# Patient Record
Sex: MALE
[De-identification: names, ages, dates, MRNs, and addresses within clinical notes are randomized per-mention and may not be internally consistent; named-entity substitution may affect disease eponyms.]

---

## 2017-07-23 NOTE — RAD
PROCEDURE:  Radiographs of the Left Forearm 



HISTORY:

2 broken needles in ARM s/p drug use







COMPARISON:

None available.



TECHNIQUE:

Frontal and lateral views obtained. 



FINDINGS:



BONES:

No fracture or destructive lesion.



JOINT SPACES:

Unremarkable.



OTHER FINDINGS:

Linear metallic radiopaque foreign bodies in the ventral soft tissues 

of proximal forearm, consistent with needles.



IMPRESSION:

Two needles in ventral soft tissues proximal left forearm.

## 2017-07-23 NOTE — ED PDOC
Arrival/HPI





- General


Chief Complaint: Abnormal Skin Integrity


Time Seen by Provider: 07/23/17 15:20


Historian: Patient





- History of Present Illness


Narrative History of Present Illness (Text): 





07/23/17 17:06


51-year-old male with a history of IV drug abuse presents today with concerns 

for broken needle in his skin. Patient states 2 days ago he was using heroin 

and the needle broke off near the antecubital region. Patient denies fevers or 

chills. Complaining of minimal pain. Denies numbness weakness or tingling in 

the extremity. Denies decreased range of motion of the extremity. Patient 

states one week ago this also happened and he was seen in the Hospital in 

[place] and they told him that he had a needle in the forearm. Patient states 

he was told that there was nothing that needed to be done and he was sent home.


Time/Duration: Other (2 days ago)


Symptom Onset: Sudden


Quality: Aching


Severity Level: 2





Past Medical History





- Provider Review


Nursing Documentation Reviewed: Yes





- Travel History


Have you recently traveled outside [place] w/in the past 3 mons?: No





- Tetanus Immunization


Tetanus Immunization: Unknown





- Cardiac


Hx Hypertension: Yes





- Psychiatric


Hx Substance Use: Yes (heroin, coccain)





- Surgical History


Other/Comment: hernia sx,





Family/Social History





- Physician Review


Nursing Documentation Reviewed: Yes


Family/Social History: Unknown Family HX


Smoking Status: Heavy Smoker > 10 Cigarettes Daily


Hx Alcohol Use: No


Hx Substance Use: Yes (heroin, coccain)





Allergies/Home Meds


Allergies/Adverse Reactions: 


Allergies





benztropine [From Cogentin] Allergy (Verified 07/23/17 15:08)


 SHORTNESS OF BREATH


chlorpromazine [From Thorazine] Allergy (Verified 07/23/17 15:08)


 URTICARIA


haloperidol [From Haldol] Allergy (Verified 07/23/17 15:08)


 SHORTNESS OF BREATH


phenytoin [From Dilantin] Allergy (Verified 07/23/17 15:08)


 SHORTNESS OF BREATH








Home Medications: 


 Home Meds











 Medication  Instructions  Recorded  Confirmed


 


Gabapentin [Neurontin] 600 mg PO TID 07/23/17 07/23/17


 


Lisinopril [Zestril] 5 mg PO DAILY 07/23/17 07/23/17














Review of Systems





- Review of Systems


Constitutional: absent: Fatigue, Fevers


Respiratory: absent: SOB, Cough


Cardiovascular: absent: Chest Pain, Palpitations


Gastrointestinal: absent: Abdominal Pain, Nausea, Vomiting


Musculoskeletal: Arthralgias


Skin: Other (fb in left forearm)


Neurological: absent: Headache, Dizziness





Physical Exam


Vital Signs Reviewed: Yes


Vital Signs











  Temp Pulse Resp BP Pulse Ox


 


 07/23/17 18:01   68  18  123/63  96


 


 07/23/17 16:35   71  18  125/65  96


 


 07/23/17 15:01  98.9 F  74  18  127/66  96











Temperature: Afebrile


Blood Pressure: Normal


Pulse: Regular


Respiratory Rate: Normal


Appearance: Positive for: Well-Appearing, Non-Toxic, Comfortable


Pain Distress: None


Mental Status: Positive for: Alert and Oriented X 3





- Systems Exam


Head: Present: Atraumatic


Mouth: Present: Moist Mucous Membranes


Neck: Present: Normal Range of Motion


Respiratory/Chest: Present: Clear to Auscultation, Good Air Exchange.  No: 

Respiratory Distress, Accessory Muscle Use


Cardiovascular: Present: Regular Rate and Rhythm, Normal S1, S2.  No: Murmurs


Upper Extremity: Present: Normal ROM, NORMAL PULSES, Neurovascularly Intact, 

Capillary Refill < 2s, Other (+ tract marks noted to left forearm and 

anticubital region; no palpable fb noted. no abscess or erythema noted. no 

edema. sensation and distal pulses intact; cap refill <2. full rom of entire 

extremity.  ).  No: Tenderness, Swelling, Erythema, Deformity


Neurological: Present: GCS=15, Speech Normal


Skin: Present: Warm, Dry


Psychiatric: Present: Alert, Oriented x 3





Medical Decision Making


ED Course and Treatment: 





07/23/17 17:09


51-year-old male with a history of IV drug abuse presents today with concerns 

for broken needle in the left arm





X-rays of the forearm shows 2 metallic foreign bodies within the volar aspect 

of the left forearm


X-rays of the humerus within normal limits








Case was discussed with Dr. Dunham: d/c home f/u in the clinic for 

evaluation and possible removal. 


Patient seen and evaluated by surgical resident dr. Cifuentes; no palpable fb. 

pt to f/u in surgical clinic.





tetanus updated


bactrim


keflex 


motrin








all results discussed with patient.


We will discharge the patient home with antibiotics. Advised follow-up in the 

surgical clinic ASAP.. Advised pt to return if symptoms worsen persist or if 

new concerning symptoms develop. stressed importance of close f/u and immediate 

return. advised repeat xrays. 





Patient verbalized full agreement with and understanding of discharge 

instructions. States that he agrees with the plan and disposition. Verbalized 

and repeated discharge instructions and plan. I have given the patient 

opportunity to ask any additional questions. 








all aspects of this case were discussed the attending of record. 








Impression: Foreign body skin


Motrin every 6 hours as needed for pain


Bactrim 1 tablet twice daily 7 days


Keflex 1 capsule 4 times daily 7 days


Follow-up with primary care physician within the next 2 days


Follow-up with the surgical clinic within the next 2 days


Return immediately if symptoms worsen persist or if new concerning symptoms 

develop high fevers, increasing pain, increasing redness, increasing swelling, 

purulent discharge








- RAD Interpretation


Radiology Orders: 








07/23/17 15:20


FOREARM LEFT [RAD] Stat 


HUMERUS LEFT [RAD] Stat 














- Medication Orders


Current Medication Orders: 











Discontinued Medications





Cephalexin Monohydrate (Keflex)  500 mg PO STAT STA


   PRN Reason: Protocol


   Stop: 07/23/17 17:57


Ibuprofen (Motrin Tab)  600 mg PO STAT STA


   Stop: 07/23/17 18:02


Tetanus/Reduced Diphtheria/Acell Pertussis (Boostrix Vaccine Inj)  0.5 ml IM 

.ONCE ONE


   Stop: 07/23/17 17:58


Trimethoprim/Sulfamethoxazole (Bactrim Ds Tab)  1 tab PO STAT STA


   PRN Reason: Protocol


   Stop: 07/23/17 17:57











Disposition/Present on Arrival





- Present on Arrival


Any Indicators Present on Arrival: No


History of DVT/PE: No


History of Uncontrolled Diabetes: No


Urinary Catheter: No


History of Decub. Ulcer: No


History Surgical Site Infection Following: None





- Disposition


Have Diagnosis and Disposition been Completed?: Yes


Diagnosis: 


 Foreign body in soft tissue





Disposition: HOME/ ROUTINE


Disposition Time: 17:10


Patient Plan: Discharge


Patient Problems: 


 Current Active Problems











Problem Status Onset


 


Foreign body in soft tissue Acute  











Condition: GOOD


Discharge Instructions (ExitCare):  Soft Tissue Foreign Body (ED)


Additional Instructions: 


Motrin every 6 hours as needed for pain


Bactrim 1 tablet twice daily 7 days


Keflex 1 capsule 4 times daily 7 days


Follow-up with primary care physician within the next 2 days


Follow-up with the surgical clinic within the next 2 days


Return immediately if symptoms worsen persist or if new concerning symptoms 

develop high fevers, increasing pain, increasing redness, increasing swelling, 

purulent discharge


Prescriptions: 


Cephalexin [Keflex] 500 mg PO QID #28 capsule


Ibuprofen [Motrin] 600 mg PO Q6H PRN #20 tab


 PRN Reason: pain/fever reduction


Sulfamethoxazole/Trimethoprim [Bactrim  mg-160 mg] 1 tab PO BID #14 tab


Referrals: 


Dylon Dunham MD [Staff Provider] - Follow up with primary


[facility] [Outside] - Follow up with primary


Forms:  WORK NOTE

## 2017-07-23 NOTE — RAD
PROCEDURE:  Radiographs of the left humerus.



HISTORY:

broken needle in arm



COMPARISON:

None.



FINDINGS:



BONES:

Normal. No fracture or focal lesion. 



SOFT TISSUES:

Normal.



OTHER FINDINGS:

None.



IMPRESSION:

Normal radiographs of left humerus.

## 2017-07-23 NOTE — CP.PCM.CON
History of Present Illness





- History of Present Illness


History of Present Illness: 





General Surgery Consult- Dr. Dunham





51M hx of IV drug abuse presents today with concerns for broken needle in his 

skin. Patient states 2 days ago he was using heroin and the needle broke off 

near the antecubital region. Patient denies fevers or chills. Complaining of 

minimal pain. Denies numbness weakness or tingling in the extremity. Denies 

decreased range of motion of the extremity. Patient states one week ago this 

also happened and he was seen in the Hospital in [place] and they told him 

that he had a needle in the forearm. Patient states he was told that there was 

nothing that needed to be done and he was sent home.


PMHx: none


PSH: None


ALL: Phenytoin, haloperidol, chlopromazine, benztropine


Socialhx: cocaine and heroin use. smoker 2ppd





Past Patient History





- Tetanus Immunizations


Tetanus Immunization: Unknown





- Past Social History


Smoking Status: Heavy Smoker > 10 Cigarettes Daily





- CARDIAC


Hx Hypertension: Yes





- PSYCHIATRIC


Hx Substance Use: Yes (heroin, coccain)





- SURGICAL HISTORY


Other/Comment: hernia sx,





Meds


Home Medications: 


 Home Medication List











 Medication  Instructions  Recorded  Confirmed  Type


 


Cephalexin [Keflex] 500 mg PO QID #28 capsule 07/23/17  Rx


 


Ibuprofen [Motrin] 600 mg PO Q6H PRN #20 tab 07/23/17  Rx


 


Sulfamethoxazole/Trimethoprim 1 tab PO BID #14 tab 07/23/17  Rx





[Bactrim  mg-160 mg]    











Allergies/Adverse Reactions: 


 Allergies











Allergy/AdvReac Type Severity Reaction Status Date / Time


 


benztropine [From Cogentin] Allergy  SHORTNESS Verified 07/23/17 15:08





   OF BREATH  


 


chlorpromazine Allergy  URTICARIA Verified 07/23/17 15:08





[From Thorazine]     


 


haloperidol [From Haldol] Allergy  SHORTNESS Verified 07/23/17 15:08





   OF BREATH  


 


phenytoin [From Dilantin] Allergy  SHORTNESS Verified 07/23/17 15:08





   OF BREATH  














Physical Exam





- Constitutional


Appears: Unkempt, Agitated, Confused





- Eye Exam


Eye Exam: EOMI.  absent: PERRL





- ENT Exam


ENT Exam: Mucous Membranes Moist





- Respiratory Exam


Respiratory Exam: Clear to Auscultation Bilateral, NORMAL BREATHING PATTERN.  

absent: Accessory Muscle Use, Wheezes





- Cardiovascular Exam


Cardiovascular Exam: REGULAR RHYTHM, +S1, +S2





- Rectal Exam


Rectal Exam: Deferred





- Extremities Exam


Additional comments: 





left anticubetal fossa burising from needle use. 





- Neurological Exam


Neurological exam: Alert, Oriented x3





- Skin


Additional comments: 





non-palpable needle





Results





- Vital Signs


Recent Vital Signs: 


 Last Vital Signs











Temp  98.9 F   07/23/17 15:01


 


Pulse  68   07/23/17 18:01


 


Resp  18   07/23/17 18:01


 


BP  123/63   07/23/17 18:01


 


Pulse Ox  96   07/23/17 18:01














Assessment & Plan





- Assessment and Plan (Free Text)


Assessment: 





51M hx of substance abuse presents with foreign body in Left arm


Plan: 





- pt neurovascular intact


- Xray shows two needles in arm.


- attempted to palpate and use US to extract the needle at bedside. Needle was 

not visible by US


- Pt is stable and neurovascular intact


- sent home on abx


- f/u as outpt 





D/W Dr. Roly Cifuentes PGY1





- Date & Time


Date: 07/23/17


Time: 15:30

## 2018-10-11 ENCOUNTER — HOSPITAL ENCOUNTER (EMERGENCY)
Dept: HOSPITAL 42 - ED | Age: 53
Discharge: HOME | End: 2018-10-11
Payer: MEDICAID

## 2018-10-11 ENCOUNTER — HOSPITAL ENCOUNTER (EMERGENCY)
Dept: HOSPITAL 42 - ED | Age: 53
Discharge: LEFT BEFORE DISCHARGE | End: 2018-10-11
Payer: MEDICAID

## 2018-10-11 VITALS — RESPIRATION RATE: 18 BRPM | TEMPERATURE: 98.2 F

## 2018-10-11 VITALS
TEMPERATURE: 98.5 F | OXYGEN SATURATION: 96 % | HEART RATE: 96 BPM | DIASTOLIC BLOOD PRESSURE: 84 MMHG | SYSTOLIC BLOOD PRESSURE: 160 MMHG

## 2018-10-11 VITALS — DIASTOLIC BLOOD PRESSURE: 100 MMHG | OXYGEN SATURATION: 99 % | HEART RATE: 98 BPM | SYSTOLIC BLOOD PRESSURE: 182 MMHG

## 2018-10-11 VITALS — BODY MASS INDEX: 25.8 KG/M2

## 2018-10-11 VITALS — RESPIRATION RATE: 20 BRPM

## 2018-10-11 DIAGNOSIS — R33.9: Primary | ICD-10-CM
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
## 2018-10-11 DIAGNOSIS — S37.30XA: Primary | ICD-10-CM
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
## 2018-10-11 LAB
BASOPHILS # BLD AUTO: 0.02 K/MM3 (ref 0–2)
BASOPHILS NFR BLD: 0.2 % (ref 0–3)
BUN SERPL-MCNC: 12 MG/DL (ref 7–21)
CALCIUM SERPL-MCNC: 9.8 MG/DL (ref 8.4–10.5)
EOSINOPHIL # BLD: 0 10*3/UL (ref 0–0.7)
EOSINOPHIL NFR BLD: 0.4 % (ref 1.5–5)
ERYTHROCYTE [DISTWIDTH] IN BLOOD BY AUTOMATED COUNT: 13.4 % (ref 11.5–14.5)
GFR NON-AFRICAN AMERICAN: > 60
GRANULOCYTES # BLD: 6.87 10*3/UL (ref 1.4–6.5)
GRANULOCYTES NFR BLD: 73.4 % (ref 50–68)
HGB BLD-MCNC: 15.3 G/DL (ref 14–18)
LYMPHOCYTES # BLD: 2 10*3/UL (ref 1.2–3.4)
LYMPHOCYTES NFR BLD AUTO: 20.9 % (ref 22–35)
MCH RBC QN AUTO: 30.2 PG (ref 25–35)
MCHC RBC AUTO-ENTMCNC: 34.2 G/DL (ref 31–37)
MCV RBC AUTO: 88.2 FL (ref 80–105)
MONOCYTES # BLD AUTO: 0.5 10*3/UL (ref 0.1–0.6)
MONOCYTES NFR BLD: 5.1 % (ref 1–6)
PLATELET # BLD: 271 10^3/UL (ref 120–450)
PMV BLD AUTO: 9.6 FL (ref 7–11)
RBC # BLD AUTO: 5.07 10^6/UL (ref 3.5–6.1)
WBC # BLD AUTO: 9.4 10^3/UL (ref 4.5–11)

## 2018-10-11 PROCEDURE — 99283 EMERGENCY DEPT VISIT LOW MDM: CPT

## 2018-10-11 PROCEDURE — 96372 THER/PROPH/DIAG INJ SC/IM: CPT

## 2018-10-11 PROCEDURE — 80048 BASIC METABOLIC PNL TOTAL CA: CPT

## 2018-10-11 PROCEDURE — 99284 EMERGENCY DEPT VISIT MOD MDM: CPT

## 2018-10-11 PROCEDURE — 96374 THER/PROPH/DIAG INJ IV PUSH: CPT

## 2018-10-11 PROCEDURE — 85025 COMPLETE CBC W/AUTO DIFF WBC: CPT

## 2018-10-11 NOTE — CP.PCM.CON
History of Present Illness





- History of Present Illness


History of Present Illness: 


Surgery: Dr. Love 





Reason for consult: urinary retention and urethral stricture 





51 y/o male pmhx of opioids abuse and dependence currently on methadone with 

recent placement of penile prosthesis in August 2018 presents complaining of 

severe pain at the tip of his penis and urinary retention since 3am. Patient 

states he was unable to void since 3am. He reports manipulating the prosthesis 

this am in attempts to assist with voiding and felt a "pop"/burst sensation. He 

did have the prosthesis completely deflated.  He denies f/c/n/v/. Per patient he

has been told he has a urethral stricture in the past, requiring dilation during

penile prosthesis procedure for glaser. 











Review of Systems





- Review of Systems


All systems: reviewed and no additional remarkable complaints except


Review of Systems: 





unless stated in HPI





Past Patient History





- Infectious Disease


Hx of Infectious Diseases: None





- Tetanus Immunizations


Tetanus Immunization: Unknown





- Past Social History


Smoking Status: Heavy Smoker > 10 Cigarettes Daily





- CARDIAC


Hx Hypertension: Yes





- GENITOURINARY/GYNECOLOGICAL


Other/Comment: penile implant





- PSYCHIATRIC


Hx Substance Use: Yes (heroin, coccain)





- SURGICAL HISTORY


Other/Comment: hernia sx,





- ANESTHESIA


Hx Anesthesia Reactions: No





Meds


Allergies/Adverse Reactions: 


                                    Allergies











Allergy/AdvReac Type Severity Reaction Status Date / Time


 


benztropine [From Cogentin] Allergy  SHORTNESS Verified 10/11/18 07:56





   OF BREATH  


 


chlorpromazine Allergy  URTICARIA Verified 10/11/18 07:56





[From Thorazine]     


 


haloperidol [From Haldol] Allergy  SHORTNESS Verified 10/11/18 07:56





   OF BREATH  


 


phenytoin [From Dilantin] Allergy  SHORTNESS Verified 10/11/18 07:56





   OF BREATH  














Physical Exam





- Constitutional


Appears: In Acute Distress





- Head Exam


Head Exam: ATRAUMATIC, NORMOCEPHALIC





- Eye Exam


Eye Exam: EOMI, Normal appearance





- ENT Exam


ENT Exam: Mucous Membranes Moist





- Respiratory Exam


Respiratory Exam: NORMAL BREATHING PATTERN.  absent: Respiratory Distress





- Cardiovascular Exam


Cardiovascular Exam: REGULAR RHYTHM.  absent: Tachycardia





-  Exam


 Exam: Circumcision, Bladder Distension.  absent: Scrotal Swelling (no 

thethering of prosthesis to the scrotum, penile shaft implant deflated), 

Testicular Tenderness, Uretheral Discharge





Assessment & Plan





- Assessment and Plan (Free Text)


Assessment: 





51 y/o male w/ malfunctioning penile prosthesis, no sign of infection, with 

urethral stricture and urinary retention 2/2 to above


Plan: 





-urethral stricture dilated with mosquito clamp and 10Fr glaser placed, clear 

yellow urine returned, no hematuria 


-patient instructed to f/u with primary urologist as soon as possible, being 

today or tomorrow for further evaluation 


-Urologist Dr. Jorge David in the [place]


-d/c patient with leg bag to continue urinary drainage until seen by primary 

urologist 


-recommend Keflex upon d/c 


-d/w ED attending Dr. Wolfe and urologist Dr. Kate Whitlock PGY4





- Date & Time


Date: 10/11/18


Time: 09:00

## 2018-10-11 NOTE — ED PDOC
Arrival/HPI





- General


Chief Complaint: Male Genitourinary


Time Seen by Provider: 10/11/18 12:30


Historian: Patient





- History of Present Illness


Narrative History of Present Illness (Text): 





10/11/18 12:40


52 M, returns to the Emergency department after self removal of Glaser catheter 

that was inserted about 1 hour prior to arrival. Patient presented to the 

Emergency department this morning for urine retention and was discharged at 

09:48 after FC was inserted.  Patient states he pulled his FC out of place and 

is in pain. Patient denies any other complaints. 











Time/Duration: Prior to Arrival (pt returned to Emergency department after self 

removal of FC )


Symptom Onset: Sudden


Symptom Course: Unchanged


Activities at Onset: Other (self removal of FC )





Past Medical History





- Provider Review


Nursing Documentation Reviewed: Yes





- Infectious Disease


Hx of Infectious Diseases: None





- Tetanus Immunization


Tetanus Immunization: Unknown





- Cardiac


Hx Hypertension: Yes





- Pulmonary


Hx Respiratory Disorders: No





- Neurological


Hx Neurological Disorder: No





- Genitourinary/Gynecological


Other/Comment: penile implant





- Psychiatric


Hx Substance Use: Yes (heroin, coccain)





- Surgical History


Other/Comment: hernia sx,





- Anesthesia


Hx Anesthesia Reactions: No





Family/Social History





- Physician Review


Nursing Documentation Reviewed: Yes


Family/Social History: No Known Family HX


Smoking Status: Heavy Smoker > 10 Cigarettes Daily


Hx Alcohol Use: No


Hx Substance Use: Yes (heroin, coccain)





Allergies/Home Meds


Allergies/Adverse Reactions: 


Allergies





benztropine [From Cogentin] Allergy (Verified 10/11/18 07:56)


   SHORTNESS OF BREATH


chlorpromazine [From Thorazine] Allergy (Verified 10/11/18 07:56)


   URTICARIA


haloperidol [From Haldol] Allergy (Verified 10/11/18 07:56)


   SHORTNESS OF BREATH


phenytoin [From Dilantin] Allergy (Verified 10/11/18 07:56)


   SHORTNESS OF BREATH








Home Medications: 


                                    Home Meds











 Medication  Instructions  Recorded  Confirmed


 


Gabapentin [Neurontin] 600 mg PO TID 07/23/17 07/23/17


 


Lisinopril [Zestril] 5 mg PO DAILY 07/23/17 07/23/17














Review of Systems





- Physician Review


All systems were reviewed & negative as marked: Yes (All other systems negative 

except that noted in the HPI.)





Physical Exam





- Physical Exam


Narrative Physical Exam (Text): 





10/11/18 12:40


Gen: VS reviewed, alert, well developed, well nourished, nontoxic, mild 

distress.





ENT: normal pharynx





Eye: EOMI, PERRL





Neck: no JVD, supple, no adenopathy





CV: regular rate, regular rhythm, no rubs, no murmer, no gallops, S1, S2, pulses

 equal and strong





Pulm: no distress, clear to auscultation, no wheeze, no rhonci, breath sounds 

equal, no rales





Abd: soft, nontender, no guarding, no rebound, no rigidity, normal bowel sounds





Ext: no edema.  





Genitourinary: FC is in place. It is draining clear urine. 





Skin: good color, no rash, no cyanosis





Psych: responds appropriately to questions, normal affect





Neuro: oriented x3, CN2-12 intact grossly, motor intact, sensation intact








Vital Signs Reviewed: Yes





Vital Signs











  Temp Pulse Resp BP Pulse Ox


 


 10/11/18 11:37  97.8 F  102 H  20  152/98 H  98


 


 10/11/18 11:33  97.8 F  102 H  20  152/98 H  98











Temperature: Afebrile


Blood Pressure: Hypertensive (at 152/98)


Pulse: Tachycardic (at 102)


Respiratory Rate: Normal


Appearance: Positive for: Well-Appearing, Non-Toxic


Pain Distress: Mild


Mental Status: Positive for: Alert and Oriented X 3





Medical Decision Making


ED Course and Treatment: 





10/11/18 12:40


Impression:








Differential Diagnosis included but are not limited to:  





Plan:


-- Morphine 


-- Pack ice - LG/SM routine


-- Reassess and disposition





Prior Visits:


Notes and results from previous visits were reviewed. Patient was last seen in 

the emergency department earlier today morning 10/11/18 for urinary retention. 

Patient was discharged home in stable condition and directed to follow up with 

his Urologist as soon as possible. 





Progress Notes:








10/11/18 14:15


events: patient came to the ED because he attempted to rip out his glaser 

catheter. patient states at that time there was transient blood coming from the 

catheter. upon arrival to the ED, there was no blood noted around the catheter 

or inside the urine bag. in fact, the catheter was draining urine appropriately 

but was disconnected from the collection bag (this is was disconnected prior to 

my eval). urology, dr. barker was contacted and at that time it was agreed that 

since the glaser was appropriately drainaing urine, there was no indication to 

alter management. moments later, the patient was complaining that blood is 

coming from the tube but the blood that is seen on the bed stretcher, it is 

unclear if the blood was coming from the catheter or the urethra on repeat 

inspection. dr. barker was contacted again and he recommended to remove the 

glaser catheter. dr. barker states that he is willing to see the patient in the 

ED but is currently help up with office patients. i went back to explain this to

 the patient and he states he is willing to wait for the urologist. i told the 

patient that i need to remove the catheter but he clearly refused removal of the

 catheter. 


10/11/18 15:44


patient left the ED, walked out. i did not witness the patient leaving the ED.





- Medication Orders


Current Medication Orders: 














Discontinued Medications





Morphine Sulfate (Morphine)  2 mg IM STAT STA


   Stop: 10/11/18 12:49


   Last Admin: 10/11/18 13:06  Dose: 2 mg





MAR Pain Assessment


 Document     10/11/18 13:06  [facility]  (Rec: 10/11/18 13:07  [id number]


     Pain Reassessment


      Is this a pain reassessment?               Yes


IM Administration Charges


 Document     10/11/18 13:06  [facility]  (Rec: 10/11/18 13:07  [id number]


     Charges for Administration


      # of IM Administrations                    1














- Scribe Statement


The provider has reviewed the documentation as recorded by the Scribe





Fely Hughes 





All medical record entries made by the Scribe were at my direction and 

personally dictated by me. I have reviewed the chart and agree that the record 

accurately reflects my personal performance of the history, physical exam, 

medical decision making, and the department course for this patient. I have also

 personally directed, reviewed, and agree with the discharge instructions and 

disposition.





Disposition/Present on Arrival





- Present on Arrival


Any Indicators Present on Arrival: No


History of DVT/PE: No


History of Uncontrolled Diabetes: No


Urinary Catheter: No


History of Decub. Ulcer: No


History Surgical Site Infection Following: None





- Disposition


Have Diagnosis and Disposition been Completed?: Yes


Diagnosis: 


 Internal injury, urethra, closed





Disposition: ELOPEMENT - ER ONLY


Disposition Time: 19:18


Condition: STABLE


Forms:  [employer] (English)

## 2018-10-11 NOTE — ED PDOC
Arrival/HPI





- General


Chief Complaint: Male Genitourinary


Time Seen by Provider: 10/11/18 07:47


Historian: Patient





- History of Present Illness


Narrative History of Present Illness (Text): 





10/11/18 08:25


52 M, whose past medical history includes hypertension and IV drug abuse, who 

presents to the Emergency department with cc of urinary retention. Patient 

reports last urination was at 03:00 this morning, and has not urinated since. 

Patient states his bladder feels full and notes he has not been able to urinate.

Patient had a penile implant on 08/13/18. Patient denies any other complaints. 








Urologist: Dr. David ([place], [place]) 








Time/Duration: Other (notes his last urination was 03:00 this morning, states 

bladder feels full. )


Symptom Onset: Sudden


Symptom Course: Unchanged


Activities at Onset: Light





Past Medical History





- Provider Review


Nursing Documentation Reviewed: Yes





- Infectious Disease


Hx of Infectious Diseases: None





- Tetanus Immunization


Tetanus Immunization: Unknown





- Cardiac


Hx Hypertension: Yes





- Genitourinary/Gynecological


Other/Comment: penile implant





- Psychiatric


Hx Substance Use: Yes (heroin, coccain)





- Surgical History


Other/Comment: hernia sx,





- Anesthesia


Hx Anesthesia Reactions: No





Family/Social History





- Physician Review


Nursing Documentation Reviewed: Yes


Family/Social History: No Known Family HX


Smoking Status: Heavy Smoker > 10 Cigarettes Daily


Hx Alcohol Use: No


Hx Substance Use: Yes (heroin, coccain)





Allergies/Home Meds


Allergies/Adverse Reactions: 


Allergies





benztropine [From Cogentin] Allergy (Verified 10/11/18 07:56)


   SHORTNESS OF BREATH


chlorpromazine [From Thorazine] Allergy (Verified 10/11/18 07:56)


   URTICARIA


haloperidol [From Haldol] Allergy (Verified 10/11/18 07:56)


   SHORTNESS OF BREATH


phenytoin [From Dilantin] Allergy (Verified 10/11/18 07:56)


   SHORTNESS OF BREATH








Home Medications: 


                                    Home Meds











 Medication  Instructions  Recorded  Confirmed


 


Gabapentin [Neurontin] 600 mg PO TID 07/23/17 07/23/17


 


Lisinopril [Zestril] 5 mg PO DAILY 07/23/17 07/23/17














Review of Systems





- Physician Review


All systems were reviewed & negative as marked: Yes (All other systems negative 

except that noted in the HPI.)





Physical Exam





- Physical Exam


Narrative Physical Exam (Text): 





10/11/18 08:25


Gen: VS reviewed, alert, well developed, well nourished, nontoxic, mild 

distress.





ENT: normal pharynx





Eye: EOMI, PERRL





Neck: no JVD, supple, no adenopathy





CV: regular rate, regular rhythm, no rubs, no murmer, no gallops, S1, S2, pulses

 equal and strong





Pulm: no distress, clear to auscultation, no wheeze, no rhonci, breath sounds 

equal, no rales





Abd: soft, nontender, no guarding, no rebound, no rigidity, normal bowel sounds





Ext: no edema





Skin: good color, no rash, no cyanosis





Psych: responds appropriately to questions, normal affect





Neuro: oriented x3, CN2-12 intact grossly, motor intact, sensation intact.





Very small urethral opening. No redness around genital area. No tenderness 

around genital area. 








Vital Signs Reviewed: Yes





Vital Signs











  Temp Pulse Resp BP Pulse Ox


 


 10/11/18 07:49  98.2 F  81  18  189/109 H  97











Temperature: Afebrile


Blood Pressure: Hypertensive (at 189/109)


Pulse: Regular


Respiratory Rate: Normal


Appearance: Positive for: Well-Appearing, Non-Toxic


Pain Distress: Mild


Mental Status: Positive for: Alert and Oriented X 3





Medical Decision Making


ED Course and Treatment: 





10/11/18 08:25





Impression:








Differential Diagnosis included but are not limited to:  





Plan:


-- BMP


-- CBC (with differential) 


-- Bladder scan 


-- Morphine


-- Reassess and disposition





Prior Visits:


Notes and results from previous visits were reviewed. 





Progress Notes:


10/11/18 08:40


Surgical resident is at bedside for evaluation. 





10/11/18 09:13


Pt appears to be very uncomfortable during procedure. Dose of Morphine ordered 

for analgesia. Asked nurse to please administer expeditiously. 








10/11/18 09:47


glaser catheter placed by surgical residents under the supervision of dr. barker.

 patient reported resolution of pain. patient is already taking po antibiotics 

for uti. methadone dose confirmed by clinic to be 180mg daily. patient will make

 an immediate follow up with his regular urologist.


10/11/18 10:15


the discussed methadone dose was only discussed in reference to the patient, not

 the patient's female significant other. the patient's methadone dose was 

confirmed at clinic and ordered to be given to the patient.





- Scribe Statement


The provider has reviewed the documentation as recorded by the Karenibe





Fely Hughes 





All medical record entries made by the Karenibe were at my direction and 

personally dictated by me. I have reviewed the chart and agree that the record 

accurately reflects my personal performance of the history, physical exam, 

medical decision making, and the department course for this patient. I have also

 personally directed, reviewed, and agree with the discharge instructions and 

disposition.





Disposition/Present on Arrival





- Present on Arrival


Any Indicators Present on Arrival: No


History of DVT/PE: No


History of Uncontrolled Diabetes: No


Urinary Catheter: No


History of Decub. Ulcer: No


History Surgical Site Infection Following: None





- Disposition


Have Diagnosis and Disposition been Completed?: Yes


Diagnosis: 


 Urine retention





Disposition: HOME/ ROUTINE


Disposition Time: 09:48


Patient Plan: Discharge


Patient Problems: 


                             Current Active Problems











Problem Status Onset


 


Urine retention Acute 











Condition: STABLE


Discharge Instructions (ExitCare):  Urinary Retention


Additional Instructions: 


Follow up with your urologist as soon as possible. Follow up with your primary 

care doctor to recheck your elevated blood pressure.





DOROTHY REYNOSO, thank you for letting us take care of you today. Your provider 

was Dr. Zay Wolfe and you were treated for URINARY RETENTION. The e

[facility] medical care you received today was directed at your acute symptoms. If

 you were prescribed any medication, please fill it and take as directed. It may

 take several days for your symptoms to resolve. Return to the Emergency 

Department if your symptoms worsen, do not improve, or if you have any other 

problems.





Please contact your doctor or call one of the physicians/clinics you have been 

referred to that are listed on the Patient Visit Information form that is 

included in your discharge packet. Bring any paperwork you were given at 

discharge with you along with any medications you are taking to your follow up 

visit. Our treatment cannot replace ongoing medical care by a primary care 

provider outside of the emergency department.





Thank you for allowing the [facility] team to be part of your care today.








If you had an X-Ray or CT scan: A Radiologist will review the ED reading if any 

change in treatment is needed we will contact you.***





If you had a blood, urine, or wound culture: It will take several days for the 

results, if any change in treatment is needed we will contact you.***





If you had an STI test: It will take 48 hours for the results. Please call after

 1 week if you have not heard back.***


Forms:  [employer] (English)

## 2018-11-07 ENCOUNTER — HOSPITAL ENCOUNTER (EMERGENCY)
Dept: HOSPITAL 42 - ED | Age: 53
Discharge: HOME | End: 2018-11-07
Payer: MEDICAID

## 2018-11-07 VITALS — HEART RATE: 75 BPM | DIASTOLIC BLOOD PRESSURE: 85 MMHG | OXYGEN SATURATION: 100 % | SYSTOLIC BLOOD PRESSURE: 154 MMHG

## 2018-11-07 VITALS — RESPIRATION RATE: 18 BRPM | TEMPERATURE: 98.8 F

## 2018-11-07 VITALS — BODY MASS INDEX: 24.2 KG/M2

## 2018-11-07 DIAGNOSIS — I10: ICD-10-CM

## 2018-11-07 DIAGNOSIS — R56.9: Primary | ICD-10-CM

## 2018-11-07 DIAGNOSIS — F17.210: ICD-10-CM

## 2018-11-07 DIAGNOSIS — E11.9: ICD-10-CM

## 2018-11-07 DIAGNOSIS — Z79.4: ICD-10-CM

## 2018-11-07 LAB
ALBUMIN SERPL-MCNC: 4.3 G/DL (ref 3–4.8)
ALBUMIN/GLOB SERPL: 1.3 {RATIO} (ref 1.1–1.8)
ALT SERPL-CCNC: 32 U/L (ref 7–56)
AST SERPL-CCNC: 40 U/L (ref 17–59)
BASOPHILS # BLD AUTO: 0.02 K/MM3 (ref 0–2)
BASOPHILS NFR BLD: 0.2 % (ref 0–3)
BUN SERPL-MCNC: 12 MG/DL (ref 7–21)
CALCIUM SERPL-MCNC: 8.9 MG/DL (ref 8.4–10.5)
EOSINOPHIL # BLD: 0.1 10*3/UL (ref 0–0.7)
EOSINOPHIL NFR BLD: 0.6 % (ref 1.5–5)
ERYTHROCYTE [DISTWIDTH] IN BLOOD BY AUTOMATED COUNT: 14 % (ref 11.5–14.5)
GFR NON-AFRICAN AMERICAN: > 60
GRANULOCYTES # BLD: 5.35 10*3/UL (ref 1.4–6.5)
GRANULOCYTES NFR BLD: 63.7 % (ref 50–68)
HGB BLD-MCNC: 13.8 G/DL (ref 14–18)
LYMPHOCYTES # BLD: 2.6 10*3/UL (ref 1.2–3.4)
LYMPHOCYTES NFR BLD AUTO: 30.4 % (ref 22–35)
MCH RBC QN AUTO: 29.6 PG (ref 25–35)
MCHC RBC AUTO-ENTMCNC: 33.8 G/DL (ref 31–37)
MCV RBC AUTO: 87.6 FL (ref 80–105)
MONOCYTES # BLD AUTO: 0.4 10*3/UL (ref 0.1–0.6)
MONOCYTES NFR BLD: 5.1 % (ref 1–6)
PLATELET # BLD: 236 10^3/UL (ref 120–450)
PMV BLD AUTO: 9.5 FL (ref 7–11)
RBC # BLD AUTO: 4.66 10^6/UL (ref 3.5–6.1)
WBC # BLD AUTO: 8.4 10^3/UL (ref 4.5–11)

## 2018-11-07 NOTE — CT
Date of service: 



11/07/2018



PROCEDURE:  CT HEAD WITHOUT CONTRAST.



HISTORY:

seizure



COMPARISON:

None available.



TECHNIQUE:

Axial computed tomography images were obtained through the head/brain 

without intravenous contrast.  



Radiation dose:



Total exam DLP = 868.0 mGy-cm.



This CT exam was performed using one or more of the following dose 

reduction techniques: Automated exposure control, adjustment of the 

mA and/or kV according to patient size, and/or use of iterative 

reconstruction technique.



FINDINGS:



HEMORRHAGE:

No intracranial hemorrhage. 



BRAIN:

No mass effect or edema.  No atrophy or chronic microvascular 

ischemic changes.



VENTRICLES:

Unremarkable. No hydrocephalus. 



CALVARIUM:

Unremarkable.



PARANASAL SINUSES:

Unremarkable as visualized. No significant inflammatory changes.



MASTOID AIR CELLS:

Unremarkable as visualized. No inflammatory changes.



OTHER FINDINGS:

The report concurs with the preliminary [facility] report



IMPRESSION:

No acute intracranial findings

## 2018-11-07 NOTE — ED PDOC
Arrival/HPI





- General


Chief Complaint: Seizure


Time Seen by Provider: 11/07/18 04:15


Historian: Patient





- History of Present Illness


Narrative History of Present Illness (Text): 





11/07/18 04:27


53 year old male, whose past medical history includes IDDM, IV drug abuse, 

Hypertension, Epilepsy, and Seizure disorder (Klonipan 2mg), presents to the 

emergency department s/p unwitnessed seizure in [facility]. Patient states 

he fell back and hit his head, but denies any loss of consciousness. Patient 

reports he missed his dose of Klonipan today and did not eat at all. He admits 

to drinking Vodka and a couple beers tonight. The patient denies any fever, 

chills, chest pain, shortness of breath, abdominal pain, nausea, vomiting, 

diarrhea, urinary symptoms, back pain, neck pain, headache, dizziness, or any 

other complaints. 


Time/Duration: Prior to Arrival


Symptom Onset: Sudden


Symptom Course: Improving


Activities at Onset: Light


Context: Walking, Street





Past Medical History





- Provider Review


Nursing Documentation Reviewed: Yes





- Infectious Disease


Hx of Infectious Diseases: None





- Tetanus Immunization


Tetanus Immunization: Unknown





- Cardiac


Hx Hypertension: Yes





- Pulmonary


Hx Respiratory Disorders: No





- Neurological


Hx Neurological Disorder: No


Hx Seizures: Yes





- Genitourinary/Gynecological


Other/Comment: penile implant





- Psychiatric


Hx Substance Use: Yes (heroin, coccain)





- Surgical History


Other/Comment: hernia sx,





- Anesthesia


Hx Anesthesia Reactions: No





Family/Social History





- Physician Review


Nursing Documentation Reviewed: Yes


Family/Social History: No Known Family HX


Smoking Status: Heavy Smoker > 10 Cigarettes Daily


Hx Alcohol Use: No


Hx Substance Use: Yes (heroin, coccain)





Allergies/Home Meds


Allergies/Adverse Reactions: 


Allergies





benztropine [From Cogentin] Allergy (Verified 10/11/18 07:56)


   SHORTNESS OF BREATH


chlorpromazine [From Thorazine] Allergy (Verified 10/11/18 07:56)


   URTICARIA


haloperidol [From Haldol] Allergy (Verified 10/11/18 07:56)


   SHORTNESS OF BREATH


phenytoin [From Dilantin] Allergy (Verified 10/11/18 07:56)


   SHORTNESS OF BREATH








Home Medications: 


                                    Home Meds











 Medication  Instructions  Recorded  Confirmed


 


Gabapentin [Neurontin] 600 mg PO TID 07/23/17 11/07/18


 


RX: Lisinopril [Zestril] 5 mg PO DAILY 07/23/17 11/07/18


 


Clonazepam [Klonopin] 2 mg PO DAILY 11/07/18 11/07/18














Review of Systems





- Physician Review


All systems were reviewed & negative as marked: Yes





- Review of Systems


Constitutional: absent: Fevers, Other (Chills)


Respiratory: absent: SOB


Cardiovascular: absent: Chest Pain


Gastrointestinal: absent: Abdominal Pain, Diarrhea, Nausea, Vomiting


Genitourinary Male: absent: Frequency


Musculoskeletal: absent: Back Pain, Neck Pain


Neurological: Seizure.  absent: Headache, Dizziness





Physical Exam


Vital Signs Reviewed: Yes





Vital Signs











  Temp Pulse Resp BP Pulse Ox


 


 11/07/18 04:09  98.8 F  76  18  172/96 H  95











Temperature: Afebrile


Blood Pressure: Hypertensive


Pulse: Regular


Respiratory Rate: Normal


Appearance: Positive for: Well-Appearing, Non-Toxic, Comfortable


Pain Distress: None


Mental Status: Positive for: Alert and Oriented X 3





- Systems Exam


Head: Present: Atraumatic, Normocephalic, Other (hematoma on the optical area of

 head.)


Pupils: Present: PERRL


Extroacular Muscles: Present: EOMI


Conjunctiva: Present: Normal


Mouth: Present: Moist Mucous Membranes


Neck: Present: Normal Range of Motion


Respiratory/Chest: Present: Clear to Auscultation, Good Air Exchange.  No: 

Respiratory Distress, Accessory Muscle Use


Cardiovascular: Present: Regular Rate and Rhythm, Normal S1, S2.  No: Murmurs


Abdomen: No: Tenderness, Distention, Peritoneal Signs


Back: Present: Normal Inspection


Upper Extremity: Present: Normal Inspection.  No: Cyanosis, Edema


Lower Extremity: Present: Normal Inspection.  No: Edema


Neurological: Present: GCS=15, CN II-XII Intact, Speech Normal


Skin: Present: Warm, Dry, Normal Color.  No: Rashes


Psychiatric: Present: Alert, Oriented x 3, Normal Insight, Normal Concentration





Medical Decision Making


ED Course and Treatment: 





11/07/18 04:27


Impression:


53 year old male presents s/p seizure episode. Patient did not take his dose of 

Klonipan today. 





Plan:


-- CT Head w/o contrast 


-- Labs


-- Tylenol  


-- Reassess and disposition





Prior Visits:


Notes and results from previous visits were reviewed.





Progress Notes:





EXAM: CT SCAN OF THE BRAIN WITHOUT IV CONTRAST 


Electronically signed on Nov 7, 2018 5:39:17 AM EST by:


Marisol Herzog M.D


IMPRESSION:


Normal unenhanced CT scan of the brain.





- Lab Interpretations


I have reviewed the lab results: Yes





- Scribe Statement


The provider has reviewed the documentation as recorded by the Karenibe





Frank Engel





Provider Scribe Attestation:


All medical record entries made by the Scribe were at my direction and 

personally dictated by me. I have reviewed the chart and agree that the record 

accurately reflects my personal performance of the history, physical exam, 

medical decision making, and the department course for this patient. I have also

 personally directed, reviewed, and agree with the discharge instructions and 

disposition.








Disposition/Present on Arrival





- Present on Arrival


Any Indicators Present on Arrival: No


History of DVT/PE: No


History of Uncontrolled Diabetes: No


Urinary Catheter: No


History of Decub. Ulcer: No


History Surgical Site Infection Following: None





- Disposition


Have Diagnosis and Disposition been Completed?: Yes


Diagnosis: 


 Seizure





Disposition: HOME/ ROUTINE


Disposition Time: 06:30


Condition: GOOD


Discharge Instructions (ExitCare):  Seizures, Adult (DC)


Forms:  [facility] [facility] [employer] [facility] (English)

## 2020-02-25 VITALS
OXYGEN SATURATION: 95 % | WEIGHT: 175.05 LBS | TEMPERATURE: 98 F | DIASTOLIC BLOOD PRESSURE: 97 MMHG | HEIGHT: 67 IN | HEART RATE: 95 BPM | RESPIRATION RATE: 18 BRPM | SYSTOLIC BLOOD PRESSURE: 157 MMHG

## 2020-02-25 LAB
ANION GAP SERPL CALC-SCNC: 3 MMOL/L — LOW (ref 9–16)
APAP SERPL-MCNC: <2 UG/ML — SIGNIFICANT CHANGE UP (ref 10–30)
BASOPHILS # BLD AUTO: 0.01 K/UL — SIGNIFICANT CHANGE UP (ref 0–0.2)
BASOPHILS NFR BLD AUTO: 0.1 % — SIGNIFICANT CHANGE UP (ref 0–2)
BUN SERPL-MCNC: 13 MG/DL — SIGNIFICANT CHANGE UP (ref 7–23)
CALCIUM SERPL-MCNC: 9.3 MG/DL — SIGNIFICANT CHANGE UP (ref 8.5–10.5)
CHLORIDE SERPL-SCNC: 96 MMOL/L — SIGNIFICANT CHANGE UP (ref 96–108)
CO2 SERPL-SCNC: 34 MMOL/L — HIGH (ref 22–31)
CREAT SERPL-MCNC: 1.09 MG/DL — SIGNIFICANT CHANGE UP (ref 0.5–1.3)
EOSINOPHIL # BLD AUTO: 0.01 K/UL — SIGNIFICANT CHANGE UP (ref 0–0.5)
EOSINOPHIL NFR BLD AUTO: 0.1 % — SIGNIFICANT CHANGE UP (ref 0–6)
ETHANOL SERPL-MCNC: <3 MG/DL — SIGNIFICANT CHANGE UP
GLUCOSE SERPL-MCNC: 134 MG/DL — HIGH (ref 70–99)
HCT VFR BLD CALC: 39.9 % — SIGNIFICANT CHANGE UP (ref 39–50)
HGB BLD-MCNC: 13.4 G/DL — SIGNIFICANT CHANGE UP (ref 13–17)
IMM GRANULOCYTES NFR BLD AUTO: 0.1 % — SIGNIFICANT CHANGE UP (ref 0–1.5)
LYMPHOCYTES # BLD AUTO: 2.43 K/UL — SIGNIFICANT CHANGE UP (ref 1–3.3)
LYMPHOCYTES # BLD AUTO: 35.4 % — SIGNIFICANT CHANGE UP (ref 13–44)
MCHC RBC-ENTMCNC: 29 PG — SIGNIFICANT CHANGE UP (ref 27–34)
MCHC RBC-ENTMCNC: 33.6 GM/DL — SIGNIFICANT CHANGE UP (ref 32–36)
MCV RBC AUTO: 86.4 FL — SIGNIFICANT CHANGE UP (ref 80–100)
MONOCYTES # BLD AUTO: 0.7 K/UL — SIGNIFICANT CHANGE UP (ref 0–0.9)
MONOCYTES NFR BLD AUTO: 10.2 % — SIGNIFICANT CHANGE UP (ref 2–14)
NEUTROPHILS # BLD AUTO: 3.7 K/UL — SIGNIFICANT CHANGE UP (ref 1.8–7.4)
NEUTROPHILS NFR BLD AUTO: 54.1 % — SIGNIFICANT CHANGE UP (ref 43–77)
NRBC # BLD: 0 /100 WBCS — SIGNIFICANT CHANGE UP (ref 0–0)
PLATELET # BLD AUTO: 241 K/UL — SIGNIFICANT CHANGE UP (ref 150–400)
POTASSIUM SERPL-MCNC: 3.8 MMOL/L — SIGNIFICANT CHANGE UP (ref 3.5–5.3)
POTASSIUM SERPL-SCNC: 3.8 MMOL/L — SIGNIFICANT CHANGE UP (ref 3.5–5.3)
RBC # BLD: 4.62 M/UL — SIGNIFICANT CHANGE UP (ref 4.2–5.8)
RBC # FLD: 13.6 % — SIGNIFICANT CHANGE UP (ref 10.3–14.5)
SALICYLATES SERPL-MCNC: 1.4 MG/DL — LOW (ref 2.8–20)
SODIUM SERPL-SCNC: 133 MMOL/L — SIGNIFICANT CHANGE UP (ref 132–145)
TSH SERPL-MCNC: 1.56 UIU/ML — SIGNIFICANT CHANGE UP (ref 0.36–3.74)
WBC # BLD: 6.86 K/UL — SIGNIFICANT CHANGE UP (ref 3.8–10.5)
WBC # FLD AUTO: 6.86 K/UL — SIGNIFICANT CHANGE UP (ref 3.8–10.5)

## 2020-02-25 PROCEDURE — 71045 X-RAY EXAM CHEST 1 VIEW: CPT | Mod: 26

## 2020-02-25 PROCEDURE — 90792 PSYCH DIAG EVAL W/MED SRVCS: CPT | Mod: GT

## 2020-02-25 RX ORDER — ACETAMINOPHEN 500 MG
975 TABLET ORAL ONCE
Refills: 0 | Status: COMPLETED | OUTPATIENT
Start: 2020-02-25 | End: 2020-02-25

## 2020-02-25 RX ADMIN — Medication 975 MILLIGRAM(S): at 21:55
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
## 2020-02-25 NOTE — ED BEHAVIORAL HEALTH ASSESSMENT NOTE - DIFFERENTIAL
schizoaffective d/o bipolar type  cocaine use d/o  cannabis use d/o   opioid use d/o in remission  r/o substance induced psychosis/mood d/o
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
## 2020-02-25 NOTE — ED BEHAVIORAL HEALTH ASSESSMENT NOTE - ACTIVATING EVENTS/STRESSORS
Inadequate social supports/Substance intoxication or withdrawal/Non-compliant or not receiving treatment

## 2020-02-25 NOTE — ED BEHAVIORAL HEALTH ASSESSMENT NOTE - MEDICAL ISSUES AND PLAN (INCLUDE STANDING AND PRN MEDICATION)
Defer to ED provider, please see ED provider note.  Per [facility], patient picked up Percocet 10-325mg tid on 2/1, would prescribe lower dosage for PRN for pain
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
## 2020-02-25 NOTE — ED BEHAVIORAL HEALTH ASSESSMENT NOTE - PSYCHIATRIC ISSUES AND PLAN (INCLUDE STANDING AND PRN MEDICATION)
standing medication defer to primary inpatient team, day team to clarify Methadone treatment program/dosage.  Provide Haldol 5mg q 6 hours PRN for agitation (IM or p.o.), Ativan 2mg q 6 hours PRN for agitation (IM or p.o.), Benadryl 50mg q 6 hours PRN for agitation (IM or p.o.)

## 2020-02-25 NOTE — ED PROVIDER NOTE - ENMT, MLM
s/p total tooth extraction , dry blood, Airway patent, Nasal mucosa clear. Throat has no vesicles, no oropharyngeal exudates and uvula is midline.
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
## 2020-02-25 NOTE — ED PROVIDER NOTE - CONSTITUTIONAL, MLM
normal... anxious , awake, alert, oriented to person, place, time/situation and in no apparent distress.

## 2020-02-25 NOTE — ED BEHAVIORAL HEALTH ASSESSMENT NOTE - PERSONAL COLLATERAL NAME
History and physical documented and up to date, allergies reviewed, lab results reviewed, pre-procedure education provided, patient verbalized understanding of procedure, procedural consent signed and patient is NPO. Tonya Buchanan

## 2020-02-25 NOTE — ED BEHAVIORAL HEALTH ASSESSMENT NOTE - DESCRIPTION (FIRST USE, LAST USE, QUANTITY, FREQUENCY, DURATION)
2-3 cigarettes daily social drinking edible MJ use intermittently intermittent use, last used few days ago h/o use, reports in Methadone program currently prescription use
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
## 2020-02-25 NOTE — ED BEHAVIORAL HEALTH ASSESSMENT NOTE - DESCRIPTION
please see [facility] note separately    istop  Reference #: [id number]    Others' Prescriptions  Patient Name:	Edmund Bueno	YOB: 1965  Address:	[address] [place] [address]	Sex:	Male  Rx Written	Rx Dispensed	Drug	Quantity	Days Supply	Prescriber Name  01/31/2020	02/01/2020	oxycodone-acetaminophen  mg tab	75	25	Chance Lay MD  12/13/2019	12/16/2019	oxycodone-acetaminophen  mg tab	75	25	Cristino Marsh K  09/27/2019	09/30/2019	oxycodone-acetaminophen  mg tab	60	30	Sudeep Graham  Patient Name:	Edmund Bueno	YOB: 1965  Address:	[address] [place] [address]	Sex:	Male  Rx Written	Rx Dispensed	Drug	Quantity	Days Supply	Prescriber Name  01/15/2020	01/17/2020	oxycodone-acetaminophen  mg tab	32	10	Chance Lay MD  12/18/2019	12/18/2019	dextroamp-amphetamin 30 mg tab	30	15	Bienvenido Gleason R (MD)  12/04/2019	12/16/2019	testosterone cyp 200 mg/ml	4ml	28	Bienvenido Gleason R (MD)  12/04/2019	12/04/2019	lorazepam 2 mg tablet	60	30	Bienvenido Gleason R (MD) s/p teeth extraction, self reported diabetes with neuropathy but glucose test was normal at ED, denied taking insulin or meds at home, reports h/o chronic pituitary tumors that do not require acute treatment see hpi

## 2020-02-25 NOTE — ED BEHAVIORAL HEALTH ASSESSMENT NOTE - OTHER
gf substance use, not currently in psych treatment defer to ED deferred to ED dysarthria due to s/p teeth extraction

## 2020-02-25 NOTE — ED BEHAVIORAL HEALTH ASSESSMENT NOTE - SUICIDE RISK FACTORS
Psychotic disorder current/past/Conduct problems current/past/Mood Disorder current/past/Unable to engage in safety planning/Alcohol/Substance abuse disorders/Insomnia

## 2020-02-25 NOTE — ED BEHAVIORAL HEALTH ASSESSMENT NOTE - ADDITIONAL DETAILS ALL
Patient reports active SI with plan and intent to slit wrist and run into traffic, denied prior attempt

## 2020-02-25 NOTE — ED BEHAVIORAL HEALTH ASSESSMENT NOTE - AXIS IV
Problem related to social environment/Problems with interaction with legal system/Occupational problems/Economic problems/Problems with primary support

## 2020-02-26 ENCOUNTER — INPATIENT (INPATIENT)
Facility: HOSPITAL | Age: 55
LOS: 6 days | Discharge: HOME | DRG: 885 | End: 2020-03-04
Attending: PSYCHIATRY & NEUROLOGY | Admitting: PSYCHIATRY & NEUROLOGY
Payer: COMMERCIAL

## 2020-02-26 DIAGNOSIS — F25.0 SCHIZOAFFECTIVE DISORDER, BIPOLAR TYPE: ICD-10-CM

## 2020-02-26 DIAGNOSIS — E13.9 OTHER SPECIFIED DIABETES MELLITUS WITHOUT COMPLICATIONS: ICD-10-CM

## 2020-02-26 DIAGNOSIS — F39 UNSPECIFIED MOOD [AFFECTIVE] DISORDER: ICD-10-CM

## 2020-02-26 DIAGNOSIS — D35.2 BENIGN NEOPLASM OF PITUITARY GLAND: ICD-10-CM

## 2020-02-26 DIAGNOSIS — G40.919 EPILEPSY, UNSPECIFIED, INTRACTABLE, WITHOUT STATUS EPILEPTICUS: ICD-10-CM

## 2020-02-26 DIAGNOSIS — F11.11 OPIOID ABUSE, IN REMISSION: ICD-10-CM

## 2020-02-26 DIAGNOSIS — K08.89 OTHER SPECIFIED DISORDERS OF TEETH AND SUPPORTING STRUCTURES: ICD-10-CM

## 2020-02-26 DIAGNOSIS — E11.69 TYPE 2 DIABETES MELLITUS WITH OTHER SPECIFIED COMPLICATION: ICD-10-CM

## 2020-02-26 DIAGNOSIS — I10 ESSENTIAL (PRIMARY) HYPERTENSION: ICD-10-CM

## 2020-02-26 LAB
ALBUMIN SERPL ELPH-MCNC: 3.9 G/DL — SIGNIFICANT CHANGE UP (ref 3.3–5)
ALP SERPL-CCNC: 76 U/L — SIGNIFICANT CHANGE UP (ref 40–120)
ALT FLD-CCNC: 19 U/L — SIGNIFICANT CHANGE UP (ref 10–45)
APPEARANCE UR: CLEAR — SIGNIFICANT CHANGE UP
AST SERPL-CCNC: 20 U/L — SIGNIFICANT CHANGE UP (ref 10–40)
BILIRUB DIRECT SERPL-MCNC: <0.2 MG/DL — SIGNIFICANT CHANGE UP (ref 0–0.2)
BILIRUB INDIRECT FLD-MCNC: >0.1 MG/DL — LOW (ref 0.2–1)
BILIRUB SERPL-MCNC: 0.3 MG/DL — SIGNIFICANT CHANGE UP (ref 0.2–1.2)
BILIRUB UR-MCNC: ABNORMAL
CHOLEST SERPL-MCNC: 144 MG/DL — SIGNIFICANT CHANGE UP (ref 10–199)
COLOR SPEC: YELLOW — SIGNIFICANT CHANGE UP
DIFF PNL FLD: NEGATIVE — SIGNIFICANT CHANGE UP
GLUCOSE BLDC GLUCOMTR-MCNC: 156 MG/DL — HIGH (ref 70–99)
GLUCOSE UR QL: 500
HAV IGM SER-ACNC: SIGNIFICANT CHANGE UP
HBA1C BLD-MCNC: 8.5 % — HIGH (ref 4–5.6)
HBV CORE IGM SER-ACNC: SIGNIFICANT CHANGE UP
HBV SURFACE AG SER-ACNC: SIGNIFICANT CHANGE UP
HCV AB S/CO SERPL IA: 8.95 S/CO — SIGNIFICANT CHANGE UP
HCV AB SERPL-IMP: REACTIVE
HDLC SERPL-MCNC: 54 MG/DL — SIGNIFICANT CHANGE UP
HIV 1+2 AB+HIV1 P24 AG SERPL QL IA: SIGNIFICANT CHANGE UP
INR BLD: 1.08 — SIGNIFICANT CHANGE UP (ref 0.88–1.16)
KETONES UR-MCNC: ABNORMAL MG/DL
LEUKOCYTE ESTERASE UR-ACNC: NEGATIVE — SIGNIFICANT CHANGE UP
LIPID PNL WITH DIRECT LDL SERPL: 78 MG/DL — SIGNIFICANT CHANGE UP
NITRITE UR-MCNC: NEGATIVE — SIGNIFICANT CHANGE UP
PCP SPEC-MCNC: SIGNIFICANT CHANGE UP
PH UR: 5.5 — SIGNIFICANT CHANGE UP (ref 5–8)
PROT SERPL-MCNC: 7.2 G/DL — SIGNIFICANT CHANGE UP (ref 6–8.3)
PROT UR-MCNC: NEGATIVE MG/DL — SIGNIFICANT CHANGE UP
PROTHROM AB SERPL-ACNC: 12.4 SEC — SIGNIFICANT CHANGE UP (ref 10–12.9)
SP GR SPEC: >=1.03 — SIGNIFICANT CHANGE UP (ref 1–1.03)
TOTAL CHOLESTEROL/HDL RATIO MEASUREMENT: 2.7 RATIO — LOW (ref 3.4–9.6)
TRIGL SERPL-MCNC: 58 MG/DL — SIGNIFICANT CHANGE UP (ref 10–149)
UROBILINOGEN FLD QL: 0.2 E.U./DL — SIGNIFICANT CHANGE UP

## 2020-02-26 PROCEDURE — 93010 ELECTROCARDIOGRAM REPORT: CPT

## 2020-02-26 PROCEDURE — 99223 1ST HOSP IP/OBS HIGH 75: CPT

## 2020-02-26 PROCEDURE — 99285 EMERGENCY DEPT VISIT HI MDM: CPT

## 2020-02-26 PROCEDURE — 70450 CT HEAD/BRAIN W/O DYE: CPT | Mod: 26

## 2020-02-26 RX ORDER — METHADONE HYDROCHLORIDE 40 MG/1
200 TABLET ORAL DAILY
Refills: 0 | Status: DISCONTINUED | OUTPATIENT
Start: 2020-02-26 | End: 2020-03-04

## 2020-02-26 RX ORDER — IBUPROFEN 200 MG
600 TABLET ORAL EVERY 6 HOURS
Refills: 0 | Status: DISCONTINUED | OUTPATIENT
Start: 2020-02-26 | End: 2020-02-27

## 2020-02-26 RX ORDER — INSULIN GLARGINE 100 [IU]/ML
20 INJECTION, SOLUTION SUBCUTANEOUS AT BEDTIME
Refills: 0 | Status: DISCONTINUED | OUTPATIENT
Start: 2020-02-26 | End: 2020-03-04

## 2020-02-26 RX ORDER — DEXTROSE 50 % IN WATER 50 %
25 SYRINGE (ML) INTRAVENOUS ONCE
Refills: 0 | Status: DISCONTINUED | OUTPATIENT
Start: 2020-02-26 | End: 2020-03-04

## 2020-02-26 RX ORDER — OXYCODONE AND ACETAMINOPHEN 5; 325 MG/1; MG/1
1 TABLET ORAL EVERY 8 HOURS
Refills: 0 | Status: DISCONTINUED | OUTPATIENT
Start: 2020-02-26 | End: 2020-02-26

## 2020-02-26 RX ORDER — SODIUM CHLORIDE 9 MG/ML
1000 INJECTION, SOLUTION INTRAVENOUS
Refills: 0 | Status: DISCONTINUED | OUTPATIENT
Start: 2020-02-26 | End: 2020-03-04

## 2020-02-26 RX ORDER — NICOTINE POLACRILEX 2 MG
1 GUM BUCCAL DAILY
Refills: 0 | Status: DISCONTINUED | OUTPATIENT
Start: 2020-02-26 | End: 2020-03-04

## 2020-02-26 RX ORDER — GABAPENTIN 400 MG/1
300 CAPSULE ORAL THREE TIMES A DAY
Refills: 0 | Status: DISCONTINUED | OUTPATIENT
Start: 2020-02-26 | End: 2020-02-27

## 2020-02-26 RX ORDER — DEXTROSE 50 % IN WATER 50 %
12.5 SYRINGE (ML) INTRAVENOUS ONCE
Refills: 0 | Status: DISCONTINUED | OUTPATIENT
Start: 2020-02-26 | End: 2020-03-04

## 2020-02-26 RX ORDER — MAGNESIUM HYDROXIDE 400 MG/1
30 TABLET, CHEWABLE ORAL DAILY
Refills: 0 | Status: DISCONTINUED | OUTPATIENT
Start: 2020-02-26 | End: 2020-03-04

## 2020-02-26 RX ORDER — ACETAMINOPHEN 500 MG
650 TABLET ORAL EVERY 6 HOURS
Refills: 0 | Status: DISCONTINUED | OUTPATIENT
Start: 2020-02-26 | End: 2020-02-27

## 2020-02-26 RX ORDER — QUETIAPINE FUMARATE 200 MG/1
50 TABLET, FILM COATED ORAL EVERY 6 HOURS
Refills: 0 | Status: DISCONTINUED | OUTPATIENT
Start: 2020-02-26 | End: 2020-03-04

## 2020-02-26 RX ORDER — GLUCAGON INJECTION, SOLUTION 0.5 MG/.1ML
1 INJECTION, SOLUTION SUBCUTANEOUS ONCE
Refills: 0 | Status: DISCONTINUED | OUTPATIENT
Start: 2020-02-26 | End: 2020-03-04

## 2020-02-26 RX ORDER — DIVALPROEX SODIUM 500 MG/1
500 TABLET, DELAYED RELEASE ORAL AT BEDTIME
Refills: 0 | Status: DISCONTINUED | OUTPATIENT
Start: 2020-02-26 | End: 2020-03-01

## 2020-02-26 RX ORDER — DEXTROSE 50 % IN WATER 50 %
15 SYRINGE (ML) INTRAVENOUS ONCE
Refills: 0 | Status: DISCONTINUED | OUTPATIENT
Start: 2020-02-26 | End: 2020-03-04

## 2020-02-26 RX ORDER — INSULIN LISPRO 100/ML
VIAL (ML) SUBCUTANEOUS
Refills: 0 | Status: DISCONTINUED | OUTPATIENT
Start: 2020-02-26 | End: 2020-03-04

## 2020-02-26 RX ADMIN — Medication 650 MILLIGRAM(S): at 21:27

## 2020-02-26 RX ADMIN — METHADONE HYDROCHLORIDE 200 MILLIGRAM(S): 40 TABLET ORAL at 12:01

## 2020-02-26 RX ADMIN — Medication 1: at 12:10

## 2020-02-26 RX ADMIN — GABAPENTIN 300 MILLIGRAM(S): 400 CAPSULE ORAL at 08:01

## 2020-02-26 RX ADMIN — Medication 4: at 16:45

## 2020-02-26 RX ADMIN — GABAPENTIN 300 MILLIGRAM(S): 400 CAPSULE ORAL at 21:23

## 2020-02-26 RX ADMIN — DIVALPROEX SODIUM 500 MILLIGRAM(S): 500 TABLET, DELAYED RELEASE ORAL at 21:23

## 2020-02-26 RX ADMIN — Medication 600 MILLIGRAM(S): at 16:48

## 2020-02-26 RX ADMIN — Medication 650 MILLIGRAM(S): at 13:04

## 2020-02-26 RX ADMIN — Medication 2 MILLIGRAM(S): at 14:47

## 2020-02-26 RX ADMIN — INSULIN GLARGINE 20 UNIT(S): 100 INJECTION, SOLUTION SUBCUTANEOUS at 21:26

## 2020-02-26 RX ADMIN — Medication 975 MILLIGRAM(S): at 08:01

## 2020-02-26 RX ADMIN — Medication 600 MILLIGRAM(S): at 17:33

## 2020-02-26 RX ADMIN — Medication 650 MILLIGRAM(S): at 12:00

## 2020-02-26 RX ADMIN — Medication 2 MILLIGRAM(S): at 21:26

## 2020-02-26 RX ADMIN — GABAPENTIN 300 MILLIGRAM(S): 400 CAPSULE ORAL at 14:47

## 2020-02-26 NOTE — BEHAVIORAL HEALTH ASSESSMENT NOTE - PROBLEM SELECTOR PLAN 3
Monitor blood sugar with PRN sliding scale insulin - Continue to monitor  - If elevated will restart patient on low dose of home Lisinopril
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
## 2020-02-26 NOTE — BEHAVIORAL HEALTH ASSESSMENT NOTE - SUICIDE PROTECTIVE FACTORS
Fear of death or the actual act of killing self/Positive therapeutic relationships/Responsibility to family and others/Identifies reasons for living

## 2020-02-26 NOTE — BEHAVIORAL HEALTH ASSESSMENT NOTE - NSBHCHARTREVIEWLAB_PSY_A_CORE FT
13.4   6.86  )-----------( 241      ( 25 Feb 2020 19:55 )             39.9    Liver panel pending.  ID labs pending.

## 2020-02-26 NOTE — BEHAVIORAL HEALTH ASSESSMENT NOTE - DIFFERENTIAL
schizoaffective d/o bipolar type  cluster B personality disorder  cocaine use d/o  cannabis use d/o   opioid use d/o in remission  r/o substance induced psychosis/mood d/o schizoaffective d/o bipolar type vs bipolar d/o  cluster B personality disorder  cocaine use d/o  cannabis use d/o   opioid use d/o in remission  r/o substance induced psychosis/mood d/o

## 2020-02-26 NOTE — BEHAVIORAL HEALTH ASSESSMENT NOTE - ACTIVATING EVENTS/STRESSORS
Acute medical problem/Non-compliant or not receiving treatment/Triggering events leading to humiliation, shame, and/or despair (e.g. Loss of relationship, financial or health status) (real or anticipated)/Substance intoxication or withdrawal

## 2020-02-26 NOTE — BEHAVIORAL HEALTH ASSESSMENT NOTE - OTHER PAST PSYCHIATRIC HISTORY (INCLUDE DETAILS REGARDING ONSET, COURSE OF ILLNESS, INPATIENT/OUTPATIENT TREATMENT)
Previous dx of bipolar disorder with apparent intellectual, social and conduct limitations Previous dx of bipolar disorder with apparent intellectual, social and conduct limitations. Prior hospitalizations. Prior dx of ADHD, depression.

## 2020-02-26 NOTE — BEHAVIORAL HEALTH ASSESSMENT NOTE - NSBHREFERDETAILS_PSY_A_CORE_FT
Patient seen at [facility] and transferred directly from [facility] ED to [facility] Patient seen at [facility] and transferred [facility] [place] Patient seen at [facility] and transferred [address] [place].

## 2020-02-26 NOTE — BEHAVIORAL HEALTH ASSESSMENT NOTE - SUICIDE RISK FACTORS
History of abuse/trauma/Family History of psychiatric diagnoses requiring hospitalization/Current mood episode/Alcohol/Substance abuse disorders/Access to lethal methods (pills, firearm, etc.: Ask specifically about presence or absence of a firearm in the home or ease of accessing/Conduct problems current/past/ADHD current/past/Cluster B Personality disorders or traits current/past

## 2020-02-26 NOTE — BEHAVIORAL HEALTH ASSESSMENT NOTE - PAST PSYCHOTROPIC MEDICATION
prozac prozac, lithium (not well tolerated for unknown reason), past trials of Haldol and Thorazine which resulted in significant EPS. Seroquel poorly tolerated.
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
## 2020-02-26 NOTE — BEHAVIORAL HEALTH ASSESSMENT NOTE - DESCRIPTION (FIRST USE, LAST USE, QUANTITY, FREQUENCY, DURATION)
2-3 cigs/day Has been drinking pint of vodka few times per week since separation Occasional edibles Nasal inhalation, used in past few weeks

## 2020-02-26 NOTE — BEHAVIORAL HEALTH ASSESSMENT NOTE - PROBLEM SELECTOR PLAN 4
Depakote - Head CT in ED wnl but non-sensitive to pituitary, may consider endocrinology involvement and MRI but currently asymptomatic  - Previously on testosterone but will hold due to possible mood effects

## 2020-02-26 NOTE — BEHAVIORAL HEALTH ASSESSMENT NOTE - NSBHADMITIPSTRENGTH_PSY_A_CORE
Has supportive interpersonal relationships with family, friends or peers/Awareness of substance use issues/Motivated and ready for change

## 2020-02-26 NOTE — BEHAVIORAL HEALTH ASSESSMENT NOTE - OTHER
Patient typically domiciled but has been displaced for several weeks, staying with uncle/friends/streets Patient fixed on breakup with wife few weeks ago and recent home displacement it has caused him hostile at times but able to cooperate with full exam. frustration tolerance diminished. regarding wife's indiscretions
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
## 2020-02-26 NOTE — BEHAVIORAL HEALTH ASSESSMENT NOTE - NSBHCHARTREVIEWVS_PSY_A_CORE FT
Vital Signs Last 24 Hrs  T(C): 36.9 (26 Feb 2020 09:00), Max: 37.2 (25 Feb 2020 22:43)  T(F): 98.4 (26 Feb 2020 09:00), Max: 98.9 (25 Feb 2020 22:43)  HR: 84 (26 Feb 2020 09:00) (84 - 105)  BP: 144/83 (26 Feb 2020 09:00) (132/80 - 157/97)  BP(mean): --  RR: 20 (26 Feb 2020 09:00) (16 - 20)  SpO2: 96% (26 Feb 2020 09:00) (95% - 97%)
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
## 2020-02-26 NOTE — BEHAVIORAL HEALTH ASSESSMENT NOTE - PROBLEM SELECTOR PLAN 1
- Start Depakote ER 500mg qHS with VPA level on 3/1/20 with mind to up-titrate  - Group and milieu therapy, to start individual therapy  - Continue discharge planning
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
## 2020-02-26 NOTE — BEHAVIORAL HEALTH ASSESSMENT NOTE - PROBLEM SELECTOR PROBLEM 2
Pituitary adenoma Type 2 diabetes mellitus with other specified complication, unspecified whether long term insulin use

## 2020-02-26 NOTE — BEHAVIORAL HEALTH ASSESSMENT NOTE - NSBHSOCIALHXDETAILSFT_PSY_A_CORE
Patient decided it was not worth repercussions to have firearm in his possession given his incarceration history so patient gave gun to friend.

## 2020-02-26 NOTE — BEHAVIORAL HEALTH ASSESSMENT NOTE - PROBLEM SELECTOR PLAN 2
Head CT in ED shows no acute treatment necessary, consider endocrinology involvement - Continue to monitor fingersticks  - Sliding scale PRN - Continue to monitor fingersticks  - Continue home dose of Lantus 20units at bedtime (as per PCP Dr. Gleason wanted to increase dose to 40units at bedtime on next visit)  - Sliding scale PRN  - Consider endocrine consultation if continued deranged fingersticks especially given pituitary concerns  - Dietary consultation placed in context of recent dental operation and dietary needs - Continue to monitor fingersticks  - Continue home dose of Lantus 20units at bedtime (as per PCP Dr. Gleason wanted to increase dose to 40units at bedtime on next visit)  - Sliding scale PRN  - Consider endocrine consultation if continued deranged fingersticks especially given pituitary concerns  - Dietary consultation placed in context of recent dental operation and dietary needs  - Neuropathic pain to continue Gabapentin 300mg TID with mind to increase to home dose of 600mg TID

## 2020-02-26 NOTE — BEHAVIORAL HEALTH ASSESSMENT NOTE - COMMENTS ON SUICIDE RISK/PROTECTIVE FACTORS:
Patient feels he is important and impact to wife's 5 yo son (from previous marriage) Patient feels he is important and impact to wife's 7 yo son (not biological, from previous marriage)

## 2020-02-26 NOTE — BEHAVIORAL HEALTH ASSESSMENT NOTE - NS ED BHA SUICIDALITY PRESENT CURRENT INTENT DETAILS COLLATERAL FT
As per ED note: mother reports patient is not at baseline for past few months and feels he has been delusional, paranoid and fixated about gf as. She does not  believe his report on his gf is accurate

## 2020-02-26 NOTE — BEHAVIORAL HEALTH ASSESSMENT NOTE - COMMENTS ON VIOLENCE RISK/PROTECTIVE FACTORS:
Patient aware of repercussions if he were to act on his feelings, does not want to go back to shelter
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
## 2020-02-27 LAB — T PALLIDUM AB TITR SER: NEGATIVE — SIGNIFICANT CHANGE UP

## 2020-02-27 PROCEDURE — 99232 SBSQ HOSP IP/OBS MODERATE 35: CPT

## 2020-02-27 RX ORDER — TRAMADOL HYDROCHLORIDE 50 MG/1
50 TABLET ORAL EVERY 6 HOURS
Refills: 0 | Status: DISCONTINUED | OUTPATIENT
Start: 2020-02-27 | End: 2020-03-03

## 2020-02-27 RX ORDER — GABAPENTIN 400 MG/1
600 CAPSULE ORAL THREE TIMES A DAY
Refills: 0 | Status: DISCONTINUED | OUTPATIENT
Start: 2020-02-27 | End: 2020-03-04

## 2020-02-27 RX ORDER — ACETAMINOPHEN 500 MG
1000 TABLET ORAL EVERY 8 HOURS
Refills: 0 | Status: DISCONTINUED | OUTPATIENT
Start: 2020-02-27 | End: 2020-02-27

## 2020-02-27 RX ORDER — TRAMADOL HYDROCHLORIDE 50 MG/1
25 TABLET ORAL EVERY 6 HOURS
Refills: 0 | Status: DISCONTINUED | OUTPATIENT
Start: 2020-02-27 | End: 2020-02-27

## 2020-02-27 RX ORDER — ACETAMINOPHEN 500 MG
1000 TABLET ORAL EVERY 8 HOURS
Refills: 0 | Status: DISCONTINUED | OUTPATIENT
Start: 2020-02-27 | End: 2020-03-04

## 2020-02-27 RX ORDER — IBUPROFEN 200 MG
800 TABLET ORAL EVERY 6 HOURS
Refills: 0 | Status: DISCONTINUED | OUTPATIENT
Start: 2020-02-27 | End: 2020-03-04

## 2020-02-27 RX ORDER — TRAMADOL HYDROCHLORIDE 50 MG/1
25 TABLET ORAL ONCE
Refills: 0 | Status: DISCONTINUED | OUTPATIENT
Start: 2020-02-27 | End: 2020-02-27

## 2020-02-27 RX ADMIN — TRAMADOL HYDROCHLORIDE 25 MILLIGRAM(S): 50 TABLET ORAL at 16:50

## 2020-02-27 RX ADMIN — Medication 650 MILLIGRAM(S): at 07:00

## 2020-02-27 RX ADMIN — GABAPENTIN 300 MILLIGRAM(S): 400 CAPSULE ORAL at 06:58

## 2020-02-27 RX ADMIN — Medication 1: at 16:44

## 2020-02-27 RX ADMIN — TRAMADOL HYDROCHLORIDE 25 MILLIGRAM(S): 50 TABLET ORAL at 17:50

## 2020-02-27 RX ADMIN — TRAMADOL HYDROCHLORIDE 50 MILLIGRAM(S): 50 TABLET ORAL at 22:07

## 2020-02-27 RX ADMIN — TRAMADOL HYDROCHLORIDE 25 MILLIGRAM(S): 50 TABLET ORAL at 17:00

## 2020-02-27 RX ADMIN — DIVALPROEX SODIUM 500 MILLIGRAM(S): 500 TABLET, DELAYED RELEASE ORAL at 21:41

## 2020-02-27 RX ADMIN — TRAMADOL HYDROCHLORIDE 25 MILLIGRAM(S): 50 TABLET ORAL at 17:55

## 2020-02-27 RX ADMIN — Medication 2 MILLIGRAM(S): at 09:51

## 2020-02-27 RX ADMIN — GABAPENTIN 600 MILLIGRAM(S): 400 CAPSULE ORAL at 21:41

## 2020-02-27 RX ADMIN — GABAPENTIN 300 MILLIGRAM(S): 400 CAPSULE ORAL at 13:30

## 2020-02-27 RX ADMIN — TRAMADOL HYDROCHLORIDE 50 MILLIGRAM(S): 50 TABLET ORAL at 21:41

## 2020-02-27 RX ADMIN — METHADONE HYDROCHLORIDE 200 MILLIGRAM(S): 40 TABLET ORAL at 09:52

## 2020-02-27 RX ADMIN — Medication 650 MILLIGRAM(S): at 07:01

## 2020-02-27 RX ADMIN — Medication 600 MILLIGRAM(S): at 11:22

## 2020-02-27 RX ADMIN — Medication 1: at 11:21

## 2020-02-27 RX ADMIN — INSULIN GLARGINE 20 UNIT(S): 100 INJECTION, SOLUTION SUBCUTANEOUS at 22:01

## 2020-02-27 RX ADMIN — Medication 600 MILLIGRAM(S): at 12:20

## 2020-02-27 NOTE — PROGRESS NOTE BEHAVIORAL HEALTH - OTHER
hostile at times but able to cooperate with full exam. frustration tolerance diminished. regarding wife's indiscretions paranoid superficially cooperative regarding partner's indiscretions
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
## 2020-02-27 NOTE — PROGRESS NOTE BEHAVIORAL HEALTH - PROBLEM SELECTOR PLAN 6
- NSAIDs PRN  - Follow-up outpatient post-discharge - increase Tylenol to 1000mg TID prn   - increase Motrin to 800mg q6hr  - increase Gabapentin to 600mg TID  - Follow-up outpatient post-discharge - increase Tylenol to 1000mg TID prn   - increase Motrin to 800mg q6hr  - increase Gabapentin to 600mg TID  - start Tramadol 25mg q6hr prn severe pain  - Follow-up outpatient post-discharge
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
## 2020-02-27 NOTE — PROGRESS NOTE BEHAVIORAL HEALTH - NSBHFUPVIOLFT_PSY_A_CORE
Patient feels aggression toward girlfriend during current struggles but reports he would not act on thoughts.

## 2020-02-27 NOTE — PROGRESS NOTE BEHAVIORAL HEALTH - PROBLEM SELECTOR PLAN 2
- Continue to monitor fingersticks  - Continue home dose of Lantus 20units at bedtime (as per PCP Dr. Gleason wanted to increase dose to 40units at bedtime on next visit)  - Sliding scale PRN  - Consider endocrine consultation if continued deranged fingersticks especially given pituitary concerns  - Dietary consultation placed in context of recent dental operation and dietary needs  - Neuropathic pain to continue Gabapentin 300mg TID with mind to increase to home dose of 600mg TID

## 2020-02-27 NOTE — PROGRESS NOTE BEHAVIORAL HEALTH - NSBHFUPINTERVALHXFT_PSY_A_CORE
Pt. seen, chart reviewed, case discussed with the Interdisciplinary Treatment Team. As per Nursing Report, pt has been irritable, cooperative, and compliant with medications.  Pt. seen individually, he is lying in his bed, dressed in hospital-provided clothing. Pt. reports to be feeling “depressed” and is fixated on his past with his partner. Also reports dental pain s/p teeth removal. He has been eating and sleeping well. Patient is paranoid of partners new boyfriends coming to the unit to harm him. Pt. denies SI/HI/AH/VH. No side effects to medications reported or observed. Pt. seen, chart reviewed, case discussed with the Interdisciplinary Treatment Team. As per Nursing Report, pt has been irritable, cooperative, and compliant with medications.  Pt. seen individually, he is lying in his bed, dressed in hospital-provided clothing. Pt. reports to be feeling “depressed” and is fixated on his past with his partner. Also reports dental pain s/p teeth removal. He has been eating and sleeping well. Patient is paranoid of partners new boyfriends coming to the unit to harm him. Pt. denies SI/HI/AH/VH. Pt. reports some mild sedation since starting Depakote but as he has been on it before appreciates this is temporary.  Pt. also continues to endorse tooth pain and requests increasing current pain medications.  Pt. also made several requests for Adderall during today's evaluation. Pt. seen, chart reviewed, case discussed with the Interdisciplinary Treatment Team. As per Nursing Report, pt has been irritable, cooperative, and compliant with medications.  Pt. seen individually, he is lying in his bed, dressed in hospital-provided clothing. Pt. reports to be feeling “depressed” and is fixated on his past with his partner. Also reports dental pain s/p teeth removal. He has been eating and sleeping well. Patient is paranoid of partners new boyfriends coming to the unit to harm him. Pt. denies SI/HI/AH/VH. Pt. reports some mild sedation since starting Depakote but as he has been on it before appreciates this is temporary.  Pt. also continues to endorse tooth pain and requests increasing current pain medications as well as Tramadol.  Pt. also made several requests for Adderall during today's evaluation.

## 2020-02-28 PROCEDURE — 99232 SBSQ HOSP IP/OBS MODERATE 35: CPT

## 2020-02-28 RX ORDER — HYDROXYZINE HCL 10 MG
50 TABLET ORAL AT BEDTIME
Refills: 0 | Status: DISCONTINUED | OUTPATIENT
Start: 2020-02-28 | End: 2020-03-04

## 2020-02-28 RX ADMIN — TRAMADOL HYDROCHLORIDE 50 MILLIGRAM(S): 50 TABLET ORAL at 11:20

## 2020-02-28 RX ADMIN — Medication 1000 MILLIGRAM(S): at 03:06

## 2020-02-28 RX ADMIN — INSULIN GLARGINE 20 UNIT(S): 100 INJECTION, SOLUTION SUBCUTANEOUS at 21:32

## 2020-02-28 RX ADMIN — Medication 2 MILLIGRAM(S): at 03:06

## 2020-02-28 RX ADMIN — Medication 2 MILLIGRAM(S): at 12:36

## 2020-02-28 RX ADMIN — GABAPENTIN 600 MILLIGRAM(S): 400 CAPSULE ORAL at 10:19

## 2020-02-28 RX ADMIN — GABAPENTIN 600 MILLIGRAM(S): 400 CAPSULE ORAL at 14:12

## 2020-02-28 RX ADMIN — TRAMADOL HYDROCHLORIDE 50 MILLIGRAM(S): 50 TABLET ORAL at 10:20

## 2020-02-28 RX ADMIN — TRAMADOL HYDROCHLORIDE 50 MILLIGRAM(S): 50 TABLET ORAL at 21:32

## 2020-02-28 RX ADMIN — Medication 1: at 21:43

## 2020-02-28 RX ADMIN — Medication 1000 MILLIGRAM(S): at 13:30

## 2020-02-28 RX ADMIN — Medication 1: at 17:28

## 2020-02-28 RX ADMIN — Medication 2 MILLIGRAM(S): at 17:28

## 2020-02-28 RX ADMIN — GABAPENTIN 600 MILLIGRAM(S): 400 CAPSULE ORAL at 21:32

## 2020-02-28 RX ADMIN — METHADONE HYDROCHLORIDE 200 MILLIGRAM(S): 40 TABLET ORAL at 10:20

## 2020-02-28 RX ADMIN — Medication 1000 MILLIGRAM(S): at 07:06

## 2020-02-28 RX ADMIN — Medication 1000 MILLIGRAM(S): at 12:36

## 2020-02-28 RX ADMIN — Medication 2 MILLIGRAM(S): at 21:32

## 2020-02-28 RX ADMIN — DIVALPROEX SODIUM 500 MILLIGRAM(S): 500 TABLET, DELAYED RELEASE ORAL at 21:32

## 2020-02-28 RX ADMIN — TRAMADOL HYDROCHLORIDE 50 MILLIGRAM(S): 50 TABLET ORAL at 23:36

## 2020-02-28 NOTE — PROGRESS NOTE BEHAVIORAL HEALTH - NSBHFUPVIOLFT_PSY_A_CORE
Unchanged since admission, patient occasionally feels resentment with thoughts of harm towards partner d/t recent breakup but no intent to act

## 2020-02-28 NOTE — PROGRESS NOTE BEHAVIORAL HEALTH - DETAILS
Dental pain s/p dental procedure. Patient reports sedative effect from Depakote but understands this is temporary

## 2020-02-28 NOTE — PROGRESS NOTE BEHAVIORAL HEALTH - PROBLEM SELECTOR PLAN 6
- NSAIDs PRN  - Follow-up outpatient post-discharge - NSAIDs PRN  - Added Tramadol PRN  - Follow-up outpatient post-discharge

## 2020-02-28 NOTE — PROGRESS NOTE BEHAVIORAL HEALTH - RISK ASSESSMENT
Static: Male Sex, Hx of Substance Use  Modifiable: Current Substance Use, Tx compliance  Protective: Fear of death/Dying  Current Risk: MODERATE  Current risk of self-harm due to mood episode will be mitigated by use of medication i.e. Depakote.

## 2020-02-28 NOTE — PROGRESS NOTE BEHAVIORAL HEALTH - PROBLEM SELECTOR PLAN 1
- Start Depakote ER 500mg qHS with VPA level on 3/1/20 with mind to up-titrate  - Group and milieu therapy, to start individual therapy  - Continue discharge planning - Continue Depakote ER 500mg qHS with VPA level on 3/1/20 with mind to up-titrate  - Atarax 50mg PRN for insomnia  - Group and milieu therapy, and to start individual therapy  - Continue discharge planning

## 2020-02-28 NOTE — PROGRESS NOTE BEHAVIORAL HEALTH - PROBLEM SELECTOR PLAN 2
- Continue to monitor fingersticks  - Continue home dose of Lantus 20units at bedtime (as per PCP Dr. Gleason wanted to increase dose to 40units at bedtime on next visit)  - Sliding scale PRN  - Consider endocrine consultation if continued deranged fingersticks especially given pituitary concerns  - Dietary consultation placed in context of recent dental operation and dietary needs  - Neuropathic pain to continue Gabapentin 300mg TID with mind to increase to home dose of 600mg TID - Continue to monitor fingersticks  - Continue home dose of Lantus 20units at bedtime (as per PCP Dr. Gleason wanted to increase dose to 40units at bedtime on next visit)  - Sliding scale PRN  - Consider endocrine consultation if continued deranged fingersticks especially given pituitary concerns  - Dietary consultation completed  - Neuropathic pain to continue Gabapentin 600mg TID

## 2020-02-28 NOTE — PROGRESS NOTE BEHAVIORAL HEALTH - NSBHFUPSUICINTERVALFT_PSY_A_CORE
Unchanged since admission, patient occasionally does not feel desire to live d/t recent events with partner but no intent to act
Patient feels depressed with expression on not wanting to live during current struggles but reports he would not act on thoughts.

## 2020-02-28 NOTE — PROGRESS NOTE BEHAVIORAL HEALTH - SUMMARY
As per previous note: "54 year old  male with previously domiciled with domestic partner of 3 years but broke up recently and homeless since early Feb 2020, part-time employment, mother as main social support, PPH of depression and ADHD, multiple prior admissions, remote h/o SA as per collateral, last at [facility] 1 year ago, h/o DAISY treatment last time was in 2016, currently in Methadone program for h/o opioid abuse, polysubstance abuse, used cocaine few days ago, and MJ use intermittently, Utox+ cocaine, methadone, amphetamines, and opioids, h/o incarceration for assault and manslaughter released from nursing home 2014, reports no active legal issues, has h/o violence, BIB self for SI/HI in context of medication nonadherence. On assessment on arrival to the unit he shares complaints of depressed mood, paranoia, and anxiety. He reports that sleep and concentration have been disrupted in the context of homelessness and preoccupation with his partner of 3.5 years. Objectively, patient appears irritable, with low frustration tolerance, but able to conduct full interview with team. 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 Diagnostic impression of mood disorder, unspecified. Plan to restart Methadone 200mg (confirmed) and to confirm appropriate liver functioning with lab draws in order to start Depakote ER 500mg qHS for mood stability.    2/27: Patient remains paranoid with overinclusive thoughts, continue Depakote for bipolar d/o. Will increase Gabapentin, Tylenol and Motrin to address pain."  2/28: Patient unable to sleep well d/t overinclusive thoughts about partner, paranoia and delusions still present but have decreased- patient feeling more safe on unit, continue observation. Dental pain improved with increase Tylenol and Motrin. As per previous note: "54 year old  male with previously domiciled with domestic partner of 3 years but broke up recently and homeless since early Feb 2020, part-time employment, mother as main social support, PPH of depression and ADHD, multiple prior admissions, remote h/o SA as per collateral, last at [facility] 1 year ago, h/o DAISY treatment last time was in 2016, currently in Methadone program for h/o opioid abuse, polysubstance abuse, used cocaine few days ago, and MJ use intermittently, Utox+ cocaine, methadone, amphetamines, and opioids, h/o incarceration for assault and manslaughter released from senior living 2014, reports no active legal issues, has h/o violence, BIB self for SI/HI in context of medication nonadherence. On assessment on arrival to the unit he shares complaints of depressed mood, paranoia, and anxiety. He reports that sleep and concentration have been disrupted in the context of homelessness and preoccupation with his partner of 3.5 years. Objectively, patient appears irritable, with low frustration tolerance, but able to conduct full interview with team. 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 Diagnostic impression of mood disorder, unspecified. Plan to restart Methadone 200mg (confirmed) and to confirm appropriate liver functioning with lab draws in order to start Depakote ER 500mg qHS for mood stability.    2/27: Patient remains paranoid with overinclusive thoughts, continue Depakote for bipolar d/o. Will increase Gabapentin, Tylenol and Motrin to address pain."  2/28: Paranoia and delusions still present but have decreased. He denies SI. Continue observation. Dental pain improved with increase in Tylenol and Motrin and addition of Tramadol. VPA level on Sunday with possible dose increase of Depakote.

## 2020-02-28 NOTE — PROGRESS NOTE BEHAVIORAL HEALTH - NSBHFUPINTERVALHXFT_PSY_A_CORE
Pt. seen, chart reviewed, case discussed with the Interdisciplinary Treatment Team. As per Nursing Report, pt has been irritable, anxious with drug seeking behaviors but has been cooperative, and compliant with medications.  Pt. seen individually, he is lying in his bed, dressed in hospital-provided clothing. Pt. reports to be feeling “the same” and is fixated on his past with his partner. Also reports improvement in dental pain since adjustment to pain management plan. He has been eating and sleeping well. Patient is still paranoid of partners new bfs coming to the unit to harm him. Pt. denies SI/HI/AH/VH. Reports mild sedative effects since beginning depakote, the patient understands this is a side effect of medications that will resolve in the next few days. Pt. seen, chart reviewed, case discussed with the Interdisciplinary Treatment Team. As per Nursing Report, pt has been irritable, anxious with drug seeking behaviors but has been cooperative, and compliant with medications.  Pt. seen individually, he is lying in his bed, dressed in hospital-provided clothing. Pt. reports to be feeling “the same” and is fixated on his past with his partner. Also reports improvement in dental pain since adjustment to pain management plan with Tramadol. He has been eating and sleeping well. Patient is still paranoid of partners new bfs coming to the unit to harm him but recognizes that no one is going to harm him on the unit. He denied any paranoia after having breakfast and waking up more. Pt. denies SI/HI/AH/VH. Reports mild sedative effects since beginning Depakote, the patient understands this is a side effect of medications that will resolve in the next few days.

## 2020-02-28 NOTE — PROGRESS NOTE BEHAVIORAL HEALTH - CASE SUMMARY
53 yo WM with domestic partner of 3 years, left home couple weeks ago, on SSI, unemployed, mother as main support, PPH of bipolar vs schizoaffective d/o, multiple prior admissions, denied h/o SA, last at [facility] 1 year ago, h/o DAISY treatment last time was in 2016, self-reported currently in Methadone program for h/o opioid abuse, poly substance abuse, used cocaine few days ago, and MJ use intermittently, Utox currently negative so as BAL, PMH of DM, HTN, ?pituitary tumor, h/o incarceration for assault and manslaughter released from senior care 2014 after 29 years of imprisonment, reports no active legal issues, has h/o violence, BIBS for SI/HI since 2/14 in context of medication noncompliance and recent breakup, s/p all teeth extraction on day of admission. Possible paranoid ideation prior to admission, SI with plan and intent to slit his wrist and HI to harm his gf.  He denies AH/VH, racing thoughts, poor sleep, poor appetite, energy, and concentration, with increased anxiety. Pt requests Adderall for ADHD sx. Labs/EKG reviewed. CT head WNL. DDX- SAD, SIMD. Will start VPA ER 500mg qhs for mood stabilization if LFTs wnl.  ISS for DM.  Consider restarting Lisinopril if BP is elevated (though pt seems to not be compliant outpt).  Will avoid restarting testosterone as it may exacerbate mood sx.  Appreciate collateral info. Psychoeducation provided re: importance of treatment compliance and risks associated with substance abuse. Encourage 12 step meetings.  IGM tx.  Discharge planning. Per staff, pt is anxious, med-seeking.  This morning, pt states that he had difficulty falling and staying asleep, reports depressed mood with occasional si.  Denies current si/hi/ah/vh.  Denies med side effects.  Cont current meds. Psychoeducation provided re: importance of treatment compliance and risks associated with substance abuse.  IGM tx.  Discharge planning.

## 2020-02-29 RX ORDER — OLANZAPINE 15 MG/1
5 TABLET, FILM COATED ORAL ONCE
Refills: 0 | Status: DISCONTINUED | OUTPATIENT
Start: 2020-02-29 | End: 2020-03-04

## 2020-02-29 RX ADMIN — GABAPENTIN 600 MILLIGRAM(S): 400 CAPSULE ORAL at 11:01

## 2020-02-29 RX ADMIN — METHADONE HYDROCHLORIDE 200 MILLIGRAM(S): 40 TABLET ORAL at 10:57

## 2020-02-29 RX ADMIN — TRAMADOL HYDROCHLORIDE 50 MILLIGRAM(S): 50 TABLET ORAL at 12:15

## 2020-02-29 NOTE — CHART NOTE - NSCHARTNOTEFT_GEN_A_CORE
RN called: patient is demanding ativan 2mg , tramadol and methadone 200mg at the same time and nursing doesn't feel comfortable giving due to high risk of respiratory depression.  Patient became agitated and yelling at nurses.    Pt seen, chart reviewed.    He states that he takes ativan on the street and needs it now for anxiety.  It is explained that ativan is only given for benzo withdrawal and agitation combine with antipsychotic.  He refuses to take antipsychotic.  Lidocaine viscous offered for toothache and he declined.  He went to his room.      In the afternoon, he requested for ativan again and agrees to take with olanzapine 5mg for agitation.

## 2020-03-01 LAB — VALPROATE SERPL-MCNC: 6.8 UG/ML — LOW (ref 50–100)

## 2020-03-01 RX ORDER — LISINOPRIL 2.5 MG/1
5 TABLET ORAL DAILY
Refills: 0 | Status: DISCONTINUED | OUTPATIENT
Start: 2020-03-01 | End: 2020-03-04

## 2020-03-01 RX ORDER — DIVALPROEX SODIUM 500 MG/1
1000 TABLET, DELAYED RELEASE ORAL AT BEDTIME
Refills: 0 | Status: DISCONTINUED | OUTPATIENT
Start: 2020-03-01 | End: 2020-03-04

## 2020-03-01 RX ADMIN — Medication 800 MILLIGRAM(S): at 07:20

## 2020-03-01 RX ADMIN — DIVALPROEX SODIUM 1000 MILLIGRAM(S): 500 TABLET, DELAYED RELEASE ORAL at 21:42

## 2020-03-01 RX ADMIN — METHADONE HYDROCHLORIDE 200 MILLIGRAM(S): 40 TABLET ORAL at 10:29

## 2020-03-01 RX ADMIN — Medication 800 MILLIGRAM(S): at 06:25

## 2020-03-01 RX ADMIN — Medication 2 MILLIGRAM(S): at 15:56

## 2020-03-01 RX ADMIN — TRAMADOL HYDROCHLORIDE 50 MILLIGRAM(S): 50 TABLET ORAL at 13:06

## 2020-03-01 RX ADMIN — GABAPENTIN 600 MILLIGRAM(S): 400 CAPSULE ORAL at 21:43

## 2020-03-01 RX ADMIN — Medication 1: at 13:06

## 2020-03-01 RX ADMIN — TRAMADOL HYDROCHLORIDE 50 MILLIGRAM(S): 50 TABLET ORAL at 22:31

## 2020-03-01 RX ADMIN — Medication 2 MILLIGRAM(S): at 06:25

## 2020-03-01 RX ADMIN — GABAPENTIN 600 MILLIGRAM(S): 400 CAPSULE ORAL at 10:29

## 2020-03-01 RX ADMIN — INSULIN GLARGINE 20 UNIT(S): 100 INJECTION, SOLUTION SUBCUTANEOUS at 21:43

## 2020-03-01 RX ADMIN — TRAMADOL HYDROCHLORIDE 50 MILLIGRAM(S): 50 TABLET ORAL at 21:43

## 2020-03-01 NOTE — PROGRESS NOTE BEHAVIORAL HEALTH - PROBLEM SELECTOR PLAN 2
- Continue to monitor fingersticks  - Continue home dose of Lantus 20units at bedtime (as per PCP Dr. Gleason wanted to increase dose to 40units at bedtime on next visit)  - Sliding scale PRN  - Consider endocrine consultation if continued deranged fingersticks especially given pituitary concerns  - Dietary consultation completed  - Neuropathic pain to continue Gabapentin 600mg TID

## 2020-03-01 NOTE — PROGRESS NOTE BEHAVIORAL HEALTH - NSBHFUPINTERVALHXFT_PSY_A_CORE
yesterday, on call psychiatrist changed ativan order from prn for anxiety to prn for withdrawal. patient is angry about this, says "I don't understand why I have to be sick to get my ativan" He missed his depakote last night because he was angry about the ativan. he agrees to increase his depakote

## 2020-03-01 NOTE — PROGRESS NOTE BEHAVIORAL HEALTH - SUMMARY
As per previous note: "54 year old  male with previously domiciled with domestic partner of 3 years but broke up recently and homeless since early Feb 2020, part-time employment, mother as main social support, PPH of depression and ADHD, multiple prior admissions, remote h/o SA as per collateral, last at [facility] 1 year ago, h/o DAISY treatment last time was in 2016, currently in Methadone program for h/o opioid abuse, polysubstance abuse, used cocaine few days ago, and MJ use intermittently, Utox+ cocaine, methadone, amphetamines, and opioids, h/o incarceration for assault and manslaughter released from residential 2014, reports no active legal issues, has h/o violence, BIB self for SI/HI in context of medication nonadherence. On assessment on arrival to the unit he shares complaints of depressed mood, paranoia, and anxiety. He reports that sleep and concentration have been disrupted in the context of homelessness and preoccupation with his partner of 3.5 years. Objectively, patient appears irritable, with low frustration tolerance, but able to conduct full interview with team. 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 Diagnostic impression of mood disorder, unspecified. Plan to restart Methadone 200mg (confirmed) and to confirm appropriate liver functioning with lab draws in order to start Depakote ER 500mg qHS for mood stability.    2/27: Patient remains paranoid with overinclusive thoughts, continue Depakote for bipolar d/o. Will increase Gabapentin, Tylenol and Motrin to address pain."  2/28: Paranoia and delusions still present but have decreased. He denies SI. Continue observation. Dental pain improved with increase in Tylenol and Motrin and addition of Tramadol. VPA level on Sunday with possible dose increase of Depakote.  3/1, patient angry that ativan was changed from a prn for anxiety to a prn for withdrawal. level today is 6.8, however he missed his dose last night. Although level is likely falsely low due to missed dose, it is so low that he can likely tolerate 1000mg at bedtime

## 2020-03-01 NOTE — PROGRESS NOTE BEHAVIORAL HEALTH - PROBLEM SELECTOR PLAN 1
- Continue Depakote ER 500mg qHS with VPA level on 3/1/20 with mind to up-titrate  - Atarax 50mg PRN for insomnia  - Group and milieu therapy, and to start individual therapy  - Continue discharge planning

## 2020-03-02 PROCEDURE — 99232 SBSQ HOSP IP/OBS MODERATE 35: CPT

## 2020-03-02 RX ORDER — ARIPIPRAZOLE 15 MG/1
2 TABLET ORAL AT BEDTIME
Refills: 0 | Status: DISCONTINUED | OUTPATIENT
Start: 2020-03-02 | End: 2020-03-03

## 2020-03-02 RX ADMIN — ARIPIPRAZOLE 2 MILLIGRAM(S): 15 TABLET ORAL at 22:20

## 2020-03-02 RX ADMIN — METHADONE HYDROCHLORIDE 200 MILLIGRAM(S): 40 TABLET ORAL at 09:41

## 2020-03-02 RX ADMIN — Medication 2 MILLIGRAM(S): at 22:20

## 2020-03-02 RX ADMIN — Medication 1: at 11:46

## 2020-03-02 RX ADMIN — Medication 2 MILLIGRAM(S): at 07:18

## 2020-03-02 RX ADMIN — Medication 1: at 16:52

## 2020-03-02 RX ADMIN — TRAMADOL HYDROCHLORIDE 50 MILLIGRAM(S): 50 TABLET ORAL at 07:18

## 2020-03-02 RX ADMIN — Medication 2: at 22:20

## 2020-03-02 RX ADMIN — Medication 2 MILLIGRAM(S): at 00:35

## 2020-03-02 RX ADMIN — DIVALPROEX SODIUM 1000 MILLIGRAM(S): 500 TABLET, DELAYED RELEASE ORAL at 22:20

## 2020-03-02 RX ADMIN — Medication 2 MILLIGRAM(S): at 14:56

## 2020-03-02 RX ADMIN — GABAPENTIN 600 MILLIGRAM(S): 400 CAPSULE ORAL at 22:20

## 2020-03-02 RX ADMIN — TRAMADOL HYDROCHLORIDE 50 MILLIGRAM(S): 50 TABLET ORAL at 22:20

## 2020-03-02 RX ADMIN — TRAMADOL HYDROCHLORIDE 50 MILLIGRAM(S): 50 TABLET ORAL at 08:10

## 2020-03-02 RX ADMIN — GABAPENTIN 600 MILLIGRAM(S): 400 CAPSULE ORAL at 14:55

## 2020-03-02 RX ADMIN — GABAPENTIN 600 MILLIGRAM(S): 400 CAPSULE ORAL at 09:41

## 2020-03-02 RX ADMIN — INSULIN GLARGINE 20 UNIT(S): 100 INJECTION, SOLUTION SUBCUTANEOUS at 22:22

## 2020-03-02 NOTE — PROGRESS NOTE BEHAVIORAL HEALTH - CASE SUMMARY
53 yo M pph Schizoaffective D/o responding well to current medications with no side effects reported or observed. Risks, benefits, and potential side effects of all medications discussed with patient. Current risk of self-harm due to mood episode will be mitigated by use of medication i.e Depakote and starting new antipsychotic. Inpatient Hospitalization is necessary at this time to ensure pt safety, stabilize symptoms, and plan a safe discharge. Per staff, pt has been irritable, refusing AP, pain 2’ dental work.  This morning, pt states that he is feeling better.  Denies current si/hi/ah/vh. He denies thoughts of harming anybody.  He recognizes that he may see the clarence who was sleeping with his wife, but he doesn’t want to go back to [facility] so he doesn’t plan to harm them.  Interested in discharge. Focused on getting more Ativan.  Denies med side effects.   Agreeable to accepting Abilify to augment VPA.  Refuses Risperdal or Zyprexa 2’ fears of weight gain or sexual dysfunction. Overall, although pt will remain at chronically elevated risk given his extensive forensic history, h/o mood sx, h/o irritability an d substance abuse, at this time his acute risk is being mitigated with meds.  Cont current meds. Start Abilify 2mg daily.  Rba of meds discussed with pt. Psychoeducation provided re: importance of treatment compliance and risks associated with substance abuse.  IGM tx.  Discharge planning.

## 2020-03-02 NOTE — PROGRESS NOTE BEHAVIORAL HEALTH - SUMMARY
As per previous note: "54 year old  male with previously domiciled with domestic partner of 3 years but broke up recently and homeless since early Feb 2020, part-time employment, mother as main social support, PPH of depression and ADHD, multiple prior admissions, remote h/o SA as per collateral, last at [facility] 1 year ago, h/o DAISY treatment last time was in 2016, currently in Methadone program for h/o opioid abuse, polysubstance abuse, used cocaine few days ago, and MJ use intermittently, Utox+ cocaine, methadone, amphetamines, and opioids, h/o incarceration for assault and manslaughter released from retirement 2014, reports no active legal issues, has h/o violence, BIB self for SI/HI in context of medication nonadherence. On assessment on arrival to the unit he shares complaints of depressed mood, paranoia, and anxiety. He reports that sleep and concentration have been disrupted in the context of homelessness and preoccupation with his partner of 3.5 years. Objectively, patient appears irritable, with low frustration tolerance, but able to conduct full interview with team. 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 Diagnostic impression of mood disorder, unspecified. Plan to restart Methadone 200mg (confirmed) and to confirm appropriate liver functioning with lab draws in order to start Depakote ER 500mg qHS for mood stability.    2/27: Patient remains paranoid with overinclusive thoughts, continue Depakote for bipolar d/o. Will increase Gabapentin, Tylenol and Motrin to address pain.  2/28: Paranoia and delusions still present but have decreased. He denies SI. Continue observation. Dental pain improved with increase in Tylenol and Motrin and addition of Tramadol. VPA level on Sunday with possible dose increase of Depakote."  3/2: Patient is less paranoid today but still having delusions of partner. Expresses HI without plan, denies SI/AH/VH. Dental pain improved on current pain management regiment, will continue PRN. Depakote 1000mg PO qd without side effects. Consider adding a antipsychotic for delusions and personalty d/o. As per previous note: "54 year old  male with previously domiciled with domestic partner of 3 years but broke up recently and homeless since early Feb 2020, part-time employment, mother as main social support, PPH of depression and ADHD, multiple prior admissions, remote h/o SA as per collateral, last at [facility] 1 year ago, h/o DAISY treatment last time was in 2016, currently in Methadone program for h/o opioid abuse, polysubstance abuse, used cocaine few days ago, and MJ use intermittently, Utox+ cocaine, methadone, amphetamines, and opioids, h/o incarceration for assault and manslaughter released from custodial 2014, reports no active legal issues, has h/o violence, BIB self for SI/HI in context of medication nonadherence. On assessment on arrival to the unit he shares complaints of depressed mood, paranoia, and anxiety. He reports that sleep and concentration have been disrupted in the context of homelessness and preoccupation with his partner of 3.5 years. Objectively, patient appears irritable, with low frustration tolerance, but able to conduct full interview with team. 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 Diagnostic impression of mood disorder, unspecified. Plan to restart Methadone 200mg (confirmed) and to confirm appropriate liver functioning with lab draws in order to start Depakote ER 500mg qHS for mood stability.    2/27: Patient remains paranoid with overinclusive thoughts, continue Depakote for bipolar d/o. Will increase Gabapentin, Tylenol and Motrin to address pain.  2/28: Paranoia and delusions still present but have decreased. He denies SI. Continue observation. Dental pain improved with increase in Tylenol and Motrin and addition of Tramadol. VPA level on Sunday with possible dose increase of Depakote."  3/2: Patient is less paranoid today but still having delusions of partner. Expresses HI without plan, denies SI/AH/VH. Dental pain improved on current pain management regiment, will continue PRN. Depakote 1000mg PO qd without side effects. will start abilify 2mg daily ic for delusions and personalty d/o.

## 2020-03-02 NOTE — PROGRESS NOTE BEHAVIORAL HEALTH - NSBHFUPINTERVALHXFT_PSY_A_CORE
Pt. seen, chart reviewed, case discussed with the Interdisciplinary Treatment Team. As per Nursing Report, pt has been irritable but cooperative, and compliant with medications aside from refusing depakote last night, asking several times for Ativan or Tramadol.  Pt. seen individually, he is lying in his bed, dressed in hospital-provided clothing. Pt. reports to be feeling “angry” as he has been speaking with his partner over the phone without resolution of prior issues. Decrease in sleep for the past 2 nights and requesting sleep aid. Improvement with mouth pain, patient eating well. Reports some HI towards partner’s new boyfriend and understands the repercussions should he act on these desires. Pt. denies SI/HI/AH/VH. No side effects to medications reported or observed. Pt. is looking forward to being discharged this week and expresses interest in Outpatient psych follow up and returning to his PCP. Pt. seen, chart reviewed, case discussed with the Interdisciplinary Treatment Team. As per Nursing Report, pt has been irritable but cooperative, and compliant with medications aside from refusing depakote last night, asking several times for Ativan or Tramadol.  Pt. seen individually, he is lying in his bed, dressed in hospital-provided clothing. Pt. reports to be feeling “angry” as he has been speaking with his partner over the phone without resolution of prior issues. Decrease in sleep for the past 2 nights and requesting sleep aid. Improvement with mouth pain, patient eating well. Reports some HI towards partner’s new boyfriend but says he will not act on them and understands the repercussions should he act on these desires. Pt. denies SI/HI/AH/VH. No side effects to medications reported or observed. Pt. is looking forward to being discharged this week and expresses interest in Outpatient psych follow up and returning to his PCP.

## 2020-03-02 NOTE — PROGRESS NOTE BEHAVIORAL HEALTH - NSBHADMITIPREASON_PSY_A_CORE
Danger to self; mental illness expected to respond to inpatient care stabilizing/Danger to self; mental illness expected to respond to inpatient care

## 2020-03-02 NOTE — PROGRESS NOTE BEHAVIORAL HEALTH - NSBHFUPVIOLFT_PSY_A_CORE
Patient expresses desire to harm partner's new boyfriend but feels it may be related to his unstable relationship with her. Repercussions of acting on this ideation was discussed with patient. Patient expresses desire to harm partner's new boyfriend but feels it may be related to his unstable relationship with her. Repercussions of acting on this ideation was discussed with patient. Patient reports should he see them he will try to avoid them and walk away

## 2020-03-02 NOTE — PROGRESS NOTE BEHAVIORAL HEALTH - RISK ASSESSMENT
Static: Male Sex, Hx of Substance Use  Modifiable: Current Substance Use, Tx compliance  Protective: Fear of death/Dying, understands importance of f/u  Current Risk: Low  Current risk of self-harm due to mood episode will be mitigated by use of medication i.e. Depakote.

## 2020-03-03 PROCEDURE — 99232 SBSQ HOSP IP/OBS MODERATE 35: CPT

## 2020-03-03 RX ORDER — TRAMADOL HYDROCHLORIDE 50 MG/1
50 TABLET ORAL EVERY 6 HOURS
Refills: 0 | Status: DISCONTINUED | OUTPATIENT
Start: 2020-03-03 | End: 2020-03-04

## 2020-03-03 RX ORDER — ARIPIPRAZOLE 15 MG/1
5 TABLET ORAL AT BEDTIME
Refills: 0 | Status: DISCONTINUED | OUTPATIENT
Start: 2020-03-03 | End: 2020-03-04

## 2020-03-03 RX ADMIN — Medication 1000 MILLIGRAM(S): at 21:58

## 2020-03-03 RX ADMIN — Medication 1000 MILLIGRAM(S): at 22:52

## 2020-03-03 RX ADMIN — Medication 1: at 16:56

## 2020-03-03 RX ADMIN — Medication 2 MILLIGRAM(S): at 18:38

## 2020-03-03 RX ADMIN — TRAMADOL HYDROCHLORIDE 50 MILLIGRAM(S): 50 TABLET ORAL at 19:29

## 2020-03-03 RX ADMIN — Medication 2 MILLIGRAM(S): at 11:33

## 2020-03-03 RX ADMIN — METHADONE HYDROCHLORIDE 200 MILLIGRAM(S): 40 TABLET ORAL at 09:51

## 2020-03-03 RX ADMIN — TRAMADOL HYDROCHLORIDE 50 MILLIGRAM(S): 50 TABLET ORAL at 11:41

## 2020-03-03 RX ADMIN — Medication 3: at 22:06

## 2020-03-03 RX ADMIN — TRAMADOL HYDROCHLORIDE 50 MILLIGRAM(S): 50 TABLET ORAL at 18:38

## 2020-03-03 RX ADMIN — ARIPIPRAZOLE 5 MILLIGRAM(S): 15 TABLET ORAL at 21:58

## 2020-03-03 RX ADMIN — Medication 1000 MILLIGRAM(S): at 14:50

## 2020-03-03 RX ADMIN — GABAPENTIN 600 MILLIGRAM(S): 400 CAPSULE ORAL at 09:51

## 2020-03-03 RX ADMIN — GABAPENTIN 600 MILLIGRAM(S): 400 CAPSULE ORAL at 13:51

## 2020-03-03 RX ADMIN — TRAMADOL HYDROCHLORIDE 50 MILLIGRAM(S): 50 TABLET ORAL at 13:06

## 2020-03-03 RX ADMIN — DIVALPROEX SODIUM 1000 MILLIGRAM(S): 500 TABLET, DELAYED RELEASE ORAL at 21:58

## 2020-03-03 RX ADMIN — INSULIN GLARGINE 20 UNIT(S): 100 INJECTION, SOLUTION SUBCUTANEOUS at 22:06

## 2020-03-03 RX ADMIN — GABAPENTIN 600 MILLIGRAM(S): 400 CAPSULE ORAL at 21:58

## 2020-03-03 RX ADMIN — Medication 2 MILLIGRAM(S): at 04:44

## 2020-03-03 RX ADMIN — Medication 2: at 11:37

## 2020-03-03 RX ADMIN — Medication 1000 MILLIGRAM(S): at 13:51

## 2020-03-03 NOTE — PROGRESS NOTE BEHAVIORAL HEALTH - OTHER
superficially cooperative regarding partner's indiscretions regarding partner's indiscretions improving

## 2020-03-03 NOTE — PROGRESS NOTE BEHAVIORAL HEALTH - CASE SUMMARY
Per staff, pt has been more cooperative, calm and present on the unit. This morning, pt states that he is feeling better. Denies current si/hi/ah/vh. He denies thoughts of harming anybody.  He recognizes that he may see the clarence who was sleeping with his wife, but he doesn’t want to go back to assisted so he doesn’t plan to harm them. Interested in discharge. Focused on getting more Ativan.  Denies med side effects.   Agreeable to accepting Abilify to augment VPA and admits to some improvement of "noise in brain."  Refuses Risperdal or Zyprexa 2’ fears of weight gain or sexual dysfunction. Overall, although pt will remain at chronically elevated risk given his extensive forensic history, h/o mood sx, h/o irritability an d substance abuse, at this time his acute risk is being mitigated with meds.  Cont current meds.  Rba of meds discussed with pt. Psychoeducation provided re: importance of treatment compliance and risks associated with substance abuse.  IGM tx.  Discharge planning. Case discussed with interdisciplinary team.  Chart reviewed.  Per staff, pt is anxious, focused on ativan, easily agitated, visible on the unit, social with peers, cooperative.  This morning, pt states that he feels better and feels ready to leave the hospital. Denies current si/hi/ah/vh. He denies thoughts of harming anybody else, acknowledges understanding of ramifications of hurting others.  He has remained in behavioral control. Interested in discharge. Accepted and tolerated Abilify without side effects.  Visible on the unit, attending groups. Overall, although pt will remain at chronically elevated risk given his extensive forensic history, h/o mood sx, h/o irritability and substance abuse, at this time his acute risk is being mitigated with meds.  Cont current meds. increase to Abilify 5mg daily to better address delusions.  RBA of meds discussed with pt. Pt educated to get VPA level upon discharge.  Psychoeducation provided re: importance of treatment compliance and risks associated with substance abuse.  IGM tx.  Discharge planning.

## 2020-03-03 NOTE — PROGRESS NOTE BEHAVIORAL HEALTH - PROBLEM SELECTOR PLAN 1
- Continue Depakote ER 500mg qHS with VPA level on 3/1/20 with mind to up-titrate  - Atarax 50mg PRN for insomnia  - Group and milieu therapy, and to start individual therapy  - Continue discharge planning - Continue Depakote ER 1000mg qHS (subtherapeutic VPA level on 3/1/20)  - Atarax 50mg PRN for insomnia  - Group and milieu therapy, and individual therapy  - Continue discharge planning

## 2020-03-03 NOTE — PROGRESS NOTE BEHAVIORAL HEALTH - NSBHFUPINTERVALHXFT_PSY_A_CORE
Pt. seen, chart reviewed, case discussed with the Interdisciplinary Treatment Team. As per Nursing Report, pt has been social with poor boundaries and agitated especially in regards to PRN medications, medicine driven requesting Ativan, has been compliant with medications.  Pt. seen individually, he is lying in his bed, dressed in personal clothing. Pt. reports to be feeling “more calm” with “less noise in his brain” since starting Abilify yesterday. He has been eating and sleeping well.  Pt. denies SI/HI/AH/VH. No side effects to medications reported or observed. Pt reports feeling comfortable remaining on these medications when he is discharged.

## 2020-03-03 NOTE — PROGRESS NOTE BEHAVIORAL HEALTH - PROBLEM SELECTOR PLAN 5
- Continue Methadone 200mg daily  - start Abilify 2 mg daily - Continue Methadone 200mg daily  - Increase Abilify to 5 mg qhs - Continue Methadone 200mg daily  - Increase to Abilify to 5 mg qhs

## 2020-03-03 NOTE — PROGRESS NOTE BEHAVIORAL HEALTH - SUMMARY
As per previous assessments: "54 year old  male with previously domiciled with domestic partner of 3 years but broke up recently and homeless since early Feb 2020, part-time employment, mother as main social support, PPH of depression and ADHD, multiple prior admissions, remote h/o SA as per collateral, last at [facility] 1 year ago, h/o DAISY treatment last time was in 2016, currently in Methadone program for h/o opioid abuse, polysubstance abuse, used cocaine few days ago, and MJ use intermittently, Utox+ cocaine, methadone, amphetamines, and opioids, h/o incarceration for assault and manslaughter released from longterm 2014, reports no active legal issues, has h/o violence, BIB self for SI/HI in context of medication nonadherence. On assessment on arrival to the unit he shares complaints of depressed mood, paranoia, and anxiety. He reports that sleep and concentration have been disrupted in the context of homelessness and preoccupation with his partner of 3.5 years. Objectively, patient appears irritable, with low frustration tolerance, but able to conduct full interview with team. 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 Diagnostic impression of mood disorder, unspecified. Plan to restart Methadone 200mg (confirmed) and to confirm appropriate liver functioning with lab draws in order to start Depakote ER 500mg qHS for mood stability.    2/27: Patient remains paranoid with overinclusive thoughts, continue Depakote for bipolar d/o. Will increase Gabapentin, Tylenol and Motrin to address pain.  2/28: Paranoia and delusions still present but have decreased. He denies SI. Continue observation. Dental pain improved with increase in Tylenol and Motrin and addition of Tramadol. VPA level on Sunday with possible dose increase of Depakote.  3/2: Patient is less paranoid today but still having delusions of partner. Expresses HI without plan, denies SI/AH/VH. Dental pain improved on current pain management regiment, will continue PRN. Depakote 1000mg PO qd without side effects. will start Abilify 2mg daily ic for delusions and personalty d/o."  3/3: Patient appears less agitated today and expresses ability to think more clearly today after starting Abilify yesterday and feels comfortable continuing these mediation outpatient. Patient is still delusional about partners indiscretions but no longer expressing anger towards her. Continue current medication regimen and prepare discharge for later in the week. As per previous assessments: "54 year old  male with previously domiciled with domestic partner of 3 years but broke up recently and homeless since early Feb 2020, part-time employment, mother as main social support, PPH of depression and ADHD, multiple prior admissions, remote h/o SA as per collateral, last at [facility] 1 year ago, h/o DAISY treatment last time was in 2016, currently in Methadone program for h/o opioid abuse, polysubstance abuse, used cocaine few days ago, and MJ use intermittently, Utox+ cocaine, methadone, amphetamines, and opioids, h/o incarceration for assault and manslaughter released from long-term 2014, reports no active legal issues, has h/o violence, BIB self for SI/HI in context of medication nonadherence. On assessment on arrival to the unit he shares complaints of depressed mood, paranoia, and anxiety. He reports that sleep and concentration have been disrupted in the context of homelessness and preoccupation with his partner of 3.5 years. Objectively, patient appears irritable, with low frustration tolerance, but able to conduct full interview with team. 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 Diagnostic impression of mood disorder, unspecified. Plan to restart Methadone 200mg (confirmed) and to confirm appropriate liver functioning with lab draws in order to start Depakote ER 500mg qHS for mood stability.    2/27: Patient remains paranoid with overinclusive thoughts, continue Depakote for bipolar d/o. Will increase Gabapentin, Tylenol and Motrin to address pain.  2/28: Paranoia and delusions still present but have decreased. He denies SI. Continue observation. Dental pain improved with increase in Tylenol and Motrin and addition of Tramadol. VPA level on Sunday with possible dose increase of Depakote.  3/2: Patient is less paranoid today but still having delusions of partner. Expresses HI without plan, denies SI/AH/VH. Dental pain improved on current pain management regiment, will continue PRN. Depakote 1000mg PO qd without side effects. will start Abilify 2mg daily ic for delusions and personalty d/o."  3/3: Patient appears less agitated today and expresses ability to think more clearly today after starting Abilify yesterday and feels comfortable continuing these medication outpatient. Patient is still delusional about partners indiscretions but no longer expressing anger towards her or the clarence she was with. Continue current medication regimen and prepare discharge for later in the week.

## 2020-03-03 NOTE — PROGRESS NOTE BEHAVIORAL HEALTH - RISK ASSESSMENT
Static: Male Sex, Hx of Substance Use  Modifiable: Current Substance Use, Tx compliance  Protective: Fear of death/Dying, understands importance of f/u  Current Risk: Low  Current risk of self-harm due to mood episode will be mitigated by use of medication i.e. Depakote and Risperdal. Static risk factors: Male Sex, Hx of Substance Use, h/o previous incarceration for 29yrs for murder  Modifiable risk factors on admission: Current Substance Use, Tx noncompliance, depressed mood and SI  Protective factors: Fear of death/Dying, understands importance of f/u  Current Risk: Low  Current risk of self-harm due to mood episode has been mitigated by use of medication i.e. Depakote and Abilify

## 2020-03-04 VITALS
HEART RATE: 82 BPM | SYSTOLIC BLOOD PRESSURE: 139 MMHG | OXYGEN SATURATION: 95 % | TEMPERATURE: 98 F | DIASTOLIC BLOOD PRESSURE: 91 MMHG | RESPIRATION RATE: 18 BRPM

## 2020-03-04 PROCEDURE — 86780 TREPONEMA PALLIDUM: CPT

## 2020-03-04 PROCEDURE — 85025 COMPLETE CBC W/AUTO DIFF WBC: CPT

## 2020-03-04 PROCEDURE — 80048 BASIC METABOLIC PNL TOTAL CA: CPT

## 2020-03-04 PROCEDURE — 80076 HEPATIC FUNCTION PANEL: CPT

## 2020-03-04 PROCEDURE — 80164 ASSAY DIPROPYLACETIC ACD TOT: CPT

## 2020-03-04 PROCEDURE — 71045 X-RAY EXAM CHEST 1 VIEW: CPT

## 2020-03-04 PROCEDURE — 85610 PROTHROMBIN TIME: CPT

## 2020-03-04 PROCEDURE — 36415 COLL VENOUS BLD VENIPUNCTURE: CPT

## 2020-03-04 PROCEDURE — 99285 EMERGENCY DEPT VISIT HI MDM: CPT | Mod: 25

## 2020-03-04 PROCEDURE — 84443 ASSAY THYROID STIM HORMONE: CPT

## 2020-03-04 PROCEDURE — 82962 GLUCOSE BLOOD TEST: CPT

## 2020-03-04 PROCEDURE — 93005 ELECTROCARDIOGRAM TRACING: CPT

## 2020-03-04 PROCEDURE — 80061 LIPID PANEL: CPT

## 2020-03-04 PROCEDURE — 81003 URINALYSIS AUTO W/O SCOPE: CPT

## 2020-03-04 PROCEDURE — 80074 ACUTE HEPATITIS PANEL: CPT

## 2020-03-04 PROCEDURE — 83036 HEMOGLOBIN GLYCOSYLATED A1C: CPT

## 2020-03-04 PROCEDURE — 87521 HEPATITIS C PROBE&RVRS TRNSC: CPT

## 2020-03-04 PROCEDURE — 70450 CT HEAD/BRAIN W/O DYE: CPT

## 2020-03-04 PROCEDURE — 99238 HOSP IP/OBS DSCHRG MGMT 30/<: CPT

## 2020-03-04 PROCEDURE — 80307 DRUG TEST PRSMV CHEM ANLYZR: CPT

## 2020-03-04 PROCEDURE — 87389 HIV-1 AG W/HIV-1&-2 AB AG IA: CPT

## 2020-03-04 RX ORDER — DIVALPROEX SODIUM 500 MG/1
2 TABLET, DELAYED RELEASE ORAL
Qty: 0 | Refills: 0 | DISCHARGE
Start: 2020-03-04

## 2020-03-04 RX ORDER — LISINOPRIL 2.5 MG/1
1 TABLET ORAL
Qty: 0 | Refills: 0 | DISCHARGE
Start: 2020-03-04

## 2020-03-04 RX ORDER — ARIPIPRAZOLE 15 MG/1
1 TABLET ORAL
Qty: 15 | Refills: 0
Start: 2020-03-04

## 2020-03-04 RX ORDER — GABAPENTIN 400 MG/1
1 CAPSULE ORAL
Qty: 15 | Refills: 0
Start: 2020-03-04

## 2020-03-04 RX ORDER — GABAPENTIN 400 MG/1
1 CAPSULE ORAL
Qty: 0 | Refills: 0 | DISCHARGE
Start: 2020-03-04

## 2020-03-04 RX ORDER — DIVALPROEX SODIUM 500 MG/1
2 TABLET, DELAYED RELEASE ORAL
Qty: 15 | Refills: 0
Start: 2020-03-04

## 2020-03-04 RX ORDER — METHADONE HYDROCHLORIDE 40 MG/1
200 TABLET ORAL DAILY
Refills: 0 | Status: DISCONTINUED | OUTPATIENT
Start: 2020-03-04 | End: 2020-03-04

## 2020-03-04 RX ORDER — ARIPIPRAZOLE 15 MG/1
1 TABLET ORAL
Qty: 0 | Refills: 0 | DISCHARGE
Start: 2020-03-04

## 2020-03-04 RX ORDER — LISINOPRIL 2.5 MG/1
1 TABLET ORAL
Qty: 15 | Refills: 0
Start: 2020-03-04

## 2020-03-04 RX ADMIN — Medication 2 MILLIGRAM(S): at 07:25

## 2020-03-04 RX ADMIN — Medication 1: at 12:46

## 2020-03-04 RX ADMIN — GABAPENTIN 600 MILLIGRAM(S): 400 CAPSULE ORAL at 10:11

## 2020-03-04 RX ADMIN — Medication 2 MILLIGRAM(S): at 00:52

## 2020-03-04 RX ADMIN — METHADONE HYDROCHLORIDE 200 MILLIGRAM(S): 40 TABLET ORAL at 10:11

## 2020-03-04 RX ADMIN — TRAMADOL HYDROCHLORIDE 50 MILLIGRAM(S): 50 TABLET ORAL at 07:25

## 2020-03-04 RX ADMIN — GABAPENTIN 600 MILLIGRAM(S): 400 CAPSULE ORAL at 12:47

## 2020-03-04 NOTE — PROGRESS NOTE BEHAVIORAL HEALTH - NSBHADMITMEDEDUDETAILS_A_CORE FT
Risks, benefits, and potential side effects of all medications discussed with patient.

## 2020-03-04 NOTE — PROGRESS NOTE BEHAVIORAL HEALTH - NSBHCHARTREVIEWVS_PSY_A_CORE FT
Vital Signs Last 24 Hrs  T(C): 36.9 (01 Mar 2020 09:00), Max: 37.2 (29 Feb 2020 17:20)  T(F): 98.5 (01 Mar 2020 09:00), Max: 98.9 (29 Feb 2020 17:20)  HR: 86 (01 Mar 2020 09:00) (77 - 89)  BP: 144/92 (01 Mar 2020 09:00) (131/91 - 172/88)  BP(mean): --  RR: 18 (01 Mar 2020 09:00) (18 - 18)  SpO2: 97% (01 Mar 2020 09:00) (95% - 97%)
Vital Signs Last 24 Hrs  T(C): 36.5 (03 Mar 2020 09:32), Max: 36.6 (03 Mar 2020 06:00)  T(F): 97.7 (03 Mar 2020 09:32), Max: 97.8 (03 Mar 2020 06:00)  HR: 93 (03 Mar 2020 09:32) (81 - 93)  BP: 128/87 (03 Mar 2020 09:32) (128/87 - 146/83)  BP(mean): --  RR: 20 (03 Mar 2020 09:32) (18 - 20)  SpO2: 94% (03 Mar 2020 09:32) (94% - 95%)
Vital Signs Last 24 Hrs  T(C): 36.9 (04 Mar 2020 08:30), Max: 36.9 (04 Mar 2020 08:30)  T(F): 98.5 (04 Mar 2020 08:30), Max: 98.5 (04 Mar 2020 08:30)  HR: 82 (04 Mar 2020 08:30) (73 - 105)  BP: 139/91 (04 Mar 2020 08:30) (135/73 - 155/100)  BP(mean): --  RR: 18 (04 Mar 2020 08:30) (18 - 18)  SpO2: 95% (04 Mar 2020 08:30) (93% - 95%)
Vital Signs Last 24 Hrs  T(C): 36.4 (28 Feb 2020 09:00), Max: 37.1 (27 Feb 2020 17:06)  T(F): 97.6 (28 Feb 2020 09:00), Max: 98.7 (27 Feb 2020 17:06)  HR: 68 (28 Feb 2020 09:00) (68 - 89)  BP: 130/72 (28 Feb 2020 09:00) (109/79 - 146/87)  BP(mean): --  RR: 18 (28 Feb 2020 09:00) (18 - 18)  SpO2: 96% (28 Feb 2020 09:00) (96% - 96%)
Vital Signs Last 24 Hrs  T(C): 37.2 (02 Mar 2020 08:45), Max: 37.3 (02 Mar 2020 06:27)  T(F): 98.9 (02 Mar 2020 08:45), Max: 99.2 (02 Mar 2020 06:27)  HR: 84 (02 Mar 2020 08:45) (79 - 93)  BP: 134/84 (02 Mar 2020 08:45) (134/84 - 163/89)  BP(mean): --  RR: 20 (02 Mar 2020 08:45) (18 - 20)  SpO2: 97% (02 Mar 2020 08:45) (92% - 97%)
Vital Signs Last 24 Hrs  T(C): 37.1 (27 Feb 2020 06:17), Max: 37.1 (27 Feb 2020 06:17)  T(F): 98.7 (27 Feb 2020 06:17), Max: 98.7 (27 Feb 2020 06:17)  HR: 64 (27 Feb 2020 06:17) (64 - 92)  BP: 102/58 (27 Feb 2020 06:17) (102/58 - 132/85)  BP(mean): --  RR: 18 (27 Feb 2020 06:17) (18 - 18)  SpO2: 96% (27 Feb 2020 06:17) (96% - 96%)

## 2020-03-04 NOTE — PROGRESS NOTE BEHAVIORAL HEALTH - NSBHFUPINTERVALHXFT_PSY_A_CORE
Pt. seen, chart reviewed, case discussed with the Interdisciplinary Treatment Team. As per Nursing Report, pt has been calm, cooperative, and compliant with medications, taking all PRNs.  Pt. seen individually, he is lying in his bed, dressed in personal clothing. Pt. reports to be feeling “good and ready to leave.” Over the past 2 days the patient reports speaking with his wife frequently and resolving most of the conflicts patient was concerned about on admission. He has been eating and sleeping well.  Pt. denies SI/HI/AH/VH, no longer has desires to harm others including partner or partners boyfriend. No side effects to medications reported or observed. Pt. is looking forward to returning home this afternoon and following up with Outpatient providers.

## 2020-03-04 NOTE — PROGRESS NOTE BEHAVIORAL HEALTH - RISK ASSESSMENT
Static risk factors: Male Sex, Hx of Substance Use, h/o previous incarceration for 29yrs for murder  Modifiable risk factors on admission: Current Substance Use, Tx noncompliance, depressed mood and SI  Protective factors: Fear of death/Dying, understands importance of f/u  Current Risk: Low  Current risk of self-harm due to mood episode has been mitigated by use of medication i.e. Depakote and Abilify Static risk factors: Male Sex, Hx of Substance Use, h/o previous incarceration for 29yrs for murder  Modifiable risk factors on admission: Current Substance Use, Tx noncompliance, depressed mood and SI  Protective factors: Fear of death/Dying, understands importance of f/u  Current Risk: Low  Current risk of self-harm due to mood episode has been mitigated by use of medication i.e. Depakote and Abilify.  stable for discharge home today.

## 2020-03-04 NOTE — PROGRESS NOTE BEHAVIORAL HEALTH - CASE SUMMARY
Case discussed with interdisciplinary team.  Chart reviewed.  Per staff, pt is calm and cooperative,, visible on the unit and social with peers. Accepted and tolerated increase of Abilify from 2mg to 5mg to better address delusions, tolerating without side effects, no other medication side effects reported. Patient has been visible on the unit and attending groups during inpatient admission. Overall, although pt will remain at chronically elevated risk given his extensive forensic history, h/o mood sx, h/o irritability and substance abuse, at this time his acute risk is being mitigated with meds. Cont current meds. RBA of meds discussed with pt and patient expressed understanding. Requesting discharge this morning and at current time there are no contraindications to discharge of patient. Pt educated to get VPA level upon discharge. Psychoeducation provided re: importance of treatment compliance and risks associated with substance abuse. In addition to psychiatric follow up patient encouraged to continue visiting, PMD, methadone clinic and neurology for pituitary adenoma. Case discussed with interdisciplinary team.  Chart reviewed.  Per staff, pt is feeling calmer with Abilify, denies si/hi/ah/vh, accepts prns.  This morning, pt states that he feels better and feels ready to leave the hospital. Denies current si/hi/ah/vh. He denies thoughts of harming anybody else, acknowledges understanding of ramifications of hurting others.  He has remained in behavioral control. Interested in discharge. Accepted and tolerated meds without side effects.  Visible on the unit, attending groups. Overall, although pt will remain at chronically elevated risk given his extensive forensic history, h/o mood sx, h/o irritability and substance abuse, at this time his acute risk is being mitigated with meds.  Cont current meds. cont Abilify 2mg daily.  RBA of meds discussed with pt. Psychoeducation provided re: importance of treatment compliance and risks associated with substance abuse.  At this time, pt is at low acute risk of harm to self/others and stable for discharge home today.

## 2020-03-04 NOTE — PROGRESS NOTE BEHAVIORAL HEALTH - PROBLEM SELECTOR PROBLEM 4
Pituitary adenoma

## 2020-03-04 NOTE — PROGRESS NOTE BEHAVIORAL HEALTH - PROBLEM SELECTOR PROBLEM 5
Opioid abuse, in remission
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
## 2020-03-04 NOTE — PROGRESS NOTE BEHAVIORAL HEALTH - NSBHADMITIPREASONDETAILS_PSY_A_CORE FT
no longer harm to self or other, discharge form inpatient unit for outpatient follow up no longer harm to self or other, discharge from inpatient unit for outpatient follow up

## 2020-03-04 NOTE — PROGRESS NOTE BEHAVIORAL HEALTH - NSBHCHARTREVIEWLAB_PSY_A_CORE FT
Valproic Acid Level, Serum (03.01.20 @ 08:15)    Valproic Acid Level, Serum: 6.8 ug/mL
13.4   6.86  )-----------( 241      ( 25 Feb 2020 19:55 )             39.9
13.4   6.86  )-----------( 241      ( 25 Feb 2020 19:55 )             39.9
13.4   6.86  )-----------( 241      ( 25 Feb 2020 19:55 )             39.9    Liver panel pending.  ID labs pending.
13.4   6.86  )-----------( 241      ( 25 Feb 2020 19:55 )             39.9
13.4   6.86  )-----------( 241      ( 25 Feb 2020 19:55 )             39.9    Liver panel pending.  ID labs pending.

## 2020-03-04 NOTE — PROGRESS NOTE BEHAVIORAL HEALTH - PROBLEM SELECTOR PLAN 4
- Head CT in ED wnl but non-sensitive to pituitary, may consider endocrinology involvement and MRI but currently asymptomatic  - Previously on testosterone but will hold due to possible mood effects

## 2020-03-04 NOTE — PROGRESS NOTE BEHAVIORAL HEALTH - PROBLEM SELECTOR PLAN 3
- Continue to monitor  - If elevated will restart patient on low dose of home Lisinopril

## 2020-03-04 NOTE — PROGRESS NOTE BEHAVIORAL HEALTH - PROBLEM SELECTOR PLAN 1
- Continue Depakote ER 1000mg qHS (subtherapeutic VPA level on 3/1/20)  - Atarax 50mg PRN for insomnia  - Group and milieu therapy, and individual therapy  - Continue discharge planning- being discharged today

## 2020-03-04 NOTE — PROGRESS NOTE BEHAVIORAL HEALTH - NSBHATTESTSEENBY_PSY_A_CORE
Trainee with telephonic supervision from Attending Psychiatrist
Attending Psychiatrist supervising NP/Trainee, meeting pt...

## 2020-03-04 NOTE — PROGRESS NOTE BEHAVIORAL HEALTH - PROBLEM SELECTOR PROBLEM 2
Type 2 diabetes mellitus with other specified complication, unspecified whether long term insulin use

## 2020-03-10 DIAGNOSIS — G47.00 INSOMNIA, UNSPECIFIED: ICD-10-CM

## 2020-03-10 DIAGNOSIS — K08.89 OTHER SPECIFIED DISORDERS OF TEETH AND SUPPORTING STRUCTURES: ICD-10-CM

## 2020-03-10 DIAGNOSIS — F15.90 OTHER STIMULANT USE, UNSPECIFIED, UNCOMPLICATED: ICD-10-CM

## 2020-03-10 DIAGNOSIS — F39 UNSPECIFIED MOOD [AFFECTIVE] DISORDER: ICD-10-CM

## 2020-03-10 DIAGNOSIS — R45.850 HOMICIDAL IDEATIONS: ICD-10-CM

## 2020-03-10 DIAGNOSIS — Z91.14 PATIENT'S OTHER NONCOMPLIANCE WITH MEDICATION REGIMEN: ICD-10-CM

## 2020-03-10 DIAGNOSIS — D35.2 BENIGN NEOPLASM OF PITUITARY GLAND: ICD-10-CM

## 2020-03-10 DIAGNOSIS — E11.9 TYPE 2 DIABETES MELLITUS WITHOUT COMPLICATIONS: ICD-10-CM

## 2020-03-10 DIAGNOSIS — Z79.4 LONG TERM (CURRENT) USE OF INSULIN: ICD-10-CM

## 2020-03-10 DIAGNOSIS — F41.9 ANXIETY DISORDER, UNSPECIFIED: ICD-10-CM

## 2020-03-10 DIAGNOSIS — Z81.8 FAMILY HISTORY OF OTHER MENTAL AND BEHAVIORAL DISORDERS: ICD-10-CM

## 2020-03-10 DIAGNOSIS — F11.11 OPIOID ABUSE, IN REMISSION: ICD-10-CM

## 2020-03-10 DIAGNOSIS — I10 ESSENTIAL (PRIMARY) HYPERTENSION: ICD-10-CM

## 2020-03-10 DIAGNOSIS — R45.851 SUICIDAL IDEATIONS: ICD-10-CM

## 2020-03-10 DIAGNOSIS — F14.90 COCAINE USE, UNSPECIFIED, UNCOMPLICATED: ICD-10-CM

## 2020-03-10 DIAGNOSIS — F17.210 NICOTINE DEPENDENCE, CIGARETTES, UNCOMPLICATED: ICD-10-CM

## 2020-03-10 DIAGNOSIS — Z88.8 ALLERGY STATUS TO OTHER DRUGS, MEDICAMENTS AND BIOLOGICAL SUBSTANCES STATUS: ICD-10-CM

## 2021-05-19 ENCOUNTER — EMERGENCY (EMERGENCY)
Facility: HOSPITAL | Age: 56
LOS: 1 days | Discharge: LEFT BEFORE DISCHARGE | End: 2021-05-19
Admitting: EMERGENCY MEDICINE
Payer: COMMERCIAL

## 2021-05-19 VITALS
HEIGHT: 67 IN | SYSTOLIC BLOOD PRESSURE: 136 MMHG | TEMPERATURE: 99 F | WEIGHT: 184.97 LBS | RESPIRATION RATE: 18 BRPM | HEART RATE: 99 BPM | DIASTOLIC BLOOD PRESSURE: 93 MMHG | OXYGEN SATURATION: 98 %

## 2021-05-19 DIAGNOSIS — M79.671 PAIN IN RIGHT FOOT: ICD-10-CM

## 2021-05-19 DIAGNOSIS — Z53.21 PROCEDURE AND TREATMENT NOT CARRIED OUT DUE TO PATIENT LEAVING PRIOR TO BEING SEEN BY HEALTH CARE PROVIDER: ICD-10-CM

## 2021-05-19 PROCEDURE — 99281 EMR DPT VST MAYX REQ PHY/QHP: CPT

## 2021-05-19 PROCEDURE — L9991: CPT
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
## 2021-08-31 NOTE — BEHAVIORAL HEALTH ASSESSMENT NOTE - OCCUPATION
How Severe Are Your Spot(S)?: mild What Is The Reason For Today's Visit?: Full Body Skin Examination What Is The Reason For Today's Visit? 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

## 2022-02-16 ENCOUNTER — INPATIENT (INPATIENT)
Facility: HOSPITAL | Age: 57
LOS: 12 days | Discharge: HOME | DRG: 885 | End: 2022-03-01
Attending: PSYCHIATRY & NEUROLOGY | Admitting: PSYCHIATRY & NEUROLOGY
Payer: COMMERCIAL

## 2022-02-16 VITALS
WEIGHT: 192.46 LBS | RESPIRATION RATE: 18 BRPM | SYSTOLIC BLOOD PRESSURE: 150 MMHG | DIASTOLIC BLOOD PRESSURE: 100 MMHG | HEIGHT: 67 IN | HEART RATE: 70 BPM | TEMPERATURE: 98 F | OXYGEN SATURATION: 94 %

## 2022-02-16 DIAGNOSIS — F39 UNSPECIFIED MOOD [AFFECTIVE] DISORDER: ICD-10-CM

## 2022-02-16 DIAGNOSIS — F11.90 OPIOID USE, UNSPECIFIED, UNCOMPLICATED: ICD-10-CM

## 2022-02-16 DIAGNOSIS — F13.20 SEDATIVE, HYPNOTIC OR ANXIOLYTIC DEPENDENCE, UNCOMPLICATED: ICD-10-CM

## 2022-02-16 DIAGNOSIS — F15.20 OTHER STIMULANT DEPENDENCE, UNCOMPLICATED: ICD-10-CM

## 2022-02-16 LAB
A1C WITH ESTIMATED AVERAGE GLUCOSE RESULT: 9.3 % — HIGH (ref 4–5.6)
ALBUMIN SERPL ELPH-MCNC: 3.8 G/DL — SIGNIFICANT CHANGE UP (ref 3.3–5)
ALP SERPL-CCNC: 65 U/L — SIGNIFICANT CHANGE UP (ref 40–120)
ALT FLD-CCNC: 51 U/L — HIGH (ref 10–45)
AMPHET UR-MCNC: NEGATIVE — SIGNIFICANT CHANGE UP
ANION GAP SERPL CALC-SCNC: 10 MMOL/L — SIGNIFICANT CHANGE UP (ref 5–17)
APPEARANCE UR: CLEAR — SIGNIFICANT CHANGE UP
AST SERPL-CCNC: 23 U/L — SIGNIFICANT CHANGE UP (ref 10–40)
BARBITURATES UR SCN-MCNC: NEGATIVE — SIGNIFICANT CHANGE UP
BASOPHILS # BLD AUTO: 0.03 K/UL — SIGNIFICANT CHANGE UP (ref 0–0.2)
BASOPHILS NFR BLD AUTO: 0.3 % — SIGNIFICANT CHANGE UP (ref 0–2)
BENZODIAZ UR-MCNC: NEGATIVE — SIGNIFICANT CHANGE UP
BILIRUB SERPL-MCNC: <0.2 MG/DL — SIGNIFICANT CHANGE UP (ref 0.2–1.2)
BILIRUB UR-MCNC: NEGATIVE — SIGNIFICANT CHANGE UP
BUN SERPL-MCNC: 12 MG/DL — SIGNIFICANT CHANGE UP (ref 7–23)
CALCIUM SERPL-MCNC: 9.8 MG/DL — SIGNIFICANT CHANGE UP (ref 8.4–10.5)
CHLORIDE SERPL-SCNC: 96 MMOL/L — SIGNIFICANT CHANGE UP (ref 96–108)
CO2 SERPL-SCNC: 31 MMOL/L — SIGNIFICANT CHANGE UP (ref 22–31)
COCAINE METAB.OTHER UR-MCNC: NEGATIVE — SIGNIFICANT CHANGE UP
COLOR SPEC: YELLOW — SIGNIFICANT CHANGE UP
CREAT SERPL-MCNC: 0.85 MG/DL — SIGNIFICANT CHANGE UP (ref 0.5–1.3)
DIFF PNL FLD: NEGATIVE — SIGNIFICANT CHANGE UP
EOSINOPHIL # BLD AUTO: 0.13 K/UL — SIGNIFICANT CHANGE UP (ref 0–0.5)
EOSINOPHIL NFR BLD AUTO: 1.5 % — SIGNIFICANT CHANGE UP (ref 0–6)
ESTIMATED AVERAGE GLUCOSE: 220 MG/DL — HIGH (ref 68–114)
ETHANOL SERPL-MCNC: <10 MG/DL — SIGNIFICANT CHANGE UP (ref 0–10)
GLUCOSE BLDC GLUCOMTR-MCNC: 206 MG/DL — HIGH (ref 70–99)
GLUCOSE BLDC GLUCOMTR-MCNC: 290 MG/DL — HIGH (ref 70–99)
GLUCOSE SERPL-MCNC: 150 MG/DL — HIGH (ref 70–99)
GLUCOSE UR QL: NEGATIVE — SIGNIFICANT CHANGE UP
HCT VFR BLD CALC: 41.5 % — SIGNIFICANT CHANGE UP (ref 39–50)
HGB BLD-MCNC: 13.5 G/DL — SIGNIFICANT CHANGE UP (ref 13–17)
IMM GRANULOCYTES NFR BLD AUTO: 0.7 % — SIGNIFICANT CHANGE UP (ref 0–1.5)
KETONES UR-MCNC: NEGATIVE — SIGNIFICANT CHANGE UP
LEUKOCYTE ESTERASE UR-ACNC: NEGATIVE — SIGNIFICANT CHANGE UP
LYMPHOCYTES # BLD AUTO: 1.98 K/UL — SIGNIFICANT CHANGE UP (ref 1–3.3)
LYMPHOCYTES # BLD AUTO: 22.4 % — SIGNIFICANT CHANGE UP (ref 13–44)
MCHC RBC-ENTMCNC: 27.8 PG — SIGNIFICANT CHANGE UP (ref 27–34)
MCHC RBC-ENTMCNC: 32.5 GM/DL — SIGNIFICANT CHANGE UP (ref 32–36)
MCV RBC AUTO: 85.4 FL — SIGNIFICANT CHANGE UP (ref 80–100)
METHADONE UR-MCNC: POSITIVE
MONOCYTES # BLD AUTO: 0.91 K/UL — HIGH (ref 0–0.9)
MONOCYTES NFR BLD AUTO: 10.3 % — SIGNIFICANT CHANGE UP (ref 2–14)
NEUTROPHILS # BLD AUTO: 5.74 K/UL — SIGNIFICANT CHANGE UP (ref 1.8–7.4)
NEUTROPHILS NFR BLD AUTO: 64.8 % — SIGNIFICANT CHANGE UP (ref 43–77)
NITRITE UR-MCNC: NEGATIVE — SIGNIFICANT CHANGE UP
NRBC # BLD: 0 /100 WBCS — SIGNIFICANT CHANGE UP (ref 0–0)
OPIATES UR-MCNC: NEGATIVE — SIGNIFICANT CHANGE UP
PCP SPEC-MCNC: SIGNIFICANT CHANGE UP
PCP SPEC-MCNC: SIGNIFICANT CHANGE UP
PCP UR-MCNC: NEGATIVE — SIGNIFICANT CHANGE UP
PH UR: 5.5 — SIGNIFICANT CHANGE UP (ref 5–8)
PLATELET # BLD AUTO: 190 K/UL — SIGNIFICANT CHANGE UP (ref 150–400)
POTASSIUM SERPL-MCNC: 4.2 MMOL/L — SIGNIFICANT CHANGE UP (ref 3.5–5.3)
POTASSIUM SERPL-SCNC: 4.2 MMOL/L — SIGNIFICANT CHANGE UP (ref 3.5–5.3)
PROT SERPL-MCNC: 6.7 G/DL — SIGNIFICANT CHANGE UP (ref 6–8.3)
PROT UR-MCNC: NEGATIVE MG/DL — SIGNIFICANT CHANGE UP
RBC # BLD: 4.86 M/UL — SIGNIFICANT CHANGE UP (ref 4.2–5.8)
RBC # FLD: 14.6 % — HIGH (ref 10.3–14.5)
SARS-COV-2 RNA SPEC QL NAA+PROBE: SIGNIFICANT CHANGE UP
SODIUM SERPL-SCNC: 137 MMOL/L — SIGNIFICANT CHANGE UP (ref 135–145)
SP GR SPEC: 1.02 — SIGNIFICANT CHANGE UP (ref 1–1.03)
THC UR QL: NEGATIVE — SIGNIFICANT CHANGE UP
TSH SERPL-MCNC: 2.1 UIU/ML — SIGNIFICANT CHANGE UP (ref 0.27–4.2)
UROBILINOGEN FLD QL: 0.2 E.U./DL — SIGNIFICANT CHANGE UP
WBC # BLD: 8.85 K/UL — SIGNIFICANT CHANGE UP (ref 3.8–10.5)
WBC # FLD AUTO: 8.85 K/UL — SIGNIFICANT CHANGE UP (ref 3.8–10.5)

## 2022-02-16 PROCEDURE — 73562 X-RAY EXAM OF KNEE 3: CPT | Mod: 26,RT

## 2022-02-16 PROCEDURE — 90792 PSYCH DIAG EVAL W/MED SRVCS: CPT

## 2022-02-16 PROCEDURE — 73130 X-RAY EXAM OF HAND: CPT | Mod: 26,RT

## 2022-02-16 PROCEDURE — 99285 EMERGENCY DEPT VISIT HI MDM: CPT

## 2022-02-16 RX ORDER — INSULIN LISPRO 100/ML
VIAL (ML) SUBCUTANEOUS
Refills: 0 | Status: DISCONTINUED | OUTPATIENT
Start: 2022-02-16 | End: 2022-02-21

## 2022-02-16 RX ORDER — DIPHENHYDRAMINE HCL 50 MG
50 CAPSULE ORAL AT BEDTIME
Refills: 0 | Status: DISCONTINUED | OUTPATIENT
Start: 2022-02-16 | End: 2022-03-01

## 2022-02-16 RX ORDER — SODIUM CHLORIDE 9 MG/ML
1000 INJECTION, SOLUTION INTRAVENOUS
Refills: 0 | Status: DISCONTINUED | OUTPATIENT
Start: 2022-02-16 | End: 2022-03-01

## 2022-02-16 RX ORDER — DEXTROSE 50 % IN WATER 50 %
25 SYRINGE (ML) INTRAVENOUS ONCE
Refills: 0 | Status: DISCONTINUED | OUTPATIENT
Start: 2022-02-16 | End: 2022-03-01

## 2022-02-16 RX ORDER — LOPERAMIDE HCL 2 MG
2 TABLET ORAL EVERY 4 HOURS
Refills: 0 | Status: DISCONTINUED | OUTPATIENT
Start: 2022-02-16 | End: 2022-03-01

## 2022-02-16 RX ORDER — INFLUENZA VIRUS VACCINE 15; 15; 15; 15 UG/.5ML; UG/.5ML; UG/.5ML; UG/.5ML
0.5 SUSPENSION INTRAMUSCULAR ONCE
Refills: 0 | Status: DISCONTINUED | OUTPATIENT
Start: 2022-02-16 | End: 2022-03-01

## 2022-02-16 RX ORDER — TRAZODONE HCL 50 MG
50 TABLET ORAL AT BEDTIME
Refills: 0 | Status: DISCONTINUED | OUTPATIENT
Start: 2022-02-16 | End: 2022-02-18

## 2022-02-16 RX ORDER — OLANZAPINE 15 MG/1
5 TABLET, FILM COATED ORAL EVERY 6 HOURS
Refills: 0 | Status: DISCONTINUED | OUTPATIENT
Start: 2022-02-16 | End: 2022-02-18

## 2022-02-16 RX ORDER — GLUCAGON INJECTION, SOLUTION 0.5 MG/.1ML
1 INJECTION, SOLUTION SUBCUTANEOUS ONCE
Refills: 0 | Status: DISCONTINUED | OUTPATIENT
Start: 2022-02-16 | End: 2022-03-01

## 2022-02-16 RX ORDER — DEXTROSE 50 % IN WATER 50 %
15 SYRINGE (ML) INTRAVENOUS ONCE
Refills: 0 | Status: DISCONTINUED | OUTPATIENT
Start: 2022-02-16 | End: 2022-03-01

## 2022-02-16 RX ORDER — CLONAZEPAM 1 MG
1 TABLET ORAL
Refills: 0 | Status: DISCONTINUED | OUTPATIENT
Start: 2022-02-16 | End: 2022-02-22

## 2022-02-16 RX ORDER — LITHIUM CARBONATE 300 MG/1
300 TABLET, EXTENDED RELEASE ORAL
Refills: 0 | Status: DISCONTINUED | OUTPATIENT
Start: 2022-02-16 | End: 2022-02-19

## 2022-02-16 RX ORDER — ACETAMINOPHEN 500 MG
650 TABLET ORAL EVERY 6 HOURS
Refills: 0 | Status: DISCONTINUED | OUTPATIENT
Start: 2022-02-16 | End: 2022-03-01

## 2022-02-16 RX ORDER — MAGNESIUM HYDROXIDE 400 MG/1
30 TABLET, CHEWABLE ORAL DAILY
Refills: 0 | Status: DISCONTINUED | OUTPATIENT
Start: 2022-02-16 | End: 2022-03-01

## 2022-02-16 RX ORDER — DEXTROSE 50 % IN WATER 50 %
12.5 SYRINGE (ML) INTRAVENOUS ONCE
Refills: 0 | Status: DISCONTINUED | OUTPATIENT
Start: 2022-02-16 | End: 2022-03-01

## 2022-02-16 RX ORDER — INSULIN LISPRO 100/ML
VIAL (ML) SUBCUTANEOUS AT BEDTIME
Refills: 0 | Status: DISCONTINUED | OUTPATIENT
Start: 2022-02-16 | End: 2022-02-22

## 2022-02-16 RX ORDER — HYDROXYZINE HCL 10 MG
25 TABLET ORAL EVERY 6 HOURS
Refills: 0 | Status: DISCONTINUED | OUTPATIENT
Start: 2022-02-16 | End: 2022-03-01

## 2022-02-16 RX ORDER — NICOTINE POLACRILEX 2 MG
1 GUM BUCCAL DAILY
Refills: 0 | Status: DISCONTINUED | OUTPATIENT
Start: 2022-02-16 | End: 2022-03-01

## 2022-02-16 RX ADMIN — Medication 1 MILLIGRAM(S): at 23:19

## 2022-02-16 RX ADMIN — Medication 2 MILLIGRAM(S): at 23:24

## 2022-02-16 RX ADMIN — LITHIUM CARBONATE 300 MILLIGRAM(S): 300 TABLET, EXTENDED RELEASE ORAL at 23:14

## 2022-02-16 NOTE — ED PROVIDER NOTE - CLINICAL SUMMARY MEDICAL DECISION MAKING FREE TEXT BOX
Pt with SI and HI - multiple other complaints - but medically cleared.  Right hand with 5th metacarpal neck fracture splinted several days prior, ACE removed and web roll applied for patient's safety.  Pt able to ambulate - no fx of right knee, labs otherwise ok.  Psych to admit for further management.

## 2022-02-16 NOTE — BH PATIENT PROFILE - NSVRISKUNREALISTIC_PSY_ALL_CORE
High Dose Vitamin A Pregnancy And Lactation Text: High dose vitamin A therapy is contraindicated during pregnancy and breast feeding. Maybe/Moderate

## 2022-02-16 NOTE — BH PATIENT PROFILE - STATED REASON FOR ADMISSION
"People are following me" Patient endorses HI towards people that are following him, but does not know who they are.

## 2022-02-16 NOTE — BH PATIENT PROFILE - FALL HARM RISK - UNIVERSAL INTERVENTIONS
Non-slip footwear when patient is out of bed/Physically safe environment - no spills, clutter or unnecessary equipment/Purposeful Proactive Rounding

## 2022-02-16 NOTE — ED BEHAVIORAL HEALTH ASSESSMENT NOTE - DESCRIPTION
Per HPI In ED, he had medical work up including basic labs, EKG, and X-rays of his R hand, which showed healing fracture, and of his knee, which was also normal. In ED, he had medical work up including basic labs, EKG, and X-rays of his R hand, which showed healing fracture, and of his knee, which was also normal. Discussed his R hand fracture with Dr. cole who said she would try to get velcro splint for him rather than continue with ace bandage and foam splint as ace bandage would necessitate 1:1.

## 2022-02-16 NOTE — ED BEHAVIORAL HEALTH ASSESSMENT NOTE - NSCURPASTPSYDX_PSY_ALL_CORE
Mood disorder/Psychotic disorder/Alcohol/Substance Use disorders/Cluster B Personality disorder/traits/Conduct problems

## 2022-02-16 NOTE — ED BEHAVIORAL HEALTH ASSESSMENT NOTE - VIOLENCE RISK FACTORS:
Feeling of being under threat and being unable to control threat/Antisocial behavior/cognition (past or present)/Violent ideation/threat/speech/Substance abuse/Impulsivity

## 2022-02-16 NOTE — ED ADULT TRIAGE NOTE - CHIEF COMPLAINT QUOTE
Pt walked into ED c/o left arm numbness for 4 hrs. Pt also c/o "I have a swollen right knee, I don't know what happened. Also I broke my knuckle and need a new cast because I got this one wet. I have a lot of psych programs, Bipolar and all that, but don't give me haldol, cogentin, thorazine, or compazine because I'm allergic." No left arm weakness noted, no dysarthria or facial droop.

## 2022-02-16 NOTE — ED ADULT NURSE NOTE - OBJECTIVE STATEMENT
Pt A&Ox4, ambulatory with steady gait, speaking in clear/full sentences, no acute distress, vital signs stable. Pt presents to the ED for left arm numbness from the elbow to the hand x 4 hours. Pt reports he has r knee pain that started "maybe after I prayed and hit my knee." Pt also reports left arm pain after he punched a door a week ago. Pt was seen at [facility] but per pt, he needs a new splint. Pt also reports recently moving to [place] from [place] and being paranoid. "People are following me." I wanna kill them and I have thought about killing myself to stop the people from following me. Pt denies HI towards a specific person. Just "those people who are following me" Pt reports SI plan to take a bunch of his home medications. Pt also reports having Covid a few weeks ago. Pt denies SOB/CP at this time.

## 2022-02-16 NOTE — ED ADULT NURSE REASSESSMENT NOTE - NS ED NURSE REASSESS COMMENT FT1
Psyc at bedside. Pt denies auditory hallucinations. Pt reports seeing shadows. But pt reports that is due to his pituitary tumor. Psych at bedside. Pt denies auditory hallucinations. Pt reports seeing shadows. But pt reports that is due to his pituitary tumor.

## 2022-02-16 NOTE — BH PATIENT PROFILE - HOME MEDICATIONS
LORazepam 2 mg oral tablet , 1 tab(s) orally 1 to 2 times a day, As Needed -agitation/anxiety MDD:4mg  lisinopril 5 mg oral tablet , 1 tab(s) orally once a day  ARIPiprazole 5 mg oral tablet , 1 tab(s) orally once a day (at bedtime)  gabapentin 600 mg oral tablet , 1 tab(s) orally 3 times a day  divalproex sodium 500 mg oral tablet, extended release , 2 tab(s) orally once a day (at bedtime)

## 2022-02-16 NOTE — ED ADULT NURSE NOTE - NSICDXPASTMEDICALHX_GEN_ALL_CORE_FT
PAST MEDICAL HISTORY:  Diabetes mellitus     GERD (gastroesophageal reflux disease)     HTN (hypertension)
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
## 2022-02-16 NOTE — ED PROVIDER NOTE - OBJECTIVE STATEMENT
57 y/o m with bipolar disorder on lithium and olanzapine presents to ED with multiple complaints including numbness of his left elbow which radiates to hand, right knee pain and right hand pain s/p splint at [facility] for a fracture.  Pt also states that people are following him and he wants to hurt one of them and wants to hurt himself by taking pills.  Pt states he recently moved to [facility] and has not yet established psychiatric care in ED.  Denies headache, neck pain, back pain, other extremity weakness or numbness.
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
## 2022-02-16 NOTE — ED PROVIDER NOTE - MUSCULOSKELETAL, MLM
right fifth metacarpal tenderness, no distal nv deficit, right knee - mild tenderness, FROM no distal nv deficit

## 2022-02-17 LAB
A1C WITH ESTIMATED AVERAGE GLUCOSE RESULT: 9.2 % — HIGH (ref 4–5.6)
CHOLEST SERPL-MCNC: 92 MG/DL — SIGNIFICANT CHANGE UP
ESTIMATED AVERAGE GLUCOSE: 217 MG/DL — HIGH (ref 68–114)
GLUCOSE BLDC GLUCOMTR-MCNC: 167 MG/DL — HIGH (ref 70–99)
GLUCOSE BLDC GLUCOMTR-MCNC: 223 MG/DL — HIGH (ref 70–99)
GLUCOSE BLDC GLUCOMTR-MCNC: 269 MG/DL — HIGH (ref 70–99)
GLUCOSE BLDC GLUCOMTR-MCNC: 318 MG/DL — HIGH (ref 70–99)
HDLC SERPL-MCNC: 34 MG/DL — LOW
LIPID PNL WITH DIRECT LDL SERPL: 40 MG/DL — SIGNIFICANT CHANGE UP
NON HDL CHOLESTEROL: 58 MG/DL — SIGNIFICANT CHANGE UP
TRIGL SERPL-MCNC: 92 MG/DL — SIGNIFICANT CHANGE UP

## 2022-02-17 PROCEDURE — 99223 1ST HOSP IP/OBS HIGH 75: CPT

## 2022-02-17 RX ORDER — GABAPENTIN 400 MG/1
800 CAPSULE ORAL THREE TIMES A DAY
Refills: 0 | Status: DISCONTINUED | OUTPATIENT
Start: 2022-02-17 | End: 2022-03-01

## 2022-02-17 RX ORDER — METHADONE HYDROCHLORIDE 40 MG/1
30 TABLET ORAL DAILY
Refills: 0 | Status: DISCONTINUED | OUTPATIENT
Start: 2022-02-17 | End: 2022-02-18

## 2022-02-17 RX ORDER — ATORVASTATIN CALCIUM 80 MG/1
80 TABLET, FILM COATED ORAL AT BEDTIME
Refills: 0 | Status: DISCONTINUED | OUTPATIENT
Start: 2022-02-17 | End: 2022-03-01

## 2022-02-17 RX ORDER — SPIRONOLACTONE 25 MG/1
25 TABLET, FILM COATED ORAL DAILY
Refills: 0 | Status: DISCONTINUED | OUTPATIENT
Start: 2022-02-17 | End: 2022-03-01

## 2022-02-17 RX ORDER — METHADONE HYDROCHLORIDE 40 MG/1
120 TABLET ORAL ONCE
Refills: 0 | Status: DISCONTINUED | OUTPATIENT
Start: 2022-02-17 | End: 2022-02-17

## 2022-02-17 RX ORDER — LOSARTAN POTASSIUM 100 MG/1
25 TABLET, FILM COATED ORAL DAILY
Refills: 0 | Status: DISCONTINUED | OUTPATIENT
Start: 2022-02-17 | End: 2022-03-01

## 2022-02-17 RX ORDER — CARVEDILOL PHOSPHATE 80 MG/1
6.25 CAPSULE, EXTENDED RELEASE ORAL EVERY 12 HOURS
Refills: 0 | Status: DISCONTINUED | OUTPATIENT
Start: 2022-02-17 | End: 2022-03-01

## 2022-02-17 RX ORDER — METHADONE HYDROCHLORIDE 40 MG/1
150 TABLET ORAL DAILY
Refills: 0 | Status: DISCONTINUED | OUTPATIENT
Start: 2022-02-18 | End: 2022-02-24

## 2022-02-17 RX ADMIN — Medication 650 MILLIGRAM(S): at 02:30

## 2022-02-17 RX ADMIN — Medication 1 MILLIGRAM(S): at 21:57

## 2022-02-17 RX ADMIN — LITHIUM CARBONATE 300 MILLIGRAM(S): 300 TABLET, EXTENDED RELEASE ORAL at 21:58

## 2022-02-17 RX ADMIN — Medication 6: at 16:46

## 2022-02-17 RX ADMIN — ATORVASTATIN CALCIUM 80 MILLIGRAM(S): 80 TABLET, FILM COATED ORAL at 21:58

## 2022-02-17 RX ADMIN — Medication 1 PATCH: at 16:48

## 2022-02-17 RX ADMIN — Medication 1 PATCH: at 10:19

## 2022-02-17 RX ADMIN — Medication 2 MILLIGRAM(S): at 21:59

## 2022-02-17 RX ADMIN — Medication 1 MILLIGRAM(S): at 10:18

## 2022-02-17 RX ADMIN — CARVEDILOL PHOSPHATE 6.25 MILLIGRAM(S): 80 CAPSULE, EXTENDED RELEASE ORAL at 21:57

## 2022-02-17 RX ADMIN — LITHIUM CARBONATE 300 MILLIGRAM(S): 300 TABLET, EXTENDED RELEASE ORAL at 10:19

## 2022-02-17 RX ADMIN — GABAPENTIN 800 MILLIGRAM(S): 400 CAPSULE ORAL at 21:57

## 2022-02-17 RX ADMIN — METHADONE HYDROCHLORIDE 120 MILLIGRAM(S): 40 TABLET ORAL at 17:47

## 2022-02-17 RX ADMIN — Medication 650 MILLIGRAM(S): at 17:47

## 2022-02-17 RX ADMIN — Medication 8: at 11:44

## 2022-02-17 RX ADMIN — Medication 2 MILLIGRAM(S): at 10:22

## 2022-02-17 RX ADMIN — LOSARTAN POTASSIUM 25 MILLIGRAM(S): 100 TABLET, FILM COATED ORAL at 17:46

## 2022-02-17 RX ADMIN — METHADONE HYDROCHLORIDE 30 MILLIGRAM(S): 40 TABLET ORAL at 13:37

## 2022-02-17 RX ADMIN — Medication 2: at 07:09

## 2022-02-17 RX ADMIN — Medication 2 MILLIGRAM(S): at 15:11

## 2022-02-17 RX ADMIN — Medication 650 MILLIGRAM(S): at 01:50

## 2022-02-17 NOTE — BH INPATIENT PSYCHIATRY ASSESSMENT NOTE - NSBHMETABOLIC_PSY_ALL_CORE_FT
BMI: BMI (kg/m2): 27.6 (02-16-22 @ 22:13)  HbA1c: A1C with Estimated Average Glucose Result: 9.2 % (02-17-22 @ 07:34)    Glucose: POCT Blood Glucose.: 318 mg/dL (02-17-22 @ 11:41)    BP: 128/79 (02-17-22 @ 08:05) (128/79 - 162/79)  Lipid Panel: Date/Time: 02-17-22 @ 07:34  Cholesterol, Serum: 92  Direct LDL: --  HDL Cholesterol, Serum: 34  Total Cholesterol/HDL Ration Measurement: --  Triglycerides, Serum: 92

## 2022-02-17 NOTE — BH INPATIENT PSYCHIATRY ASSESSMENT NOTE - PAST PSYCHOTROPIC MEDICATION
fluoxetine, sertraline, wellbutrin, lexapro, gabapentin, Klonopin, Depakote, olanzapine, lithium, aripiprazole

## 2022-02-17 NOTE — BH INPATIENT PSYCHIATRY ASSESSMENT NOTE - NSICDXBHSECONDARYDX_PSY_ALL_CORE
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

## 2022-02-17 NOTE — BH INPATIENT PSYCHIATRY ASSESSMENT NOTE - NSBHCHARTREVIEWVS_PSY_A_CORE FT
Vital Signs Last 24 Hrs  T(C): 36.9 (02-17-22 @ 08:05), Max: 37.5 (02-16-22 @ 22:10)  T(F): 98.5 (02-17-22 @ 08:05), Max: 99.5 (02-16-22 @ 22:10)  HR: 61 (02-17-22 @ 08:05) (61 - 73)  BP: 128/79 (02-17-22 @ 08:05) (128/79 - 162/79)  BP(mean): --  RR: 18 (02-17-22 @ 08:05) (17 - 20)  SpO2: 95% (02-17-22 @ 08:05) (95% - 96%)
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
## 2022-02-17 NOTE — BH SOCIAL WORK INITIAL PSYCHOSOCIAL EVALUATION - NSBHBARRIERS_PSY_ALL_CORE
Co-morbid personality/Financial difficulties/Lack of support/Lack of insight/Medical co-morbidities/Non-compliant with treatment/Poor judgement

## 2022-02-17 NOTE — BH INPATIENT PSYCHIATRY ASSESSMENT NOTE - NSBHASSESSSUMMFT_PSY_ALL_CORE
Patient is a 56 year old male domiciled at mother's home, unemployed on disability, single, never , no children. He has prior psychiatric diagnoses including bipolar 1 disorder vs schizoaffective disorder, bipolar type vs antisocial personality disorder. He also carries medical diagnoses including pituitary tumor, HTN, T2DM, heart failure, and GERD. He was brought in by self for paranoia, suicidality, and homicidality.    Patient presents with complaints of "jose" which includes paranoia that people are following him to the point where he has HI with active intent to harm other people were he to be discharged. Reports passive SI, but denies intent or plan. Not observed to be elevated, speaking quickly, no PMA. Likely what  patient describes as jose is heightened anxiety related to paranoia that people linked to his ex-girlfriend have been following him for over a year. Mr. Bueno was recently admitted to [facility], but didn't feel the discharge plan (appointment with [facility]) was adequate and thus sought continued inpatient treatment at [facility]. Was admitted do to perceived increase risk to self/others. Patient would benefit from medication optimization to attempt to address paranoia, as well as safety planning when in the community. Chronic suicidality/homicidality may be present, psychoeducation surrounding coping tools with patient's goal of staying out of [facility] as well as preventing future hospitalizations.     Plan:   Continue lithium 300mg PO BID.  Continue methadone 150mg PO daily.

## 2022-02-17 NOTE — BH SOCIAL WORK INITIAL PSYCHOSOCIAL EVALUATION - OTHER PAST PSYCHIATRIC HISTORY (INCLUDE DETAILS REGARDING ONSET, COURSE OF ILLNESS, INPATIENT/OUTPATIENT TREATMENT)
F39 Mood Disorder  F11.90 Opioid Use Disorder  Multiple prior hospitalizations (most recent 02/2022 at [place]) F39 Mood Disorder  F11.90 Opioid Use Disorder  Multiple prior hospitalizations (>10 admissions; patient reports most recent 02/2022 at [place], however RACHEL indicates most recent at [facility] on 08/19/2021)
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
## 2022-02-17 NOTE — CHART NOTE - NSCHARTNOTEFT_GEN_A_CORE
[facility]  PHYSICAL EXAM: Agree/Declined    VITALS: T(C): 37.5 (02-16-22 @ 22:10), Max: 37.5 (02-16-22 @ 22:10)  HR: 73 (02-16-22 @ 22:10) (70 - 73)  BP: 162/79 (02-16-22 @ 22:10) (150/100 - 162/79)  RR: 17 (02-16-22 @ 22:10) (17 - 20)  SpO2: 96% (02-16-22 @ 22:10) (94% - 96%)      GENERAL: NAD, comfortable, ambulating  HEAD:  Atraumatic, Normocephalic  EYES: EOMI, PERRLA, conjunctiva and sclera clear  ENT: Moist mucous membranes  NECK: Supple, No JVD  CHEST/LUNG: Clear to auscultation bilaterally; No rales, rhonchi, wheezing, or rubs. Unlabored respirations  HEART: Regular rate and rhythm; No murmurs, rubs, or gallops  ABDOMEN: BSx4; Soft, nontender, nondistended  EXTREMITIES:  No clubbing, cyanosis, or edema  NERVOUS SYSTEM:  A&Ox3, no focal deficits   SKIN: No rashes or lesions PHYSICAL EXAM: Agreed    VITALS: T(C): 37.5 (02-16-22 @ 22:10), Max: 37.5 (02-16-22 @ 22:10)  HR: 73 (02-16-22 @ 22:10) (70 - 73)  BP: 162/79 (02-16-22 @ 22:10) (150/100 - 162/79)  RR: 17 (02-16-22 @ 22:10) (17 - 20)  SpO2: 96% (02-16-22 @ 22:10) (94% - 96%)      GENERAL: NAD, comfortable, ambulating;  HEAD:  Atraumatic, Normocephalic  EYES: EOMI, PERRLA, conjunctiva and sclera clear  ENT: Moist mucous membranes  NECK: Supple, No JVD  CHEST/LUNG: Clear to auscultation bilaterally; No rales, rhonchi, wheezing, or rubs. Unlabored respirations  HEART: Regular rate and rhythm; No murmurs, rubs, or gallops  ABDOMEN: BSx4; Soft, nontender, nondistended  EXTREMITIES:  No clubbing, cyanosis, or edema  NERVOUS SYSTEM:  A&Ox3, no focal deficits   SKIN: No rashes or lesions

## 2022-02-17 NOTE — BH INPATIENT PSYCHIATRY ASSESSMENT NOTE - CURRENT MEDICATION
MEDICATIONS  (STANDING):  clonazePAM  Tablet 1 milliGRAM(s) Oral two times a day  dextrose 40% Gel 15 Gram(s) Oral once  dextrose 5%. 1000 milliLiter(s) (50 mL/Hr) IV Continuous <Continuous>  dextrose 5%. 1000 milliLiter(s) (100 mL/Hr) IV Continuous <Continuous>  dextrose 50% Injectable 25 Gram(s) IV Push once  dextrose 50% Injectable 12.5 Gram(s) IV Push once  dextrose 50% Injectable 25 Gram(s) IV Push once  glucagon  Injectable 1 milliGRAM(s) IntraMuscular once  influenza   Vaccine 0.5 milliLiter(s) IntraMuscular once  insulin lispro (ADMELOG) corrective regimen sliding scale   SubCutaneous three times a day before meals  insulin lispro (ADMELOG) corrective regimen sliding scale   SubCutaneous at bedtime  lithium 300 milliGRAM(s) Oral two times a day  methadone    Tablet 30 milliGRAM(s) Oral daily  methadone    Tablet 120 milliGRAM(s) Oral once  nicotine -  14 mG/24Hr(s) Patch 1 Patch Transdermal daily    MEDICATIONS  (PRN):  acetaminophen     Tablet .. 650 milliGRAM(s) Oral every 6 hours PRN Mild Pain (1 - 3), Moderate Pain (4 - 6)  aluminum hydroxide/magnesium hydroxide/simethicone Suspension 30 milliLiter(s) Oral every 4 hours PRN Dyspepsia  diphenhydrAMINE 50 milliGRAM(s) Oral at bedtime PRN Insomnia  hydrOXYzine hydrochloride 25 milliGRAM(s) Oral every 6 hours PRN Anxiety  loperamide 2 milliGRAM(s) Oral every 4 hours PRN diarrhea  magnesium hydroxide Suspension 30 milliLiter(s) Oral daily PRN Constipation  OLANZapine Disintegrating Tablet 5 milliGRAM(s) Oral every 6 hours PRN Agitation  traZODone 50 milliGRAM(s) Oral at bedtime PRN insomnia

## 2022-02-17 NOTE — DIETITIAN NUTRITION RISK NOTIFICATION - TREATMENT: THE FOLLOWING DIET HAS BEEN RECOMMENDED
1. Continue Diet, Consistent Carbohydrate w/Evening Snack   >>Fish allergy. Honor diet preferences as able  >>Add Ensure Max Protein QD (provide 150cal, 30g pro/serving)  2. Monitor BG,BMP, CBC per MD discretion  3.Trend weights (weekly), PO intake, bowel movement  >>Reported diarrhea, on immodium. Recs banatrol if diarrhea persists  4. Enforce CHO diet education on follow up  >> Patient would benefit from further Medical Nutrition Therapy after discharge. Recommend patient to [facility]. [email or web address] or call [phone] to make an appointment.   5. RD remains available, continue to follow per protocol.  
Dr. Little: Gen: AAOX3, in no acute distress, speaking in complete sentences, cooperative with examination  HEENT: PERRL, EOMI, moist oral mucosa, patent airway, normal pharynx, supple neck  Heart: RRR, no murmurs or gallops  Lungs: CTAX2, symmetric chest expansion  Ext: no edema or cyanosis, no gross deformities, mild tenderness to palpation right upper arms, full ROM of all extremities  Skin: no rashes or lesions, capillary refill <2 secs  Neuro: no gross deficits        -Dr. Little

## 2022-02-18 LAB
A1C WITH ESTIMATED AVERAGE GLUCOSE RESULT: 9.3 % — HIGH (ref 4–5.6)
CHOLEST SERPL-MCNC: 104 MG/DL — SIGNIFICANT CHANGE UP
ESTIMATED AVERAGE GLUCOSE: 220 MG/DL — HIGH (ref 68–114)
GLUCOSE BLDC GLUCOMTR-MCNC: 162 MG/DL — HIGH (ref 70–99)
GLUCOSE BLDC GLUCOMTR-MCNC: 206 MG/DL — HIGH (ref 70–99)
GLUCOSE BLDC GLUCOMTR-MCNC: 254 MG/DL — HIGH (ref 70–99)
GLUCOSE BLDC GLUCOMTR-MCNC: 289 MG/DL — HIGH (ref 70–99)
HDLC SERPL-MCNC: 40 MG/DL — LOW
LIPID PNL WITH DIRECT LDL SERPL: 33 MG/DL — SIGNIFICANT CHANGE UP
NON HDL CHOLESTEROL: 64 MG/DL — SIGNIFICANT CHANGE UP
TRIGL SERPL-MCNC: 157 MG/DL — HIGH

## 2022-02-18 PROCEDURE — 99233 SBSQ HOSP IP/OBS HIGH 50: CPT

## 2022-02-18 RX ORDER — OLANZAPINE 15 MG/1
10 TABLET, FILM COATED ORAL EVERY 8 HOURS
Refills: 0 | Status: DISCONTINUED | OUTPATIENT
Start: 2022-02-18 | End: 2022-03-01

## 2022-02-18 RX ADMIN — ATORVASTATIN CALCIUM 80 MILLIGRAM(S): 80 TABLET, FILM COATED ORAL at 21:35

## 2022-02-18 RX ADMIN — LOSARTAN POTASSIUM 25 MILLIGRAM(S): 100 TABLET, FILM COATED ORAL at 09:33

## 2022-02-18 RX ADMIN — Medication 650 MILLIGRAM(S): at 07:18

## 2022-02-18 RX ADMIN — METHADONE HYDROCHLORIDE 150 MILLIGRAM(S): 40 TABLET ORAL at 09:36

## 2022-02-18 RX ADMIN — CARVEDILOL PHOSPHATE 6.25 MILLIGRAM(S): 80 CAPSULE, EXTENDED RELEASE ORAL at 09:38

## 2022-02-18 RX ADMIN — Medication 650 MILLIGRAM(S): at 00:20

## 2022-02-18 RX ADMIN — Medication 650 MILLIGRAM(S): at 23:41

## 2022-02-18 RX ADMIN — GABAPENTIN 800 MILLIGRAM(S): 400 CAPSULE ORAL at 09:33

## 2022-02-18 RX ADMIN — Medication 2: at 21:40

## 2022-02-18 RX ADMIN — Medication 1 PATCH: at 09:35

## 2022-02-18 RX ADMIN — Medication 1 MILLIGRAM(S): at 09:37

## 2022-02-18 RX ADMIN — LITHIUM CARBONATE 300 MILLIGRAM(S): 300 TABLET, EXTENDED RELEASE ORAL at 09:34

## 2022-02-18 RX ADMIN — Medication 1 PATCH: at 07:19

## 2022-02-18 RX ADMIN — Medication 650 MILLIGRAM(S): at 13:35

## 2022-02-18 RX ADMIN — Medication 1 PATCH: at 16:51

## 2022-02-18 RX ADMIN — GABAPENTIN 800 MILLIGRAM(S): 400 CAPSULE ORAL at 13:34

## 2022-02-18 RX ADMIN — Medication 2 MILLIGRAM(S): at 17:52

## 2022-02-18 RX ADMIN — LITHIUM CARBONATE 300 MILLIGRAM(S): 300 TABLET, EXTENDED RELEASE ORAL at 21:35

## 2022-02-18 RX ADMIN — Medication 6: at 12:08

## 2022-02-18 RX ADMIN — CARVEDILOL PHOSPHATE 6.25 MILLIGRAM(S): 80 CAPSULE, EXTENDED RELEASE ORAL at 21:35

## 2022-02-18 RX ADMIN — Medication 1 MILLIGRAM(S): at 21:35

## 2022-02-18 RX ADMIN — GABAPENTIN 800 MILLIGRAM(S): 400 CAPSULE ORAL at 21:35

## 2022-02-18 RX ADMIN — Medication 650 MILLIGRAM(S): at 06:04

## 2022-02-18 RX ADMIN — Medication 650 MILLIGRAM(S): at 14:20

## 2022-02-18 RX ADMIN — Medication 4: at 07:08

## 2022-02-18 RX ADMIN — Medication 2: at 16:44

## 2022-02-18 RX ADMIN — OLANZAPINE 10 MILLIGRAM(S): 15 TABLET, FILM COATED ORAL at 17:53

## 2022-02-18 RX ADMIN — SPIRONOLACTONE 25 MILLIGRAM(S): 25 TABLET, FILM COATED ORAL at 09:34

## 2022-02-18 RX ADMIN — Medication 2 MILLIGRAM(S): at 04:18

## 2022-02-18 RX ADMIN — Medication 2 MILLIGRAM(S): at 13:37

## 2022-02-18 NOTE — BH INPATIENT PSYCHIATRY PROGRESS NOTE - NSBHASSESSSUMMFT_PSY_ALL_CORE
Patient is a 56 year old male domiciled at mother's home, unemployed on disability, single, never , no children. He has prior psychiatric diagnoses including bipolar 1 disorder vs schizoaffective disorder, bipolar type vs antisocial personality disorder. He also carries medical diagnoses including pituitary tumor, HTN, T2DM, heart failure, and GERD. He was brought in by self for paranoia, suicidality, and homicidality.    Patient presents with complaints of "jose" which includes paranoia that people are following him to the point where he has HI with active intent to harm other people were he to be discharged. Reports passive SI, but denies intent or plan. Not observed to be elevated, speaking quickly, no PMA. Likely what  patient describes as jose is heightened anxiety related to paranoia that people linked to his ex-girlfriend have been following him for over a year. Mr. Bueno was recently admitted to [facility], but didn't feel the discharge plan (appointment with [facility]) was adequate and thus sought continued inpatient treatment at [facility]. Was admitted do to perceived increase risk to self/others. Patient would benefit from medication optimization to attempt to address paranoia, as well as safety planning when in the community. Chronic suicidality/homicidality may be present, psychoeducation surrounding coping tools with patient's goal of staying out of skilled nursing as well as preventing future hospitalizations.     Plan:   Continue lithium 300mg PO BID.  Continue methadone 150mg PO daily.

## 2022-02-18 NOTE — BH INPATIENT PSYCHIATRY PROGRESS NOTE - CURRENT MEDICATION
MEDICATIONS  (STANDING):  atorvastatin 80 milliGRAM(s) Oral at bedtime  carvedilol 6.25 milliGRAM(s) Oral every 12 hours  clonazePAM  Tablet 1 milliGRAM(s) Oral two times a day  dextrose 40% Gel 15 Gram(s) Oral once  dextrose 5%. 1000 milliLiter(s) (50 mL/Hr) IV Continuous <Continuous>  dextrose 5%. 1000 milliLiter(s) (100 mL/Hr) IV Continuous <Continuous>  dextrose 50% Injectable 25 Gram(s) IV Push once  dextrose 50% Injectable 12.5 Gram(s) IV Push once  dextrose 50% Injectable 25 Gram(s) IV Push once  gabapentin 800 milliGRAM(s) Oral three times a day  glucagon  Injectable 1 milliGRAM(s) IntraMuscular once  influenza   Vaccine 0.5 milliLiter(s) IntraMuscular once  insulin lispro (ADMELOG) corrective regimen sliding scale   SubCutaneous three times a day before meals  insulin lispro (ADMELOG) corrective regimen sliding scale   SubCutaneous at bedtime  lithium 300 milliGRAM(s) Oral two times a day  losartan 25 milliGRAM(s) Oral daily  methadone    Tablet 150 milliGRAM(s) Oral daily  nicotine -  14 mG/24Hr(s) Patch 1 Patch Transdermal daily  spironolactone 25 milliGRAM(s) Oral daily    MEDICATIONS  (PRN):  acetaminophen     Tablet .. 650 milliGRAM(s) Oral every 6 hours PRN Mild Pain (1 - 3), Moderate Pain (4 - 6)  aluminum hydroxide/magnesium hydroxide/simethicone Suspension 30 milliLiter(s) Oral every 4 hours PRN Dyspepsia  diphenhydrAMINE 50 milliGRAM(s) Oral at bedtime PRN Insomnia  hydrOXYzine hydrochloride 25 milliGRAM(s) Oral every 6 hours PRN Anxiety  loperamide 2 milliGRAM(s) Oral every 4 hours PRN diarrhea  LORazepam     Tablet 2 milliGRAM(s) Oral every 6 hours PRN agitation  magnesium hydroxide Suspension 30 milliLiter(s) Oral daily PRN Constipation  OLANZapine Disintegrating Tablet 10 milliGRAM(s) Oral every 8 hours PRN agitation   MEDICATIONS  (STANDING):  atorvastatin 80 milliGRAM(s) Oral at bedtime  carvedilol 6.25 milliGRAM(s) Oral every 12 hours  dextrose 40% Gel 15 Gram(s) Oral once  dextrose 5%. 1000 milliLiter(s) (50 mL/Hr) IV Continuous <Continuous>  dextrose 5%. 1000 milliLiter(s) (100 mL/Hr) IV Continuous <Continuous>  dextrose 50% Injectable 25 Gram(s) IV Push once  dextrose 50% Injectable 12.5 Gram(s) IV Push once  dextrose 50% Injectable 25 Gram(s) IV Push once  diazepam    Tablet 10 milliGRAM(s) Oral two times a day  gabapentin 800 milliGRAM(s) Oral three times a day  glucagon  Injectable 1 milliGRAM(s) IntraMuscular once  influenza   Vaccine 0.5 milliLiter(s) IntraMuscular once  insulin glargine Injectable (LANTUS) 25 Unit(s) SubCutaneous every morning  insulin lispro (ADMELOG) corrective regimen sliding scale   SubCutaneous at bedtime  insulin lispro (ADMELOG) corrective regimen sliding scale   SubCutaneous three times a day before meals  insulin lispro Injectable (ADMELOG) 10 Unit(s) SubCutaneous three times a day before meals  lithium CR (ESKALITH-CR) 450 milliGRAM(s) Oral two times a day  losartan 25 milliGRAM(s) Oral daily  methadone    Tablet 150 milliGRAM(s) Oral daily  nicotine -  14 mG/24Hr(s) Patch 1 Patch Transdermal daily  OLANZapine 15 milliGRAM(s) Oral at bedtime  OLANZapine Injectable 5 milliGRAM(s) IntraMuscular once  spironolactone 25 milliGRAM(s) Oral daily    MEDICATIONS  (PRN):  acetaminophen     Tablet .. 650 milliGRAM(s) Oral every 6 hours PRN Mild Pain (1 - 3), Moderate Pain (4 - 6)  aluminum hydroxide/magnesium hydroxide/simethicone Suspension 30 milliLiter(s) Oral every 4 hours PRN Dyspepsia  diphenhydrAMINE 50 milliGRAM(s) Oral at bedtime PRN Insomnia  hydrOXYzine hydrochloride 25 milliGRAM(s) Oral every 6 hours PRN Anxiety  loperamide 2 milliGRAM(s) Oral every 4 hours PRN diarrhea  LORazepam     Tablet 2 milliGRAM(s) Oral every 6 hours PRN agitation  magnesium hydroxide Suspension 30 milliLiter(s) Oral daily PRN Constipation  OLANZapine Disintegrating Tablet 10 milliGRAM(s) Oral every 8 hours PRN agitation

## 2022-02-18 NOTE — BH INPATIENT PSYCHIATRY PROGRESS NOTE - NSBHMETABOLIC_PSY_ALL_CORE_FT
BMI: BMI (kg/m2): 27.6 (02-16-22 @ 22:13)  HbA1c: A1C with Estimated Average Glucose Result: 9.3 % (02-18-22 @ 07:39)    Glucose: POCT Blood Glucose.: 162 mg/dL (02-18-22 @ 16:42)    BP: 146/77 (02-18-22 @ 17:25) (128/79 - 162/87)  Lipid Panel: Date/Time: 02-18-22 @ 07:38  Cholesterol, Serum: 104  Direct LDL: --  HDL Cholesterol, Serum: 40  Total Cholesterol/HDL Ration Measurement: --  Triglycerides, Serum: 157   BMI: BMI (kg/m2): 27.6 (02-16-22 @ 22:13)  HbA1c: A1C with Estimated Average Glucose Result: 9.3 % (02-18-22 @ 07:39)    Glucose: POCT Blood Glucose.: 403 mg/dL (02-22-22 @ 12:10)    BP: 134/76 (02-22-22 @ 16:45) (117/73 - 159/78)  Lipid Panel: Date/Time: 02-18-22 @ 07:38  Cholesterol, Serum: 104  Direct LDL: --  HDL Cholesterol, Serum: 40  Total Cholesterol/HDL Ration Measurement: --  Triglycerides, Serum: 157

## 2022-02-18 NOTE — BH INPATIENT PSYCHIATRY PROGRESS NOTE - NSBHCHARTREVIEWVS_PSY_A_CORE FT
Vital Signs Last 24 Hrs  T(C): 36.7 (02-18-22 @ 17:25), Max: 36.9 (02-17-22 @ 21:47)  T(F): 98 (02-18-22 @ 17:25), Max: 98.5 (02-17-22 @ 21:47)  HR: 56 (02-18-22 @ 17:25) (56 - 94)  BP: 146/77 (02-18-22 @ 17:25) (133/79 - 159/74)  BP(mean): --  RR: 18 (02-18-22 @ 17:25) (18 - 18)  SpO2: 95% (02-18-22 @ 17:25) (93% - 96%)     Vital Signs Last 24 Hrs  T(C): 36.9 (02-22-22 @ 16:45), Max: 37.1 (02-21-22 @ 20:05)  T(F): 98.5 (02-22-22 @ 16:45), Max: 98.7 (02-21-22 @ 20:05)  HR: 72 (02-22-22 @ 16:45) (72 - 79)  BP: 134/76 (02-22-22 @ 16:45) (129/75 - 134/76)  BP(mean): --  RR: 17 (02-22-22 @ 16:45) (16 - 18)  SpO2: 95% (02-22-22 @ 16:45) (95% - 95%)
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
## 2022-02-19 LAB
GLUCOSE BLDC GLUCOMTR-MCNC: 192 MG/DL — HIGH (ref 70–99)
GLUCOSE BLDC GLUCOMTR-MCNC: 317 MG/DL — HIGH (ref 70–99)
GLUCOSE BLDC GLUCOMTR-MCNC: 319 MG/DL — HIGH (ref 70–99)
GLUCOSE BLDC GLUCOMTR-MCNC: 404 MG/DL — HIGH (ref 70–99)

## 2022-02-19 RX ORDER — OLANZAPINE 15 MG/1
10 TABLET, FILM COATED ORAL AT BEDTIME
Refills: 0 | Status: DISCONTINUED | OUTPATIENT
Start: 2022-02-19 | End: 2022-02-22

## 2022-02-19 RX ORDER — LITHIUM CARBONATE 300 MG/1
450 TABLET, EXTENDED RELEASE ORAL
Refills: 0 | Status: DISCONTINUED | OUTPATIENT
Start: 2022-02-19 | End: 2022-02-23

## 2022-02-19 RX ORDER — OLANZAPINE 15 MG/1
5 TABLET, FILM COATED ORAL AT BEDTIME
Refills: 0 | Status: DISCONTINUED | OUTPATIENT
Start: 2022-02-19 | End: 2022-02-19

## 2022-02-19 RX ADMIN — LITHIUM CARBONATE 300 MILLIGRAM(S): 300 TABLET, EXTENDED RELEASE ORAL at 09:54

## 2022-02-19 RX ADMIN — CARVEDILOL PHOSPHATE 6.25 MILLIGRAM(S): 80 CAPSULE, EXTENDED RELEASE ORAL at 21:39

## 2022-02-19 RX ADMIN — Medication 2: at 16:55

## 2022-02-19 RX ADMIN — Medication 1 MILLIGRAM(S): at 09:54

## 2022-02-19 RX ADMIN — Medication 1 PATCH: at 07:23

## 2022-02-19 RX ADMIN — OLANZAPINE 10 MILLIGRAM(S): 15 TABLET, FILM COATED ORAL at 21:39

## 2022-02-19 RX ADMIN — SPIRONOLACTONE 25 MILLIGRAM(S): 25 TABLET, FILM COATED ORAL at 09:54

## 2022-02-19 RX ADMIN — Medication 650 MILLIGRAM(S): at 00:37

## 2022-02-19 RX ADMIN — Medication 8: at 12:11

## 2022-02-19 RX ADMIN — LOSARTAN POTASSIUM 25 MILLIGRAM(S): 100 TABLET, FILM COATED ORAL at 09:54

## 2022-02-19 RX ADMIN — ATORVASTATIN CALCIUM 80 MILLIGRAM(S): 80 TABLET, FILM COATED ORAL at 21:39

## 2022-02-19 RX ADMIN — Medication 650 MILLIGRAM(S): at 07:17

## 2022-02-19 RX ADMIN — Medication 1 PATCH: at 09:53

## 2022-02-19 RX ADMIN — LITHIUM CARBONATE 450 MILLIGRAM(S): 300 TABLET, EXTENDED RELEASE ORAL at 21:39

## 2022-02-19 RX ADMIN — Medication 650 MILLIGRAM(S): at 07:57

## 2022-02-19 RX ADMIN — GABAPENTIN 800 MILLIGRAM(S): 400 CAPSULE ORAL at 15:26

## 2022-02-19 RX ADMIN — GABAPENTIN 800 MILLIGRAM(S): 400 CAPSULE ORAL at 09:54

## 2022-02-19 RX ADMIN — Medication 2 MILLIGRAM(S): at 14:28

## 2022-02-19 RX ADMIN — Medication 2 MILLIGRAM(S): at 01:09

## 2022-02-19 RX ADMIN — Medication 8: at 21:40

## 2022-02-19 RX ADMIN — METHADONE HYDROCHLORIDE 150 MILLIGRAM(S): 40 TABLET ORAL at 09:54

## 2022-02-19 RX ADMIN — Medication 2 MILLIGRAM(S): at 07:19

## 2022-02-19 RX ADMIN — Medication 8: at 07:30

## 2022-02-19 RX ADMIN — Medication 1 PATCH: at 09:28

## 2022-02-19 RX ADMIN — Medication 1 MILLIGRAM(S): at 21:38

## 2022-02-19 RX ADMIN — GABAPENTIN 800 MILLIGRAM(S): 400 CAPSULE ORAL at 21:39

## 2022-02-19 RX ADMIN — Medication 2 MILLIGRAM(S): at 23:38

## 2022-02-19 RX ADMIN — Medication 650 MILLIGRAM(S): at 23:37

## 2022-02-19 RX ADMIN — CARVEDILOL PHOSPHATE 6.25 MILLIGRAM(S): 80 CAPSULE, EXTENDED RELEASE ORAL at 09:54

## 2022-02-19 NOTE — BH INPATIENT PSYCHIATRY PROGRESS NOTE - NSBHMETABOLIC_PSY_ALL_CORE_FT
BMI: BMI (kg/m2): 27.6 (02-16-22 @ 22:13)  HbA1c: A1C with Estimated Average Glucose Result: 9.3 % (02-18-22 @ 07:39)    Glucose: POCT Blood Glucose.: 317 mg/dL (02-19-22 @ 07:12)    BP: 130/72 (02-19-22 @ 09:37) (128/79 - 162/87)  Lipid Panel: Date/Time: 02-18-22 @ 07:38  Cholesterol, Serum: 104  Direct LDL: --  HDL Cholesterol, Serum: 40  Total Cholesterol/HDL Ration Measurement: --  Triglycerides, Serum: 157
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
## 2022-02-19 NOTE — BH INPATIENT PSYCHIATRY PROGRESS NOTE - NSBHCHARTREVIEWVS_PSY_A_CORE FT
Vital Signs Last 24 Hrs  T(C): 36.9 (02-19-22 @ 09:37), Max: 36.9 (02-19-22 @ 09:37)  T(F): 98.4 (02-19-22 @ 09:37), Max: 98.4 (02-19-22 @ 09:37)  HR: 66 (02-19-22 @ 09:37) (56 - 66)  BP: 130/72 (02-19-22 @ 09:37) (130/72 - 146/77)  BP(mean): --  RR: 18 (02-18-22 @ 21:13) (18 - 18)  SpO2: 95% (02-19-22 @ 09:37) (95% - 95%)

## 2022-02-19 NOTE — BH INPATIENT PSYCHIATRY PROGRESS NOTE - NSBHASSESSSUMMFT_PSY_ALL_CORE
Patient is a 56-year-old man, domiciled at mother's home, unemployed, single, PPH bipolar disorder vs schizoaffective disorder, polysubstance use, antisocial personality disorder, prior psychiatric hospitalizations, prior incarceration for violent crime, BIBself after recent discharge from [facility], endorsing SI, HI, paranoia. Patient was admitted to [facility] for safety, stabilization, and treatment of psychosis.    PLAN:  --Increase Lithium to 450mg bid PO for mood instability  --Start olanzapine 5mg qhs for psychosis, patient has been previously on 20mg qhs  --Continue Klonopin 1mg bid PO for anxiety  --Continue methadone 150mg daily for opioid use disorder  --Continue home gabapentin, carvedilol, spironolactone, losartan Patient is a 56-year-old man, domiciled at mother's home, unemployed, single, PPH bipolar disorder vs schizoaffective disorder, polysubstance use, antisocial personality disorder, prior psychiatric hospitalizations, prior incarceration for violent crime, BIBself after recent discharge from [facility], endorsing SI, HI, paranoia. Patient was admitted to [facility] for safety, stabilization, and treatment of psychosis.    PLAN:  --Increase Lithium to 450mg bid PO for mood instability  --Start olanzapine 10mg qhs for psychosis, patient has been previously on 20mg qhs  --Continue Klonopin 1mg bid PO for anxiety  --Continue methadone 150mg daily for opioid use disorder  --Continue home gabapentin, carvedilol, spironolactone, losartan

## 2022-02-19 NOTE — BH INPATIENT PSYCHIATRY PROGRESS NOTE - CURRENT MEDICATION
MEDICATIONS  (STANDING):  atorvastatin 80 milliGRAM(s) Oral at bedtime  carvedilol 6.25 milliGRAM(s) Oral every 12 hours  clonazePAM  Tablet 1 milliGRAM(s) Oral two times a day  dextrose 40% Gel 15 Gram(s) Oral once  dextrose 5%. 1000 milliLiter(s) (50 mL/Hr) IV Continuous <Continuous>  dextrose 5%. 1000 milliLiter(s) (100 mL/Hr) IV Continuous <Continuous>  dextrose 50% Injectable 25 Gram(s) IV Push once  dextrose 50% Injectable 12.5 Gram(s) IV Push once  dextrose 50% Injectable 25 Gram(s) IV Push once  gabapentin 800 milliGRAM(s) Oral three times a day  glucagon  Injectable 1 milliGRAM(s) IntraMuscular once  influenza   Vaccine 0.5 milliLiter(s) IntraMuscular once  insulin lispro (ADMELOG) corrective regimen sliding scale   SubCutaneous three times a day before meals  insulin lispro (ADMELOG) corrective regimen sliding scale   SubCutaneous at bedtime  lithium 300 milliGRAM(s) Oral two times a day  losartan 25 milliGRAM(s) Oral daily  methadone    Tablet 150 milliGRAM(s) Oral daily  nicotine -  14 mG/24Hr(s) Patch 1 Patch Transdermal daily  spironolactone 25 milliGRAM(s) Oral daily    MEDICATIONS  (PRN):  acetaminophen     Tablet .. 650 milliGRAM(s) Oral every 6 hours PRN Mild Pain (1 - 3), Moderate Pain (4 - 6)  aluminum hydroxide/magnesium hydroxide/simethicone Suspension 30 milliLiter(s) Oral every 4 hours PRN Dyspepsia  diphenhydrAMINE 50 milliGRAM(s) Oral at bedtime PRN Insomnia  hydrOXYzine hydrochloride 25 milliGRAM(s) Oral every 6 hours PRN Anxiety  loperamide 2 milliGRAM(s) Oral every 4 hours PRN diarrhea  LORazepam     Tablet 2 milliGRAM(s) Oral every 6 hours PRN agitation  magnesium hydroxide Suspension 30 milliLiter(s) Oral daily PRN Constipation  OLANZapine Disintegrating Tablet 10 milliGRAM(s) Oral every 8 hours PRN agitation   MEDICATIONS  (STANDING):  atorvastatin 80 milliGRAM(s) Oral at bedtime  carvedilol 6.25 milliGRAM(s) Oral every 12 hours  clonazePAM  Tablet 1 milliGRAM(s) Oral two times a day  dextrose 40% Gel 15 Gram(s) Oral once  dextrose 5%. 1000 milliLiter(s) (50 mL/Hr) IV Continuous <Continuous>  dextrose 5%. 1000 milliLiter(s) (100 mL/Hr) IV Continuous <Continuous>  dextrose 50% Injectable 25 Gram(s) IV Push once  dextrose 50% Injectable 12.5 Gram(s) IV Push once  dextrose 50% Injectable 25 Gram(s) IV Push once  gabapentin 800 milliGRAM(s) Oral three times a day  glucagon  Injectable 1 milliGRAM(s) IntraMuscular once  influenza   Vaccine 0.5 milliLiter(s) IntraMuscular once  insulin lispro (ADMELOG) corrective regimen sliding scale   SubCutaneous three times a day before meals  insulin lispro (ADMELOG) corrective regimen sliding scale   SubCutaneous at bedtime  lithium CR (ESKALITH-CR) 450 milliGRAM(s) Oral two times a day  losartan 25 milliGRAM(s) Oral daily  methadone    Tablet 150 milliGRAM(s) Oral daily  nicotine -  14 mG/24Hr(s) Patch 1 Patch Transdermal daily  OLANZapine 10 milliGRAM(s) Oral at bedtime  spironolactone 25 milliGRAM(s) Oral daily    MEDICATIONS  (PRN):  acetaminophen     Tablet .. 650 milliGRAM(s) Oral every 6 hours PRN Mild Pain (1 - 3), Moderate Pain (4 - 6)  aluminum hydroxide/magnesium hydroxide/simethicone Suspension 30 milliLiter(s) Oral every 4 hours PRN Dyspepsia  diphenhydrAMINE 50 milliGRAM(s) Oral at bedtime PRN Insomnia  hydrOXYzine hydrochloride 25 milliGRAM(s) Oral every 6 hours PRN Anxiety  loperamide 2 milliGRAM(s) Oral every 4 hours PRN diarrhea  LORazepam     Tablet 2 milliGRAM(s) Oral every 6 hours PRN agitation  magnesium hydroxide Suspension 30 milliLiter(s) Oral daily PRN Constipation  OLANZapine Disintegrating Tablet 10 milliGRAM(s) Oral every 8 hours PRN agitation

## 2022-02-20 LAB
GLUCOSE BLDC GLUCOMTR-MCNC: 269 MG/DL — HIGH (ref 70–99)
GLUCOSE BLDC GLUCOMTR-MCNC: 329 MG/DL — HIGH (ref 70–99)
GLUCOSE BLDC GLUCOMTR-MCNC: 333 MG/DL — HIGH (ref 70–99)
GLUCOSE BLDC GLUCOMTR-MCNC: 342 MG/DL — HIGH (ref 70–99)

## 2022-02-20 PROCEDURE — 99232 SBSQ HOSP IP/OBS MODERATE 35: CPT

## 2022-02-20 RX ORDER — OLANZAPINE 15 MG/1
5 TABLET, FILM COATED ORAL ONCE
Refills: 0 | Status: DISCONTINUED | OUTPATIENT
Start: 2022-02-20 | End: 2022-03-01

## 2022-02-20 RX ADMIN — Medication 1 MILLIGRAM(S): at 10:30

## 2022-02-20 RX ADMIN — Medication 2 MILLIGRAM(S): at 18:59

## 2022-02-20 RX ADMIN — ATORVASTATIN CALCIUM 80 MILLIGRAM(S): 80 TABLET, FILM COATED ORAL at 21:13

## 2022-02-20 RX ADMIN — Medication 1 PATCH: at 10:29

## 2022-02-20 RX ADMIN — CARVEDILOL PHOSPHATE 6.25 MILLIGRAM(S): 80 CAPSULE, EXTENDED RELEASE ORAL at 21:14

## 2022-02-20 RX ADMIN — Medication 650 MILLIGRAM(S): at 00:30

## 2022-02-20 RX ADMIN — Medication 1 PATCH: at 10:31

## 2022-02-20 RX ADMIN — METHADONE HYDROCHLORIDE 150 MILLIGRAM(S): 40 TABLET ORAL at 10:31

## 2022-02-20 RX ADMIN — Medication 1 MILLIGRAM(S): at 21:13

## 2022-02-20 RX ADMIN — Medication 8: at 12:12

## 2022-02-20 RX ADMIN — OLANZAPINE 10 MILLIGRAM(S): 15 TABLET, FILM COATED ORAL at 21:13

## 2022-02-20 RX ADMIN — Medication 6: at 07:52

## 2022-02-20 RX ADMIN — Medication 1 PATCH: at 19:07

## 2022-02-20 RX ADMIN — Medication 650 MILLIGRAM(S): at 14:14

## 2022-02-20 RX ADMIN — Medication 4: at 21:14

## 2022-02-20 RX ADMIN — Medication 8: at 17:11

## 2022-02-20 RX ADMIN — LOSARTAN POTASSIUM 25 MILLIGRAM(S): 100 TABLET, FILM COATED ORAL at 10:30

## 2022-02-20 RX ADMIN — Medication 2 MILLIGRAM(S): at 12:33

## 2022-02-20 RX ADMIN — CARVEDILOL PHOSPHATE 6.25 MILLIGRAM(S): 80 CAPSULE, EXTENDED RELEASE ORAL at 10:29

## 2022-02-20 RX ADMIN — LITHIUM CARBONATE 450 MILLIGRAM(S): 300 TABLET, EXTENDED RELEASE ORAL at 10:30

## 2022-02-20 RX ADMIN — Medication 1 PATCH: at 06:46

## 2022-02-20 RX ADMIN — GABAPENTIN 800 MILLIGRAM(S): 400 CAPSULE ORAL at 21:13

## 2022-02-20 RX ADMIN — Medication 650 MILLIGRAM(S): at 15:14

## 2022-02-20 RX ADMIN — GABAPENTIN 800 MILLIGRAM(S): 400 CAPSULE ORAL at 15:00

## 2022-02-20 RX ADMIN — SPIRONOLACTONE 25 MILLIGRAM(S): 25 TABLET, FILM COATED ORAL at 10:29

## 2022-02-20 RX ADMIN — GABAPENTIN 800 MILLIGRAM(S): 400 CAPSULE ORAL at 10:28

## 2022-02-20 RX ADMIN — LITHIUM CARBONATE 450 MILLIGRAM(S): 300 TABLET, EXTENDED RELEASE ORAL at 21:14

## 2022-02-20 NOTE — BH INPATIENT PSYCHIATRY PROGRESS NOTE - NSBHMETABOLIC_PSY_ALL_CORE_FT
BMI: BMI (kg/m2): 27.6 (02-16-22 @ 22:13)  HbA1c: A1C with Estimated Average Glucose Result: 9.3 % (02-18-22 @ 07:39)    Glucose: POCT Blood Glucose.: 269 mg/dL (02-20-22 @ 07:47)    BP: 150/85 (02-19-22 @ 17:26) (130/72 - 162/87)  Lipid Panel: Date/Time: 02-18-22 @ 07:38  Cholesterol, Serum: 104  Direct LDL: --  HDL Cholesterol, Serum: 40  Total Cholesterol/HDL Ration Measurement: --  Triglycerides, Serum: 157   BMI: BMI (kg/m2): 27.6 (02-16-22 @ 22:13)  HbA1c: A1C with Estimated Average Glucose Result: 9.3 % (02-18-22 @ 07:39)    Glucose: POCT Blood Glucose.: 329 mg/dL (02-20-22 @ 12:08)    BP: 132/78 (02-20-22 @ 10:17) (130/72 - 162/87)  Lipid Panel: Date/Time: 02-18-22 @ 07:38  Cholesterol, Serum: 104  Direct LDL: --  HDL Cholesterol, Serum: 40  Total Cholesterol/HDL Ration Measurement: --  Triglycerides, Serum: 157

## 2022-02-20 NOTE — BH INPATIENT PSYCHIATRY PROGRESS NOTE - CURRENT MEDICATION
MEDICATIONS  (STANDING):  atorvastatin 80 milliGRAM(s) Oral at bedtime  carvedilol 6.25 milliGRAM(s) Oral every 12 hours  clonazePAM  Tablet 1 milliGRAM(s) Oral two times a day  dextrose 40% Gel 15 Gram(s) Oral once  dextrose 5%. 1000 milliLiter(s) (50 mL/Hr) IV Continuous <Continuous>  dextrose 5%. 1000 milliLiter(s) (100 mL/Hr) IV Continuous <Continuous>  dextrose 50% Injectable 25 Gram(s) IV Push once  dextrose 50% Injectable 12.5 Gram(s) IV Push once  dextrose 50% Injectable 25 Gram(s) IV Push once  gabapentin 800 milliGRAM(s) Oral three times a day  glucagon  Injectable 1 milliGRAM(s) IntraMuscular once  influenza   Vaccine 0.5 milliLiter(s) IntraMuscular once  insulin lispro (ADMELOG) corrective regimen sliding scale   SubCutaneous three times a day before meals  insulin lispro (ADMELOG) corrective regimen sliding scale   SubCutaneous at bedtime  lithium CR (ESKALITH-CR) 450 milliGRAM(s) Oral two times a day  losartan 25 milliGRAM(s) Oral daily  methadone    Tablet 150 milliGRAM(s) Oral daily  nicotine -  14 mG/24Hr(s) Patch 1 Patch Transdermal daily  OLANZapine 10 milliGRAM(s) Oral at bedtime  spironolactone 25 milliGRAM(s) Oral daily    MEDICATIONS  (PRN):  acetaminophen     Tablet .. 650 milliGRAM(s) Oral every 6 hours PRN Mild Pain (1 - 3), Moderate Pain (4 - 6)  aluminum hydroxide/magnesium hydroxide/simethicone Suspension 30 milliLiter(s) Oral every 4 hours PRN Dyspepsia  diphenhydrAMINE 50 milliGRAM(s) Oral at bedtime PRN Insomnia  hydrOXYzine hydrochloride 25 milliGRAM(s) Oral every 6 hours PRN Anxiety  loperamide 2 milliGRAM(s) Oral every 4 hours PRN diarrhea  LORazepam     Tablet 2 milliGRAM(s) Oral every 6 hours PRN agitation  magnesium hydroxide Suspension 30 milliLiter(s) Oral daily PRN Constipation  OLANZapine Disintegrating Tablet 10 milliGRAM(s) Oral every 8 hours PRN agitation

## 2022-02-20 NOTE — BH INPATIENT PSYCHIATRY PROGRESS NOTE - NSBHASSESSSUMMFT_PSY_ALL_CORE
Patient is a 56-year-old man, domiciled at mother's home, unemployed, single, PPH bipolar disorder vs schizoaffective disorder, polysubstance use, antisocial personality disorder, prior psychiatric hospitalizations, prior incarceration for violent crime, BIBself after recent discharge from [facility], endorsing SI, HI, paranoia. Patient was admitted to [facility] for safety, stabilization, and treatment of psychosis.    PLAN:  --Increase Lithium to 450mg bid PO for mood instability  --Start olanzapine 10mg qhs for psychosis, patient has been previously on 20mg qhs  --Continue Klonopin 1mg bid PO for anxiety  --Continue methadone 150mg daily for opioid use disorder  --Continue home gabapentin, carvedilol, spironolactone, losartan    ;;02/20: somewhat pressured; slightly elevated; med focused on pain medication; able to flex fingers w/o distress; sociable ; Not endorsing  suicidal or homicidal ideation intent or plans; no mention of auditory or visual hallucinations .

## 2022-02-20 NOTE — BH INPATIENT PSYCHIATRY PROGRESS NOTE - NSBHCHARTREVIEWVS_PSY_A_CORE FT
Vital Signs Last 24 Hrs  T(C): 36.6 (02-19-22 @ 17:26), Max: 36.9 (02-19-22 @ 09:37)  T(F): 97.9 (02-19-22 @ 17:26), Max: 98.4 (02-19-22 @ 09:37)  HR: 66 (02-19-22 @ 17:26) (66 - 66)  BP: 150/85 (02-19-22 @ 17:26) (130/72 - 150/85)  BP(mean): --  RR: 18 (02-19-22 @ 17:26) (18 - 18)  SpO2: 95% (02-19-22 @ 17:26) (95% - 95%)     Vital Signs Last 24 Hrs  T(C): 36.9 (02-20-22 @ 10:17), Max: 36.9 (02-20-22 @ 10:17)  T(F): 98.5 (02-20-22 @ 10:17), Max: 98.5 (02-20-22 @ 10:17)  HR: 56 (02-20-22 @ 10:17) (56 - 66)  BP: 132/78 (02-20-22 @ 10:17) (132/78 - 150/85)  BP(mean): --  RR: 18 (02-19-22 @ 17:26) (18 - 18)  SpO2: 97% (02-20-22 @ 10:17) (95% - 97%)

## 2022-02-21 LAB
GLUCOSE BLDC GLUCOMTR-MCNC: 330 MG/DL — HIGH (ref 70–99)
GLUCOSE BLDC GLUCOMTR-MCNC: 343 MG/DL — HIGH (ref 70–99)
GLUCOSE BLDC GLUCOMTR-MCNC: 363 MG/DL — HIGH (ref 70–99)

## 2022-02-21 RX ORDER — INSULIN LISPRO 100/ML
6 VIAL (ML) SUBCUTANEOUS
Refills: 0 | Status: DISCONTINUED | OUTPATIENT
Start: 2022-02-21 | End: 2022-02-22

## 2022-02-21 RX ORDER — INSULIN GLARGINE 100 [IU]/ML
17 INJECTION, SOLUTION SUBCUTANEOUS AT BEDTIME
Refills: 0 | Status: DISCONTINUED | OUTPATIENT
Start: 2022-02-21 | End: 2022-02-21

## 2022-02-21 RX ORDER — INSULIN GLARGINE 100 [IU]/ML
17 INJECTION, SOLUTION SUBCUTANEOUS EVERY MORNING
Refills: 0 | Status: DISCONTINUED | OUTPATIENT
Start: 2022-02-21 | End: 2022-02-22

## 2022-02-21 RX ADMIN — Medication 10: at 17:12

## 2022-02-21 RX ADMIN — Medication 2 MILLIGRAM(S): at 01:45

## 2022-02-21 RX ADMIN — Medication 1 PATCH: at 09:57

## 2022-02-21 RX ADMIN — Medication 2 MILLIGRAM(S): at 14:25

## 2022-02-21 RX ADMIN — LOSARTAN POTASSIUM 25 MILLIGRAM(S): 100 TABLET, FILM COATED ORAL at 09:59

## 2022-02-21 RX ADMIN — ATORVASTATIN CALCIUM 80 MILLIGRAM(S): 80 TABLET, FILM COATED ORAL at 21:49

## 2022-02-21 RX ADMIN — OLANZAPINE 10 MILLIGRAM(S): 15 TABLET, FILM COATED ORAL at 21:51

## 2022-02-21 RX ADMIN — LITHIUM CARBONATE 450 MILLIGRAM(S): 300 TABLET, EXTENDED RELEASE ORAL at 21:49

## 2022-02-21 RX ADMIN — Medication 1 MILLIGRAM(S): at 21:49

## 2022-02-21 RX ADMIN — METHADONE HYDROCHLORIDE 150 MILLIGRAM(S): 40 TABLET ORAL at 10:04

## 2022-02-21 RX ADMIN — CARVEDILOL PHOSPHATE 6.25 MILLIGRAM(S): 80 CAPSULE, EXTENDED RELEASE ORAL at 10:09

## 2022-02-21 RX ADMIN — Medication 8: at 07:29

## 2022-02-21 RX ADMIN — LITHIUM CARBONATE 450 MILLIGRAM(S): 300 TABLET, EXTENDED RELEASE ORAL at 09:59

## 2022-02-21 RX ADMIN — Medication 2 MILLIGRAM(S): at 21:53

## 2022-02-21 RX ADMIN — Medication 1 MILLIGRAM(S): at 09:58

## 2022-02-21 RX ADMIN — CARVEDILOL PHOSPHATE 6.25 MILLIGRAM(S): 80 CAPSULE, EXTENDED RELEASE ORAL at 21:49

## 2022-02-21 RX ADMIN — GABAPENTIN 800 MILLIGRAM(S): 400 CAPSULE ORAL at 13:10

## 2022-02-21 RX ADMIN — Medication 2 MILLIGRAM(S): at 07:46

## 2022-02-21 RX ADMIN — GABAPENTIN 800 MILLIGRAM(S): 400 CAPSULE ORAL at 21:49

## 2022-02-21 RX ADMIN — GABAPENTIN 800 MILLIGRAM(S): 400 CAPSULE ORAL at 14:23

## 2022-02-21 RX ADMIN — SPIRONOLACTONE 25 MILLIGRAM(S): 25 TABLET, FILM COATED ORAL at 09:59

## 2022-02-21 NOTE — CHART NOTE - NSCHARTNOTEFT_GEN_A_CORE
Patient's BG has ranged between 192 and 404 within the past 48 hr. A1c on admission 9.3. Patient only on sliding scale currently. He reports that his BG is <200 at home when he takes aspart 12U premeal and lantus 32U evening.     Pharmacy was called and patient last picked up Aspart 15U premeal on 09/10/21 and is prescribed Lantus 32U evening which he has not filled.    Writer called medicine consult which suggested weight based measurement (BW 87.3kg on file). Recommendations as follows:  - Total insulin daily dose 35U based on weight  - Recommend Lispro 6U premeal  - Recommend Lantus 17U in the evening and check BG at 2AM.  - Recommend to do full consult with medicine if BG remains uncontrollable with aforementioned insulin doses.    Orders placed as per medicine consult's recommendation.

## 2022-02-22 LAB
GLUCOSE BLDC GLUCOMTR-MCNC: 294 MG/DL — HIGH (ref 70–99)
GLUCOSE BLDC GLUCOMTR-MCNC: 342 MG/DL — HIGH (ref 70–99)
GLUCOSE BLDC GLUCOMTR-MCNC: 389 MG/DL — HIGH (ref 70–99)
GLUCOSE BLDC GLUCOMTR-MCNC: 403 MG/DL — HIGH (ref 70–99)

## 2022-02-22 PROCEDURE — 99232 SBSQ HOSP IP/OBS MODERATE 35: CPT

## 2022-02-22 RX ORDER — INSULIN GLARGINE 100 [IU]/ML
25 INJECTION, SOLUTION SUBCUTANEOUS EVERY MORNING
Refills: 0 | Status: DISCONTINUED | OUTPATIENT
Start: 2022-02-22 | End: 2022-02-23

## 2022-02-22 RX ORDER — INSULIN LISPRO 100/ML
10 VIAL (ML) SUBCUTANEOUS
Refills: 0 | Status: DISCONTINUED | OUTPATIENT
Start: 2022-02-22 | End: 2022-02-23

## 2022-02-22 RX ORDER — INSULIN LISPRO 100/ML
VIAL (ML) SUBCUTANEOUS AT BEDTIME
Refills: 0 | Status: DISCONTINUED | OUTPATIENT
Start: 2022-02-22 | End: 2022-03-01

## 2022-02-22 RX ORDER — OLANZAPINE 15 MG/1
15 TABLET, FILM COATED ORAL AT BEDTIME
Refills: 0 | Status: DISCONTINUED | OUTPATIENT
Start: 2022-02-22 | End: 2022-02-25

## 2022-02-22 RX ORDER — DIAZEPAM 5 MG
10 TABLET ORAL
Refills: 0 | Status: DISCONTINUED | OUTPATIENT
Start: 2022-02-22 | End: 2022-02-27

## 2022-02-22 RX ORDER — INSULIN LISPRO 100/ML
VIAL (ML) SUBCUTANEOUS
Refills: 0 | Status: DISCONTINUED | OUTPATIENT
Start: 2022-02-22 | End: 2022-03-01

## 2022-02-22 RX ADMIN — Medication 6 UNIT(S): at 12:16

## 2022-02-22 RX ADMIN — Medication 650 MILLIGRAM(S): at 21:30

## 2022-02-22 RX ADMIN — ATORVASTATIN CALCIUM 80 MILLIGRAM(S): 80 TABLET, FILM COATED ORAL at 20:59

## 2022-02-22 RX ADMIN — INSULIN GLARGINE 17 UNIT(S): 100 INJECTION, SOLUTION SUBCUTANEOUS at 07:51

## 2022-02-22 RX ADMIN — Medication 650 MILLIGRAM(S): at 20:22

## 2022-02-22 RX ADMIN — Medication 1 PATCH: at 10:12

## 2022-02-22 RX ADMIN — CARVEDILOL PHOSPHATE 6.25 MILLIGRAM(S): 80 CAPSULE, EXTENDED RELEASE ORAL at 20:59

## 2022-02-22 RX ADMIN — Medication 10: at 17:12

## 2022-02-22 RX ADMIN — OLANZAPINE 15 MILLIGRAM(S): 15 TABLET, FILM COATED ORAL at 20:59

## 2022-02-22 RX ADMIN — Medication 1 MILLIGRAM(S): at 10:14

## 2022-02-22 RX ADMIN — Medication 4: at 20:57

## 2022-02-22 RX ADMIN — GABAPENTIN 800 MILLIGRAM(S): 400 CAPSULE ORAL at 20:59

## 2022-02-22 RX ADMIN — LITHIUM CARBONATE 450 MILLIGRAM(S): 300 TABLET, EXTENDED RELEASE ORAL at 20:59

## 2022-02-22 RX ADMIN — Medication 2 MILLIGRAM(S): at 14:48

## 2022-02-22 RX ADMIN — Medication 10 MILLIGRAM(S): at 20:58

## 2022-02-22 RX ADMIN — Medication 1 PATCH: at 17:25

## 2022-02-22 RX ADMIN — Medication 2 MILLIGRAM(S): at 07:42

## 2022-02-22 RX ADMIN — GABAPENTIN 800 MILLIGRAM(S): 400 CAPSULE ORAL at 10:14

## 2022-02-22 RX ADMIN — METHADONE HYDROCHLORIDE 150 MILLIGRAM(S): 40 TABLET ORAL at 10:12

## 2022-02-22 RX ADMIN — SPIRONOLACTONE 25 MILLIGRAM(S): 25 TABLET, FILM COATED ORAL at 10:15

## 2022-02-22 RX ADMIN — CARVEDILOL PHOSPHATE 6.25 MILLIGRAM(S): 80 CAPSULE, EXTENDED RELEASE ORAL at 10:14

## 2022-02-22 RX ADMIN — GABAPENTIN 800 MILLIGRAM(S): 400 CAPSULE ORAL at 14:48

## 2022-02-22 RX ADMIN — LITHIUM CARBONATE 450 MILLIGRAM(S): 300 TABLET, EXTENDED RELEASE ORAL at 10:14

## 2022-02-22 RX ADMIN — INSULIN GLARGINE 25 UNIT(S): 100 INJECTION, SOLUTION SUBCUTANEOUS at 20:58

## 2022-02-22 RX ADMIN — LOSARTAN POTASSIUM 25 MILLIGRAM(S): 100 TABLET, FILM COATED ORAL at 10:13

## 2022-02-22 RX ADMIN — Medication 6: at 12:17

## 2022-02-22 RX ADMIN — Medication 6 UNIT(S): at 12:12

## 2022-02-22 NOTE — PROVIDER CONTACT NOTE (CHANGE IN STATUS NOTIFICATION) - BACKGROUND
Patient has repeatedly registered with high glucose on ac/hs checks.  Last evening patient refused insulin.  Also, patient requested food at 0300.

## 2022-02-22 NOTE — CONSULT NOTE ADULT - SUBJECTIVE AND OBJECTIVE BOX
Endocrinology Consult Note    HPI:  56M with bipolar disorder (previously on olanzapine and lithium), HTN, IDDM2(A1c 9.3% 2/18/22) admitted to [facility] for paranoia, suicidality and homicidal intent on 2/16/22. Pt. also with history of prior substance use (amphetamines, crack/cocaine, heroin/opiates) and prior 10yr. incarceration for manslaughter, now out of intermediate for 6.5 yrs.   Endocrinology was consulted for management of pt's DM2, with blood glucose levels ranging in the 300s.    Allergies    Cogentin (Unknown)  Haldol (Unknown)  Thorazine (Rash)    Intolerances        MEDICATIONS  (STANDING):  atorvastatin 80 milliGRAM(s) Oral at bedtime  carvedilol 6.25 milliGRAM(s) Oral every 12 hours  clonazePAM  Tablet 1 milliGRAM(s) Oral two times a day  dextrose 40% Gel 15 Gram(s) Oral once  dextrose 5%. 1000 milliLiter(s) (50 mL/Hr) IV Continuous <Continuous>  dextrose 5%. 1000 milliLiter(s) (100 mL/Hr) IV Continuous <Continuous>  dextrose 50% Injectable 25 Gram(s) IV Push once  dextrose 50% Injectable 12.5 Gram(s) IV Push once  dextrose 50% Injectable 25 Gram(s) IV Push once  gabapentin 800 milliGRAM(s) Oral three times a day  glucagon  Injectable 1 milliGRAM(s) IntraMuscular once  influenza   Vaccine 0.5 milliLiter(s) IntraMuscular once  insulin glargine Injectable (LANTUS) 17 Unit(s) SubCutaneous every morning  insulin lispro (ADMELOG) corrective regimen sliding scale   SubCutaneous at bedtime  insulin lispro (ADMELOG) corrective regimen sliding scale   SubCutaneous three times a day before meals  insulin lispro Injectable (ADMELOG) 6 Unit(s) SubCutaneous three times a day before meals  lithium CR (ESKALITH-CR) 450 milliGRAM(s) Oral two times a day  losartan 25 milliGRAM(s) Oral daily  methadone    Tablet 150 milliGRAM(s) Oral daily  nicotine -  14 mG/24Hr(s) Patch 1 Patch Transdermal daily  OLANZapine 10 milliGRAM(s) Oral at bedtime  OLANZapine Injectable 5 milliGRAM(s) IntraMuscular once  spironolactone 25 milliGRAM(s) Oral daily    MEDICATIONS  (PRN):  acetaminophen     Tablet .. 650 milliGRAM(s) Oral every 6 hours PRN Mild Pain (1 - 3), Moderate Pain (4 - 6)  aluminum hydroxide/magnesium hydroxide/simethicone Suspension 30 milliLiter(s) Oral every 4 hours PRN Dyspepsia  diphenhydrAMINE 50 milliGRAM(s) Oral at bedtime PRN Insomnia  hydrOXYzine hydrochloride 25 milliGRAM(s) Oral every 6 hours PRN Anxiety  loperamide 2 milliGRAM(s) Oral every 4 hours PRN diarrhea  LORazepam     Tablet 2 milliGRAM(s) Oral every 6 hours PRN agitation  magnesium hydroxide Suspension 30 milliLiter(s) Oral daily PRN Constipation  OLANZapine Disintegrating Tablet 10 milliGRAM(s) Oral every 8 hours PRN agitation      PAST MEDICAL & SURGICAL HISTORY:  Diabetes mellitus    HTN (hypertension)    GERD (gastroesophageal reflux disease)    Heart failure        FAMILY HISTORY:      SOCIAL HISTORY: No EtOH, no tobacco    REVIEW OF SYSTEMS:    CONSTITUTIONAL: No weakness, fevers or chills  EYES/ENT: No visual changes;  No vertigo or throat pain   NECK: No pain or stiffness  RESPIRATORY: No cough, wheezing, hemoptysis; No shortness of breath  CARDIOVASCULAR: No chest pain or palpitations  GASTROINTESTINAL: No abdominal or epigastric pain. No nausea, vomiting, or hematemesis; No diarrhea or constipation. No melena or hematochezia.  GENITOURINARY: No dysuria, frequency or hematuria  NEUROLOGICAL: No numbness or weakness  SKIN: No itching, burning, rashes, or lesions   All other review of systems is negative unless indicated above.        T(F): 98.6 (02-22-22 @ 09:10), Max: 98.7 (02-21-22 @ 20:05)  HR: 74 (02-22-22 @ 09:10)  BP: 130/77 (02-22-22 @ 09:10)  RR: 16 (02-22-22 @ 09:10)  SpO2: 95% (02-22-22 @ 09:10)  Wt(kg): --    GENERAL: NAD, well-developed  HEAD:  Atraumatic, Normocephalic  EYES: EOMI, PERRLA, conjunctiva and sclera clear  NECK: Supple, No JVD  CHEST/LUNG: Clear to auscultation bilaterally; No wheeze  HEART: Regular rate and rhythm; No murmurs, rubs, or gallops  ABDOMEN: Soft, Nontender, Nondistended; Bowel sounds present  EXTREMITIES:  2+ Peripheral Pulses, No clubbing, cyanosis, or edema  NEUROLOGY: non-focal  SKIN: No rashes or lesions                     Endocrinology Consult Note    HPI:  56M with bipolar disorder (previously on olanzapine and lithium), HTN, IDDM2(A1c 9.3% 2/18/22) admitted to [facility] for paranoia, suicidality and homicidal intent on 2/16/22. Pt. also with history of prior substance use (amphetamines, crack/cocaine, heroin/opiates) and prior 10yr. incarceration for manslaughter, now out of halfway for 6.5 yrs.   Endocrinology was consulted for management of pt's DM2, with blood glucose levels ranging in the 300s.    He was diagnosed with diabetes 30 yrs. ago, and reports having been on insulin since then. His home regimen is 12U humalog TID with meals, and 32U lantus at bedtime. While inpatient, he has been placed on 6U TID with meals plus the corrective moderate ISS  (for which he has been requiring an additional 6-10U, which he occasionally refuses) and 17U of Lantus plus the corrective low ISS at bedtime. He reports that he "hates the diet" here and that at home he eats [place] toast at least twice daily and consumes Ensure TID. Per the nurses and PCAs on the unit, he reportedly rummages through the food carts and walks off with 6 cups of juice at a time or multiple cakes.    Allergies    Cogentin (Unknown)  Haldol (Unknown)  Thorazine (Rash)    Intolerances        MEDICATIONS  (STANDING):  atorvastatin 80 milliGRAM(s) Oral at bedtime  carvedilol 6.25 milliGRAM(s) Oral every 12 hours  clonazePAM  Tablet 1 milliGRAM(s) Oral two times a day  dextrose 40% Gel 15 Gram(s) Oral once  dextrose 5%. 1000 milliLiter(s) (50 mL/Hr) IV Continuous <Continuous>  dextrose 5%. 1000 milliLiter(s) (100 mL/Hr) IV Continuous <Continuous>  dextrose 50% Injectable 25 Gram(s) IV Push once  dextrose 50% Injectable 12.5 Gram(s) IV Push once  dextrose 50% Injectable 25 Gram(s) IV Push once  gabapentin 800 milliGRAM(s) Oral three times a day  glucagon  Injectable 1 milliGRAM(s) IntraMuscular once  influenza   Vaccine 0.5 milliLiter(s) IntraMuscular once  insulin glargine Injectable (LANTUS) 17 Unit(s) SubCutaneous every morning  insulin lispro (ADMELOG) corrective regimen sliding scale   SubCutaneous at bedtime  insulin lispro (ADMELOG) corrective regimen sliding scale   SubCutaneous three times a day before meals  insulin lispro Injectable (ADMELOG) 6 Unit(s) SubCutaneous three times a day before meals  lithium CR (ESKALITH-CR) 450 milliGRAM(s) Oral two times a day  losartan 25 milliGRAM(s) Oral daily  methadone    Tablet 150 milliGRAM(s) Oral daily  nicotine -  14 mG/24Hr(s) Patch 1 Patch Transdermal daily  OLANZapine 10 milliGRAM(s) Oral at bedtime  OLANZapine Injectable 5 milliGRAM(s) IntraMuscular once  spironolactone 25 milliGRAM(s) Oral daily    MEDICATIONS  (PRN):  acetaminophen     Tablet .. 650 milliGRAM(s) Oral every 6 hours PRN Mild Pain (1 - 3), Moderate Pain (4 - 6)  aluminum hydroxide/magnesium hydroxide/simethicone Suspension 30 milliLiter(s) Oral every 4 hours PRN Dyspepsia  diphenhydrAMINE 50 milliGRAM(s) Oral at bedtime PRN Insomnia  hydrOXYzine hydrochloride 25 milliGRAM(s) Oral every 6 hours PRN Anxiety  loperamide 2 milliGRAM(s) Oral every 4 hours PRN diarrhea  LORazepam     Tablet 2 milliGRAM(s) Oral every 6 hours PRN agitation  magnesium hydroxide Suspension 30 milliLiter(s) Oral daily PRN Constipation  OLANZapine Disintegrating Tablet 10 milliGRAM(s) Oral every 8 hours PRN agitation      PAST MEDICAL & SURGICAL HISTORY:  Diabetes mellitus    HTN (hypertension)    GERD (gastroesophageal reflux disease)    Heart failure        FAMILY HISTORY:      SOCIAL HISTORY: No EtOH, no tobacco    REVIEW OF SYSTEMS:    CONSTITUTIONAL: No weakness, fevers or chills  EYES/ENT: No visual changes;  No vertigo or throat pain   NECK: No pain or stiffness  RESPIRATORY: No cough, wheezing, hemoptysis; No shortness of breath  CARDIOVASCULAR: No chest pain or palpitations  GASTROINTESTINAL: No abdominal or epigastric pain. No nausea, vomiting, or hematemesis; No diarrhea or constipation. No melena or hematochezia.  GENITOURINARY: No dysuria, frequency or hematuria  NEUROLOGICAL: No numbness or weakness  SKIN: No itching, burning, rashes, or lesions   All other review of systems is negative unless indicated above.        T(F): 98.6 (02-22-22 @ 09:10), Max: 98.7 (02-21-22 @ 20:05)  HR: 74 (02-22-22 @ 09:10)  BP: 130/77 (02-22-22 @ 09:10)  RR: 16 (02-22-22 @ 09:10)  SpO2: 95% (02-22-22 @ 09:10)  Wt(kg): --    GENERAL: NAD, well-developed  HEAD:  Atraumatic, Normocephalic  EYES: EOMI, PERRLA, conjunctiva and sclera clear  NECK: Supple, No JVD  CHEST/LUNG: Clear to auscultation bilaterally; No wheeze  HEART: Regular rate and rhythm; No murmurs, rubs, or gallops  ABDOMEN: Soft, Nontender, Nondistended; Bowel sounds present  EXTREMITIES:  2+ Peripheral Pulses, No clubbing, cyanosis, or edema, R hand wrapped in gauze bandage with limited range of motion  NEUROLOGY: non-focal, loss of sensation to light touch on feet  SKIN: No rashes or lesions

## 2022-02-22 NOTE — CONSULT NOTE ADULT - ASSESSMENT
56M with bipolar disorder (previously on olanzapine and lithium), HTN, IDDM2(A1c 9.3% 2/18/22) admitted to [facility] for paranoia, suicidality and homicidal intent on 2/16/22. Endocrinology was consulted for management of pt's DM2, with blood glucose levels ranging in the 300s.    #IDDM2  Patient has a 30yr history of IDDM2, on home 12U humalog TID w meals and 32U lantus qhs, and has been getting 6U +6-10U TID and 17U lantus inpatient, with poor glycemic control and BG in the 300s.    Recommendations:  1. Please increase lantus to 25U qhs and monitor fingersticks  2. Please increase humalog to 10U plus corrective mISS with meals    Recommendations final when discussed with attending on service. 56M with bipolar disorder (previously on olanzapine and lithium), HTN, IDDM2(A1c 9.3% 2/18/22) admitted to [facility] for paranoia, suicidality and homicidal intent on 2/16/22. Endocrinology was consulted for management of pt's DM2, with blood glucose levels ranging in the 300s.    #IDDM2  Patient has a 30yr history of IDDM2, on home 12U humalog TID w meals and 32U lantus qhs, and has been getting 6U +6-10U TID and 17U lantus inpatient, with poor glycemic control and BG in the 300s.    Recommendations:  1. Please increase lantus to 25U qhs   2. Please increase humalog to 10U plus corrective mISS with meals    Recommendations discussed with attending endocrinologist Dr. Brenner.

## 2022-02-22 NOTE — BH INPATIENT PSYCHIATRY PROGRESS NOTE - NSBHCHARTREVIEWVS_PSY_A_CORE FT
Vital Signs Last 24 Hrs  T(C): 37 (02-22-22 @ 09:10), Max: 37.1 (02-21-22 @ 20:05)  T(F): 98.6 (02-22-22 @ 09:10), Max: 98.7 (02-21-22 @ 20:05)  HR: 74 (02-22-22 @ 09:10) (66 - 79)  BP: 130/77 (02-22-22 @ 09:10) (129/75 - 159/78)  BP(mean): --  RR: 16 (02-22-22 @ 09:10) (16 - 18)  SpO2: 95% (02-22-22 @ 09:10) (95% - 96%)

## 2022-02-22 NOTE — BH INPATIENT PSYCHIATRY PROGRESS NOTE - CURRENT MEDICATION
MEDICATIONS  (STANDING):  atorvastatin 80 milliGRAM(s) Oral at bedtime  carvedilol 6.25 milliGRAM(s) Oral every 12 hours  dextrose 40% Gel 15 Gram(s) Oral once  dextrose 5%. 1000 milliLiter(s) (50 mL/Hr) IV Continuous <Continuous>  dextrose 5%. 1000 milliLiter(s) (100 mL/Hr) IV Continuous <Continuous>  dextrose 50% Injectable 25 Gram(s) IV Push once  dextrose 50% Injectable 12.5 Gram(s) IV Push once  dextrose 50% Injectable 25 Gram(s) IV Push once  diazepam    Tablet 10 milliGRAM(s) Oral two times a day  gabapentin 800 milliGRAM(s) Oral three times a day  glucagon  Injectable 1 milliGRAM(s) IntraMuscular once  influenza   Vaccine 0.5 milliLiter(s) IntraMuscular once  insulin glargine Injectable (LANTUS) 17 Unit(s) SubCutaneous every morning  insulin lispro (ADMELOG) corrective regimen sliding scale   SubCutaneous at bedtime  insulin lispro (ADMELOG) corrective regimen sliding scale   SubCutaneous three times a day before meals  insulin lispro Injectable (ADMELOG) 6 Unit(s) SubCutaneous three times a day before meals  lithium CR (ESKALITH-CR) 450 milliGRAM(s) Oral two times a day  losartan 25 milliGRAM(s) Oral daily  methadone    Tablet 150 milliGRAM(s) Oral daily  nicotine -  14 mG/24Hr(s) Patch 1 Patch Transdermal daily  OLANZapine 15 milliGRAM(s) Oral at bedtime  OLANZapine Injectable 5 milliGRAM(s) IntraMuscular once  spironolactone 25 milliGRAM(s) Oral daily    MEDICATIONS  (PRN):  acetaminophen     Tablet .. 650 milliGRAM(s) Oral every 6 hours PRN Mild Pain (1 - 3), Moderate Pain (4 - 6)  aluminum hydroxide/magnesium hydroxide/simethicone Suspension 30 milliLiter(s) Oral every 4 hours PRN Dyspepsia  diphenhydrAMINE 50 milliGRAM(s) Oral at bedtime PRN Insomnia  hydrOXYzine hydrochloride 25 milliGRAM(s) Oral every 6 hours PRN Anxiety  loperamide 2 milliGRAM(s) Oral every 4 hours PRN diarrhea  LORazepam     Tablet 2 milliGRAM(s) Oral every 6 hours PRN agitation  magnesium hydroxide Suspension 30 milliLiter(s) Oral daily PRN Constipation  OLANZapine Disintegrating Tablet 10 milliGRAM(s) Oral every 8 hours PRN agitation

## 2022-02-22 NOTE — BH INPATIENT PSYCHIATRY PROGRESS NOTE - NSBHASSESSSUMMFT_PSY_ALL_CORE
Patient is a 56-year-old man, domiciled at mother's home, unemployed, single, PPH bipolar disorder vs schizoaffective disorder, polysubstance use, antisocial personality disorder, prior psychiatric hospitalizations, prior incarceration for violent crime, BIBself after recent discharge from [facility], endorsing SI, HI, paranoia. Patient was admitted to [facility] for safety, stabilization, and treatment of psychosis.    PLAN:  --Increase Lithium to 450mg bid PO for mood instability  --Start olanzapine 10mg qhs for psychosis, patient has been previously on 20mg qhs  --Continue Klonopin 1mg bid PO for anxiety  --Continue methadone 150mg daily for opioid use disorder  --Continue home gabapentin, carvedilol, spironolactone, losartan    ;;02/20: somewhat pressured; slightly elevated; med focused on pain medication; able to flex fingers w/o distress; sociable ; Not endorsing  suicidal or homicidal ideation intent or plans; no mention of auditory or visual hallucinations .     2/22: Patient appears less pressured but significantly paranoid, to the level where writer is convinced there is a delusional/psychotic aspect in addition to his mood disorder. As such, will continue to increase Zyprexa as he does endorse HI due to the severity of his paranoia. Will follow up on lithium level tomorrow and base dosing on this. Will change Klonopin per patient's request to Valium. Will also follow up on endocrine recs given consistently elevated sugars.

## 2022-02-22 NOTE — BH INPATIENT PSYCHIATRY PROGRESS NOTE - NSTXSUBMISGOALOTHER_PSY_ALL_CORE
Pt will attend 12-step groups when a speaker is on the unit
Attend 12 step program
Pt will attend 12-step groups when a speaker is on the unit
Pt will attend 12-step groups when a speaker is on the unit
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
## 2022-02-22 NOTE — CONSULT NOTE ADULT - ATTENDING COMMENTS
Pt seen on unit.   Agree with above  pt with out regard to diet, will need high doses of insulin to maintain glycemic control.   This apporach to DM control is not optimal as it will lead to excessive weight gain which will contribute to increased cardiovascular, metabolic, and cancer risk.  Pt not willing to even try to adhere to diet recommendations and is clear about disinterest in improving his diabetes control.   To be clear, although increasing doses of insulin may improve pt's hyperglycemia, this is not the best approach to treatment of type 2 diabetes.  This has been discussed with patient who does not demonstrate understanding and we would appreciate cooperation from primary team in managing pt's diet.

## 2022-02-22 NOTE — BH INPATIENT PSYCHIATRY PROGRESS NOTE - NSBHMETABOLIC_PSY_ALL_CORE_FT
BMI: BMI (kg/m2): 27.6 (02-16-22 @ 22:13)  HbA1c: A1C with Estimated Average Glucose Result: 9.3 % (02-18-22 @ 07:39)    Glucose: POCT Blood Glucose.: 403 mg/dL (02-22-22 @ 12:10)    BP: 130/77 (02-22-22 @ 09:10) (117/73 - 159/78)  Lipid Panel: Date/Time: 02-18-22 @ 07:38  Cholesterol, Serum: 104  Direct LDL: --  HDL Cholesterol, Serum: 40  Total Cholesterol/HDL Ration Measurement: --  Triglycerides, Serum: 157

## 2022-02-23 LAB
GLUCOSE BLDC GLUCOMTR-MCNC: 159 MG/DL — HIGH (ref 70–99)
GLUCOSE BLDC GLUCOMTR-MCNC: 217 MG/DL — HIGH (ref 70–99)
GLUCOSE BLDC GLUCOMTR-MCNC: 309 MG/DL — HIGH (ref 70–99)
GLUCOSE BLDC GLUCOMTR-MCNC: 344 MG/DL — HIGH (ref 70–99)
LITHIUM SERPL-MCNC: 0.48 MMOL/L — LOW (ref 0.6–1.2)

## 2022-02-23 PROCEDURE — 99232 SBSQ HOSP IP/OBS MODERATE 35: CPT

## 2022-02-23 RX ORDER — INSULIN LISPRO 100/ML
12 VIAL (ML) SUBCUTANEOUS
Refills: 0 | Status: DISCONTINUED | OUTPATIENT
Start: 2022-02-23 | End: 2022-02-28

## 2022-02-23 RX ORDER — LITHIUM CARBONATE 300 MG/1
600 TABLET, EXTENDED RELEASE ORAL
Refills: 0 | Status: DISCONTINUED | OUTPATIENT
Start: 2022-02-23 | End: 2022-02-23

## 2022-02-23 RX ORDER — INSULIN GLARGINE 100 [IU]/ML
33 INJECTION, SOLUTION SUBCUTANEOUS AT BEDTIME
Refills: 0 | Status: DISCONTINUED | OUTPATIENT
Start: 2022-02-23 | End: 2022-02-28

## 2022-02-23 RX ORDER — LITHIUM CARBONATE 300 MG/1
600 TABLET, EXTENDED RELEASE ORAL
Refills: 0 | Status: DISCONTINUED | OUTPATIENT
Start: 2022-02-23 | End: 2022-03-01

## 2022-02-23 RX ADMIN — SPIRONOLACTONE 25 MILLIGRAM(S): 25 TABLET, FILM COATED ORAL at 09:47

## 2022-02-23 RX ADMIN — GABAPENTIN 800 MILLIGRAM(S): 400 CAPSULE ORAL at 09:11

## 2022-02-23 RX ADMIN — METHADONE HYDROCHLORIDE 150 MILLIGRAM(S): 40 TABLET ORAL at 09:48

## 2022-02-23 RX ADMIN — Medication 10 UNIT(S): at 16:57

## 2022-02-23 RX ADMIN — LOSARTAN POTASSIUM 25 MILLIGRAM(S): 100 TABLET, FILM COATED ORAL at 09:47

## 2022-02-23 RX ADMIN — Medication 1 PATCH: at 10:45

## 2022-02-23 RX ADMIN — Medication 10 MILLIGRAM(S): at 09:11

## 2022-02-23 RX ADMIN — INSULIN GLARGINE 25 UNIT(S): 100 INJECTION, SOLUTION SUBCUTANEOUS at 12:09

## 2022-02-23 RX ADMIN — CARVEDILOL PHOSPHATE 6.25 MILLIGRAM(S): 80 CAPSULE, EXTENDED RELEASE ORAL at 09:49

## 2022-02-23 RX ADMIN — CARVEDILOL PHOSPHATE 6.25 MILLIGRAM(S): 80 CAPSULE, EXTENDED RELEASE ORAL at 21:13

## 2022-02-23 RX ADMIN — Medication 2 MILLIGRAM(S): at 19:56

## 2022-02-23 RX ADMIN — LITHIUM CARBONATE 450 MILLIGRAM(S): 300 TABLET, EXTENDED RELEASE ORAL at 09:47

## 2022-02-23 RX ADMIN — OLANZAPINE 15 MILLIGRAM(S): 15 TABLET, FILM COATED ORAL at 21:13

## 2022-02-23 RX ADMIN — Medication 1 PATCH: at 06:59

## 2022-02-23 RX ADMIN — GABAPENTIN 800 MILLIGRAM(S): 400 CAPSULE ORAL at 21:12

## 2022-02-23 RX ADMIN — Medication 10 UNIT(S): at 07:37

## 2022-02-23 RX ADMIN — Medication 10 UNIT(S): at 12:22

## 2022-02-23 RX ADMIN — Medication 2: at 16:57

## 2022-02-23 RX ADMIN — ATORVASTATIN CALCIUM 80 MILLIGRAM(S): 80 TABLET, FILM COATED ORAL at 21:13

## 2022-02-23 RX ADMIN — INSULIN GLARGINE 33 UNIT(S): 100 INJECTION, SOLUTION SUBCUTANEOUS at 21:13

## 2022-02-23 RX ADMIN — Medication 4: at 21:14

## 2022-02-23 RX ADMIN — Medication 4: at 07:36

## 2022-02-23 RX ADMIN — LITHIUM CARBONATE 600 MILLIGRAM(S): 300 TABLET, EXTENDED RELEASE ORAL at 21:12

## 2022-02-23 RX ADMIN — Medication 8: at 12:21

## 2022-02-23 RX ADMIN — Medication 1 PATCH: at 09:48

## 2022-02-23 RX ADMIN — Medication 2 MILLIGRAM(S): at 10:51

## 2022-02-23 RX ADMIN — Medication 10 MILLIGRAM(S): at 21:13

## 2022-02-23 RX ADMIN — GABAPENTIN 800 MILLIGRAM(S): 400 CAPSULE ORAL at 14:15

## 2022-02-23 NOTE — BH INPATIENT PSYCHIATRY PROGRESS NOTE - CURRENT MEDICATION
MEDICATIONS  (STANDING):  atorvastatin 80 milliGRAM(s) Oral at bedtime  carvedilol 6.25 milliGRAM(s) Oral every 12 hours  dextrose 40% Gel 15 Gram(s) Oral once  dextrose 5%. 1000 milliLiter(s) (50 mL/Hr) IV Continuous <Continuous>  dextrose 5%. 1000 milliLiter(s) (100 mL/Hr) IV Continuous <Continuous>  dextrose 50% Injectable 25 Gram(s) IV Push once  dextrose 50% Injectable 12.5 Gram(s) IV Push once  dextrose 50% Injectable 25 Gram(s) IV Push once  diazepam    Tablet 10 milliGRAM(s) Oral two times a day  gabapentin 800 milliGRAM(s) Oral three times a day  glucagon  Injectable 1 milliGRAM(s) IntraMuscular once  influenza   Vaccine 0.5 milliLiter(s) IntraMuscular once  insulin glargine Injectable (LANTUS) 25 Unit(s) SubCutaneous every morning  insulin lispro (ADMELOG) corrective regimen sliding scale   SubCutaneous at bedtime  insulin lispro (ADMELOG) corrective regimen sliding scale   SubCutaneous three times a day before meals  insulin lispro Injectable (ADMELOG) 10 Unit(s) SubCutaneous three times a day before meals  lithium CR (ESKALITH-CR) 600 milliGRAM(s) Oral two times a day  losartan 25 milliGRAM(s) Oral daily  methadone    Tablet 150 milliGRAM(s) Oral daily  nicotine -  14 mG/24Hr(s) Patch 1 Patch Transdermal daily  OLANZapine 15 milliGRAM(s) Oral at bedtime  OLANZapine Injectable 5 milliGRAM(s) IntraMuscular once  spironolactone 25 milliGRAM(s) Oral daily    MEDICATIONS  (PRN):  acetaminophen     Tablet .. 650 milliGRAM(s) Oral every 6 hours PRN Mild Pain (1 - 3), Moderate Pain (4 - 6)  aluminum hydroxide/magnesium hydroxide/simethicone Suspension 30 milliLiter(s) Oral every 4 hours PRN Dyspepsia  diphenhydrAMINE 50 milliGRAM(s) Oral at bedtime PRN Insomnia  hydrOXYzine hydrochloride 25 milliGRAM(s) Oral every 6 hours PRN Anxiety  loperamide 2 milliGRAM(s) Oral every 4 hours PRN diarrhea  LORazepam     Tablet 2 milliGRAM(s) Oral every 6 hours PRN agitation  magnesium hydroxide Suspension 30 milliLiter(s) Oral daily PRN Constipation  OLANZapine Disintegrating Tablet 10 milliGRAM(s) Oral every 8 hours PRN agitation   MEDICATIONS  (STANDING):  atorvastatin 80 milliGRAM(s) Oral at bedtime  carvedilol 6.25 milliGRAM(s) Oral every 12 hours  dextrose 40% Gel 15 Gram(s) Oral once  dextrose 5%. 1000 milliLiter(s) (50 mL/Hr) IV Continuous <Continuous>  dextrose 5%. 1000 milliLiter(s) (100 mL/Hr) IV Continuous <Continuous>  dextrose 50% Injectable 25 Gram(s) IV Push once  dextrose 50% Injectable 12.5 Gram(s) IV Push once  dextrose 50% Injectable 25 Gram(s) IV Push once  diazepam    Tablet 10 milliGRAM(s) Oral two times a day  gabapentin 800 milliGRAM(s) Oral three times a day  glucagon  Injectable 1 milliGRAM(s) IntraMuscular once  influenza   Vaccine 0.5 milliLiter(s) IntraMuscular once  insulin glargine Injectable (LANTUS) 25 Unit(s) SubCutaneous every morning  insulin lispro (ADMELOG) corrective regimen sliding scale   SubCutaneous at bedtime  insulin lispro (ADMELOG) corrective regimen sliding scale   SubCutaneous three times a day before meals  insulin lispro Injectable (ADMELOG) 10 Unit(s) SubCutaneous three times a day before meals  lithium 600 milliGRAM(s) Oral two times a day  losartan 25 milliGRAM(s) Oral daily  methadone    Tablet 150 milliGRAM(s) Oral daily  nicotine -  14 mG/24Hr(s) Patch 1 Patch Transdermal daily  OLANZapine 15 milliGRAM(s) Oral at bedtime  OLANZapine Injectable 5 milliGRAM(s) IntraMuscular once  spironolactone 25 milliGRAM(s) Oral daily    MEDICATIONS  (PRN):  acetaminophen     Tablet .. 650 milliGRAM(s) Oral every 6 hours PRN Mild Pain (1 - 3), Moderate Pain (4 - 6)  aluminum hydroxide/magnesium hydroxide/simethicone Suspension 30 milliLiter(s) Oral every 4 hours PRN Dyspepsia  diphenhydrAMINE 50 milliGRAM(s) Oral at bedtime PRN Insomnia  hydrOXYzine hydrochloride 25 milliGRAM(s) Oral every 6 hours PRN Anxiety  loperamide 2 milliGRAM(s) Oral every 4 hours PRN diarrhea  LORazepam     Tablet 2 milliGRAM(s) Oral every 6 hours PRN agitation  magnesium hydroxide Suspension 30 milliLiter(s) Oral daily PRN Constipation  OLANZapine Disintegrating Tablet 10 milliGRAM(s) Oral every 8 hours PRN agitation   MEDICATIONS  (STANDING):  atorvastatin 80 milliGRAM(s) Oral at bedtime  carvedilol 6.25 milliGRAM(s) Oral every 12 hours  dextrose 40% Gel 15 Gram(s) Oral once  dextrose 5%. 1000 milliLiter(s) (50 mL/Hr) IV Continuous <Continuous>  dextrose 5%. 1000 milliLiter(s) (100 mL/Hr) IV Continuous <Continuous>  dextrose 50% Injectable 25 Gram(s) IV Push once  dextrose 50% Injectable 12.5 Gram(s) IV Push once  dextrose 50% Injectable 25 Gram(s) IV Push once  diazepam    Tablet 10 milliGRAM(s) Oral two times a day  gabapentin 800 milliGRAM(s) Oral three times a day  glucagon  Injectable 1 milliGRAM(s) IntraMuscular once  influenza   Vaccine 0.5 milliLiter(s) IntraMuscular once  insulin glargine Injectable (LANTUS) 33 Unit(s) SubCutaneous at bedtime  insulin lispro (ADMELOG) corrective regimen sliding scale   SubCutaneous at bedtime  insulin lispro (ADMELOG) corrective regimen sliding scale   SubCutaneous three times a day before meals  insulin lispro Injectable (ADMELOG) 12 Unit(s) SubCutaneous three times a day before meals  lithium 600 milliGRAM(s) Oral two times a day  losartan 25 milliGRAM(s) Oral daily  methadone    Tablet 150 milliGRAM(s) Oral daily  nicotine -  14 mG/24Hr(s) Patch 1 Patch Transdermal daily  OLANZapine 15 milliGRAM(s) Oral at bedtime  OLANZapine Injectable 5 milliGRAM(s) IntraMuscular once  spironolactone 25 milliGRAM(s) Oral daily    MEDICATIONS  (PRN):  acetaminophen     Tablet .. 650 milliGRAM(s) Oral every 6 hours PRN Mild Pain (1 - 3), Moderate Pain (4 - 6)  aluminum hydroxide/magnesium hydroxide/simethicone Suspension 30 milliLiter(s) Oral every 4 hours PRN Dyspepsia  diphenhydrAMINE 50 milliGRAM(s) Oral at bedtime PRN Insomnia  hydrOXYzine hydrochloride 25 milliGRAM(s) Oral every 6 hours PRN Anxiety  loperamide 2 milliGRAM(s) Oral every 4 hours PRN diarrhea  LORazepam     Tablet 2 milliGRAM(s) Oral every 6 hours PRN agitation  magnesium hydroxide Suspension 30 milliLiter(s) Oral daily PRN Constipation  OLANZapine Disintegrating Tablet 10 milliGRAM(s) Oral every 8 hours PRN agitation

## 2022-02-23 NOTE — BH TREATMENT PLAN - NSTXMANICINTERPR_PSY_ALL_CORE
Pt will be invited and encouraged to attend all offered groups
Pt will be invited and encouraged to attend all offered groups

## 2022-02-23 NOTE — BH INPATIENT PSYCHIATRY PROGRESS NOTE - NSBHMETABOLIC_PSY_ALL_CORE_FT
BMI: BMI (kg/m2): 27.6 (02-16-22 @ 22:13)  HbA1c: A1C with Estimated Average Glucose Result: 9.3 % (02-18-22 @ 07:39)    Glucose: POCT Blood Glucose.: 344 mg/dL (02-23-22 @ 11:50)    BP: 135/88 (02-23-22 @ 08:37) (117/73 - 159/78)  Lipid Panel: Date/Time: 02-18-22 @ 07:38  Cholesterol, Serum: 104  Direct LDL: --  HDL Cholesterol, Serum: 40  Total Cholesterol/HDL Ration Measurement: --  Triglycerides, Serum: 157   BMI: BMI (kg/m2): 27.6 (02-16-22 @ 22:13)  HbA1c: A1C with Estimated Average Glucose Result: 9.3 % (02-18-22 @ 07:39)    Glucose: POCT Blood Glucose.: 309 mg/dL (02-23-22 @ 21:11)    BP: 155/78 (02-23-22 @ 20:13) (125/65 - 159/78)  Lipid Panel: Date/Time: 02-18-22 @ 07:38  Cholesterol, Serum: 104  Direct LDL: --  HDL Cholesterol, Serum: 40  Total Cholesterol/HDL Ration Measurement: --  Triglycerides, Serum: 157

## 2022-02-23 NOTE — BH TREATMENT PLAN - NSTXSUBMISINTERPR_PSY_ALL_CORE
Pt will be encouraged to attend 12-step groups when a speaker is on the unit
Pt will be encouraged to attend 12-step groups when a speaker is on the unit
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
## 2022-02-23 NOTE — BH TREATMENT PLAN - NSTXMANICINTERRN_PSY_ALL_CORE
Listen actively and utilize clear, concise communication; remain neutral and avoid arguing about unrealistic or grandiose ideas.    Advocate for continuity of care for consistent approach and expectations. Decrease environmental stimuli; rearrange room to minimize environmental risk; remove objects that may lead to self or other harm. Minimize attention to bizarre behavior; set firm limits on destructive or unsafe behavior; provide positive reinforcement for appropriate behavior and logical thought. Provide and engage in focused, calming activities for distraction and redirection of energy. Monitor behavior frequently for signs of increased agitation or hyperactivity, psychosis, suicidality and risk for violence; implement safety measures based on level of risk.
Listen actively and utilize clear, concise communication; remain neutral and avoid arguing about unrealistic or grandiose ideas.    Advocate for continuity of care for consistent approach and expectations. Decrease environmental stimuli; rearrange room to minimize environmental risk; remove objects that may lead to self or other harm. Minimize attention to bizarre behavior; set firm limits on destructive or unsafe behavior; provide positive reinforcement for appropriate behavior and logical thought. Provide and engage in focused, calming activities for distraction and redirection of energy. Monitor behavior frequently for signs of increased agitation or hyperactivity, psychosis, suicidality and risk for violence; implement safety measures based on level of risk.

## 2022-02-23 NOTE — BH TREATMENT PLAN - NSTXMANICDATETRGT_PSY_ALL_CORE
Use Enhanced Medication Counseling?: No Tazorac Counseling:  Patient advised that medication is irritating and drying.  Patient may need to apply sparingly and wash off after an hour before eventually leaving it on overnight.  The patient verbalized understanding of the proper use and possible adverse effects of tazorac.  All of the patient's questions and concerns were addressed. Sarecycline Counseling: Patient advised regarding possible photosensitivity and discoloration of the teeth, skin, lips, tongue and gums.  Patient instructed to avoid sunlight, if possible.  When exposed to sunlight, patients should wear protective clothing, sunglasses, and sunscreen.  The patient was instructed to call the office immediately if the following severe adverse effects occur:  hearing changes, easy bruising/bleeding, severe headache, or vision changes.  The patient verbalized understanding of the proper use and possible adverse effects of sarecycline.  All of the patient's questions and concerns were addressed. Bactrim Pregnancy And Lactation Text: This medication is Pregnancy Category D and is known to cause fetal risk.  It is also excreted in breast milk. 03-Mar-2022 Birth Control Pills Counseling: Birth Control Pill Counseling: I discussed with the patient the potential side effects of OCPs including but not limited to increased risk of stroke, heart attack, thrombophlebitis, deep venous thrombosis, hepatic adenomas, breast changes, GI upset, headaches, and depression.  The patient verbalized understanding of the proper use and possible adverse effects of OCPs. All of the patient's questions and concerns were addressed. Erythromycin Pregnancy And Lactation Text: This medication is Pregnancy Category B and is considered safe during pregnancy. It is also excreted in breast milk. Doxycycline Pregnancy And Lactation Text: This medication is Pregnancy Category D and not consider safe during pregnancy. It is also excreted in breast milk but is considered safe for shorter treatment courses. Erythromycin Counseling:  I discussed with the patient the risks of erythromycin including but not limited to GI upset, allergic reaction, drug rash, diarrhea, increase in liver enzymes, and yeast infections. Detail Level: Zone Tetracycline Pregnancy And Lactation Text: This medication is Pregnancy Category D and not consider safe during pregnancy. It is also excreted in breast milk. Spironolactone Counseling: Patient advised regarding risks of diarrhea, abdominal pain, hyperkalemia, birth defects (for female patients), liver toxicity and renal toxicity. The patient may need blood work to monitor liver and kidney function and potassium levels while on therapy. The patient verbalized understanding of the proper use and possible adverse effects of spironolactone.  All of the patient's questions and concerns were addressed. Benzoyl Peroxide Pregnancy And Lactation Text: This medication is Pregnancy Category C. It is unknown if benzoyl peroxide is excreted in breast milk. Topical Sulfur Applications Counseling: Topical Sulfur Counseling: Patient counseled that this medication may cause skin irritation or allergic reactions.  In the event of skin irritation, the patient was advised to reduce the amount of the drug applied or use it less frequently.   The patient verbalized understanding of the proper use and possible adverse effects of topical sulfur application.  All of the patient's questions and concerns were addressed. Isotretinoin Pregnancy And Lactation Text: This medication is Pregnancy Category X and is considered extremely dangerous during pregnancy. It is unknown if it is excreted in breast milk. Doxycycline Counseling:  Patient counseled regarding possible photosensitivity and increased risk for sunburn.  Patient instructed to avoid sunlight, if possible.  When exposed to sunlight, patients should wear protective clothing, sunglasses, and sunscreen.  The patient was instructed to call the office immediately if the following severe adverse effects occur:  hearing changes, easy bruising/bleeding, severe headache, or vision changes.  The patient verbalized understanding of the proper use and possible adverse effects of doxycycline.  All of the patient's questions and concerns were addressed. Benzoyl Peroxide Counseling: Patient counseled that medicine may cause skin irritation and bleach clothing.  In the event of skin irritation, the patient was advised to reduce the amount of the drug applied or use it less frequently.   The patient verbalized understanding of the proper use and possible adverse effects of benzoyl peroxide.  All of the patient's questions and concerns were addressed. Azithromycin Pregnancy And Lactation Text: This medication is considered safe during pregnancy and is also secreted in breast milk. Tetracycline Counseling: Patient counseled regarding possible photosensitivity and increased risk for sunburn.  Patient instructed to avoid sunlight, if possible.  When exposed to sunlight, patients should wear protective clothing, sunglasses, and sunscreen.  The patient was instructed to call the office immediately if the following severe adverse effects occur:  hearing changes, easy bruising/bleeding, severe headache, or vision changes.  The patient verbalized understanding of the proper use and possible adverse effects of tetracycline.  All of the patient's questions and concerns were addressed. Patient understands to avoid pregnancy while on therapy due to potential birth defects. High Dose Vitamin A Counseling: Side effects reviewed, pt to contact office should one occur. Tazorac Pregnancy And Lactation Text: This medication is not safe during pregnancy. It is unknown if this medication is excreted in breast milk. Topical Clindamycin Counseling: Patient counseled that this medication may cause skin irritation or allergic reactions.  In the event of skin irritation, the patient was advised to reduce the amount of the drug applied or use it less frequently.   The patient verbalized understanding of the proper use and possible adverse effects of clindamycin.  All of the patient's questions and concerns were addressed. Azithromycin Counseling:  I discussed with the patient the risks of azithromycin including but not limited to GI upset, allergic reaction, drug rash, diarrhea, and yeast infections. Topical Sulfur Applications Pregnancy And Lactation Text: This medication is Pregnancy Category C and has an unknown safety profile during pregnancy. It is unknown if this topical medication is excreted in breast milk. Isotretinoin Counseling: Patient should get monthly blood tests, not donate blood, not drive at night if vision affected, not share medication, and not undergo elective surgery for 6 months after tx completed. Side effects reviewed, pt to contact office should one occur. Topical Clindamycin Pregnancy And Lactation Text: This medication is Pregnancy Category B and is considered safe during pregnancy. It is unknown if it is excreted in breast milk. Birth Control Pills Pregnancy And Lactation Text: This medication should be avoided if pregnant and for the first 30 days post-partum. Dapsone Pregnancy And Lactation Text: This medication is Pregnancy Category C and is not considered safe during pregnancy or breast feeding. Minocycline Counseling: Patient advised regarding possible photosensitivity and discoloration of the teeth, skin, lips, tongue and gums.  Patient instructed to avoid sunlight, if possible.  When exposed to sunlight, patients should wear protective clothing, sunglasses, and sunscreen.  The patient was instructed to call the office immediately if the following severe adverse effects occur:  hearing changes, easy bruising/bleeding, severe headache, or vision changes.  The patient verbalized understanding of the proper use and possible adverse effects of minocycline.  All of the patient's questions and concerns were addressed. High Dose Vitamin A Pregnancy And Lactation Text: High dose vitamin A therapy is contraindicated during pregnancy and breast feeding. Topical Retinoid counseling:  Patient advised to apply a pea-sized amount only at bedtime and wait 30 minutes after washing their face before applying.  If too drying, patient may add a non-comedogenic moisturizer. The patient verbalized understanding of the proper use and possible adverse effects of retinoids.  All of the patient's questions and concerns were addressed. Topical Retinoid Pregnancy And Lactation Text: This medication is Pregnancy Category C. It is unknown if this medication is excreted in breast milk. Bactrim Counseling:  I discussed with the patient the risks of sulfa antibiotics including but not limited to GI upset, allergic reaction, drug rash, diarrhea, dizziness, photosensitivity, and yeast infections.  Rarely, more serious reactions can occur including but not limited to aplastic anemia, agranulocytosis, methemoglobinemia, blood dyscrasias, liver or kidney failure, lung infiltrates or desquamative/blistering drug rashes. Dapsone Counseling: I discussed with the patient the risks of dapsone including but not limited to hemolytic anemia, agranulocytosis, rashes, methemoglobinemia, kidney failure, peripheral neuropathy, headaches, GI upset, and liver toxicity.  Patients who start dapsone require monitoring including baseline LFTs and weekly CBCs for the first month, then every month thereafter.  The patient verbalized understanding of the proper use and possible adverse effects of dapsone.  All of the patient's questions and concerns were addressed. Spironolactone Pregnancy And Lactation Text: This medication can cause feminization of the male fetus and should be avoided during pregnancy. The active metabolite is also found in breast milk.
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
## 2022-02-23 NOTE — BH TREATMENT PLAN - NSTXANXDATEEST_PSY_ALL_CORE
Refill Fluticasone nasal spray  Last OV 3-26-21 [place]  Next appt.  not scheduled
23-Feb-2022
17-Feb-2022

## 2022-02-24 LAB
FSH SERPL-MCNC: 5.6 IU/L — SIGNIFICANT CHANGE UP (ref 1.5–12.4)
GLUCOSE BLDC GLUCOMTR-MCNC: 273 MG/DL — HIGH (ref 70–99)
GLUCOSE BLDC GLUCOMTR-MCNC: 293 MG/DL — HIGH (ref 70–99)
GLUCOSE BLDC GLUCOMTR-MCNC: 312 MG/DL — HIGH (ref 70–99)
GLUCOSE BLDC GLUCOMTR-MCNC: 315 MG/DL — HIGH (ref 70–99)
LH SERPL-ACNC: 7.9 IU/L — SIGNIFICANT CHANGE UP

## 2022-02-24 PROCEDURE — 99232 SBSQ HOSP IP/OBS MODERATE 35: CPT

## 2022-02-24 RX ORDER — METHADONE HYDROCHLORIDE 40 MG/1
150 TABLET ORAL DAILY
Refills: 0 | Status: DISCONTINUED | OUTPATIENT
Start: 2022-02-25 | End: 2022-03-01

## 2022-02-24 RX ORDER — METHADONE HYDROCHLORIDE 40 MG/1
150 TABLET ORAL DAILY
Refills: 0 | Status: DISCONTINUED | OUTPATIENT
Start: 2022-02-24 | End: 2022-02-24

## 2022-02-24 RX ADMIN — Medication 1 PATCH: at 09:31

## 2022-02-24 RX ADMIN — INSULIN GLARGINE 33 UNIT(S): 100 INJECTION, SOLUTION SUBCUTANEOUS at 21:37

## 2022-02-24 RX ADMIN — LOSARTAN POTASSIUM 25 MILLIGRAM(S): 100 TABLET, FILM COATED ORAL at 09:31

## 2022-02-24 RX ADMIN — Medication 12 UNIT(S): at 12:20

## 2022-02-24 RX ADMIN — CARVEDILOL PHOSPHATE 6.25 MILLIGRAM(S): 80 CAPSULE, EXTENDED RELEASE ORAL at 21:36

## 2022-02-24 RX ADMIN — METHADONE HYDROCHLORIDE 150 MILLIGRAM(S): 40 TABLET ORAL at 09:32

## 2022-02-24 RX ADMIN — ATORVASTATIN CALCIUM 80 MILLIGRAM(S): 80 TABLET, FILM COATED ORAL at 21:36

## 2022-02-24 RX ADMIN — Medication 10 MILLIGRAM(S): at 09:30

## 2022-02-24 RX ADMIN — OLANZAPINE 15 MILLIGRAM(S): 15 TABLET, FILM COATED ORAL at 21:37

## 2022-02-24 RX ADMIN — Medication 12 UNIT(S): at 16:39

## 2022-02-24 RX ADMIN — LITHIUM CARBONATE 600 MILLIGRAM(S): 300 TABLET, EXTENDED RELEASE ORAL at 09:31

## 2022-02-24 RX ADMIN — Medication 6: at 07:48

## 2022-02-24 RX ADMIN — Medication 6: at 16:39

## 2022-02-24 RX ADMIN — Medication 12 UNIT(S): at 07:49

## 2022-02-24 RX ADMIN — Medication 1 PATCH: at 18:24

## 2022-02-24 RX ADMIN — LITHIUM CARBONATE 600 MILLIGRAM(S): 300 TABLET, EXTENDED RELEASE ORAL at 21:36

## 2022-02-24 RX ADMIN — Medication 1 PATCH: at 07:05

## 2022-02-24 RX ADMIN — Medication 8: at 12:20

## 2022-02-24 RX ADMIN — SPIRONOLACTONE 25 MILLIGRAM(S): 25 TABLET, FILM COATED ORAL at 09:31

## 2022-02-24 RX ADMIN — CARVEDILOL PHOSPHATE 6.25 MILLIGRAM(S): 80 CAPSULE, EXTENDED RELEASE ORAL at 09:31

## 2022-02-24 RX ADMIN — GABAPENTIN 800 MILLIGRAM(S): 400 CAPSULE ORAL at 09:31

## 2022-02-24 RX ADMIN — GABAPENTIN 800 MILLIGRAM(S): 400 CAPSULE ORAL at 14:07

## 2022-02-24 RX ADMIN — Medication 4: at 21:37

## 2022-02-24 RX ADMIN — GABAPENTIN 800 MILLIGRAM(S): 400 CAPSULE ORAL at 21:36

## 2022-02-24 RX ADMIN — Medication 10 MILLIGRAM(S): at 21:36

## 2022-02-24 RX ADMIN — Medication 2 MILLIGRAM(S): at 10:44

## 2022-02-24 RX ADMIN — Medication 2 MILLIGRAM(S): at 17:43

## 2022-02-24 NOTE — BH INPATIENT PSYCHIATRY PROGRESS NOTE - NSBHMETABOLIC_PSY_ALL_CORE_FT
BMI: BMI (kg/m2): 27.6 (02-16-22 @ 22:13)  HbA1c: A1C with Estimated Average Glucose Result: 9.3 % (02-18-22 @ 07:39)    Glucose: POCT Blood Glucose.: 293 mg/dL (02-24-22 @ 07:38)    BP: 155/78 (02-23-22 @ 20:13) (125/65 - 159/78)  Lipid Panel: Date/Time: 02-18-22 @ 07:38  Cholesterol, Serum: 104  Direct LDL: --  HDL Cholesterol, Serum: 40  Total Cholesterol/HDL Ration Measurement: --  Triglycerides, Serum: 157   BMI: BMI (kg/m2): 27.6 (02-16-22 @ 22:13)  HbA1c: A1C with Estimated Average Glucose Result: 9.3 % (02-18-22 @ 07:39)    Glucose: POCT Blood Glucose.: 312 mg/dL (02-24-22 @ 12:09)    BP: 155/78 (02-23-22 @ 20:13) (125/65 - 159/78)  Lipid Panel: Date/Time: 02-18-22 @ 07:38  Cholesterol, Serum: 104  Direct LDL: --  HDL Cholesterol, Serum: 40  Total Cholesterol/HDL Ration Measurement: --  Triglycerides, Serum: 157
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
## 2022-02-24 NOTE — BH INPATIENT PSYCHIATRY PROGRESS NOTE - NSBHCHARTREVIEWVS_PSY_A_CORE FT
Vital Signs Last 24 Hrs  T(C): 36.9 (02-23-22 @ 20:13), Max: 36.9 (02-23-22 @ 20:13)  T(F): 98.4 (02-23-22 @ 20:13), Max: 98.4 (02-23-22 @ 20:13)  HR: 76 (02-23-22 @ 20:13) (74 - 76)  BP: 155/78 (02-23-22 @ 20:13) (131/77 - 155/78)  BP(mean): --  RR: 19 (02-23-22 @ 20:13) (18 - 19)  SpO2: 97% (02-23-22 @ 20:13) (96% - 97%)

## 2022-02-24 NOTE — BH INPATIENT PSYCHIATRY PROGRESS NOTE - CURRENT MEDICATION
MEDICATIONS  (STANDING):  atorvastatin 80 milliGRAM(s) Oral at bedtime  carvedilol 6.25 milliGRAM(s) Oral every 12 hours  dextrose 40% Gel 15 Gram(s) Oral once  dextrose 5%. 1000 milliLiter(s) (50 mL/Hr) IV Continuous <Continuous>  dextrose 5%. 1000 milliLiter(s) (100 mL/Hr) IV Continuous <Continuous>  dextrose 50% Injectable 25 Gram(s) IV Push once  dextrose 50% Injectable 12.5 Gram(s) IV Push once  dextrose 50% Injectable 25 Gram(s) IV Push once  diazepam    Tablet 10 milliGRAM(s) Oral two times a day  gabapentin 800 milliGRAM(s) Oral three times a day  glucagon  Injectable 1 milliGRAM(s) IntraMuscular once  influenza   Vaccine 0.5 milliLiter(s) IntraMuscular once  insulin glargine Injectable (LANTUS) 33 Unit(s) SubCutaneous at bedtime  insulin lispro (ADMELOG) corrective regimen sliding scale   SubCutaneous at bedtime  insulin lispro (ADMELOG) corrective regimen sliding scale   SubCutaneous three times a day before meals  insulin lispro Injectable (ADMELOG) 12 Unit(s) SubCutaneous three times a day before meals  lithium 600 milliGRAM(s) Oral two times a day  losartan 25 milliGRAM(s) Oral daily  methadone    Tablet 150 milliGRAM(s) Oral daily  nicotine -  14 mG/24Hr(s) Patch 1 Patch Transdermal daily  OLANZapine 15 milliGRAM(s) Oral at bedtime  OLANZapine Injectable 5 milliGRAM(s) IntraMuscular once  spironolactone 25 milliGRAM(s) Oral daily    MEDICATIONS  (PRN):  acetaminophen     Tablet .. 650 milliGRAM(s) Oral every 6 hours PRN Mild Pain (1 - 3), Moderate Pain (4 - 6)  aluminum hydroxide/magnesium hydroxide/simethicone Suspension 30 milliLiter(s) Oral every 4 hours PRN Dyspepsia  diphenhydrAMINE 50 milliGRAM(s) Oral at bedtime PRN Insomnia  hydrOXYzine hydrochloride 25 milliGRAM(s) Oral every 6 hours PRN Anxiety  loperamide 2 milliGRAM(s) Oral every 4 hours PRN diarrhea  LORazepam     Tablet 2 milliGRAM(s) Oral every 6 hours PRN agitation  magnesium hydroxide Suspension 30 milliLiter(s) Oral daily PRN Constipation  OLANZapine Disintegrating Tablet 10 milliGRAM(s) Oral every 8 hours PRN agitation
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
## 2022-02-25 LAB
GLUCOSE BLDC GLUCOMTR-MCNC: 183 MG/DL — HIGH (ref 70–99)
GLUCOSE BLDC GLUCOMTR-MCNC: 240 MG/DL — HIGH (ref 70–99)
GLUCOSE BLDC GLUCOMTR-MCNC: 348 MG/DL — HIGH (ref 70–99)
GLUCOSE BLDC GLUCOMTR-MCNC: 375 MG/DL — HIGH (ref 70–99)

## 2022-02-25 PROCEDURE — 99232 SBSQ HOSP IP/OBS MODERATE 35: CPT

## 2022-02-25 RX ORDER — OLANZAPINE 15 MG/1
20 TABLET, FILM COATED ORAL AT BEDTIME
Refills: 0 | Status: DISCONTINUED | OUTPATIENT
Start: 2022-02-25 | End: 2022-02-25

## 2022-02-25 RX ORDER — OLANZAPINE 15 MG/1
15 TABLET, FILM COATED ORAL AT BEDTIME
Refills: 0 | Status: DISCONTINUED | OUTPATIENT
Start: 2022-02-25 | End: 2022-03-01

## 2022-02-25 RX ADMIN — LOSARTAN POTASSIUM 25 MILLIGRAM(S): 100 TABLET, FILM COATED ORAL at 09:03

## 2022-02-25 RX ADMIN — GABAPENTIN 800 MILLIGRAM(S): 400 CAPSULE ORAL at 14:50

## 2022-02-25 RX ADMIN — INSULIN GLARGINE 33 UNIT(S): 100 INJECTION, SOLUTION SUBCUTANEOUS at 21:45

## 2022-02-25 RX ADMIN — GABAPENTIN 800 MILLIGRAM(S): 400 CAPSULE ORAL at 22:01

## 2022-02-25 RX ADMIN — Medication 10 MILLIGRAM(S): at 21:41

## 2022-02-25 RX ADMIN — CARVEDILOL PHOSPHATE 6.25 MILLIGRAM(S): 80 CAPSULE, EXTENDED RELEASE ORAL at 09:03

## 2022-02-25 RX ADMIN — SPIRONOLACTONE 25 MILLIGRAM(S): 25 TABLET, FILM COATED ORAL at 09:08

## 2022-02-25 RX ADMIN — Medication 1 PATCH: at 18:47

## 2022-02-25 RX ADMIN — Medication 2 MILLIGRAM(S): at 16:34

## 2022-02-25 RX ADMIN — Medication 12 UNIT(S): at 07:53

## 2022-02-25 RX ADMIN — OLANZAPINE 15 MILLIGRAM(S): 15 TABLET, FILM COATED ORAL at 21:42

## 2022-02-25 RX ADMIN — Medication 12 UNIT(S): at 12:12

## 2022-02-25 RX ADMIN — Medication 1 PATCH: at 09:11

## 2022-02-25 RX ADMIN — Medication 10 MILLIGRAM(S): at 09:03

## 2022-02-25 RX ADMIN — Medication 1 PATCH: at 07:21

## 2022-02-25 RX ADMIN — LITHIUM CARBONATE 600 MILLIGRAM(S): 300 TABLET, EXTENDED RELEASE ORAL at 09:02

## 2022-02-25 RX ADMIN — GABAPENTIN 800 MILLIGRAM(S): 400 CAPSULE ORAL at 09:03

## 2022-02-25 RX ADMIN — Medication 12 UNIT(S): at 16:51

## 2022-02-25 RX ADMIN — Medication 4: at 21:43

## 2022-02-25 RX ADMIN — METHADONE HYDROCHLORIDE 150 MILLIGRAM(S): 40 TABLET ORAL at 09:03

## 2022-02-25 RX ADMIN — Medication 1 PATCH: at 09:04

## 2022-02-25 RX ADMIN — Medication 4: at 07:53

## 2022-02-25 RX ADMIN — Medication 650 MILLIGRAM(S): at 19:04

## 2022-02-25 RX ADMIN — ATORVASTATIN CALCIUM 80 MILLIGRAM(S): 80 TABLET, FILM COATED ORAL at 21:41

## 2022-02-25 RX ADMIN — Medication 2 MILLIGRAM(S): at 10:32

## 2022-02-25 RX ADMIN — Medication 2: at 16:51

## 2022-02-25 RX ADMIN — CARVEDILOL PHOSPHATE 6.25 MILLIGRAM(S): 80 CAPSULE, EXTENDED RELEASE ORAL at 21:42

## 2022-02-25 RX ADMIN — LITHIUM CARBONATE 600 MILLIGRAM(S): 300 TABLET, EXTENDED RELEASE ORAL at 21:41

## 2022-02-25 RX ADMIN — Medication 10: at 12:12
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
## 2022-02-25 NOTE — BH INPATIENT PSYCHIATRY PROGRESS NOTE - CURRENT MEDICATION
MEDICATIONS  (STANDING):  atorvastatin 80 milliGRAM(s) Oral at bedtime  carvedilol 6.25 milliGRAM(s) Oral every 12 hours  dextrose 40% Gel 15 Gram(s) Oral once  dextrose 5%. 1000 milliLiter(s) (50 mL/Hr) IV Continuous <Continuous>  dextrose 5%. 1000 milliLiter(s) (100 mL/Hr) IV Continuous <Continuous>  dextrose 50% Injectable 25 Gram(s) IV Push once  dextrose 50% Injectable 12.5 Gram(s) IV Push once  dextrose 50% Injectable 25 Gram(s) IV Push once  diazepam    Tablet 10 milliGRAM(s) Oral two times a day  gabapentin 800 milliGRAM(s) Oral three times a day  glucagon  Injectable 1 milliGRAM(s) IntraMuscular once  influenza   Vaccine 0.5 milliLiter(s) IntraMuscular once  insulin glargine Injectable (LANTUS) 33 Unit(s) SubCutaneous at bedtime  insulin lispro (ADMELOG) corrective regimen sliding scale   SubCutaneous at bedtime  insulin lispro (ADMELOG) corrective regimen sliding scale   SubCutaneous three times a day before meals  insulin lispro Injectable (ADMELOG) 12 Unit(s) SubCutaneous three times a day before meals  lithium 600 milliGRAM(s) Oral two times a day  losartan 25 milliGRAM(s) Oral daily  methadone    Tablet 150 milliGRAM(s) Oral daily  nicotine -  14 mG/24Hr(s) Patch 1 Patch Transdermal daily  OLANZapine 20 milliGRAM(s) Oral at bedtime  OLANZapine Injectable 5 milliGRAM(s) IntraMuscular once  spironolactone 25 milliGRAM(s) Oral daily    MEDICATIONS  (PRN):  acetaminophen     Tablet .. 650 milliGRAM(s) Oral every 6 hours PRN Mild Pain (1 - 3), Moderate Pain (4 - 6)  aluminum hydroxide/magnesium hydroxide/simethicone Suspension 30 milliLiter(s) Oral every 4 hours PRN Dyspepsia  diphenhydrAMINE 50 milliGRAM(s) Oral at bedtime PRN Insomnia  hydrOXYzine hydrochloride 25 milliGRAM(s) Oral every 6 hours PRN Anxiety  loperamide 2 milliGRAM(s) Oral every 4 hours PRN diarrhea  LORazepam     Tablet 2 milliGRAM(s) Oral every 6 hours PRN agitation  magnesium hydroxide Suspension 30 milliLiter(s) Oral daily PRN Constipation  OLANZapine Disintegrating Tablet 10 milliGRAM(s) Oral every 8 hours PRN agitation   MEDICATIONS  (STANDING):  atorvastatin 80 milliGRAM(s) Oral at bedtime  carvedilol 6.25 milliGRAM(s) Oral every 12 hours  dextrose 40% Gel 15 Gram(s) Oral once  dextrose 5%. 1000 milliLiter(s) (50 mL/Hr) IV Continuous <Continuous>  dextrose 5%. 1000 milliLiter(s) (100 mL/Hr) IV Continuous <Continuous>  dextrose 50% Injectable 25 Gram(s) IV Push once  dextrose 50% Injectable 12.5 Gram(s) IV Push once  dextrose 50% Injectable 25 Gram(s) IV Push once  diazepam    Tablet 10 milliGRAM(s) Oral two times a day  gabapentin 800 milliGRAM(s) Oral three times a day  glucagon  Injectable 1 milliGRAM(s) IntraMuscular once  influenza   Vaccine 0.5 milliLiter(s) IntraMuscular once  insulin glargine Injectable (LANTUS) 40 Unit(s) SubCutaneous at bedtime  insulin lispro (ADMELOG) corrective regimen sliding scale   SubCutaneous at bedtime  insulin lispro (ADMELOG) corrective regimen sliding scale   SubCutaneous three times a day before meals  insulin lispro Injectable (ADMELOG) 20 Unit(s) SubCutaneous three times a day before meals  lithium 600 milliGRAM(s) Oral two times a day  losartan 25 milliGRAM(s) Oral daily  methadone    Tablet 150 milliGRAM(s) Oral daily  nicotine -  14 mG/24Hr(s) Patch 1 Patch Transdermal daily  OLANZapine 15 milliGRAM(s) Oral at bedtime  OLANZapine Injectable 5 milliGRAM(s) IntraMuscular once  spironolactone 25 milliGRAM(s) Oral daily    MEDICATIONS  (PRN):  acetaminophen     Tablet .. 650 milliGRAM(s) Oral every 6 hours PRN Mild Pain (1 - 3), Moderate Pain (4 - 6)  aluminum hydroxide/magnesium hydroxide/simethicone Suspension 30 milliLiter(s) Oral every 4 hours PRN Dyspepsia  diphenhydrAMINE 50 milliGRAM(s) Oral at bedtime PRN Insomnia  hydrOXYzine hydrochloride 25 milliGRAM(s) Oral every 6 hours PRN Anxiety  loperamide 2 milliGRAM(s) Oral every 4 hours PRN diarrhea  LORazepam     Tablet 2 milliGRAM(s) Oral every 6 hours PRN agitation  magnesium hydroxide Suspension 30 milliLiter(s) Oral daily PRN Constipation  OLANZapine Disintegrating Tablet 10 milliGRAM(s) Oral every 8 hours PRN agitation

## 2022-02-25 NOTE — BH INPATIENT PSYCHIATRY PROGRESS NOTE - NSBHMETABOLIC_PSY_ALL_CORE_FT
BMI: BMI (kg/m2): 27.6 (02-16-22 @ 22:13)  HbA1c: A1C with Estimated Average Glucose Result: 9.3 % (02-18-22 @ 07:39)    Glucose: POCT Blood Glucose.: 375 mg/dL (02-25-22 @ 12:11)    BP: 149/86 (02-25-22 @ 11:19) (125/65 - 161/79)  Lipid Panel: Date/Time: 02-18-22 @ 07:38  Cholesterol, Serum: 104  Direct LDL: --  HDL Cholesterol, Serum: 40  Total Cholesterol/HDL Ration Measurement: --  Triglycerides, Serum: 157   BMI: BMI (kg/m2): 27.6 (02-16-22 @ 22:13)  HbA1c: A1C with Estimated Average Glucose Result: 9.3 % (02-18-22 @ 07:39)    Glucose: POCT Blood Glucose.: 163 mg/dL (03-01-22 @ 07:34)    BP: 153/77 (03-01-22 @ 09:08) (120/78 - 153/77)  Lipid Panel: Date/Time: 02-18-22 @ 07:38  Cholesterol, Serum: 104  Direct LDL: --  HDL Cholesterol, Serum: 40  Total Cholesterol/HDL Ration Measurement: --  Triglycerides, Serum: 157

## 2022-02-25 NOTE — BH INPATIENT PSYCHIATRY PROGRESS NOTE - NSBHCHARTREVIEWVS_PSY_A_CORE FT
Vital Signs Last 24 Hrs  T(C): 36.6 (02-25-22 @ 11:19), Max: 37 (02-24-22 @ 17:04)  T(F): 97.8 (02-25-22 @ 11:19), Max: 98.6 (02-24-22 @ 17:04)  HR: 75 (02-25-22 @ 11:19) (73 - 89)  BP: 149/86 (02-25-22 @ 11:19) (149/74 - 161/79)  BP(mean): --  RR: 18 (02-25-22 @ 11:19) (18 - 18)  SpO2: 97% (02-25-22 @ 11:19) (95% - 97%)     Vital Signs Last 24 Hrs  T(C): --  T(F): --  HR: 84 (03-01-22 @ 09:08) (80 - 84)  BP: 153/77 (03-01-22 @ 09:08) (138/80 - 153/77)  BP(mean): --  RR: 17 (02-28-22 @ 20:02) (17 - 17)  SpO2: 97% (02-28-22 @ 20:02) (97% - 97%)

## 2022-02-25 NOTE — BH INPATIENT PSYCHIATRY PROGRESS NOTE - NSBHMSESPABN_PSY_A_CORE
Loud volume/Increased productivity
Pressured rate/Increased productivity
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
## 2022-02-25 NOTE — BH INPATIENT PSYCHIATRY PROGRESS NOTE - NSTXMANICGOALOTHER_PSY_ALL_CORE
Patient will stabilize mood and alleviate symptoms of jose to engage in discharge planning.
Patient will stabilize mood and alleviate symptoms of jose
Pt. will participate in groups and milieu treatment structure of the unit
Patient will stabilize mood and alleviate symptoms of jose
Patient will stabilize mood and alleviate symptoms of jose

## 2022-02-26 LAB
GLUCOSE BLDC GLUCOMTR-MCNC: 228 MG/DL — HIGH (ref 70–99)
GLUCOSE BLDC GLUCOMTR-MCNC: 274 MG/DL — HIGH (ref 70–99)
GLUCOSE BLDC GLUCOMTR-MCNC: 279 MG/DL — HIGH (ref 70–99)
GLUCOSE BLDC GLUCOMTR-MCNC: 352 MG/DL — HIGH (ref 70–99)

## 2022-02-26 RX ADMIN — OLANZAPINE 15 MILLIGRAM(S): 15 TABLET, FILM COATED ORAL at 21:34

## 2022-02-26 RX ADMIN — Medication 2 MILLIGRAM(S): at 18:18

## 2022-02-26 RX ADMIN — Medication 12 UNIT(S): at 12:18

## 2022-02-26 RX ADMIN — INSULIN GLARGINE 33 UNIT(S): 100 INJECTION, SOLUTION SUBCUTANEOUS at 21:40

## 2022-02-26 RX ADMIN — Medication 1 PATCH: at 21:00

## 2022-02-26 RX ADMIN — Medication 2 MILLIGRAM(S): at 12:17

## 2022-02-26 RX ADMIN — Medication 650 MILLIGRAM(S): at 13:00

## 2022-02-26 RX ADMIN — Medication 10 MILLIGRAM(S): at 21:31

## 2022-02-26 RX ADMIN — METHADONE HYDROCHLORIDE 150 MILLIGRAM(S): 40 TABLET ORAL at 10:02

## 2022-02-26 RX ADMIN — GABAPENTIN 800 MILLIGRAM(S): 400 CAPSULE ORAL at 14:17

## 2022-02-26 RX ADMIN — Medication 1 PATCH: at 10:02

## 2022-02-26 RX ADMIN — LOSARTAN POTASSIUM 25 MILLIGRAM(S): 100 TABLET, FILM COATED ORAL at 10:01

## 2022-02-26 RX ADMIN — GABAPENTIN 800 MILLIGRAM(S): 400 CAPSULE ORAL at 21:30

## 2022-02-26 RX ADMIN — Medication 4: at 07:46

## 2022-02-26 RX ADMIN — Medication 12 UNIT(S): at 07:48

## 2022-02-26 RX ADMIN — Medication 10 MILLIGRAM(S): at 10:01

## 2022-02-26 RX ADMIN — ATORVASTATIN CALCIUM 80 MILLIGRAM(S): 80 TABLET, FILM COATED ORAL at 21:37

## 2022-02-26 RX ADMIN — CARVEDILOL PHOSPHATE 6.25 MILLIGRAM(S): 80 CAPSULE, EXTENDED RELEASE ORAL at 21:36

## 2022-02-26 RX ADMIN — Medication 6: at 17:01

## 2022-02-26 RX ADMIN — Medication 6: at 12:18

## 2022-02-26 RX ADMIN — LITHIUM CARBONATE 600 MILLIGRAM(S): 300 TABLET, EXTENDED RELEASE ORAL at 21:31

## 2022-02-26 RX ADMIN — Medication 12 UNIT(S): at 17:00

## 2022-02-26 RX ADMIN — GABAPENTIN 800 MILLIGRAM(S): 400 CAPSULE ORAL at 10:01

## 2022-02-26 RX ADMIN — Medication 6: at 21:39

## 2022-02-26 RX ADMIN — SPIRONOLACTONE 25 MILLIGRAM(S): 25 TABLET, FILM COATED ORAL at 10:01

## 2022-02-26 RX ADMIN — Medication 650 MILLIGRAM(S): at 12:17

## 2022-02-26 RX ADMIN — CARVEDILOL PHOSPHATE 6.25 MILLIGRAM(S): 80 CAPSULE, EXTENDED RELEASE ORAL at 10:01

## 2022-02-26 RX ADMIN — LITHIUM CARBONATE 600 MILLIGRAM(S): 300 TABLET, EXTENDED RELEASE ORAL at 10:00

## 2022-02-27 LAB
GLUCOSE BLDC GLUCOMTR-MCNC: 208 MG/DL — HIGH (ref 70–99)
GLUCOSE BLDC GLUCOMTR-MCNC: 263 MG/DL — HIGH (ref 70–99)
GLUCOSE BLDC GLUCOMTR-MCNC: 350 MG/DL — HIGH (ref 70–99)
GLUCOSE BLDC GLUCOMTR-MCNC: 405 MG/DL — HIGH (ref 70–99)

## 2022-02-27 PROCEDURE — 99232 SBSQ HOSP IP/OBS MODERATE 35: CPT

## 2022-02-27 RX ORDER — DIAZEPAM 5 MG
10 TABLET ORAL ONCE
Refills: 0 | Status: DISCONTINUED | OUTPATIENT
Start: 2022-02-27 | End: 2022-02-27

## 2022-02-27 RX ORDER — DIAZEPAM 5 MG
10 TABLET ORAL
Refills: 0 | Status: DISCONTINUED | OUTPATIENT
Start: 2022-02-27 | End: 2022-03-01

## 2022-02-27 RX ADMIN — Medication 2 MILLIGRAM(S): at 14:09

## 2022-02-27 RX ADMIN — CARVEDILOL PHOSPHATE 6.25 MILLIGRAM(S): 80 CAPSULE, EXTENDED RELEASE ORAL at 21:27

## 2022-02-27 RX ADMIN — Medication 10 MILLIGRAM(S): at 10:27

## 2022-02-27 RX ADMIN — Medication 8: at 11:28

## 2022-02-27 RX ADMIN — Medication 1 PATCH: at 18:26

## 2022-02-27 RX ADMIN — LOSARTAN POTASSIUM 25 MILLIGRAM(S): 100 TABLET, FILM COATED ORAL at 10:27

## 2022-02-27 RX ADMIN — Medication 1 PATCH: at 07:40

## 2022-02-27 RX ADMIN — Medication 10 MILLIGRAM(S): at 21:26

## 2022-02-27 RX ADMIN — Medication 1 PATCH: at 10:26

## 2022-02-27 RX ADMIN — OLANZAPINE 15 MILLIGRAM(S): 15 TABLET, FILM COATED ORAL at 21:27

## 2022-02-27 RX ADMIN — Medication 4: at 07:54

## 2022-02-27 RX ADMIN — Medication 2 MILLIGRAM(S): at 07:58

## 2022-02-27 RX ADMIN — GABAPENTIN 800 MILLIGRAM(S): 400 CAPSULE ORAL at 10:27

## 2022-02-27 RX ADMIN — GABAPENTIN 800 MILLIGRAM(S): 400 CAPSULE ORAL at 21:26

## 2022-02-27 RX ADMIN — Medication 2 MILLIGRAM(S): at 21:27

## 2022-02-27 RX ADMIN — Medication 6: at 17:13

## 2022-02-27 RX ADMIN — Medication 12 UNIT(S): at 07:49

## 2022-02-27 RX ADMIN — ATORVASTATIN CALCIUM 80 MILLIGRAM(S): 80 TABLET, FILM COATED ORAL at 21:27

## 2022-02-27 RX ADMIN — LITHIUM CARBONATE 600 MILLIGRAM(S): 300 TABLET, EXTENDED RELEASE ORAL at 21:27

## 2022-02-27 RX ADMIN — Medication 12 UNIT(S): at 11:28

## 2022-02-27 RX ADMIN — Medication 12 UNIT(S): at 17:14

## 2022-02-27 RX ADMIN — LITHIUM CARBONATE 600 MILLIGRAM(S): 300 TABLET, EXTENDED RELEASE ORAL at 10:27

## 2022-02-27 RX ADMIN — Medication 1 PATCH: at 10:29

## 2022-02-27 RX ADMIN — CARVEDILOL PHOSPHATE 6.25 MILLIGRAM(S): 80 CAPSULE, EXTENDED RELEASE ORAL at 10:29

## 2022-02-27 RX ADMIN — METHADONE HYDROCHLORIDE 150 MILLIGRAM(S): 40 TABLET ORAL at 10:28

## 2022-02-27 RX ADMIN — GABAPENTIN 800 MILLIGRAM(S): 400 CAPSULE ORAL at 15:00

## 2022-02-27 RX ADMIN — Medication 8: at 21:28

## 2022-02-27 RX ADMIN — INSULIN GLARGINE 33 UNIT(S): 100 INJECTION, SOLUTION SUBCUTANEOUS at 21:49

## 2022-02-27 RX ADMIN — SPIRONOLACTONE 25 MILLIGRAM(S): 25 TABLET, FILM COATED ORAL at 10:28
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
## 2022-02-27 NOTE — BH INPATIENT PSYCHIATRY PROGRESS NOTE - NSBHFUPINTERVALHXFT_PSY_A_CORE
talks about waiting for his bank card to be sent to the unit; less labile than in past; Alert; oriented; cognition intact; speech clear; no tremor or evidence of movement impairment.  Not endorsing  suicidal or homicidal ideation intent or plans; no mention of auditory or visual hallucinations .  anxious but seems less irritable although demanding at times.

## 2022-02-27 NOTE — BH INPATIENT PSYCHIATRY PROGRESS NOTE - CURRENT MEDICATION
MEDICATIONS  (STANDING):  atorvastatin 80 milliGRAM(s) Oral at bedtime  carvedilol 6.25 milliGRAM(s) Oral every 12 hours  dextrose 40% Gel 15 Gram(s) Oral once  dextrose 5%. 1000 milliLiter(s) (50 mL/Hr) IV Continuous <Continuous>  dextrose 5%. 1000 milliLiter(s) (100 mL/Hr) IV Continuous <Continuous>  dextrose 50% Injectable 25 Gram(s) IV Push once  dextrose 50% Injectable 12.5 Gram(s) IV Push once  dextrose 50% Injectable 25 Gram(s) IV Push once  diazepam    Tablet 10 milliGRAM(s) Oral two times a day  gabapentin 800 milliGRAM(s) Oral three times a day  glucagon  Injectable 1 milliGRAM(s) IntraMuscular once  influenza   Vaccine 0.5 milliLiter(s) IntraMuscular once  insulin glargine Injectable (LANTUS) 33 Unit(s) SubCutaneous at bedtime  insulin lispro (ADMELOG) corrective regimen sliding scale   SubCutaneous three times a day before meals  insulin lispro (ADMELOG) corrective regimen sliding scale   SubCutaneous at bedtime  insulin lispro Injectable (ADMELOG) 12 Unit(s) SubCutaneous three times a day before meals  lithium 600 milliGRAM(s) Oral two times a day  losartan 25 milliGRAM(s) Oral daily  methadone    Tablet 150 milliGRAM(s) Oral daily  nicotine -  14 mG/24Hr(s) Patch 1 Patch Transdermal daily  OLANZapine 15 milliGRAM(s) Oral at bedtime  OLANZapine Injectable 5 milliGRAM(s) IntraMuscular once  spironolactone 25 milliGRAM(s) Oral daily    MEDICATIONS  (PRN):  acetaminophen     Tablet .. 650 milliGRAM(s) Oral every 6 hours PRN Mild Pain (1 - 3), Moderate Pain (4 - 6)  aluminum hydroxide/magnesium hydroxide/simethicone Suspension 30 milliLiter(s) Oral every 4 hours PRN Dyspepsia  diphenhydrAMINE 50 milliGRAM(s) Oral at bedtime PRN Insomnia  hydrOXYzine hydrochloride 25 milliGRAM(s) Oral every 6 hours PRN Anxiety  loperamide 2 milliGRAM(s) Oral every 4 hours PRN diarrhea  LORazepam     Tablet 2 milliGRAM(s) Oral every 6 hours PRN agitation  magnesium hydroxide Suspension 30 milliLiter(s) Oral daily PRN Constipation  OLANZapine Disintegrating Tablet 10 milliGRAM(s) Oral every 8 hours PRN agitation

## 2022-02-27 NOTE — BH INPATIENT PSYCHIATRY PROGRESS NOTE - NSBHMETABOLIC_PSY_ALL_CORE_FT
BMI: BMI (kg/m2): 27.6 (02-16-22 @ 22:13)  HbA1c: A1C with Estimated Average Glucose Result: 9.3 % (02-18-22 @ 07:39)    Glucose: POCT Blood Glucose.: 208 mg/dL (02-27-22 @ 07:44)    BP: 147/74 (02-27-22 @ 08:40) (133/88 - 161/79)  Lipid Panel: Date/Time: 02-18-22 @ 07:38  Cholesterol, Serum: 104  Direct LDL: --  HDL Cholesterol, Serum: 40  Total Cholesterol/HDL Ration Measurement: --  Triglycerides, Serum: 157

## 2022-02-27 NOTE — BH INPATIENT PSYCHIATRY PROGRESS NOTE - NSBHCHARTREVIEWVS_PSY_A_CORE FT
Vital Signs Last 24 Hrs  T(C): 36.7 (02-27-22 @ 08:40), Max: 36.7 (02-27-22 @ 08:40)  T(F): 98.1 (02-27-22 @ 08:40), Max: 98.1 (02-27-22 @ 08:40)  HR: 76 (02-27-22 @ 08:40) (71 - 76)  BP: 147/74 (02-27-22 @ 08:40) (143/76 - 157/81)  BP(mean): --  RR: 18 (02-27-22 @ 08:40) (16 - 18)  SpO2: 97% (02-27-22 @ 08:40) (95% - 97%)

## 2022-02-28 LAB
GLUCOSE BLDC GLUCOMTR-MCNC: 259 MG/DL — HIGH (ref 70–99)
GLUCOSE BLDC GLUCOMTR-MCNC: 301 MG/DL — HIGH (ref 70–99)
GLUCOSE BLDC GLUCOMTR-MCNC: 318 MG/DL — HIGH (ref 70–99)
GLUCOSE BLDC GLUCOMTR-MCNC: 320 MG/DL — HIGH (ref 70–99)
LITHIUM SERPL-MCNC: 0.58 MMOL/L — LOW (ref 0.6–1.2)

## 2022-02-28 PROCEDURE — 99231 SBSQ HOSP IP/OBS SF/LOW 25: CPT

## 2022-02-28 RX ORDER — INSULIN LISPRO 100/ML
20 VIAL (ML) SUBCUTANEOUS
Refills: 0 | Status: DISCONTINUED | OUTPATIENT
Start: 2022-02-28 | End: 2022-03-01

## 2022-02-28 RX ORDER — METHADONE HYDROCHLORIDE 40 MG/1
11 TABLET ORAL
Qty: 0 | Refills: 0 | DISCHARGE
Start: 2022-02-28

## 2022-02-28 RX ORDER — DIAZEPAM 5 MG
1 TABLET ORAL
Qty: 30 | Refills: 0
Start: 2022-02-28 | End: 2022-03-14

## 2022-02-28 RX ORDER — INSULIN LISPRO 100/ML
20 VIAL (ML) SUBCUTANEOUS
Qty: 18 | Refills: 0
Start: 2022-02-28 | End: 2022-03-29

## 2022-02-28 RX ORDER — LOSARTAN POTASSIUM 100 MG/1
1 TABLET, FILM COATED ORAL
Qty: 30 | Refills: 0
Start: 2022-02-28 | End: 2022-03-29

## 2022-02-28 RX ORDER — INSULIN GLARGINE 100 [IU]/ML
40 INJECTION, SOLUTION SUBCUTANEOUS AT BEDTIME
Refills: 0 | Status: DISCONTINUED | OUTPATIENT
Start: 2022-02-28 | End: 2022-03-01

## 2022-02-28 RX ORDER — CARVEDILOL PHOSPHATE 80 MG/1
1 CAPSULE, EXTENDED RELEASE ORAL
Qty: 60 | Refills: 0
Start: 2022-02-28 | End: 2022-03-29

## 2022-02-28 RX ORDER — NICOTINE POLACRILEX 2 MG
14 GUM BUCCAL
Qty: 30 | Refills: 0
Start: 2022-02-28 | End: 2022-03-29

## 2022-02-28 RX ORDER — OLANZAPINE 15 MG/1
15 TABLET, FILM COATED ORAL
Qty: 30 | Refills: 0
Start: 2022-02-28 | End: 2022-03-29

## 2022-02-28 RX ORDER — ATORVASTATIN CALCIUM 80 MG/1
1 TABLET, FILM COATED ORAL
Qty: 30 | Refills: 0
Start: 2022-02-28 | End: 2022-03-29

## 2022-02-28 RX ORDER — INSULIN GLARGINE 100 [IU]/ML
40 INJECTION, SOLUTION SUBCUTANEOUS
Qty: 4 | Refills: 0
Start: 2022-02-28 | End: 2022-03-29

## 2022-02-28 RX ORDER — ISOPROPYL ALCOHOL, BENZOCAINE .7; .06 ML/ML; ML/ML
1 SWAB TOPICAL
Qty: 100 | Refills: 1
Start: 2022-02-28 | End: 2022-04-18

## 2022-02-28 RX ORDER — INSULIN LISPRO 100/ML
15 VIAL (ML) SUBCUTANEOUS
Refills: 0 | Status: DISCONTINUED | OUTPATIENT
Start: 2022-02-28 | End: 2022-02-28

## 2022-02-28 RX ORDER — INSULIN LISPRO 100/ML
20 VIAL (ML) SUBCUTANEOUS
Qty: 6 | Refills: 0
Start: 2022-02-28 | End: 2022-03-29

## 2022-02-28 RX ORDER — LITHIUM CARBONATE 300 MG/1
1 TABLET, EXTENDED RELEASE ORAL
Qty: 60 | Refills: 0
Start: 2022-02-28 | End: 2022-03-29

## 2022-02-28 RX ORDER — GABAPENTIN 400 MG/1
2 CAPSULE ORAL
Qty: 180 | Refills: 0
Start: 2022-02-28 | End: 2022-03-29

## 2022-02-28 RX ORDER — OLANZAPINE 15 MG/1
10 TABLET, FILM COATED ORAL
Qty: 0 | Refills: 0 | DISCHARGE
Start: 2022-02-28 | End: 2022-03-29

## 2022-02-28 RX ORDER — SPIRONOLACTONE 25 MG/1
1 TABLET, FILM COATED ORAL
Qty: 30 | Refills: 0
Start: 2022-02-28 | End: 2022-03-29

## 2022-02-28 RX ORDER — LISINOPRIL 2.5 MG/1
1 TABLET ORAL
Qty: 30 | Refills: 0
Start: 2022-02-28 | End: 2022-03-29

## 2022-02-28 RX ORDER — INSULIN GLARGINE 100 [IU]/ML
36 INJECTION, SOLUTION SUBCUTANEOUS AT BEDTIME
Refills: 0 | Status: DISCONTINUED | OUTPATIENT
Start: 2022-02-28 | End: 2022-02-28

## 2022-02-28 RX ORDER — METHADONE HYDROCHLORIDE 40 MG/1
15 TABLET ORAL
Qty: 0 | Refills: 0 | DISCHARGE
Start: 2022-02-28

## 2022-02-28 RX ORDER — INSULIN GLARGINE 100 [IU]/ML
40 INJECTION, SOLUTION SUBCUTANEOUS
Qty: 12 | Refills: 0
Start: 2022-02-28 | End: 2022-03-29

## 2022-02-28 RX ADMIN — OLANZAPINE 15 MILLIGRAM(S): 15 TABLET, FILM COATED ORAL at 21:23

## 2022-02-28 RX ADMIN — Medication 6: at 16:40

## 2022-02-28 RX ADMIN — CARVEDILOL PHOSPHATE 6.25 MILLIGRAM(S): 80 CAPSULE, EXTENDED RELEASE ORAL at 10:05

## 2022-02-28 RX ADMIN — Medication 1 PATCH: at 09:00

## 2022-02-28 RX ADMIN — GABAPENTIN 800 MILLIGRAM(S): 400 CAPSULE ORAL at 16:40

## 2022-02-28 RX ADMIN — ATORVASTATIN CALCIUM 80 MILLIGRAM(S): 80 TABLET, FILM COATED ORAL at 21:23

## 2022-02-28 RX ADMIN — Medication 8: at 12:17

## 2022-02-28 RX ADMIN — METHADONE HYDROCHLORIDE 150 MILLIGRAM(S): 40 TABLET ORAL at 10:04

## 2022-02-28 RX ADMIN — LITHIUM CARBONATE 600 MILLIGRAM(S): 300 TABLET, EXTENDED RELEASE ORAL at 10:09

## 2022-02-28 RX ADMIN — Medication 2 MILLIGRAM(S): at 14:32

## 2022-02-28 RX ADMIN — Medication 650 MILLIGRAM(S): at 22:13

## 2022-02-28 RX ADMIN — GABAPENTIN 800 MILLIGRAM(S): 400 CAPSULE ORAL at 10:05

## 2022-02-28 RX ADMIN — Medication 1 PATCH: at 10:05

## 2022-02-28 RX ADMIN — Medication 10 MILLIGRAM(S): at 10:05

## 2022-02-28 RX ADMIN — LOSARTAN POTASSIUM 25 MILLIGRAM(S): 100 TABLET, FILM COATED ORAL at 10:04

## 2022-02-28 RX ADMIN — INSULIN GLARGINE 40 UNIT(S): 100 INJECTION, SOLUTION SUBCUTANEOUS at 21:28

## 2022-02-28 RX ADMIN — Medication 8: at 07:35

## 2022-02-28 RX ADMIN — Medication 1 PATCH: at 07:41

## 2022-02-28 RX ADMIN — SPIRONOLACTONE 25 MILLIGRAM(S): 25 TABLET, FILM COATED ORAL at 10:05

## 2022-02-28 RX ADMIN — Medication 12 UNIT(S): at 07:35

## 2022-02-28 RX ADMIN — Medication 2 MILLIGRAM(S): at 22:14

## 2022-02-28 RX ADMIN — Medication 10 MILLIGRAM(S): at 21:26

## 2022-02-28 RX ADMIN — Medication 4: at 21:28

## 2022-02-28 RX ADMIN — GABAPENTIN 800 MILLIGRAM(S): 400 CAPSULE ORAL at 21:23

## 2022-02-28 RX ADMIN — CARVEDILOL PHOSPHATE 6.25 MILLIGRAM(S): 80 CAPSULE, EXTENDED RELEASE ORAL at 21:23

## 2022-02-28 RX ADMIN — Medication 650 MILLIGRAM(S): at 23:40

## 2022-02-28 RX ADMIN — Medication 2 MILLIGRAM(S): at 07:32

## 2022-02-28 RX ADMIN — Medication 15 UNIT(S): at 16:41

## 2022-02-28 RX ADMIN — LITHIUM CARBONATE 600 MILLIGRAM(S): 300 TABLET, EXTENDED RELEASE ORAL at 21:23

## 2022-02-28 NOTE — BH INPATIENT PSYCHIATRY PROGRESS NOTE - NSTXSUBMISDATEEST_PSY_ALL_CORE
17-Feb-2022
23-Feb-2022
17-Feb-2022
23-Feb-2022
17-Feb-2022
23-Feb-2022
23-Feb-2022
17-Feb-2022
23-Feb-2022
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
## 2022-02-28 NOTE — BH INPATIENT PSYCHIATRY PROGRESS NOTE - NSTXANXDATEEST_PSY_ALL_CORE
17-Feb-2022
23-Feb-2022
17-Feb-2022
23-Feb-2022
23-Feb-2022
17-Feb-2022
17-Feb-2022
23-Feb-2022
23-Feb-2022

## 2022-02-28 NOTE — BH INPATIENT PSYCHIATRY DISCHARGE NOTE - LEGAL HISTORY
Has been incarcerated for 10 years for manslaughter -- out of skilled nursing 6.5 years per patient.
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
## 2022-02-28 NOTE — PROGRESS NOTE ADULT - SUBJECTIVE AND OBJECTIVE BOX
INTERVAL HPI/OVERNIGHT EVENTS: Blood sugars continue to be elevated. Patient shows no indications of being open to modifying his eating habits.     SUBJECTIVE: Patient seen and examined at bedside.    VITAL SIGNS:  ICU Vital Signs Last 24 Hrs  T(C): 36.6 (25 Feb 2022 11:19), Max: 37 (24 Feb 2022 17:04)  T(F): 97.8 (25 Feb 2022 11:19), Max: 98.6 (24 Feb 2022 17:04)  HR: 75 (25 Feb 2022 11:19) (73 - 89)  BP: 149/86 (25 Feb 2022 11:19) (149/74 - 161/79)  BP(mean): --  ABP: --  ABP(mean): --  RR: 18 (25 Feb 2022 11:19) (18 - 18)  SpO2: 97% (25 Feb 2022 11:19) (95% - 97%)        CAPILLARY BLOOD GLUCOSE      POCT Blood Glucose.: 375 mg/dL (25 Feb 2022 12:11)      PHYSICAL EXAM:    GENERAL: NAD, well-developed  HEAD:  Atraumatic, Normocephalic  EYES: EOMI, PERRLA, conjunctiva and sclera clear  NECK: Supple, No JVD  CHEST/LUNG: Clear to auscultation bilaterally; No wheeze  HEART: Regular rate and rhythm; No murmurs, rubs, or gallops  ABDOMEN: Soft, Nontender, Nondistended; Bowel sounds present  EXTREMITIES:  2+ Peripheral Pulses, No clubbing, cyanosis, or edema, R hand wrapped in gauze bandage with limited range of motion  NEUROLOGY: non-focal, loss of sensation to light touch on feet, poor insight  SKIN: No rashes or lesions    MEDICATIONS:  MEDICATIONS  (STANDING):  atorvastatin 80 milliGRAM(s) Oral at bedtime  carvedilol 6.25 milliGRAM(s) Oral every 12 hours  dextrose 40% Gel 15 Gram(s) Oral once  dextrose 5%. 1000 milliLiter(s) (50 mL/Hr) IV Continuous <Continuous>  dextrose 5%. 1000 milliLiter(s) (100 mL/Hr) IV Continuous <Continuous>  dextrose 50% Injectable 25 Gram(s) IV Push once  dextrose 50% Injectable 12.5 Gram(s) IV Push once  dextrose 50% Injectable 25 Gram(s) IV Push once  diazepam    Tablet 10 milliGRAM(s) Oral two times a day  gabapentin 800 milliGRAM(s) Oral three times a day  glucagon  Injectable 1 milliGRAM(s) IntraMuscular once  influenza   Vaccine 0.5 milliLiter(s) IntraMuscular once  insulin glargine Injectable (LANTUS) 33 Unit(s) SubCutaneous at bedtime  insulin lispro (ADMELOG) corrective regimen sliding scale   SubCutaneous at bedtime  insulin lispro (ADMELOG) corrective regimen sliding scale   SubCutaneous three times a day before meals  insulin lispro Injectable (ADMELOG) 12 Unit(s) SubCutaneous three times a day before meals  lithium 600 milliGRAM(s) Oral two times a day  losartan 25 milliGRAM(s) Oral daily  methadone    Tablet 150 milliGRAM(s) Oral daily  nicotine -  14 mG/24Hr(s) Patch 1 Patch Transdermal daily  OLANZapine 15 milliGRAM(s) Oral at bedtime  OLANZapine Injectable 5 milliGRAM(s) IntraMuscular once  spironolactone 25 milliGRAM(s) Oral daily    MEDICATIONS  (PRN):  acetaminophen     Tablet .. 650 milliGRAM(s) Oral every 6 hours PRN Mild Pain (1 - 3), Moderate Pain (4 - 6)  aluminum hydroxide/magnesium hydroxide/simethicone Suspension 30 milliLiter(s) Oral every 4 hours PRN Dyspepsia  diphenhydrAMINE 50 milliGRAM(s) Oral at bedtime PRN Insomnia  hydrOXYzine hydrochloride 25 milliGRAM(s) Oral every 6 hours PRN Anxiety  loperamide 2 milliGRAM(s) Oral every 4 hours PRN diarrhea  LORazepam     Tablet 2 milliGRAM(s) Oral every 6 hours PRN agitation  magnesium hydroxide Suspension 30 milliLiter(s) Oral daily PRN Constipation  OLANZapine Disintegrating Tablet 10 milliGRAM(s) Oral every 8 hours PRN agitation      ALLERGIES:  Allergies    Cogentin (Unknown)  Haldol (Unknown)  Thorazine (Rash)    Intolerances        LABS:    CAPILLARY BLOOD GLUCOSE    POCT Blood Glucose.: 375 mg/dL (25 Feb 2022 12:11)  POCT Blood Glucose.: 240 mg/dL (25 Feb 2022 07:49)  POCT Blood Glucose.: 315 mg/dL (24 Feb 2022 21:34)  POCT Blood Glucose.: 273 mg/dL (24 Feb 2022 16:37)                  RADIOLOGY & ADDITIONAL TESTS: Reviewed.  
INTERVAL HPI/OVERNIGHT EVENTS:    SUBJECTIVE: Patient seen and examined at bedside.    VITAL SIGNS:  ICU Vital Signs Last 24 Hrs  T(C): 36.6 (23 Feb 2022 16:48), Max: 37.1 (23 Feb 2022 05:58)  T(F): 97.9 (23 Feb 2022 16:48), Max: 98.7 (23 Feb 2022 05:58)  HR: 74 (23 Feb 2022 16:48) (72 - 95)  BP: 131/77 (23 Feb 2022 16:48) (125/65 - 135/88)  BP(mean): --  ABP: --  ABP(mean): --  RR: 18 (23 Feb 2022 16:48) (17 - 18)  SpO2: 96% (23 Feb 2022 16:48) (93% - 97%)        CAPILLARY BLOOD GLUCOSE      POCT Blood Glucose.: 159 mg/dL (23 Feb 2022 16:43)      PHYSICAL EXAM:    GENERAL: NAD, well-developed  HEAD:  Atraumatic, Normocephalic  EYES: EOMI, PERRLA, conjunctiva and sclera clear  NECK: Supple, No JVD  CHEST/LUNG: Clear to auscultation bilaterally; No wheeze  HEART: Regular rate and rhythm; No murmurs, rubs, or gallops  ABDOMEN: Soft, Nontender, Nondistended; Bowel sounds present  EXTREMITIES:  2+ Peripheral Pulses, No clubbing, cyanosis, or edema, R hand wrapped in gauze bandage with limited range of motion  NEUROLOGY: non-focal, loss of sensation to light touch on feet  SKIN: No rashes or lesions    MEDICATIONS:  MEDICATIONS  (STANDING):  atorvastatin 80 milliGRAM(s) Oral at bedtime  carvedilol 6.25 milliGRAM(s) Oral every 12 hours  dextrose 40% Gel 15 Gram(s) Oral once  dextrose 5%. 1000 milliLiter(s) (50 mL/Hr) IV Continuous <Continuous>  dextrose 5%. 1000 milliLiter(s) (100 mL/Hr) IV Continuous <Continuous>  dextrose 50% Injectable 25 Gram(s) IV Push once  dextrose 50% Injectable 12.5 Gram(s) IV Push once  dextrose 50% Injectable 25 Gram(s) IV Push once  diazepam    Tablet 10 milliGRAM(s) Oral two times a day  gabapentin 800 milliGRAM(s) Oral three times a day  glucagon  Injectable 1 milliGRAM(s) IntraMuscular once  influenza   Vaccine 0.5 milliLiter(s) IntraMuscular once  insulin glargine Injectable (LANTUS) 25 Unit(s) SubCutaneous every morning  insulin lispro (ADMELOG) corrective regimen sliding scale   SubCutaneous at bedtime  insulin lispro (ADMELOG) corrective regimen sliding scale   SubCutaneous three times a day before meals  insulin lispro Injectable (ADMELOG) 10 Unit(s) SubCutaneous three times a day before meals  lithium 600 milliGRAM(s) Oral two times a day  losartan 25 milliGRAM(s) Oral daily  methadone    Tablet 150 milliGRAM(s) Oral daily  nicotine -  14 mG/24Hr(s) Patch 1 Patch Transdermal daily  OLANZapine 15 milliGRAM(s) Oral at bedtime  OLANZapine Injectable 5 milliGRAM(s) IntraMuscular once  spironolactone 25 milliGRAM(s) Oral daily    MEDICATIONS  (PRN):  acetaminophen     Tablet .. 650 milliGRAM(s) Oral every 6 hours PRN Mild Pain (1 - 3), Moderate Pain (4 - 6)  aluminum hydroxide/magnesium hydroxide/simethicone Suspension 30 milliLiter(s) Oral every 4 hours PRN Dyspepsia  diphenhydrAMINE 50 milliGRAM(s) Oral at bedtime PRN Insomnia  hydrOXYzine hydrochloride 25 milliGRAM(s) Oral every 6 hours PRN Anxiety  loperamide 2 milliGRAM(s) Oral every 4 hours PRN diarrhea  LORazepam     Tablet 2 milliGRAM(s) Oral every 6 hours PRN agitation  magnesium hydroxide Suspension 30 milliLiter(s) Oral daily PRN Constipation  OLANZapine Disintegrating Tablet 10 milliGRAM(s) Oral every 8 hours PRN agitation      ALLERGIES:  Allergies    Cogentin (Unknown)  Haldol (Unknown)  Thorazine (Rash)    Intolerances        LABS:      POCT Blood Glucose (02.23.22 @ 16:43)   POCT Blood Glucose.: 159 mg/dL       Historical Values  POCT Blood Glucose (02.23.22 @ 16:43)   POCT Blood Glucose.: 159 mg/dL   POCT Blood Glucose (02.23.22 @ 11:50)   POCT Blood Glucose.: 344 mg/dL   POCT Blood Glucose (02.23.22 @ 07:32)   POCT Blood Glucose.: 217 mg/dL   POCT Blood Glucose (02.22.22 @ 20:54)   POCT Blood Glucose.: 342 mg/dL   POCT Blood Glucose (02.22.22 @ 17:00)   POCT Blood Glucose.: 389 mg/dL   POCT Blood Glucose (02.22.22 @ 12:10)   POCT Blood Glucose.: 403 mg/dL   POCT Blood Glucose (02.22.22 @ 07:32)   POCT Blood Glucose.: 294 mg/dL          RADIOLOGY & ADDITIONAL TESTS: Reviewed.  
INTERVAL HPI/OVERNIGHT EVENTS: Patient continues to have poor glycemic control as he is noncompliant with the diet, and appears to have very poor insight with regards to his diabetes. Despite multiple conversations, he remains insistent on eating what he wants, and per staff, he continues to rummage through pantry and other patient's trays, taking juices or sweets. BG still ranging from 200s to >300s, given patients noncompliance. Per chart review, pt received correctional 8U for a  at around noon, however did not receive his 12U which was supposed to be given with meals (documented reason on MAR stated BG 77),    SUBJECTIVE: Patient seen and examined at bedside.    VITAL SIGNS:  ICU Vital Signs Last 24 Hrs  T(C): 37 (24 Feb 2022 17:04), Max: 37 (24 Feb 2022 17:04)  T(F): 98.6 (24 Feb 2022 17:04), Max: 98.6 (24 Feb 2022 17:04)  HR: 89 (24 Feb 2022 17:04) (76 - 89)  BP: 161/79 (24 Feb 2022 17:04) (155/78 - 161/79)  BP(mean): --  ABP: --  ABP(mean): --  RR: 18 (24 Feb 2022 17:04) (18 - 19)  SpO2: 95% (24 Feb 2022 17:04) (95% - 97%)        CAPILLARY BLOOD GLUCOSE      POCT Blood Glucose.: 273 mg/dL (24 Feb 2022 16:37)      PHYSICAL EXAM:    GENERAL: NAD, well-developed  HEAD:  Atraumatic, Normocephalic  EYES: EOMI, PERRLA, conjunctiva and sclera clear  NECK: Supple, No JVD  CHEST/LUNG: Clear to auscultation bilaterally; No wheeze  HEART: Regular rate and rhythm; No murmurs, rubs, or gallops  ABDOMEN: Soft, Nontender, Nondistended; Bowel sounds present  EXTREMITIES:  2+ Peripheral Pulses, No clubbing, cyanosis, or edema, R hand wrapped in gauze bandage with limited range of motion  NEUROLOGY: non-focal, loss of sensation to light touch on feet, poor insight, distractible  SKIN: No rashes or lesions    MEDICATIONS:  MEDICATIONS  (STANDING):  atorvastatin 80 milliGRAM(s) Oral at bedtime  carvedilol 6.25 milliGRAM(s) Oral every 12 hours  dextrose 40% Gel 15 Gram(s) Oral once  dextrose 5%. 1000 milliLiter(s) (50 mL/Hr) IV Continuous <Continuous>  dextrose 5%. 1000 milliLiter(s) (100 mL/Hr) IV Continuous <Continuous>  dextrose 50% Injectable 25 Gram(s) IV Push once  dextrose 50% Injectable 12.5 Gram(s) IV Push once  dextrose 50% Injectable 25 Gram(s) IV Push once  diazepam    Tablet 10 milliGRAM(s) Oral two times a day  gabapentin 800 milliGRAM(s) Oral three times a day  glucagon  Injectable 1 milliGRAM(s) IntraMuscular once  influenza   Vaccine 0.5 milliLiter(s) IntraMuscular once  insulin glargine Injectable (LANTUS) 33 Unit(s) SubCutaneous at bedtime  insulin lispro (ADMELOG) corrective regimen sliding scale   SubCutaneous at bedtime  insulin lispro (ADMELOG) corrective regimen sliding scale   SubCutaneous three times a day before meals  insulin lispro Injectable (ADMELOG) 12 Unit(s) SubCutaneous three times a day before meals  lithium 600 milliGRAM(s) Oral two times a day  losartan 25 milliGRAM(s) Oral daily  nicotine -  14 mG/24Hr(s) Patch 1 Patch Transdermal daily  OLANZapine 15 milliGRAM(s) Oral at bedtime  OLANZapine Injectable 5 milliGRAM(s) IntraMuscular once  spironolactone 25 milliGRAM(s) Oral daily    MEDICATIONS  (PRN):  acetaminophen     Tablet .. 650 milliGRAM(s) Oral every 6 hours PRN Mild Pain (1 - 3), Moderate Pain (4 - 6)  aluminum hydroxide/magnesium hydroxide/simethicone Suspension 30 milliLiter(s) Oral every 4 hours PRN Dyspepsia  diphenhydrAMINE 50 milliGRAM(s) Oral at bedtime PRN Insomnia  hydrOXYzine hydrochloride 25 milliGRAM(s) Oral every 6 hours PRN Anxiety  loperamide 2 milliGRAM(s) Oral every 4 hours PRN diarrhea  LORazepam     Tablet 2 milliGRAM(s) Oral every 6 hours PRN agitation  magnesium hydroxide Suspension 30 milliLiter(s) Oral daily PRN Constipation  OLANZapine Disintegrating Tablet 10 milliGRAM(s) Oral every 8 hours PRN agitation      ALLERGIES:  Allergies    Cogentin (Unknown)  Haldol (Unknown)  Thorazine (Rash)    Intolerances        LABS:  POCT Blood Glucose (02.24.22 @ 16:37)   POCT Blood Glucose.: 273 mg/dL       Historical Values  POCT Blood Glucose (02.24.22 @ 16:37)   POCT Blood Glucose.: 273 mg/dL   POCT Blood Glucose (02.24.22 @ 12:09)   POCT Blood Glucose.: 312 mg/dL   POCT Blood Glucose (02.24.22 @ 07:38)   POCT Blood Glucose.: 293 mg/dL   POCT Blood Glucose (02.23.22 @ 21:11)   POCT Blood Glucose.: 309 mg/dL   POCT Blood Glucose (02.23.22 @ 16:43)   POCT Blood Glucose.: 159 mg/dL   POCT Blood Glucose (02.23.22 @ 11:50)   POCT Blood Glucose.: 344 mg/dL   POCT Blood Glucose (02.23.22 @ 07:32)   POCT Blood Glucose.: 217 mg/dL              RADIOLOGY & ADDITIONAL TESTS: Reviewed.  
INTERVAL HPI/OVERNIGHT EVENTS: Pt continues to be hyperglycemic    SUBJECTIVE: Patient seen and examined at bedside.    VITAL SIGNS:  ICU Vital Signs Last 24 Hrs  T(C): 36.4 (28 Feb 2022 07:55), Max: 36.9 (27 Feb 2022 20:14)  T(F): 97.6 (28 Feb 2022 07:55), Max: 98.4 (27 Feb 2022 20:14)  HR: 71 (28 Feb 2022 07:55) (71 - 74)  BP: 120/78 (28 Feb 2022 07:55) (120/78 - 135/88)  BP(mean): --  ABP: --  ABP(mean): --  RR: 16 (28 Feb 2022 07:55) (16 - 18)  SpO2: 94% (28 Feb 2022 07:55) (94% - 95%)        CAPILLARY BLOOD GLUCOSE      POCT Blood Glucose.: 259 mg/dL (28 Feb 2022 16:36)      PHYSICAL EXAM:    GENERAL: NAD, well-developed  HEAD:  Atraumatic, Normocephalic  EYES: EOMI, PERRLA, conjunctiva and sclera clear  NECK: Supple, No JVD  CHEST/LUNG: Clear to auscultation bilaterally; No wheeze  HEART: Regular rate and rhythm; No murmurs, rubs, or gallops  ABDOMEN: Soft, Nontender, Nondistended; Bowel sounds present  EXTREMITIES:  2+ Peripheral Pulses, No clubbing, cyanosis, or edema, R hand wrapped in gauze bandage with limited range of motion  NEUROLOGY: non-focal, loss of sensation to light touch on feet, poor insight  SKIN: No rashes or lesions    MEDICATIONS:  MEDICATIONS  (STANDING):  atorvastatin 80 milliGRAM(s) Oral at bedtime  carvedilol 6.25 milliGRAM(s) Oral every 12 hours  dextrose 40% Gel 15 Gram(s) Oral once  dextrose 5%. 1000 milliLiter(s) (50 mL/Hr) IV Continuous <Continuous>  dextrose 5%. 1000 milliLiter(s) (100 mL/Hr) IV Continuous <Continuous>  dextrose 50% Injectable 25 Gram(s) IV Push once  dextrose 50% Injectable 12.5 Gram(s) IV Push once  dextrose 50% Injectable 25 Gram(s) IV Push once  diazepam    Tablet 10 milliGRAM(s) Oral two times a day  gabapentin 800 milliGRAM(s) Oral three times a day  glucagon  Injectable 1 milliGRAM(s) IntraMuscular once  influenza   Vaccine 0.5 milliLiter(s) IntraMuscular once  insulin glargine Injectable (LANTUS) 40 Unit(s) SubCutaneous at bedtime  insulin lispro (ADMELOG) corrective regimen sliding scale   SubCutaneous at bedtime  insulin lispro (ADMELOG) corrective regimen sliding scale   SubCutaneous three times a day before meals  insulin lispro Injectable (ADMELOG) 20 Unit(s) SubCutaneous three times a day before meals  lithium 600 milliGRAM(s) Oral two times a day  losartan 25 milliGRAM(s) Oral daily  methadone    Tablet 150 milliGRAM(s) Oral daily  nicotine -  14 mG/24Hr(s) Patch 1 Patch Transdermal daily  OLANZapine 15 milliGRAM(s) Oral at bedtime  OLANZapine Injectable 5 milliGRAM(s) IntraMuscular once  spironolactone 25 milliGRAM(s) Oral daily    MEDICATIONS  (PRN):  acetaminophen     Tablet .. 650 milliGRAM(s) Oral every 6 hours PRN Mild Pain (1 - 3), Moderate Pain (4 - 6)  aluminum hydroxide/magnesium hydroxide/simethicone Suspension 30 milliLiter(s) Oral every 4 hours PRN Dyspepsia  diphenhydrAMINE 50 milliGRAM(s) Oral at bedtime PRN Insomnia  hydrOXYzine hydrochloride 25 milliGRAM(s) Oral every 6 hours PRN Anxiety  loperamide 2 milliGRAM(s) Oral every 4 hours PRN diarrhea  LORazepam     Tablet 2 milliGRAM(s) Oral every 6 hours PRN agitation  magnesium hydroxide Suspension 30 milliLiter(s) Oral daily PRN Constipation  OLANZapine Disintegrating Tablet 10 milliGRAM(s) Oral every 8 hours PRN agitation      ALLERGIES:  Allergies    Cogentin (Unknown)  Haldol (Unknown)  Thorazine (Rash)    Intolerances        LABS:  CAPILLARY BLOOD GLUCOSE      POCT Blood Glucose.: 259 mg/dL (28 Feb 2022 16:36)  POCT Blood Glucose.: 301 mg/dL (28 Feb 2022 12:11)  POCT Blood Glucose.: 318 mg/dL (28 Feb 2022 07:29)  POCT Blood Glucose.: 405 mg/dL (27 Feb 2022 21:20)                RADIOLOGY & ADDITIONAL TESTS: Reviewed.

## 2022-02-28 NOTE — BH INPATIENT PSYCHIATRY PROGRESS NOTE - NSTXANXINTERMD_PSY_ALL_CORE
reinforce coping tools; medication management

## 2022-02-28 NOTE — BH INPATIENT PSYCHIATRY PROGRESS NOTE - NSBHATTESTSEENBY_PSY_A_CORE
Trainee with telephonic supervision from Attending Psychiatrist
attending Psychiatrist without NP/Trainee
Attending Psychiatrist supervising NP/Trainee, meeting pt...
attending Psychiatrist without NP/Trainee
Attending Psychiatrist supervising NP/Trainee, meeting pt...
attending Psychiatrist without NP/Trainee
Attending Psychiatrist supervising NP/Trainee, meeting pt...
Attending Psychiatrist supervising NP/Trainee, meeting pt...
attending Psychiatrist without NP/Trainee

## 2022-02-28 NOTE — SAFE ACT REPORTING PROGRESS NOTE - COMMENTS
Submitted On: 2/28/2022 3:46:47 PM  Reference Number: [id number]  By: Maria C May  For: JOSEP BAH

## 2022-02-28 NOTE — BH INPATIENT PSYCHIATRY PROGRESS NOTE - NSBHMSEKNOWHOW_PSY_ALL_CORE
Vocabulary
Current Events/Vocabulary
Vocabulary

## 2022-02-28 NOTE — BH INPATIENT PSYCHIATRY DISCHARGE NOTE - NSDCCPCAREPLAN_GEN_ALL_CORE_FT
PRINCIPAL DISCHARGE DIAGNOSIS  Diagnosis: Bipolar disorder with psychotic features  Assessment and Plan of Treatment:       SECONDARY DISCHARGE DIAGNOSES  Diagnosis: Substance use disorder  Assessment and Plan of Treatment:
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
## 2022-02-28 NOTE — BH INPATIENT PSYCHIATRY PROGRESS NOTE - NSTXSUBMISDATETRGT_PSY_ALL_CORE
02-Mar-2024
02-Mar-2022
02-Mar-2024
24-Feb-2022
24-Feb-2022
02-Mar-2022
24-Feb-2022
24-Feb-2022
02-Mar-2024

## 2022-02-28 NOTE — BH INPATIENT PSYCHIATRY PROGRESS NOTE - PRN MEDS
MEDICATIONS  (PRN):  acetaminophen     Tablet .. 650 milliGRAM(s) Oral every 6 hours PRN Mild Pain (1 - 3), Moderate Pain (4 - 6)  aluminum hydroxide/magnesium hydroxide/simethicone Suspension 30 milliLiter(s) Oral every 4 hours PRN Dyspepsia  diphenhydrAMINE 50 milliGRAM(s) Oral at bedtime PRN Insomnia  hydrOXYzine hydrochloride 25 milliGRAM(s) Oral every 6 hours PRN Anxiety  loperamide 2 milliGRAM(s) Oral every 4 hours PRN diarrhea  LORazepam     Tablet 2 milliGRAM(s) Oral every 6 hours PRN agitation  magnesium hydroxide Suspension 30 milliLiter(s) Oral daily PRN Constipation  OLANZapine Disintegrating Tablet 10 milliGRAM(s) Oral every 8 hours PRN agitation  

## 2022-02-28 NOTE — BH INPATIENT PSYCHIATRY PROGRESS NOTE - NSBHADMITMEDEDUDETAILS_PSY_A_CORE FT
patient aware that he is on Lithium	

## 2022-02-28 NOTE — BH INPATIENT PSYCHIATRY DISCHARGE NOTE - NSBHMETABOLIC_PSY_ALL_CORE_FT
BMI: BMI (kg/m2): 27.6 (02-16-22 @ 22:13)  HbA1c: A1C with Estimated Average Glucose Result: 9.3 % (02-18-22 @ 07:39)    Glucose: POCT Blood Glucose.: 259 mg/dL (02-28-22 @ 16:36)    BP: 120/78 (02-28-22 @ 07:55) (120/78 - 157/81)  Lipid Panel: Date/Time: 02-18-22 @ 07:38  Cholesterol, Serum: 104  Direct LDL: --  HDL Cholesterol, Serum: 40  Total Cholesterol/HDL Ration Measurement: --  Triglycerides, Serum: 157
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
## 2022-02-28 NOTE — BH DISCHARGE NOTE NURSING/SOCIAL WORK/PSYCH REHAB - NSDCPRGOAL_PSY_ALL_CORE
Pt. has attended groups regularly during admission.  Pt's mood and behavior has fluctuated.  Pt. has been pleasant at times but can also be irritable and intrusive.  Pt. has been social with peers but has poor boundaries with females at times.  Pt. has demonstrated low frustration tolerance and has been devaluing of staff at times, but has been able to maintain behavioral control.

## 2022-02-28 NOTE — BH INPATIENT PSYCHIATRY PROGRESS NOTE - NSICDXBHPRIMARYDX_PSY_ALL_CORE
Delusions   F22  

## 2022-02-28 NOTE — BH INPATIENT PSYCHIATRY PROGRESS NOTE - NSBHMSEMOOD_PSY_A_CORE
Anxious/Irritable
Anxious
Anxious/Irritable
Anxious/Irritable
Anxious
Anxious
Anxious/Irritable

## 2022-02-28 NOTE — BH INPATIENT PSYCHIATRY PROGRESS NOTE - NSTXMANICDATEEST_PSY_ALL_CORE
24-Feb-2022
22-Feb-2022
24-Feb-2022
17-Feb-2022
22-Feb-2022
24-Feb-2022
24-Feb-2022
17-Feb-2022
17-Feb-2022
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
## 2022-02-28 NOTE — BH INPATIENT PSYCHIATRY PROGRESS NOTE - NSBHMETABOLIC_PSY_ALL_CORE_FT
BMI: BMI (kg/m2): 27.6 (02-16-22 @ 22:13)  HbA1c: A1C with Estimated Average Glucose Result: 9.3 % (02-18-22 @ 07:39)    Glucose: POCT Blood Glucose.: 259 mg/dL (02-28-22 @ 16:36)    BP: 120/78 (02-28-22 @ 07:55) (120/78 - 157/81)  Lipid Panel: Date/Time: 02-18-22 @ 07:38  Cholesterol, Serum: 104  Direct LDL: --  HDL Cholesterol, Serum: 40  Total Cholesterol/HDL Ration Measurement: --  Triglycerides, Serum: 157   BMI: BMI (kg/m2): 27.6 (02-16-22 @ 22:13)  HbA1c: A1C with Estimated Average Glucose Result: 9.3 % (02-18-22 @ 07:39)    Glucose: POCT Blood Glucose.: 163 mg/dL (03-01-22 @ 07:34)    BP: 153/77 (03-01-22 @ 09:08) (120/78 - 153/77)  Lipid Panel: Date/Time: 02-18-22 @ 07:38  Cholesterol, Serum: 104  Direct LDL: --  HDL Cholesterol, Serum: 40  Total Cholesterol/HDL Ration Measurement: --  Triglycerides, Serum: 157
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
## 2022-02-28 NOTE — BH INPATIENT PSYCHIATRY PROGRESS NOTE - NSBHCHARTREVIEWVS_PSY_A_CORE FT
Vital Signs Last 24 Hrs  T(C): 36.4 (02-28-22 @ 07:55), Max: 36.9 (02-27-22 @ 20:14)  T(F): 97.6 (02-28-22 @ 07:55), Max: 98.4 (02-27-22 @ 20:14)  HR: 71 (02-28-22 @ 07:55) (71 - 74)  BP: 120/78 (02-28-22 @ 07:55) (120/78 - 135/88)  BP(mean): --  RR: 16 (02-28-22 @ 07:55) (16 - 18)  SpO2: 94% (02-28-22 @ 07:55) (94% - 95%)     Vital Signs Last 24 Hrs  T(C): --  T(F): --  HR: 84 (03-01-22 @ 09:08) (80 - 84)  BP: 153/77 (03-01-22 @ 09:08) (138/80 - 153/77)  BP(mean): --  RR: 17 (02-28-22 @ 20:02) (17 - 17)  SpO2: 97% (02-28-22 @ 20:02) (97% - 97%)

## 2022-02-28 NOTE — BH INPATIENT PSYCHIATRY PROGRESS NOTE - NSTXSUBMISGOAL_PSY_ALL_CORE
Be able to acknowledge that substance abuse is a problem
Other...
Other...
Be able to acknowledge that substance abuse is a problem
Other...
Other...

## 2022-02-28 NOTE — BH INPATIENT PSYCHIATRY DISCHARGE NOTE - NSBHDCMEDICALFT_PSY_A_CORE
Type 2 Diabetes - Increase in insulin dosing per endocrinology  Heart Failure and HTN - Continued home regimen  Hyperlipidemia - Continued home regimen
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
## 2022-02-28 NOTE — BH INPATIENT PSYCHIATRY DISCHARGE NOTE - REASON FOR ADMISSION
Patient is a 56 year old male with history of bipolar 1 disorder vs schizoaffective disorder, bipolar type with medical diagnoses of pituitary adenoma, HTN, type 2 diabetes, and GERD. He was brought in by self for paranoia, suicidality, and homicidality.

## 2022-02-28 NOTE — BH INPATIENT PSYCHIATRY DISCHARGE NOTE - NSDCMRMEDTOKEN_GEN_ALL_CORE_FT
atorvastatin 80 mg oral tablet: 1 tab(s) orally once a day (at bedtime)  carvedilol 6.25 mg oral tablet: 1 tab(s) orally every 12 hours  insulin glargine 100 units/mL subcutaneous solution: 40 unit(s) subcutaneous once a day (at bedtime)   insulin lispro 100 units/mL injectable solution: 20 unit(s) injectable 3 times a day (before meals)   Lancets: 1 lancet(s) subcutaneous 4 times a day   lisinopril 5 mg oral tablet: 1 tab(s) orally once a day  lithium 600 mg oral capsule: 1 cap(s) orally 2 times a day  losartan 25 mg oral tablet: 1 tab(s) orally once a day  methadone 10 mg oral tablet: 15 tab(s) orally once a day  Neurontin 400 mg oral capsule: 2 cap(s) orally 3 times a day  nicotine 14 mg/24 hr transdermal film, extended release: 14 milligram(s) transdermal once a day   OLANZapine 15 mg oral tablet: 15 milligram(s) orally once a day (at bedtime)   spironolactone 25 mg oral tablet: 1 tab(s) orally once a day  Valium 10 mg oral tablet: 1 tab(s) orally 2 times a day MDD:20 mg   atorvastatin 80 mg oral tablet: 1 tab(s) orally once a day (at bedtime)  carvedilol 6.25 mg oral tablet: 1 tab(s) orally every 12 hours  diazePAM 10 mg oral tablet: 1 tab(s) orally 2 times a day MDD:20 mg  HumaLOG KwikPen 100 units/mL injectable solution: 20 unit(s) subcutaneous 3 times a day (with meals)   Insulin Pen Needles, 4mm: 1 application subcutaneously 4 times a day. ** Use with insulin pen **   Lancets: 1 lancet(s) subcutaneous 4 times a day   Lantus Solostar Pen 100 units/mL subcutaneous solution: 40 unit(s) subcutaneous once a day   lisinopril 5 mg oral tablet: 1 tab(s) orally once a day  lithium 600 mg oral capsule: 1 cap(s) orally 2 times a day  losartan 25 mg oral tablet: 1 tab(s) orally once a day  methadone 10 mg oral tablet: 15 tab(s) orally once a day  Neurontin 400 mg oral capsule: 2 cap(s) orally 3 times a day  nicotine 14 mg/24 hr transdermal film, extended release: 14 milligram(s) transdermal once a day   OLANZapine 15 mg oral tablet: 15 milligram(s) orally once a day (at bedtime)   spironolactone 25 mg oral tablet: 1 tab(s) orally once a day

## 2022-02-28 NOTE — BH INPATIENT PSYCHIATRY PROGRESS NOTE - NSDCCRITERIA_PSY_ALL_CORE
decrease in SI/HI/paranoia

## 2022-02-28 NOTE — BH INPATIENT PSYCHIATRY PROGRESS NOTE - NSTXMANICGOAL_PSY_ALL_CORE
Other...
Exhibit a substantial reduction in elated/angry acting out, and pressured speech that prevents mutual conversation
Demonstrate a substantial reduction in both hyperactive pacing on the unit and social intrusiveness
Other...
Other...
Be able to identify the early signs of jose (e.g. sleep and mood changes) and to employ coping strategies to minimize acting out
Exhibit a substantial reduction in elated/angry acting out, and pressured speech that prevents mutual conversation
Other...
Other...

## 2022-02-28 NOTE — BH INPATIENT PSYCHIATRY PROGRESS NOTE - NSBHFUPINTERVALHXFT_PSY_A_CORE
No events overnight, adherent to medications. Upon evaluation today, patient reported feeling anxious about getting discharged tomorrow as he does not want to go back to his shelter and will likely attempt to find alternative shelter/housing. He also asked writer if it was possible for him to get referral to supportive housing as well. Patient remains paranoid about his ex girlfriend and some of the men she has been with since they broke up, continues to mention feeling threatened. He will report wanting to get a machine gun of some sort to defend himself but when asked if he would get it, he denied. Denies SI/AH/VH. Denying any side effects from medications.

## 2022-02-28 NOTE — BH DISCHARGE NOTE NURSING/SOCIAL WORK/PSYCH REHAB - NSTOBACCONORTHWELLF/U_GEN_A_CORE_CS
[facility] ([address] [place] [address], [place] [id number], [phone])

## 2022-02-28 NOTE — BH INPATIENT PSYCHIATRY PROGRESS NOTE - CURRENT MEDICATION
MEDICATIONS  (STANDING):  atorvastatin 80 milliGRAM(s) Oral at bedtime  carvedilol 6.25 milliGRAM(s) Oral every 12 hours  dextrose 40% Gel 15 Gram(s) Oral once  dextrose 5%. 1000 milliLiter(s) (50 mL/Hr) IV Continuous <Continuous>  dextrose 5%. 1000 milliLiter(s) (100 mL/Hr) IV Continuous <Continuous>  dextrose 50% Injectable 25 Gram(s) IV Push once  dextrose 50% Injectable 12.5 Gram(s) IV Push once  dextrose 50% Injectable 25 Gram(s) IV Push once  diazepam    Tablet 10 milliGRAM(s) Oral two times a day  gabapentin 800 milliGRAM(s) Oral three times a day  glucagon  Injectable 1 milliGRAM(s) IntraMuscular once  influenza   Vaccine 0.5 milliLiter(s) IntraMuscular once  insulin glargine Injectable (LANTUS) 40 Unit(s) SubCutaneous at bedtime  insulin lispro (ADMELOG) corrective regimen sliding scale   SubCutaneous at bedtime  insulin lispro (ADMELOG) corrective regimen sliding scale   SubCutaneous three times a day before meals  insulin lispro Injectable (ADMELOG) 20 Unit(s) SubCutaneous three times a day before meals  lithium 600 milliGRAM(s) Oral two times a day  losartan 25 milliGRAM(s) Oral daily  methadone    Tablet 150 milliGRAM(s) Oral daily  nicotine -  14 mG/24Hr(s) Patch 1 Patch Transdermal daily  OLANZapine 15 milliGRAM(s) Oral at bedtime  OLANZapine Injectable 5 milliGRAM(s) IntraMuscular once  spironolactone 25 milliGRAM(s) Oral daily    MEDICATIONS  (PRN):  acetaminophen     Tablet .. 650 milliGRAM(s) Oral every 6 hours PRN Mild Pain (1 - 3), Moderate Pain (4 - 6)  aluminum hydroxide/magnesium hydroxide/simethicone Suspension 30 milliLiter(s) Oral every 4 hours PRN Dyspepsia  diphenhydrAMINE 50 milliGRAM(s) Oral at bedtime PRN Insomnia  hydrOXYzine hydrochloride 25 milliGRAM(s) Oral every 6 hours PRN Anxiety  loperamide 2 milliGRAM(s) Oral every 4 hours PRN diarrhea  LORazepam     Tablet 2 milliGRAM(s) Oral every 6 hours PRN agitation  magnesium hydroxide Suspension 30 milliLiter(s) Oral daily PRN Constipation  OLANZapine Disintegrating Tablet 10 milliGRAM(s) Oral every 8 hours PRN agitation

## 2022-02-28 NOTE — BH DISCHARGE NOTE NURSING/SOCIAL WORK/PSYCH REHAB - NSTOBACCOSMKCESSPRO_PSY_ALL_CORE
[facility]  [address] [place] [id number]  [phone]

## 2022-02-28 NOTE — BH DISCHARGE NOTE NURSING/SOCIAL WORK/PSYCH REHAB - PATIENT PORTAL LINK FT
You can access the FollowMyHealth Patient Portal offered by [facility] by registering at the following website: [email or web address]. By joining [employer]’s FollowMyHealth portal, you will also be able to view your health information using other applications (apps) compatible with our system.
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
## 2022-02-28 NOTE — BH DISCHARGE NOTE NURSING/SOCIAL WORK/PSYCH REHAB - NSCDUDCCRISIS_PSY_A_CORE
[facility] Well  1 (626) [facility]-WELL ([phone])  Text "WELL" to 77143  Website: www.doxo/.Safe Horizons 1 (558) 051-SDSS (8315) Website: www.safehorizon.org/.National Suicide Prevention Lifeline 6 (769) 354-7176/.  Lifenet  1 (599) LIFENET (095-5912)/.  [facility]  [address] [place] [address] [phone]   Hours:  Monday through Friday from 9 AM to 3 PM/.  [facility] of  Affairs - Veterans Crisis Line  5 [phone] 018-0457, Option 1 [facility] Well  1 (290) [facility]-WELL ([phone])  Text "WELL" to 05358  Website: www.Values of n/.Safe Horizons 1 (438) 551-UAKV (4365) Website: www.safehorizon.org/.National Suicide Prevention Lifeline 1 (265) 026-6519/.  Lif[employer]et  1 (308) LIFENET (251-8442)/.  [facility]’s Behavioral Health Crisis [facility]  [address] [place] [address] [phone]   Hours:  Monday through Friday from 9 AM to 3 PM [facility] Well  1 (862) [facility]-WELL ([phone])  Text "WELL" to 18604  Website: www.Linktone/.Safe Horizons 1 (332) 081-EKBI (7631) Website: www.safehorizon.org/.National Suicide Prevention Lifeline 7 (253) 184-4861/.  [employer]et  1 (862) LIFENET (353-4780)/.  [facility]’s Behavioral Health Crisis Center  [address] [place] [address] [phone]   Hours:  Monday through Friday from 9 AM to 3 PM/.  [facility] Dept of  Affairs - Veterans Crisis Line  5 [phone], Option 1

## 2022-02-28 NOTE — BH INPATIENT PSYCHIATRY PROGRESS NOTE - NSBHINPTBILLING_PSY_ALL_CORE
Non-billable
16623 - Inpatient Moderate Complexity
47393 - Inpatient High Complexity
35145 - Inpatient Low Complexity
41197 - Inpatient Moderate Complexity
71669 - Inpatient Moderate Complexity
65646 - Inpatient Moderate Complexity
76862 - Inpatient Moderate Complexity
92381 - Inpatient Moderate Complexity

## 2022-02-28 NOTE — PROGRESS NOTE ADULT - ATTENDING COMMENTS
Agree with above  can increaes insulin doses as described above
Agree with above  insulin administration reviewed  pt continues to eat large portions and is not engaged in his diabetic care.   Increasing insulin doses may reduce hyperglycemia but will not improve underlying disease and will contribute to significant weight gain and likelihood of developing metabolic syndrome. As stated previously, a better approach to management would be to restrict diet.   will follow
Agree with above  pt continues to eat ad hilary and unchecked  40 units lantus and 20 units lispro tid with meals.
Agree with above  insulin administration reviewed  pt continues to eat large portions and is not engaged in his diabetic care.   Increasing insulin doses may reduce hyperglycemia but will not improve underlying disease and will contribute to significant weight gain and likelihood of developing metabolic syndrome. As stated previously, a better approach to management would be to restrict diet.   will follow

## 2022-02-28 NOTE — BH INPATIENT PSYCHIATRY PROGRESS NOTE - NSBHCONSBHPROVDETAILS_PSY_A_CORE  FT
[facility] 18S
[facility] 18S
[facility] 18S
[facility] 18S
[facility] 18S
[facility] 18S
[facility] 18S
[facility] 18S
[facility] 18S

## 2022-02-28 NOTE — BH INPATIENT PSYCHIATRY PROGRESS NOTE - NSTXSUBMISINTERMD_PSY_ALL_CORE
continue methadone, monitor for withdrawal; 

## 2022-02-28 NOTE — BH INPATIENT PSYCHIATRY PROGRESS NOTE - NSBHFUPMEDSE_PSY_A_CORE
None known

## 2022-02-28 NOTE — BH INPATIENT PSYCHIATRY PROGRESS NOTE - NSTXMANICINTERMD_PSY_ALL_CORE
medication management
medication management
medication management, zyprexa, lithium
medication management
medication management, zyprexa, lithium
medication management
medication management, zyprexa, lithium

## 2022-02-28 NOTE — BH INPATIENT PSYCHIATRY PROGRESS NOTE - NSTXMANICDATETRGT_PSY_ALL_CORE
03-Mar-2022
03-Mar-2022
24-Feb-2022
24-Feb-2022
03-Mar-2022
24-Feb-2022
24-Feb-2022
03-Mar-2022
03-Mar-2022

## 2022-02-28 NOTE — BH DISCHARGE NOTE NURSING/SOCIAL WORK/PSYCH REHAB - NSDCADDINFO2FT_PSY_ALL_CORE
No appointment needed. Since patient is not established at this [facility], patient can attend an intake appointment via walk-in any time from Monday - Friday 7AM-11AM. Please bring a copy of your insurance, proof of address, picture ID, and discharge paperwork.

## 2022-03-01 VITALS — DIASTOLIC BLOOD PRESSURE: 77 MMHG | HEART RATE: 84 BPM | SYSTOLIC BLOOD PRESSURE: 153 MMHG

## 2022-03-01 LAB — GLUCOSE BLDC GLUCOMTR-MCNC: 163 MG/DL — HIGH (ref 70–99)

## 2022-03-01 PROCEDURE — 99239 HOSP IP/OBS DSCHRG MGMT >30: CPT

## 2022-03-01 RX ORDER — DIAZEPAM 5 MG
1 TABLET ORAL
Qty: 30 | Refills: 0
Start: 2022-03-01 | End: 2022-03-15

## 2022-03-01 RX ADMIN — SPIRONOLACTONE 25 MILLIGRAM(S): 25 TABLET, FILM COATED ORAL at 10:09

## 2022-03-01 RX ADMIN — Medication 10 MILLIGRAM(S): at 10:09

## 2022-03-01 RX ADMIN — METHADONE HYDROCHLORIDE 150 MILLIGRAM(S): 40 TABLET ORAL at 10:11

## 2022-03-01 RX ADMIN — Medication 2 MILLIGRAM(S): at 09:13

## 2022-03-01 RX ADMIN — LOSARTAN POTASSIUM 25 MILLIGRAM(S): 100 TABLET, FILM COATED ORAL at 10:10

## 2022-03-01 RX ADMIN — LITHIUM CARBONATE 600 MILLIGRAM(S): 300 TABLET, EXTENDED RELEASE ORAL at 10:10

## 2022-03-01 RX ADMIN — Medication 1 PATCH: at 10:11

## 2022-03-01 RX ADMIN — CARVEDILOL PHOSPHATE 6.25 MILLIGRAM(S): 80 CAPSULE, EXTENDED RELEASE ORAL at 10:10

## 2022-03-01 RX ADMIN — GABAPENTIN 800 MILLIGRAM(S): 400 CAPSULE ORAL at 10:10
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
## 2022-03-03 DIAGNOSIS — I11.0 HYPERTENSIVE HEART DISEASE WITH HEART FAILURE: ICD-10-CM

## 2022-03-03 DIAGNOSIS — E44.0 MODERATE PROTEIN-CALORIE MALNUTRITION: ICD-10-CM

## 2022-03-03 DIAGNOSIS — D35.2 BENIGN NEOPLASM OF PITUITARY GLAND: ICD-10-CM

## 2022-03-03 DIAGNOSIS — R45.851 SUICIDAL IDEATIONS: ICD-10-CM

## 2022-03-03 DIAGNOSIS — S62.336A DISPLACED FRACTURE OF NECK OF FIFTH METACARPAL BONE, RIGHT HAND, INITIAL ENCOUNTER FOR CLOSED FRACTURE: ICD-10-CM

## 2022-03-03 DIAGNOSIS — F13.20 SEDATIVE, HYPNOTIC OR ANXIOLYTIC DEPENDENCE, UNCOMPLICATED: ICD-10-CM

## 2022-03-03 DIAGNOSIS — F60.2 ANTISOCIAL PERSONALITY DISORDER: ICD-10-CM

## 2022-03-03 DIAGNOSIS — R45.850 HOMICIDAL IDEATIONS: ICD-10-CM

## 2022-03-03 DIAGNOSIS — F11.21 OPIOID DEPENDENCE, IN REMISSION: ICD-10-CM

## 2022-03-03 DIAGNOSIS — E78.5 HYPERLIPIDEMIA, UNSPECIFIED: ICD-10-CM

## 2022-03-03 DIAGNOSIS — F31.9 BIPOLAR DISORDER, UNSPECIFIED: ICD-10-CM

## 2022-03-03 DIAGNOSIS — Z79.4 LONG TERM (CURRENT) USE OF INSULIN: ICD-10-CM

## 2022-03-03 DIAGNOSIS — Z91.11 PATIENT'S NONCOMPLIANCE WITH DIETARY REGIMEN: ICD-10-CM

## 2022-03-03 DIAGNOSIS — F15.20 OTHER STIMULANT DEPENDENCE, UNCOMPLICATED: ICD-10-CM

## 2022-03-03 DIAGNOSIS — I50.9 HEART FAILURE, UNSPECIFIED: ICD-10-CM

## 2022-03-03 DIAGNOSIS — E11.65 TYPE 2 DIABETES MELLITUS WITH HYPERGLYCEMIA: ICD-10-CM
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
## 2022-03-03 DIAGNOSIS — F17.290 NICOTINE DEPENDENCE, OTHER TOBACCO PRODUCT, UNCOMPLICATED: ICD-10-CM
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
## 2022-03-03 DIAGNOSIS — F22 DELUSIONAL DISORDERS: ICD-10-CM

## 2022-03-03 DIAGNOSIS — K21.9 GASTRO-ESOPHAGEAL REFLUX DISEASE WITHOUT ESOPHAGITIS: ICD-10-CM

## 2022-03-03 DIAGNOSIS — Z91.51 PERSONAL HISTORY OF SUICIDAL BEHAVIOR: ICD-10-CM

## 2022-03-05 ENCOUNTER — EMERGENCY (EMERGENCY)
Facility: HOSPITAL | Age: 57
LOS: 1 days | Discharge: HOME | End: 2022-03-05
Attending: EMERGENCY MEDICINE | Admitting: EMERGENCY MEDICINE
Payer: COMMERCIAL

## 2022-03-05 VITALS
OXYGEN SATURATION: 100 % | SYSTOLIC BLOOD PRESSURE: 109 MMHG | HEART RATE: 81 BPM | TEMPERATURE: 98 F | RESPIRATION RATE: 17 BRPM | DIASTOLIC BLOOD PRESSURE: 77 MMHG | WEIGHT: 195.11 LBS | HEIGHT: 67 IN

## 2022-03-05 PROBLEM — K21.9 GASTRO-ESOPHAGEAL REFLUX DISEASE WITHOUT ESOPHAGITIS: Chronic | Status: ACTIVE | Noted: 2022-02-16

## 2022-03-05 PROBLEM — E11.9 TYPE 2 DIABETES MELLITUS WITHOUT COMPLICATIONS: Chronic | Status: ACTIVE | Noted: 2022-02-16

## 2022-03-05 PROBLEM — I10 ESSENTIAL (PRIMARY) HYPERTENSION: Chronic | Status: ACTIVE | Noted: 2022-02-16

## 2022-03-05 PROCEDURE — 99283 EMERGENCY DEPT VISIT LOW MDM: CPT

## 2022-03-05 RX ORDER — METHADONE HYDROCHLORIDE 40 MG/1
80 TABLET ORAL ONCE
Refills: 0 | Status: DISCONTINUED | OUTPATIENT
Start: 2022-03-05 | End: 2022-03-05

## 2022-03-05 RX ORDER — CARVEDILOL PHOSPHATE 80 MG/1
6.25 CAPSULE, EXTENDED RELEASE ORAL EVERY 12 HOURS
Refills: 0 | Status: DISCONTINUED | OUTPATIENT
Start: 2022-03-05 | End: 2022-03-08

## 2022-03-05 RX ADMIN — CARVEDILOL PHOSPHATE 6.25 MILLIGRAM(S): 80 CAPSULE, EXTENDED RELEASE ORAL at 10:17

## 2022-03-05 RX ADMIN — METHADONE HYDROCHLORIDE 80 MILLIGRAM(S): 40 TABLET ORAL at 10:17

## 2022-03-05 NOTE — ED PROVIDER NOTE - PATIENT PORTAL LINK FT
You can access the FollowMyHealth Patient Portal offered by [facility] by registering at the following website: [email or web address]. By joining [facility]’s FollowMyHealth portal, you will also be able to view your health information using other applications (apps) compatible with our system.

## 2022-03-05 NOTE — ED ADULT TRIAGE NOTE - PATIENT ON (OXYGEN DELIVERY METHOD)
room air No. KRISTA screening performed.  STOP BANG Legend: 0-2 = LOW Risk; 3-4 = INTERMEDIATE Risk; 5-8 = HIGH Risk

## 2022-03-05 NOTE — ED ADULT NURSE NOTE - OBJECTIVE STATEMENT
57 y/o male Patient states he ran out of his psych meds and methadone 150mg.  Denies suicidal ideation, hallucinations.

## 2022-03-05 NOTE — ED PROVIDER NOTE - OBJECTIVE STATEMENT
56 year old male with history of bipolar 1 disorder vs schizoaffective disorder, bipolar type with medical diagnoses of pituitary adenoma, HTN, type 2 diabetes, and GERD - requesting methadone 150 mg- [place]  feeling anxious and in pain  mod pain

## 2022-03-05 NOTE — ED ADULT NURSE NOTE - NSIMPLEMENTINTERV_GEN_ALL_ED
Implemented All Universal Safety Interventions:  [place] to call system. Call bell, personal items and telephone within reach. Instruct patient to call for assistance. Room bathroom lighting operational. Non-slip footwear when patient is off stretcher. Physically safe environment: no spills, clutter or unnecessary equipment. Stretcher in lowest position, wheels locked, appropriate side rails in place.

## 2022-03-05 NOTE — ED ADULT TRIAGE NOTE - CHIEF COMPLAINT QUOTE
Patient states he ran out of his psych meds and methadone 150mg.  Denies suicidal ideation, hallucinations.

## 2022-03-05 NOTE — ED ADULT NURSE NOTE - NS ED NURSE RECORD ANOTHER HT AND WT
Number Of Tubes Drawn: 1 Venipuncture Paragraph: An alcohol pad was applied to the venipuncture site. Venipuncture was performed using a butterfly needle. Pressure and a bandaid was applied to the site. No complications were noted. Bill For Individual Tests Below?: no Detail Level: None Yes

## 2022-03-05 NOTE — ED PROVIDER NOTE - RESPIRATORY, MLM
Refill request from [facility] for Amlodipine and Atorvastatin. It looks like Atorvastatin rx was sent yesterday. Last CPX done 12/11/2019. No upcoming appts scheduled. Please advise if ok to send rx? -MP   Breath sounds clear and equal bilaterally.

## 2022-03-07 DIAGNOSIS — Z88.8 ALLERGY STATUS TO OTHER DRUGS, MEDICAMENTS AND BIOLOGICAL SUBSTANCES STATUS: ICD-10-CM

## 2022-03-07 DIAGNOSIS — Z76.0 ENCOUNTER FOR ISSUE OF REPEAT PRESCRIPTION: ICD-10-CM

## 2022-03-07 DIAGNOSIS — Z79.4 LONG TERM (CURRENT) USE OF INSULIN: ICD-10-CM

## 2022-03-07 DIAGNOSIS — I10 ESSENTIAL (PRIMARY) HYPERTENSION: ICD-10-CM

## 2022-03-07 DIAGNOSIS — E11.9 TYPE 2 DIABETES MELLITUS WITHOUT COMPLICATIONS: ICD-10-CM

## 2022-03-07 DIAGNOSIS — K21.9 GASTRO-ESOPHAGEAL REFLUX DISEASE WITHOUT ESOPHAGITIS: ICD-10-CM

## 2022-03-08 DIAGNOSIS — F41.9 ANXIETY DISORDER, UNSPECIFIED: ICD-10-CM

## 2022-05-30 PROCEDURE — 81003 URINALYSIS AUTO W/O SCOPE: CPT

## 2022-05-30 PROCEDURE — 83036 HEMOGLOBIN GLYCOSYLATED A1C: CPT

## 2022-05-30 PROCEDURE — 80307 DRUG TEST PRSMV CHEM ANLYZR: CPT

## 2022-05-30 PROCEDURE — U0005: CPT

## 2022-05-30 PROCEDURE — 80178 ASSAY OF LITHIUM: CPT

## 2022-05-30 PROCEDURE — 73130 X-RAY EXAM OF HAND: CPT

## 2022-05-30 PROCEDURE — 99285 EMERGENCY DEPT VISIT HI MDM: CPT

## 2022-05-30 PROCEDURE — 73562 X-RAY EXAM OF KNEE 3: CPT

## 2022-05-30 PROCEDURE — 84443 ASSAY THYROID STIM HORMONE: CPT

## 2022-05-30 PROCEDURE — 83001 ASSAY OF GONADOTROPIN (FSH): CPT

## 2022-05-30 PROCEDURE — U0003: CPT

## 2022-05-30 PROCEDURE — 82962 GLUCOSE BLOOD TEST: CPT

## 2022-05-30 PROCEDURE — 85025 COMPLETE CBC W/AUTO DIFF WBC: CPT

## 2022-05-30 PROCEDURE — 36415 COLL VENOUS BLD VENIPUNCTURE: CPT

## 2022-05-30 PROCEDURE — 80053 COMPREHEN METABOLIC PANEL: CPT

## 2022-05-30 PROCEDURE — 83002 ASSAY OF GONADOTROPIN (LH): CPT

## 2022-05-30 PROCEDURE — 80061 LIPID PANEL: CPT

## 2022-09-19 NOTE — ED ADULT NURSE NOTE - NSFALLRSKASSESSTYPE_ED_ALL_ED
Initial (On Arrival) Partial Purse String (Simple) Text: Given the location of the defect and the characteristics of the surrounding skin a simple purse string closure was deemed most appropriate.  Undermining was performed circumferentially around the surgical defect.  A purse string suture was then placed and tightened. Wound tension of the circular defect prevented complete closure of the wound.

## 2022-09-29 ENCOUNTER — EMERGENCY (EMERGENCY)
Facility: HOSPITAL | Age: 57
LOS: 1 days | Discharge: LEFT BEFORE DISCHARGE | End: 2022-09-29
Admitting: EMERGENCY MEDICINE

## 2022-09-29 VITALS
OXYGEN SATURATION: 95 % | DIASTOLIC BLOOD PRESSURE: 85 MMHG | WEIGHT: 189.6 LBS | RESPIRATION RATE: 16 BRPM | SYSTOLIC BLOOD PRESSURE: 175 MMHG | HEIGHT: 67 IN | TEMPERATURE: 98 F | HEART RATE: 64 BPM

## 2022-09-29 DIAGNOSIS — Z76.0 ENCOUNTER FOR ISSUE OF REPEAT PRESCRIPTION: ICD-10-CM

## 2022-09-29 DIAGNOSIS — Z53.21 PROCEDURE AND TREATMENT NOT CARRIED OUT DUE TO PATIENT LEAVING PRIOR TO BEING SEEN BY HEALTH CARE PROVIDER: ICD-10-CM

## 2022-09-29 PROCEDURE — L9991: CPT

## 2022-09-29 NOTE — ED ADULT TRIAGE NOTE - CHIEF COMPLAINT QUOTE
Pt presents to ER requesting medication Ivermectin because pts insurance wont pay for it and pt states his "health is deteriorating with out it". Pt diagnosed with scabies ~2 weeks ago.

## 2022-11-03 NOTE — BH DISCHARGE NOTE NURSING/SOCIAL WORK/PSYCH REHAB - NSDCPEPT PROEDHF_GEN_ALL_CORE
Call primary care provider for follow up after discharge/Activities as tolerated/Low salt diet/Monitor weight daily/Report signs and symptoms to primary care provider
Never smoker

## 2022-11-29 NOTE — BH INPATIENT PSYCHIATRY PROGRESS NOTE - NSBHCHARTREVIEWVS_PSY_A_CORE FT
Vital Signs Last 24 Hrs  T(C): 36.9 (02-23-22 @ 08:37), Max: 37.1 (02-23-22 @ 05:58)  T(F): 98.5 (02-23-22 @ 08:37), Max: 98.7 (02-23-22 @ 05:58)  HR: 72 (02-23-22 @ 08:37) (72 - 95)  BP: 135/88 (02-23-22 @ 08:37) (125/65 - 135/88)  BP(mean): --  RR: 18 (02-23-22 @ 08:37) (17 - 18)  SpO2: 95% (02-23-22 @ 08:37) (93% - 97%)     Vital Signs Last 24 Hrs  T(C): 36.9 (02-23-22 @ 20:13), Max: 37.1 (02-23-22 @ 05:58)  T(F): 98.4 (02-23-22 @ 20:13), Max: 98.7 (02-23-22 @ 05:58)  HR: 76 (02-23-22 @ 20:13) (72 - 95)  BP: 155/78 (02-23-22 @ 20:13) (125/65 - 155/78)  BP(mean): --  RR: 19 (02-23-22 @ 20:13) (18 - 19)  SpO2: 97% (02-23-22 @ 20:13) (93% - 97%)     Composite Graft Text: The defect edges were debeveled with a #15 scalpel blade.  Given the location of the defect, shape of the defect, the proximity to free margins and the fact the defect was full thickness a composite graft was deemed most appropriate.  The defect was outline and then transferred to the donor site.  A full thickness graft was then excised from the donor site. The graft was then placed in the primary defect, oriented appropriately and then sutured into place.  The secondary defect was then repaired using a primary closure.

## 2022-12-19 ENCOUNTER — HOSPITAL ENCOUNTER (INPATIENT)
Dept: HOSPITAL 74 - YASAS | Age: 57
LOS: 4 days | Discharge: HOME | DRG: 773 | End: 2022-12-23
Attending: SURGERY | Admitting: ALLERGY & IMMUNOLOGY
Payer: COMMERCIAL

## 2022-12-19 VITALS — BODY MASS INDEX: 25 KG/M2

## 2022-12-19 DIAGNOSIS — I50.9: ICD-10-CM

## 2022-12-19 DIAGNOSIS — F11.20: Primary | ICD-10-CM

## 2022-12-19 DIAGNOSIS — K21.9: ICD-10-CM

## 2022-12-19 DIAGNOSIS — F12.20: ICD-10-CM

## 2022-12-19 DIAGNOSIS — F90.9: ICD-10-CM

## 2022-12-19 DIAGNOSIS — I11.0: ICD-10-CM

## 2022-12-19 DIAGNOSIS — Z86.69: ICD-10-CM

## 2022-12-19 DIAGNOSIS — Z79.4: ICD-10-CM

## 2022-12-19 DIAGNOSIS — E78.5: ICD-10-CM

## 2022-12-19 DIAGNOSIS — F41.9: ICD-10-CM

## 2022-12-19 DIAGNOSIS — F17.210: ICD-10-CM

## 2022-12-19 DIAGNOSIS — Z86.19: ICD-10-CM

## 2022-12-19 DIAGNOSIS — E11.65: ICD-10-CM

## 2022-12-19 DIAGNOSIS — I25.10: ICD-10-CM

## 2022-12-19 DIAGNOSIS — Z88.8: ICD-10-CM

## 2022-12-19 DIAGNOSIS — F31.9: ICD-10-CM

## 2022-12-19 DIAGNOSIS — Z91.013: ICD-10-CM

## 2022-12-19 DIAGNOSIS — F14.20: ICD-10-CM

## 2022-12-19 LAB
ALBUMIN SERPL-MCNC: 3.1 G/DL (ref 3.4–5)
ALP SERPL-CCNC: 83 U/L (ref 45–117)
ALT SERPL-CCNC: 19 U/L (ref 13–61)
ANION GAP SERPL CALC-SCNC: 11 MMOL/L (ref 8–16)
AST SERPL-CCNC: 14 U/L (ref 15–37)
BILIRUB SERPL-MCNC: 0.5 MG/DL (ref 0.2–1)
BUN SERPL-MCNC: 10.6 MG/DL (ref 7–18)
CALCIUM SERPL-MCNC: 8.7 MG/DL (ref 8.5–10.1)
CHLORIDE SERPL-SCNC: 95 MMOL/L (ref 98–107)
CO2 SERPL-SCNC: 33 MMOL/L (ref 21–32)
CREAT SERPL-MCNC: 1.1 MG/DL (ref 0.55–1.3)
DEPRECATED RDW RBC AUTO: 13.4 % (ref 11.9–15.9)
GLUCOSE SERPL-MCNC: 284 MG/DL (ref 74–106)
HCT VFR BLD CALC: 41.4 % (ref 35.4–49)
HGB BLD-MCNC: 13.6 GM/DL (ref 11.7–16.9)
MCH RBC QN AUTO: 29.6 PG (ref 25.7–33.7)
MCHC RBC AUTO-ENTMCNC: 32.9 G/DL (ref 32–35.9)
MCV RBC: 89.9 FL (ref 80–96)
PLATELET # BLD AUTO: 240 10^3/UL (ref 134–434)
PMV BLD: 9.7 FL (ref 7.5–11.1)
PROT SERPL-MCNC: 6.2 G/DL (ref 6.4–8.2)
RBC # BLD AUTO: 4.6 M/MM3 (ref 4–5.6)
SODIUM SERPL-SCNC: 139 MMOL/L (ref 136–145)
WBC # BLD AUTO: 6.1 K/MM3 (ref 4–10)

## 2022-12-19 PROCEDURE — U0005 INFEC AGEN DETEC AMPLI PROBE: HCPCS

## 2022-12-19 PROCEDURE — HZ2ZZZZ DETOXIFICATION SERVICES FOR SUBSTANCE ABUSE TREATMENT: ICD-10-PCS | Performed by: SURGERY

## 2022-12-19 PROCEDURE — U0003 INFECTIOUS AGENT DETECTION BY NUCLEIC ACID (DNA OR RNA); SEVERE ACUTE RESPIRATORY SYNDROME CORONAVIRUS 2 (SARS-COV-2) (CORONAVIRUS DISEASE [COVID-19]), AMPLIFIED PROBE TECHNIQUE, MAKING USE OF HIGH THROUGHPUT TECHNOLOGIES AS DESCRIBED BY CMS-2020-01-R: HCPCS

## 2022-12-19 RX ADMIN — INSULIN ASPART SCH UNIT: 100 INJECTION, SOLUTION INTRAVENOUS; SUBCUTANEOUS at 21:38

## 2022-12-19 RX ADMIN — METHOCARBAMOL PRN MG: 500 TABLET ORAL at 14:29

## 2022-12-19 RX ADMIN — LITHIUM CARBONATE SCH MG: 450 TABLET, EXTENDED RELEASE ORAL at 22:20

## 2022-12-19 RX ADMIN — Medication SCH TAB: at 14:31

## 2022-12-19 RX ADMIN — CARVEDILOL SCH MG: 12.5 TABLET, FILM COATED ORAL at 22:20

## 2022-12-19 RX ADMIN — NICOTINE SCH MG: 21 PATCH TRANSDERMAL at 14:31

## 2022-12-19 RX ADMIN — ACETAMINOPHEN PRN MG: 325 TABLET ORAL at 17:35

## 2022-12-19 RX ADMIN — INSULIN DETEMIR SCH UNIT: 100 INJECTION, SOLUTION SUBCUTANEOUS at 21:28

## 2022-12-19 RX ADMIN — Medication SCH MG: at 22:18

## 2022-12-20 RX ADMIN — IBUPROFEN PRN MG: 600 TABLET, FILM COATED ORAL at 20:49

## 2022-12-20 RX ADMIN — Medication SCH: at 22:48

## 2022-12-20 RX ADMIN — INSULIN ASPART SCH UNIT: 100 INJECTION, SOLUTION INTRAVENOUS; SUBCUTANEOUS at 07:43

## 2022-12-20 RX ADMIN — Medication SCH TAB: at 10:19

## 2022-12-20 RX ADMIN — METHOCARBAMOL PRN MG: 500 TABLET ORAL at 11:20

## 2022-12-20 RX ADMIN — ACETAMINOPHEN PRN MG: 325 TABLET ORAL at 05:44

## 2022-12-20 RX ADMIN — Medication SCH MG: at 22:11

## 2022-12-20 RX ADMIN — SPIRONOLACTONE SCH MG: 25 TABLET, FILM COATED ORAL at 11:06

## 2022-12-20 RX ADMIN — CARVEDILOL SCH MG: 12.5 TABLET, FILM COATED ORAL at 11:05

## 2022-12-20 RX ADMIN — INSULIN ASPART SCH UNIT: 100 INJECTION, SOLUTION INTRAVENOUS; SUBCUTANEOUS at 22:15

## 2022-12-20 RX ADMIN — INSULIN ASPART SCH UNIT: 100 INJECTION, SOLUTION INTRAVENOUS; SUBCUTANEOUS at 18:03

## 2022-12-20 RX ADMIN — INSULIN ASPART SCH UNIT: 100 INJECTION, SOLUTION INTRAVENOUS; SUBCUTANEOUS at 11:09

## 2022-12-20 RX ADMIN — INSULIN DETEMIR SCH UNIT: 100 INJECTION, SOLUTION SUBCUTANEOUS at 22:17

## 2022-12-20 RX ADMIN — LITHIUM CARBONATE SCH MG: 450 TABLET, EXTENDED RELEASE ORAL at 11:09

## 2022-12-20 RX ADMIN — NICOTINE SCH MG: 21 PATCH TRANSDERMAL at 10:19

## 2022-12-20 RX ADMIN — NICOTINE PRN MG: 4 INHALANT RESPIRATORY (INHALATION) at 10:24

## 2022-12-20 RX ADMIN — CARVEDILOL SCH MG: 12.5 TABLET, FILM COATED ORAL at 22:12

## 2022-12-20 RX ADMIN — LITHIUM CARBONATE SCH MG: 450 TABLET, EXTENDED RELEASE ORAL at 22:13

## 2022-12-21 RX ADMIN — INSULIN ASPART SCH UNIT: 100 INJECTION, SOLUTION INTRAVENOUS; SUBCUTANEOUS at 17:15

## 2022-12-21 RX ADMIN — Medication SCH MG: at 22:16

## 2022-12-21 RX ADMIN — INSULIN ASPART SCH UNIT: 100 INJECTION, SOLUTION INTRAVENOUS; SUBCUTANEOUS at 22:21

## 2022-12-21 RX ADMIN — IBUPROFEN PRN MG: 400 TABLET, FILM COATED ORAL at 12:27

## 2022-12-21 RX ADMIN — Medication SCH TAB: at 10:17

## 2022-12-21 RX ADMIN — ACETAMINOPHEN PRN MG: 325 TABLET ORAL at 10:19

## 2022-12-21 RX ADMIN — CARVEDILOL SCH MG: 12.5 TABLET, FILM COATED ORAL at 10:17

## 2022-12-21 RX ADMIN — METHOCARBAMOL PRN MG: 500 TABLET ORAL at 11:07

## 2022-12-21 RX ADMIN — SPIRONOLACTONE SCH MG: 25 TABLET, FILM COATED ORAL at 10:17

## 2022-12-21 RX ADMIN — NICOTINE SCH MG: 21 PATCH TRANSDERMAL at 10:20

## 2022-12-21 RX ADMIN — INSULIN ASPART SCH UNIT: 100 INJECTION, SOLUTION INTRAVENOUS; SUBCUTANEOUS at 11:40

## 2022-12-21 RX ADMIN — Medication SCH MG: at 22:17

## 2022-12-21 RX ADMIN — IBUPROFEN PRN MG: 600 TABLET, FILM COATED ORAL at 06:33

## 2022-12-21 RX ADMIN — LITHIUM CARBONATE SCH MG: 450 TABLET, EXTENDED RELEASE ORAL at 10:17

## 2022-12-21 RX ADMIN — INSULIN ASPART SCH UNIT: 100 INJECTION, SOLUTION INTRAVENOUS; SUBCUTANEOUS at 07:49

## 2022-12-21 RX ADMIN — NICOTINE PRN MG: 4 INHALANT RESPIRATORY (INHALATION) at 21:14

## 2022-12-21 RX ADMIN — CARVEDILOL SCH MG: 12.5 TABLET, FILM COATED ORAL at 22:17

## 2022-12-21 RX ADMIN — INSULIN DETEMIR SCH UNIT: 100 INJECTION, SOLUTION SUBCUTANEOUS at 22:22

## 2022-12-22 LAB
ANION GAP SERPL CALC-SCNC: 9 MMOL/L (ref 8–16)
BUN SERPL-MCNC: 14.7 MG/DL (ref 7–18)
CALCIUM SERPL-MCNC: 9.9 MG/DL (ref 8.5–10.1)
CHLORIDE SERPL-SCNC: 95 MMOL/L (ref 98–107)
CO2 SERPL-SCNC: 30 MMOL/L (ref 21–32)
CREAT SERPL-MCNC: 1.2 MG/DL (ref 0.55–1.3)
GLUCOSE SERPL-MCNC: 384 MG/DL (ref 74–106)
SODIUM SERPL-SCNC: 135 MMOL/L (ref 136–145)

## 2022-12-22 RX ADMIN — METHOCARBAMOL PRN MG: 500 TABLET ORAL at 03:09

## 2022-12-22 RX ADMIN — INSULIN ASPART SCH UNIT: 100 INJECTION, SOLUTION INTRAVENOUS; SUBCUTANEOUS at 08:09

## 2022-12-22 RX ADMIN — CARVEDILOL SCH: 12.5 TABLET, FILM COATED ORAL at 23:20

## 2022-12-22 RX ADMIN — INSULIN ASPART SCH UNIT: 100 INJECTION, SOLUTION INTRAVENOUS; SUBCUTANEOUS at 17:48

## 2022-12-22 RX ADMIN — NICOTINE PRN MG: 4 INHALANT RESPIRATORY (INHALATION) at 19:47

## 2022-12-22 RX ADMIN — Medication SCH MG: at 22:11

## 2022-12-22 RX ADMIN — INSULIN DETEMIR SCH UNIT: 100 INJECTION, SOLUTION SUBCUTANEOUS at 22:18

## 2022-12-22 RX ADMIN — METHOCARBAMOL PRN MG: 500 TABLET ORAL at 23:17

## 2022-12-22 RX ADMIN — IBUPROFEN PRN MG: 600 TABLET, FILM COATED ORAL at 03:09

## 2022-12-22 RX ADMIN — INSULIN ASPART SCH UNIT: 100 INJECTION, SOLUTION INTRAVENOUS; SUBCUTANEOUS at 22:15

## 2022-12-22 RX ADMIN — NICOTINE SCH MG: 21 PATCH TRANSDERMAL at 10:22

## 2022-12-22 RX ADMIN — ACETAMINOPHEN PRN MG: 325 TABLET ORAL at 23:17

## 2022-12-22 RX ADMIN — NICOTINE PRN MG: 4 INHALANT RESPIRATORY (INHALATION) at 10:54

## 2022-12-22 RX ADMIN — SPIRONOLACTONE SCH MG: 25 TABLET, FILM COATED ORAL at 10:19

## 2022-12-22 RX ADMIN — Medication SCH TAB: at 10:19

## 2022-12-22 RX ADMIN — INSULIN ASPART SCH UNIT: 100 INJECTION, SOLUTION INTRAVENOUS; SUBCUTANEOUS at 11:57

## 2022-12-22 RX ADMIN — METHOCARBAMOL PRN MG: 500 TABLET ORAL at 10:19

## 2022-12-22 RX ADMIN — CARVEDILOL SCH MG: 12.5 TABLET, FILM COATED ORAL at 10:19

## 2022-12-22 RX ADMIN — IBUPROFEN PRN MG: 400 TABLET, FILM COATED ORAL at 17:52

## 2022-12-23 VITALS
SYSTOLIC BLOOD PRESSURE: 129 MMHG | HEART RATE: 62 BPM | TEMPERATURE: 97.1 F | RESPIRATION RATE: 18 BRPM | DIASTOLIC BLOOD PRESSURE: 68 MMHG

## 2022-12-23 RX ADMIN — METHOCARBAMOL PRN MG: 500 TABLET ORAL at 06:54

## 2022-12-23 RX ADMIN — INSULIN ASPART SCH UNIT: 100 INJECTION, SOLUTION INTRAVENOUS; SUBCUTANEOUS at 06:49

## 2022-12-23 RX ADMIN — NICOTINE PRN MG: 4 INHALANT RESPIRATORY (INHALATION) at 11:44

## 2022-12-23 RX ADMIN — Medication SCH TAB: at 10:29

## 2022-12-23 RX ADMIN — IBUPROFEN PRN MG: 600 TABLET, FILM COATED ORAL at 03:16

## 2022-12-23 RX ADMIN — NICOTINE SCH MG: 21 PATCH TRANSDERMAL at 10:29

## 2022-12-23 RX ADMIN — CARVEDILOL SCH MG: 12.5 TABLET, FILM COATED ORAL at 10:29

## 2022-12-23 RX ADMIN — NICOTINE PRN MG: 4 INHALANT RESPIRATORY (INHALATION) at 07:30

## 2022-12-23 RX ADMIN — ACETAMINOPHEN PRN MG: 325 TABLET ORAL at 06:54

## 2022-12-23 RX ADMIN — INSULIN ASPART SCH UNIT: 100 INJECTION, SOLUTION INTRAVENOUS; SUBCUTANEOUS at 11:51

## 2022-12-23 RX ADMIN — SPIRONOLACTONE SCH MG: 25 TABLET, FILM COATED ORAL at 10:29

## 2023-01-04 ENCOUNTER — EMERGENCY (EMERGENCY)
Facility: HOSPITAL | Age: 58
LOS: 1 days | Discharge: PSYCHIATRIC OR SUBSTANCE USE FACILITY | End: 2023-01-04
Attending: STUDENT IN AN ORGANIZED HEALTH CARE EDUCATION/TRAINING PROGRAM | Admitting: STUDENT IN AN ORGANIZED HEALTH CARE EDUCATION/TRAINING PROGRAM
Payer: COMMERCIAL

## 2023-01-04 VITALS
SYSTOLIC BLOOD PRESSURE: 174 MMHG | RESPIRATION RATE: 18 BRPM | DIASTOLIC BLOOD PRESSURE: 90 MMHG | OXYGEN SATURATION: 97 % | TEMPERATURE: 98 F | HEART RATE: 74 BPM | WEIGHT: 184.97 LBS

## 2023-01-04 DIAGNOSIS — F14.90 COCAINE USE, UNSPECIFIED, UNCOMPLICATED: ICD-10-CM

## 2023-01-04 DIAGNOSIS — Z81.3 FAMILY HISTORY OF OTHER PSYCHOACTIVE SUBSTANCE ABUSE AND DEPENDENCE: ICD-10-CM

## 2023-01-04 DIAGNOSIS — Z79.4 LONG TERM (CURRENT) USE OF INSULIN: ICD-10-CM

## 2023-01-04 DIAGNOSIS — R45.84 ANHEDONIA: ICD-10-CM
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
## 2023-01-04 DIAGNOSIS — U07.1 COVID-19: ICD-10-CM

## 2023-01-04 DIAGNOSIS — E11.9 TYPE 2 DIABETES MELLITUS WITHOUT COMPLICATIONS: ICD-10-CM

## 2023-01-04 DIAGNOSIS — F17.290 NICOTINE DEPENDENCE, OTHER TOBACCO PRODUCT, UNCOMPLICATED: ICD-10-CM
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
## 2023-01-04 DIAGNOSIS — Z91.14 PATIENT'S OTHER NONCOMPLIANCE WITH MEDICATION REGIMEN: ICD-10-CM

## 2023-01-04 DIAGNOSIS — Z88.8 ALLERGY STATUS TO OTHER DRUGS, MEDICAMENTS AND BIOLOGICAL SUBSTANCES: ICD-10-CM

## 2023-01-04 DIAGNOSIS — R45.850 HOMICIDAL IDEATIONS: ICD-10-CM

## 2023-01-04 DIAGNOSIS — Z91.51 PERSONAL HISTORY OF SUICIDAL BEHAVIOR: ICD-10-CM

## 2023-01-04 DIAGNOSIS — F22 DELUSIONAL DISORDERS: ICD-10-CM

## 2023-01-04 DIAGNOSIS — Z87.19 PERSONAL HISTORY OF OTHER DISEASES OF THE DIGESTIVE SYSTEM: ICD-10-CM

## 2023-01-04 DIAGNOSIS — Z86.011 PERSONAL HISTORY OF BENIGN NEOPLASM OF THE BRAIN: ICD-10-CM

## 2023-01-04 DIAGNOSIS — T43.506A UNDERDOSING OF UNSPECIFIED ANTIPSYCHOTICS AND NEUROLEPTICS, INITIAL ENCOUNTER: ICD-10-CM

## 2023-01-04 DIAGNOSIS — F31.60 BIPOLAR DISORDER, CURRENT EPISODE MIXED, UNSPECIFIED: ICD-10-CM

## 2023-01-04 DIAGNOSIS — Z81.8 FAMILY HISTORY OF OTHER MENTAL AND BEHAVIORAL DISORDERS: ICD-10-CM

## 2023-01-04 DIAGNOSIS — F29 UNSPECIFIED PSYCHOSIS NOT DUE TO A SUBSTANCE OR KNOWN PHYSIOLOGICAL CONDITION: ICD-10-CM

## 2023-01-04 DIAGNOSIS — Z81.1 FAMILY HISTORY OF ALCOHOL ABUSE AND DEPENDENCE: ICD-10-CM

## 2023-01-04 DIAGNOSIS — I11.0 HYPERTENSIVE HEART DISEASE WITH HEART FAILURE: ICD-10-CM

## 2023-01-04 DIAGNOSIS — F13.90 SEDATIVE, HYPNOTIC, OR ANXIOLYTIC USE, UNSPECIFIED, UNCOMPLICATED: ICD-10-CM

## 2023-01-04 DIAGNOSIS — F10.90 ALCOHOL USE, UNSPECIFIED, UNCOMPLICATED: ICD-10-CM

## 2023-01-04 DIAGNOSIS — F91.9 CONDUCT DISORDER, UNSPECIFIED: ICD-10-CM

## 2023-01-04 DIAGNOSIS — F19.90 OTHER PSYCHOACTIVE SUBSTANCE USE, UNSPECIFIED, UNCOMPLICATED: ICD-10-CM

## 2023-01-04 DIAGNOSIS — I50.9 HEART FAILURE, UNSPECIFIED: ICD-10-CM

## 2023-01-04 DIAGNOSIS — F11.90 OPIOID USE, UNSPECIFIED, UNCOMPLICATED: ICD-10-CM

## 2023-01-04 DIAGNOSIS — F60.89 OTHER SPECIFIC PERSONALITY DISORDERS: ICD-10-CM

## 2023-01-04 LAB
AMPHET UR-MCNC: NEGATIVE — SIGNIFICANT CHANGE UP
ANION GAP SERPL CALC-SCNC: 10 MMOL/L — SIGNIFICANT CHANGE UP (ref 5–17)
APAP SERPL-MCNC: <5 UG/ML — LOW (ref 10–30)
APPEARANCE UR: CLEAR — SIGNIFICANT CHANGE UP
BARBITURATES UR SCN-MCNC: NEGATIVE — SIGNIFICANT CHANGE UP
BASOPHILS # BLD AUTO: 0.02 K/UL — SIGNIFICANT CHANGE UP (ref 0–0.2)
BASOPHILS NFR BLD AUTO: 0.3 % — SIGNIFICANT CHANGE UP (ref 0–2)
BENZODIAZ UR-MCNC: POSITIVE
BILIRUB UR-MCNC: NEGATIVE — SIGNIFICANT CHANGE UP
BUN SERPL-MCNC: 8 MG/DL — SIGNIFICANT CHANGE UP (ref 7–23)
CALCIUM SERPL-MCNC: 9.6 MG/DL — SIGNIFICANT CHANGE UP (ref 8.4–10.5)
CHLORIDE SERPL-SCNC: 95 MMOL/L — LOW (ref 96–108)
CO2 SERPL-SCNC: 30 MMOL/L — SIGNIFICANT CHANGE UP (ref 22–31)
COCAINE METAB.OTHER UR-MCNC: POSITIVE
COLOR SPEC: YELLOW — SIGNIFICANT CHANGE UP
CREAT SERPL-MCNC: 0.77 MG/DL — SIGNIFICANT CHANGE UP (ref 0.5–1.3)
DIFF PNL FLD: NEGATIVE — SIGNIFICANT CHANGE UP
EGFR: 105 ML/MIN/1.73M2 — SIGNIFICANT CHANGE UP
EOSINOPHIL # BLD AUTO: 0.26 K/UL — SIGNIFICANT CHANGE UP (ref 0–0.5)
EOSINOPHIL NFR BLD AUTO: 3.5 % — SIGNIFICANT CHANGE UP (ref 0–6)
ETHANOL SERPL-MCNC: 122 MG/DL — HIGH (ref 0–10)
FLUAV AG NPH QL: SIGNIFICANT CHANGE UP
FLUBV AG NPH QL: SIGNIFICANT CHANGE UP
GLUCOSE SERPL-MCNC: 213 MG/DL — HIGH (ref 70–99)
GLUCOSE UR QL: NEGATIVE — SIGNIFICANT CHANGE UP
HCT VFR BLD CALC: 42 % — SIGNIFICANT CHANGE UP (ref 39–50)
HGB BLD-MCNC: 14.2 G/DL — SIGNIFICANT CHANGE UP (ref 13–17)
IMM GRANULOCYTES NFR BLD AUTO: 0.3 % — SIGNIFICANT CHANGE UP (ref 0–0.9)
KETONES UR-MCNC: NEGATIVE — SIGNIFICANT CHANGE UP
LEUKOCYTE ESTERASE UR-ACNC: NEGATIVE — SIGNIFICANT CHANGE UP
LYMPHOCYTES # BLD AUTO: 2.28 K/UL — SIGNIFICANT CHANGE UP (ref 1–3.3)
LYMPHOCYTES # BLD AUTO: 30.3 % — SIGNIFICANT CHANGE UP (ref 13–44)
MCHC RBC-ENTMCNC: 29.9 PG — SIGNIFICANT CHANGE UP (ref 27–34)
MCHC RBC-ENTMCNC: 33.8 GM/DL — SIGNIFICANT CHANGE UP (ref 32–36)
MCV RBC AUTO: 88.4 FL — SIGNIFICANT CHANGE UP (ref 80–100)
METHADONE UR-MCNC: POSITIVE
MONOCYTES # BLD AUTO: 0.49 K/UL — SIGNIFICANT CHANGE UP (ref 0–0.9)
MONOCYTES NFR BLD AUTO: 6.5 % — SIGNIFICANT CHANGE UP (ref 2–14)
NEUTROPHILS # BLD AUTO: 4.45 K/UL — SIGNIFICANT CHANGE UP (ref 1.8–7.4)
NEUTROPHILS NFR BLD AUTO: 59.1 % — SIGNIFICANT CHANGE UP (ref 43–77)
NITRITE UR-MCNC: NEGATIVE — SIGNIFICANT CHANGE UP
NRBC # BLD: 0 /100 WBCS — SIGNIFICANT CHANGE UP (ref 0–0)
OPIATES UR-MCNC: NEGATIVE — SIGNIFICANT CHANGE UP
PCP SPEC-MCNC: SIGNIFICANT CHANGE UP
PCP UR-MCNC: NEGATIVE — SIGNIFICANT CHANGE UP
PH UR: 6 — SIGNIFICANT CHANGE UP (ref 5–8)
PLATELET # BLD AUTO: 239 K/UL — SIGNIFICANT CHANGE UP (ref 150–400)
POTASSIUM SERPL-MCNC: 4.1 MMOL/L — SIGNIFICANT CHANGE UP (ref 3.5–5.3)
POTASSIUM SERPL-SCNC: 4.1 MMOL/L — SIGNIFICANT CHANGE UP (ref 3.5–5.3)
PROT UR-MCNC: NEGATIVE MG/DL — SIGNIFICANT CHANGE UP
RBC # BLD: 4.75 M/UL — SIGNIFICANT CHANGE UP (ref 4.2–5.8)
RBC # FLD: 12.7 % — SIGNIFICANT CHANGE UP (ref 10.3–14.5)
RSV RNA NPH QL NAA+NON-PROBE: SIGNIFICANT CHANGE UP
SALICYLATES SERPL-MCNC: <0.3 MG/DL — LOW (ref 2.8–20)
SARS-COV-2 RNA SPEC QL NAA+PROBE: DETECTED
SODIUM SERPL-SCNC: 135 MMOL/L — SIGNIFICANT CHANGE UP (ref 135–145)
SP GR SPEC: <=1.005 — SIGNIFICANT CHANGE UP (ref 1–1.03)
THC UR QL: POSITIVE
TSH SERPL-MCNC: 0.52 UIU/ML — SIGNIFICANT CHANGE UP (ref 0.27–4.2)
UROBILINOGEN FLD QL: 0.2 E.U./DL — SIGNIFICANT CHANGE UP
WBC # BLD: 7.52 K/UL — SIGNIFICANT CHANGE UP (ref 3.8–10.5)
WBC # FLD AUTO: 7.52 K/UL — SIGNIFICANT CHANGE UP (ref 3.8–10.5)

## 2023-01-04 PROCEDURE — 99285 EMERGENCY DEPT VISIT HI MDM: CPT

## 2023-01-04 NOTE — ED ADULT NURSE NOTE - OBJECTIVE STATEMENT
Patient complaints of homicidal thoughts. As per patient, he broke up with girl friend. And wants to kill her and her daughter.   Denies SOB, CP, abdominal pain, HA, Dizziness, cough or other medical complaints at this time.  Patient is alert and oriented, A&O4, steady gait noted.  Patient is under 1:1 close observation.
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
## 2023-01-04 NOTE — ED ADULT NURSE NOTE - NSICDXPASTMEDICALHX_GEN_ALL_CORE_FT
PAST MEDICAL HISTORY:  Diabetes mellitus     GERD (gastroesophageal reflux disease)     Heart failure     HTN (hypertension)

## 2023-01-05 ENCOUNTER — INPATIENT (INPATIENT)
Facility: HOSPITAL | Age: 58
LOS: 1 days | Discharge: ANOTHER HOSPITAL | End: 2023-01-07
Attending: PSYCHIATRY & NEUROLOGY | Admitting: PSYCHIATRY & NEUROLOGY
Payer: MEDICAID

## 2023-01-05 VITALS — WEIGHT: 185.19 LBS | HEIGHT: 70 IN | TEMPERATURE: 98 F | OXYGEN SATURATION: 100 % | RESPIRATION RATE: 18 BRPM

## 2023-01-05 VITALS
HEART RATE: 55 BPM | DIASTOLIC BLOOD PRESSURE: 75 MMHG | OXYGEN SATURATION: 99 % | SYSTOLIC BLOOD PRESSURE: 148 MMHG | RESPIRATION RATE: 18 BRPM | TEMPERATURE: 98 F

## 2023-01-05 DIAGNOSIS — F11.90 OPIOID USE, UNSPECIFIED, UNCOMPLICATED: ICD-10-CM

## 2023-01-05 DIAGNOSIS — F31.60 BIPOLAR DISORDER, CURRENT EPISODE MIXED, UNSPECIFIED: ICD-10-CM

## 2023-01-05 DIAGNOSIS — F19.90 OTHER PSYCHOACTIVE SUBSTANCE USE, UNSPECIFIED, UNCOMPLICATED: ICD-10-CM

## 2023-01-05 PROCEDURE — 99284 EMERGENCY DEPT VISIT MOD MDM: CPT

## 2023-01-05 PROCEDURE — 80048 BASIC METABOLIC PNL TOTAL CA: CPT

## 2023-01-05 PROCEDURE — 90792 PSYCH DIAG EVAL W/MED SRVCS: CPT | Mod: 95

## 2023-01-05 PROCEDURE — 80307 DRUG TEST PRSMV CHEM ANLYZR: CPT

## 2023-01-05 PROCEDURE — 85025 COMPLETE CBC W/AUTO DIFF WBC: CPT

## 2023-01-05 PROCEDURE — 36415 COLL VENOUS BLD VENIPUNCTURE: CPT

## 2023-01-05 PROCEDURE — 84443 ASSAY THYROID STIM HORMONE: CPT

## 2023-01-05 PROCEDURE — 87637 SARSCOV2&INF A&B&RSV AMP PRB: CPT

## 2023-01-05 PROCEDURE — 81003 URINALYSIS AUTO W/O SCOPE: CPT

## 2023-01-05 PROCEDURE — 99223 1ST HOSP IP/OBS HIGH 75: CPT

## 2023-01-05 PROCEDURE — 93005 ELECTROCARDIOGRAM TRACING: CPT

## 2023-01-05 RX ORDER — OLANZAPINE 15 MG/1
10 TABLET, FILM COATED ORAL ONCE
Refills: 0 | Status: DISCONTINUED | OUTPATIENT
Start: 2023-01-05 | End: 2023-01-07

## 2023-01-05 RX ORDER — DIAZEPAM 5 MG
20 TABLET ORAL EVERY 6 HOURS
Refills: 0 | Status: DISCONTINUED | OUTPATIENT
Start: 2023-01-05 | End: 2023-01-06

## 2023-01-05 RX ORDER — METHADONE HYDROCHLORIDE 40 MG/1
110 TABLET ORAL ONCE
Refills: 0 | Status: DISCONTINUED | OUTPATIENT
Start: 2023-01-05 | End: 2023-01-05

## 2023-01-05 RX ORDER — MULTIVIT-MIN/FERROUS GLUCONATE 9 MG/15 ML
1 LIQUID (ML) ORAL AT BEDTIME
Refills: 0 | Status: DISCONTINUED | OUTPATIENT
Start: 2023-01-05 | End: 2023-01-07

## 2023-01-05 RX ORDER — CARVEDILOL PHOSPHATE 80 MG/1
6.25 CAPSULE, EXTENDED RELEASE ORAL ONCE
Refills: 0 | Status: COMPLETED | OUTPATIENT
Start: 2023-01-05 | End: 2023-01-05

## 2023-01-05 RX ORDER — TRAZODONE HCL 50 MG
100 TABLET ORAL AT BEDTIME
Refills: 0 | Status: DISCONTINUED | OUTPATIENT
Start: 2023-01-05 | End: 2023-01-07

## 2023-01-05 RX ORDER — METOPROLOL TARTRATE 50 MG
25 TABLET ORAL AT BEDTIME
Refills: 0 | Status: DISCONTINUED | OUTPATIENT
Start: 2023-01-05 | End: 2023-01-07

## 2023-01-05 RX ORDER — LITHIUM CARBONATE 300 MG/1
900 TABLET, EXTENDED RELEASE ORAL AT BEDTIME
Refills: 0 | Status: DISCONTINUED | OUTPATIENT
Start: 2023-01-05 | End: 2023-01-07

## 2023-01-05 RX ORDER — OLANZAPINE 15 MG/1
5 TABLET, FILM COATED ORAL EVERY 4 HOURS
Refills: 0 | Status: DISCONTINUED | OUTPATIENT
Start: 2023-01-05 | End: 2023-01-07

## 2023-01-05 RX ORDER — IBUPROFEN 200 MG
600 TABLET ORAL ONCE
Refills: 0 | Status: COMPLETED | OUTPATIENT
Start: 2023-01-05 | End: 2023-01-05

## 2023-01-05 RX ORDER — ARIPIPRAZOLE 15 MG/1
10 TABLET ORAL AT BEDTIME
Refills: 0 | Status: DISCONTINUED | OUTPATIENT
Start: 2023-01-05 | End: 2023-01-05

## 2023-01-05 RX ORDER — SPIRONOLACTONE 25 MG/1
25 TABLET, FILM COATED ORAL ONCE
Refills: 0 | Status: COMPLETED | OUTPATIENT
Start: 2023-01-05 | End: 2023-01-05

## 2023-01-05 RX ORDER — OLANZAPINE 15 MG/1
10 TABLET, FILM COATED ORAL AT BEDTIME
Refills: 0 | Status: DISCONTINUED | OUTPATIENT
Start: 2023-01-05 | End: 2023-01-07

## 2023-01-05 RX ORDER — DEXTROSE 50 % IN WATER 50 %
15 SYRINGE (ML) INTRAVENOUS ONCE
Refills: 0 | Status: DISCONTINUED | OUTPATIENT
Start: 2023-01-05 | End: 2023-01-07

## 2023-01-05 RX ORDER — INFLUENZA VIRUS VACCINE 15; 15; 15; 15 UG/.5ML; UG/.5ML; UG/.5ML; UG/.5ML
0.5 SUSPENSION INTRAMUSCULAR ONCE
Refills: 0 | Status: DISCONTINUED | OUTPATIENT
Start: 2023-01-05 | End: 2023-01-07

## 2023-01-05 RX ORDER — OXYMETAZOLINE HYDROCHLORIDE 0.5 MG/ML
1 SPRAY NASAL ONCE
Refills: 0 | Status: COMPLETED | OUTPATIENT
Start: 2023-01-05 | End: 2023-01-05

## 2023-01-05 RX ORDER — INSULIN LISPRO 100/ML
VIAL (ML) SUBCUTANEOUS
Refills: 0 | Status: DISCONTINUED | OUTPATIENT
Start: 2023-01-05 | End: 2023-01-07

## 2023-01-05 RX ORDER — INSULIN LISPRO 100/ML
VIAL (ML) SUBCUTANEOUS AT BEDTIME
Refills: 0 | Status: DISCONTINUED | OUTPATIENT
Start: 2023-01-05 | End: 2023-01-07

## 2023-01-05 RX ADMIN — Medication 1 TABLET(S): at 21:29

## 2023-01-05 RX ADMIN — Medication 0.2 MILLIGRAM(S): at 05:32

## 2023-01-05 RX ADMIN — OXYMETAZOLINE HYDROCHLORIDE 1 SPRAY(S): 0.5 SPRAY NASAL at 10:25

## 2023-01-05 RX ADMIN — Medication 4: at 21:30

## 2023-01-05 RX ADMIN — SPIRONOLACTONE 25 MILLIGRAM(S): 25 TABLET, FILM COATED ORAL at 05:32

## 2023-01-05 RX ADMIN — Medication 25 MILLIGRAM(S): at 15:50

## 2023-01-05 RX ADMIN — CARVEDILOL PHOSPHATE 6.25 MILLIGRAM(S): 80 CAPSULE, EXTENDED RELEASE ORAL at 05:32

## 2023-01-05 RX ADMIN — Medication 25 MILLIGRAM(S): at 06:50

## 2023-01-05 RX ADMIN — Medication 2 MILLIGRAM(S): at 20:24

## 2023-01-05 RX ADMIN — OLANZAPINE 10 MILLIGRAM(S): 15 TABLET, FILM COATED ORAL at 20:24

## 2023-01-05 RX ADMIN — Medication 50 MILLIGRAM(S): at 10:26

## 2023-01-05 RX ADMIN — LITHIUM CARBONATE 900 MILLIGRAM(S): 300 TABLET, EXTENDED RELEASE ORAL at 20:24

## 2023-01-05 RX ADMIN — Medication 20 MILLIGRAM(S): at 18:31

## 2023-01-05 RX ADMIN — Medication 100 MILLIGRAM(S): at 20:24

## 2023-01-05 RX ADMIN — METHADONE HYDROCHLORIDE 110 MILLIGRAM(S): 40 TABLET ORAL at 10:24

## 2023-01-05 RX ADMIN — Medication 600 MILLIGRAM(S): at 10:25

## 2023-01-05 NOTE — ED BEHAVIORAL HEALTH PROGRESS NOTE - OTHER
moving extremities - motor exam deferred due to covid precautions no impulsivity observed during interview stable posture in bed in ED room dysphoric content with SI and HI. No voiced delusions or paranoia during re-evaluation
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
## 2023-01-05 NOTE — ED ADULT NURSE REASSESSMENT NOTE - NS ED NURSE REASSESS COMMENT FT1
Patient is sleeping on stretcher. Denies any discomfort at this time.
Report received from nightshift RN. Pt sleeping in stretcher. Respirations even and unlabored. Constant observation remains in place. Safety precautions maintained.
pt. requesting home BP meds, has spoken with provider, DEEP Diaz speaking with Psych at present
pt. was medicated as noted, tonie. well, telepsych states ready for consult, cart to bedside with 1:1, pt. aware, with understanding verbalized
telepsych called ED, updated on ed course, per RN, awaiting further recs. per telepsych
Patient is sleeping on stretcher comfortably.  Denies any discomfort at this time.  Continuous 1:1 observation is ongoing.

## 2023-01-05 NOTE — ED BEHAVIORAL HEALTH ASSESSMENT NOTE - PRIMARY DX
Mood disorder Bipolar affective disorder, current episode mixed, current episode severity unspecified

## 2023-01-05 NOTE — ED BEHAVIORAL HEALTH PROGRESS NOTE - PSYCHIATRIC ISSUES AND PLAN (INCLUDE STANDING AND PRN MEDICATION)
restart Lithium 300mg BID for mood stabilization and suicidality  consider restarting olanzapine pending clinical course  olanzapine PRN for acute agitation

## 2023-01-05 NOTE — ED BEHAVIORAL HEALTH PROGRESS NOTE - DETAILS:
Pt reassessed this morning - reports to be feeling "terrible", with body aches he attributes to covid and need for methadone. Reports ongoing low mood, suicidal thoughts to overdose on fentanyl and homicidal thoughts to kill his ex-girlfriend who lives in [place]. Reports that he does not want to act on these thoughts (cites his family - mother with cancer and sister as deterrents - but worries about ability to control his actions in the community. Denies current AVH. Denies violent thoughts or suicidal intent/plan in ED.

## 2023-01-05 NOTE — BH INPATIENT PSYCHIATRY ASSESSMENT NOTE - PAST PSYCHOTROPIC MEDICATION
Per HPI (has also been on fluoxetine, sertraline, wellbutrin, lexapro, and gabapentin) also states history of adderall

## 2023-01-05 NOTE — BH INPATIENT PSYCHIATRY ASSESSMENT NOTE - OTHER
MMTP that pt feels is helping him in [place]- has card- dose of 110 mg to confirm does not appear frankly psychotic or complain of AVTH pt narrative fair at times, lying in bed

## 2023-01-05 NOTE — BH PATIENT PROFILE - HOME MEDICATIONS
diazePAM 10 mg oral tablet , 1 tab(s) orally 2 times a day MDD:20 mg  Insulin Pen Needles, 4mm , 1 application subcutaneously 4 times a day. ** Use with insulin pen **   HumaLOG KwikPen 100 units/mL injectable solution , 20 unit(s) subcutaneous 3 times a day (with meals)   Lantus Solostar Pen 100 units/mL subcutaneous solution , 40 unit(s) subcutaneous once a day   Lancets , 1 lancet(s) subcutaneous 4 times a day   Neurontin 400 mg oral capsule , 2 cap(s) orally 3 times a day  nicotine 14 mg/24 hr transdermal film, extended release , 14 milligram(s) transdermal once a day   spironolactone 25 mg oral tablet , 1 tab(s) orally once a day  carvedilol 6.25 mg oral tablet , 1 tab(s) orally every 12 hours  OLANZapine 15 mg oral tablet , 15 milligram(s) orally once a day (at bedtime)   lithium 600 mg oral capsule , 1 cap(s) orally 2 times a day  atorvastatin 80 mg oral tablet , 1 tab(s) orally once a day (at bedtime)  losartan 25 mg oral tablet , 1 tab(s) orally once a day  lisinopril 5 mg oral tablet , 1 tab(s) orally once a day  methadone 10 mg oral tablet , 15 tab(s) orally once a day
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
## 2023-01-05 NOTE — ED BEHAVIORAL HEALTH ASSESSMENT NOTE - MEDICAL ISSUES AND PLAN (INCLUDE STANDING AND PRN MEDICATION)
Diabetes, will have sliding scale HTN: Continue home medications (clonidine, carvedilol, and spirolactone), DM2: Patient has not been taking DM2 medications and is supposd to be on insulin, would start with ILSS and finger sticks but consider consulting endocrinology for management given patient's non compliance with treatment, GERD: pantoprazole
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
## 2023-01-05 NOTE — ED BEHAVIORAL HEALTH ASSESSMENT NOTE - DIFFERENTIAL
Bipolar 1 disorder vs antisocial personality disorder vs substance induced mood disorder Bipolar 1 disorder   antisocial personality disorder    substance induced mood disorder  substance induced psychotic disorder

## 2023-01-05 NOTE — ED BEHAVIORAL HEALTH ASSESSMENT NOTE - DETAILS
Pain in knee and R hand Per HPI Did not answer call Bleach ingestion years ago see HPI Pending bed, requires COVID+ bed Ed notified of plan Father - alcohol and heroin used, patient states his father passed away in front of him of drug overdose when he wasa kid, half sister has schizophrenia Pt does not have a handgun, but states that he can easily access one if he wants. He was DESTINEY'd on his admission in february 2022 due to shanelle comments

## 2023-01-05 NOTE — ED BEHAVIORAL HEALTH ASSESSMENT NOTE - CURRENT MEDICATION
Per HPI Methadone 110mg daily ([facility]), carvedilol, clonidine, spirolactone 25mg once a day

## 2023-01-05 NOTE — ED BEHAVIORAL HEALTH PROGRESS NOTE - MEDICAL ISSUES AND PLAN (INCLUDE STANDING AND PRN MEDICATION)
as per ED team as per ED team, pt is medically cleared for psychiatric admission  resume home medications and sliding scale insulin. No current need for any medicine/endocrine consult

## 2023-01-05 NOTE — BH INPATIENT PSYCHIATRY ASSESSMENT NOTE - CURRENT MEDICATION
MEDICATIONS  (STANDING):  ARIPiprazole 10 milliGRAM(s) Oral at bedtime  traZODone 100 milliGRAM(s) Oral at bedtime    MEDICATIONS  (PRN):  diazepam    Tablet 20 milliGRAM(s) Oral every 6 hours PRN Symptom-triggered when CIWA-Ar score 8 or Greater  LORazepam     Tablet 2 milliGRAM(s) Oral every 2 hours PRN Symptom-triggered 2 point increase in CIWA-Ar  LORazepam   Injectable 2 milliGRAM(s) IntraMuscular once PRN agitation 2/2 anxiety  OLANZapine Disintegrating Tablet 5 milliGRAM(s) Oral every 4 hours PRN agiaton or aggression  OLANZapine Injectable 10 milliGRAM(s) IntraMuscular once PRN severe agitation or aggression   MEDICATIONS  (STANDING):  influenza   Vaccine 0.5 milliLiter(s) IntraMuscular once  insulin lispro (ADMELOG) corrective regimen sliding scale   SubCutaneous three times a day before meals  insulin lispro (ADMELOG) corrective regimen sliding scale   SubCutaneous at bedtime  lithium CR (ESKALITH-CR) 900 milliGRAM(s) Oral at bedtime  multivitamin/minerals 1 Tablet(s) Oral at bedtime  OLANZapine Disintegrating Tablet 10 milliGRAM(s) Oral at bedtime  traZODone 100 milliGRAM(s) Oral at bedtime    MEDICATIONS  (PRN):  dextrose Oral Gel 15 Gram(s) Oral once PRN Blood Glucose LESS THAN 70 milliGRAM(s)/deciliter  diazepam    Tablet 20 milliGRAM(s) Oral every 6 hours PRN Symptom-triggered when CIWA-Ar score 8 or Greater  LORazepam     Tablet 2 milliGRAM(s) Oral every 2 hours PRN Symptom-triggered 2 point increase in CIWA-Ar  LORazepam   Injectable 2 milliGRAM(s) IntraMuscular once PRN agitation 2/2 anxiety  OLANZapine Disintegrating Tablet 5 milliGRAM(s) Oral every 4 hours PRN agiaton or aggression  OLANZapine Injectable 10 milliGRAM(s) IntraMuscular once PRN severe agitation or aggression

## 2023-01-05 NOTE — BH INPATIENT PSYCHIATRY ASSESSMENT NOTE - NSBHMETABOLIC_PSY_ALL_CORE_FT
BMI: BMI (kg/m2): 29 (01-04-23 @ 21:43)  HbA1c: A1C with Estimated Average Glucose Result: 9.3 % (02-18-22 @ 07:39)    Glucose: POCT Blood Glucose.: 163 mg/dL (03-01-22 @ 07:34)    BP: --  Lipid Panel: Date/Time: 02-18-22 @ 07:38  Cholesterol, Serum: 104  Direct LDL: --  HDL Cholesterol, Serum: 40  Total Cholesterol/HDL Ration Measurement: --  Triglycerides, Serum: 157   BMI: BMI (kg/m2): 26.6 (01-05-23 @ 17:05)  HbA1c: A1C with Estimated Average Glucose Result: 9.3 % (02-18-22 @ 07:39)    Glucose: POCT Blood Glucose.: 417 mg/dL (01-05-23 @ 20:41)    BP: --  Lipid Panel: Date/Time: 02-18-22 @ 07:38  Cholesterol, Serum: 104  Direct LDL: --  HDL Cholesterol, Serum: 40  Total Cholesterol/HDL Ration Measurement: --  Triglycerides, Serum: 157

## 2023-01-05 NOTE — ED BEHAVIORAL HEALTH ASSESSMENT NOTE - SUBSTANCE ISSUES AND PLAN (INCLUDE STANDING AND PRN MEDICATION)
Nicotine patch, confirm methadone dose Nicotine patch, Methadone 110mg daily (ED team to check with Methadone clinic at [facility] to confirm dosing), ETOH withdrawal: Multivitamin, Folate, B12, Symptom triggered CIWA for alcohol withdrawal

## 2023-01-05 NOTE — ED BEHAVIORAL HEALTH ASSESSMENT NOTE - VIOLENCE RISK FACTORS:
Feeling of being under threat and being unable to control threat/Antisocial behavior/cognition (past or present)/Violent ideation/threat/speech/Substance abuse/Impulsivity Feeling of being under threat and being unable to control threat/Antisocial behavior/cognition (past or present)/Violent ideation/threat/speech/Substance abuse/Affective dysregulation/Impulsivity/History of being victimized/traumatized/Lack of insight into violence risk/need for treatment/Noncompliance with treatment/Community stressors that increase the risk of destabilization/Irritability

## 2023-01-05 NOTE — BH INPATIENT PSYCHIATRY ASSESSMENT NOTE - NSBHCHARTREVIEWVS_PSY_A_CORE FT
Vital Signs Last 24 Hrs  T(C): 36.9 (01-05-23 @ 15:24), Max: 37 (01-05-23 @ 08:26)  T(F): 98.5 (01-05-23 @ 15:24), Max: 98.6 (01-05-23 @ 08:26)  HR: 55 (01-05-23 @ 15:24) (55 - 87)  BP: 148/75 (01-05-23 @ 15:24) (148/75 - 178/87)  BP(mean): --  RR: 18 (01-05-23 @ 15:24) (18 - 18)  SpO2: 99% (01-05-23 @ 15:24) (95% - 99%)     Vital Signs Last 24 Hrs  T(C): 36.6 (01-05-23 @ 17:05), Max: 37 (01-05-23 @ 08:26)  T(F): 97.8 (01-05-23 @ 17:05), Max: 98.6 (01-05-23 @ 08:26)  HR: 55 (01-05-23 @ 15:24) (55 - 87)  BP: 148/75 (01-05-23 @ 15:24) (148/75 - 178/87)  BP(mean): --  RR: 18 (01-05-23 @ 17:05) (18 - 18)  SpO2: 100% (01-05-23 @ 17:05) (95% - 100%)    Orthostatic VS  01-05-23 @ 17:05  Lying BP: --/-- HR: --  Sitting BP: 149/70 HR: 69  Standing BP: 146/71 HR: 71  Site: --  Mode: --

## 2023-01-05 NOTE — BH INPATIENT PSYCHIATRY ASSESSMENT NOTE - VIOLENCE RISK FACTORS:
Feeling of being under threat and being unable to control threat/Antisocial behavior/cognition (past or present)/Violent ideation/threat/speech/Substance abuse/Affective dysregulation/Impulsivity/History of being victimized/traumatized/Lack of insight into violence risk/need for treatment/Noncompliance with treatment/Community stressors that increase the risk of destabilization/Irritability
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
## 2023-01-05 NOTE — BH INPATIENT PSYCHIATRY ASSESSMENT NOTE - DETAILS
see HPI as above Per HPI Pt does not have a handgun, but states that he can easily access one if he wants. He was DESTINEY'd on his admission in february 2022 due to shanelle comments Father - alcohol and heroin used, patient states his father passed away in front of him of drug overdose when he was a kid, half sister has schizophrenia

## 2023-01-05 NOTE — BH INPATIENT PSYCHIATRY ASSESSMENT NOTE - NSCOMMENTSUICRISKFACT_PSY_ALL_CORE
Would benefit from GLASS for safety and compliance: low dose aristada vs invega vs relprevv with vivitrol

## 2023-01-05 NOTE — ED BEHAVIORAL HEALTH NOTE - BEHAVIORAL HEALTH NOTE
==================  PRE-HOSPITAL COURSE  ===================  SOURCE:  RN and secondhand ED documentation  DETAILS: 58 yo male with h/o bipolar d/o, ? schizoaffective d/o, currently on Methadone program,  in the Er c/o HI.  =========  ED COURSE  =========  SOURCE:  RN and secondhand ED documentation.  ARRIVAL:  Per chart and RN, patient arrived via walk in. Per RN, patient was calm upon arrival, and cooperative with triage process.  BELONGINGS:  Per RN, patient arrived with belongings. All belongings were provided to hospital security, and patient currently in a gown with a 1:1 staff member.  BEHAVIOR: RN described patient to be mostly sleeping through the night and irritable, presenting with linear thought process, AAOx3, presenting with irritable mood and congruent affect, remains in behavioral and impulse control, is not violent/aggressive. RN stated that the patient is endorsing HI, denies SI/A/VH. RN stated that there are no visible marks, bruises, or lacerations on the body. RN stated that the patient appears to be unkempt, maintains fair hygiene, and reports fair ADLs, ambulates without assistance.  TREATMENT:  Per chart and RN, patient did not receive PRN medications.   VISITORS:  Per RN, no visitors at bedside.        COVID Exposure Screen- collateral (i.e. third-party, chart review, belongings, etc; include EMS and ED staff)   ---------------------------------------------------  1. Has the patient had a COVID-19 test in the last 90 days? Unknown.   2. Has the patient tested positive for COVID-19 antibodies? Unknown.   3. Has the patient received 2 doses of the COVID-19 vaccine? Unknown  4. In the past 10 days, has the patient been around anyone with a positive COVID-19 test?* Unknown.   5. Has the patient been out of [place] within the past 10 days? Unknown

## 2023-01-05 NOTE — ED PROVIDER NOTE - ATTENDING APP SHARED VISIT CONTRIBUTION OF CARE
56 yo male with h/o bipolar, HTN, DM II, GERD, on Methadone, in the ER with paranoia and HI. Pt also admits drinking and using cocaine yesterday. Appears in no distress, cooperative. plan : labs, psych. eval.   dispo as per psychiatrist.
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
## 2023-01-05 NOTE — ED PROVIDER NOTE - OBJECTIVE STATEMENT
58 yo male with h/o bipolar d/o, ? schizoaffective d/o, currently on Methadone program,  in the Er c/o HI. Pt states he feels very depressed, and he also overwhelmed with some people that are following him. Pt states that his ex girlfriend is together with those who has been following him. pt states " I want to kill her and her daughter" Pt admits drinking almost every day, admits using cocaine. Pt states he has not been taking amny psychiatric mediucations for about a year.
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
## 2023-01-05 NOTE — ED BEHAVIORAL HEALTH ASSESSMENT NOTE - PAST PSYCHOTROPIC MEDICATION
Per HPI (has also been on fluoxetine, sertraline, wellbutrin, lexapro, and gabapentin) Per HPI (has also been on fluoxetine, sertraline, wellbutrin, lexapro, and gabapentin) also states history of adderall

## 2023-01-05 NOTE — CHART NOTE - NSCHARTNOTEFT_GEN_A_CORE
Screening Medical Evaluation  Patient Admitted from: [facility] admitting diagnosis: Bipolar affective disorder, current episode mixed    PAST MEDICAL & SURGICAL HISTORY:  Diabetes mellitus      HTN (hypertension)      GERD (gastroesophageal reflux disease)      Heart failure            Allergies    Cogentin (Unknown)  Haldol (Unknown)  Thorazine (Rash)    Intolerances        Social History:     FAMILY HISTORY:      MEDICATIONS  (STANDING):  influenza   Vaccine 0.5 milliLiter(s) IntraMuscular once  insulin lispro (ADMELOG) corrective regimen sliding scale   SubCutaneous three times a day before meals  insulin lispro (ADMELOG) corrective regimen sliding scale   SubCutaneous at bedtime  lithium CR (ESKALITH-CR) 900 milliGRAM(s) Oral at bedtime  metoprolol succinate ER 25 milliGRAM(s) Oral at bedtime  multivitamin/minerals 1 Tablet(s) Oral at bedtime  OLANZapine Disintegrating Tablet 10 milliGRAM(s) Oral at bedtime  traZODone 100 milliGRAM(s) Oral at bedtime    MEDICATIONS  (PRN):  dextrose Oral Gel 15 Gram(s) Oral once PRN Blood Glucose LESS THAN 70 milliGRAM(s)/deciliter  diazepam    Tablet 20 milliGRAM(s) Oral every 6 hours PRN Symptom-triggered when CIWA-Ar score 8 or Greater  LORazepam     Tablet 2 milliGRAM(s) Oral every 2 hours PRN Symptom-triggered 2 point increase in CIWA-Ar  LORazepam   Injectable 2 milliGRAM(s) IntraMuscular once PRN agitation 2/2 anxiety  OLANZapine Disintegrating Tablet 5 milliGRAM(s) Oral every 4 hours PRN agiaton or aggression  OLANZapine Injectable 10 milliGRAM(s) IntraMuscular once PRN severe agitation or aggression      Vital Signs Last 24 Hrs  T(C): 36.6 (05 Jan 2023 17:05), Max: 37 (05 Jan 2023 08:26)  T(F): 97.8 (05 Jan 2023 17:05), Max: 98.6 (05 Jan 2023 08:26)  HR: 55 (05 Jan 2023 15:24) (55 - 87)  BP: 148/75 (05 Jan 2023 15:24) (148/75 - 178/87)  BP(mean): --  RR: 18 (05 Jan 2023 17:05) (18 - 18)  SpO2: 100% (05 Jan 2023 17:05) (95% - 100%)      CAPILLARY BLOOD GLUCOSE      POCT Blood Glucose.: 417 mg/dL (05 Jan 2023 20:41)        PHYSICAL EXAM:  GENERAL: NAD, well-developed  HEAD:  Atraumatic, Normocephalic  EYES: EOMI, PERRLA, conjunctiva and sclera clear  NECK: Supple, No JVD  CHEST/LUNG: Clear to auscultation bilaterally; No wheeze  HEART: Regular rate and rhythm; No murmurs, rubs, or gallops  ABDOMEN: Soft, Nontender, Nondistended; Bowel sounds present  EXTREMITIES:  2+ Peripheral Pulses, No clubbing, cyanosis, or edema  PSYCH: AAOx3  NEUROLOGY: non-focal  SKIN: No rashes or lesions    LABS:                        14.2   7.52  )-----------( 239      ( 04 Jan 2023 22:16 )             42.0     01-04    135  |  95<L>  |  8   ----------------------------<  213<H>  4.1   |  30  |  0.77    Ca    9.6      04 Jan 2023 22:16            Urinalysis Basic - ( 04 Jan 2023 22:24 )    Color: Yellow / Appearance: Clear / SG: <=1.005 / pH: x  Gluc: x / Ketone: NEGATIVE  / Bili: Negative / Urobili: 0.2 E.U./dL   Blood: x / Protein: NEGATIVE mg/dL / Nitrite: NEGATIVE   Leuk Esterase: NEGATIVE / RBC: x / WBC x   Sq Epi: x / Non Sq Epi: x / Bacteria: x        RADIOLOGY & ADDITIONAL TESTS:    Assessment and Plan:  57 year old male presenting today from [facility] ED to [facility] with admitting diagnosis of Bipolar affective disorder, current episode mixed with PMH of PMHx pituitary tumor, HTN, T2DM, and GERD (only compliant with HTN medications), polysubstance abuse (daily ETOH use c/b reportedly ETOH withdrawal seizures per patient report, MJ use, cocaine use, past heroin use, now on methadone maintenance through [facility] in [place]). Pt COVID +. Denies any fever, chills, headche, chest pain, SOB, abdominal pain, N/V/D/C, dysuria.   1) Continue home medications for HTN, GERD, and DM2, consider endocrinology consult as patient has not been compliant with home DM2 medications and was previously on insulin  CIWA for ETOH w/drawal, continue home Methadone (ED to call [facility] Methad[facility] clinic
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
## 2023-01-05 NOTE — ED BEHAVIORAL HEALTH ASSESSMENT NOTE - RISK ASSESSMENT
Given diagnoses per PSYCKES, history of substance use, history of violence with incarceration, and prior hospitalizations including at [facility], he is a chronically moderate risk. Pt is at high acute risk of harm to others and low moderate acute suicide risk. Patient has numerous chronic risk factors that place him at high risk for violence, he has low frustration tolerance, poor impulse control, poor coping skills, he has a significant history of violence including manslaughter and has been aggressive in the community and on inpatient units, he states that it is easy for him to access guns (although he does not have one). In regards to suicide risks, patient reports depressed mood and poor sleep, he is not taking his medication, and he reported suicidal ideation with loosely formulated/vague plan to OD. He has one past remote suicide attempt. He also has acute risk factors most prominently violent/homicidal ideation to harm ex and her daughter, he also has polysubstance abuse which is a risk factor for both violence and suicide. The patient has a few protective factors - he is help seeking, he identifies reasons to live, and has the support of his sister. He states that he wants to get better and go to rehab after inpatient treatment.

## 2023-01-05 NOTE — ED BEHAVIORAL HEALTH ASSESSMENT NOTE - DESCRIPTION
Per HPI Pt largely calm and cooperative    T(C): 36.7 (01-05-23 @ 04:08)  T(F): 98 (01-05-23 @ 04:08), Max: 98 (01-05-23 @ 04:08)  HR: 72 (01-05-23 @ 04:08) (65 - 74)  BP: 178/87 (01-05-23 @ 04:08) (152/72 - 178/87)  RR:  (18 - 18)  SpO2:  (95% - 97%)  Wt(kg): -- HTN, T2DM, GERD, pituitary tumor

## 2023-01-05 NOTE — BH INPATIENT PSYCHIATRY ASSESSMENT NOTE - NSACTIVEVENT_PSY_ALL_CORE
Triggering events leading to humiliation, shame, and/or despair (e.g., Loss of relationship, financial or health status) (real or anticipated)/Current or pending social isolation/Substance intoxication or withdrawal/Pending incarceration or homelessness/Inadequate social supports

## 2023-01-05 NOTE — ED BEHAVIORAL HEALTH PROGRESS NOTE - VIOLENCE RISK FACTORS:
Antisocial behavior/cognition (past or present)/Violent ideation/threat/speech/Substance abuse/Impulsivity/Noncompliance with treatment/Irritability

## 2023-01-05 NOTE — ED BEHAVIORAL HEALTH ASSESSMENT NOTE - SUMMARY
Patient is a 56 year old male domiciled at mother's home, unemployed on disability, single, never , no children. He has prior psychiatric diagnoses including bipolar 1 disorder vs schizoaffective disorder, bipolar type vs antisocial personality disorder. He also carries medical diagnoses including pituitary tumor, HTN, T2DM, and GERD. He was brought in by self for paranoia, suicidality, and homicidality.    Patient presents with complaints of "jose" which includes paranoia that people are following him to the point where he has HI with active intent to harm other people were he to be discharged. Also endorsing intent to kill himself were he to be discharged. He also reports feeling depressed, sleeping only 5 hours a day and fatigue. Unclear if substance is playing a role in current presentation though patient would like to go to inpatient rehab.     He has genetic loading with sister with schizophrenia, and long standing polysubstance use, on methadone, as well as legal history. His issues with violence and substance use will be chronic risk factors for future hospitalizations. Unclear what his adherence to treatment and medications is. He also has multiple chronic health issues that are also risks to his overall mental health. He is seeking help and does want to get better.     As patient is an acute risk to self and others, he will be admitted on a voluntary basis where he will be started on lithium 300 mg twice daily as per his wish vs depakote. Will hold on restarting zyprexa as it is unclear if he has an active psychotic process though he does have quite significant paranoia (but not particularly disorganized or flat of affect). There is an element of possible secondary gain based on his history and chart review as well. Will need to continuously reevaluate need for inpatient hospitalization.     Plan:     1) Medical - Per primary team  2) Standing psychotropics - Will restart on lithium 300 mg twice daily and reeval need for zyprexa  3) PRN Psychotropics - QTc on EKG was in the low 400s, but given possible history of heart failure, may need to avoid Haldol. Zyprexa/Zydeis would be safer 5-10 mg q6h po/IV/IM. Please avoid concomitant IV/IM benzo use if he is given zyprexa IV/IM for at least 4 hours.   4) Observation - 1:1 until transfer  5) Dispo - Transfer to [facility] psych Mr. Bueno is a 57 year old man, domiciled with sister in [place], on disability, single, documented diagnoses of bipolar 1 disorder, antisocial personality disorder, PMHx pituitary tumor, HTN, T2DM, and GERD (only compliant with HTN medications), 1 remote suicide attempt via injecting bleech per patient, with numerous psychiatric hospitalizations (last at [facility] in [place] from 10/9/2022 - 10/12/2022), multiple ED visits in the past few months for medical, MH and substance use disorder, history of multiple incarcerations (last nine years ago, patient served 10 year sentence for manslaughter and was released from detention in ), history of violence and aggression both on the psychiatric unit and in the community,  history of polysubstance abuse (daily ETOH use c/b reportedly ETOH withdrawal seizures per patient report, MJ use, cocaine use, past heroin use, now on methadone maintenance through [facility] in [place]), +family history (father  of drug/alcohol overdose, and half sister has schizophrenia), he self presented with homicidal ideation and paranoia in the context of substance abuse coupled with medication non compliance. Pt is COVID positive.     Patient presents with homicidal ideation and suicidal ideation in the context of paranoia that people are following him around due to his ex-girlfriend/wife. He expressed active homicidal ideation, with plan to chop his ex and her daughter w/axradha and states that he may act on his violent thoughts if he goes home. The patient also endorsed vague suicidal ideation to overdose in the context of his paranoia. Pt has similar presentations to ED in the past, and upon admission psychiatrists have noted that his paranoia is prominent and that he improves on Lithium and antipsychotic medication. However, patient also uses a lot of substances which is likely interfering with his functioning and his mood, and he has axis 2 pathology, largely antisocial personality traits, which also contribute to his frequent ED presentations. Pt also is somewhat provocative with practitioners, and it is unclear what elements of his presentation are related to secondary gain, and what elements are due to substance use disorder, and what is due to a primary mood or psychotic disorder.  Nonetheless, the patient is an acute risk to himself and others due to his active homicidal ideation coupled with his suicidal ideation. He adamantly wants inpatient admission for stabilization and is also interested in attending rehabilitation for substance use disorder after inpatient.     Plan:   - Pt will be admitted on  legal status, maintain 1:1 until transfer, pending bed at [facility] for COVID+, no CO required on inpatient as patient states he can remain safe while in the hospital and presence of CO will likely exacerbate patient's baseline irritability   - monitor vital signs  - Consider restarting Lithium for mood stability and Zyprexa for paranoia/delusions as these medications were effective during his last available admission.   - Medical: Continue home medications for HTN, GERD, and DM2, consider endocrinology consult as patient has not been compliant with home DM2 medications and was previously on insulin  - PRNs: Zyprexa 5-10mg q6 hrs po PRN or IM for agitation/severe agitation. Patient reporting possible heart failure so would avoid haldol for now. Can also use Ativan 2mg po PRN for severe agitation. Avoid concomitant IV/IM benzo use if he is given zyprexa IV/IM for at least 4 hours.  - Pt was safe ACT'd in February   - I do not have the contact information for patient's ex or ex's daughter; however, given patient's homicidal ideation towards these individuals, primary team should attempt to get this information and discuss with patient duty to warn/protect  - CIWA for ETOH w/drawal, continue home Methadone (ED to call [facility] Methadone clinic to confirm dose)

## 2023-01-06 PROCEDURE — 99232 SBSQ HOSP IP/OBS MODERATE 35: CPT | Mod: GC

## 2023-01-06 PROCEDURE — 99223 1ST HOSP IP/OBS HIGH 75: CPT

## 2023-01-06 RX ORDER — GABAPENTIN 400 MG/1
800 CAPSULE ORAL THREE TIMES A DAY
Refills: 0 | Status: DISCONTINUED | OUTPATIENT
Start: 2023-01-06 | End: 2023-01-07

## 2023-01-06 RX ORDER — NICOTINE POLACRILEX 2 MG
1 GUM BUCCAL DAILY
Refills: 0 | Status: DISCONTINUED | OUTPATIENT
Start: 2023-01-06 | End: 2023-01-07

## 2023-01-06 RX ORDER — INSULIN GLARGINE 100 [IU]/ML
30 INJECTION, SOLUTION SUBCUTANEOUS AT BEDTIME
Refills: 0 | Status: DISCONTINUED | OUTPATIENT
Start: 2023-01-06 | End: 2023-01-07

## 2023-01-06 RX ORDER — METHADONE HYDROCHLORIDE 40 MG/1
30 TABLET ORAL ONCE
Refills: 0 | Status: DISCONTINUED | OUTPATIENT
Start: 2023-01-06 | End: 2023-01-06

## 2023-01-06 RX ORDER — SULINDAC 200 MG/1
150 TABLET ORAL EVERY 12 HOURS
Refills: 0 | Status: DISCONTINUED | OUTPATIENT
Start: 2023-01-06 | End: 2023-01-06

## 2023-01-06 RX ORDER — INSULIN LISPRO 100/ML
10 VIAL (ML) SUBCUTANEOUS
Refills: 0 | Status: DISCONTINUED | OUTPATIENT
Start: 2023-01-06 | End: 2023-01-07

## 2023-01-06 RX ORDER — SPIRONOLACTONE 25 MG/1
25 TABLET, FILM COATED ORAL DAILY
Refills: 0 | Status: DISCONTINUED | OUTPATIENT
Start: 2023-01-06 | End: 2023-01-07

## 2023-01-06 RX ORDER — METHADONE HYDROCHLORIDE 40 MG/1
80 TABLET ORAL ONCE
Refills: 0 | Status: DISCONTINUED | OUTPATIENT
Start: 2023-01-06 | End: 2023-01-06

## 2023-01-06 RX ORDER — SULINDAC 200 MG/1
150 TABLET ORAL EVERY 12 HOURS
Refills: 0 | Status: DISCONTINUED | OUTPATIENT
Start: 2023-01-06 | End: 2023-01-07

## 2023-01-06 RX ORDER — BENZOCAINE AND MENTHOL 5; 1 G/100ML; G/100ML
1 LIQUID ORAL
Refills: 0 | Status: DISCONTINUED | OUTPATIENT
Start: 2023-01-06 | End: 2023-01-07

## 2023-01-06 RX ORDER — ACETAMINOPHEN 500 MG
650 TABLET ORAL EVERY 6 HOURS
Refills: 0 | Status: DISCONTINUED | OUTPATIENT
Start: 2023-01-06 | End: 2023-01-07

## 2023-01-06 RX ORDER — CARVEDILOL PHOSPHATE 80 MG/1
12.5 CAPSULE, EXTENDED RELEASE ORAL EVERY 12 HOURS
Refills: 0 | Status: DISCONTINUED | OUTPATIENT
Start: 2023-01-06 | End: 2023-01-07

## 2023-01-06 RX ORDER — INSULIN LISPRO 100/ML
VIAL (ML) SUBCUTANEOUS
Refills: 0 | Status: DISCONTINUED | OUTPATIENT
Start: 2023-01-06 | End: 2023-01-07

## 2023-01-06 RX ORDER — METHADONE HYDROCHLORIDE 40 MG/1
80 TABLET ORAL DAILY
Refills: 0 | Status: DISCONTINUED | OUTPATIENT
Start: 2023-01-07 | End: 2023-01-07

## 2023-01-06 RX ORDER — SODIUM CHLORIDE 0.65 %
1 AEROSOL, SPRAY (ML) NASAL EVERY 4 HOURS
Refills: 0 | Status: DISCONTINUED | OUTPATIENT
Start: 2023-01-06 | End: 2023-01-07

## 2023-01-06 RX ORDER — BENZOCAINE AND MENTHOL 5; 1 G/100ML; G/100ML
1 LIQUID ORAL
Refills: 0 | Status: DISCONTINUED | OUTPATIENT
Start: 2023-01-06 | End: 2023-01-06

## 2023-01-06 RX ORDER — METHADONE HYDROCHLORIDE 40 MG/1
30 TABLET ORAL DAILY
Refills: 0 | Status: DISCONTINUED | OUTPATIENT
Start: 2023-01-07 | End: 2023-01-07

## 2023-01-06 RX ADMIN — METHADONE HYDROCHLORIDE 30 MILLIGRAM(S): 40 TABLET ORAL at 10:12

## 2023-01-06 RX ADMIN — Medication 3: at 12:15

## 2023-01-06 RX ADMIN — Medication 0.2 MILLIGRAM(S): at 22:50

## 2023-01-06 RX ADMIN — Medication 0: at 21:29

## 2023-01-06 RX ADMIN — Medication 50 MILLIGRAM(S): at 10:42

## 2023-01-06 RX ADMIN — Medication 50 MILLIGRAM(S): at 14:43

## 2023-01-06 RX ADMIN — Medication 1 PATCH: at 15:07

## 2023-01-06 RX ADMIN — INSULIN GLARGINE 30 UNIT(S): 100 INJECTION, SOLUTION SUBCUTANEOUS at 22:52

## 2023-01-06 RX ADMIN — SPIRONOLACTONE 25 MILLIGRAM(S): 25 TABLET, FILM COATED ORAL at 12:57

## 2023-01-06 RX ADMIN — Medication 50 MILLIGRAM(S): at 22:51

## 2023-01-06 RX ADMIN — CARVEDILOL PHOSPHATE 12.5 MILLIGRAM(S): 80 CAPSULE, EXTENDED RELEASE ORAL at 21:32

## 2023-01-06 RX ADMIN — OLANZAPINE 10 MILLIGRAM(S): 15 TABLET, FILM COATED ORAL at 23:14

## 2023-01-06 RX ADMIN — Medication 1 TABLET(S): at 22:50

## 2023-01-06 RX ADMIN — Medication 4: at 16:35

## 2023-01-06 RX ADMIN — LITHIUM CARBONATE 900 MILLIGRAM(S): 300 TABLET, EXTENDED RELEASE ORAL at 21:31

## 2023-01-06 RX ADMIN — Medication 2 MILLIGRAM(S): at 20:16

## 2023-01-06 RX ADMIN — Medication 10 UNIT(S): at 16:36

## 2023-01-06 RX ADMIN — Medication 50 MILLIGRAM(S): at 18:35

## 2023-01-06 RX ADMIN — Medication 1 PATCH: at 20:15

## 2023-01-06 RX ADMIN — Medication 650 MILLIGRAM(S): at 16:06

## 2023-01-06 RX ADMIN — Medication 2 MILLIGRAM(S): at 05:30

## 2023-01-06 RX ADMIN — Medication 2 MILLIGRAM(S): at 12:02

## 2023-01-06 RX ADMIN — METHADONE HYDROCHLORIDE 80 MILLIGRAM(S): 40 TABLET ORAL at 10:12

## 2023-01-06 RX ADMIN — Medication 2 MILLIGRAM(S): at 16:06

## 2023-01-06 RX ADMIN — SULINDAC 150 MILLIGRAM(S): 200 TABLET ORAL at 15:07

## 2023-01-06 RX ADMIN — Medication 100 MILLIGRAM(S): at 21:32

## 2023-01-06 RX ADMIN — GABAPENTIN 800 MILLIGRAM(S): 400 CAPSULE ORAL at 12:57

## 2023-01-06 RX ADMIN — GABAPENTIN 800 MILLIGRAM(S): 400 CAPSULE ORAL at 21:31

## 2023-01-06 RX ADMIN — Medication 25 MILLIGRAM(S): at 21:31

## 2023-01-06 NOTE — BH CHART NOTE - NSEVENTNOTEFT_PSY_ALL_CORE
Called [facility] ([phone]), spoke with VELMA Craft.  Confirmed daily methadone dose of 110 mg.  Last pickup was yesterday.

## 2023-01-06 NOTE — BH INPATIENT PSYCHIATRY PROGRESS NOTE - NSBHASSESSSUMMFT_PSY_ALL_CORE
Mr. Bueno is a 57 year old man, domiciled with sister in [place], on disability, single, documented diagnoses of bipolar 1 disorder, antisocial personality disorder, PMHx pituitary tumor, HTN, T2DM, and GERD (only compliant with HTN medications), 1 remote suicide attempt via injecting bleach per patient, with numerous psychiatric hospitalizations (last at [facility] in [place] from 10/9/2022 - 10/12/2022), multiple ED visits in the past few months for medical, MH and substance use disorder, history of multiple incarcerations (last nine years ago, patient served 10 year sentence for manslaughter and was released from [facility] in ), history of violence and aggression both on the psychiatric unit and in the community,  history of polysubstance abuse (daily ETOH use c/b reportedly ETOH withdrawal seizures per patient report, MJ use, cocaine use, past heroin use, now on methadone maintenance through [facility] in [place]), +family history (father  of drug/alcohol overdose, and half sister has schizophrenia), he self presented with homicidal ideation and paranoia in the context of substance abuse coupled with medication non compliance, found to be COVID+, admitted to [facility] for continued psychiatric care. Differentials include substance withdrawal, substance induced mood disorder, personality traits, secondary gain.     Today, patient was demanding medications for this alcohol withdrawal and was started on a librium taper. Does not appear manic or psychotic.     PLAN  Pt would benefit form VOL admit 9.13  q15 adequate, no plan to harm self on unit  Psych meds:  lithium  qhs  Zyprexa 10 qhs for mood stabilizing and anxiety and sleep  Consider SGA mood stabilizers possibly low dose with GLASS options, eg aristada vs invega  Else consider latuda vs asenapine vs low dose FGA  Trazodone 100 qhs for insomnia  CIWA scale with ativan+librium as per score  PRNs:  Zyprexa IM 10 mg given aggression hx and zydis PO 5 mg q4  Ativan 2 mg IM  Comorbids:  DM2 diet and FSGs with insulin SS and monitoring/metabolic monitoring  HTN med regimen needs review: pt obviously noncompliant with any TID regimen- will restart low dose carvedilol if needed  MED consult for appropriate HTN meds/regimen but an SGA may correct HTN and no meds needed/TBD  MI for all substance cessation and Addiction consult for possible MAT/vivitrol GLASS  MMTP continuance  MM dose of 110 mg as per pt- has card- dose needs confirmation  EKG monitoring/YFi=230  Psych collateral  Family/other support collateral  G&M therapy  Supportive therapy  Dispo TBD- consider dual dx aftercare  consider GLASS insulin for safety and compliance    Plan:  1.	Legal: continue   2.	Safety: routine obs  3.	Psychiatric: c/w Li 900mg qhs, zyprexa ODT 10mg qhs, trazodone 100mg qhs. Started gabapentin 800mg TID (home med per OP pharmacy). OUD- Restarted methadone 110mg daily (confirmed with MMTP)  4.	Group/Milieu therapy   5.	Medical: COVID+ (prns for symptomatic treatment), Alcohol withdrawal - CIWA with librium taper and symptom triggered PRN ativan 2mg PO, hx HTN/HF (start carvedilol 12.5mg BID, clonidine 0.2mg BID, spironolactone 25mg per OP pharmacy), T2DM (start lantus 30u qhs, Ademelog 10mg TID)  6.	Collateral/Dispo: Unclear, will submit MH housing application if not already submitted   Mr. Bueno is a 57 year old man, domiciled with sister in [place], on disability, single, documented diagnoses of bipolar 1 disorder, antisocial personality disorder, PMHx pituitary tumor, HTN, T2DM, and GERD (only compliant with HTN medications), 1 remote suicide attempt via injecting bleach per patient, with numerous psychiatric hospitalizations (last at [facility] in [place] from 10/9/2022 - 10/12/2022), multiple ED visits in the past few months for medical, MH and substance use disorder, history of multiple incarcerations (last nine years ago, patient served 10 year sentence for manslaughter and was released from senior living in ), history of violence and aggression both on the psychiatric unit and in the community,  history of polysubstance abuse (daily ETOH use c/b reportedly ETOH withdrawal seizures per patient report, MJ use, cocaine use, past heroin use, now on methadone maintenance through [facility] in [place]), +family history (father  of drug/alcohol overdose, and half sister has schizophrenia), he self presented with homicidal ideation and paranoia in the context of substance abuse coupled with medication non compliance, found to be COVID+, admitted to [facility] for continued psychiatric care. Differentials include substance withdrawal, substance induced mood disorder, personality traits, secondary gain.     Today, patient was demanding medications for alcohol withdrawal and was started on a librium taper. Does not appear manic or psychotic.     Plan:  1.	Legal: continue   2.	Safety: routine obs  3.	Psychiatric: c/w Li 900mg qhs, zyprexa ODT 10mg qhs, trazodone 100mg qhs. Started gabapentin 800mg TID (home med per OP pharmacy). OUD- Restarted methadone 110mg daily (confirmed with MMTP)  4.	Group/Milieu therapy   5.	Medical: COVID+ (prns for symptomatic treatment), Alcohol withdrawal - CIWA with librium taper and symptom triggered PRN ativan 2mg PO, hx HTN/HF (start carvedilol 12.5mg BID, clonidine 0.2mg BID, spironolactone 25mg per OP pharmacy), T2DM (start lantus 30u qhs, Ademelog 10mg TID)  6.	Collateral/Dispo: Unclear, will submit  housing application if not already submitted   Mr. Bueno is a 57 year old man, domiciled with sister in [place], on disability, single, documented diagnoses of bipolar 1 disorder, antisocial personality disorder, PMHx pituitary tumor, HTN, T2DM, and GERD (only compliant with HTN medications), 1 remote suicide attempt via injecting bleach per patient, with numerous psychiatric hospitalizations (last at [facility] in [place] from 10/9/2022 - 10/12/2022), multiple ED visits in the past few months for medical, MH and substance use disorder, history of multiple incarcerations (last nine years ago, patient served 10 year sentence for manslaughter and was released from correction in ), history of violence and aggression both on the psychiatric unit and in the community,  history of polysubstance abuse (daily ETOH use c/b reportedly ETOH withdrawal seizures per patient report, MJ use, cocaine use, past heroin use, now on methadone maintenance through [facility] in [place]), +family history (father  of drug/alcohol overdose, and half sister has schizophrenia), he self presented with homicidal ideation and paranoia in the context of substance abuse coupled with medication non compliance, found to be COVID+, admitted to [facility] for continued psychiatric care. Differentials include substance withdrawal, substance induced mood disorder, personality traits, secondary gain.     Today, pt scoring on CIWA for alcohol withdrawal and was started on a librium taper. Does not appear manic or psychotic.     Plan:  1.	Legal: continue   2.	Safety: routine obs  3.	Psychiatric: c/w Li 900mg qhs, zyprexa ODT 10mg qhs, trazodone 100mg qhs. Started gabapentin 800mg TID (home med per OP pharmacy). OUD- Restarted methadone 110mg daily (confirmed with MMTP)  4.	Group/Milieu therapy   5.	Medical: COVID+ (prns for symptomatic treatment), Alcohol withdrawal - CIWA with librium taper and symptom triggered PRN ativan 2mg PO, hx HTN/HF (start carvedilol 12.5mg BID, clonidine 0.2mg BID, spironolactone 25mg per OP pharmacy), T2DM (start lantus 30u qhs, Ademelog 10mg TID)  6.	Collateral/Dispo: Unclear, will submit  housing application if not already submitted

## 2023-01-06 NOTE — BH INPATIENT PSYCHIATRY PROGRESS NOTE - NSDCCRITERIA_PSY_ALL_CORE
cgi<=2  No HI  No SI  Ideally with GLASS  MMTP continued possibly with additional MAT When pt is no longer an acute or imminent risk of harm to self or others, and is able to care for self safely, pt may then be discharged.

## 2023-01-06 NOTE — BH INPATIENT PSYCHIATRY PROGRESS NOTE - NSBHATTESTCOMMENTATTENDFT_PSY_A_CORE
Pt at this time is isolated to room, reporting COVID and ETOH withdrawal symptoms.  Tx plan as above.  Dispo: Mental health housing application, inpt rehab

## 2023-01-06 NOTE — BH SOCIAL WORK INITIAL PSYCHOSOCIAL EVALUATION - NSHIGHRISKBEHFT_PSY_ALL_CORE
Pt was charged with manslaughter and spent ten years in [facility].  Pt was charged with manslaughter and spent ten years in retirement. Multiple suicide attempts in past, as per sister.

## 2023-01-06 NOTE — BH SOCIAL WORK INITIAL PSYCHOSOCIAL EVALUATION - NSBHABUSECOMMENTFT_PSY_ALL_CORE
Pt reported no abuse or neglect during childhood, but stated that he was traumatized while he was in jail. Pt did not go into detail as to how.

## 2023-01-06 NOTE — BH INPATIENT PSYCHIATRY PROGRESS NOTE - PRN MEDS
MEDICATIONS  (PRN):  acetaminophen     Tablet .. 650 milliGRAM(s) Oral every 6 hours PRN Moderate Pain (4 - 6)  benzocaine 15 mG/menthol 3.6 mG Lozenge 1 Lozenge Oral every 2 hours PRN sore throat  dextrose Oral Gel 15 Gram(s) Oral once PRN Blood Glucose LESS THAN 70 milliGRAM(s)/deciliter  guaiFENesin Oral Liquid (Sugar-Free) 100 milliGRAM(s) Oral every 6 hours PRN cough  LORazepam     Tablet 2 milliGRAM(s) Oral every 2 hours PRN Symptom-triggered 2 point increase in CIWA-Ar  LORazepam   Injectable 2 milliGRAM(s) IntraMuscular once PRN agitation 2/2 anxiety  OLANZapine Disintegrating Tablet 5 milliGRAM(s) Oral every 4 hours PRN agiaton or aggression  OLANZapine Injectable 10 milliGRAM(s) IntraMuscular once PRN severe agitation or aggression  sodium chloride 0.65% Nasal 1 Spray(s) Both Nostrils every 4 hours PRN Nasal Congestion  sulindac 150 milliGRAM(s) Oral every 12 hours PRN pain

## 2023-01-06 NOTE — BH INPATIENT PSYCHIATRY PROGRESS NOTE - NSBHATTESTBILLING_PSY_A_CORE
99223-Initial OBS or IP - high complexity OR  mins 66092-Txdrxeyzaf OBS or IP - moderate complexity OR 35-49 mins

## 2023-01-06 NOTE — BH INPATIENT PSYCHIATRY PROGRESS NOTE - CURRENT MEDICATION
MEDICATIONS  (STANDING):  carvedilol 12.5 milliGRAM(s) Oral every 12 hours  chlordiazePOXIDE   Oral   chlordiazePOXIDE 50 milliGRAM(s) Oral every 4 hours  cloNIDine 0.2 milliGRAM(s) Oral every 12 hours  gabapentin 800 milliGRAM(s) Oral three times a day  influenza   Vaccine 0.5 milliLiter(s) IntraMuscular once  insulin glargine Injectable (LANTUS) 30 Unit(s) SubCutaneous at bedtime  insulin lispro (ADMELOG) corrective regimen sliding scale   SubCutaneous three times a day before meals  insulin lispro (ADMELOG) corrective regimen sliding scale   SubCutaneous three times a day before meals  insulin lispro (ADMELOG) corrective regimen sliding scale   SubCutaneous at bedtime  insulin lispro Injectable (ADMELOG) 10 Unit(s) SubCutaneous three times a day before meals  lithium CR (ESKALITH-CR) 900 milliGRAM(s) Oral at bedtime  metoprolol succinate ER 25 milliGRAM(s) Oral at bedtime  multivitamin/minerals 1 Tablet(s) Oral at bedtime  nicotine - 21 mG/24Hr(s) Patch 1 Patch Transdermal daily  OLANZapine Disintegrating Tablet 10 milliGRAM(s) Oral at bedtime  spironolactone 25 milliGRAM(s) Oral daily  traZODone 100 milliGRAM(s) Oral at bedtime    MEDICATIONS  (PRN):  acetaminophen     Tablet .. 650 milliGRAM(s) Oral every 6 hours PRN Moderate Pain (4 - 6)  benzocaine 15 mG/menthol 3.6 mG Lozenge 1 Lozenge Oral every 2 hours PRN sore throat  dextrose Oral Gel 15 Gram(s) Oral once PRN Blood Glucose LESS THAN 70 milliGRAM(s)/deciliter  guaiFENesin Oral Liquid (Sugar-Free) 100 milliGRAM(s) Oral every 6 hours PRN cough  LORazepam     Tablet 2 milliGRAM(s) Oral every 2 hours PRN Symptom-triggered 2 point increase in CIWA-Ar  LORazepam   Injectable 2 milliGRAM(s) IntraMuscular once PRN agitation 2/2 anxiety  OLANZapine Disintegrating Tablet 5 milliGRAM(s) Oral every 4 hours PRN agiaton or aggression  OLANZapine Injectable 10 milliGRAM(s) IntraMuscular once PRN severe agitation or aggression  sodium chloride 0.65% Nasal 1 Spray(s) Both Nostrils every 4 hours PRN Nasal Congestion  sulindac 150 milliGRAM(s) Oral every 12 hours PRN pain

## 2023-01-06 NOTE — BH SOCIAL WORK INITIAL PSYCHOSOCIAL EVALUATION - OTHER PAST PSYCHIATRIC HISTORY (INCLUDE DETAILS REGARDING ONSET, COURSE OF ILLNESS, INPATIENT/OUTPATIENT TREATMENT)
According to  Inpatient Psychiatry Assessment Note on 2023, "As per comprehensive [facility] ED note:Mr. Bueno is a 57 year old man, domiciled with sister in [place], on disability, single, documented diagnoses of bipolar 1 disorder, antisocial personality disorder, PMHx pituitary tumor, HTN, T2DM, and GERD (only compliant with HTN medications), 1 remote suicide attempt via injecting bleech per patient, with numerous psychiatric hospitalizations (last at [facility] in [place] from 10/9/2022 - 10/12/2022), multiple ED visits in the past few months for medical, MH and substance use disorder, history of multiple incarcerations (last nine years ago, patient served 10 year sentence for manslaughter and was released from senior living in ), history of violence and aggression both on the psychiatric unit and in the community,  history of polysubstance abuse (daily ETOH use c/b reportedly ETOH withdrawal seizures per patient report, MJ use, cocaine use, past heroin use, now on methadone maintenance through [facility] in [place]), +family history (father  of drug/alcohol overdose, and half sister has schizophrenia), he self presented with homicidal ideation and paranoia in the context of substance abuse coupled with medication non compliance. Pt is COVID positive. "

## 2023-01-06 NOTE — BH SOCIAL WORK INITIAL PSYCHOSOCIAL EVALUATION - NSBHBARRIERS_PSY_ALL_CORE
Non-compliant with treatment Financial difficulties/Lack of support/Lack of insight/Non-compliant with treatment/Poor judgement

## 2023-01-06 NOTE — PSYCHIATRIC REHAB INITIAL EVALUATION - NSBHPRRECOMMEND_PSY_ALL_CORE
Patient is a 57 year old male, with a long hx of treatment, with a dx of Bipolar Disorder and Antisocial Personality Disorder, with a legal hx that includes 10 years incarceration for manslaughter, with a hx of aggression both in and out of the hospital, one suicide attempt that includes injecting self with bleach. Patient was hospitalized due to worsening paranoia and HI, secondary to noncompliance with medication and polysubstance abuse.

## 2023-01-06 NOTE — BH INPATIENT PSYCHIATRY PROGRESS NOTE - NSBHCHARTREVIEWVS_PSY_A_CORE FT
Vital Signs Last 24 Hrs  T(C): 36.4 (01-06-23 @ 08:54), Max: 36.9 (01-05-23 @ 15:24)  T(F): 97.6 (01-06-23 @ 08:54), Max: 98.5 (01-05-23 @ 15:24)  HR: 85 (01-06-23 @ 12:06) (55 - 85)  BP: 123/75 (01-06-23 @ 12:06) (123/75 - 148/75)  BP(mean): --  RR: 18 (01-05-23 @ 17:05) (18 - 18)  SpO2: 100% (01-05-23 @ 17:05) (99% - 100%)    Orthostatic VS  01-06-23 @ 08:54  Lying BP: --/-- HR: --  Sitting BP: 116/59 HR: 60  Standing BP: --/-- HR: --  Site: --  Mode: --  Orthostatic VS  01-05-23 @ 17:05  Lying BP: --/-- HR: --  Sitting BP: 149/70 HR: 69  Standing BP: 146/71 HR: 71  Site: --  Mode: --   Vital Signs Last 24 Hrs  T(C): 36.4 (01-06-23 @ 08:54), Max: 36.6 (01-05-23 @ 17:05)  T(F): 97.6 (01-06-23 @ 08:54), Max: 97.8 (01-05-23 @ 17:05)  HR: 85 (01-06-23 @ 12:06) (85 - 85)  BP: 123/75 (01-06-23 @ 12:06) (123/75 - 123/75)  BP(mean): --  RR: 18 (01-05-23 @ 17:05) (18 - 18)  SpO2: 100% (01-05-23 @ 17:05) (100% - 100%)    Orthostatic VS  01-06-23 @ 08:54  Lying BP: --/-- HR: --  Sitting BP: 116/59 HR: 60  Standing BP: --/-- HR: --  Site: --  Mode: --  Orthostatic VS  01-05-23 @ 17:05  Lying BP: --/-- HR: --  Sitting BP: 149/70 HR: 69  Standing BP: 146/71 HR: 71  Site: --  Mode: --

## 2023-01-06 NOTE — BH INPATIENT PSYCHIATRY PROGRESS NOTE - MSE UNSTRUCTURED FT
Appearance: fair hygiene, dressed appropriately.   Behavior: mildly cooperative, fair eye contact.    Motor: Some psychomotor agitation. Mild tremor with arms outstretched  Speech: Normal rate.   Mood: "sick"    Affect: irritable/ anxious. constricted  Thought process: linear, goal directed.   Thought content: Focused on demanding medications. Not responding to internal stimuli.   Insight: poor  Judgment: poor  Impulse control: poor  Gait: Intact Appearance: fair hygiene, dressed appropriately.   Behavior: mildly cooperative, fair eye contact.    Motor: Some psychomotor agitation. Mild tremor with arms outstretched  Speech: Normal rate.   Mood: "sick"    Affect: irritable/ anxious. constricted  Thought process: linear, goal directed.   Thought content: Focused on medical and withdrawal symptoms. Not responding to internal stimuli.   Insight: poor  Judgment: poor  Impulse control: poor  Gait: Intact

## 2023-01-06 NOTE — BH INPATIENT PSYCHIATRY PROGRESS NOTE - NSBHMETABOLIC_PSY_ALL_CORE_FT
BMI: BMI (kg/m2): 26.6 (01-05-23 @ 17:05)  HbA1c: A1C with Estimated Average Glucose Result: 9.3 % (02-18-22 @ 07:39)    Glucose: POCT Blood Glucose.: 290 mg/dL (01-06-23 @ 11:37)    BP: 123/75 (01-06-23 @ 12:06) (123/75 - 123/75)  Lipid Panel: Date/Time: 02-18-22 @ 07:38  Cholesterol, Serum: 104  Direct LDL: --  HDL Cholesterol, Serum: 40  Total Cholesterol/HDL Ration Measurement: --  Triglycerides, Serum: 157

## 2023-01-06 NOTE — BH SOCIAL WORK INITIAL PSYCHOSOCIAL EVALUATION - NSPTSTATEDGOAL_PSY_ALL_CORE
"I would like to find housing, get a job, and straighten my life out". "I would like to find housing, get a job, and straighten my life out."
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
## 2023-01-06 NOTE — BH INPATIENT PSYCHIATRY PROGRESS NOTE - NSBHFUPINTERVALHXFT_PSY_A_CORE
Chart reviewed. Case discussed with interdisciplinary team. Patient compliant with standing medications. Patient demanding PRNs for alcohol withdrawal symptoms (last CIWAS 8,8,10 - mostly for anxiety and agitation). Since admission to  yesterday at 7pm requested PRN ativan 2mg x3, PRN valium 20mg x1, PRN librium 50mg x2). Patient was restarted on his methadone 110mg daily this morning. Patient was restarted on his other home medications for HTN/ HF (spironolactone 25mg, clonidine 0.2mg BID, carvedilol 12.5mg BID) and gabapentin 800mg TID (as confirmed by his [facility] pharmacy in [place] ([phone]). Patient additionally requested PRNs for COVID symptoms (nasal spray, robitussin, benzocaine lozenges, sulindac, tylenol). Patient was found in his room, reports feeling sick from COVID (nasal congestion, muscle aches). Reports he wants help with housing. He was previously in the shelter system and does not want to return. Reports no one has submitted an application for  housing. Reports he is interested in inpatient rehab for substance use. Asked the interviewer for the time and then rushed out of the room to request another dose of librium.  Chart reviewed. Case discussed with interdisciplinary team. Patient compliant with standing medications. Patient demanding PRNs for alcohol withdrawal symptoms (last CIWAS 8,8,10 - mostly for anxiety and agitation). Since admission to  yesterday at 7pm requested PRN ativan 2mg x3, PRN valium 20mg x1, PRN librium 50mg x2). Patient was restarted on his methadone 110mg daily this morning. Patient was restarted on his other home medications for HTN/ HF (spironolactone 25mg, clonidine 0.2mg BID, carvedilol 12.5mg BID) and gabapentin 800mg TID (as confirmed by his [facility] pharmacy in [place] ([phone]). Patient additionally requested PRNs for COVID symptoms (nasal spray, robitussin, benzocaine lozenges, sulindac, tylenol). Patient was found in his room, reports feeling sick from COVID (nasal congestion, muscle aches). Reports he wants help with housing. He was previously in the shelter system and does not want to return. Reports no one has submitted an application for  housing. Reports he is interested in inpatient rehab for substance use.

## 2023-01-07 ENCOUNTER — INPATIENT (INPATIENT)
Facility: HOSPITAL | Age: 58
LOS: 2 days | Discharge: PSYCHIATRIC OR SUBSTANCE USE FACILITY | End: 2023-01-10
Attending: STUDENT IN AN ORGANIZED HEALTH CARE EDUCATION/TRAINING PROGRAM | Admitting: STUDENT IN AN ORGANIZED HEALTH CARE EDUCATION/TRAINING PROGRAM
Payer: MEDICAID

## 2023-01-07 VITALS
SYSTOLIC BLOOD PRESSURE: 191 MMHG | HEIGHT: 70 IN | TEMPERATURE: 98 F | HEART RATE: 55 BPM | DIASTOLIC BLOOD PRESSURE: 94 MMHG | OXYGEN SATURATION: 96 % | RESPIRATION RATE: 15 BRPM

## 2023-01-07 VITALS — DIASTOLIC BLOOD PRESSURE: 72 MMHG | HEART RATE: 63 BPM | SYSTOLIC BLOOD PRESSURE: 132 MMHG

## 2023-01-07 DIAGNOSIS — E11.9 TYPE 2 DIABETES MELLITUS WITHOUT COMPLICATIONS: ICD-10-CM

## 2023-01-07 DIAGNOSIS — R00.1 BRADYCARDIA, UNSPECIFIED: ICD-10-CM

## 2023-01-07 DIAGNOSIS — Z29.9 ENCOUNTER FOR PROPHYLACTIC MEASURES, UNSPECIFIED: ICD-10-CM

## 2023-01-07 DIAGNOSIS — T50.901A POISONING BY UNSPECIFIED DRUGS, MEDICAMENTS AND BIOLOGICAL SUBSTANCES, ACCIDENTAL (UNINTENTIONAL), INITIAL ENCOUNTER: ICD-10-CM

## 2023-01-07 DIAGNOSIS — I10 ESSENTIAL (PRIMARY) HYPERTENSION: ICD-10-CM

## 2023-01-07 DIAGNOSIS — F19.10 OTHER PSYCHOACTIVE SUBSTANCE ABUSE, UNCOMPLICATED: ICD-10-CM

## 2023-01-07 DIAGNOSIS — F31.9 BIPOLAR DISORDER, UNSPECIFIED: ICD-10-CM

## 2023-01-07 DIAGNOSIS — E16.2 HYPOGLYCEMIA, UNSPECIFIED: ICD-10-CM

## 2023-01-07 DIAGNOSIS — I50.9 HEART FAILURE, UNSPECIFIED: ICD-10-CM

## 2023-01-07 DIAGNOSIS — U07.1 COVID-19: ICD-10-CM

## 2023-01-07 LAB
ALBUMIN SERPL ELPH-MCNC: 4.2 G/DL — SIGNIFICANT CHANGE UP (ref 3.3–5)
ALP SERPL-CCNC: 78 U/L — SIGNIFICANT CHANGE UP (ref 40–120)
ALT FLD-CCNC: 18 U/L — SIGNIFICANT CHANGE UP (ref 4–41)
ANION GAP SERPL CALC-SCNC: 10 MMOL/L — SIGNIFICANT CHANGE UP (ref 7–14)
ANION GAP SERPL CALC-SCNC: 8 MMOL/L — SIGNIFICANT CHANGE UP (ref 7–14)
AST SERPL-CCNC: 16 U/L — SIGNIFICANT CHANGE UP (ref 4–40)
BASOPHILS # BLD AUTO: 0.04 K/UL — SIGNIFICANT CHANGE UP (ref 0–0.2)
BASOPHILS NFR BLD AUTO: 0.4 % — SIGNIFICANT CHANGE UP (ref 0–2)
BILIRUB SERPL-MCNC: <0.2 MG/DL — SIGNIFICANT CHANGE UP (ref 0.2–1.2)
BLOOD GAS VENOUS COMPREHENSIVE RESULT: SIGNIFICANT CHANGE UP
BLOOD GAS VENOUS COMPREHENSIVE RESULT: SIGNIFICANT CHANGE UP
BUN SERPL-MCNC: 22 MG/DL — SIGNIFICANT CHANGE UP (ref 7–23)
BUN SERPL-MCNC: 23 MG/DL — SIGNIFICANT CHANGE UP (ref 7–23)
CALCIUM SERPL-MCNC: 10.1 MG/DL — SIGNIFICANT CHANGE UP (ref 8.4–10.5)
CALCIUM SERPL-MCNC: 9.4 MG/DL — SIGNIFICANT CHANGE UP (ref 8.4–10.5)
CHLORIDE SERPL-SCNC: 102 MMOL/L — SIGNIFICANT CHANGE UP (ref 98–107)
CHLORIDE SERPL-SCNC: 106 MMOL/L — SIGNIFICANT CHANGE UP (ref 98–107)
CO2 SERPL-SCNC: 27 MMOL/L — SIGNIFICANT CHANGE UP (ref 22–31)
CO2 SERPL-SCNC: 28 MMOL/L — SIGNIFICANT CHANGE UP (ref 22–31)
CREAT SERPL-MCNC: 0.96 MG/DL — SIGNIFICANT CHANGE UP (ref 0.5–1.3)
CREAT SERPL-MCNC: 1.09 MG/DL — SIGNIFICANT CHANGE UP (ref 0.5–1.3)
EGFR: 80 ML/MIN/1.73M2 — SIGNIFICANT CHANGE UP
EGFR: 93 ML/MIN/1.73M2 — SIGNIFICANT CHANGE UP
EOSINOPHIL # BLD AUTO: 0.65 K/UL — HIGH (ref 0–0.5)
EOSINOPHIL NFR BLD AUTO: 6.8 % — HIGH (ref 0–6)
FLUAV AG NPH QL: SIGNIFICANT CHANGE UP
FLUBV AG NPH QL: SIGNIFICANT CHANGE UP
GLUCOSE BLDC GLUCOMTR-MCNC: 290 MG/DL — HIGH (ref 70–99)
GLUCOSE SERPL-MCNC: 49 MG/DL — CRITICAL LOW (ref 70–99)
GLUCOSE SERPL-MCNC: 77 MG/DL — SIGNIFICANT CHANGE UP (ref 70–99)
HCT VFR BLD CALC: 45.2 % — SIGNIFICANT CHANGE UP (ref 39–50)
HGB BLD-MCNC: 14.8 G/DL — SIGNIFICANT CHANGE UP (ref 13–17)
IANC: 4.5 K/UL — SIGNIFICANT CHANGE UP (ref 1.8–7.4)
IMM GRANULOCYTES NFR BLD AUTO: 0.6 % — SIGNIFICANT CHANGE UP (ref 0–0.9)
LITHIUM SERPL-MCNC: 0.4 MMOL/L — LOW (ref 0.6–1.2)
LYMPHOCYTES # BLD AUTO: 3.43 K/UL — HIGH (ref 1–3.3)
LYMPHOCYTES # BLD AUTO: 35.7 % — SIGNIFICANT CHANGE UP (ref 13–44)
MAGNESIUM SERPL-MCNC: 1.7 MG/DL — SIGNIFICANT CHANGE UP (ref 1.6–2.6)
MAGNESIUM SERPL-MCNC: 1.7 MG/DL — SIGNIFICANT CHANGE UP (ref 1.6–2.6)
MCHC RBC-ENTMCNC: 29 PG — SIGNIFICANT CHANGE UP (ref 27–34)
MCHC RBC-ENTMCNC: 32.7 GM/DL — SIGNIFICANT CHANGE UP (ref 32–36)
MCV RBC AUTO: 88.6 FL — SIGNIFICANT CHANGE UP (ref 80–100)
MONOCYTES # BLD AUTO: 0.94 K/UL — HIGH (ref 0–0.9)
MONOCYTES NFR BLD AUTO: 9.8 % — SIGNIFICANT CHANGE UP (ref 2–14)
NEUTROPHILS # BLD AUTO: 4.5 K/UL — SIGNIFICANT CHANGE UP (ref 1.8–7.4)
NEUTROPHILS NFR BLD AUTO: 46.7 % — SIGNIFICANT CHANGE UP (ref 43–77)
NRBC # BLD: 0 /100 WBCS — SIGNIFICANT CHANGE UP (ref 0–0)
NRBC # FLD: 0 K/UL — SIGNIFICANT CHANGE UP (ref 0–0)
PHOSPHATE SERPL-MCNC: 1.7 MG/DL — LOW (ref 2.5–4.5)
PHOSPHATE SERPL-MCNC: 1.9 MG/DL — LOW (ref 2.5–4.5)
PLATELET # BLD AUTO: 320 K/UL — SIGNIFICANT CHANGE UP (ref 150–400)
POTASSIUM SERPL-MCNC: 4.2 MMOL/L — SIGNIFICANT CHANGE UP (ref 3.5–5.3)
POTASSIUM SERPL-MCNC: 4.5 MMOL/L — SIGNIFICANT CHANGE UP (ref 3.5–5.3)
POTASSIUM SERPL-SCNC: 4.2 MMOL/L — SIGNIFICANT CHANGE UP (ref 3.5–5.3)
POTASSIUM SERPL-SCNC: 4.5 MMOL/L — SIGNIFICANT CHANGE UP (ref 3.5–5.3)
PROT SERPL-MCNC: 7 G/DL — SIGNIFICANT CHANGE UP (ref 6–8.3)
RBC # BLD: 5.1 M/UL — SIGNIFICANT CHANGE UP (ref 4.2–5.8)
RBC # FLD: 13 % — SIGNIFICANT CHANGE UP (ref 10.3–14.5)
RSV RNA NPH QL NAA+NON-PROBE: SIGNIFICANT CHANGE UP
SARS-COV-2 RNA SPEC QL NAA+PROBE: DETECTED
SODIUM SERPL-SCNC: 140 MMOL/L — SIGNIFICANT CHANGE UP (ref 135–145)
SODIUM SERPL-SCNC: 141 MMOL/L — SIGNIFICANT CHANGE UP (ref 135–145)
TOXICOLOGY SCREEN, DRUGS OF ABUSE, SERUM RESULT: SIGNIFICANT CHANGE UP
TROPONIN T, HIGH SENSITIVITY RESULT: 7 NG/L — SIGNIFICANT CHANGE UP
WBC # BLD: 9.62 K/UL — SIGNIFICANT CHANGE UP (ref 3.8–10.5)
WBC # FLD AUTO: 9.62 K/UL — SIGNIFICANT CHANGE UP (ref 3.8–10.5)

## 2023-01-07 PROCEDURE — 99222 1ST HOSP IP/OBS MODERATE 55: CPT | Mod: GC

## 2023-01-07 PROCEDURE — 99291 CRITICAL CARE FIRST HOUR: CPT

## 2023-01-07 PROCEDURE — 70450 CT HEAD/BRAIN W/O DYE: CPT | Mod: 26,MA

## 2023-01-07 PROCEDURE — 99231 SBSQ HOSP IP/OBS SF/LOW 25: CPT

## 2023-01-07 PROCEDURE — 99223 1ST HOSP IP/OBS HIGH 75: CPT | Mod: GC

## 2023-01-07 RX ORDER — ACETAMINOPHEN 500 MG
650 TABLET ORAL EVERY 6 HOURS
Refills: 0 | Status: DISCONTINUED | OUTPATIENT
Start: 2023-01-07 | End: 2023-01-10

## 2023-01-07 RX ORDER — DEXTROSE 50 % IN WATER 50 %
50 SYRINGE (ML) INTRAVENOUS ONCE
Refills: 0 | Status: COMPLETED | OUTPATIENT
Start: 2023-01-07 | End: 2023-01-07

## 2023-01-07 RX ORDER — SODIUM CHLORIDE 9 MG/ML
1000 INJECTION, SOLUTION INTRAVENOUS
Refills: 0 | Status: DISCONTINUED | OUTPATIENT
Start: 2023-01-07 | End: 2023-01-07

## 2023-01-07 RX ORDER — OLANZAPINE 15 MG/1
10 TABLET, FILM COATED ORAL AT BEDTIME
Refills: 0 | Status: DISCONTINUED | OUTPATIENT
Start: 2023-01-07 | End: 2023-01-10

## 2023-01-07 RX ORDER — BENZOCAINE AND MENTHOL 5; 1 G/100ML; G/100ML
1 LIQUID ORAL
Refills: 0 | Status: DISCONTINUED | OUTPATIENT
Start: 2023-01-07 | End: 2023-01-10

## 2023-01-07 RX ORDER — SODIUM CHLORIDE 9 MG/ML
500 INJECTION INTRAMUSCULAR; INTRAVENOUS; SUBCUTANEOUS ONCE
Refills: 0 | Status: COMPLETED | OUTPATIENT
Start: 2023-01-07 | End: 2023-01-07

## 2023-01-07 RX ORDER — SPIRONOLACTONE 25 MG/1
25 TABLET, FILM COATED ORAL DAILY
Refills: 0 | Status: DISCONTINUED | OUTPATIENT
Start: 2023-01-07 | End: 2023-01-10

## 2023-01-07 RX ORDER — SODIUM CHLORIDE 0.65 %
1 AEROSOL, SPRAY (ML) NASAL EVERY 4 HOURS
Refills: 0 | Status: DISCONTINUED | OUTPATIENT
Start: 2023-01-07 | End: 2023-01-10

## 2023-01-07 RX ORDER — NICOTINE POLACRILEX 2 MG
1 GUM BUCCAL DAILY
Refills: 0 | Status: DISCONTINUED | OUTPATIENT
Start: 2023-01-07 | End: 2023-01-10

## 2023-01-07 RX ORDER — OLANZAPINE 15 MG/1
5 TABLET, FILM COATED ORAL EVERY 4 HOURS
Refills: 0 | Status: DISCONTINUED | OUTPATIENT
Start: 2023-01-07 | End: 2023-01-09

## 2023-01-07 RX ORDER — CARVEDILOL PHOSPHATE 80 MG/1
12.5 CAPSULE, EXTENDED RELEASE ORAL EVERY 12 HOURS
Refills: 0 | Status: DISCONTINUED | OUTPATIENT
Start: 2023-01-07 | End: 2023-01-08

## 2023-01-07 RX ORDER — LITHIUM CARBONATE 300 MG/1
900 TABLET, EXTENDED RELEASE ORAL AT BEDTIME
Refills: 0 | Status: DISCONTINUED | OUTPATIENT
Start: 2023-01-07 | End: 2023-01-10

## 2023-01-07 RX ADMIN — METHADONE HYDROCHLORIDE 80 MILLIGRAM(S): 40 TABLET ORAL at 09:08

## 2023-01-07 RX ADMIN — Medication 50 MILLIGRAM(S): at 07:00

## 2023-01-07 RX ADMIN — Medication 1 SPRAY(S): at 10:12

## 2023-01-07 RX ADMIN — SODIUM CHLORIDE 500 MILLILITER(S): 9 INJECTION INTRAMUSCULAR; INTRAVENOUS; SUBCUTANEOUS at 13:58

## 2023-01-07 RX ADMIN — GABAPENTIN 800 MILLIGRAM(S): 400 CAPSULE ORAL at 09:09

## 2023-01-07 RX ADMIN — SPIRONOLACTONE 25 MILLIGRAM(S): 25 TABLET, FILM COATED ORAL at 10:11

## 2023-01-07 RX ADMIN — SODIUM CHLORIDE 100 MILLILITER(S): 9 INJECTION, SOLUTION INTRAVENOUS at 15:47

## 2023-01-07 RX ADMIN — Medication 10 UNIT(S): at 09:11

## 2023-01-07 RX ADMIN — Medication 50 MILLIGRAM(S): at 10:12

## 2023-01-07 RX ADMIN — CARVEDILOL PHOSPHATE 12.5 MILLIGRAM(S): 80 CAPSULE, EXTENDED RELEASE ORAL at 09:09

## 2023-01-07 RX ADMIN — Medication 50 MILLILITER(S): at 14:31

## 2023-01-07 RX ADMIN — METHADONE HYDROCHLORIDE 30 MILLIGRAM(S): 40 TABLET ORAL at 09:08

## 2023-01-07 RX ADMIN — Medication 5: at 08:33

## 2023-01-07 RX ADMIN — Medication 0.2 MILLIGRAM(S): at 09:09

## 2023-01-07 NOTE — H&P ADULT - PROBLEM SELECTOR PLAN 3
Patient w/ history of uncontrolled DM  At [facility] patient with fluctuating hyper- & hypoglycemic episodes  Was on lantus 30u qhs & ademlog 10u tid  - Check A1C  - Start lantus 24u qhs & admelog 8u tid  - ISS Patient w/ history of uncontrolled DM  At [facility] patient with fluctuating hyper- & hypoglycemic episodes  Was on lantus 30u qhs & ademlog 10u tid  - Check A1C  - Start lantus 15u qhs & admelog 5u tid  - ISS

## 2023-01-07 NOTE — H&P ADULT - PROBLEM SELECTOR PLAN 7
Questionable history of HF reported by patient  No TTE found on chart review  Appears euvolemic on exam  - Continue coreg w/ hold parameters  - Continue spironolactone

## 2023-01-07 NOTE — H&P ADULT - ATTENDING COMMENTS
20.5 Pt with DM, HTN, bipolar d/o, opiate and ETOH dependence on methadone transferred from [facility] with somnolence and noted to be bradycardic.  Was admitted at [facility] for HI, and was being treated for ETOH withdrawal.  Unclear if pt took extra benzo or opiate or other medications, however he reports he did not.    Currently on exam in NAD, heart RRR, lungs CTA B,  minimal hand tremor  Labs reviewed  Will monitor with CIWA protocal with symptom triggered ativan.  If CIWA rising again, will restart benzo taper.    Continue methadone  C/L psych f/u

## 2023-01-07 NOTE — H&P ADULT - PROBLEM SELECTOR PLAN 5
Patient reported COVID positive on 1/4  Endorses mild symptoms of congestion, subjective fevers, chills  Not hypoxic  - No indication for treatment at this time  - Continue to monitor  - Supportive care+ Patient reported COVID positive on 1/4  Endorses mild symptoms of congestion, subjective fevers, chills  Not hypoxic  - No indication for treatment at this time  - Continue to monitor  - Supportive care

## 2023-01-07 NOTE — BH INPATIENT PSYCHIATRY PROGRESS NOTE - NSBHCHARTREVIEWVS_PSY_A_CORE FT
Vital Signs Last 24 Hrs  T(C): 36.6 (01-07-23 @ 13:02), Max: 37.1 (01-06-23 @ 16:54)  T(F): 97.8 (01-07-23 @ 13:02), Max: 98.7 (01-06-23 @ 16:54)  HR: 55 (01-07-23 @ 13:02) (55 - 63)  BP: 191/94 (01-07-23 @ 13:02) (104/45 - 191/94)  BP(mean): --  RR: 15 (01-07-23 @ 13:02) (15 - 16)  SpO2: 96% (01-07-23 @ 13:02) (96% - 96%)    Orthostatic VS  01-06-23 @ 20:59  Lying BP: --/-- HR: --  Sitting BP: 128/63 HR: 70  Standing BP: 130/71 HR: 74  Site: --  Mode: --  Orthostatic VS  01-06-23 @ 08:54  Lying BP: --/-- HR: --  Sitting BP: 116/59 HR: 60  Standing BP: --/-- HR: --  Site: --  Mode: --  Orthostatic VS  01-05-23 @ 17:05  Lying BP: --/-- HR: --  Sitting BP: 149/70 HR: 69  Standing BP: 146/71 HR: 71  Site: --  Mode: --   Vital Signs Last 24 Hrs  T(C): 36.9 (01-08-23 @ 09:00), Max: 37 (01-08-23 @ 03:20)  T(F): 98.5 (01-08-23 @ 09:00), Max: 98.6 (01-08-23 @ 03:20)  HR: 51 (01-08-23 @ 09:00) (42 - 60)  BP: 125/64 (01-08-23 @ 09:00) (113/86 - 191/94)  BP(mean): --  RR: 17 (01-08-23 @ 09:00) (10 - 19)  SpO2: 100% (01-08-23 @ 09:00) (95% - 100%)    Orthostatic VS  01-06-23 @ 20:59  Lying BP: --/-- HR: --  Sitting BP: 128/63 HR: 70  Standing BP: 130/71 HR: 74  Site: --  Mode: --

## 2023-01-07 NOTE — H&P ADULT - PROBLEM SELECTOR PLAN 1
Patient presented with alteration in mental status w/ drooling, hypotension, bradycardia, hypoglycemia. Received naloxone 2mg IV with improvement in mental status. Patient also noted w/ miosis & hyporeflexia on arrival to ED. Signs & symptoms conenring for medication overdose i/s/o polypharmacy. s/p MICU & toxicology consults.  - Toxicology on board, appreciate recs  - Telemetry for monitoring of bradycardia  - EtCO2 monitoring for hypercapnia  - Careful re-initiation of medications  - No indication for further naloxone  - Close monitoring of sedative-hypnotic/alcohol withdrawal

## 2023-01-07 NOTE — BH INPATIENT PSYCHIATRY PROGRESS NOTE - CURRENT MEDICATION
MEDICATIONS  (STANDING):  carvedilol 12.5 milliGRAM(s) Oral every 12 hours  chlordiazePOXIDE   Oral   chlordiazePOXIDE 50 milliGRAM(s) Oral every 6 hours  cloNIDine 0.2 milliGRAM(s) Oral every 12 hours  gabapentin 800 milliGRAM(s) Oral three times a day  influenza   Vaccine 0.5 milliLiter(s) IntraMuscular once  insulin lispro (ADMELOG) corrective regimen sliding scale   SubCutaneous three times a day before meals  insulin lispro (ADMELOG) corrective regimen sliding scale   SubCutaneous at bedtime  lithium CR (ESKALITH-CR) 900 milliGRAM(s) Oral at bedtime  methadone    Tablet 30 milliGRAM(s) Oral daily  methadone   Dispersible Tablet 80 milliGRAM(s) Oral daily  metoprolol succinate ER 25 milliGRAM(s) Oral at bedtime  multivitamin/minerals 1 Tablet(s) Oral at bedtime  nicotine - 21 mG/24Hr(s) Patch 1 Patch Transdermal daily  OLANZapine Disintegrating Tablet 10 milliGRAM(s) Oral at bedtime  spironolactone 25 milliGRAM(s) Oral daily  traZODone 100 milliGRAM(s) Oral at bedtime    MEDICATIONS  (PRN):  acetaminophen     Tablet .. 650 milliGRAM(s) Oral every 6 hours PRN Moderate Pain (4 - 6)  benzocaine 15 mG/menthol 3.6 mG Lozenge 1 Lozenge Oral every 2 hours PRN sore throat  dextrose Oral Gel 15 Gram(s) Oral once PRN Blood Glucose LESS THAN 70 milliGRAM(s)/deciliter  guaiFENesin Oral Liquid (Sugar-Free) 100 milliGRAM(s) Oral every 6 hours PRN cough  LORazepam     Tablet 2 milliGRAM(s) Oral every 2 hours PRN Symptom-triggered 2 point increase in CIWA-Ar  LORazepam   Injectable 2 milliGRAM(s) IntraMuscular once PRN agitation 2/2 anxiety  OLANZapine Disintegrating Tablet 5 milliGRAM(s) Oral every 4 hours PRN agiaton or aggression  OLANZapine Injectable 10 milliGRAM(s) IntraMuscular once PRN severe agitation or aggression  sodium chloride 0.65% Nasal 1 Spray(s) Both Nostrils every 4 hours PRN Nasal Congestion  sulindac 150 milliGRAM(s) Oral every 12 hours PRN pain   MEDICATIONS  (STANDING):    MEDICATIONS  (PRN):

## 2023-01-07 NOTE — BH INPATIENT PSYCHIATRY PROGRESS NOTE - PRN MEDS
MEDICATIONS  (PRN):  acetaminophen     Tablet .. 650 milliGRAM(s) Oral every 6 hours PRN Moderate Pain (4 - 6)  benzocaine 15 mG/menthol 3.6 mG Lozenge 1 Lozenge Oral every 2 hours PRN sore throat  dextrose Oral Gel 15 Gram(s) Oral once PRN Blood Glucose LESS THAN 70 milliGRAM(s)/deciliter  guaiFENesin Oral Liquid (Sugar-Free) 100 milliGRAM(s) Oral every 6 hours PRN cough  LORazepam     Tablet 2 milliGRAM(s) Oral every 2 hours PRN Symptom-triggered 2 point increase in CIWA-Ar  LORazepam   Injectable 2 milliGRAM(s) IntraMuscular once PRN agitation 2/2 anxiety  OLANZapine Disintegrating Tablet 5 milliGRAM(s) Oral every 4 hours PRN agiaton or aggression  OLANZapine Injectable 10 milliGRAM(s) IntraMuscular once PRN severe agitation or aggression  sodium chloride 0.65% Nasal 1 Spray(s) Both Nostrils every 4 hours PRN Nasal Congestion  sulindac 150 milliGRAM(s) Oral every 12 hours PRN pain   MEDICATIONS  (PRN):

## 2023-01-07 NOTE — H&P ADULT - NSHPPHYSICALEXAM_GEN_ALL_CORE
VITALS:   T(C): 36.8 (01-07-23 @ 22:12), Max: 36.8 (01-07-23 @ 22:12)  HR: 56 (01-07-23 @ 22:12) (42 - 63)  BP: 139/102 (01-07-23 @ 22:12) (104/45 - 191/94)  RR: 17 (01-07-23 @ 22:12) (10 - 17)  SpO2: 100% (01-07-23 @ 22:12) (96% - 100%)    GENERAL: NAD, lying in bed comfortably  HEAD:  Atraumatic, Normocephalic  EYES: EOMI, pupils pinpoint but reactive, conjunctiva and sclera clear  ENT: Moist mucous membranes  NECK: Supple, No JVD  CHEST/LUNG: Clear to auscultation bilaterally; No rales, rhonchi, wheezing, or rubs. Unlabored respirations  HEART: Regular rate and rhythm; No murmurs, rubs, or gallops  ABDOMEN: BSx4; Soft, nontender, nondistended  EXTREMITIES:  2+ Peripheral Pulses, brisk capillary refill. No clubbing, cyanosis, or edema  NERVOUS SYSTEM:  A&Ox3, no focal deficits   SKIN: No rashes or lesions  Psych: Normal speech, normal behavior, normal affect

## 2023-01-07 NOTE — H&P ADULT - PROBLEM SELECTOR PLAN 4
Patient presented with bradycardia to the 40s, asymptomatic  Possibly i/s/o medication use (carvedilol, metoprolol?)  - Continue to monitor on telemetry  - Add medications w/ hold parameters Patient presented with bradycardia to the 40s, asymptomatic  Possibly i/s/o medication use (carvedilol, metoprolol?)  - Continue to monitor on telemetry  - Restart medications w/ hold parameters as per below

## 2023-01-07 NOTE — H&P ADULT - PROBLEM SELECTOR PLAN 9
DVT ppx: Lovenox  Diet: CC/DASH  Dispo: Likely back to [facility] pending medical management

## 2023-01-07 NOTE — CONSULT NOTE ADULT - ATTENDING COMMENTS
Patient seen and examined at bedside. Patient continues to be communicative and is currently asking me for his methadone and diazepam. He continues to be hemodynamically stable. His HR is in the 40's so would suggest telemetry. Continue D5NS for hypoglycemia.  Would do basic infectious workup but this appears to be more overdose. No need for ICU level of care at this time.     Thank you for your consult. Please call back if you have any further questions.

## 2023-01-07 NOTE — ED ADULT NURSE NOTE - OBJECTIVE STATEMENT
TERRI RN: Received pt to Rm 16 from [facility] with c/o possible overdose. Pt was at [facility] and was found to be unresponsive, staff thinks pt may have overdosed on prescribed methadone. Pt arrives grossly diaphoretic, minimally responsive to painful stimuli. Pt arrives with #[id number] IV to [facility] by EMS. Pt changed into gown and placed on cardiac monitor showing NSR, pt placed on 3LPM NC. Lab work collected as ordered. MD at bedside for eval, report given to primary RN.

## 2023-01-07 NOTE — BH CHART NOTE - NSEVENTNOTEFT_PSY_ALL_CORE
MMR called at 12pm for hypotension. Per nursing, earlier that morning pt found to be hypoglycemic to 60 on routine FS, given juice and encouraged PO intake. On reevaluation pt found to be somnolent but arousable to verbal stimuli in bed, oriented to self only. Repeat FS 96, however, BP found to be 65/38, on repeat 95/40. Drooling, responding to questions with intermittent appropriateness. Pt reports feeling well throughout rapid. Noted to have difficulty sitting upright without support. Pt transferred to ED for further evaluation of hypotension and AMS.       VS: BP95/40, HR63  Gen: Patient stooped over in bed, sitting upright only with support from staff, drooling  CV: Radial pulses 2+ b/l  Neuro: somnolent, oriented to self only     A/P: JILL called to evaluate pt for hypoglycemia and hypotension. PT hypoglycemic to 60 with increase to 95 after administration of juice. Pt with persistent hypotensive to 65/38 and 95/38 on repeat. Associated AMS, oriented to self only. Pt transferred to ED for further evaluation of hypotension and AMS.   1. Pt transferred to [facility] ED via EMS for evaluation   2. will continue to monitor ED course  3. d/w RN staff

## 2023-01-07 NOTE — BH INPATIENT PSYCHIATRY PROGRESS NOTE - NSBHCONSDANGEROTHERS_PSY_A_CORE
assaultive threats with plan and means/high risk for assault
assaultive threats with plan and means/high risk for assault

## 2023-01-07 NOTE — H&P ADULT - HISTORY OF PRESENT ILLNESS
57yoM with PMH of DM2 (on Lantus 40u QHS and admelog 20 TID), psychiatry disorders (diazepam 10 BID, neurontin 800mg TID, lithium 600mg BID, Zyprexa 15mg QHS) daily smoker 50 pack year (on nicotine patch), CHF/HTN (spironolactone 25mg QD, carvedilol 6.25mg BID, losartan 25 QD), presented from [facility] for altered mentation. Per [facility] report, he was found to be somnolent but arousable to verbal stimuli, oriented only to self with BP 65/38--> 95/40, drooling. He was givve 2mg IV Narcan with improvement in mental status. He states that he remembers that he self-admitted himself to [facility] for homicidal ideation. He reports that this morning he did not eat much breakfast and then felt tired, so he returned to bed. He remembers not being able to wake up not much other than that. He states he did not take any medications other than what was prescribed at [facility]. He feels tired and states that he is experiencing severe withdrawal symptoms (hot/cold, skin crawling, pain) as well as minor COVID symptoms including congestion, diarrhea, fevers, chils, myalgias. Staff at [facility] suspected that he was storing methadone but nothing was confirmed to be found in his room. He has history of cocaine, heroin, alcohol, and opioid use.     In the ED, T 97.8, HR 55, /94, RR 15, O2 sat 96%. Subsequently developed bradycardia to 40s. CBC WNL, BMP with hypoglycemia to 49 & phos 1.7. Troponin T 7. VBG 7.45/46/84/32. Lithium level 0.4, acetaminophen, salicylate, alcohol negative. COVID-19 positive. CTH w/ no acute intracranial process, moderate mucosal thickening.  57yoM with PMH of DM2 (on Lantus 40u QHS and admelog 20 TID), bipolar disorder (diazepam 10 BID, neurontin 800mg TID, lithium 600mg BID, Zyprexa 15mg QHS) daily smoker 50 pack year (on nicotine patch), CHF/HTN (spironolactone 25mg QD, carvedilol 6.25mg BID, losartan 25 QD), presented from [facility] for altered mentation. Per [facility] report, he was found to be somnolent but arousable to verbal stimuli, oriented only to self with BP 65/38--> 95/40, drooling. He was givve 2mg IV Narcan with improvement in mental status. He states that he remembers that he self-admitted himself to [facility] for homicidal ideation. He reports that this morning he did not eat much breakfast and then felt tired, so he returned to bed. He remembers not being able to wake up not much other than that. He states he did not take any medications other than what was prescribed at [facility]. He feels tired and states that he is experiencing severe withdrawal symptoms (hot/cold, skin crawling, pain) as well as minor COVID symptoms including congestion, diarrhea, fevers, chils, myalgias. Staff at [facility] suspected that he was storing methadone but nothing was confirmed to be found in his room. He has history of cocaine, heroin, alcohol, and opioid use.     In the ED, T 97.8, HR 55, /94, RR 15, O2 sat 96%. Subsequently developed bradycardia to 40s. CBC WNL, BMP with hypoglycemia to 49 & phos 1.7. Troponin T 7. VBG 7.45/46/84/32. Lithium level 0.4, acetaminophen, salicylate, alcohol negative. COVID-19 positive. CTH w/ no acute intracranial process, moderate mucosal thickening.

## 2023-01-07 NOTE — ED PROVIDER NOTE - OBJECTIVE STATEMENT
57 Y M H/O DM on lantus 40 u QHSm, admelog 20 units TID, bipolar, CHF, HTN, presenting from [facility] for lethargy, bradycardia, hypotension. Per discussion with [facility] [place] nursing staff patient became somnelant at noon, unresponsive, hypotensive to 70/40, responding to 2 mg naloxone, presenting now mildly diaphoretic, somnelent, protecting airway, non-responsive to verbal stimuli and minimally responsive to physical stimuli.

## 2023-01-07 NOTE — BH INPATIENT PSYCHIATRY PROGRESS NOTE - NSBHMETABOLIC_PSY_ALL_CORE_FT
BMI: BMI (kg/m2): 26.6 (01-07-23 @ 13:02)  HbA1c: A1C with Estimated Average Glucose Result: 9.3 % (02-18-22 @ 07:39)    Glucose: POCT Blood Glucose.: 73 mg/dL (01-07-23 @ 13:55)    BP: 132/72 (01-07-23 @ 06:19) (104/45 - 132/72)  Lipid Panel: Date/Time: 02-18-22 @ 07:38  Cholesterol, Serum: 104  Direct LDL: --  HDL Cholesterol, Serum: 40  Total Cholesterol/HDL Ration Measurement: --  Triglycerides, Serum: 157   BMI: BMI (kg/m2): 26.6 (01-07-23 @ 13:02)  HbA1c: A1C with Estimated Average Glucose Result: 10.2 % (01-08-23 @ 05:32)    Glucose: POCT Blood Glucose.: 290 mg/dL (01-07-23 @ 21:26)    BP: 132/72 (01-07-23 @ 06:19) (104/45 - 132/72)  Lipid Panel: Date/Time: 02-18-22 @ 07:38  Cholesterol, Serum: 104  Direct LDL: --  HDL Cholesterol, Serum: 40  Total Cholesterol/HDL Ration Measurement: --  Triglycerides, Serum: 157

## 2023-01-07 NOTE — H&P ADULT - PROBLEM SELECTOR PLAN 2
Patient has complex history of polysubstance abuse including alcohol, marijuana, cocaine, heroin  He has been on methadone maintenance through [facility] in [place]  Presented to [facility] w/ homicidal ideation and paranoia i/s/o alcohol & cocaine use, on CIWA at facility  Last drink 1/4  - Holding methadone currently i/s/o possible overdose  - Restart CIWA protocol carefully  - appreciate recommendations of psychiatry Patient has complex history of polysubstance abuse including alcohol, marijuana, cocaine, heroin  He has been on methadone maintenance through [facility] in [place]  Presented to [facility] w/ homicidal ideation and paranoia i/s/o alcohol & cocaine use, on CIWA at facility  Last drink 1/4  - Holding methadone currently i/s/o possible overdose  - Restart symptom triggered CIWA for now  --> Low threshold to restart Librium taper if symptoms uncontrolled w/ Ativan  - appreciate recommendations of psychiatry

## 2023-01-07 NOTE — H&P ADULT - ASSESSMENT
57yoM with PMH of DM2 (on Lantus 40u QHS and admelog 20 TID), psychiatry disorders (diazepam 10 BID, neurontin 800mg TID, lithium 600mg BID, Zyprexa 15mg QHS) daily smoker 50 pack year (on nicotine patch), CHF/HTN (spironolactone 25mg QD, carvedilol 6.25mg BID, losartan 25 QD), presented from [facility] for altered mentation in presumed medication overdose.

## 2023-01-07 NOTE — CONSULT NOTE ADULT - SUBJECTIVE AND OBJECTIVE BOX
CHIEF COMPLAINT: tired    HPI:   57yoM with PMH of DM2 (on Lantus 40u QHS and admelog 20 TID), psychiatry disorders (diazepam 10 BID, neurontin 800mg TID, lithium 600mg BID, Zyprexa 15mg QHS) daily smoker 50PPY (on nicotine patch), CHF/HTN (spironolactone 25mg QD, carvedilol 6.25mg BID, losartan 25 QD), presented from [facility] for lethargy, bradycardia to 40s and hypoglycemia to 49, and HTN to SBP of 190s. Pt states that he did not take any medications other than what he was prescribed at [facility]. He states he feels tired but denies fevers/chills/n/v/abdominal pain/myalgias/SOB/cough Staff suspecting pt was storing methadone, but nothing else was confirmed to be found in his room. Pt states that he has a history of cocaine, heroin, alcohol, and opioid use and was a former smoker. No significant overnight events. Pt denies chest pain, palpitations, SOB, dizziness/lightheadedhess, syncope, abdominal pain, diarrhea/constipation, dysuria, focal deficits.    PAST MEDICAL & SURGICAL HISTORY:  Diabetes mellitus  HTN (hypertension)  GERD (gastroesophageal reflux disease)  Heart failure    FAMILY HISTORY:    SOCIAL HISTORY:  former smoker, cocaine use, alcohol use, opioid use on methadone for treatment    Allergies:  Cogentin (Unknown)  Haldol (Unknown)  Thorazine (Rash)    Intolerances    HOME MEDICATIONS: as of 01-Mar-2022  · 	diazePAM 10 mg oral tablet: 1 tab(s) orally 2 times a day MDD:20 mg  · 	Insulin Pen Needles, 4mm: 1 application subcutaneously 4 times a day. ** Use with insulin pen **   · 	HumaLOG KwikPen 100 units/mL injectable solution: 20 unit(s) subcutaneous 3 times a day (with meals)   · 	Lantus Solostar Pen 100 units/mL subcutaneous solution: 40 unit(s) subcutaneous once a day   · 	Lancets: 1 lancet(s) subcutaneous 4 times a day   · 	Neurontin 400 mg oral capsule: 2 cap(s) orally 3 times a day  · 	nicotine 14 mg/24 hr transdermal film, extended release: 14 milligram(s) transdermal once a day   · 	spironolactone 25 mg oral tablet: 1 tab(s) orally once a day  · 	carvedilol 6.25 mg oral tablet: 1 tab(s) orally every 12 hours  · 	OLANZapine 15 mg oral tablet: 15 milligram(s) orally once a day (at bedtime)   · 	lithium 600 mg oral capsule: 1 cap(s) orally 2 times a day  · 	atorvastatin 80 mg oral tablet: 1 tab(s) orally once a day (at bedtime)  · 	losartan 25 mg oral tablet: 1 tab(s) orally once a day  · 	lisinopril 5 mg oral tablet: 1 tab(s) orally once a day          · 	methadone 10 mg oral tablet: 15 tab(s) orally once a day    REVIEW OF SYSTEMS:  CONSTITUTIONAL: +tired. Denies fevers or chills  EYES/ENT: No visual changes;  No vertigo or throat pain   NECK: No pain or stiffness  RESPIRATORY: No cough, wheezing, hemoptysis; No shortness of breath  CARDIOVASCULAR: No chest pain or palpitations  GASTROINTESTINAL: No abdominal or epigastric pain. No nausea, vomiting, or hematemesis; No diarrhea or constipation.  GENITOURINARY: No dysuria, frequency or hematuria  NEUROLOGICAL: No numbness or weakness  SKIN: No itching, rashes    OBJECTIVE:  ICU Vital Signs Last 24 Hrs  T(C): 36.6 (07 Jan 2023 13:02), Max: 36.8 (06 Jan 2023 20:59)  T(F): 97.8 (07 Jan 2023 13:02), Max: 98.2 (06 Jan 2023 20:59)  HR: 46 (07 Jan 2023 16:17) (46 - 63)  BP: 116/61 (07 Jan 2023 16:17) (104/45 - 191/94)  RR: 16 (07 Jan 2023 16:17) (15 - 16)  SpO2: 96% (07 Jan 2023 16:17) (96% - 96%)    O2 Parameters below as of 07 Jan 2023 16:17  Patient On (Oxygen Delivery Method): room air    CAPILLARY BLOOD GLUCOSE  POCT Blood Glucose.: 114 mg/dL (07 Jan 2023 17:06)    GENERAL: NAD, sleeping in bed with mild snoring, easily arousable to voice  HEAD: Atraumatic, Normocephalic  EYES: conjunctiva and sclera clear, pupils reactive to light, slightly constricted  ENT: Moist mucous membranes  NECK: Supple, No JVD  CHEST/LUNG: No stridor or upper airway obstruction or collapse seen. Clear to auscultation bilaterally; No rales, rhonchi, wheezing, or rubs. Unlabored respirations Mild snoring.   HEART: Regular rate and rhythm; No murmurs, rubs, or gallops  ABDOMEN: Bowel sounds present; Soft, Nontender, Nondistended.   EXTREMITIES:  2+ Peripheral Pulses, brisk capillary refill. No clubbing, cyanosis, or edema  NERVOUS SYSTEM: Alert & Oriented X3, speech clear. No deficits   MSK: FROM all 4 extremities, full and equal strength  SKIN: No rashes or lesions    HOSPITAL MEDICATIONS:  MEDICATIONS  (STANDING):  dextrose 5% + lactated ringers. 1000 milliLiter(s) (100 mL/Hr) IV Continuous <Continuous>    MEDICATIONS  (PRN):    LABS:                        14.8   9.62  )-----------( 320      ( 07 Jan 2023 13:10 )             45.2     01-07    141  |  106  |  22  ----------------------------<  77  4.5   |  27  |  0.96    Ca    9.4      07 Jan 2023 16:05  Phos  1.9     01-07  Mg     1.70     01-07    TPro  7.0  /  Alb  4.2  /  TBili  <0.2  /  DBili  x   /  AST  16  /  ALT  18  /  AlkPhos  78  01-07    Venous Blood Gas:  01-07 @ 14:44  7.37/52/76/30/94.8  VBG Lactate: 1.1  Venous Blood Gas:  01-07 @ 14:07  7.45/46/84/32/97.4  VBG Lactate: 1.9    MICROBIOLOGY:     RADIOLOGY:  [ ] Reviewed and interpreted by me    EKG:  CHIEF COMPLAINT: tired    HPI:   57yoM with PMH of DM2 (on Lantus 40u QHS and admelog 20 TID), psychiatry disorders (diazepam 10 BID, neurontin 800mg TID, lithium 600mg BID, Zyprexa 15mg QHS) daily smoker 50PPY (on nicotine patch), CHF/HTN (spironolactone 25mg QD, carvedilol 6.25mg BID, losartan 25 QD), presented from [facility] for lethargy, bradycardia to 40s and hypoglycemia to 49, and HTN to SBP of 190s. Pt states that he did not take any medications other than what he was prescribed at [facility]. He states he feels tired but denies fevers/chills/n/v/abdominal pain/myalgias/SOB/cough Staff suspecting pt was storing methadone, but nothing else was confirmed to be found in his room. Pt states that he has a history of cocaine, heroin, alcohol, and opioid use and was a former smoker. No significant overnight events. Pt denies chest pain, palpitations, SOB, dizziness/lightheadedhess, syncope, abdominal pain, diarrhea/constipation, dysuria, focal deficits.    PAST MEDICAL & SURGICAL HISTORY:  Diabetes mellitus  HTN (hypertension)  GERD (gastroesophageal reflux disease)  Heart failure    FAMILY HISTORY:  denies FH of cancers, heart disease, DM2, strokes    SOCIAL HISTORY:  former smoker, cocaine use, alcohol use, opioid use on methadone for treatment    Allergies:  Cogentin (Unknown)  Haldol (Unknown)  Thorazine (Rash)    Intolerances    HOME MEDICATIONS: as of 01-Mar-2022  · 	diazePAM 10 mg oral tablet: 1 tab(s) orally 2 times a day MDD:20 mg  · 	Insulin Pen Needles, 4mm: 1 application subcutaneously 4 times a day. ** Use with insulin pen **   · 	HumaLOG KwikPen 100 units/mL injectable solution: 20 unit(s) subcutaneous 3 times a day (with meals)   · 	Lantus Solostar Pen 100 units/mL subcutaneous solution: 40 unit(s) subcutaneous once a day   · 	Lancets: 1 lancet(s) subcutaneous 4 times a day   · 	Neurontin 400 mg oral capsule: 2 cap(s) orally 3 times a day  · 	nicotine 14 mg/24 hr transdermal film, extended release: 14 milligram(s) transdermal once a day   · 	spironolactone 25 mg oral tablet: 1 tab(s) orally once a day  · 	carvedilol 6.25 mg oral tablet: 1 tab(s) orally every 12 hours  · 	OLANZapine 15 mg oral tablet: 15 milligram(s) orally once a day (at bedtime)   · 	lithium 600 mg oral capsule: 1 cap(s) orally 2 times a day  · 	atorvastatin 80 mg oral tablet: 1 tab(s) orally once a day (at bedtime)  · 	losartan 25 mg oral tablet: 1 tab(s) orally once a day  · 	lisinopril 5 mg oral tablet: 1 tab(s) orally once a day          · 	methadone 10 mg oral tablet: 15 tab(s) orally once a day    REVIEW OF SYSTEMS:  CONSTITUTIONAL: +tired. Denies fevers or chills  EYES/ENT: No visual changes;  No vertigo or throat pain   NECK: No pain or stiffness  RESPIRATORY: No cough, wheezing, hemoptysis; No shortness of breath  CARDIOVASCULAR: No chest pain or palpitations  GASTROINTESTINAL: No abdominal or epigastric pain. No nausea, vomiting, or hematemesis; No diarrhea or constipation.  GENITOURINARY: No dysuria, frequency or hematuria  NEUROLOGICAL: No numbness or weakness  SKIN: No itching, rashes    OBJECTIVE:  ICU Vital Signs Last 24 Hrs  T(C): 36.6 (07 Jan 2023 13:02), Max: 36.8 (06 Jan 2023 20:59)  T(F): 97.8 (07 Jan 2023 13:02), Max: 98.2 (06 Jan 2023 20:59)  HR: 46 (07 Jan 2023 16:17) (46 - 63)  BP: 116/61 (07 Jan 2023 16:17) (104/45 - 191/94)  RR: 16 (07 Jan 2023 16:17) (15 - 16)  SpO2: 96% (07 Jan 2023 16:17) (96% - 96%)    O2 Parameters below as of 07 Jan 2023 16:17  Patient On (Oxygen Delivery Method): room air    CAPILLARY BLOOD GLUCOSE  POCT Blood Glucose.: 114 mg/dL (07 Jan 2023 17:06)    GENERAL: NAD, sleeping in bed with mild snoring, easily arousable to voice  HEAD: Atraumatic, Normocephalic  EYES: conjunctiva and sclera clear, pupils reactive to light, slightly constricted pupils  ENT: Moist mucous membranes  NECK: Supple, No JVD  CHEST/LUNG: No stridor or upper airway obstruction or collapse seen. Clear to auscultation bilaterally; No rales, rhonchi, wheezing, or rubs. Unlabored respirations Mild snoring.   HEART: Regular rate and rhythm; No murmurs, rubs, or gallops  ABDOMEN: Bowel sounds present; Soft, Nontender, Nondistended.   EXTREMITIES:  2+ Peripheral Pulses, brisk capillary refill. No clubbing, cyanosis, or edema  NERVOUS SYSTEM: Alert & Oriented X3, speech clear. No deficits   MSK: FROM all 4 extremities, full and equal strength  SKIN: No rashes or lesions    HOSPITAL MEDICATIONS:  MEDICATIONS  (STANDING):  dextrose 5% + lactated ringers. 1000 milliLiter(s) (100 mL/Hr) IV Continuous <Continuous>    MEDICATIONS  (PRN):    LABS:                        14.8   9.62  )-----------( 320      ( 07 Jan 2023 13:10 )             45.2     01-07    141  |  106  |  22  ----------------------------<  77  4.5   |  27  |  0.96    Ca    9.4      07 Jan 2023 16:05  Phos  1.9     01-07  Mg     1.70     01-07    TPro  7.0  /  Alb  4.2  /  TBili  <0.2  /  DBili  x   /  AST  16  /  ALT  18  /  AlkPhos  78  01-07    Venous Blood Gas:  01-07 @ 14:44  7.37/52/76/30/94.8  VBG Lactate: 1.1  Venous Blood Gas:  01-07 @ 14:07  7.45/46/84/32/97.4  VBG Lactate: 1.9    MICROBIOLOGY:     RADIOLOGY:  [ ] Reviewed and interpreted by me    EKG:

## 2023-01-07 NOTE — H&P ADULT - PROBLEM SELECTOR PLAN 6
Patient w/ reported history of BPD, antisocial personality disorder  Presented to [facility] w/ homicidal ideation & paranoia  Lithium level low  - Continue Lithium 900mg qhs  - Continue Zyprexa ODT 10mg qhs  --> Continue Zyprexa ODT 5mg q4hr agitation  - Continue trazodone 100mg qhs  - Continue gabapentin 800 tid  - Contact psych in AM for further recommendations Patient w/ reported history of BPD, antisocial personality disorder  Presented to [facility] w/ homicidal ideation & paranoia  Lithium level low  - Continue Lithium 900mg qhs  - Continue Zyprexa ODT 10mg qhs  --> Continue Zyprexa ODT 5mg q4hr agitation  - Hold trazodone 100mg qhs for now  - Hold gabapentin 800 tid for now, but would reinitiate this medication first if remains lucid   - Contact psych in AM for further recommendations

## 2023-01-07 NOTE — BH INPATIENT PSYCHIATRY PROGRESS NOTE - NSTXALCDRGGOAL_PSY_ALL_CORE
Will utilize coping/calming strategies to manage with withdrawal symptoms daily
Will utilize coping/calming strategies to manage with withdrawal symptoms daily

## 2023-01-07 NOTE — CONSULT NOTE ADULT - ASSESSMENT
Pt is a 58 yo M, PMHx of HTN, T2DM, polysubstance abuse, depression, bipolar disorder, who was BIBEMS from [facility] for altered mental status, hypoglycemia, and hypotension. This morning, patient received morning medications, and at noon was found to be hypoglycemic with BG 60, somnolent, drooling, and oriented x1. BP was reportedly 60s/30s with HR 60s at the time. EMS was called, and reportedly en route BP 70s/40s. EMS gave naloxone 2 mg and zofran, and patient was diaphoretic afterwards. He is found bradycardic, initially hypertensive and now normotensive in ED. He appears sedated with miosis and hyporeflexia per ED team. His EKG shows sinus bradycardia with slightly  prolonged NJ and QRS. Labs show hypoglycemia after morning medications, mild mildly elevated creatinine.     The patient's clinical presentation is certainly concerning for a xenobiotic induced toxicity, likely polypharmacy given his multiple medication. His mildly increase creatinine also may increase his serum concentration of xenobiotics. Specifically, insulin likely contributes to his hypoglycemia Carvedilol and metoprolol to the bradycardia and hypotension. Methadone contributing to sedation and miosis. Clonidine and multiple sedative hypnotic medications to bradycardia and sedation. His mildly increase creatinine likely also contributes to increased serum xenobiotic concentrations.     #Recommendations  - Supportive care including monitoring on telemetry for worsening hypotension, bradycardia, and/or arrhythmia; EtCO2 monitoring for hypercapnia  - Can consider glucagon 5-10 mg IV given over 3 minutes to see if bradycardia would improve. If improvement, likely a component of carvedilol and/or metoprolol induced toxicity. Slow push decreases risk of emesis.   - Would NOT recommend further naloxone use give risk of severe withdrawal  - Patient requires careful medication reconciliation and titration per medicine team and/or subspecialty team  - Watch for sedative-hypnotic/alcohol withdrawal once mental status improves  - Add on lithium level  - Further medical care per primary team    Thank you for involving us in the care of this patient. Assessment and plan discussed with toxicology attending Dr. Gerald Kelly. Please do not hesitate to reach out to the toxicology team for any further questions or concerns.    The On-Call Toxicology Fellow can be reached 24/7 via Pager #[phone]  Please send a 10 digit call back # as [place] cover multiple hospitals    Ian Pichardo MD  Toxicology Fellow  PGY-4  
57yoM with PMH of DM2 (on Lantus 40u QHS and admelog 20 TID), psychiatry disorders, CHF/HTN (spironolactone 25mg QD, carvedilol 6.25mg BID, losartan 25 QD), presented from [facility] for lethargy, bradycardia to 40s and hypoglycemia to 49, and HTN to SBP of 190s, concerning for toxic ingestion/overdose. MICU consulted for airway evaluation.     MICU recommendations  -Pt on RA saturating 100%, able to follow complex commands, A/Ox3 answering all questions appropriately  -Pt adequately protecting his airway at this time  -Methadone, if taken by patient in larger than intended dose, would have reached peak onset of action at time of evaluation. Pt has no further indication for naloxone at time of evaluation.  -Would f/u toxicology recommendations   -Would continue tele monitoring for bradycardia, and/or arrhythmia; EtCO2 monitoring for hypercapnia  -Pt previously hypertensive to  and bradycardic to 40s, would f/u CT head to evaluate for intracranial pathology   -Would continue D5 while pt has episodes of hypoglycemic as Lantus acts over 24 hours. Would check C-peptide level. If persistent >24 hours would evaluate for possible sulfonylurea poisoning v pancreatic pathology v adrenal disease contributing to hypoglycemia, which at that time, would consult endocrinology for further workup    Note authored by Loly Youssef MD  Case discussed and patient seen with Dr. Cheatham, Attending Physician

## 2023-01-07 NOTE — ED ADULT NURSE NOTE - CHIEF COMPLAINT QUOTE
pt form [facility], arrives via EMS for possible Overdose on his methadone. pt given 2mg of Narcan IV and Zofran pta. pt diaphoretic, restless and lethargic in triage, #18 R forearm with NS @KVO by EMS.

## 2023-01-07 NOTE — ED ADULT TRIAGE NOTE - CHIEF COMPLAINT QUOTE
pt form [facility], arrives via EMS for possible Overdose on his methadone. pt given 2mg of Narcan IV and Zofran pta. pt diaphoretic and lethargic in triage, #18 R forearm with NS @KVO by EMS. pt form [facility], arrives via EMS for possible Overdose on his methadone. pt given 2mg of Narcan IV and Zofran pta. pt diaphoretic, restless and lethargic in triage, #18 R forearm with NS @KVO by EMS.

## 2023-01-07 NOTE — H&P ADULT - NSHPSOCIALHISTORY_GEN_ALL_CORE
Currently undomiciled. Transferred from [facility] where he was receiving inpatient psychiatric treatment for homicidal ideation.

## 2023-01-07 NOTE — CONSULT NOTE ADULT - SUBJECTIVE AND OBJECTIVE BOX
MEDICAL TOXICOLOGY CONSULT    HPI: "Pt is a 58 yo M, PMHx of HTN, T2DM, polysubstance abuse, depression, bipolar disorder, who was BIBEMS from [facility] for altered mental status, hypoglycemia, and hypotension. Patient initially presented to ED on 23 for homicidal ideation and paranoia. At the time, he endorsed daily ethanol use and cocaine use, and appeared inebriated His workup at the time show UDS +benzos, THC, cocaine, methadone. He was COVID+. He was subsequently cleared for psychiatric management at [facility]. On 23, the hospitalist was consulted for medical management. He had URI symptoms, myalgia, weight loss, polyuria, and polydipsia in the setting of poor compliance with diabetes medication. This morning, patient received morning medications, and at noon was found to be hypoglycemic with BG 60, somnolent, drooling, and oriented x1. BP was reportedly 60s/30s with HR 60s at the time. EMS was called, and reportedly en route BP 70s/40s. EMS gave naloxone 2 mg and zofran, and patient was diaphoretic afterwards.     Per [facility] resident: Medications this morning (most at 9 am) include chlordiazepoxide 50 mg at 7am and 50 mg at 10am, carvedilol 12.5 mg, clonidine 0.2 mg, methadone 110 mg, insulin 15 units, spironolactone 25 mg, gabapentin 800 mg,. Last night he received lantus 30 U at 10:52 pm, trazodone 100 mg, olanzapine 10 mg, metoprolol succinate 25 mg, carvedilol 12.5 mg, clonidine 0.2 mg, lithium 100 mg, gabapentin 800 mg, chlordiazepoxide 50 mg, and lorazepam 2 mg at night.     ONSET / TIME of exposure(s): See HPI    QUANTITY of exposure(s):See HPI    ROUTE of exposure:  INGESTION    CONTEXT of exposure: At [facility]    ASSOCIATED symptoms: somnolence, altered mental status, hypotension    PAST MEDICAL & SURGICAL HISTORY:  Diabetes mellitus      HTN (hypertension)      GERD (gastroesophageal reflux disease)      Heart failure          MEDICATION HISTORY:  dextrose 5% + lactated ringers. 1000 milliLiter(s) IV Continuous <Continuous>      FAMILY HISTORY: n/a      REVIEW OF SYSTEMS: unable to perform due to intoxication, dementia, or illness      Vital Signs Last 24 Hrs  T(C): 36.6 (2023 13:02), Max: 36.8 (2023 20:59)  T(F): 97.8 (2023 13:02), Max: 98.2 (2023 20:59)  HR: 46 (2023 16:17) (46 - 63)  BP: 116/61 (2023 16:17) (104/45 - 191/94)  BP(mean): --  RR: 16 (2023 16:17) (15 - 16)  SpO2: 96% (2023 16:17) (96% - 96%)    Parameters below as of 2023 16:17  Patient On (Oxygen Delivery Method): room air        SIGNIFICANT LABORATORY STUDIES:                        14.8   9.62  )-----------( 320      ( 2023 13:10 )             45.2           141  |  106  |  22  ----------------------------<  77  4.5   |  27  |  0.96    Ca    9.4      2023 16:05  Phos  1.9       Mg     1.70         TPro  7.0  /  Alb  4.2  /  TBili  <0.2  /  DBili  x   /  AST  16  /  ALT  18  /  AlkPhos  78  07              Anion Gap: 8  @ 16:05  CK: --  @ 16:05  Troponin:  --   @ 16:05  Pro-BNP:  --   @ 16:05  VBG:  --   @ 16:05  Carboxyhemoglobin %:  --   @ 16:05  Methemoglobin %:  --   @ 16:05  Osmolality Serum:  --   @ 16:05  Aspirin Level: --   @ 16:05  Acetaminophen Level:  --   @ 16:05  Ethanol Level:  --   @ 16:05  Digoxin Level:  --   @ 16:05  Phenytoin Level:  --   @ 16:05  Carbamazepine level:  --   @ 16:05  Lamotrigine level:  --   @ 16:05  Anion Gap: --  @ 14:44  CK: --  14:44  Troponin:  --   14:44  Pro-BNP:  --   14:44  VB   14:44  Carboxyhemoglobin %:  --   14:44  Methemoglobin %:  --   @ 14:44  Osmolality Serum:  --   14:44  Aspirin Level: <0.3<L>   14:44  Acetaminophen Level:  <10<L>   14:44  Ethanol Level:  --   14:44  Digoxin Level:  --   14:44  Phenytoin Level:  --  :44  Carbamazepine level:  --   14:44  Lamotrigine level:  --   14:44  Anion Gap: --  @ 14:07  CK: --  @ 14:07  Troponin:  --   @ 14:07  Pro-BNP:  --   14:07  VB   @ 14:07  Carboxyhemoglobin %:  --   14:07  Methemoglobin %:  --   @ 14:07  Osmolality Serum:  --   @ 14:07  Aspirin Level: --   @ 14:07  Acetaminophen Level:  --   @ 14:07  Ethanol Level:  --   @ 14:07  Digoxin Level:  --   @ 14:07  Phenytoin Level:  --   @ 14:07  Carbamazepine level:  --   @ 14:07  Lamotrigine level:  --   @ 14:07  Anion Gap: 10  @ 13:10  CK: --  @ 13:10  Troponin:  --   @ 13:10  Pro-BNP:  --   @ 13:10  VBG:  --   @ 13:10  Carboxyhemoglobin %:  --   @ 13:10  Methemoglobin %:  --   @ 13:10  Osmolality Serum:  --   @ 13:10  Aspirin Level: --   @ 13:10  Acetaminophen Level:  --   @ 13:10  Ethanol Level:  --   @ 13:10  Digoxin Level:  --   @ 13:10  Phenytoin Level:  --   @ 13:10  Carbamazepine level:  --   @ 13:10  Lamotrigine level:  --   @ 13:10

## 2023-01-07 NOTE — ED PROVIDER NOTE - ATTENDING CONTRIBUTION TO CARE
57M p/w sent from [facility] for altered mental status, hypoglycemia, and hypotension.  Per note/report from staff at [facility], this morning found unresponsive, FS was low, glucose was given, pt also noted to be bradycardic and hypotensive.  Pt improved with narcan and zofran but is now minimally responsive here again.  Rpt FS OK but glucose low?  Rpt labs again.  Possibly narcotic overdose - concern for staff to be taking extra methadone.  Pt maintaining airway at this time, Bp improving and stable.  Pupils pinpoint.  Rx fluids, close monitoring of FS, will observe off narcan for now due to long acting agent and no benefit to waking him up and possibly having opioid withdrawal.  Pt is at [facility] for polysubstance abuse, bipolar disorder, (+)HI, and was covid positive on admission 3 days ago.  No sign of hypoxia, lungs clear.  Moving all extrems to painful stimuli, can be woken with vigorous stimulation, mumbles a bit, and goes back to sleep.  Concern for toxidrome - likely overdose on sedative hypnotics + also hypoglycemia likely r/t decreased PO intake possibly due to malaise from COVID, pt on multiple sedating meds including methadone, clonidine, coreg, gabapentin, trazodone, zyprexa, librium, ativan vs also on street drugs.  pt also on lantus and insulin.  Possibly related to significant bradycardia to 40's but this is felt to be more of a symptom of toxidrome vs a cause of obtundation.  Tox consult.  MICU consult.  Admit to medicine.  Likely needs time to observe FS and mental status, reconfigure medications while in hospital.   VS:  bradycardia, hypopnea.  BP adequate    GEN - obtunded  HEAD - NC/AT     ENT - PEERL, EOMI, mucous membranes   dry , no discharge      NECK: Neck supple, non-tender without lymphadenopathy, no masses, no JVD  PULM - CTA b/l,  symmetric breath sounds  COR -  normal heart sounds    ABD - , ND, NT, soft,  BACK - no CVA tenderness, nontender spine     EXTREMS - no edema, no deformity, warm and well perfused    SKIN - no rash    or bruising      NEUROLOGIC -obtunded but arousable to physical stim, face symmetric, speech mumbling incoherently, sensation moves all 4 when stimulated, motor moves all extrems equally when stimulated.    Upon my evaluation, this patient had a high probability of imminent or life-threatening deterioration due to obtundation, hypoglycemia, bradycardia, opioid overdose which required my direct attention, intervention, and personal management.  The patient has a  medical condition that impairs one or more vital organ systems.  Frequent personal assessment and adjustment of medical interventions was performed.      I have personally provided 45 minutes of critical care time exclusive of time spent on separately billable procedures. Time includes review of laboratory data, radiology results, discussion with consultants, patient and family; monitoring for potential decompensation, as well as time spent retrieving data and reviewing the chart and documenting the visit. Interventions were performed as documented above.

## 2023-01-07 NOTE — BH INPATIENT PSYCHIATRY PROGRESS NOTE - NSBHASSESSSUMMFT_PSY_ALL_CORE
Mr. Bueno is a 57 year old man, domiciled with sister in [place], on disability, single, documented diagnoses of bipolar 1 disorder, antisocial personality disorder, PMHx pituitary tumor, HTN, T2DM, and GERD (only compliant with HTN medications), 1 remote suicide attempt via injecting bleach per patient, with numerous psychiatric hospitalizations (last at [facility] in [place] from 10/9/2022 - 10/12/2022), multiple ED visits in the past few months for medical, MH and substance use disorder, history of multiple incarcerations (last nine years ago, patient served 10 year sentence for manslaughter and was released from residential in ), history of violence and aggression both on the psychiatric unit and in the community,  history of polysubstance abuse (daily ETOH use c/b reportedly ETOH withdrawal seizures per patient report, MJ use, cocaine use, past heroin use, now on methadone maintenance through [facility] in [place]), +family history (father  of drug/alcohol overdose, and half sister has schizophrenia), he self presented with homicidal ideation and paranoia in the context of substance abuse coupled with medication non compliance, found to be COVID+, admitted to [facility] for continued psychiatric care. Differentials include substance withdrawal, substance induced mood disorder, personality traits, secondary gain.     On assessment, patient was sedated and unable to participate in interview. He was later sent to the ED by [facility] for further eval, BP was low and patient unable to sit up.     Plan:  1.	Legal: continue 13  2.	Safety: routine obs  3.	Psychiatric: c/w Li 900mg qhs, zyprexa ODT 10mg qhs, trazodone 100mg qhs. Started gabapentin 800mg TID (home med per OP pharmacy). OUD- Restarted methadone 110mg daily (confirmed with [facility])  4.	Group/Milieu therapy   5.	Medical: COVID+ (prns for symptomatic treatment), Alcohol withdrawal - CIWA with librium taper and symptom triggered PRN ativan 2mg PO, hx HTN/HF (start carvedilol 12.5mg BID, clonidine 0.2mg BID, spironolactone 25mg per OP pharmacy), T2DM (start lantus 30u qhs, Ademelog 10mg TID)  6.	Collateral/Dispo: Unclear, will submit MH housing application if not already submitted   Mr. Bueno is a 57 year old man, domiciled with sister in [place], on disability, single, documented diagnoses of bipolar 1 disorder, antisocial personality disorder, PMHx pituitary tumor, HTN, T2DM, and GERD (only compliant with HTN medications), 1 remote suicide attempt via injecting bleach per patient, with numerous psychiatric hospitalizations (last at [facility] in [place] from 10/9/2022 - 10/12/2022), multiple ED visits in the past few months for medical, MH and substance use disorder, history of multiple incarcerations (last nine years ago, patient served 10 year sentence for manslaughter and was released from nursing home in ), history of violence and aggression both on the psychiatric unit and in the community,  history of polysubstance abuse (daily ETOH use c/b reportedly ETOH withdrawal seizures per patient report, MJ use, cocaine use, past heroin use, now on methadone maintenance through [facility] in [place]), +family history (father  of drug/alcohol overdose, and half sister has schizophrenia), he self presented with homicidal ideation and paranoia in the context of substance abuse coupled with medication non compliance, found to be COVID+, admitted to [facility] for continued psychiatric care. Differentials include substance withdrawal, substance induced mood disorder, personality traits, secondary gain.     On assessment, patient was sedated and unable to participate in interview. He was later sent to the ED by [facility] for further eval, BP and FS was low and patient unable to sit up.     Plan:  1.	Legal: continue   2.	Safety: routine obs  3.	Psychiatric: c/w Li 900mg qhs, zyprexa ODT 10mg qhs, trazodone 100mg qhs. Started gabapentin 800mg TID (home med per OP pharmacy). OUD- Restarted methadone 110mg daily (confirmed with [facility])  4.	Group/Milieu therapy   5.	Medical: COVID+ (prns for symptomatic treatment), Alcohol withdrawal - CIWA with librium taper and symptom triggered PRN ativan 2mg PO, hx HTN/HF (start carvedilol 12.5mg BID, clonidine 0.2mg BID, spironolactone 25mg per OP pharmacy), T2DM (start lantus 30u qhs, Ademelog 10mg TID)  6.	Collateral/Dispo: Unclear, will submit  housing application if not already submitted

## 2023-01-07 NOTE — ED PROVIDER NOTE - CLINICAL SUMMARY MEDICAL DECISION MAKING FREE TEXT BOX
57 Y m presenting from [facility] for hypoglycemia, unresponsiveness s/p methadone. primary concern for opioid toxicity with constricted pupils, lethargy, vs. persistent hypoglycemia, common labs, CT head, capnography, re-assess, disposition to MICU vs. medical floors based on correction of hypoglycemia. Time spent in discussion with [facility], review of >3 documents.

## 2023-01-07 NOTE — ED PROVIDER NOTE - PHYSICAL EXAMINATION
Physical Exam:  General: NAD, Conversive  Eyes: EOMI, Conjunctia and sclera clear, pupils 1 mm B/L responsive to light  Neck: No JVD  Lungs: Clear to auscultation bilaterally, no wheeze, no rhonchi  Heart: Normal S1, S2, no murmurs  Abdomen: Soft, nontender, nondistended, no CVA tenderness  Extremities: 2+ peripheral pulses, no edema  Psych: AAO X0 non-responsive to questioning  Neurologic: Non-focal, GURROLA x4, hyporeflexic, no rigidity.

## 2023-01-07 NOTE — CHART NOTE - NSCHARTNOTEFT_GEN_A_CORE
Got signout to f/u FS. FS from today noted for hypoglycemia and hypotension. Per primary team, patient is getting transferred to ED now. DM regimen dc'ed for now. If patient is not admitted and FS elevated, would restart patient at a significantly reduced dose insulin regimen and low dose ISS. Got signout to f/u FS. FS from today noted for hypoglycemia. Per primary team, patient is getting transferred to ED now for hypotension. DM regimen dc'ed for now. If patient is not admitted and FS elevated, would restart patient at a significantly reduced dose insulin regimen and low dose ISS.

## 2023-01-07 NOTE — CHART NOTE - NSCHARTNOTEFT_GEN_A_CORE
[facility] Inpatient to ED Transfer Summary    Reason for Transfer/Medical Summary: Hypotension     56 yo M w/ HTN, DM2, polysubstance abuse.  Pt noted to be hypoglycemic to 60 this morning, given juice and encouraged PO intake. On reevaluation, pt found in bed somnolent, FSBG rechecked and found to be 95. Hypotensive to 65/38, on recheck found to be 95/40. HR 63. Patient with AMS, oriented to self only. Drooling, not answering questions appropriately.     PAST MEDICAL & SURGICAL HISTORY:  Diabetes mellitus      HTN (hypertension)      GERD (gastroesophageal reflux disease)      Heart failure          Allergies    Cogentin (Unknown)  Haldol (Unknown)  Thorazine (Rash)    Intolerances        MEDICATIONS  (STANDING):  carvedilol 12.5 milliGRAM(s) Oral every 12 hours  chlordiazePOXIDE   Oral   chlordiazePOXIDE 50 milliGRAM(s) Oral every 6 hours  cloNIDine 0.2 milliGRAM(s) Oral every 12 hours  gabapentin 800 milliGRAM(s) Oral three times a day  influenza   Vaccine 0.5 milliLiter(s) IntraMuscular once  insulin glargine Injectable (LANTUS) 30 Unit(s) SubCutaneous at bedtime  insulin lispro (ADMELOG) corrective regimen sliding scale   SubCutaneous three times a day before meals  insulin lispro (ADMELOG) corrective regimen sliding scale   SubCutaneous three times a day before meals  insulin lispro (ADMELOG) corrective regimen sliding scale   SubCutaneous at bedtime  insulin lispro Injectable (ADMELOG) 10 Unit(s) SubCutaneous three times a day before meals  lithium CR (ESKALITH-CR) 900 milliGRAM(s) Oral at bedtime  methadone    Tablet 30 milliGRAM(s) Oral daily  methadone   Dispersible Tablet 80 milliGRAM(s) Oral daily  metoprolol succinate ER 25 milliGRAM(s) Oral at bedtime  multivitamin/minerals 1 Tablet(s) Oral at bedtime  nicotine - 21 mG/24Hr(s) Patch 1 Patch Transdermal daily  OLANZapine Disintegrating Tablet 10 milliGRAM(s) Oral at bedtime  spironolactone 25 milliGRAM(s) Oral daily  traZODone 100 milliGRAM(s) Oral at bedtime    MEDICATIONS  (PRN):  acetaminophen     Tablet .. 650 milliGRAM(s) Oral every 6 hours PRN Moderate Pain (4 - 6)  benzocaine 15 mG/menthol 3.6 mG Lozenge 1 Lozenge Oral every 2 hours PRN sore throat  dextrose Oral Gel 15 Gram(s) Oral once PRN Blood Glucose LESS THAN 70 milliGRAM(s)/deciliter  guaiFENesin Oral Liquid (Sugar-Free) 100 milliGRAM(s) Oral every 6 hours PRN cough  LORazepam     Tablet 2 milliGRAM(s) Oral every 2 hours PRN Symptom-triggered 2 point increase in CIWA-Ar  LORazepam   Injectable 2 milliGRAM(s) IntraMuscular once PRN agitation 2/2 anxiety  OLANZapine Disintegrating Tablet 5 milliGRAM(s) Oral every 4 hours PRN agiaton or aggression  OLANZapine Injectable 10 milliGRAM(s) IntraMuscular once PRN severe agitation or aggression  sodium chloride 0.65% Nasal 1 Spray(s) Both Nostrils every 4 hours PRN Nasal Congestion  sulindac 150 milliGRAM(s) Oral every 12 hours PRN pain      Vital Signs Last 24 Hrs  T(C): 36.8 (06 Jan 2023 20:59), Max: 37.1 (06 Jan 2023 16:54)  T(F): 98.2 (06 Jan 2023 20:59), Max: 98.7 (06 Jan 2023 16:54)  HR: 63 (07 Jan 2023 06:19) (57 - 63)  BP: 132/72 (07 Jan 2023 06:19) (104/45 - 132/72)  BP(mean): --  RR: 16 (07 Jan 2023 03:15) (16 - 16)  SpO2: --    Parameters below as of 07 Jan 2023 03:15  Patient On (Oxygen Delivery Method): room air      CAPILLARY BLOOD GLUCOSE      POCT Blood Glucose.: 95 mg/dL (07 Jan 2023 12:00)  POCT Blood Glucose.: 60 mg/dL (07 Jan 2023 11:48)  POCT Blood Glucose.: 354 mg/dL (07 Jan 2023 07:48)  POCT Blood Glucose.: 158 mg/dL (06 Jan 2023 20:33)  POCT Blood Glucose.: 325 mg/dL (06 Jan 2023 16:21)        PHYSICAL EXAM:  GENERAL: Somnolent,   HEART: Regular rate and rhythm  EXTREMITIES:  2+ Peripheral Pulses  PSYCH: AAOx1 to self only   NEUROLOGY: non-focal  SKIN: No rashes or lesions    LABS:                    Psychiatry Section:  Psychiatric Summary/[facility] admitting diagnosis:  56 yo M w/ HTN, DM2, polysubstance abuse, history of incarceration and prior psychiatric hospitalizations (dx of bipolar, antisocial personality disorder), presented with homicidal ideation and paranoia in setting of substance abuse, transferred to [facility] for further psychiatric management.     Psychiatric Recommendations:  Hold medications pending further evaluation and med rec     Observation status (check one):   (X) Constant Observation  ( ) Enhanced care  ( ) Routine checks    Risk Status (check all that apply if present):  ( ) at risk for suicide/self-injury  ( ) at risk for aggressive behavior  (X) at risk for elopement  ( ) other risk: [facility] Inpatient to ED Transfer Summary    Reason for Transfer/Medical Summary: Hypotension     56 yo M w/ HTN, DM2, polysubstance abuse.  Pt noted to be hypoglycemic on routine FS this AM to 60 this morning, given juice and encouraged PO intake. On reevaluation, pt found in bed somnolent, FSBG rechecked and found to be 95. Hypotensive to 65/38, on recheck found to be 95/40. HR 63. Patient with AMS, oriented to self only. Drooling, responding to questions with intermittent appropriateness.     PAST MEDICAL & SURGICAL HISTORY:  Diabetes mellitus      HTN (hypertension)      GERD (gastroesophageal reflux disease)      Heart failure          Allergies    Cogentin (Unknown)  Haldol (Unknown)  Thorazine (Rash)    Intolerances        MEDICATIONS  (STANDING):  carvedilol 12.5 milliGRAM(s) Oral every 12 hours  chlordiazePOXIDE   Oral   chlordiazePOXIDE 50 milliGRAM(s) Oral every 6 hours  cloNIDine 0.2 milliGRAM(s) Oral every 12 hours  gabapentin 800 milliGRAM(s) Oral three times a day  influenza   Vaccine 0.5 milliLiter(s) IntraMuscular once  insulin glargine Injectable (LANTUS) 30 Unit(s) SubCutaneous at bedtime  insulin lispro (ADMELOG) corrective regimen sliding scale   SubCutaneous three times a day before meals  insulin lispro (ADMELOG) corrective regimen sliding scale   SubCutaneous three times a day before meals  insulin lispro (ADMELOG) corrective regimen sliding scale   SubCutaneous at bedtime  insulin lispro Injectable (ADMELOG) 10 Unit(s) SubCutaneous three times a day before meals  lithium CR (ESKALITH-CR) 900 milliGRAM(s) Oral at bedtime  methadone    Tablet 30 milliGRAM(s) Oral daily  methadone   Dispersible Tablet 80 milliGRAM(s) Oral daily  metoprolol succinate ER 25 milliGRAM(s) Oral at bedtime  multivitamin/minerals 1 Tablet(s) Oral at bedtime  nicotine - 21 mG/24Hr(s) Patch 1 Patch Transdermal daily  OLANZapine Disintegrating Tablet 10 milliGRAM(s) Oral at bedtime  spironolactone 25 milliGRAM(s) Oral daily  traZODone 100 milliGRAM(s) Oral at bedtime    MEDICATIONS  (PRN):  acetaminophen     Tablet .. 650 milliGRAM(s) Oral every 6 hours PRN Moderate Pain (4 - 6)  benzocaine 15 mG/menthol 3.6 mG Lozenge 1 Lozenge Oral every 2 hours PRN sore throat  dextrose Oral Gel 15 Gram(s) Oral once PRN Blood Glucose LESS THAN 70 milliGRAM(s)/deciliter  guaiFENesin Oral Liquid (Sugar-Free) 100 milliGRAM(s) Oral every 6 hours PRN cough  LORazepam     Tablet 2 milliGRAM(s) Oral every 2 hours PRN Symptom-triggered 2 point increase in CIWA-Ar  LORazepam   Injectable 2 milliGRAM(s) IntraMuscular once PRN agitation 2/2 anxiety  OLANZapine Disintegrating Tablet 5 milliGRAM(s) Oral every 4 hours PRN agiaton or aggression  OLANZapine Injectable 10 milliGRAM(s) IntraMuscular once PRN severe agitation or aggression  sodium chloride 0.65% Nasal 1 Spray(s) Both Nostrils every 4 hours PRN Nasal Congestion  sulindac 150 milliGRAM(s) Oral every 12 hours PRN pain      Vital Signs Last 24 Hrs  T(C): 36.8 (06 Jan 2023 20:59), Max: 37.1 (06 Jan 2023 16:54)  T(F): 98.2 (06 Jan 2023 20:59), Max: 98.7 (06 Jan 2023 16:54)  HR: 63 (07 Jan 2023 06:19) (57 - 63)  BP: 132/72 (07 Jan 2023 06:19) (104/45 - 132/72)  BP(mean): --  RR: 16 (07 Jan 2023 03:15) (16 - 16)  SpO2: --    Parameters below as of 07 Jan 2023 03:15  Patient On (Oxygen Delivery Method): room air      CAPILLARY BLOOD GLUCOSE      POCT Blood Glucose.: 95 mg/dL (07 Jan 2023 12:00)  POCT Blood Glucose.: 60 mg/dL (07 Jan 2023 11:48)  POCT Blood Glucose.: 354 mg/dL (07 Jan 2023 07:48)  POCT Blood Glucose.: 158 mg/dL (06 Jan 2023 20:33)  POCT Blood Glucose.: 325 mg/dL (06 Jan 2023 16:21)        PHYSICAL EXAM:  GENERAL: Somnolent,   HEART: Regular rate and rhythm  EXTREMITIES:  2+ Peripheral Pulses  PSYCH: AAOx1 to self only   NEUROLOGY: non-focal  SKIN: No rashes or lesions    LABS:                    Psychiatry Section:  Psychiatric Summary/[facility] admitting diagnosis:  56 yo M w/ HTN, DM2, polysubstance abuse, history of incarceration and prior psychiatric hospitalizations (dx of bipolar, antisocial personality disorder), presented with homicidal ideation and paranoia in setting of substance abuse, transferred to [facility] for further psychiatric management.     Psychiatric Recommendations:  Hold medications pending further evaluation and med rec     Observation status (check one):   (X) Constant Observation  ( ) Enhanced care  ( ) Routine checks    Risk Status (check all that apply if present):  ( ) at risk for suicide/self-injury  ( ) at risk for aggressive behavior  (X) at risk for elopement  ( ) other risk: [facility] Inpatient to ED Transfer Summary    Reason for Transfer/Medical Summary: Hypotension     56 yo M w/ HTN, DM2, COVID (+).  Pt noted to be hypoglycemic on routine FS this AM to 60 this morning, given juice and encouraged PO intake. On reevaluation, pt found in bed somnolent, FSBG rechecked and found to be 95. Hypotensive to 65/38, on recheck found to be 95/40. HR 63. Patient with AMS, oriented to self only. Drooling, responding to questions with intermittent appropriateness.     PAST MEDICAL & SURGICAL HISTORY:  Diabetes mellitus      HTN (hypertension)      GERD (gastroesophageal reflux disease)      Heart failure          Allergies    Cogentin (Unknown)  Haldol (Unknown)  Thorazine (Rash)    Intolerances        MEDICATIONS  (STANDING):  carvedilol 12.5 milliGRAM(s) Oral every 12 hours  chlordiazePOXIDE   Oral   chlordiazePOXIDE 50 milliGRAM(s) Oral every 6 hours  cloNIDine 0.2 milliGRAM(s) Oral every 12 hours  gabapentin 800 milliGRAM(s) Oral three times a day  influenza   Vaccine 0.5 milliLiter(s) IntraMuscular once  insulin glargine Injectable (LANTUS) 30 Unit(s) SubCutaneous at bedtime  insulin lispro (ADMELOG) corrective regimen sliding scale   SubCutaneous three times a day before meals  insulin lispro (ADMELOG) corrective regimen sliding scale   SubCutaneous three times a day before meals  insulin lispro (ADMELOG) corrective regimen sliding scale   SubCutaneous at bedtime  insulin lispro Injectable (ADMELOG) 10 Unit(s) SubCutaneous three times a day before meals  lithium CR (ESKALITH-CR) 900 milliGRAM(s) Oral at bedtime  methadone    Tablet 30 milliGRAM(s) Oral daily  methadone   Dispersible Tablet 80 milliGRAM(s) Oral daily  metoprolol succinate ER 25 milliGRAM(s) Oral at bedtime  multivitamin/minerals 1 Tablet(s) Oral at bedtime  nicotine - 21 mG/24Hr(s) Patch 1 Patch Transdermal daily  OLANZapine Disintegrating Tablet 10 milliGRAM(s) Oral at bedtime  spironolactone 25 milliGRAM(s) Oral daily  traZODone 100 milliGRAM(s) Oral at bedtime    MEDICATIONS  (PRN):  acetaminophen     Tablet .. 650 milliGRAM(s) Oral every 6 hours PRN Moderate Pain (4 - 6)  benzocaine 15 mG/menthol 3.6 mG Lozenge 1 Lozenge Oral every 2 hours PRN sore throat  dextrose Oral Gel 15 Gram(s) Oral once PRN Blood Glucose LESS THAN 70 milliGRAM(s)/deciliter  guaiFENesin Oral Liquid (Sugar-Free) 100 milliGRAM(s) Oral every 6 hours PRN cough  LORazepam     Tablet 2 milliGRAM(s) Oral every 2 hours PRN Symptom-triggered 2 point increase in CIWA-Ar  LORazepam   Injectable 2 milliGRAM(s) IntraMuscular once PRN agitation 2/2 anxiety  OLANZapine Disintegrating Tablet 5 milliGRAM(s) Oral every 4 hours PRN agiaton or aggression  OLANZapine Injectable 10 milliGRAM(s) IntraMuscular once PRN severe agitation or aggression  sodium chloride 0.65% Nasal 1 Spray(s) Both Nostrils every 4 hours PRN Nasal Congestion  sulindac 150 milliGRAM(s) Oral every 12 hours PRN pain      Vital Signs Last 24 Hrs  T(C): 36.8 (06 Jan 2023 20:59), Max: 37.1 (06 Jan 2023 16:54)  T(F): 98.2 (06 Jan 2023 20:59), Max: 98.7 (06 Jan 2023 16:54)  HR: 63 (07 Jan 2023 06:19) (57 - 63)  BP: 132/72 (07 Jan 2023 06:19) (104/45 - 132/72)  BP(mean): --  RR: 16 (07 Jan 2023 03:15) (16 - 16)  SpO2: --    Parameters below as of 07 Jan 2023 03:15  Patient On (Oxygen Delivery Method): room air      CAPILLARY BLOOD GLUCOSE      POCT Blood Glucose.: 95 mg/dL (07 Jan 2023 12:00)  POCT Blood Glucose.: 60 mg/dL (07 Jan 2023 11:48)  POCT Blood Glucose.: 354 mg/dL (07 Jan 2023 07:48)  POCT Blood Glucose.: 158 mg/dL (06 Jan 2023 20:33)  POCT Blood Glucose.: 325 mg/dL (06 Jan 2023 16:21)        PHYSICAL EXAM:  GENERAL: Somnolent,   HEART: Regular rate and rhythm  EXTREMITIES:  2+ Peripheral Pulses  PSYCH: AAOx1 to self only   NEUROLOGY: non-focal  SKIN: No rashes or lesions    LABS:                    Psychiatry Section:  Psychiatric Summary/[facility] admitting diagnosis:  56 yo M w/ HTN, DM2, polysubstance abuse, history of incarceration and prior psychiatric hospitalizations (dx of bipolar, antisocial personality disorder), presented with homicidal ideation and paranoia in setting of substance abuse, transferred to [facility] for further psychiatric management.     Psychiatric Recommendations:  Hold medications pending further evaluation and med rec     Observation status (check one):   (X) Constant Observation  ( ) Enhanced care  ( ) Routine checks    Risk Status (check all that apply if present):  ( ) at risk for suicide/self-injury  ( ) at risk for aggressive behavior  (X) at risk for elopement  ( ) other risk:

## 2023-01-07 NOTE — ED PROVIDER NOTE - CARE PLAN
Principal Discharge DX:	Hypoglycemia   1 Principal Discharge DX:	Medication overdose  Secondary Diagnosis:	Obtundation  Secondary Diagnosis:	Sinus bradycardia

## 2023-01-07 NOTE — ED ADULT NURSE NOTE - NSIMPLEMENTINTERV_GEN_ALL_ED
Implemented All Fall Risk Interventions:  [place] to call system. Call bell, personal items and telephone within reach. Instruct patient to call for assistance. Room bathroom lighting operational. Non-slip footwear when patient is off stretcher. Physically safe environment: no spills, clutter or unnecessary equipment. Stretcher in lowest position, wheels locked, appropriate side rails in place. Provide visual cue, wrist band, yellow gown, etc. Monitor gait and stability. Monitor for mental status changes and reorient to person, place, and time. Review medications for side effects contributing to fall risk. Reinforce activity limits and safety measures with patient and family.

## 2023-01-07 NOTE — BH INPATIENT PSYCHIATRY PROGRESS NOTE - NSBHFUPINTERVALHXFT_PSY_A_CORE
Chart reviewed and case discussed with treatment team. No events reported overnight. Sleep and appetite is reportedly fair. Patient scored on CIWA and was given Ativan PRN with standing meds. Patient unable to participate in interview due to oversedation. Patient was mumbling and speech was incoherent. RN called rapid response on patient later in the morning for low BP. Patient was speaking clearer but unable to stay awake at that time. Patient also evaluated by JILL and sent to ED for further eval. Patient is compliant with medications, no adverse effects reported.   Chart reviewed and case discussed with treatment team. No events reported overnight. Sleep and appetite is reportedly fair. Patient scored on CIWA and was given Ativan PRN with standing meds. Patient unable to participate in interview due to oversedation. Patient was mumbling and speech was incoherent. RN called rapid response on patient later in the morning for hypoglycemia and hypotension. Patient was speaking clearer but unable to stay awake at that time. Patient also evaluated by JILL and sent to ED for further eval. Patient is compliant with medications, no adverse effects reported.

## 2023-01-08 LAB
A1C WITH ESTIMATED AVERAGE GLUCOSE RESULT: 10.2 % — HIGH (ref 4–5.6)
ANION GAP SERPL CALC-SCNC: 10 MMOL/L — SIGNIFICANT CHANGE UP (ref 7–14)
BASE EXCESS BLDV CALC-SCNC: 5.6 MMOL/L — HIGH (ref -2–3)
BUN SERPL-MCNC: 16 MG/DL — SIGNIFICANT CHANGE UP (ref 7–23)
CALCIUM SERPL-MCNC: 9.6 MG/DL — SIGNIFICANT CHANGE UP (ref 8.4–10.5)
CHLORIDE SERPL-SCNC: 97 MMOL/L — LOW (ref 98–107)
CO2 BLDV-SCNC: 34.6 MMOL/L — HIGH (ref 22–26)
CO2 SERPL-SCNC: 28 MMOL/L — SIGNIFICANT CHANGE UP (ref 22–31)
CREAT SERPL-MCNC: 0.82 MG/DL — SIGNIFICANT CHANGE UP (ref 0.5–1.3)
EGFR: 103 ML/MIN/1.73M2 — SIGNIFICANT CHANGE UP
ESTIMATED AVERAGE GLUCOSE: 246 — SIGNIFICANT CHANGE UP
GLUCOSE BLDC GLUCOMTR-MCNC: 143 MG/DL — HIGH (ref 70–99)
GLUCOSE BLDC GLUCOMTR-MCNC: 196 MG/DL — HIGH (ref 70–99)
GLUCOSE BLDC GLUCOMTR-MCNC: 232 MG/DL — HIGH (ref 70–99)
GLUCOSE SERPL-MCNC: 275 MG/DL — HIGH (ref 70–99)
HCO3 BLDV-SCNC: 33 MMOL/L — HIGH (ref 22–29)
HCT VFR BLD CALC: 40 % — SIGNIFICANT CHANGE UP (ref 39–50)
HGB BLD-MCNC: 13.2 G/DL — SIGNIFICANT CHANGE UP (ref 13–17)
MAGNESIUM SERPL-MCNC: 1.7 MG/DL — SIGNIFICANT CHANGE UP (ref 1.6–2.6)
MCHC RBC-ENTMCNC: 29.4 PG — SIGNIFICANT CHANGE UP (ref 27–34)
MCHC RBC-ENTMCNC: 33 GM/DL — SIGNIFICANT CHANGE UP (ref 32–36)
MCV RBC AUTO: 89.1 FL — SIGNIFICANT CHANGE UP (ref 80–100)
NRBC # BLD: 0 /100 WBCS — SIGNIFICANT CHANGE UP (ref 0–0)
NRBC # FLD: 0 K/UL — SIGNIFICANT CHANGE UP (ref 0–0)
PCO2 BLDV: 58 MMHG — HIGH (ref 42–55)
PH BLDV: 7.36 — SIGNIFICANT CHANGE UP (ref 7.32–7.43)
PHOSPHATE SERPL-MCNC: 3.4 MG/DL — SIGNIFICANT CHANGE UP (ref 2.5–4.5)
PLATELET # BLD AUTO: 234 K/UL — SIGNIFICANT CHANGE UP (ref 150–400)
PO2 BLDV: 59 MMHG — SIGNIFICANT CHANGE UP
POTASSIUM SERPL-MCNC: 4.3 MMOL/L — SIGNIFICANT CHANGE UP (ref 3.5–5.3)
POTASSIUM SERPL-SCNC: 4.3 MMOL/L — SIGNIFICANT CHANGE UP (ref 3.5–5.3)
RBC # BLD: 4.49 M/UL — SIGNIFICANT CHANGE UP (ref 4.2–5.8)
RBC # FLD: 13.1 % — SIGNIFICANT CHANGE UP (ref 10.3–14.5)
SAO2 % BLDV: 89.1 % — SIGNIFICANT CHANGE UP
SODIUM SERPL-SCNC: 135 MMOL/L — SIGNIFICANT CHANGE UP (ref 135–145)
WBC # BLD: 6.24 K/UL — SIGNIFICANT CHANGE UP (ref 3.8–10.5)
WBC # FLD AUTO: 6.24 K/UL — SIGNIFICANT CHANGE UP (ref 3.8–10.5)

## 2023-01-08 PROCEDURE — 99232 SBSQ HOSP IP/OBS MODERATE 35: CPT | Mod: GC

## 2023-01-08 RX ORDER — DEXTROSE 50 % IN WATER 50 %
25 SYRINGE (ML) INTRAVENOUS ONCE
Refills: 0 | Status: DISCONTINUED | OUTPATIENT
Start: 2023-01-08 | End: 2023-01-10

## 2023-01-08 RX ORDER — METHADONE HYDROCHLORIDE 40 MG/1
110 TABLET ORAL DAILY
Refills: 0 | Status: DISCONTINUED | OUTPATIENT
Start: 2023-01-08 | End: 2023-01-10

## 2023-01-08 RX ORDER — INSULIN LISPRO 100/ML
VIAL (ML) SUBCUTANEOUS AT BEDTIME
Refills: 0 | Status: DISCONTINUED | OUTPATIENT
Start: 2023-01-08 | End: 2023-01-10

## 2023-01-08 RX ORDER — INSULIN LISPRO 100/ML
VIAL (ML) SUBCUTANEOUS
Refills: 0 | Status: DISCONTINUED | OUTPATIENT
Start: 2023-01-08 | End: 2023-01-10

## 2023-01-08 RX ORDER — SODIUM CHLORIDE 9 MG/ML
1000 INJECTION, SOLUTION INTRAVENOUS
Refills: 0 | Status: DISCONTINUED | OUTPATIENT
Start: 2023-01-08 | End: 2023-01-10

## 2023-01-08 RX ORDER — DEXTROSE 50 % IN WATER 50 %
12.5 SYRINGE (ML) INTRAVENOUS ONCE
Refills: 0 | Status: DISCONTINUED | OUTPATIENT
Start: 2023-01-08 | End: 2023-01-10

## 2023-01-08 RX ORDER — INSULIN LISPRO 100/ML
5 VIAL (ML) SUBCUTANEOUS
Refills: 0 | Status: DISCONTINUED | OUTPATIENT
Start: 2023-01-08 | End: 2023-01-09

## 2023-01-08 RX ORDER — GLUCAGON INJECTION, SOLUTION 0.5 MG/.1ML
1 INJECTION, SOLUTION SUBCUTANEOUS ONCE
Refills: 0 | Status: DISCONTINUED | OUTPATIENT
Start: 2023-01-08 | End: 2023-01-10

## 2023-01-08 RX ORDER — ENOXAPARIN SODIUM 100 MG/ML
40 INJECTION SUBCUTANEOUS EVERY 24 HOURS
Refills: 0 | Status: DISCONTINUED | OUTPATIENT
Start: 2023-01-08 | End: 2023-01-10

## 2023-01-08 RX ORDER — DEXTROSE 50 % IN WATER 50 %
15 SYRINGE (ML) INTRAVENOUS ONCE
Refills: 0 | Status: DISCONTINUED | OUTPATIENT
Start: 2023-01-08 | End: 2023-01-10

## 2023-01-08 RX ORDER — INSULIN GLARGINE 100 [IU]/ML
15 INJECTION, SOLUTION SUBCUTANEOUS AT BEDTIME
Refills: 0 | Status: DISCONTINUED | OUTPATIENT
Start: 2023-01-08 | End: 2023-01-09

## 2023-01-08 RX ADMIN — Medication 50 MILLIGRAM(S): at 14:19

## 2023-01-08 RX ADMIN — Medication 50 MILLIGRAM(S): at 21:20

## 2023-01-08 RX ADMIN — INSULIN GLARGINE 15 UNIT(S): 100 INJECTION, SOLUTION SUBCUTANEOUS at 23:34

## 2023-01-08 RX ADMIN — OLANZAPINE 10 MILLIGRAM(S): 15 TABLET, FILM COATED ORAL at 01:11

## 2023-01-08 RX ADMIN — Medication 1 TABLET(S): at 10:04

## 2023-01-08 RX ADMIN — Medication 5 UNIT(S): at 13:10

## 2023-01-08 RX ADMIN — OLANZAPINE 10 MILLIGRAM(S): 15 TABLET, FILM COATED ORAL at 21:20

## 2023-01-08 RX ADMIN — Medication 1 PATCH: at 10:05

## 2023-01-08 RX ADMIN — Medication 2 MILLIGRAM(S): at 10:05

## 2023-01-08 RX ADMIN — Medication 5 UNIT(S): at 09:33

## 2023-01-08 RX ADMIN — SPIRONOLACTONE 25 MILLIGRAM(S): 25 TABLET, FILM COATED ORAL at 05:15

## 2023-01-08 RX ADMIN — Medication 1: at 09:34

## 2023-01-08 RX ADMIN — ENOXAPARIN SODIUM 40 MILLIGRAM(S): 100 INJECTION SUBCUTANEOUS at 21:20

## 2023-01-08 RX ADMIN — Medication 2 MILLIGRAM(S): at 01:10

## 2023-01-08 RX ADMIN — LITHIUM CARBONATE 900 MILLIGRAM(S): 300 TABLET, EXTENDED RELEASE ORAL at 01:10

## 2023-01-08 RX ADMIN — LITHIUM CARBONATE 900 MILLIGRAM(S): 300 TABLET, EXTENDED RELEASE ORAL at 22:33

## 2023-01-08 RX ADMIN — Medication 2 MILLIGRAM(S): at 23:34

## 2023-01-08 RX ADMIN — METHADONE HYDROCHLORIDE 110 MILLIGRAM(S): 40 TABLET ORAL at 13:19

## 2023-01-08 RX ADMIN — Medication 2 MILLIGRAM(S): at 19:33

## 2023-01-08 RX ADMIN — Medication 2 MILLIGRAM(S): at 14:47

## 2023-01-08 RX ADMIN — Medication 1: at 13:10

## 2023-01-08 RX ADMIN — Medication 5 UNIT(S): at 17:36

## 2023-01-08 RX ADMIN — Medication 1 PATCH: at 19:53

## 2023-01-08 NOTE — BH INPATIENT PSYCHIATRY DISCHARGE NOTE - NSBHDCHANDOFFFT_PSY_ALL_CORE
[facility] was notified of medical admission.  Primary team to follow up.
[facility] psychiat[facility] was notified of medical admission.  Primary team to follow up.

## 2023-01-08 NOTE — BH INPATIENT PSYCHIATRY DISCHARGE NOTE - HOSPITAL COURSE
Pt was admitted to [facility] 2N COVID+ unit with depression, suicidality, homicidality, in context of medication noncompliance and psychosocial stressors.  Etiology of presentation was likely multifactorial, differential included the following: bipolar depression, persistent depressive disorder, substance induced (intoxication/withdrawal) mood symptoms, adjustment, personality traits vs full disorder, secondary gain.  Pt described daily ETOH use and hx of alcohol withdrawal seizures, pt was started on CIWA with symptom triggered Librium and Ativan PRNs.  Team contacted [facility] ([phone]) who confirmed dose of methadone as 110 mg daily, with last pickup on day prior to admission.  Team also contacted his [facility] pharmacy in [place] ([phone]), confirmed home medications as spironolactone 25mg, clonidine 0.2mg BID, and carvedilol 12.5mg BID, and gabapentin 800mg TID.  Pt was continued on lithium and olanzapine on admission for mood stabilization.  On first day of admission 1/6/23, pt had increasing CIWA scores of 8 to 13.  Pt was placed on Librium taper, starting at 50 mg q4h x 24 hrs, with breakthrough symptom triggered Ativan PRN.  [facility] also met with pt for management of chronic medical problems.  On second day of admission 1/7/23, pt in afternoon was found to be hypoglycemic with fingerstick of 60, BP of 65/38, HR 65, SpO2 100% RA, presented as somnolent, drooling, and oriented x1.  Pt was transferred to [facility] ER and subsequently medically admitted.
Pt was admitted to [facility] 2N COVID+ unit with depression, suicidality, homicidality, in context of medication noncompliance and psychosocial stressors.  Etiology of presentation was likely multifactorial, differential included the following: bipolar depression, persistent depressive disorder, substance induced (intoxication/withdrawal) mood symptoms, adjustment, personality traits vs full disorder, secondary gain.  Pt described daily ETOH use and hx of alcohol withdrawal seizures, pt was started on CIWA with symptom triggered Librium and Ativan PRNs.  Team contacted [facility] ([phone]) who confirmed dose of methadone as 110 mg daily, with last pickup on day prior to admission.  Team also contacted his [facility] pharmacy in [place] ([phone]), confirmed home medications as spironolactone 25mg, clonidine 0.2mg BID, and carvedilol 12.5mg BID, and gabapentin 800mg TID.  Pt was continued on lithium  mg qhs and olanzapine 10 mg qhs on admission for mood stabilization.  On first day of admission 1/6/23, pt had increasing CIWA scores of 8 to 13.  Pt was placed on Librium taper, starting at 50 mg q4h x 24 hrs, with breakthrough symptom triggered Ativan PRN.  [facility] Medicine also met with pt for management of chronic medical problems.  On second day of admission 1/7/23, pt in afternoon was found to be hypoglycemic with fingerstick of 60, BP of 65/38, HR 65, SpO2 100% RA, presented as somnolent, drooling, and oriented x1.  Pt was transferred to [facility] ER and subsequently medically admitted.

## 2023-01-08 NOTE — BH INPATIENT PSYCHIATRY DISCHARGE NOTE - NSDCPROCEDURES_PSY_ALL_CORE
Significant procedures or tests performed during this admission,
Significant procedures or tests performed during this admission,

## 2023-01-08 NOTE — PROGRESS NOTE ADULT - PROBLEM SELECTOR PLAN 4
Patient presented with bradycardia to the 40s, asymptomatic  Possibly i/s/o medication use (carvedilol, metoprolol?)  - Continue to monitor on telemetry  - Restart medications w/ hold parameters as per below Patient presented with bradycardia to the 40s, asymptomatic  Possibly i/s/o medication use (carvedilol, metoprolol?)  - Continue to monitor on telemetry  - hold clonidine and carvedilol

## 2023-01-08 NOTE — BH INPATIENT PSYCHIATRY DISCHARGE NOTE - NSCURPASTPSYDX_PSY_ALL_CORE
Mood disorder/Alcohol/Substance Use disorders/Cluster B Personality disorder/traits
Mood disorder/Alcohol/Substance Use disorders/Cluster B Personality disorder/traits

## 2023-01-08 NOTE — BH INPATIENT PSYCHIATRY DISCHARGE NOTE - NSTXRELFACTOR_PSY_ALL_CORE
Non-compliant or not receiving treatment/Recent inpatient discharge
Non-compliant or not receiving treatment/Recent inpatient discharge

## 2023-01-08 NOTE — BH INPATIENT PSYCHIATRY DISCHARGE NOTE - NSACTIVEVENT_PSY_ALL_CORE
Triggering events leading to humiliation, shame, and/or despair (e.g., Loss of relationship, financial or health status) (real or anticipated)/Current or pending social isolation/Chronic pain or other acute medical condition/Substance intoxication or withdrawal/Inadequate social supports
Triggering events leading to humiliation, shame, and/or despair (e.g., Loss of relationship, financial or health status) (real or anticipated)/Current or pending social isolation/Chronic pain or other acute medical condition/Substance intoxication or withdrawal/Inadequate social supports

## 2023-01-08 NOTE — BH INPATIENT PSYCHIATRY DISCHARGE NOTE - NSBHSUBSTUSED_PSY_A_CORE
Alcohol/Benzodiazepines/Cocaine/Crack/Heroin/Opiates
Alcohol/Benzodiazepines/Cocaine/Crack/Heroin/Opiates

## 2023-01-08 NOTE — BH INPATIENT PSYCHIATRY DISCHARGE NOTE - NSBHSUICIDESTATUS_PSY_ALL_CORE
Remains acute risk of harm to self and others due to recent suicidality/homicidality.  Requires medical admission.
Remains acute risk of harm to self and others due to recent suicidality/homicidality.  Requires medical admission.

## 2023-01-08 NOTE — PATIENT PROFILE ADULT - FALL HARM RISK - HARM RISK INTERVENTIONS

## 2023-01-08 NOTE — PROGRESS NOTE ADULT - PROBLEM SELECTOR PLAN 6
Patient w/ reported history of BPD, antisocial personality disorder  Presented to [facility] w/ homicidal ideation & paranoia  Lithium level low  - Continue Lithium 900mg qhs  - Continue Zyprexa ODT 10mg qhs  --> Continue Zyprexa ODT 5mg q4hr agitation  - Hold trazodone 100mg qhs for now  - Hold gabapentin 800 tid for now, but would reinitiate this medication first if remains lucid   - Contact psych in AM for further recommendations

## 2023-01-08 NOTE — BH INPATIENT PSYCHIATRY DISCHARGE NOTE - NSBHSAALC_PSY_A_CORE FT
Daily use - Pt reports drinking 1-2 liters of vodka a day. 
Daily use - Pt reports drinking 1-2 liters of vodka a day.
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
## 2023-01-08 NOTE — BH INPATIENT PSYCHIATRY DISCHARGE NOTE - NSBHDCMEDICALFT_PSY_A_CORE
Pt received treatment for his chronic medical problems.  Pt was also given PRNs for URI symptoms secondary to COVID.  Pt required transfer to [facility] ER and was subsequently medically admitted as described above.
Pt received treatment for his chronic medical problems.  Pt was also given PRNs for URI symptoms secondary to COVID.  Pt required transfer to [facility] ER and was subsequently medically admitted as described above.

## 2023-01-08 NOTE — PROGRESS NOTE ADULT - PROBLEM SELECTOR PLAN 8
- Continue home coreg w/ hold parameters  - Continue spironolactone  - Continue clonidine - hold coreg and clonidine  - Continue spironolactone

## 2023-01-08 NOTE — PATIENT PROFILE ADULT - FLU SEASON?
Additional Note: Reassured benign in nature. Detail Level: Zone Hide Include Location In Plan Question?: No Yes...

## 2023-01-08 NOTE — PROGRESS NOTE ADULT - PROBLEM SELECTOR PLAN 1
Patient presented with alteration in mental status w/ drooling, hypotension, bradycardia, hypoglycemia. Received naloxone 2mg IV with improvement in mental status. Patient also noted w/ miosis & hyporeflexia on arrival to ED. Signs & symptoms conenring for medication overdose i/s/o polypharmacy. s/p MICU & toxicology consults.  - Toxicology on board, appreciate recs  - Telemetry for monitoring of bradycardia  - EtCO2 monitoring for hypercapnia  - Careful re-initiation of medications  - No indication for further naloxone  - Close monitoring of sedative-hypnotic/alcohol withdrawal  - consider glucagon 5-10 mg IV over 3 minutes to see if bradycardia improves. If improvement, likely a component of carvedilol and/or metoprolol induced toxicity. Slow push decreases risk of emesis. Patient presented with alteration in mental status w/ drooling, hypotension, bradycardia, hypoglycemia. Received naloxone 2mg IV with improvement in mental status. Patient also noted w/ miosis & hyporeflexia on arrival to ED. Signs & symptoms conenring for medication overdose i/s/o polypharmacy. s/p MICU & toxicology consults.  - Telemetry for monitoring of bradycardia  - EtCO2 monitoring for hypercapnia  - No indication for further naloxone  - Close monitoring of sedative-hypnotic/alcohol withdrawal  - dc carvedilol, clonidine

## 2023-01-08 NOTE — BH INPATIENT PSYCHIATRY DISCHARGE NOTE - NSDCMRMEDTOKEN_GEN_ALL_CORE_FT
acetaminophen 650 mg oral tablet: 1 tab(s) orally every 6 hours, As Needed  Ativan 2 mg oral tablet: 1 tab(s) orally every 2 hours, As Needed  carvedilol 12.5 mg oral tablet: 1 tab(s) orally 2 times a day  cloNIDine 0.2 mg oral tablet: 1 tab(s) orally 2 times a day  guaiFENesin 100 mg/5 mL oral liquid: 5 milliliter(s) orally every 6 hours, As Needed  Librium 25 mg oral capsule: 2 cap(s) orally every 4 hours  lithium carbonate extended release: 900 milligram(s) orally once a day (at bedtime)  menthol-benzocaine 3.6 mg-15 mg mucous membrane lozenge: 1 lozenge mucous membrane every 2 hours, As Needed  methadone 10 mg oral tablet: 11 tab(s) orally once a day  Multiple Vitamins oral capsule: 1 cap(s) orally once a day  Neurontin 400 mg oral capsule: 2 cap(s) orally 3 times a day  nicotine 14 mg/24 hr transdermal film, extended release: 14 milligram(s) transdermal once a day   Ocean 0.65% nasal spray: 1 spray(s) nasal 4 times a day, As Needed  OLANZapine 15 mg oral tablet: 10 milligram(s) orally once a day (at bedtime)  OLANZapine 5 mg oral tablet, disintegratin tab(s) orally every 4 hours, As Needed  spironolactone 25 mg oral tablet: 1 tab(s) orally once a day  sulindac 150 mg oral tablet: 1 tab(s) orally 2 times a day, As Needed  Toprol-XL 25 mg oral tablet, extended release: 1 tab(s) orally once a day (at bedtime)  traZODone 100 mg oral tablet: 1 tab(s) orally once a day (at bedtime)  
acetaminophen 650 mg oral tablet: 1 tab(s) orally every 6 hours, As Needed  Ativan 2 mg oral tablet: 1 tab(s) orally every 2 hours, As Needed  carvedilol 12.5 mg oral tablet: 1 tab(s) orally 2 times a day  cloNIDine 0.2 mg oral tablet: 1 tab(s) orally 2 times a day  guaiFENesin 100 mg/5 mL oral liquid: 5 milliliter(s) orally every 6 hours, As Needed  Librium 25 mg oral capsule: 2 cap(s) orally every 4 hours  lithium carbonate extended release: 900 milligram(s) orally once a day (at bedtime)  menthol-benzocaine 3.6 mg-15 mg mucous membrane lozenge: 1 lozenge mucous membrane every 2 hours, As Needed  methadone 10 mg oral tablet: 11 tab(s) orally once a day  Multiple Vitamins oral capsule: 1 cap(s) orally once a day  Neurontin 400 mg oral capsule: 2 cap(s) orally 3 times a day  nicotine 14 mg/24 hr transdermal film, extended release: 14 milligram(s) transdermal once a day   Ocean 0.65% nasal spray: 1 spray(s) nasal 4 times a day, As Needed  OLANZapine 15 mg oral tablet: 10 milligram(s) orally once a day (at bedtime)  OLANZapine 5 mg oral tablet, disintegratin tab(s) orally every 4 hours, As Needed  spironolactone 25 mg oral tablet: 1 tab(s) orally once a day  sulindac 150 mg oral tablet: 1 tab(s) orally 2 times a day, As Needed  Toprol-XL 25 mg oral tablet, extended release: 1 tab(s) orally once a day (at bedtime)  traZODone 100 mg oral tablet: 1 tab(s) orally once a day (at bedtime)

## 2023-01-08 NOTE — BH INPATIENT PSYCHIATRY DISCHARGE NOTE - NSBHMETABOLIC_PSY_ALL_CORE_FT
BMI: BMI (kg/m2): 26.6 (01-07-23 @ 13:02)  HbA1c: A1C with Estimated Average Glucose Result: 10.2 % (01-08-23 @ 05:32)    Glucose: POCT Blood Glucose.: 290 mg/dL (01-07-23 @ 21:26)    BP: 132/72 (01-07-23 @ 06:19) (104/45 - 132/72)  Lipid Panel: Date/Time: 02-18-22 @ 07:38  Cholesterol, Serum: 104  Direct LDL: --  HDL Cholesterol, Serum: 40  Total Cholesterol/HDL Ration Measurement: --  Triglycerides, Serum: 157  
BMI: BMI (kg/m2): 26.6 (01-07-23 @ 13:02)  HbA1c: A1C with Estimated Average Glucose Result: 10.2 % (01-08-23 @ 05:32)    Glucose: POCT Blood Glucose.: 290 mg/dL (01-07-23 @ 21:26)    BP: 132/72 (01-07-23 @ 06:19) (104/45 - 132/72)  Lipid Panel: Date/Time: 02-18-22 @ 07:38  Cholesterol, Serum: 104  Direct LDL: --  HDL Cholesterol, Serum: 40  Total Cholesterol/HDL Ration Measurement: --  Triglycerides, Serum: 157

## 2023-01-08 NOTE — PROGRESS NOTE ADULT - PROBLEM SELECTOR PLAN 7
Questionable history of HF reported by patient  No TTE found on chart review  Appears euvolemic on exam  - Continue coreg w/ hold parameters  - Continue spironolactone Questionable history of HF reported by patient  No TTE found on chart review  Appears euvolemic on exam  - hold coreg  - Continue spironolactone

## 2023-01-08 NOTE — BH INPATIENT PSYCHIATRY DISCHARGE NOTE - NSTOBACCOMEDADM_PSY_A_CORE
patient to continue on over the counter cessation medication
patient to continue on over the counter cessation medication

## 2023-01-08 NOTE — PROGRESS NOTE ADULT - SUBJECTIVE AND OBJECTIVE BOX
DATE OF SERVICE: 01-08-23 @ 07:50    Patient is a 57y old  Male who presents with a chief complaint of Suspected overdose (07 Jan 2023 22:28)      SUBJECTIVE / OVERNIGHT EVENTS:  Overnight, admitted for medication overdose. Toxicology consulted. Started on CIWA protocol.   Pt seen and examined at bedside.    ROS negative except as above.    MEDICATIONS  (STANDING):  carvedilol 12.5 milliGRAM(s) Oral every 12 hours  cloNIDine 0.2 milliGRAM(s) Oral two times a day  dextrose 5%. 1000 milliLiter(s) (100 mL/Hr) IV Continuous <Continuous>  dextrose 5%. 1000 milliLiter(s) (50 mL/Hr) IV Continuous <Continuous>  dextrose 50% Injectable 25 Gram(s) IV Push once  dextrose 50% Injectable 12.5 Gram(s) IV Push once  dextrose 50% Injectable 25 Gram(s) IV Push once  enoxaparin Injectable 40 milliGRAM(s) SubCutaneous every 24 hours  glucagon  Injectable 1 milliGRAM(s) IntraMuscular once  insulin glargine Injectable (LANTUS) 15 Unit(s) SubCutaneous at bedtime  insulin lispro (ADMELOG) corrective regimen sliding scale   SubCutaneous three times a day before meals  insulin lispro (ADMELOG) corrective regimen sliding scale   SubCutaneous at bedtime  insulin lispro Injectable (ADMELOG) 5 Unit(s) SubCutaneous three times a day before meals  lithium CR (ESKALITH-CR) 900 milliGRAM(s) Oral at bedtime  multivitamin 1 Tablet(s) Oral daily  nicotine -  14 mG/24Hr(s) Patch 1 Patch Transdermal daily  OLANZapine 10 milliGRAM(s) Oral at bedtime  spironolactone 25 milliGRAM(s) Oral daily    MEDICATIONS  (PRN):  acetaminophen     Tablet .. 650 milliGRAM(s) Oral every 6 hours PRN Temp greater or equal to 38C (100.4F), Mild Pain (1 - 3)  benzocaine 15 mG/menthol 3.6 mG Lozenge 1 Lozenge Oral every 2 hours PRN Sore Throat  dextrose Oral Gel 15 Gram(s) Oral once PRN Blood Glucose LESS THAN 70 milliGRAM(s)/deciliter  guaiFENesin Oral Liquid (Sugar-Free) 100 milliGRAM(s) Oral every 6 hours PRN Cough  LORazepam     Tablet 2 milliGRAM(s) Oral every 2 hours PRN Symptom-triggered 2 point increase in CIWA-Ar  LORazepam     Tablet 2 milliGRAM(s) Oral every 1 hour PRN Symptom-triggered: each CIWA -Ar score 8 or GREATER  OLANZapine Disintegrating Tablet 5 milliGRAM(s) Oral every 4 hours PRN Agitation/Aggression  sodium chloride 0.65% Nasal 1 Spray(s) Both Nostrils every 4 hours PRN Nasal Congestion      Vital Signs Last 24 Hrs  T(C): 36.6 (08 Jan 2023 07:30), Max: 37 (08 Jan 2023 03:20)  T(F): 97.8 (08 Jan 2023 07:30), Max: 98.6 (08 Jan 2023 03:20)  HR: 52 (08 Jan 2023 07:30) (42 - 60)  BP: 113/86 (08 Jan 2023 07:30) (113/86 - 191/94)  BP(mean): --  RR: 18 (08 Jan 2023 07:30) (10 - 19)  SpO2: 100% (08 Jan 2023 07:30) (95% - 100%)    Parameters below as of 08 Jan 2023 07:30  Patient On (Oxygen Delivery Method): room air      CAPILLARY BLOOD GLUCOSE      POCT Blood Glucose.: 290 mg/dL (07 Jan 2023 21:26)  POCT Blood Glucose.: 138 mg/dL (07 Jan 2023 18:42)  POCT Blood Glucose.: 114 mg/dL (07 Jan 2023 17:06)  POCT Blood Glucose.: 75 mg/dL (07 Jan 2023 15:55)  POCT Blood Glucose.: 70 mg/dL (07 Jan 2023 15:36)  POCT Blood Glucose.: 177 mg/dL (07 Jan 2023 14:37)  POCT Blood Glucose.: 286 mg/dL (07 Jan 2023 14:31)  POCT Blood Glucose.: >600 mg/dL (07 Jan 2023 14:30)  POCT Blood Glucose.: 73 mg/dL (07 Jan 2023 13:55)  POCT Blood Glucose.: 95 mg/dL (07 Jan 2023 12:00)  POCT Blood Glucose.: 60 mg/dL (07 Jan 2023 11:48)    I&O's Summary    07 Jan 2023 07:01  -  08 Jan 2023 07:00  --------------------------------------------------------  IN: 0 mL / OUT: 1800 mL / NET: -1800 mL    08 Jan 2023 07:01  -  08 Jan 2023 07:50  --------------------------------------------------------  IN: 0 mL / OUT: 600 mL / NET: -600 mL        PHYSICAL EXAM:  GENERAL: NAD, well-developed  HEAD:  Atraumatic, Normocephalic  EYES: EOMI, PERRLA, conjunctiva and sclera clear  NECK: Supple, No JVD  CHEST/LUNG: Clear to auscultation bilaterally; No wheeze  HEART: Regular rate and rhythm; No murmurs, rubs, or gallops  ABDOMEN: Soft, Nontender, Nondistended; Bowel sounds present  EXTREMITIES:  2+ Peripheral Pulses, No clubbing, cyanosis, or edema  PSYCH: AAOx3  NEUROLOGY: non-focal  SKIN: No rashes or lesions    LABS:                        13.2   6.24  )-----------( 234      ( 08 Jan 2023 05:32 )             40.0     01-08    135  |  97<L>  |  16  ----------------------------<  275<H>  4.3   |  28  |  0.82    Ca    9.6      08 Jan 2023 05:32  Phos  3.4     01-08  Mg     1.70     01-08    TPro  7.0  /  Alb  4.2  /  TBili  <0.2  /  DBili  x   /  AST  16  /  ALT  18  /  AlkPhos  78  01-07              MICRO:      RADIOLOGY & ADDITIONAL TESTS:      Consultant(s) Notes Reviewed:         DATE OF SERVICE: 01-08-23 @ 07:50    Patient is a 57y old  Male who presents with a chief complaint of Suspected overdose (07 Jan 2023 22:28)      SUBJECTIVE / OVERNIGHT EVENTS:  Overnight, admitted for medication overdose. Toxicology consulted. Started on CIWA symptom trigger ativan.  Pt seen and examined at bedside. Agitated and anxious this morning because he has not been receiving his librium and methadone. Clarifying med rec with ariel. no respiratory complaints. Feeling restless despite being able to lay still.    ROS negative except as above.    MEDICATIONS  (STANDING):  carvedilol 12.5 milliGRAM(s) Oral every 12 hours  cloNIDine 0.2 milliGRAM(s) Oral two times a day  dextrose 5%. 1000 milliLiter(s) (100 mL/Hr) IV Continuous <Continuous>  dextrose 5%. 1000 milliLiter(s) (50 mL/Hr) IV Continuous <Continuous>  dextrose 50% Injectable 25 Gram(s) IV Push once  dextrose 50% Injectable 12.5 Gram(s) IV Push once  dextrose 50% Injectable 25 Gram(s) IV Push once  enoxaparin Injectable 40 milliGRAM(s) SubCutaneous every 24 hours  glucagon  Injectable 1 milliGRAM(s) IntraMuscular once  insulin glargine Injectable (LANTUS) 15 Unit(s) SubCutaneous at bedtime  insulin lispro (ADMELOG) corrective regimen sliding scale   SubCutaneous three times a day before meals  insulin lispro (ADMELOG) corrective regimen sliding scale   SubCutaneous at bedtime  insulin lispro Injectable (ADMELOG) 5 Unit(s) SubCutaneous three times a day before meals  lithium CR (ESKALITH-CR) 900 milliGRAM(s) Oral at bedtime  multivitamin 1 Tablet(s) Oral daily  nicotine -  14 mG/24Hr(s) Patch 1 Patch Transdermal daily  OLANZapine 10 milliGRAM(s) Oral at bedtime  spironolactone 25 milliGRAM(s) Oral daily    MEDICATIONS  (PRN):  acetaminophen     Tablet .. 650 milliGRAM(s) Oral every 6 hours PRN Temp greater or equal to 38C (100.4F), Mild Pain (1 - 3)  benzocaine 15 mG/menthol 3.6 mG Lozenge 1 Lozenge Oral every 2 hours PRN Sore Throat  dextrose Oral Gel 15 Gram(s) Oral once PRN Blood Glucose LESS THAN 70 milliGRAM(s)/deciliter  guaiFENesin Oral Liquid (Sugar-Free) 100 milliGRAM(s) Oral every 6 hours PRN Cough  LORazepam     Tablet 2 milliGRAM(s) Oral every 2 hours PRN Symptom-triggered 2 point increase in CIWA-Ar  LORazepam     Tablet 2 milliGRAM(s) Oral every 1 hour PRN Symptom-triggered: each CIWA -Ar score 8 or GREATER  OLANZapine Disintegrating Tablet 5 milliGRAM(s) Oral every 4 hours PRN Agitation/Aggression  sodium chloride 0.65% Nasal 1 Spray(s) Both Nostrils every 4 hours PRN Nasal Congestion      Vital Signs Last 24 Hrs  T(C): 36.6 (08 Jan 2023 07:30), Max: 37 (08 Jan 2023 03:20)  T(F): 97.8 (08 Jan 2023 07:30), Max: 98.6 (08 Jan 2023 03:20)  HR: 52 (08 Jan 2023 07:30) (42 - 60)  BP: 113/86 (08 Jan 2023 07:30) (113/86 - 191/94)  BP(mean): --  RR: 18 (08 Jan 2023 07:30) (10 - 19)  SpO2: 100% (08 Jan 2023 07:30) (95% - 100%)    Parameters below as of 08 Jan 2023 07:30  Patient On (Oxygen Delivery Method): room air      CAPILLARY BLOOD GLUCOSE      POCT Blood Glucose.: 290 mg/dL (07 Jan 2023 21:26)  POCT Blood Glucose.: 138 mg/dL (07 Jan 2023 18:42)  POCT Blood Glucose.: 114 mg/dL (07 Jan 2023 17:06)  POCT Blood Glucose.: 75 mg/dL (07 Jan 2023 15:55)  POCT Blood Glucose.: 70 mg/dL (07 Jan 2023 15:36)  POCT Blood Glucose.: 177 mg/dL (07 Jan 2023 14:37)  POCT Blood Glucose.: 286 mg/dL (07 Jan 2023 14:31)  POCT Blood Glucose.: >600 mg/dL (07 Jan 2023 14:30)  POCT Blood Glucose.: 73 mg/dL (07 Jan 2023 13:55)  POCT Blood Glucose.: 95 mg/dL (07 Jan 2023 12:00)  POCT Blood Glucose.: 60 mg/dL (07 Jan 2023 11:48)    I&O's Summary    07 Jan 2023 07:01  -  08 Jan 2023 07:00  --------------------------------------------------------  IN: 0 mL / OUT: 1800 mL / NET: -1800 mL    08 Jan 2023 07:01  -  08 Jan 2023 07:50  --------------------------------------------------------  IN: 0 mL / OUT: 600 mL / NET: -600 mL        PHYSICAL EXAM:  GENERAL: agitated and anxious because he has not received his librium and methadone yet  HEAD:  Atraumatic, Normocephalic  NOSE: nasal congestion  EYES: EOMI, PERRLA, conjunctiva and sclera clear  NECK: Supple, No JVD  CHEST/LUNG: Clear to auscultation bilaterally; No wheeze  HEART: Regular rate and rhythm; No murmurs, rubs, or gallops  ABDOMEN: Soft, Nontender, Nondistended; Bowel sounds present  EXTREMITIES:  2+ Peripheral Pulses, No clubbing, cyanosis, or edema  PSYCH: AAOx3  NEUROLOGY: COWS score 8, CIWA score 4, slight tremor observed with outstretched hands  SKIN: No rashes or lesions    LABS:                        13.2   6.24  )-----------( 234      ( 08 Jan 2023 05:32 )             40.0     01-08    135  |  97<L>  |  16  ----------------------------<  275<H>  4.3   |  28  |  0.82    Ca    9.6      08 Jan 2023 05:32  Phos  3.4     01-08  Mg     1.70     01-08    TPro  7.0  /  Alb  4.2  /  TBili  <0.2  /  DBili  x   /  AST  16  /  ALT  18  /  AlkPhos  78  01-07              MICRO:      RADIOLOGY & ADDITIONAL TESTS:      Consultant(s) Notes Reviewed:

## 2023-01-08 NOTE — BH INPATIENT PSYCHIATRY DISCHARGE NOTE - NSDCCPCAREPLAN_GEN_ALL_CORE_FT
PRINCIPAL DISCHARGE DIAGNOSIS  Diagnosis: Bipolar disorder, unspecified  Assessment and Plan of Treatment: Requires medical admission      SECONDARY DISCHARGE DIAGNOSES  Diagnosis: Substance use disorder  Assessment and Plan of Treatment: Requires medical admission    
PRINCIPAL DISCHARGE DIAGNOSIS  Diagnosis: Bipolar disorder, unspecified  Assessment and Plan of Treatment: Requires medical admission      SECONDARY DISCHARGE DIAGNOSES  Diagnosis: Substance use disorder  Assessment and Plan of Treatment: Requires medical admission

## 2023-01-08 NOTE — PROGRESS NOTE ADULT - ASSESSMENT
57yoM with PMH of DM2 (on Lantus 40u QHS and admelog 20 TID), psychiatry disorders (diazepam 10 BID, neurontin 800mg TID, lithium 600mg BID, Zyprexa 15mg QHS) daily smoker 50 pack year (on nicotine patch), CHF/HTN (spironolactone 25mg QD, carvedilol 6.25mg BID, losartan 25 QD), presented from [facility] for altered mentation in presumed medication overdose.  57yoM with PMH of DM2 (on Lantus 40u QHS and admelog 20 TID), psychiatry disorders (diazepam 10 BID, neurontin 800mg TID, lithium 600mg BID, Zyprexa 15mg QHS) daily smoker 50 pack year (on nicotine patch), CHF/HTN (spironolactone 25mg QD, carvedilol 6.25mg BID, losartan 25 QD), presented from [facility] for altered mentation in presumed medication overdose.

## 2023-01-08 NOTE — PROGRESS NOTE ADULT - PROBLEM SELECTOR PLAN 2
Patient has complex history of polysubstance abuse including alcohol, marijuana, cocaine, heroin  He has been on methadone maintenance through [facility] in [place]  Presented to [facility] w/ homicidal ideation and paranoia i/s/o alcohol & cocaine use, on CIWA at facility  Last drink 1/4  - Holding methadone currently i/s/o possible overdose  - Restart symptom triggered CIWA for now  --> Low threshold to restart Librium taper if symptoms uncontrolled w/ Ativan  - appreciate recommendations of psychiatry Patient has complex history of polysubstance abuse including alcohol, marijuana, cocaine, heroin  He has been on methadone maintenance through [facility] in [place]  Presented to [facility] w/ homicidal ideation and paranoia i/s/o alcohol & cocaine use, on CIWA at facility  Last drink 1/4  - resume methadone 110 mg daily per psych with hold parameters for respiratory depression  - c/w symptom triggered CIWA po ativan  - will restart librium taper after verifying received doses with [facility] pharmacy  - appreciate recommendations of psychiatry

## 2023-01-08 NOTE — PROGRESS NOTE ADULT - PROBLEM SELECTOR PLAN 3
Patient w/ history of uncontrolled DM  At [facility] patient with fluctuating hyper- & hypoglycemic episodes  Was on lantus 30u qhs & ademlog 10u tid  - Check A1C  - Start lantus 15u qhs & admelog 5u tid  - ISS Patient w/ history of uncontrolled DM. A1c 10.2  At [facility] patient with fluctuating hyper- & hypoglycemic episodes  Was on lantus 30u qhs & ademlog 10u tid  - c/w lantus 15u qhs & admelog 5u tid  - ISS

## 2023-01-08 NOTE — BH INPATIENT PSYCHIATRY DISCHARGE NOTE - NSDCHC_MEDRECSTATUS_GEN_ALL_CORE
Admission Reconciliation is Not Complete  Discharge Reconciliation is Not Complete
Admission Reconciliation is Not Complete  Discharge Reconciliation is Not Complete

## 2023-01-08 NOTE — BH INPATIENT PSYCHIATRY DISCHARGE NOTE - NSICDXPASTMEDICALHX_GEN_ALL_CORE_FT
PAST MEDICAL HISTORY:  Diabetes mellitus     GERD (gastroesophageal reflux disease)     Heart failure     History of pituitary tumor     HTN (hypertension)     
PAST MEDICAL HISTORY:  Diabetes mellitus     GERD (gastroesophageal reflux disease)     Heart failure     History of pituitary tumor     HTN (hypertension)

## 2023-01-08 NOTE — BH INPATIENT PSYCHIATRY DISCHARGE NOTE - NSBHDCBILLING_PSY_ALL_CORE
77443 (Hospital discharge day management; 30 min or less)
58649 (Hospital discharge day management; 30 min or less)

## 2023-01-09 DIAGNOSIS — F31.9 BIPOLAR DISORDER, UNSPECIFIED: ICD-10-CM

## 2023-01-09 DIAGNOSIS — F60.2 ANTISOCIAL PERSONALITY DISORDER: ICD-10-CM

## 2023-01-09 DIAGNOSIS — Z86.59 PERSONAL HISTORY OF OTHER MENTAL AND BEHAVIORAL DISORDERS: ICD-10-CM

## 2023-01-09 LAB
ALBUMIN SERPL ELPH-MCNC: 4.1 G/DL — SIGNIFICANT CHANGE UP (ref 3.3–5)
ALP SERPL-CCNC: 75 U/L — SIGNIFICANT CHANGE UP (ref 40–120)
ALT FLD-CCNC: 19 U/L — SIGNIFICANT CHANGE UP (ref 4–41)
ANION GAP SERPL CALC-SCNC: 12 MMOL/L — SIGNIFICANT CHANGE UP (ref 7–14)
AST SERPL-CCNC: 18 U/L — SIGNIFICANT CHANGE UP (ref 4–40)
BILIRUB DIRECT SERPL-MCNC: <0.2 MG/DL — SIGNIFICANT CHANGE UP (ref 0–0.3)
BILIRUB INDIRECT FLD-MCNC: SIGNIFICANT CHANGE UP MG/DL (ref 0–1)
BILIRUB SERPL-MCNC: <0.2 MG/DL — SIGNIFICANT CHANGE UP (ref 0.2–1.2)
BUN SERPL-MCNC: 20 MG/DL — SIGNIFICANT CHANGE UP (ref 7–23)
CALCIUM SERPL-MCNC: 9.8 MG/DL — SIGNIFICANT CHANGE UP (ref 8.4–10.5)
CHLORIDE SERPL-SCNC: 97 MMOL/L — LOW (ref 98–107)
CO2 SERPL-SCNC: 28 MMOL/L — SIGNIFICANT CHANGE UP (ref 22–31)
CREAT SERPL-MCNC: 1.1 MG/DL — SIGNIFICANT CHANGE UP (ref 0.5–1.3)
EGFR: 79 ML/MIN/1.73M2 — SIGNIFICANT CHANGE UP
GLUCOSE BLDC GLUCOMTR-MCNC: 107 MG/DL — HIGH (ref 70–99)
GLUCOSE BLDC GLUCOMTR-MCNC: 172 MG/DL — HIGH (ref 70–99)
GLUCOSE BLDC GLUCOMTR-MCNC: 181 MG/DL — HIGH (ref 70–99)
GLUCOSE BLDC GLUCOMTR-MCNC: 204 MG/DL — HIGH (ref 70–99)
GLUCOSE BLDC GLUCOMTR-MCNC: 251 MG/DL — HIGH (ref 70–99)
GLUCOSE BLDC GLUCOMTR-MCNC: 261 MG/DL — HIGH (ref 70–99)
GLUCOSE SERPL-MCNC: 199 MG/DL — HIGH (ref 70–99)
HCT VFR BLD CALC: 43.1 % — SIGNIFICANT CHANGE UP (ref 39–50)
HGB BLD-MCNC: 14.2 G/DL — SIGNIFICANT CHANGE UP (ref 13–17)
MAGNESIUM SERPL-MCNC: 1.6 MG/DL — SIGNIFICANT CHANGE UP (ref 1.6–2.6)
MCHC RBC-ENTMCNC: 29.6 PG — SIGNIFICANT CHANGE UP (ref 27–34)
MCHC RBC-ENTMCNC: 32.9 GM/DL — SIGNIFICANT CHANGE UP (ref 32–36)
MCV RBC AUTO: 90 FL — SIGNIFICANT CHANGE UP (ref 80–100)
NRBC # BLD: 0 /100 WBCS — SIGNIFICANT CHANGE UP (ref 0–0)
NRBC # FLD: 0 K/UL — SIGNIFICANT CHANGE UP (ref 0–0)
PHOSPHATE SERPL-MCNC: 4 MG/DL — SIGNIFICANT CHANGE UP (ref 2.5–4.5)
PLATELET # BLD AUTO: 264 K/UL — SIGNIFICANT CHANGE UP (ref 150–400)
POTASSIUM SERPL-MCNC: 4.2 MMOL/L — SIGNIFICANT CHANGE UP (ref 3.5–5.3)
POTASSIUM SERPL-SCNC: 4.2 MMOL/L — SIGNIFICANT CHANGE UP (ref 3.5–5.3)
PROT SERPL-MCNC: 7 G/DL — SIGNIFICANT CHANGE UP (ref 6–8.3)
RBC # BLD: 4.79 M/UL — SIGNIFICANT CHANGE UP (ref 4.2–5.8)
RBC # FLD: 13.4 % — SIGNIFICANT CHANGE UP (ref 10.3–14.5)
SODIUM SERPL-SCNC: 137 MMOL/L — SIGNIFICANT CHANGE UP (ref 135–145)
WBC # BLD: 5.85 K/UL — SIGNIFICANT CHANGE UP (ref 3.8–10.5)
WBC # FLD AUTO: 5.85 K/UL — SIGNIFICANT CHANGE UP (ref 3.8–10.5)

## 2023-01-09 PROCEDURE — 99223 1ST HOSP IP/OBS HIGH 75: CPT

## 2023-01-09 PROCEDURE — 99233 SBSQ HOSP IP/OBS HIGH 50: CPT | Mod: GC

## 2023-01-09 RX ORDER — DIPHENHYDRAMINE HCL 50 MG
50 CAPSULE ORAL EVERY 6 HOURS
Refills: 0 | Status: DISCONTINUED | OUTPATIENT
Start: 2023-01-09 | End: 2023-01-10

## 2023-01-09 RX ORDER — OLANZAPINE 15 MG/1
5 TABLET, FILM COATED ORAL EVERY 6 HOURS
Refills: 0 | Status: DISCONTINUED | OUTPATIENT
Start: 2023-01-09 | End: 2023-01-10

## 2023-01-09 RX ORDER — INSULIN LISPRO 100/ML
7 VIAL (ML) SUBCUTANEOUS
Refills: 0 | Status: DISCONTINUED | OUTPATIENT
Start: 2023-01-09 | End: 2023-01-10

## 2023-01-09 RX ORDER — INSULIN GLARGINE 100 [IU]/ML
20 INJECTION, SOLUTION SUBCUTANEOUS AT BEDTIME
Refills: 0 | Status: DISCONTINUED | OUTPATIENT
Start: 2023-01-09 | End: 2023-01-10

## 2023-01-09 RX ORDER — INSULIN LISPRO 100/ML
7 VIAL (ML) SUBCUTANEOUS ONCE
Refills: 0 | Status: COMPLETED | OUTPATIENT
Start: 2023-01-09 | End: 2023-01-09

## 2023-01-09 RX ADMIN — Medication 650 MILLIGRAM(S): at 01:17

## 2023-01-09 RX ADMIN — Medication 7 UNIT(S): at 13:03

## 2023-01-09 RX ADMIN — Medication 1 PATCH: at 08:53

## 2023-01-09 RX ADMIN — Medication 1 TABLET(S): at 11:59

## 2023-01-09 RX ADMIN — Medication 75 MILLIGRAM(S): at 19:05

## 2023-01-09 RX ADMIN — OLANZAPINE 10 MILLIGRAM(S): 15 TABLET, FILM COATED ORAL at 21:53

## 2023-01-09 RX ADMIN — ENOXAPARIN SODIUM 40 MILLIGRAM(S): 100 INJECTION SUBCUTANEOUS at 21:47

## 2023-01-09 RX ADMIN — Medication 50 MILLIGRAM(S): at 01:47

## 2023-01-09 RX ADMIN — METHADONE HYDROCHLORIDE 110 MILLIGRAM(S): 40 TABLET ORAL at 12:00

## 2023-01-09 RX ADMIN — Medication 650 MILLIGRAM(S): at 00:17

## 2023-01-09 RX ADMIN — INSULIN GLARGINE 20 UNIT(S): 100 INJECTION, SOLUTION SUBCUTANEOUS at 22:32

## 2023-01-09 RX ADMIN — Medication 50 MILLIGRAM(S): at 11:07

## 2023-01-09 RX ADMIN — Medication 1 PATCH: at 19:58

## 2023-01-09 RX ADMIN — LITHIUM CARBONATE 900 MILLIGRAM(S): 300 TABLET, EXTENDED RELEASE ORAL at 21:42

## 2023-01-09 RX ADMIN — Medication 25 MILLIGRAM(S): at 15:17

## 2023-01-09 RX ADMIN — Medication 1 PATCH: at 09:33

## 2023-01-09 RX ADMIN — Medication 7 UNIT(S): at 18:25

## 2023-01-09 RX ADMIN — Medication 5 UNIT(S): at 09:16

## 2023-01-09 RX ADMIN — Medication 3: at 18:25

## 2023-01-09 RX ADMIN — SPIRONOLACTONE 25 MILLIGRAM(S): 25 TABLET, FILM COATED ORAL at 06:23

## 2023-01-09 RX ADMIN — Medication 3: at 09:16

## 2023-01-09 RX ADMIN — Medication 1 PATCH: at 12:02

## 2023-01-09 NOTE — PROGRESS NOTE ADULT - PROBLEM SELECTOR PLAN 6
Patient w/ reported history of BPD, antisocial personality disorder  Presented to [facility] w/ homicidal ideation & paranoia  Lithium level low  - Continue Lithium 900mg qhs  - Continue Zyprexa ODT 10mg qhs  --> Continue Zyprexa ODT 5mg q4hr agitation  - Hold trazodone 100mg qhs for now  - Hold gabapentin 800 tid for now, but would reinitiate this medication first if remains lucid   - Contact psych in AM for further recommendations Patient w/ reported history of BPD, antisocial personality disorder  Presented to [facility] w/ homicidal ideation & paranoia  Lithium level low  - Continue Lithium 900mg qhs  - Continue Zyprexa ODT 10mg qhs  --> Continue Zyprexa ODT 5mg q4hr agitation  - Hold trazodone 100mg qhs for now

## 2023-01-09 NOTE — PROGRESS NOTE ADULT - SUBJECTIVE AND OBJECTIVE BOX
DATE OF SERVICE: 01-09-23 @ 07:10    Patient is a 57y old  Male who presents with a chief complaint of Suspected overdose (08 Jan 2023 07:50)      SUBJECTIVE / OVERNIGHT EVENTS:  Overnight, no events. Methadone and librium taper restarted.  Pt seen and examined at bedside.    ROS negative except as above.    MEDICATIONS  (STANDING):  chlordiazePOXIDE   Oral   chlordiazePOXIDE 50 milliGRAM(s) Oral every 6 hours  chlordiazePOXIDE 50 milliGRAM(s) Oral every 8 hours  dextrose 5%. 1000 milliLiter(s) (50 mL/Hr) IV Continuous <Continuous>  dextrose 5%. 1000 milliLiter(s) (100 mL/Hr) IV Continuous <Continuous>  dextrose 50% Injectable 25 Gram(s) IV Push once  dextrose 50% Injectable 12.5 Gram(s) IV Push once  dextrose 50% Injectable 25 Gram(s) IV Push once  enoxaparin Injectable 40 milliGRAM(s) SubCutaneous every 24 hours  glucagon  Injectable 1 milliGRAM(s) IntraMuscular once  insulin glargine Injectable (LANTUS) 15 Unit(s) SubCutaneous at bedtime  insulin lispro (ADMELOG) corrective regimen sliding scale   SubCutaneous three times a day before meals  insulin lispro (ADMELOG) corrective regimen sliding scale   SubCutaneous at bedtime  insulin lispro Injectable (ADMELOG) 5 Unit(s) SubCutaneous three times a day before meals  lithium CR (ESKALITH-CR) 900 milliGRAM(s) Oral at bedtime  methadone    Tablet 110 milliGRAM(s) Oral daily  multivitamin 1 Tablet(s) Oral daily  nicotine -  14 mG/24Hr(s) Patch 1 Patch Transdermal daily  OLANZapine 10 milliGRAM(s) Oral at bedtime  spironolactone 25 milliGRAM(s) Oral daily    MEDICATIONS  (PRN):  acetaminophen     Tablet .. 650 milliGRAM(s) Oral every 6 hours PRN Temp greater or equal to 38C (100.4F), Mild Pain (1 - 3)  benzocaine 15 mG/menthol 3.6 mG Lozenge 1 Lozenge Oral every 2 hours PRN Sore Throat  dextrose Oral Gel 15 Gram(s) Oral once PRN Blood Glucose LESS THAN 70 milliGRAM(s)/deciliter  guaiFENesin Oral Liquid (Sugar-Free) 100 milliGRAM(s) Oral every 6 hours PRN Cough  LORazepam     Tablet 2 milliGRAM(s) Oral every 2 hours PRN Symptom-triggered 2 point increase in CIWA-Ar  LORazepam     Tablet 2 milliGRAM(s) Oral every 1 hour PRN Symptom-triggered: each CIWA -Ar score 8 or GREATER  OLANZapine Disintegrating Tablet 5 milliGRAM(s) Oral every 4 hours PRN Agitation/Aggression  sodium chloride 0.65% Nasal 1 Spray(s) Both Nostrils every 4 hours PRN Nasal Congestion      Vital Signs Last 24 Hrs  T(C): 36.6 (09 Jan 2023 05:30), Max: 37 (08 Jan 2023 11:00)  T(F): 97.8 (09 Jan 2023 05:30), Max: 98.6 (08 Jan 2023 11:00)  HR: 74 (09 Jan 2023 05:30) (51 - 80)  BP: 124/76 (09 Jan 2023 05:30) (111/80 - 130/66)  BP(mean): --  RR: 18 (09 Jan 2023 05:30) (16 - 18)  SpO2: 97% (09 Jan 2023 05:30) (96% - 100%)    Parameters below as of 09 Jan 2023 05:30  Patient On (Oxygen Delivery Method): room air      CAPILLARY BLOOD GLUCOSE  181 (08 Jan 2023 09:20)      POCT Blood Glucose.: 232 mg/dL (08 Jan 2023 22:59)  POCT Blood Glucose.: 143 mg/dL (08 Jan 2023 17:18)  POCT Blood Glucose.: 196 mg/dL (08 Jan 2023 12:23)    I&O's Summary    08 Jan 2023 07:01  -  09 Jan 2023 07:00  --------------------------------------------------------  IN: 0 mL / OUT: 600 mL / NET: -600 mL        PHYSICAL EXAM:  GENERAL: NAD, well-developed  HEAD:  Atraumatic, Normocephalic  EYES: EOMI, PERRLA, conjunctiva and sclera clear  NECK: Supple, No JVD  CHEST/LUNG: Clear to auscultation bilaterally; No wheeze  HEART: Regular rate and rhythm; No murmurs, rubs, or gallops  ABDOMEN: Soft, Nontender, Nondistended; Bowel sounds present  EXTREMITIES:  2+ Peripheral Pulses, No clubbing, cyanosis, or edema  PSYCH: AAOx3  NEUROLOGY: non-focal  SKIN: No rashes or lesions    LABS:                        13.2   6.24  )-----------( 234      ( 08 Jan 2023 05:32 )             40.0     01-08    135  |  97<L>  |  16  ----------------------------<  275<H>  4.3   |  28  |  0.82    Ca    9.6      08 Jan 2023 05:32  Phos  3.4     01-08  Mg     1.70     01-08    TPro  7.0  /  Alb  4.2  /  TBili  <0.2  /  DBili  x   /  AST  16  /  ALT  18  /  AlkPhos  78  01-07              MICRO:      RADIOLOGY & ADDITIONAL TESTS:      Consultant(s) Notes Reviewed:         DATE OF SERVICE: 01-09-23 @ 07:10    Patient is a 57y old  Male who presents with a chief complaint of Suspected overdose (08 Jan 2023 07:50)      SUBJECTIVE / OVERNIGHT EVENTS:  Overnight, no events. Methadone and librium taper restarted.  Pt seen and examined at bedside. Pt reports he is agitated, feeling worsening symptoms of his withdrawals. Has sensation of bugs crawling on his skin. Could not sleep well last night. Endorsing homicidal ideations, states he wants to kill his ex-girlfriend, Tracey Blackmon because he reports "she told everyone in town I touched her kid, which isn't true". Reports he plans on meeting his ex girlfriend at [place]     Psych team aware of HI, will try to coordinate warning the ex-girlfriend.    ROS negative except as above.    MEDICATIONS  (STANDING):  chlordiazePOXIDE   Oral   chlordiazePOXIDE 50 milliGRAM(s) Oral every 6 hours  chlordiazePOXIDE 50 milliGRAM(s) Oral every 8 hours  dextrose 5%. 1000 milliLiter(s) (50 mL/Hr) IV Continuous <Continuous>  dextrose 5%. 1000 milliLiter(s) (100 mL/Hr) IV Continuous <Continuous>  dextrose 50% Injectable 25 Gram(s) IV Push once  dextrose 50% Injectable 12.5 Gram(s) IV Push once  dextrose 50% Injectable 25 Gram(s) IV Push once  enoxaparin Injectable 40 milliGRAM(s) SubCutaneous every 24 hours  glucagon  Injectable 1 milliGRAM(s) IntraMuscular once  insulin glargine Injectable (LANTUS) 15 Unit(s) SubCutaneous at bedtime  insulin lispro (ADMELOG) corrective regimen sliding scale   SubCutaneous three times a day before meals  insulin lispro (ADMELOG) corrective regimen sliding scale   SubCutaneous at bedtime  insulin lispro Injectable (ADMELOG) 5 Unit(s) SubCutaneous three times a day before meals  lithium CR (ESKALITH-CR) 900 milliGRAM(s) Oral at bedtime  methadone    Tablet 110 milliGRAM(s) Oral daily  multivitamin 1 Tablet(s) Oral daily  nicotine -  14 mG/24Hr(s) Patch 1 Patch Transdermal daily  OLANZapine 10 milliGRAM(s) Oral at bedtime  spironolactone 25 milliGRAM(s) Oral daily    MEDICATIONS  (PRN):  acetaminophen     Tablet .. 650 milliGRAM(s) Oral every 6 hours PRN Temp greater or equal to 38C (100.4F), Mild Pain (1 - 3)  benzocaine 15 mG/menthol 3.6 mG Lozenge 1 Lozenge Oral every 2 hours PRN Sore Throat  dextrose Oral Gel 15 Gram(s) Oral once PRN Blood Glucose LESS THAN 70 milliGRAM(s)/deciliter  guaiFENesin Oral Liquid (Sugar-Free) 100 milliGRAM(s) Oral every 6 hours PRN Cough  LORazepam     Tablet 2 milliGRAM(s) Oral every 2 hours PRN Symptom-triggered 2 point increase in CIWA-Ar  LORazepam     Tablet 2 milliGRAM(s) Oral every 1 hour PRN Symptom-triggered: each CIWA -Ar score 8 or GREATER  OLANZapine Disintegrating Tablet 5 milliGRAM(s) Oral every 4 hours PRN Agitation/Aggression  sodium chloride 0.65% Nasal 1 Spray(s) Both Nostrils every 4 hours PRN Nasal Congestion      Vital Signs Last 24 Hrs  T(C): 36.6 (09 Jan 2023 05:30), Max: 37 (08 Jan 2023 11:00)  T(F): 97.8 (09 Jan 2023 05:30), Max: 98.6 (08 Jan 2023 11:00)  HR: 74 (09 Jan 2023 05:30) (51 - 80)  BP: 124/76 (09 Jan 2023 05:30) (111/80 - 130/66)  BP(mean): --  RR: 18 (09 Jan 2023 05:30) (16 - 18)  SpO2: 97% (09 Jan 2023 05:30) (96% - 100%)    Parameters below as of 09 Jan 2023 05:30  Patient On (Oxygen Delivery Method): room air      CAPILLARY BLOOD GLUCOSE  181 (08 Jan 2023 09:20)      POCT Blood Glucose.: 232 mg/dL (08 Jan 2023 22:59)  POCT Blood Glucose.: 143 mg/dL (08 Jan 2023 17:18)  POCT Blood Glucose.: 196 mg/dL (08 Jan 2023 12:23)    I&O's Summary    08 Jan 2023 07:01  -  09 Jan 2023 07:00  --------------------------------------------------------  IN: 0 mL / OUT: 600 mL / NET: -600 mL        PHYSICAL EXAM:  GENERAL: anxious, agitated, however able to sit still and participate in interview  HEAD:  Atraumatic, Normocephalic  EYES: EOMI, PERRLA, conjunctiva and sclera clear  NECK: Supple, No JVD  CHEST/LUNG: Clear to auscultation bilaterally; No wheeze  HEART: Regular rate and rhythm; No murmurs, rubs, or gallops  ABDOMEN: Soft, Nontender, Nondistended; Bowel sounds present  EXTREMITIES:  2+ Peripheral Pulses, No clubbing, cyanosis, or edema  PSYCH: AAOx3, slurring words, +HI as above, no SI  SKIN: scratching vigorously at legs, creating open wounds by scratching    LABS:                        13.2   6.24  )-----------( 234      ( 08 Jan 2023 05:32 )             40.0     01-08    135  |  97<L>  |  16  ----------------------------<  275<H>  4.3   |  28  |  0.82    Ca    9.6      08 Jan 2023 05:32  Phos  3.4     01-08  Mg     1.70     01-08    TPro  7.0  /  Alb  4.2  /  TBili  <0.2  /  DBili  x   /  AST  16  /  ALT  18  /  AlkPhos  78  01-07              MICRO:      RADIOLOGY & ADDITIONAL TESTS:      Consultant(s) Notes Reviewed:
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
## 2023-01-09 NOTE — BH CONSULTATION LIAISON ASSESSMENT NOTE - OTHER
fair at times, lying in bed pt narrative MMTP that pt feels is helping him in [place]- has card- dose of 110 mg confirmed No tongue fasciculations

## 2023-01-09 NOTE — BH CONSULTATION LIAISON ASSESSMENT NOTE - NSCOMMENTSUICRISKFACT_PSY_ALL_CORE
Would benefit from GLASS for safety and compliance: low dose aristada vs invega vs relprevv with vivitrol Would benefit from GLASS for safety and compliance

## 2023-01-09 NOTE — PROGRESS NOTE ADULT - PROBLEM SELECTOR PLAN 1
Patient presented with alteration in mental status w/ drooling, hypotension, bradycardia, hypoglycemia. Received naloxone 2mg IV with improvement in mental status. Patient also noted w/ miosis & hyporeflexia on arrival to ED. Signs & symptoms conenring for medication overdose i/s/o polypharmacy. s/p MICU & toxicology consults.  - Telemetry for monitoring of bradycardia  - EtCO2 monitoring for hypercapnia  - No indication for further naloxone  - Close monitoring of sedative-hypnotic/alcohol withdrawal  - dc carvedilol, clonidine Patient presented with alteration in mental status w/ drooling, hypotension, bradycardia, hypoglycemia. Received naloxone 2mg IV with improvement in mental status. Patient also noted w/ miosis & hyporeflexia on arrival to ED. Signs & symptoms concerning for medication overdose i/s/o polypharmacy. s/p MICU & toxicology consults.  - Telemetry for monitoring of bradycardia  - dc carvedilol, clonidine

## 2023-01-09 NOTE — PROGRESS NOTE ADULT - ASSESSMENT
57yoM with PMH of DM2 (on Lantus 40u QHS and admelog 20 TID), psychiatry disorders (diazepam 10 BID, neurontin 800mg TID, lithium 600mg BID, Zyprexa 15mg QHS) daily smoker 50 pack year (on nicotine patch), CHF/HTN (spironolactone 25mg QD, carvedilol 6.25mg BID, losartan 25 QD), presented from [facility] for altered mentation in presumed medication overdose.
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
## 2023-01-09 NOTE — PROGRESS NOTE ADULT - PROBLEM SELECTOR PLAN 4
Patient presented with bradycardia to the 40s, asymptomatic  Possibly i/s/o medication use (carvedilol, metoprolol?)  - Continue to monitor on telemetry  - hold clonidine and carvedilol

## 2023-01-09 NOTE — BH CONSULTATION LIAISON ASSESSMENT NOTE - NSBHATTESTCOMMENTATTENDFT_PSY_A_CORE
Chart reviewed, pt. seen/evaluated with resident, I agree with above assessment/plan. Patient AAOX3, overall cooperative, expressed frustration in the setting of alcohol withdrawal, he thinks current dose of librium is not helping with withdrawals, mild tremors noted on exam. Patient denies current SIIP, he reports HI toward his ex-girlfriend though refuses to provide collateral information. Denies symptoms of psychosis. Care coordinated with Dr. Curry, team is attempting to obtain contact information of ex girl friend. Plan as above, will follow Chart reviewed, pt. seen/evaluated with resident, I agree with above assessment/plan. Patient AAOX3, overall cooperative, expressed frustration in the setting of alcohol withdrawal, he thinks current dose of librium is not helping with withdrawals, mild tremors noted on exam. Patient denies current SIIP, he reports HI toward his ex-girlfriend though refuses to provide collateral information. Denies symptoms of psychosis. Care coordinated with Dr. Curry, team is attempting to obtain contact information of ex girl friend to notify her. Plan as above, will follow

## 2023-01-09 NOTE — PROGRESS NOTE ADULT - PROBLEM SELECTOR PLAN 7
Questionable history of HF reported by patient  No TTE found on chart review  Appears euvolemic on exam  - hold coreg  - Continue spironolactone
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
## 2023-01-09 NOTE — BH CONSULTATION LIAISON ASSESSMENT NOTE - DETAILS
Father - alcohol and heroin used, patient states his father passed away in front of him of drug overdose when he was a kid, half sister has schizophrenia Per HPI see HPI as above Pt does not have a handgun, but states that he can easily access one if he wants. He was DESTINEY'd on his admission in february 2022 due to shanelle comments Pt does not have a handgun, as per chart: pt. states that he can easily access one if he wants. He was SAFEACT'd on his admission in february 2022 due to similar comments

## 2023-01-09 NOTE — BH CONSULTATION LIAISON ASSESSMENT NOTE - NSBHREFERDETAILS_PSY_A_CORE_FT
Transfer from [facility] for HI statements  Transfer from [facility] for sedation. Consult for HI. Transfer from [facility] for sedation. Consult for further management

## 2023-01-09 NOTE — BH CONSULTATION LIAISON ASSESSMENT NOTE - CURRENT MEDICATION
MEDICATIONS  (STANDING):  chlordiazePOXIDE   Oral   chlordiazePOXIDE 50 milliGRAM(s) Oral every 8 hours  dextrose 5%. 1000 milliLiter(s) (50 mL/Hr) IV Continuous <Continuous>  dextrose 5%. 1000 milliLiter(s) (100 mL/Hr) IV Continuous <Continuous>  dextrose 50% Injectable 25 Gram(s) IV Push once  dextrose 50% Injectable 12.5 Gram(s) IV Push once  dextrose 50% Injectable 25 Gram(s) IV Push once  enoxaparin Injectable 40 milliGRAM(s) SubCutaneous every 24 hours  glucagon  Injectable 1 milliGRAM(s) IntraMuscular once  insulin glargine Injectable (LANTUS) 20 Unit(s) SubCutaneous at bedtime  insulin lispro (ADMELOG) corrective regimen sliding scale   SubCutaneous three times a day before meals  insulin lispro (ADMELOG) corrective regimen sliding scale   SubCutaneous at bedtime  insulin lispro Injectable (ADMELOG) 7 Unit(s) SubCutaneous three times a day before meals  lithium CR (ESKALITH-CR) 900 milliGRAM(s) Oral at bedtime  methadone    Tablet 110 milliGRAM(s) Oral daily  multivitamin 1 Tablet(s) Oral daily  nicotine -  14 mG/24Hr(s) Patch 1 Patch Transdermal daily  OLANZapine 10 milliGRAM(s) Oral at bedtime  spironolactone 25 milliGRAM(s) Oral daily    MEDICATIONS  (PRN):  acetaminophen     Tablet .. 650 milliGRAM(s) Oral every 6 hours PRN Temp greater or equal to 38C (100.4F), Mild Pain (1 - 3)  benzocaine 15 mG/menthol 3.6 mG Lozenge 1 Lozenge Oral every 2 hours PRN Sore Throat  dextrose Oral Gel 15 Gram(s) Oral once PRN Blood Glucose LESS THAN 70 milliGRAM(s)/deciliter  guaiFENesin Oral Liquid (Sugar-Free) 100 milliGRAM(s) Oral every 6 hours PRN Cough  LORazepam     Tablet 2 milliGRAM(s) Oral every 2 hours PRN Symptom-triggered 2 point increase in CIWA-Ar  LORazepam     Tablet 2 milliGRAM(s) Oral every 1 hour PRN Symptom-triggered: each CIWA -Ar score 8 or GREATER  OLANZapine Disintegrating Tablet 5 milliGRAM(s) Oral every 4 hours PRN Agitation/Aggression  sodium chloride 0.65% Nasal 1 Spray(s) Both Nostrils every 4 hours PRN Nasal Congestion
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
## 2023-01-09 NOTE — BH CONSULTATION LIAISON ASSESSMENT NOTE - NSICDXPASTMEDICALHX_GEN_ALL_CORE_FT
PAST MEDICAL HISTORY:  Diabetes mellitus     GERD (gastroesophageal reflux disease)     Heart failure     History of pituitary tumor     HTN (hypertension)

## 2023-01-09 NOTE — SOCIAL WORK POST DISCHARGE FOLLOW UP NOTE - NSBHSWFOLLOWUP_PSY_ALL_CORE_FT
On 1/7/23, pt was transferred to [facility] ER for medical problems and subsequently medically admitted.

## 2023-01-09 NOTE — BH CONSULTATION LIAISON ASSESSMENT NOTE - NSICDXBHSECONDARYDX_PSY_ALL_CORE
Bipolar disorder   F31.9  Antisocial personality disorder   F60.2   Bipolar disorder   F31.9  History of antisocial personality disorder   Z86.59

## 2023-01-09 NOTE — PROGRESS NOTE ADULT - PROBLEM SELECTOR PLAN 3
Patient w/ history of uncontrolled DM. A1c 10.2  At [facility] patient with fluctuating hyper- & hypoglycemic episodes  Was on lantus 30u qhs & ademlog 10u tid  - c/w lantus 15u qhs & admelog 5u tid  - ISS Patient w/ history of uncontrolled DM. A1c 10.2  At [facility] patient with fluctuating hyper- & hypoglycemic episodes  Was on lantus 30u qhs & ademlog 10u tid  - increase lantus to 20u qhs & admelog 7u tid  - ISS

## 2023-01-09 NOTE — BH CONSULTATION LIAISON ASSESSMENT NOTE - NSBHCHARTREVIEWVS_PSY_A_CORE FT
Vital Signs Last 24 Hrs  T(C): 36.3 (09 Jan 2023 11:00), Max: 36.8 (08 Jan 2023 14:00)  T(F): 97.4 (09 Jan 2023 11:00), Max: 98.3 (08 Jan 2023 14:00)  HR: 135 (09 Jan 2023 11:00) (58 - 135)  BP: 110/75 (09 Jan 2023 11:00) (110/75 - 135/70)  BP(mean): --  RR: 19 (09 Jan 2023 11:00) (16 - 19)  SpO2: 99% (09 Jan 2023 11:00) (96% - 99%)    Parameters below as of 09 Jan 2023 11:00  Patient On (Oxygen Delivery Method): room air

## 2023-01-10 ENCOUNTER — INPATIENT (INPATIENT)
Facility: HOSPITAL | Age: 58
LOS: 6 days | Discharge: ANOTHER HOSPITAL | End: 2023-01-17
Attending: PSYCHIATRY & NEUROLOGY | Admitting: PSYCHIATRY & NEUROLOGY
Payer: MEDICAID

## 2023-01-10 VITALS
TEMPERATURE: 98 F | SYSTOLIC BLOOD PRESSURE: 153 MMHG | OXYGEN SATURATION: 98 % | DIASTOLIC BLOOD PRESSURE: 78 MMHG | RESPIRATION RATE: 17 BRPM | HEART RATE: 72 BPM

## 2023-01-10 VITALS — HEIGHT: 70 IN | TEMPERATURE: 97 F | WEIGHT: 184.97 LBS

## 2023-01-10 DIAGNOSIS — F31.9 BIPOLAR DISORDER, UNSPECIFIED: ICD-10-CM

## 2023-01-10 PROBLEM — Z87.898 PERSONAL HISTORY OF OTHER SPECIFIED CONDITIONS: Chronic | Status: ACTIVE | Noted: 2023-01-08

## 2023-01-10 PROBLEM — I50.9 HEART FAILURE, UNSPECIFIED: Chronic | Status: ACTIVE | Noted: 2023-01-08

## 2023-01-10 LAB
ALBUMIN SERPL ELPH-MCNC: 4.1 G/DL — SIGNIFICANT CHANGE UP (ref 3.3–5)
ALP SERPL-CCNC: 69 U/L — SIGNIFICANT CHANGE UP (ref 40–120)
ALT FLD-CCNC: 17 U/L — SIGNIFICANT CHANGE UP (ref 4–41)
ANION GAP SERPL CALC-SCNC: 10 MMOL/L — SIGNIFICANT CHANGE UP (ref 7–14)
AST SERPL-CCNC: 17 U/L — SIGNIFICANT CHANGE UP (ref 4–40)
BILIRUB SERPL-MCNC: <0.2 MG/DL — SIGNIFICANT CHANGE UP (ref 0.2–1.2)
BUN SERPL-MCNC: 19 MG/DL — SIGNIFICANT CHANGE UP (ref 7–23)
CALCIUM SERPL-MCNC: 10 MG/DL — SIGNIFICANT CHANGE UP (ref 8.4–10.5)
CHLORIDE SERPL-SCNC: 98 MMOL/L — SIGNIFICANT CHANGE UP (ref 98–107)
CO2 SERPL-SCNC: 29 MMOL/L — SIGNIFICANT CHANGE UP (ref 22–31)
CREAT SERPL-MCNC: 0.96 MG/DL — SIGNIFICANT CHANGE UP (ref 0.5–1.3)
EGFR: 93 ML/MIN/1.73M2 — SIGNIFICANT CHANGE UP
GLUCOSE BLDC GLUCOMTR-MCNC: 178 MG/DL — HIGH (ref 70–99)
GLUCOSE BLDC GLUCOMTR-MCNC: 194 MG/DL — HIGH (ref 70–99)
GLUCOSE SERPL-MCNC: 187 MG/DL — HIGH (ref 70–99)
HCT VFR BLD CALC: 43.4 % — SIGNIFICANT CHANGE UP (ref 39–50)
HGB BLD-MCNC: 14.3 G/DL — SIGNIFICANT CHANGE UP (ref 13–17)
MAGNESIUM SERPL-MCNC: 1.7 MG/DL — SIGNIFICANT CHANGE UP (ref 1.6–2.6)
MCHC RBC-ENTMCNC: 29.4 PG — SIGNIFICANT CHANGE UP (ref 27–34)
MCHC RBC-ENTMCNC: 32.9 GM/DL — SIGNIFICANT CHANGE UP (ref 32–36)
MCV RBC AUTO: 89.1 FL — SIGNIFICANT CHANGE UP (ref 80–100)
NRBC # BLD: 0 /100 WBCS — SIGNIFICANT CHANGE UP (ref 0–0)
NRBC # FLD: 0 K/UL — SIGNIFICANT CHANGE UP (ref 0–0)
PHOSPHATE SERPL-MCNC: 3.8 MG/DL — SIGNIFICANT CHANGE UP (ref 2.5–4.5)
PLATELET # BLD AUTO: 284 K/UL — SIGNIFICANT CHANGE UP (ref 150–400)
POTASSIUM SERPL-MCNC: 4.3 MMOL/L — SIGNIFICANT CHANGE UP (ref 3.5–5.3)
POTASSIUM SERPL-SCNC: 4.3 MMOL/L — SIGNIFICANT CHANGE UP (ref 3.5–5.3)
PROT SERPL-MCNC: 7.4 G/DL — SIGNIFICANT CHANGE UP (ref 6–8.3)
RBC # BLD: 4.87 M/UL — SIGNIFICANT CHANGE UP (ref 4.2–5.8)
RBC # FLD: 13.2 % — SIGNIFICANT CHANGE UP (ref 10.3–14.5)
SODIUM SERPL-SCNC: 137 MMOL/L — SIGNIFICANT CHANGE UP (ref 135–145)
WBC # BLD: 6.46 K/UL — SIGNIFICANT CHANGE UP (ref 3.8–10.5)
WBC # FLD AUTO: 6.46 K/UL — SIGNIFICANT CHANGE UP (ref 3.8–10.5)

## 2023-01-10 PROCEDURE — 99221 1ST HOSP IP/OBS SF/LOW 40: CPT

## 2023-01-10 PROCEDURE — 99233 SBSQ HOSP IP/OBS HIGH 50: CPT

## 2023-01-10 PROCEDURE — 99239 HOSP IP/OBS DSCHRG MGMT >30: CPT | Mod: GC

## 2023-01-10 RX ORDER — TRAZODONE HCL 50 MG
1 TABLET ORAL
Qty: 0 | Refills: 0 | DISCHARGE

## 2023-01-10 RX ORDER — SENNA PLUS 8.6 MG/1
1 TABLET ORAL
Qty: 0 | Refills: 0 | DISCHARGE
Start: 2023-01-10

## 2023-01-10 RX ORDER — INSULIN GLARGINE 100 [IU]/ML
23 INJECTION, SOLUTION SUBCUTANEOUS
Qty: 0 | Refills: 0 | DISCHARGE
Start: 2023-01-10

## 2023-01-10 RX ORDER — INSULIN LISPRO 100/ML
10 VIAL (ML) SUBCUTANEOUS
Qty: 0 | Refills: 0 | DISCHARGE
Start: 2023-01-10

## 2023-01-10 RX ORDER — HYDROXYZINE HCL 10 MG
1 TABLET ORAL
Qty: 0 | Refills: 0 | DISCHARGE
Start: 2023-01-10

## 2023-01-10 RX ORDER — INSULIN GLARGINE 100 [IU]/ML
23 INJECTION, SOLUTION SUBCUTANEOUS AT BEDTIME
Refills: 0 | Status: DISCONTINUED | OUTPATIENT
Start: 2023-01-10 | End: 2023-01-10

## 2023-01-10 RX ORDER — INSULIN LISPRO 100/ML
VIAL (ML) SUBCUTANEOUS
Refills: 0 | Status: DISCONTINUED | OUTPATIENT
Start: 2023-01-10 | End: 2023-01-17

## 2023-01-10 RX ORDER — METHADONE HYDROCHLORIDE 40 MG/1
80 TABLET ORAL DAILY
Refills: 0 | Status: DISCONTINUED | OUTPATIENT
Start: 2023-01-10 | End: 2023-01-17

## 2023-01-10 RX ORDER — SODIUM CHLORIDE 9 MG/ML
1000 INJECTION, SOLUTION INTRAVENOUS
Refills: 0 | Status: DISCONTINUED | OUTPATIENT
Start: 2023-01-10 | End: 2023-01-11

## 2023-01-10 RX ORDER — GLUCAGON INJECTION, SOLUTION 0.5 MG/.1ML
1 INJECTION, SOLUTION SUBCUTANEOUS ONCE
Refills: 0 | Status: DISCONTINUED | OUTPATIENT
Start: 2023-01-10 | End: 2023-01-11

## 2023-01-10 RX ORDER — BENZOCAINE AND MENTHOL 5; 1 G/100ML; G/100ML
1 LIQUID ORAL
Refills: 0 | Status: DISCONTINUED | OUTPATIENT
Start: 2023-01-10 | End: 2023-01-17

## 2023-01-10 RX ORDER — HYDROXYZINE HCL 10 MG
25 TABLET ORAL EVERY 6 HOURS
Refills: 0 | Status: DISCONTINUED | OUTPATIENT
Start: 2023-01-10 | End: 2023-01-10

## 2023-01-10 RX ORDER — LITHIUM CARBONATE 300 MG/1
900 TABLET, EXTENDED RELEASE ORAL AT BEDTIME
Refills: 0 | Status: DISCONTINUED | OUTPATIENT
Start: 2023-01-10 | End: 2023-01-17

## 2023-01-10 RX ORDER — POLYETHYLENE GLYCOL 3350 17 G/17G
17 POWDER, FOR SOLUTION ORAL
Refills: 0 | Status: DISCONTINUED | OUTPATIENT
Start: 2023-01-10 | End: 2023-01-17

## 2023-01-10 RX ORDER — SODIUM CHLORIDE 0.65 %
1 AEROSOL, SPRAY (ML) NASAL
Refills: 0 | Status: DISCONTINUED | OUTPATIENT
Start: 2023-01-10 | End: 2023-01-17

## 2023-01-10 RX ORDER — METHADONE HYDROCHLORIDE 40 MG/1
80 TABLET ORAL DAILY
Refills: 0 | Status: DISCONTINUED | OUTPATIENT
Start: 2023-01-10 | End: 2023-01-10

## 2023-01-10 RX ORDER — METOPROLOL TARTRATE 50 MG
1 TABLET ORAL
Qty: 0 | Refills: 0 | DISCHARGE

## 2023-01-10 RX ORDER — POLYETHYLENE GLYCOL 3350 17 G/17G
17 POWDER, FOR SOLUTION ORAL
Qty: 0 | Refills: 0 | DISCHARGE
Start: 2023-01-10

## 2023-01-10 RX ORDER — DEXTROSE 50 % IN WATER 50 %
25 SYRINGE (ML) INTRAVENOUS ONCE
Refills: 0 | Status: DISCONTINUED | OUTPATIENT
Start: 2023-01-10 | End: 2023-01-11

## 2023-01-10 RX ORDER — OLANZAPINE 15 MG/1
10 TABLET, FILM COATED ORAL AT BEDTIME
Refills: 0 | Status: DISCONTINUED | OUTPATIENT
Start: 2023-01-10 | End: 2023-01-12

## 2023-01-10 RX ORDER — SENNA PLUS 8.6 MG/1
1 TABLET ORAL AT BEDTIME
Refills: 0 | Status: DISCONTINUED | OUTPATIENT
Start: 2023-01-10 | End: 2023-01-10

## 2023-01-10 RX ORDER — POLYETHYLENE GLYCOL 3350 17 G/17G
17 POWDER, FOR SOLUTION ORAL
Refills: 0 | Status: DISCONTINUED | OUTPATIENT
Start: 2023-01-10 | End: 2023-01-10

## 2023-01-10 RX ORDER — OLANZAPINE 15 MG/1
5 TABLET, FILM COATED ORAL EVERY 4 HOURS
Refills: 0 | Status: DISCONTINUED | OUTPATIENT
Start: 2023-01-10 | End: 2023-01-12

## 2023-01-10 RX ORDER — CARVEDILOL PHOSPHATE 80 MG/1
1 CAPSULE, EXTENDED RELEASE ORAL
Qty: 0 | Refills: 0 | DISCHARGE

## 2023-01-10 RX ORDER — HYDROXYZINE HCL 10 MG
25 TABLET ORAL EVERY 6 HOURS
Refills: 0 | Status: DISCONTINUED | OUTPATIENT
Start: 2023-01-10 | End: 2023-01-12

## 2023-01-10 RX ORDER — METHADONE HYDROCHLORIDE 40 MG/1
110 TABLET ORAL DAILY
Refills: 0 | Status: DISCONTINUED | OUTPATIENT
Start: 2023-01-10 | End: 2023-01-10

## 2023-01-10 RX ORDER — INSULIN LISPRO 100/ML
10 VIAL (ML) SUBCUTANEOUS
Refills: 0 | Status: DISCONTINUED | OUTPATIENT
Start: 2023-01-10 | End: 2023-01-10

## 2023-01-10 RX ORDER — DEXTROSE 50 % IN WATER 50 %
15 SYRINGE (ML) INTRAVENOUS ONCE
Refills: 0 | Status: DISCONTINUED | OUTPATIENT
Start: 2023-01-10 | End: 2023-01-11

## 2023-01-10 RX ORDER — SULINDAC 200 MG/1
1 TABLET ORAL
Qty: 0 | Refills: 0 | DISCHARGE

## 2023-01-10 RX ORDER — ENOXAPARIN SODIUM 100 MG/ML
40 INJECTION SUBCUTANEOUS
Qty: 0 | Refills: 0 | DISCHARGE
Start: 2023-01-10

## 2023-01-10 RX ORDER — OLANZAPINE 15 MG/1
5 TABLET, FILM COATED ORAL ONCE
Refills: 0 | Status: DISCONTINUED | OUTPATIENT
Start: 2023-01-10 | End: 2023-01-17

## 2023-01-10 RX ORDER — METHADONE HYDROCHLORIDE 40 MG/1
30 TABLET ORAL DAILY
Refills: 0 | Status: DISCONTINUED | OUTPATIENT
Start: 2023-01-10 | End: 2023-01-17

## 2023-01-10 RX ORDER — NICOTINE POLACRILEX 2 MG
1 GUM BUCCAL DAILY
Refills: 0 | Status: DISCONTINUED | OUTPATIENT
Start: 2023-01-10 | End: 2023-01-17

## 2023-01-10 RX ORDER — SPIRONOLACTONE 25 MG/1
25 TABLET, FILM COATED ORAL DAILY
Refills: 0 | Status: DISCONTINUED | OUTPATIENT
Start: 2023-01-10 | End: 2023-01-17

## 2023-01-10 RX ORDER — DEXTROSE 50 % IN WATER 50 %
12.5 SYRINGE (ML) INTRAVENOUS ONCE
Refills: 0 | Status: DISCONTINUED | OUTPATIENT
Start: 2023-01-10 | End: 2023-01-11

## 2023-01-10 RX ORDER — SENNA PLUS 8.6 MG/1
1 TABLET ORAL AT BEDTIME
Refills: 0 | Status: DISCONTINUED | OUTPATIENT
Start: 2023-01-10 | End: 2023-01-17

## 2023-01-10 RX ORDER — ACETAMINOPHEN 500 MG
1 TABLET ORAL
Qty: 0 | Refills: 0 | DISCHARGE

## 2023-01-10 RX ADMIN — Medication 1 TABLET(S): at 12:07

## 2023-01-10 RX ADMIN — Medication 2 MILLIGRAM(S): at 20:43

## 2023-01-10 RX ADMIN — Medication 75 MILLIGRAM(S): at 05:47

## 2023-01-10 RX ADMIN — METHADONE HYDROCHLORIDE 110 MILLIGRAM(S): 40 TABLET ORAL at 12:08

## 2023-01-10 RX ADMIN — POLYETHYLENE GLYCOL 3350 17 GRAM(S): 17 POWDER, FOR SOLUTION ORAL at 20:44

## 2023-01-10 RX ADMIN — Medication 30 MILLILITER(S): at 23:04

## 2023-01-10 RX ADMIN — SENNA PLUS 1 TABLET(S): 8.6 TABLET ORAL at 20:44

## 2023-01-10 RX ADMIN — SPIRONOLACTONE 25 MILLIGRAM(S): 25 TABLET, FILM COATED ORAL at 05:47

## 2023-01-10 RX ADMIN — Medication 75 MILLIGRAM(S): at 03:04

## 2023-01-10 RX ADMIN — Medication 1: at 12:56

## 2023-01-10 RX ADMIN — Medication 7 UNIT(S): at 08:54

## 2023-01-10 RX ADMIN — Medication 75 MILLIGRAM(S): at 20:43

## 2023-01-10 RX ADMIN — Medication 10 UNIT(S): at 12:56

## 2023-01-10 RX ADMIN — Medication 650 MILLIGRAM(S): at 03:03

## 2023-01-10 RX ADMIN — Medication 1 PATCH: at 12:15

## 2023-01-10 RX ADMIN — Medication 1 SPRAY(S): at 22:55

## 2023-01-10 RX ADMIN — Medication 1 PATCH: at 12:07

## 2023-01-10 RX ADMIN — Medication 1 PATCH: at 07:15

## 2023-01-10 RX ADMIN — LITHIUM CARBONATE 900 MILLIGRAM(S): 300 TABLET, EXTENDED RELEASE ORAL at 20:44

## 2023-01-10 RX ADMIN — Medication 650 MILLIGRAM(S): at 04:03

## 2023-01-10 RX ADMIN — Medication 1: at 08:53

## 2023-01-10 NOTE — BH CONSULTATION LIAISON PROGRESS NOTE - NSBHATTESTCOMMENTATTENDFT_PSY_A_CORE
Chart reviewed, pt. seen/evaluated with resident. I agree with above assessment/plan. Patient oriented, seems more calmer than yesterday, reports feeling depressed/hopeless in the setting of multiple psychosocial stressors, denies SIIP, pt. initially denies HI, however later in the interview , he stated he would harm his ex girl friend if he were to be outside of the hospital right now. Denies AVH. No tremors or sweating noted on exam. Plan as above, care coordinated with NORM Reyes,  will follow Chart reviewed, pt. seen/evaluated with resident. I agree with above assessment/plan. Patient oriented, seems more calmer than yesterday, reports feeling depressed/hopeless with intermittent SI in the setting of multiple psychosocial stressors, denies current SIIP, pt. initially denies HI, however later in the interview , he stated he would harm his ex girl friend if he were to be outside of the hospital right now. Denies AVH. No tremors or sweating noted on exam. Plan as above, pt. will need an inpatient psychiatric admission for further stabilization/treatment, care coordinated with NORM Reyes and Basia, handoff provided to Dr. Munson [facility], case d/w Dr. Curry.

## 2023-01-10 NOTE — PROGRESS NOTE ADULT - SUBJECTIVE AND OBJECTIVE BOX
DATE OF SERVICE: 01-10-23 @ 07:04    Patient is a 57y old  Male who presents with a chief complaint of Suspected overdose (09 Jan 2023 07:10)      SUBJECTIVE / OVERNIGHT EVENTS:  Overnight, no events.   Pt seen and examined at bedside.    ROS negative except as above.    MEDICATIONS  (STANDING):  chlordiazePOXIDE 75 milliGRAM(s) Oral every 12 hours  dextrose 5%. 1000 milliLiter(s) (50 mL/Hr) IV Continuous <Continuous>  dextrose 5%. 1000 milliLiter(s) (100 mL/Hr) IV Continuous <Continuous>  dextrose 50% Injectable 25 Gram(s) IV Push once  dextrose 50% Injectable 12.5 Gram(s) IV Push once  dextrose 50% Injectable 25 Gram(s) IV Push once  enoxaparin Injectable 40 milliGRAM(s) SubCutaneous every 24 hours  glucagon  Injectable 1 milliGRAM(s) IntraMuscular once  insulin glargine Injectable (LANTUS) 20 Unit(s) SubCutaneous at bedtime  insulin lispro (ADMELOG) corrective regimen sliding scale   SubCutaneous three times a day before meals  insulin lispro (ADMELOG) corrective regimen sliding scale   SubCutaneous at bedtime  insulin lispro Injectable (ADMELOG) 7 Unit(s) SubCutaneous three times a day before meals  lithium CR (ESKALITH-CR) 900 milliGRAM(s) Oral at bedtime  methadone    Tablet 110 milliGRAM(s) Oral daily  multivitamin 1 Tablet(s) Oral daily  nicotine -  14 mG/24Hr(s) Patch 1 Patch Transdermal daily  OLANZapine 10 milliGRAM(s) Oral at bedtime  spironolactone 25 milliGRAM(s) Oral daily    MEDICATIONS  (PRN):  acetaminophen     Tablet .. 650 milliGRAM(s) Oral every 6 hours PRN Temp greater or equal to 38C (100.4F), Mild Pain (1 - 3)  benzocaine 15 mG/menthol 3.6 mG Lozenge 1 Lozenge Oral every 2 hours PRN Sore Throat  dextrose Oral Gel 15 Gram(s) Oral once PRN Blood Glucose LESS THAN 70 milliGRAM(s)/deciliter  diphenhydrAMINE Injectable 50 milliGRAM(s) IV Push every 6 hours PRN agitation  guaiFENesin Oral Liquid (Sugar-Free) 100 milliGRAM(s) Oral every 6 hours PRN Cough  LORazepam     Tablet 2 milliGRAM(s) Oral every 2 hours PRN Symptom-triggered 2 point increase in CIWA-Ar  LORazepam     Tablet 2 milliGRAM(s) Oral every 1 hour PRN Symptom-triggered: each CIWA -Ar score 8 or GREATER  OLANZapine Injectable 5 milliGRAM(s) IntraMuscular every 6 hours PRN agitation  sodium chloride 0.65% Nasal 1 Spray(s) Both Nostrils every 4 hours PRN Nasal Congestion      Vital Signs Last 24 Hrs  T(C): 36.8 (10 Wayne 2023 06:00), Max: 36.8 (09 Jan 2023 22:00)  T(F): 98.2 (10 Wayne 2023 06:00), Max: 98.2 (09 Jan 2023 22:00)  HR: 68 (10 Wayne 2023 06:00) (58 - 135)  BP: 134/70 (10 Wayne 2023 06:00) (110/75 - 143/100)  BP(mean): --  RR: 18 (10 Wayne 2023 06:00) (18 - 19)  SpO2: 99% (10 Wayne 2023 06:00) (96% - 99%)    Parameters below as of 10 Wayne 2023 06:00  Patient On (Oxygen Delivery Method): room air      CAPILLARY BLOOD GLUCOSE      POCT Blood Glucose.: 172 mg/dL (09 Jan 2023 22:30)  POCT Blood Glucose.: 261 mg/dL (09 Jan 2023 18:23)  POCT Blood Glucose.: 107 mg/dL (09 Jan 2023 12:20)  POCT Blood Glucose.: 251 mg/dL (09 Jan 2023 08:37)    I&O's Summary    09 Jan 2023 07:01  -  10 Wayne 2023 07:00  --------------------------------------------------------  IN: 0 mL / OUT: 600 mL / NET: -600 mL        PHYSICAL EXAM:  GENERAL: NAD, well-developed  HEAD:  Atraumatic, Normocephalic  EYES: EOMI, PERRLA, conjunctiva and sclera clear  NECK: Supple, No JVD  CHEST/LUNG: Clear to auscultation bilaterally; No wheeze  HEART: Regular rate and rhythm; No murmurs, rubs, or gallops  ABDOMEN: Soft, Nontender, Nondistended; Bowel sounds present  EXTREMITIES:  2+ Peripheral Pulses, No clubbing, cyanosis, or edema  PSYCH: AAOx3  NEUROLOGY: non-focal  SKIN: No rashes or lesions    LABS:                        14.2   5.85  )-----------( 264      ( 09 Jan 2023 06:35 )             43.1     01-09    137  |  97<L>  |  20  ----------------------------<  199<H>  4.2   |  28  |  1.10    Ca    9.8      09 Jan 2023 06:35  Phos  4.0     01-09  Mg     1.60     01-09    TPro  7.0  /  Alb  4.1  /  TBili  <0.2  /  DBili  <0.2  /  AST  18  /  ALT  19  /  AlkPhos  75  01-09              MICRO:      RADIOLOGY & ADDITIONAL TESTS:      Consultant(s) Notes Reviewed:         DATE OF SERVICE: 01-10-23 @ 07:04    Patient is a 57y old  Male who presents with a chief complaint of Suspected overdose (09 Jan 2023 07:10)      SUBJECTIVE / OVERNIGHT EVENTS:  Overnight, no events. Did not require any prn zyprexa or ativan.  Pt seen and examined at bedside. Reports feeling slightly improved, however upset he is not getting additional ativan, however he is cooperative. Slurring words. Medically optimized, awaiting bed at [facility].     ROS negative except as above.    MEDICATIONS  (STANDING):  chlordiazePOXIDE 75 milliGRAM(s) Oral every 12 hours  dextrose 5%. 1000 milliLiter(s) (50 mL/Hr) IV Continuous <Continuous>  dextrose 5%. 1000 milliLiter(s) (100 mL/Hr) IV Continuous <Continuous>  dextrose 50% Injectable 25 Gram(s) IV Push once  dextrose 50% Injectable 12.5 Gram(s) IV Push once  dextrose 50% Injectable 25 Gram(s) IV Push once  enoxaparin Injectable 40 milliGRAM(s) SubCutaneous every 24 hours  glucagon  Injectable 1 milliGRAM(s) IntraMuscular once  insulin glargine Injectable (LANTUS) 20 Unit(s) SubCutaneous at bedtime  insulin lispro (ADMELOG) corrective regimen sliding scale   SubCutaneous three times a day before meals  insulin lispro (ADMELOG) corrective regimen sliding scale   SubCutaneous at bedtime  insulin lispro Injectable (ADMELOG) 7 Unit(s) SubCutaneous three times a day before meals  lithium CR (ESKALITH-CR) 900 milliGRAM(s) Oral at bedtime  methadone    Tablet 110 milliGRAM(s) Oral daily  multivitamin 1 Tablet(s) Oral daily  nicotine -  14 mG/24Hr(s) Patch 1 Patch Transdermal daily  OLANZapine 10 milliGRAM(s) Oral at bedtime  spironolactone 25 milliGRAM(s) Oral daily    MEDICATIONS  (PRN):  acetaminophen     Tablet .. 650 milliGRAM(s) Oral every 6 hours PRN Temp greater or equal to 38C (100.4F), Mild Pain (1 - 3)  benzocaine 15 mG/menthol 3.6 mG Lozenge 1 Lozenge Oral every 2 hours PRN Sore Throat  dextrose Oral Gel 15 Gram(s) Oral once PRN Blood Glucose LESS THAN 70 milliGRAM(s)/deciliter  diphenhydrAMINE Injectable 50 milliGRAM(s) IV Push every 6 hours PRN agitation  guaiFENesin Oral Liquid (Sugar-Free) 100 milliGRAM(s) Oral every 6 hours PRN Cough  LORazepam     Tablet 2 milliGRAM(s) Oral every 2 hours PRN Symptom-triggered 2 point increase in CIWA-Ar  LORazepam     Tablet 2 milliGRAM(s) Oral every 1 hour PRN Symptom-triggered: each CIWA -Ar score 8 or GREATER  OLANZapine Injectable 5 milliGRAM(s) IntraMuscular every 6 hours PRN agitation  sodium chloride 0.65% Nasal 1 Spray(s) Both Nostrils every 4 hours PRN Nasal Congestion      Vital Signs Last 24 Hrs  T(C): 36.8 (10 Wayne 2023 06:00), Max: 36.8 (09 Jan 2023 22:00)  T(F): 98.2 (10 Wayne 2023 06:00), Max: 98.2 (09 Jan 2023 22:00)  HR: 68 (10 Wayne 2023 06:00) (58 - 135)  BP: 134/70 (10 Wayne 2023 06:00) (110/75 - 143/100)  BP(mean): --  RR: 18 (10 Wayne 2023 06:00) (18 - 19)  SpO2: 99% (10 Wayne 2023 06:00) (96% - 99%)    Parameters below as of 10 Wayne 2023 06:00  Patient On (Oxygen Delivery Method): room air      CAPILLARY BLOOD GLUCOSE      POCT Blood Glucose.: 172 mg/dL (09 Jan 2023 22:30)  POCT Blood Glucose.: 261 mg/dL (09 Jan 2023 18:23)  POCT Blood Glucose.: 107 mg/dL (09 Jan 2023 12:20)  POCT Blood Glucose.: 251 mg/dL (09 Jan 2023 08:37)    I&O's Summary    09 Jan 2023 07:01  -  10 Wayne 2023 07:00  --------------------------------------------------------  IN: 0 mL / OUT: 600 mL / NET: -600 mL        PHYSICAL EXAM:  GENERAL: unkempt, NAD  HEAD:  Atraumatic, Normocephalic  EYES: EOMI, PERRLA, conjunctiva and sclera clear  NECK: Supple, No JVD  CHEST/LUNG: Clear to auscultation bilaterally; No wheeze  HEART: Regular rate and rhythm; No murmurs, rubs, or gallops  ABDOMEN: Soft, Nontender, Nondistended; Bowel sounds present  EXTREMITIES:  2+ Peripheral Pulses, No clubbing, cyanosis, or edema  PSYCH: AAOx3, slurring words   SKIN: No rashes or lesions, left arm tattoo nuoed    LABS:                        14.2   5.85  )-----------( 264      ( 09 Jan 2023 06:35 )             43.1     01-09    137  |  97<L>  |  20  ----------------------------<  199<H>  4.2   |  28  |  1.10    Ca    9.8      09 Jan 2023 06:35  Phos  4.0     01-09  Mg     1.60     01-09    TPro  7.0  /  Alb  4.1  /  TBili  <0.2  /  DBili  <0.2  /  AST  18  /  ALT  19  /  AlkPhos  75  01-09              MICRO:      RADIOLOGY & ADDITIONAL TESTS:      Consultant(s) Notes Reviewed:

## 2023-01-10 NOTE — PROGRESS NOTE ADULT - PROBLEM SELECTOR PLAN 2
Patient has complex history of polysubstance abuse including alcohol, marijuana, cocaine, heroin  He has been on methadone maintenance through [facility] in [place]  Presented to [facility] w/ homicidal ideation and paranoia i/s/o alcohol & cocaine use, on CIWA at facility  Last drink 1/4  - c/w methadone 110 mg daily per psych with hold parameters for respiratory depression  - c/w symptom triggered CIWA po ativan  - increase librium taper to 75 mg per dose, hold parameters HR < 55, SBP < 90  - appreciate recommendations of psychiatry  - endorsing homicidal ideations, states he wants to kill his ex-girlfriend, Tracey Blackmon because he reports "she told everyone in town I touched her kid, which isn't true". Reports he plans on meeting his ex girlfriend at the methadone clinic she usually goes to in the [facility] side.  - zyprexa 5 mg q6h prn IM for agitation, do not give within 1 hr of ativan for risk of respiratory depression  - benadryl 50 mg q6h prn IM for agitation Patient has complex history of polysubstance abuse including alcohol, marijuana, cocaine, heroin  He has been on methadone maintenance through [facility] in [place]  Presented to [facility] w/ homicidal ideation and paranoia i/s/o alcohol & cocaine use, on CIWA at facility  Last drink 1/4  - c/w methadone 110 mg daily per psych with hold parameters for respiratory depression  - c/w symptom triggered CIWA po ativan  - c/w librium taper to 75 mg per dose, hold parameters HR < 55, SBP < 90  - appreciate recommendations of psychiatry  - endorsing homicidal ideations, states he wants to kill his ex-girlfriend, Tracey Blackmon because he reports "she told everyone in town I touched her kid, which isn't true". Reports he plans on meeting his ex girlfriend at the methadone clinic she usually goes to in the [facility].  - zyprexa 5 mg q6h prn IM for agitation, do not give within 1 hr of ativan for risk of respiratory depression  - benadryl 50 mg q6h prn IM for agitation

## 2023-01-10 NOTE — DISCHARGE NOTE PROVIDER - NSFOLLOWUPCLINICS_GEN_ALL_ED_FT
[facility]  Cardiology  [address] [place] [id number]  Phone: [phone]  Fax:   Follow Up Time: 1 month

## 2023-01-10 NOTE — DISCHARGE NOTE NURSING/CASE MANAGEMENT/SOCIAL WORK - NSDCPEFALRISK_GEN_ALL_CORE
For information on Fall & Injury Prevention, visit: [email or web address] OR  [email or web address] OR  https://www.cdc.gov/steadi/patient.html

## 2023-01-10 NOTE — DISCHARGE NOTE PROVIDER - NSDCMRMEDTOKEN_GEN_ALL_CORE_FT
chlordiazePOXIDE 25 mg oral capsule: 3 cap(s) orally every 12 hours  chlordiazePOXIDE 25 mg oral capsule: 3 cap(s) orally every 24 hours  enoxaparin: 40 milligram(s) subcutaneous once a day  guaiFENesin 100 mg/5 mL oral liquid: 5 milliliter(s) orally every 6 hours, As Needed  insulin glargine 100 units/mL subcutaneous solution: 23 unit(s) subcutaneous once a day (at bedtime)  insulin lispro 100 units/mL injectable solution: 10 unit(s) injectable 3 times a day (before meals)  lithium carbonate extended release: 900 milligram(s) orally once a day (at bedtime)  menthol-benzocaine 3.6 mg-15 mg mucous membrane lozenge: 1 lozenge mucous membrane every 2 hours, As Needed  methadone 10 mg oral tablet: 11 tab(s) orally once a day  Multiple Vitamins oral capsule: 1 cap(s) orally once a day  nicotine 14 mg/24 hr transdermal film, extended release: 14 milligram(s) transdermal once a day   Ocean 0.65% nasal spray: 1 spray(s) nasal 4 times a day, As Needed  OLANZapine 15 mg oral tablet: 10 milligram(s) orally once a day (at bedtime)  OLANZapine 5 mg oral tablet, disintegratin tab(s) orally every 4 hours, As Needed  polyethylene glycol 3350 oral powder for reconstitution: 17 gram(s) orally 2 times a day  senna leaf extract oral tablet: 1 tab(s) orally once a day (at bedtime)  spironolactone 25 mg oral tablet: 1 tab(s) orally once a day

## 2023-01-10 NOTE — BH PATIENT PROFILE - NSSBIRTALCPASSREFTXDET_GEN_A_CORE
Discussed pt's readiness to quit drinking, pt did express a desire to quit and reported 35 years of abstinence from alcohol in past during which he was taking Klonopin daily. Pt denied desire to participate in 12-step meetings.
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
## 2023-01-10 NOTE — DISCHARGE NOTE PROVIDER - NSDCCPCAREPLAN_GEN_ALL_CORE_FT
PRINCIPAL DISCHARGE DIAGNOSIS  Diagnosis: Medication overdose  Assessment and Plan of Treatment: You came to the hospital because your blood pressure was very low. We held most of you anti-hypertensive medications which resulted in an improvement in your blood pressure. You sugar levels initially were also very low, so we decreased your insulin dose and gradually increased it accoridng to your glucose readings. You will continue your care at [facility] to optimize the remainder of your psychiatric medications.
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
## 2023-01-10 NOTE — BH PATIENT PROFILE - HOME MEDICATIONS
hydrOXYzine hydrochloride 25 mg oral tablet , 1 tab(s) orally every 6 hours, As needed, Anxiety  senna leaf extract oral tablet , 1 tab(s) orally once a day (at bedtime)  polyethylene glycol 3350 oral powder for reconstitution , 17 gram(s) orally 2 times a day  chlordiazePOXIDE 25 mg oral capsule , 3 cap(s) orally every 24 hours  chlordiazePOXIDE 25 mg oral capsule , 3 cap(s) orally every 12 hours  insulin lispro 100 units/mL injectable solution , 10 unit(s) injectable 3 times a day (before meals)  insulin glargine 100 units/mL subcutaneous solution , 23 unit(s) subcutaneous once a day (at bedtime)  guaiFENesin 100 mg/5 mL oral liquid , 5 milliliter(s) orally every 6 hours, As Needed  Ocean 0.65% nasal spray , 1 spray(s) nasal 4 times a day, As Needed  menthol-benzocaine 3.6 mg-15 mg mucous membrane lozenge , 1 lozenge mucous membrane every 2 hours, As Needed  OLANZapine 5 mg oral tablet, disintegrating , 1 tab(s) orally every 4 hours, As Needed  Multiple Vitamins oral capsule , 1 cap(s) orally once a day  OLANZapine 15 mg oral tablet , 10 milligram(s) orally once a day (at bedtime)  methadone 10 mg oral tablet , 11 tab(s) orally once a day  lithium carbonate extended release , 900 milligram(s) orally once a day (at bedtime)  nicotine 14 mg/24 hr transdermal film, extended release , 14 milligram(s) transdermal once a day   spironolactone 25 mg oral tablet , 1 tab(s) orally once a day   hydrOXYzine hydrochloride 25 mg oral tablet , 1 tab(s) orally every 6 hours, As needed, Anxiety  senna leaf extract oral tablet , 1 tab(s) orally once a day (at bedtime)  polyethylene glycol 3350 oral powder for reconstitution , 17 gram(s) orally 2 times a day  guaiFENesin 100 mg/5 mL oral liquid , 5 milliliter(s) orally every 6 hours, As Needed  Ocean 0.65% nasal spray , 1 spray(s) nasal 4 times a day, As Needed  menthol-benzocaine 3.6 mg-15 mg mucous membrane lozenge , 1 lozenge mucous membrane every 2 hours, As Needed  OLANZapine 5 mg oral tablet, disintegrating , 1 tab(s) orally every 4 hours, As Needed  Multiple Vitamins oral capsule , 1 cap(s) orally once a day  OLANZapine 15 mg oral tablet , 10 milligram(s) orally once a day (at bedtime)  methadone 10 mg oral tablet , 11 tab(s) orally once a day  lithium carbonate extended release , 900 milligram(s) orally once a day (at bedtime)  nicotine 14 mg/24 hr transdermal film, extended release , 14 milligram(s) transdermal once a day   spironolactone 25 mg oral tablet , 1 tab(s) orally once a day

## 2023-01-10 NOTE — BH CONSULTATION LIAISON PROGRESS NOTE - NSBHCHARTREVIEWVS_PSY_A_CORE FT
Vital Signs Last 24 Hrs  T(C): 36.8 (10 Wayne 2023 06:00), Max: 36.8 (09 Jan 2023 22:00)  T(F): 98.2 (10 Wayne 2023 06:00), Max: 98.2 (09 Jan 2023 22:00)  HR: 68 (10 Wayne 2023 06:00) (63 - 135)  BP: 134/70 (10 Wayne 2023 06:00) (110/75 - 143/100)  BP(mean): --  RR: 18 (10 Wayne 2023 06:00) (18 - 19)  SpO2: 99% (10 Wayne 2023 06:00) (98% - 99%)    Parameters below as of 10 Wayne 2023 06:00  Patient On (Oxygen Delivery Method): room air

## 2023-01-10 NOTE — DISCHARGE NOTE PROVIDER - YES NO FOR MLM POSITIVE OR NEGATIVE COVID RESULT
What Type Of Note Output Would You Prefer (Optional)?: Bullet Format How Severe Is Your Rash?: moderate . Is This A New Presentation, Or A Follow-Up?: Rash

## 2023-01-10 NOTE — PROGRESS NOTE ADULT - PROBLEM SELECTOR PLAN 6
Patient w/ reported history of BPD, antisocial personality disorder  Presented to [facility] w/ homicidal ideation & paranoia  Lithium level low  - Continue Lithium 900mg qhs  - Continue Zyprexa ODT 10mg qhs  --> Continue Zyprexa ODT 5mg q4hr agitation  - Hold trazodone 100mg qhs for now
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
## 2023-01-10 NOTE — BH CONSULTATION LIAISON PROGRESS NOTE - NSBHCONSULTPRIMARYDISCUSSYES_PSY_A_CORE FT
Handoff provided to Dr. Munson unit [facility], discussed in detail regarding pt.'s expressing HI towards his ex-girl friend, discussed that team was trying to get contact information for the ex girl friend but could not find it yet, plan was to involve possibly law enforcement if pt. were to stay at [facility], however now pt. has been transferred to [facility].

## 2023-01-10 NOTE — BH INPATIENT PSYCHIATRY ASSESSMENT NOTE - MSE UNSTRUCTURED FT
Patient is awake and alert. Calm, cooperative. Affect is neutral. "I feel alright, I just want to get my methadone a little earlier if I can." Speech is fluent. TP is logical, coherent. No delusions. No tremors. No suicidal ideations. Limited insight. No homicidal ideations.
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
## 2023-01-10 NOTE — DISCHARGE NOTE NURSING/CASE MANAGEMENT/SOCIAL WORK - NSDCPEWEB_GEN_ALL_CORE
[facility] for Tobacco Control website --- [email or web address] website --- [email or web address]

## 2023-01-10 NOTE — CHART NOTE - NSCHARTNOTEFT_GEN_A_CORE
Tolerating adequate PO: [x ] yes  [ ] no    PEG tube on bolus feeds: [ ] yes  [x ] no  --Must be on bolus feeds, no pumps; TPN not accepted    Ambulates independently (can transfer to chair with assist): [x] yes  [ ] no    Hospital bed required: [ ] yes  [x ] no    Home O2: [ ] yes  [x ] no  --Only if patient was on home O2 prior to hospital admission    CPAP use at home confirmed with patient (accepted at [facility]): [ ] yes  [ x] no    BIPAP use at home (not accepted at [facility]): [ ] yes  [ x] no    Presence/Need of any IVs or PICCs (not accepted at [facility]): [ ] yes  [x ] no    Back catheter: [ ] yes  [ x] no  --Include date of placement and TOV in discharge note: [ ] done    Wound care instructions included in discharge note: [ ] yes  [ ] no not applicable  --Send enough supplies for special wound care services until the next business day: [ ] done    Sutures/staples: [ ] yes  [x ] no    --Include expected removal date in discharge note: [ ] done    Presence of any implanted devices: [ ] yes  [x ] no  --Insulin pumps must be removed before transfer by endocrine team    Receiving chemotherapy: [ ] yes  [x ] no  --If yes, can it be delayed by [facility] admission?: [ ] yes  [ ] no    Date ranges for any subspecialist follow-ups needed (assume lengthy admission to [facility]): cardiology followup within 1 month  --Include in discharge note: [ ] done
Psych contacted regarding confirmation of methadone dosage and question about reinitiating in context of recent bradycardia leading to transfer from [facility] to [facility] for further w/u and assessment. MMTP dosage confirmed by inpatient team to be methadone 110 mg qDaily. Patient can resume this dosage today with appropriate hold parameters (do not give if RR < 12, if HR <60, SYS BP <100, Diastolic BP <60 or if overly sedated). Consider holding or discontinuing clonidine if concerned for continued bradycardia or hypotension.    Plan:  - formal consult to follow in AM, patient added to CL consult list  - can RESUME methadone 110 mg qDaily with hold parameters for bradycardia, hypotension, excessive sedation, or decreased RR  -- can adjust during week as needed, CL team to follow  - would continue BZD taper for ETOH withdrawal with hold parameters for bradycardia, hypotension, excessive sedation or decreased RR  - consider discontinue/holding clonidine as can contribute to decreased HR/BP.

## 2023-01-10 NOTE — BH CONSULTATION LIAISON PROGRESS NOTE - CURRENT MEDICATION
MEDICATIONS  (STANDING):  chlordiazePOXIDE 75 milliGRAM(s) Oral every 12 hours  dextrose 5%. 1000 milliLiter(s) (50 mL/Hr) IV Continuous <Continuous>  dextrose 5%. 1000 milliLiter(s) (100 mL/Hr) IV Continuous <Continuous>  dextrose 50% Injectable 25 Gram(s) IV Push once  dextrose 50% Injectable 12.5 Gram(s) IV Push once  dextrose 50% Injectable 25 Gram(s) IV Push once  enoxaparin Injectable 40 milliGRAM(s) SubCutaneous every 24 hours  glucagon  Injectable 1 milliGRAM(s) IntraMuscular once  insulin glargine Injectable (LANTUS) 23 Unit(s) SubCutaneous at bedtime  insulin lispro (ADMELOG) corrective regimen sliding scale   SubCutaneous three times a day before meals  insulin lispro (ADMELOG) corrective regimen sliding scale   SubCutaneous at bedtime  insulin lispro Injectable (ADMELOG) 10 Unit(s) SubCutaneous three times a day before meals  lithium CR (ESKALITH-CR) 900 milliGRAM(s) Oral at bedtime  methadone    Tablet 110 milliGRAM(s) Oral daily  multivitamin 1 Tablet(s) Oral daily  nicotine -  14 mG/24Hr(s) Patch 1 Patch Transdermal daily  OLANZapine 10 milliGRAM(s) Oral at bedtime  polyethylene glycol 3350 17 Gram(s) Oral two times a day  senna 1 Tablet(s) Oral at bedtime  spironolactone 25 milliGRAM(s) Oral daily    MEDICATIONS  (PRN):  acetaminophen     Tablet .. 650 milliGRAM(s) Oral every 6 hours PRN Temp greater or equal to 38C (100.4F), Mild Pain (1 - 3)  benzocaine 15 mG/menthol 3.6 mG Lozenge 1 Lozenge Oral every 2 hours PRN Sore Throat  dextrose Oral Gel 15 Gram(s) Oral once PRN Blood Glucose LESS THAN 70 milliGRAM(s)/deciliter  diphenhydrAMINE Injectable 50 milliGRAM(s) IV Push every 6 hours PRN agitation  guaiFENesin Oral Liquid (Sugar-Free) 100 milliGRAM(s) Oral every 6 hours PRN Cough  LORazepam     Tablet 2 milliGRAM(s) Oral every 2 hours PRN Symptom-triggered 2 point increase in CIWA-Ar  LORazepam     Tablet 2 milliGRAM(s) Oral every 1 hour PRN Symptom-triggered: each CIWA -Ar score 8 or GREATER  OLANZapine Injectable 5 milliGRAM(s) IntraMuscular every 6 hours PRN agitation  sodium chloride 0.65% Nasal 1 Spray(s) Both Nostrils every 4 hours PRN Nasal Congestion

## 2023-01-10 NOTE — DISCHARGE NOTE PROVIDER - NSFOLLOWUPCLINICSTOKEN_GEN_ALL_ED_FT
826894:1 month|| ||00\01||False;
I have personally seen and examined this patient. I have fully participated in the care of this patient. I have reviewed all pertinent clinical information, including history, physical exam, plan and the Medical/PA/NP Student's note and agree except as noted.

## 2023-01-10 NOTE — PROGRESS NOTE ADULT - ASSESSMENT
57yoM with PMH of DM2 (on Lantus 40u QHS and admelog 20 TID), psychiatry disorders (diazepam 10 BID, neurontin 800mg TID, lithium 600mg BID, Zyprexa 15mg QHS) daily smoker 50 pack year (on nicotine patch), CHF/HTN (spironolactone 25mg QD, carvedilol 6.25mg BID, losartan 25 QD), presented from [facility] for altered mentation in presumed medication overdose.

## 2023-01-10 NOTE — PROGRESS NOTE ADULT - PROBLEM SELECTOR PLAN 5
Patient reported COVID positive on 1/4  Endorses mild symptoms of congestion, subjective fevers, chills  Not hypoxic  - No indication for treatment at this time  - Continue to monitor  - Supportive care

## 2023-01-10 NOTE — DISCHARGE NOTE NURSING/CASE MANAGEMENT/SOCIAL WORK - NSDCPEEMAIL_GEN_ALL_CORE
[facility] for Tobacco Control email [email or web address]
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
## 2023-01-10 NOTE — BH INPATIENT PSYCHIATRY ASSESSMENT NOTE - NSBHMETABOLIC_PSY_ALL_CORE_FT
BMI: BMI (kg/m2): 26.5 (01-10-23 @ 17:48)  HbA1c: A1C with Estimated Average Glucose Result: 10.2 % (01-08-23 @ 05:32)    Glucose: POCT Blood Glucose.: 178 mg/dL (01-10-23 @ 12:39)    BP: --  Lipid Panel: Date/Time: 02-18-22 @ 07:38  Cholesterol, Serum: 104  Direct LDL: --  HDL Cholesterol, Serum: 40  Total Cholesterol/HDL Ration Measurement: --  Triglycerides, Serum: 157

## 2023-01-10 NOTE — DISCHARGE NOTE NURSING/CASE MANAGEMENT/SOCIAL WORK - PATIENT PORTAL LINK FT
You can access the FollowMyHealth Patient Portal offered by [facility] by registering at the following website: [email or web address]. By joining [facility]’s FollowMyHealth portal, you will also be able to view your health information using other applications (apps) compatible with our system.

## 2023-01-10 NOTE — BH INPATIENT PSYCHIATRY ASSESSMENT NOTE - CURRENT MEDICATION
MEDICATIONS  (STANDING):  lithium 900 milliGRAM(s) Oral at bedtime  methadone    Tablet 110 milliGRAM(s) Oral daily  multivitamin 1 Tablet(s) Oral daily  nicotine -  14 mG/24Hr(s) Patch 1 Patch Transdermal daily  OLANZapine 10 milliGRAM(s) Oral at bedtime  polyethylene glycol 3350 17 Gram(s) Oral two times a day  senna 1 Tablet(s) Oral at bedtime  spironolactone 25 milliGRAM(s) Oral daily    MEDICATIONS  (PRN):  benzocaine 15 mG/menthol 3.6 mG Lozenge 1 Lozenge Oral every 2 hours PRN cough  guaiFENesin Oral Liquid (Sugar-Free) 100 milliGRAM(s) Oral every 6 hours PRN cough  hydrOXYzine hydrochloride 25 milliGRAM(s) Oral every 6 hours PRN anxiety  OLANZapine Disintegrating Tablet 5 milliGRAM(s) Oral every 4 hours PRN agitation  sodium chloride 0.65% Nasal 1 Spray(s) Both Nostrils four times a day PRN Nasal Congestion

## 2023-01-10 NOTE — BH INPATIENT PSYCHIATRY ASSESSMENT NOTE - NSBHCHARTREVIEWVS_PSY_A_CORE FT
Vital Signs Last 24 Hrs  T(C): 36.9 (01-10-23 @ 17:54), Max: 36.9 (01-10-23 @ 17:54)  T(F): 98.4 (01-10-23 @ 17:54), Max: 98.4 (01-10-23 @ 17:54)  HR: 72 (01-10-23 @ 14:00) (68 - 80)  BP: 153/78 (01-10-23 @ 14:00) (126/70 - 153/78)  BP(mean): --  RR: 17 (01-10-23 @ 14:00) (17 - 19)  SpO2: 98% (01-10-23 @ 14:00) (98% - 99%)    Orthostatic VS  01-10-23 @ 17:48  Lying BP: --/-- HR: --  Sitting BP: 145/86 HR: 70  Standing BP: 136/86 HR: 79  Site: --  Mode: --

## 2023-01-10 NOTE — BH CONSULTATION LIAISON PROGRESS NOTE - NSBHFUPINTERVALHXFT_PSY_A_CORE
Patient seen and examined at bedside. No additional Ativan PRN's required. Patient is seen initially sleeping in bed. He reports good toleration of current Librium taper. He reports anxiety at times outside of the withdrawals, which he attributes to feeling stuck in life and feeling worried about the future. He also reports feeling more down today, due to being in the hospital. He denies any associated SI, intent, or plan.     He initially states that he no longer wants to harm his ex, which he attributes to a conversation with his mother last night. Later in the interview though, the patient then states he would harm his ex if he were to be outside of the hospital right now.  Patient seen and examined at bedside. No additional Ativan PRN's required. Patient is seen initially sleeping in bed. He reports good toleration of current Librium taper. He reports anxiety at times outside of the withdrawals, which he attributes to feeling stuck in life and feeling worried about the future. He also reports feeling more down/depressed today, due to being in the hospital. He denies any associated SI, intent, or plan.     He initially states that he no longer wants to harm his ex girlfriend, which he attributes to a conversation with his mother last night. Later in the interview though, the patient then states he would harm his ex if he were to be outside of the hospital right now.

## 2023-01-10 NOTE — PROGRESS NOTE ADULT - PROBLEM SELECTOR PLAN 1
Patient presented with alteration in mental status w/ drooling, hypotension, bradycardia, hypoglycemia. Received naloxone 2mg IV with improvement in mental status. Patient also noted w/ miosis & hyporeflexia on arrival to ED. Signs & symptoms concerning for medication overdose i/s/o polypharmacy. s/p MICU & toxicology consults.  - Telemetry for monitoring of bradycardia  - dc carvedilol, clonidine Patient presented with alteration in mental status w/ drooling, hypotension, bradycardia, hypoglycemia. Received naloxone 2mg IV with improvement in mental status. Patient also noted w/ miosis & hyporeflexia on arrival to ED. Signs & symptoms concerning for medication overdose i/s/o polypharmacy. s/p MICU & toxicology consults.  - Telemetry for monitoring of bradycardia  - continue to hold carvedilol, clonidine

## 2023-01-10 NOTE — PROGRESS NOTE ADULT - PROBLEM SELECTOR PLAN 9
DVT ppx: Lovenox  Diet: CC/DASH  Dispo: Likely back to [facility] pending medical management
DVT ppx: Lovenox  Diet: CC/DASH  Dispo: Likely back to [facility] pending medical management
DVT ppx: Lovenox  Diet: CC/DASH  Dispo: Likely back to [facility] pending medical management
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
## 2023-01-10 NOTE — PROGRESS NOTE ADULT - PROBLEM SELECTOR PLAN 3
Patient w/ history of uncontrolled DM. A1c 10.2  At [facility] patient with fluctuating hyper- & hypoglycemic episodes  Was on lantus 30u qhs & ademlog 10u tid  - increase lantus to 20u qhs & admelog 7u tid  - ISS Patient w/ history of uncontrolled DM. A1c 10.2  At [facility] patient with fluctuating hyper- & hypoglycemic episodes  Was on lantus 30u qhs & ademlog 10u tid  - increase lantus to 23u qhs & admelog 1u tid  - ISS

## 2023-01-11 LAB — SARS-COV-2 RNA SPEC QL NAA+PROBE: SIGNIFICANT CHANGE UP

## 2023-01-11 PROCEDURE — 99223 1ST HOSP IP/OBS HIGH 75: CPT

## 2023-01-11 PROCEDURE — 99221 1ST HOSP IP/OBS SF/LOW 40: CPT

## 2023-01-11 RX ORDER — ACETAMINOPHEN 500 MG
650 TABLET ORAL EVERY 6 HOURS
Refills: 0 | Status: DISCONTINUED | OUTPATIENT
Start: 2023-01-11 | End: 2023-01-17

## 2023-01-11 RX ORDER — INSULIN GLARGINE 100 [IU]/ML
18 INJECTION, SOLUTION SUBCUTANEOUS AT BEDTIME
Refills: 0 | Status: DISCONTINUED | OUTPATIENT
Start: 2023-01-11 | End: 2023-01-12

## 2023-01-11 RX ORDER — LACTOBACILLUS ACIDOPHILUS 100MM CELL
1 CAPSULE ORAL
Refills: 0 | Status: DISCONTINUED | OUTPATIENT
Start: 2023-01-11 | End: 2023-01-12

## 2023-01-11 RX ORDER — INSULIN LISPRO 100/ML
6 VIAL (ML) SUBCUTANEOUS
Refills: 0 | Status: DISCONTINUED | OUTPATIENT
Start: 2023-01-11 | End: 2023-01-12

## 2023-01-11 RX ORDER — LOPERAMIDE HCL 2 MG
2 TABLET ORAL ONCE
Refills: 0 | Status: COMPLETED | OUTPATIENT
Start: 2023-01-11 | End: 2023-01-11

## 2023-01-11 RX ORDER — LOPERAMIDE HCL 2 MG
2 TABLET ORAL ONCE
Refills: 0 | Status: DISCONTINUED | OUTPATIENT
Start: 2023-01-11 | End: 2023-01-17

## 2023-01-11 RX ORDER — INSULIN LISPRO 100/ML
VIAL (ML) SUBCUTANEOUS AT BEDTIME
Refills: 0 | Status: DISCONTINUED | OUTPATIENT
Start: 2023-01-11 | End: 2023-01-17

## 2023-01-11 RX ORDER — DEXTROSE 50 % IN WATER 50 %
15 SYRINGE (ML) INTRAVENOUS ONCE
Refills: 0 | Status: DISCONTINUED | OUTPATIENT
Start: 2023-01-11 | End: 2023-01-17

## 2023-01-11 RX ORDER — DIAZEPAM 5 MG
10 TABLET ORAL
Refills: 0 | Status: DISCONTINUED | OUTPATIENT
Start: 2023-01-11 | End: 2023-01-17

## 2023-01-11 RX ORDER — LACTOBACILLUS ACIDOPHILUS 100MM CELL
1 CAPSULE ORAL
Refills: 0 | Status: COMPLETED | OUTPATIENT
Start: 2023-01-11 | End: 2023-01-14

## 2023-01-11 RX ORDER — GABAPENTIN 400 MG/1
300 CAPSULE ORAL EVERY 6 HOURS
Refills: 0 | Status: DISCONTINUED | OUTPATIENT
Start: 2023-01-11 | End: 2023-01-13

## 2023-01-11 RX ADMIN — OLANZAPINE 5 MILLIGRAM(S): 15 TABLET, FILM COATED ORAL at 13:43

## 2023-01-11 RX ADMIN — Medication 2: at 08:22

## 2023-01-11 RX ADMIN — Medication 6 UNIT(S): at 17:06

## 2023-01-11 RX ADMIN — Medication 1 PATCH: at 08:49

## 2023-01-11 RX ADMIN — Medication 4: at 17:06

## 2023-01-11 RX ADMIN — Medication 1 TABLET(S): at 08:47

## 2023-01-11 RX ADMIN — METHADONE HYDROCHLORIDE 80 MILLIGRAM(S): 40 TABLET ORAL at 08:48

## 2023-01-11 RX ADMIN — Medication 10 MILLIGRAM(S): at 20:36

## 2023-01-11 RX ADMIN — Medication 75 MILLIGRAM(S): at 08:48

## 2023-01-11 RX ADMIN — LITHIUM CARBONATE 900 MILLIGRAM(S): 300 TABLET, EXTENDED RELEASE ORAL at 20:36

## 2023-01-11 RX ADMIN — POLYETHYLENE GLYCOL 3350 17 GRAM(S): 17 POWDER, FOR SOLUTION ORAL at 08:48

## 2023-01-11 RX ADMIN — Medication 3: at 12:06

## 2023-01-11 RX ADMIN — SPIRONOLACTONE 25 MILLIGRAM(S): 25 TABLET, FILM COATED ORAL at 08:47

## 2023-01-11 RX ADMIN — Medication 1 TABLET(S): at 17:27

## 2023-01-11 RX ADMIN — INSULIN GLARGINE 18 UNIT(S): 100 INJECTION, SOLUTION SUBCUTANEOUS at 21:49

## 2023-01-11 RX ADMIN — METHADONE HYDROCHLORIDE 30 MILLIGRAM(S): 40 TABLET ORAL at 08:48

## 2023-01-11 NOTE — BH INPATIENT PSYCHIATRY PROGRESS NOTE - PRN MEDS
MEDICATIONS  (PRN):  acetaminophen     Tablet .. 650 milliGRAM(s) Oral every 6 hours PRN Temp greater or equal to 38C (100.4F), Mild Pain (1 - 3), Moderate Pain (4 - 6), Severe Pain (7 - 10)  benzocaine 15 mG/menthol 3.6 mG Lozenge 1 Lozenge Oral every 2 hours PRN cough  guaiFENesin Oral Liquid (Sugar-Free) 100 milliGRAM(s) Oral every 6 hours PRN cough  hydrOXYzine hydrochloride 25 milliGRAM(s) Oral every 6 hours PRN anxiety  OLANZapine Disintegrating Tablet 5 milliGRAM(s) Oral every 4 hours PRN agitation  OLANZapine Injectable 5 milliGRAM(s) IntraMuscular once PRN severe agitation  sodium chloride 0.65% Nasal 1 Spray(s) Both Nostrils four times a day PRN Nasal Congestion   MEDICATIONS  (PRN):  acetaminophen     Tablet .. 650 milliGRAM(s) Oral every 6 hours PRN Temp greater or equal to 38C (100.4F), Mild Pain (1 - 3), Moderate Pain (4 - 6), Severe Pain (7 - 10)  benzocaine 15 mG/menthol 3.6 mG Lozenge 1 Lozenge Oral every 2 hours PRN cough  dextrose Oral Gel 15 Gram(s) Oral once PRN Blood Glucose LESS THAN 70 milliGRAM(s)/deciliter  gabapentin 300 milliGRAM(s) Oral every 6 hours PRN anxiety/agitation  guaiFENesin Oral Liquid (Sugar-Free) 100 milliGRAM(s) Oral every 6 hours PRN cough  hydrOXYzine hydrochloride 25 milliGRAM(s) Oral every 6 hours PRN anxiety  OLANZapine Disintegrating Tablet 5 milliGRAM(s) Oral every 4 hours PRN agitation  OLANZapine Injectable 5 milliGRAM(s) IntraMuscular once PRN severe agitation  sodium chloride 0.65% Nasal 1 Spray(s) Both Nostrils four times a day PRN Nasal Congestion   MEDICATIONS  (PRN):  acetaminophen     Tablet .. 650 milliGRAM(s) Oral every 6 hours PRN Temp greater or equal to 38C (100.4F), Mild Pain (1 - 3), Moderate Pain (4 - 6), Severe Pain (7 - 10)  benzocaine 15 mG/menthol 3.6 mG Lozenge 1 Lozenge Oral every 2 hours PRN cough  dextrose Oral Gel 15 Gram(s) Oral once PRN Blood Glucose LESS THAN 70 milliGRAM(s)/deciliter  gabapentin 300 milliGRAM(s) Oral every 6 hours PRN anxiety/agitation  guaiFENesin Oral Liquid (Sugar-Free) 100 milliGRAM(s) Oral every 6 hours PRN cough  melatonin. 5 milliGRAM(s) Oral at bedtime PRN Insomnia  OLANZapine Disintegrating Tablet 2.5 milliGRAM(s) Oral every 4 hours PRN agitation  OLANZapine Injectable 5 milliGRAM(s) IntraMuscular once PRN severe agitation  QUEtiapine 50 milliGRAM(s) Oral every 4 hours PRN anxiety/agitation  sodium chloride 0.65% Nasal 1 Spray(s) Both Nostrils four times a day PRN Nasal Congestion

## 2023-01-11 NOTE — BH SOCIAL WORK INITIAL PSYCHOSOCIAL EVALUATION - NSBHBARRIERS_PSY_ALL_CORE
Financial difficulties/Lack of support/Non-compliant with treatment/Poor judgement/Social withdrawal

## 2023-01-11 NOTE — DIETITIAN INITIAL EVALUATION ADULT - OTHER INFO
Pt admitted to [facility] with primary diagnosis of Bipolar illness. H/O Polysubstance abuse. Medical history includes: Heart Failure, Pituitary humor, GERD, HTN, Type 2 diabetes. Saw Pt in day room. Pt reports good appetite/po intake at present. No GI distress noted. Noted Pt edentulous but states he can eat regular texture food. Of note, Pt not compliant with diabetes medication at home. Provided verbal education re: Consistent carbohydrate diet. Pt verbalized understanding. Food preferences explored and implemented. Food allergy to fish.

## 2023-01-11 NOTE — BH INPATIENT PSYCHIATRY PROGRESS NOTE - NSBHCHARTREVIEWVS_PSY_A_CORE FT
Vital Signs Last 24 Hrs  T(C): 36.2 (01-11-23 @ 08:28), Max: 36.9 (01-10-23 @ 17:54)  T(F): 97.2 (01-11-23 @ 08:28), Max: 98.4 (01-10-23 @ 17:54)  HR: --  BP: --  BP(mean): --  RR: --  SpO2: --    Orthostatic VS  01-11-23 @ 08:28  Lying BP: --/-- HR: --  Sitting BP: 146/82 HR: 72  Standing BP: --/-- HR: --  Site: --  Mode: --  Orthostatic VS  01-10-23 @ 17:48  Lying BP: --/-- HR: --  Sitting BP: 145/86 HR: 70  Standing BP: 136/86 HR: 79  Site: --  Mode: --   Vital Signs Last 24 Hrs  T(C): 36.1 (01-12-23 @ 17:18), Max: 36.3 (01-12-23 @ 08:29)  T(F): 97 (01-12-23 @ 17:18), Max: 97.3 (01-12-23 @ 08:29)  HR: 71 (01-12-23 @ 08:29) (71 - 71)  BP: 137/73 (01-12-23 @ 08:29) (137/73 - 137/73)  BP(mean): --  RR: --  SpO2: --    Orthostatic VS  01-11-23 @ 08:28  Lying BP: --/-- HR: --  Sitting BP: 146/82 HR: 72  Standing BP: --/-- HR: --  Site: --  Mode: --

## 2023-01-11 NOTE — BH INPATIENT PSYCHIATRY PROGRESS NOTE - NSBHMETABOLIC_PSY_ALL_CORE_FT
BMI: BMI (kg/m2): 26.5 (01-10-23 @ 17:48)  HbA1c: A1C with Estimated Average Glucose Result: 10.2 % (01-08-23 @ 05:32)    Glucose: POCT Blood Glucose.: 241 mg/dL (01-11-23 @ 07:55)    BP: --  Lipid Panel: Date/Time: 02-18-22 @ 07:38  Cholesterol, Serum: 104  Direct LDL: --  HDL Cholesterol, Serum: 40  Total Cholesterol/HDL Ration Measurement: --  Triglycerides, Serum: 157   BMI: BMI (kg/m2): 26.5 (01-10-23 @ 17:48)  HbA1c: A1C with Estimated Average Glucose Result: 10.2 % (01-08-23 @ 05:32)    Glucose: POCT Blood Glucose.: 217 mg/dL (01-12-23 @ 16:25)    BP: 137/73 (01-12-23 @ 08:29) (137/73 - 137/73)  Lipid Panel: Date/Time: 02-18-22 @ 07:38  Cholesterol, Serum: 104  Direct LDL: --  HDL Cholesterol, Serum: 40  Total Cholesterol/HDL Ration Measurement: --  Triglycerides, Serum: 157

## 2023-01-11 NOTE — DIETITIAN INITIAL EVALUATION ADULT - PERTINENT MEDS FT
MEDICATIONS  (STANDING):  glucagon  Injectable 1 milliGRAM(s) IntraMuscular once  insulin lispro (ADMELOG) corrective regimen sliding scale   SubCutaneous three times a day before meals  lactobacillus acidophilus 1 Tablet(s) Oral two times a day with meals  lithium 900 milliGRAM(s) Oral at bedtime  loperamide 2 milliGRAM(s) Oral once  methadone    Tablet 30 milliGRAM(s) Oral daily  methadone   Dispersible Tablet 80 milliGRAM(s) Oral daily  multivitamin 1 Tablet(s) Oral daily  nicotine -  14 mG/24Hr(s) Patch 1 Patch Transdermal daily  OLANZapine 10 milliGRAM(s) Oral at bedtime  polyethylene glycol 3350 17 Gram(s) Oral two times a day  senna 1 Tablet(s) Oral at bedtime  spironolactone 25 milliGRAM(s) Oral daily

## 2023-01-11 NOTE — BH SOCIAL WORK INITIAL PSYCHOSOCIAL EVALUATION - OTHER PAST PSYCHIATRIC HISTORY (INCLUDE DETAILS REGARDING ONSET, COURSE OF ILLNESS, INPATIENT/OUTPATIENT TREATMENT)
As per  Inpatient Psychiatry Assessment Note on 1/10/2023: "57 year old man, domiciled with sister in [place], on disability, single, documented diagnoses of bipolar 1 disorder, antisocial personality disorder, PMHx pituitary tumor, HTN, T2DM, and GERD (only compliant with HTN medications), 1 remote suicide attempt via injecting bleach per patient, with numerous psychiatric hospitalizations (last at [facility] in [place] from 10/9/2022 - 10/12/2022), multiple ED visits in the past few months for medical, MH and substance use disorder, history of multiple incarcerations (last nine years ago, patient served 10 year sentence for manslaughter and was released from assisted in ), history of violence and aggression both on the psychiatric unit and in the community,  history of polysubstance abuse (daily ETOH use c/b reportedly ETOH withdrawal seizures per patient report, MJ use, cocaine use, past heroin use, now on methadone maintenance through [facility] in [place]), +family history (father  of drug/alcohol overdose, and half sister has schizophrenia), he self presented with homicidal ideation and paranoia to [facility] in the context of substance abuse coupled with medication non compliance, was admitted to [facility], had episode of hypotension and impaired sensorium reversed with antagonist, treated in [facility] and followed by [facility] psych, antihypertensive meds adjusted, insulin adjusted, COVID+, now returned for continued tx."

## 2023-01-11 NOTE — PSYCHIATRIC REHAB INITIAL EVALUATION - NSBHPRRECOMMEND_PSY_ALL_CORE
Writer met with patient in order to orient patient to unit, provide patient with a unit schedule, and introduce patient to psychiatric rehabilitation staff and department functions. Writer discussed current protocol in response to COVID-19; writer reviewed the importance of daily hygiene, hand-washing, and the function and importance of the mask mandate and appropriate social distancing. Patient was irritable and unreceptive to writers engagement and ended session after a couple of minutes. Patient was recently medically discharged from  due to hypotension and hypoglycemia. Patient has since been medically cleared and has returned to [facility] COVID unit for continued inpatient psychiatric care. Per chart, patient self-presented to ED for homicidal ideation and paranoia in the context substance use and medication noncompliance. Patient has PPH of Bipolar Disorder and Antisocial personality disorder, with history of numerous psychiatric hospitalizations. Patient has history of one SA via injecting bleach into body. Patient has history of polysubstance abuse and daily alcohol use. Patient has history of aggression, both in the hospital and in the community. Patient was incarcerated for 10 years for manslaughter. Patient has PMH of pituitary tumor, HTN, T2DM, and GERD. Writer and patient were unable to collaborate on a psychiatric rehabilitation goal, therefore one will be chosen for him. Psych rehab staff will continue to engage patient daily in order to build therapeutic rapport.

## 2023-01-11 NOTE — BH INPATIENT PSYCHIATRY PROGRESS NOTE - NSBHASSESSSUMMFT_PSY_ALL_CORE
57 year old man, undomiciled, on disability, single, documented diagnoses of bipolar 1 disorder, antisocial personality disorder, PMHx pituitary tumor, HFrEF, HTN, T2DM, and GERD, 1 remote SA via injecting bleach, numerous psych hospitalizations (last at [facility] in [place] from 10/9/2022 - 10/12/2022), multiple ED visits in the past few months for medical, MH and substance use disorder, hx of multiple incarcerations (last 9 years ago, patient served 10 year sentence for manslaughter and was released from detention in ), hx of violence and aggression on the psych unit and in the community, hx of polysubstance abuse (daily ETOH use c/b reportedly ETOH withdrawal seizures per patient report, MJ use, cocaine use, past heroin use, now on methadone maintenance 110mg/day through [facility] in [place]), +family history (father  of drug/alcohol overdose, and half sister has schizophrenia), self presented with conditional HI to kill his ex-gf and paranoia in the context of substance abuse coupled with medication non compliance, found to be COVID+, admitted to [facility] for continued psychiatric care. Course complicated by episode of hypotension, bradycardia, and hypoglycemia and decreased responsiveness on 23, sent to [facility], where he stabilized after given narcan and his medications were adjusted, returned to  on 1/10/23. Differentials include substance withdrawal, substance induced mood disorder, personality traits, secondary gain.     Today, patient was linear, requesting clothes, housing, and medication changes. In good behavioral control.     Plan:  1.	Legal: continue 9.13  2.	Safety: routine obs  3.	Psychiatric: c/w Li 900mg qhs, zyprexa 10mg qhs,  methadone 110mg daily (confirmed with MMTP), valium 10mg BID  4.	Group/Milieu therapy   5.	Medical: COVID+ (prns for symptomatic treatment), hx HTN/HF (spironolactone 25mg), T2DM (SSI), nicotine patch 14mg/day. D/c CIWAS given his last drink was 23  6.	Collateral/Dispo: Unclear.  57 year old man, undomiciled, on disability, single, documented diagnoses of bipolar 1 disorder, antisocial personality disorder, PMHx pituitary tumor, HFrEF, HTN, T2DM, and GERD, 1 remote SA via injecting bleach, numerous psych hospitalizations (last at [facility] in [place] from 10/9/2022 - 10/12/2022), multiple ED visits in the past few months for medical, MH and substance use disorder, hx of multiple incarcerations (last 9 years ago, patient served 10 year sentence for manslaughter and was released from [facility] in ), hx of violence and aggression on the psych unit and in the community, hx of polysubstance abuse (daily ETOH use c/b reportedly ETOH withdrawal seizures per patient report, MJ use, cocaine use, past heroin use, now on methadone maintenance 110mg/day through [facility] in [place]), +family history (father  of drug/alcohol overdose, and half sister has schizophrenia), self presented with conditional HI to kill his ex-gf and paranoia in the context of substance abuse coupled with medication non compliance, found to be COVID+, admitted to [facility] for continued psychiatric care. Course complicated by episode of hypotension, bradycardia, and hypoglycemia and decreased responsiveness on 23, sent to [facility], where he stabilized after given narcan and his medications were adjusted, returned to  on 1/10/23. Differentials include substance withdrawal, substance induced mood disorder, personality traits, secondary gain.     Today, patient was linear, requesting clothes, housing, and medication changes. In good behavioral control.     Plan:  1.	Legal: continue 9.13  2.	Safety: routine obs  3.	Psychiatric: c/w Li 900mg qhs, zyprexa 10mg qhs,  methadone 110mg daily (confirmed with MMTP), valium 10mg BID. PRN gabapentin 300mg q6h for anxiety  4.	Group/Milieu therapy   5.	Medical: COVID+ (prns for symptomatic treatment), hx HTN/HF (spironolactone 25mg), T2DM (lantus 18u qhs, admelog 6u TID - per hospitalist), nicotine patch 14mg/day. D/c CIWAS given his last drink was 23  6.	Collateral/Dispo: Unclear.

## 2023-01-11 NOTE — PSYCHIATRIC REHAB INITIAL EVALUATION - NSBHALCSUB12_PSY_ALL_CORE
jomar Verdugo for Dr. Blair Hunter.     No

## 2023-01-11 NOTE — CONSULT NOTE ADULT - SUBJECTIVE AND OBJECTIVE BOX
HPI: 58 yo M w/ HTN, DM2, HFreF?, polysubstance abuse, history of incarceration and prior psychiatric hospitalizations (dx of bipolar, antisocial personality disorder), presented with homicidal ideation and paranoia in setting of substance abuse, transferred to [facility] for further psychiatric management, noted to be COVID+, but not hypoxic. Course was complicated by altered mental status on , where patient noted to be hypotensive, bradycardic, hypoglycemia and [facility]management. His symptoms were attributed to medications/polypharmacy, s/p naloxone with improvement in mental status, clonidine and coreg were discontinued (no further hypotension), and insulin reintroduced at a lower dose (not hypoglycemic). Deemed medically stable to return to psychiatric facility. Patient still endorsing homidical ideation (states wants to kill ex-girlfriend), so transferred back to [facility] for further psychiatric management.       Currently...     PAST MEDICAL & SURGICAL HISTORY:  Polysubstance abuse  Diabetes mellitus  HTN (hypertension)  GERD (gastroesophageal reflux disease)  History of pituitary tumor  Heart failure (HFrEF per [facility] team collateral from sister)    Review of Systems:   CONSTITUTIONAL:   EYES:   ENMT:    NECK:   RESPIRATORY:   CARDIOVASCULAR:   GASTROINTESTINAL:   GENITOURINARY:  NEUROLOGICAL:   SKIN:  LYMPH NODES:   ENDOCRINE:  MUSCULOSKELETAL:   HEME/LYMPH:   ALLERGY AND IMMUNOLOGIC:     Allergies  Cogentin (Unknown)  Haldol (Unknown)  Thorazine (Rash)    Intolerances    Social History: (supplemented via chart review)  single, domiciled w/ sister in [place], on disability, hx of multiple incarcerations including for manslaughter, +polysubstance abuse (etoh, benzos, MJ, cocaine, heroin) now on methadone    FAMILY HISTORY: (supplemented via chart review)  father  of drug/alcohol overdose  half-sister schizophrenia    MEDICATIONS  (STANDING):  dextrose 5%. 1000 milliLiter(s) (100 mL/Hr) IV Continuous <Continuous>  dextrose 5%. 1000 milliLiter(s) (50 mL/Hr) IV Continuous <Continuous>  dextrose 50% Injectable 25 Gram(s) IV Push once  dextrose 50% Injectable 12.5 Gram(s) IV Push once  dextrose 50% Injectable 25 Gram(s) IV Push once  glucagon  Injectable 1 milliGRAM(s) IntraMuscular once  insulin lispro (ADMELOG) corrective regimen sliding scale   SubCutaneous three times a day before meals  lactobacillus acidophilus 1 Tablet(s) Oral two times a day with meals  lithium 900 milliGRAM(s) Oral at bedtime  loperamide 2 milliGRAM(s) Oral once  methadone    Tablet 30 milliGRAM(s) Oral daily  methadone   Dispersible Tablet 80 milliGRAM(s) Oral daily  multivitamin 1 Tablet(s) Oral daily  nicotine -  14 mG/24Hr(s) Patch 1 Patch Transdermal daily  OLANZapine 10 milliGRAM(s) Oral at bedtime  polyethylene glycol 3350 17 Gram(s) Oral two times a day  senna 1 Tablet(s) Oral at bedtime  spironolactone 25 milliGRAM(s) Oral daily    MEDICATIONS  (PRN):  acetaminophen     Tablet .. 650 milliGRAM(s) Oral every 6 hours PRN Temp greater or equal to 38C (100.4F), Mild Pain (1 - 3), Moderate Pain (4 - 6), Severe Pain (7 - 10)  benzocaine 15 mG/menthol 3.6 mG Lozenge 1 Lozenge Oral every 2 hours PRN cough  dextrose Oral Gel 15 Gram(s) Oral once PRN Blood Glucose LESS THAN 70 milliGRAM(s)/deciliter  guaiFENesin Oral Liquid (Sugar-Free) 100 milliGRAM(s) Oral every 6 hours PRN cough  hydrOXYzine hydrochloride 25 milliGRAM(s) Oral every 6 hours PRN anxiety  LORazepam     Tablet 2 milliGRAM(s) Oral every 2 hours PRN CIWA score increase by 2 points and current CIWA score GREATER THAN 9  OLANZapine Disintegrating Tablet 5 milliGRAM(s) Oral every 4 hours PRN agitation  OLANZapine Injectable 5 milliGRAM(s) IntraMuscular once PRN severe agitation  sodium chloride 0.65% Nasal 1 Spray(s) Both Nostrils four times a day PRN Nasal Congestion      Vital Signs Last 24 Hrs  T(C): 36.2 (2023 08:28), Max: 36.9 (10 [facility] 2023 17:54)  T(F): 97.2 (2023 08:28), Max: 98.4 (10 [facility] 2023 17:54)  HR: 72 ([facility] 2023 14:00) (72 - 79)  BP: 153/78 (10 [facility] 2023 14:00) (138/79 - 153/78)  RR: 17 (10 [facility] 2023 14:00) (17 - 19)  SpO2: 98% (10 [facility] 2023 14:00) (98% - 98%)    CAPILLARY BLOOD GLUCOSE  POCT Blood Glucose.: 241 mg/dL (2023 07:55)  POCT Blood Glucose.: 178 mg/dL (10 [facility] 2023 12:39)      PHYSICAL EXAM:  GENERAL:   HEAD:    EYES:   NECK:   CHEST/LUNG:   HEART:   ABDOMEN:   EXTREMITIES:    PSYCH:   NEUROLOGY:  SKIN:     LABS:                        14.3   6.46  )-----------( 284      ( 10 [facility] 2023 06:43 )             43.4     01-10    137  |  98  |  19  ----------------------------<  187<H>  4.3   |  29  |  0.96    Ca    10.0      10 [facility] 2023 06:43  Phos  3.8     01-10  Mg     1.70     -10    TPro  7.4  /  Alb  4.1  /  TBili  <0.2  /  DBili  x   /  AST  17  /  ALT  17  /  AlkPhos  69  -10    A1C with Estimated Average Glucose Result: 10.2:     EKG(personally reviewed):    RADIOLOGY & ADDITIONAL TESTS:    Imaging Personally Reviewed:  < from: CT Head No Cont (23 @ 18:08) >  CLINICAL INFORMATION: Altered mental status    TECHNIQUE: Contiguous axial 5 mm sections were obtained through the head.   Sagittal and coronal 2-D reformatted images were also obtained.   This   scan was performed using automatic exposure control (radiation dose   reduction software) to obtain a diagnostic image quality scan with   patient dose as low as reasonably achievable.    FINDINGS:   No previous examinations are available for review.    The brain demonstrates no abnormal attenuation.   No acute cerebral   cortical infarct is seen.  No intracranial hemorrhage is found.  No mass   effect is foundin the brain.    The ventricles, sulci and basal cisterns appear unremarkable.    The orbits are unremarkable.  The paranasal sinuses are significant for   moderate mucosal thickening in the RIGHT ethmoid sinuses.  The nasal   cavity appears intact.The nasopharynx is symmetric.  The central skull   base, petrous temporal bones and calvarium remain intact.      IMPRESSION:   No acute abnormality. Moderate mucosal thickening in the   maxillary sinuses.    < end of copied text >      Consultant(s) Notes Reviewed:  Hospital course, MICU/TOx/med notes,     Care Discussed with Consultants/Other Providers: KIRILL   HPI: 56 yo M w/ HTN, DM2, HFreF?, polysubstance abuse, history of incarceration and prior psychiatric hospitalizations (dx of bipolar, antisocial personality disorder), presented with homicidal ideation and paranoia in setting of substance abuse, transferred to [facility] for further psychiatric management, noted to be COVID+, but not hypoxic. Course was complicated by altered mental status on , where patient noted to be hypotensive, bradycardic, hypoglycemia and he was transferred to [facility] for further workup/management. His symptoms were attributed to medications/polypharmacy, s/p naloxone with improvement in mental status, clonidine and coreg were discontinued (no further hypotension), and insulin reintroduced at a lower dose (not hypoglycemic). Deemed medically stable to return to psychiatric facility. Patient still endorsing homidical ideation (states wants to kill ex-girlfriend), so transferred back to [facility] for further psychiatric management.       Currently patient sitting in dining area, eating lunch (burger), speaking to other patients. Attributes episode to blood pressure medications, may not have been taking them consistently prior to admission (feels getting them all together was too much). Only complaint now is headache, otherwise doing OK. Discussed alterations to medications and plan to slowly adjust them as needed, he verbalized understanding.     PAST MEDICAL & SURGICAL HISTORY:  Polysubstance abuse  Diabetes mellitus  HTN (hypertension)  GERD (gastroesophageal reflux disease)  History of pituitary tumor  Heart failure (HFrEF per [facility] team collateral from sister)    Review of Systems:   CONSTITUTIONAL: no fever/chills  EYES: no eye pain/blurry vision  ENMT:  no throat pain/trouble swallowing  NECK: no neck pain/stiffness  RESPIRATORY: no cough/SOB  CARDIOVASCULAR: no CP/palpitations/LE edema  GASTROINTESTINAL: no N/V/abdominal pain  GENITOURINARY: no dysuria/change in frequency  NEUROLOGICAL: +headaches, no focal weakness/numbness  SKIN: no issues   ENDOCRINE: diabetes on insulin, previously with polyuria/polydipsia  MUSCULOSKELETAL: no joint pain/swelling  HEME/LYMPH: no easy bleeding/bruising    Allergies  Cogentin (Unknown)  Haldol (Unknown)  Thorazine (Rash)    Intolerances    Social History: (supplemented via chart review)  single, domiciled w/ sister in [place], on disability, hx of multiple incarcerations including for manslaughter, +polysubstance abuse (etoh, benzos, MJ, cocaine, heroin) now on methadone    FAMILY HISTORY: (supplemented via chart review)  father  of drug/alcohol overdose  half-sister schizophrenia    MEDICATIONS  (STANDING):  dextrose 5%. 1000 milliLiter(s) (100 mL/Hr) IV Continuous <Continuous>  dextrose 5%. 1000 milliLiter(s) (50 mL/Hr) IV Continuous <Continuous>  dextrose 50% Injectable 25 Gram(s) IV Push once  dextrose 50% Injectable 12.5 Gram(s) IV Push once  dextrose 50% Injectable 25 Gram(s) IV Push once  glucagon  Injectable 1 milliGRAM(s) IntraMuscular once  insulin lispro (ADMELOG) corrective regimen sliding scale   SubCutaneous three times a day before meals  lactobacillus acidophilus 1 Tablet(s) Oral two times a day with meals  lithium 900 milliGRAM(s) Oral at bedtime  loperamide 2 milliGRAM(s) Oral once  methadone    Tablet 30 milliGRAM(s) Oral daily  methadone   Dispersible Tablet 80 milliGRAM(s) Oral daily  multivitamin 1 Tablet(s) Oral daily  nicotine -  14 mG/24Hr(s) Patch 1 Patch Transdermal daily  OLANZapine 10 milliGRAM(s) Oral at bedtime  polyethylene glycol 3350 17 Gram(s) Oral two times a day  senna 1 Tablet(s) Oral at bedtime  spironolactone 25 milliGRAM(s) Oral daily    MEDICATIONS  (PRN):  acetaminophen     Tablet .. 650 milliGRAM(s) Oral every 6 hours PRN Temp greater or equal to 38C (100.4F), Mild Pain (1 - 3), Moderate Pain (4 - 6), Severe Pain (7 - 10)  benzocaine 15 mG/menthol 3.6 mG Lozenge 1 Lozenge Oral every 2 hours PRN cough  dextrose Oral Gel 15 Gram(s) Oral once PRN Blood Glucose LESS THAN 70 milliGRAM(s)/deciliter  guaiFENesin Oral Liquid (Sugar-Free) 100 milliGRAM(s) Oral every 6 hours PRN cough  hydrOXYzine hydrochloride 25 milliGRAM(s) Oral every 6 hours PRN anxiety  LORazepam     Tablet 2 milliGRAM(s) Oral every 2 hours PRN CIWA score increase by 2 points and current CIWA score GREATER THAN 9  OLANZapine Disintegrating Tablet 5 milliGRAM(s) Oral every 4 hours PRN agitation  OLANZapine Injectable 5 milliGRAM(s) IntraMuscular once PRN severe agitation  sodium chloride 0.65% Nasal 1 Spray(s) Both Nostrils four times a day PRN Nasal Congestion      Vital Signs Last 24 Hrs  T(C): 36.2 (2023 08:28), Max: 36.9 (10 [facility] 2023 17:54)  T(F): 97.2 (2023 08:28), Max: 98.4 (10 [facility] 2023 17:54)  HR: 72 (10 [facility] 2023 14:00) (72 - 79)  BP: 153/78 (10 [facility] 2023 14:00) (138/79 - 153/78)  RR: 17 (10 [facility] 2023 14:00) (17 - 19)  SpO2: 98% (10 [facility] 2023 14:00) (98% - 98%)    CAPILLARY BLOOD GLUCOSE  POCT Blood Glucose.: 241 mg/dL (2023 07:55)  POCT Blood Glucose.: 178 mg/dL (10 [facility] 2023 12:39)      PHYSICAL EXAM:  GENERAL: NAD, sitting in common area, eating lunch, speaking collegially to other patients  HEAD:  NC/AT  EYES: EOMI, clear sclera/conjunctiva  NECK: supple, no JVD  CHEST/LUNG: CTAB, no wheeze appreciated, comfortable on RA, speaking in full sentences  HEART: S1S2 RRR  ABDOMEN: soft, non-tender +BS  EXTREMITIES:  no c/c/e  PSYCH: calm cooperative  NEUROLOGY: moving all extremities, non-focal  SKIN: +multiple tattoos    LABS:                        14.3   6.46  )-----------( 284      ( 10 [facility] 2023 06:43 )             43.4     01-10    137  |  98  |  19  ----------------------------<  187<H>  4.3   |  29  |  0.96    Ca    10.0      10 [facility] 2023 06:43  Phos  3.8     -10  Mg     1.70     01-10    TPro  7.4  /  Alb  4.1  /  TBili  <0.2  /  DBili  x   /  AST  17  /  ALT  17  /  AlkPhos  69  10    A1C with Estimated Average Glucose Result: 10.2:     EKG(personally reviewed):    RADIOLOGY & ADDITIONAL TESTS:    Imaging Personally Reviewed:  < from: CT Head No Cont (23 @ 18:08) >  CLINICAL INFORMATION: Altered mental status    TECHNIQUE: Contiguous axial 5 mm sections were obtained through the head.   Sagittal and coronal 2-D reformatted images were also obtained.   This   scan was performed using automatic exposure control (radiation dose   reduction software) to obtain a diagnostic image quality scan with   patient dose as low as reasonably achievable.    FINDINGS:   No previous examinations are available for review.    The brain demonstrates no abnormal attenuation.   No acute cerebral   cortical infarct is seen.  No intracranial hemorrhage is found.  No mass   effect is foundin the brain.    The ventricles, sulci and basal cisterns appear unremarkable.    The orbits are unremarkable.  The paranasal sinuses are significant for   moderate mucosal thickening in the RIGHT ethmoid sinuses.  The nasal   cavity appears intact.The nasopharynx is symmetric.  The central skull   base, petrous temporal bones and calvarium remain intact.      IMPRESSION:   No acute abnormality. Moderate mucosal thickening in the   maxillary sinuses.    < end of copied text >      Consultant(s) Notes Reviewed:  Hospital course, MICU/TOx/med notes,     Care Discussed with Consultants/Other Providers: KIRILL Mark re: prior episode of AMS/bradycardia/hypotension/hypoglycemia, medication changes at [facility], plan for slow titration

## 2023-01-11 NOTE — BH INPATIENT PSYCHIATRY PROGRESS NOTE - CURRENT MEDICATION
MEDICATIONS  (STANDING):  diazepam    Tablet 10 milliGRAM(s) Oral two times a day  glucagon  Injectable 1 milliGRAM(s) IntraMuscular once  insulin lispro (ADMELOG) corrective regimen sliding scale   SubCutaneous three times a day before meals  lactobacillus acidophilus 1 Tablet(s) Oral two times a day with meals  lithium 900 milliGRAM(s) Oral at bedtime  loperamide 2 milliGRAM(s) Oral once  methadone    Tablet 30 milliGRAM(s) Oral daily  methadone   Dispersible Tablet 80 milliGRAM(s) Oral daily  multivitamin 1 Tablet(s) Oral daily  nicotine -  14 mG/24Hr(s) Patch 1 Patch Transdermal daily  OLANZapine 10 milliGRAM(s) Oral at bedtime  polyethylene glycol 3350 17 Gram(s) Oral two times a day  senna 1 Tablet(s) Oral at bedtime  spironolactone 25 milliGRAM(s) Oral daily    MEDICATIONS  (PRN):  acetaminophen     Tablet .. 650 milliGRAM(s) Oral every 6 hours PRN Temp greater or equal to 38C (100.4F), Mild Pain (1 - 3), Moderate Pain (4 - 6), Severe Pain (7 - 10)  benzocaine 15 mG/menthol 3.6 mG Lozenge 1 Lozenge Oral every 2 hours PRN cough  guaiFENesin Oral Liquid (Sugar-Free) 100 milliGRAM(s) Oral every 6 hours PRN cough  hydrOXYzine hydrochloride 25 milliGRAM(s) Oral every 6 hours PRN anxiety  OLANZapine Disintegrating Tablet 5 milliGRAM(s) Oral every 4 hours PRN agitation  OLANZapine Injectable 5 milliGRAM(s) IntraMuscular once PRN severe agitation  sodium chloride 0.65% Nasal 1 Spray(s) Both Nostrils four times a day PRN Nasal Congestion   MEDICATIONS  (STANDING):  diazepam    Tablet 10 milliGRAM(s) Oral two times a day  insulin glargine Injectable (LANTUS) 18 Unit(s) SubCutaneous at bedtime  insulin lispro (ADMELOG) corrective regimen sliding scale   SubCutaneous three times a day before meals  insulin lispro (ADMELOG) corrective regimen sliding scale   SubCutaneous at bedtime  insulin lispro Injectable (ADMELOG) 6 Unit(s) SubCutaneous three times a day before meals  lactobacillus acidophilus 1 Tablet(s) Oral two times a day with meals  lithium 900 milliGRAM(s) Oral at bedtime  loperamide 2 milliGRAM(s) Oral once  methadone    Tablet 30 milliGRAM(s) Oral daily  methadone   Dispersible Tablet 80 milliGRAM(s) Oral daily  multivitamin 1 Tablet(s) Oral daily  nicotine -  14 mG/24Hr(s) Patch 1 Patch Transdermal daily  OLANZapine 10 milliGRAM(s) Oral at bedtime  polyethylene glycol 3350 17 Gram(s) Oral two times a day  senna 1 Tablet(s) Oral at bedtime  spironolactone 25 milliGRAM(s) Oral daily    MEDICATIONS  (PRN):  acetaminophen     Tablet .. 650 milliGRAM(s) Oral every 6 hours PRN Temp greater or equal to 38C (100.4F), Mild Pain (1 - 3), Moderate Pain (4 - 6), Severe Pain (7 - 10)  benzocaine 15 mG/menthol 3.6 mG Lozenge 1 Lozenge Oral every 2 hours PRN cough  dextrose Oral Gel 15 Gram(s) Oral once PRN Blood Glucose LESS THAN 70 milliGRAM(s)/deciliter  gabapentin 300 milliGRAM(s) Oral every 6 hours PRN anxiety/agitation  guaiFENesin Oral Liquid (Sugar-Free) 100 milliGRAM(s) Oral every 6 hours PRN cough  hydrOXYzine hydrochloride 25 milliGRAM(s) Oral every 6 hours PRN anxiety  OLANZapine Disintegrating Tablet 5 milliGRAM(s) Oral every 4 hours PRN agitation  OLANZapine Injectable 5 milliGRAM(s) IntraMuscular once PRN severe agitation  sodium chloride 0.65% Nasal 1 Spray(s) Both Nostrils four times a day PRN Nasal Congestion   MEDICATIONS  (STANDING):  diazepam    Tablet 10 milliGRAM(s) Oral two times a day  insulin glargine Injectable (LANTUS) 23 Unit(s) SubCutaneous at bedtime  insulin lispro (ADMELOG) corrective regimen sliding scale   SubCutaneous at bedtime  insulin lispro (ADMELOG) corrective regimen sliding scale   SubCutaneous three times a day before meals  insulin lispro Injectable (ADMELOG) 10 Unit(s) SubCutaneous three times a day before meals  lactobacillus acidophilus 1 Tablet(s) Oral two times a day with meals  lithium 900 milliGRAM(s) Oral at bedtime  loperamide 2 milliGRAM(s) Oral once  melatonin. 5 milliGRAM(s) Oral at bedtime  methadone    Tablet 30 milliGRAM(s) Oral daily  methadone   Dispersible Tablet 80 milliGRAM(s) Oral daily  multivitamin 1 Tablet(s) Oral daily  nicotine -  14 mG/24Hr(s) Patch 1 Patch Transdermal daily  OLANZapine 5 milliGRAM(s) Oral at bedtime  polyethylene glycol 3350 17 Gram(s) Oral two times a day  senna 1 Tablet(s) Oral at bedtime  spironolactone 25 milliGRAM(s) Oral daily    MEDICATIONS  (PRN):  acetaminophen     Tablet .. 650 milliGRAM(s) Oral every 6 hours PRN Temp greater or equal to 38C (100.4F), Mild Pain (1 - 3), Moderate Pain (4 - 6), Severe Pain (7 - 10)  benzocaine 15 mG/menthol 3.6 mG Lozenge 1 Lozenge Oral every 2 hours PRN cough  dextrose Oral Gel 15 Gram(s) Oral once PRN Blood Glucose LESS THAN 70 milliGRAM(s)/deciliter  gabapentin 300 milliGRAM(s) Oral every 6 hours PRN anxiety/agitation  guaiFENesin Oral Liquid (Sugar-Free) 100 milliGRAM(s) Oral every 6 hours PRN cough  melatonin. 5 milliGRAM(s) Oral at bedtime PRN Insomnia  OLANZapine Disintegrating Tablet 2.5 milliGRAM(s) Oral every 4 hours PRN agitation  OLANZapine Injectable 5 milliGRAM(s) IntraMuscular once PRN severe agitation  QUEtiapine 50 milliGRAM(s) Oral every 4 hours PRN anxiety/agitation  sodium chloride 0.65% Nasal 1 Spray(s) Both Nostrils four times a day PRN Nasal Congestion

## 2023-01-11 NOTE — CONSULT NOTE ADULT - ASSESSMENT
56 yo M w/ HTN, DM2, polysubstance abuse, bipolar disorder, initially presenting w/ homicidal ideation/paranoia,, found to be COVID+, [facility] course c/b altered mental status/hypotension/bradycardia/hypoglycemia on 1/7, transferred to [facility] for further evaluation/management, symptoms attributed to medications/polypharmacy, regimen adjusted, now transferred back to [facility] for further psychiatric management.     # Medication overdose/polypharmacy  - 1/7 w/ AMS, hypotension, bradycardia, hypoglycemia  - Tox and MICU evals appreciated  - patient denied taking extra medications...  - s/p naloxone, coreg and clonidine d/zulema, restarted on lower dose of insulin  - plan for cautious adjustments to medications     # DM2 c/b hyperglycemia (and episode of hypoglycemia), on long term insulin therapy  - ?issues with compliance, patient admits his diabetes isn't under good control, had reported polyuria and polydipsia  - A1C 10.2  - was evaluated by endocrine on prior hospitalization  - prior to episode of hypoglycemia, was on Lantus 30U, premeal admelog 10U at [facility] (lower dose compared to what endocrine previously recommended in Feb eval), AM FS on 1/7 354... later FS 60 (was hypoglycemia due to premeal dose + correctional? + ?methadone???)  - insulin regimen was adjusted at [facility], slowly reintroduced and uptitrated  - currently at Lantus 23U, premeal admelog 10U tid  - goal FS <180, continue to monitor FS and titrate regimen (slowly) as needed   - appreciate diabetic educator assistance with counseling, nutrition f/u  - would likely benefit from close endocrine followup after discharge    # Essential HTN  - on spironolactone currently  - coreg and clonidine d/zulema from recent hospitalization  - goal SBP <130 given diabetes  - monitor BP and titrate regimen (slowly) as needed  - avoid hypotension    # COVID19  - not hypoxic, no indication for steroids at this time  - c/w supportive care, symptomatic management as needed    # Heart failure w/ reduced ejection fraction  - per [facility] team, collateral from sister reports hx of HFrEF  - no prior echo noted in EMR on chart review (none in [facility] either)  - regardless patient appears euvolemic on exam, is not in acute exacerbation at this time  - c/w spironolactone  - coreg currently on hold due to prior bradycardia    # Polysubstance abuse  - monitoring for signs of withdrawal as per psych  - opioid dependence, c/w methadone as per psych  - smoker - c/w nicotine replacement therapy    # Bipolar disorder, HI, paranoia, history of violence  - safety precautions and medication management as per psych   58 yo M w/ HTN, DM2, polysubstance abuse, bipolar disorder, initially presenting w/ homicidal ideation/paranoia,, found to be COVID+, [facility] course c/b altered mental status/hypotension/bradycardia/hypoglycemia on 1/7, transferred to [facility] for further evaluation/management, symptoms attributed to medications/polypharmacy, regimen adjusted, now transferred back to [facility] for further psychiatric management.     # Medication overdose/polypharmacy  - 1/7 w/ AMS, hypotension, bradycardia, hypoglycemia  - Tox and MICU evals appreciated  - patient denied taking extra medications...  - s/p naloxone, coreg and clonidine d/zulema, restarted on lower dose of insulin  - plan for cautious adjustments to medications     # DM2 c/b hyperglycemia (and episode of hypoglycemia), on long term insulin therapy  - ?issues with compliance, patient admits his diabetes isn't under good control, had reported polyuria and polydipsia  - A1C 10.2  - was evaluated by endocrine on prior hospitalization  - prior to episode of hypoglycemia, was on Lantus 30U, premeal admelog 10U at [facility] (lower dose compared to what endocrine previously recommended in Feb eval), AM FS on 1/7 354... later FS 60 (was hypoglycemia due to premeal dose + correctional? + ?methadone???)  - insulin regimen was adjusted at [facility], slowly reintroduced and uptitrated  - currently at Lantus 23U, premeal admelog 10U tid  - goal FS <180, continue to monitor FS and titrate regimen (slowly) as needed   - appreciate diabetic educator assistance with counseling, nutrition f/u  - would likely benefit from close endocrine followup after discharge    # Essential HTN  - on spironolactone currently  - coreg and clonidine d/zulema during recent hospitalization  - goal SBP <130 given diabetes  - monitor BP and titrate regimen (slowly) as needed  - avoid hypotension    # COVID19  - not hypoxic, no indication for steroids at this time  - c/w supportive care, symptomatic management as needed    # Heart failure w/ reduced ejection fraction  - per [facility] team, collateral from sister reports hx of HFrEF  - no prior echo noted in EMR on chart review (none in [facility] either)  - regardless patient appears euvolemic on exam, is not in acute exacerbation at this time  - c/w spironolactone  - coreg currently on hold due to prior bradycardia    # Polysubstance abuse  - monitoring for signs of withdrawal as per psych  - opioid dependence, c/w methadone as per psych  - smoker - c/w nicotine replacement therapy    # Bipolar disorder, HI, paranoia, history of violence  - safety precautions and medication management as per psych   56 yo M w/ HTN, DM2, polysubstance abuse, bipolar disorder, initially presenting w/ homicidal ideation/paranoia,, found to be COVID+, [facility] course c/b altered mental status/hypotension/bradycardia/hypoglycemia on 1/7, transferred to [facility] for further evaluation/management, symptoms attributed to medications/polypharmacy, regimen adjusted, now transferred back to [facility] for further psychiatric management.     # Medication overdose/polypharmacy  - 1/7 w/ AMS, hypotension, bradycardia, hypoglycemia  - Tox and MICU evals appreciated  - patient denied taking extra medications...  - s/p naloxone, coreg and clonidine d/zulema, restarted on lower dose of insulin  - plan for cautious adjustments to medications     # DM2 c/b hyperglycemia (and episode of hypoglycemia), on long term insulin therapy  - ?issues with compliance, patient admits his diabetes isn't under good control, had reported polyuria and polydipsia  - A1C 10.2  - was evaluated by endocrine on prior hospitalization  - prior to episode of hypoglycemia, was on Lantus 30U, premeal admelog 10U at [facility] (lower dose compared to what endocrine previously recommended in Feb eval), AM FS on 1/7 354... later FS 60 (was hypoglycemia due to premeal dose + correctional? + ?methadone???)  - insulin regimen was adjusted at [facility], slowly reintroduced and uptitrated  - currently at Lantus 23U, premeal admelog 10U tid at time of discharge from [facility]  - if concerned about PO intake/hypoglycemia, could decrease regimen slightly and reassess (ordered for Lantus 18U, premeal admelog 6U tid)  - goal FS <180, continue to monitor FS and titrate regimen (slowly) as needed   - appreciate diabetic educator assistance with counseling, nutrition f/u  - would likely benefit from close endocrine followup after discharge    # Essential HTN  - on spironolactone currently  - coreg and clonidine d/zulema during recent hospitalization  - goal SBP <130 given diabetes  - monitor BP and titrate regimen (slowly) as needed  - avoid hypotension    # COVID19  - not hypoxic, no indication for steroids at this time  - c/w supportive care, symptomatic management as needed    # Heart failure w/ reduced ejection fraction  - per [facility] team, collateral from sister reports hx of HFrEF  - no prior echo noted in EMR on chart review (none in [facility] either)  - regardless patient appears euvolemic on exam, is not in acute exacerbation at this time  - c/w spironolactone  - coreg currently on hold due to prior bradycardia    # Polysubstance abuse  - monitoring for signs of withdrawal as per psych  - opioid dependence, c/w methadone as per psych  - smoker - c/w nicotine replacement therapy    # Bipolar disorder, HI, paranoia, history of violence  - safety precautions and medication management as per psych

## 2023-01-11 NOTE — BH INPATIENT PSYCHIATRY PROGRESS NOTE - MSE UNSTRUCTURED FT
Patient is awake and alert. Calm, cooperative. Affect is neutral. "I feel alright, I just want to get my methadone a little earlier if I can." Speech is fluent. TP is logical, coherent. No delusions. No tremors. No suicidal ideations. Limited insight. No homicidal ideations.     Appearance: fair hygiene, dressed appropriately in shirt and hospital pants.   Behavior: calm, cooperative, maintained appropriate eye contact.    Motor: No psychomotor agitation or retardation. No tremors.  Speech: Coherent (not slurred), normal rate, rhythm, and volume.   Mood: "not good"    Affect: Neutral, constricted, stable.  Thought process: linear, logical, goal directed.   Thought content: Denies SI/HI/AVH. Not responding to internal stimuli.   Insight: fair  Judgment: fair on interview  Impulse control: fair on interview  Gait: Intact

## 2023-01-11 NOTE — DIETITIAN INITIAL EVALUATION ADULT - PERTINENT LABORATORY DATA
CAPILLARY BLOOD GLUCOSE    POCT Blood Glucose.: 241 mg/dL (11 Jan 2023 07:55)  POCT Blood Glucose.: 178 mg/dL (10 Wayne 2023 12:39)    A1C with Estimated Average Glucose Result: 10.2 % (01-08-23 @ 05:32)

## 2023-01-11 NOTE — BH INPATIENT PSYCHIATRY PROGRESS NOTE - NSBHATTESTBILLING_PSY_A_CORE
99221-Initial OBS or IP - low complexity OR 40-54 mins 26342-[id number] OBS or IP - moderate complexity OR 35-49 mins

## 2023-01-12 LAB — SARS-COV-2 RNA SPEC QL NAA+PROBE: DETECTED

## 2023-01-12 PROCEDURE — 99213 OFFICE O/P EST LOW 20 MIN: CPT

## 2023-01-12 PROCEDURE — 99233 SBSQ HOSP IP/OBS HIGH 50: CPT

## 2023-01-12 RX ORDER — OLANZAPINE 15 MG/1
2.5 TABLET, FILM COATED ORAL EVERY 4 HOURS
Refills: 0 | Status: DISCONTINUED | OUTPATIENT
Start: 2023-01-12 | End: 2023-01-12

## 2023-01-12 RX ORDER — OLANZAPINE 15 MG/1
2.5 TABLET, FILM COATED ORAL EVERY 4 HOURS
Refills: 0 | Status: DISCONTINUED | OUTPATIENT
Start: 2023-01-12 | End: 2023-01-17

## 2023-01-12 RX ORDER — QUETIAPINE FUMARATE 200 MG/1
50 TABLET, FILM COATED ORAL EVERY 4 HOURS
Refills: 0 | Status: DISCONTINUED | OUTPATIENT
Start: 2023-01-12 | End: 2023-01-17

## 2023-01-12 RX ORDER — OLANZAPINE 15 MG/1
5 TABLET, FILM COATED ORAL AT BEDTIME
Refills: 0 | Status: DISCONTINUED | OUTPATIENT
Start: 2023-01-12 | End: 2023-01-17

## 2023-01-12 RX ORDER — INSULIN LISPRO 100/ML
10 VIAL (ML) SUBCUTANEOUS
Refills: 0 | Status: DISCONTINUED | OUTPATIENT
Start: 2023-01-12 | End: 2023-01-17

## 2023-01-12 RX ORDER — INSULIN GLARGINE 100 [IU]/ML
23 INJECTION, SOLUTION SUBCUTANEOUS AT BEDTIME
Refills: 0 | Status: DISCONTINUED | OUTPATIENT
Start: 2023-01-12 | End: 2023-01-13

## 2023-01-12 RX ORDER — LANOLIN ALCOHOL/MO/W.PET/CERES
5 CREAM (GRAM) TOPICAL AT BEDTIME
Refills: 0 | Status: DISCONTINUED | OUTPATIENT
Start: 2023-01-12 | End: 2023-01-17

## 2023-01-12 RX ADMIN — Medication 5 MILLIGRAM(S): at 20:28

## 2023-01-12 RX ADMIN — Medication 25 MILLIGRAM(S): at 12:41

## 2023-01-12 RX ADMIN — GABAPENTIN 300 MILLIGRAM(S): 400 CAPSULE ORAL at 12:41

## 2023-01-12 RX ADMIN — Medication 5 MILLIGRAM(S): at 02:12

## 2023-01-12 RX ADMIN — Medication 1 PATCH: at 20:34

## 2023-01-12 RX ADMIN — LITHIUM CARBONATE 900 MILLIGRAM(S): 300 TABLET, EXTENDED RELEASE ORAL at 20:27

## 2023-01-12 RX ADMIN — METHADONE HYDROCHLORIDE 80 MILLIGRAM(S): 40 TABLET ORAL at 08:53

## 2023-01-12 RX ADMIN — Medication 1 PATCH: at 08:55

## 2023-01-12 RX ADMIN — Medication 10 MILLIGRAM(S): at 20:27

## 2023-01-12 RX ADMIN — Medication 10 MILLIGRAM(S): at 08:57

## 2023-01-12 RX ADMIN — OLANZAPINE 5 MILLIGRAM(S): 15 TABLET, FILM COATED ORAL at 12:41

## 2023-01-12 RX ADMIN — Medication 650 MILLIGRAM(S): at 19:45

## 2023-01-12 RX ADMIN — Medication 1 TABLET(S): at 08:57

## 2023-01-12 RX ADMIN — Medication 10 UNIT(S): at 16:56

## 2023-01-12 RX ADMIN — Medication 6 UNIT(S): at 09:00

## 2023-01-12 RX ADMIN — Medication 6 UNIT(S): at 12:30

## 2023-01-12 RX ADMIN — Medication 1 TABLET(S): at 17:14

## 2023-01-12 RX ADMIN — Medication 650 MILLIGRAM(S): at 20:34

## 2023-01-12 RX ADMIN — METHADONE HYDROCHLORIDE 30 MILLIGRAM(S): 40 TABLET ORAL at 08:53

## 2023-01-12 RX ADMIN — Medication 6: at 12:30

## 2023-01-12 RX ADMIN — Medication 1 TABLET(S): at 08:58

## 2023-01-12 RX ADMIN — INSULIN GLARGINE 23 UNIT(S): 100 INJECTION, SOLUTION SUBCUTANEOUS at 20:23

## 2023-01-12 RX ADMIN — SPIRONOLACTONE 25 MILLIGRAM(S): 25 TABLET, FILM COATED ORAL at 08:58

## 2023-01-12 RX ADMIN — Medication 2: at 16:56

## 2023-01-12 RX ADMIN — Medication 2 MILLIGRAM(S): at 00:07

## 2023-01-12 RX ADMIN — Medication 1 TABLET(S): at 00:10

## 2023-01-12 RX ADMIN — Medication 2: at 09:00

## 2023-01-12 NOTE — BH INPATIENT PSYCHIATRY PROGRESS NOTE - CURRENT MEDICATION
MEDICATIONS  (STANDING):  diazepam    Tablet 10 milliGRAM(s) Oral two times a day  insulin glargine Injectable (LANTUS) 23 Unit(s) SubCutaneous at bedtime  insulin lispro (ADMELOG) corrective regimen sliding scale   SubCutaneous at bedtime  insulin lispro (ADMELOG) corrective regimen sliding scale   SubCutaneous three times a day before meals  insulin lispro Injectable (ADMELOG) 10 Unit(s) SubCutaneous three times a day before meals  lactobacillus acidophilus 1 Tablet(s) Oral two times a day with meals  lithium 900 milliGRAM(s) Oral at bedtime  loperamide 2 milliGRAM(s) Oral once  melatonin. 5 milliGRAM(s) Oral at bedtime  methadone    Tablet 30 milliGRAM(s) Oral daily  methadone   Dispersible Tablet 80 milliGRAM(s) Oral daily  multivitamin 1 Tablet(s) Oral daily  nicotine -  14 mG/24Hr(s) Patch 1 Patch Transdermal daily  OLANZapine 5 milliGRAM(s) Oral at bedtime  polyethylene glycol 3350 17 Gram(s) Oral two times a day  senna 1 Tablet(s) Oral at bedtime  spironolactone 25 milliGRAM(s) Oral daily    MEDICATIONS  (PRN):  acetaminophen     Tablet .. 650 milliGRAM(s) Oral every 6 hours PRN Temp greater or equal to 38C (100.4F), Mild Pain (1 - 3), Moderate Pain (4 - 6), Severe Pain (7 - 10)  benzocaine 15 mG/menthol 3.6 mG Lozenge 1 Lozenge Oral every 2 hours PRN cough  dextrose Oral Gel 15 Gram(s) Oral once PRN Blood Glucose LESS THAN 70 milliGRAM(s)/deciliter  gabapentin 300 milliGRAM(s) Oral every 6 hours PRN anxiety/agitation  guaiFENesin Oral Liquid (Sugar-Free) 100 milliGRAM(s) Oral every 6 hours PRN cough  melatonin. 5 milliGRAM(s) Oral at bedtime PRN Insomnia  OLANZapine Disintegrating Tablet 2.5 milliGRAM(s) Oral every 4 hours PRN agitation  OLANZapine Injectable 5 milliGRAM(s) IntraMuscular once PRN severe agitation  QUEtiapine 50 milliGRAM(s) Oral every 4 hours PRN anxiety/agitation  sodium chloride 0.65% Nasal 1 Spray(s) Both Nostrils four times a day PRN Nasal Congestion

## 2023-01-12 NOTE — BH INPATIENT PSYCHIATRY PROGRESS NOTE - NSBHCHARTREVIEWVS_PSY_A_CORE FT
Vital Signs Last 24 Hrs  T(C): 36.3 (01-12-23 @ 08:29), Max: 36.3 (01-11-23 @ 17:15)  T(F): 97.3 (01-12-23 @ 08:29), Max: 97.3 (01-11-23 @ 17:15)  HR: 71 (01-12-23 @ 08:29) (71 - 71)  BP: 137/73 (01-12-23 @ 08:29) (137/73 - 137/73)  BP(mean): --  RR: --  SpO2: --    Orthostatic VS  01-11-23 @ 08:28  Lying BP: --/-- HR: --  Sitting BP: 146/82 HR: 72  Standing BP: --/-- HR: --  Site: --  Mode: --  Orthostatic VS  01-10-23 @ 17:48  Lying BP: --/-- HR: --  Sitting BP: 145/86 HR: 70  Standing BP: 136/86 HR: 79  Site: --  Mode: --

## 2023-01-12 NOTE — BH INPATIENT PSYCHIATRY PROGRESS NOTE - NSBHASSESSSUMMFT_PSY_ALL_CORE
57 year old man, undomiciled, on disability, single, documented diagnoses of bipolar 1 disorder, antisocial personality disorder, PMHx pituitary tumor, HFrEF, HTN, T2DM, and GERD, 1 remote SA via injecting bleach, numerous psych hospitalizations (last at [facility] in [place] from 10/9/2022 - 10/12/2022), multiple ED visits in the past few months for medical, MH and substance use disorder, hx of multiple incarcerations (last 9 years ago, patient served 10 year sentence for manslaughter and was released from senior care in ), hx of violence and aggression on the psych unit and in the community, hx of polysubstance abuse (daily ETOH use c/b reportedly ETOH withdrawal seizures per patient report, MJ use, cocaine use, past heroin use, now on methadone maintenance 110mg/day through [facility] in [place]), +family history (father  of drug/alcohol overdose, and half sister has schizophrenia), self presented with conditional HI to kill his ex-gf and paranoia in the context of substance abuse coupled with medication non compliance, found to be COVID+, admitted to [facility] for continued psychiatric care. Course complicated by episode of hypotension, bradycardia, and hypoglycemia and decreased responsiveness on 23, sent to [facility], where he stabilized after given narcan and his medications were adjusted, returned to  on 1/10/23.       Clinical suspicion that etiology of presentation likely more involving substance intoxication/withdrawal-induced, personality traits, secondary gain, rather than schizoaffective disorder.    Some interpersonal disputes with peers, but otherwise still no active psychosis or jose.    Plan:  1.	Legal: continue   2.	Safety: routine obs  3.	Psychiatric: c/w Li 900mg qhs.  Pt states olanzapine is too oversedating, reduce standing dose to 5 mg qhs and PRNs to 2.5 mg.  Continue methadone 110mg daily (confirmed with [facility]), valium 10mg BID. PRN gabapentin 300mg q6h for anxiety.  Discontinued hydroxyzine at pt's request (intolerance/allergy?)  4.	Group/Milieu therapy   5.	Medical: COVID+ (prns for symptomatic treatment), hx HTN/HF (spironolactone 25mg), T2DM (lantus 18u qhs, admelog 6u TID - per hospitalist), nicotine patch 14mg/day. D/c CIWAS given his last drink was 23  6.	Collateral/Dispo: Unclear.

## 2023-01-12 NOTE — BH INPATIENT PSYCHIATRY PROGRESS NOTE - NSBHMETABOLIC_PSY_ALL_CORE_FT
BMI: BMI (kg/m2): 26.5 (01-10-23 @ 17:48)  HbA1c: A1C with Estimated Average Glucose Result: 10.2 % (01-08-23 @ 05:32)    Glucose: POCT Blood Glucose.: 217 mg/dL (01-12-23 @ 16:25)    BP: 137/73 (01-12-23 @ 08:29) (137/73 - 137/73)  Lipid Panel: Date/Time: 02-18-22 @ 07:38  Cholesterol, Serum: 104  Direct LDL: --  HDL Cholesterol, Serum: 40  Total Cholesterol/HDL Ration Measurement: --  Triglycerides, Serum: 157

## 2023-01-12 NOTE — BH INPATIENT PSYCHIATRY PROGRESS NOTE - MSE UNSTRUCTURED FT
Appearance: Dressed appropriately.  Good hygiene and grooming.  Found eating pudding snack in room.  Behavior: Cooperative.  Calm at time of interview.  Good eye contact.  Motor: No abnormal movements, no psychomotor slowing or activation.  Speech: Regular rate.  Mood: "Fine."  Affect: Neutral.  Thought Process: Linear.  Associations: Fair.  Thought Content: Ruminations over arguments today.  No SIIP or HIIP.  Insight: Limited.  Judgment: Fair on interview.  Attention: Fair.  Language: Fluent.  Gait: Intact.

## 2023-01-12 NOTE — PROGRESS NOTE ADULT - SUBJECTIVE AND OBJECTIVE BOX
[facility] Medicine  Pager [phone]    Patient is a 57y old  Male who presents with a chief complaint of Bipolar disorder     (11 Jan 2023 12:24)      INTERVAL HPI/OVERNIGHT EVENTS: Had two altercations earlier today, explained that was provoked, was talking about his Nondenominational ([employer]eh[employer]ah[employer], studied theology in senior living, formerly Yazidism but did not like what was going on in senior living Buddhist) when was disrespected. Wanted to apologize, does not want to jeopardize his treatment, is hoping to be able to get into supportive housing.     Otherwise FS noted to be high (>400s), discussed plan to uptitrate insulin regimen slowly (given prior episode of hypoglycemia) and continue to monitor FS.     MEDICATIONS  (STANDING):  diazepam    Tablet 10 milliGRAM(s) Oral two times a day  insulin glargine Injectable (LANTUS) 23 Unit(s) SubCutaneous at bedtime  insulin lispro (ADMELOG) corrective regimen sliding scale   SubCutaneous at bedtime  insulin lispro (ADMELOG) corrective regimen sliding scale   SubCutaneous three times a day before meals  insulin lispro Injectable (ADMELOG) 10 Unit(s) SubCutaneous three times a day before meals  lactobacillus acidophilus 1 Tablet(s) Oral two times a day with meals  lithium 900 milliGRAM(s) Oral at bedtime  loperamide 2 milliGRAM(s) Oral once  melatonin. 5 milliGRAM(s) Oral at bedtime  methadone    Tablet 30 milliGRAM(s) Oral daily  methadone   Dispersible Tablet 80 milliGRAM(s) Oral daily  multivitamin 1 Tablet(s) Oral daily  nicotine -  14 mG/24Hr(s) Patch 1 Patch Transdermal daily  OLANZapine 10 milliGRAM(s) Oral at bedtime  polyethylene glycol 3350 17 Gram(s) Oral two times a day  senna 1 Tablet(s) Oral at bedtime  spironolactone 25 milliGRAM(s) Oral daily    MEDICATIONS  (PRN):  acetaminophen     Tablet .. 650 milliGRAM(s) Oral every 6 hours PRN Temp greater or equal to 38C (100.4F), Mild Pain (1 - 3), Moderate Pain (4 - 6), Severe Pain (7 - 10)  benzocaine 15 mG/menthol 3.6 mG Lozenge 1 Lozenge Oral every 2 hours PRN cough  dextrose Oral Gel 15 Gram(s) Oral once PRN Blood Glucose LESS THAN 70 milliGRAM(s)/deciliter  gabapentin 300 milliGRAM(s) Oral every 6 hours PRN anxiety/agitation  guaiFENesin Oral Liquid (Sugar-Free) 100 milliGRAM(s) Oral every 6 hours PRN cough  melatonin. 5 milliGRAM(s) Oral at bedtime PRN Insomnia  OLANZapine Disintegrating Tablet 5 milliGRAM(s) Oral every 4 hours PRN agitation  OLANZapine Injectable 5 milliGRAM(s) IntraMuscular once PRN severe agitation  sodium chloride 0.65% Nasal 1 Spray(s) Both Nostrils four times a day PRN Nasal Congestion      Allergies    Cogentin (Unknown)  Haldol (Unknown)  Thorazine (Rash)    Intolerances        REVIEW OF SYSTEMS:  Please see interval HPI:    Vital Signs Last 24 Hrs  T(C): 36.3 (12 Jan 2023 08:29), Max: 36.3 (11 Jan 2023 17:15)  T(F): 97.3 (12 Jan 2023 08:29), Max: 97.3 (11 Jan 2023 17:15)  HR: 71 (12 Jan 2023 08:29) (71 - 71)  BP: 137/73 (12 Jan 2023 08:29) (137/73 - 137/73)  BP(mean): --  RR: --  SpO2: --      I&O's Detail        PHYSICAL EXAM:  GENERAL:   HEAD:    EYES:   ENMT:   NECK:   NERVOUS SYSTEM:    CHEST/LUNG:   HEART:   ABDOMEN:   EXTREMITIES:    LYMPH:   SKIN:     LABS:            CAPILLARY BLOOD GLUCOSE  POCT Blood Glucose.: 408 mg/dL (12 Jan 2023 11:35)  POCT Blood Glucose.: 217 mg/dL (12 Jan 2023 08:04)  POCT Blood Glucose.: 166 mg/dL (11 Jan 2023 20:52)  POCT Blood Glucose.: 334 mg/dL (11 Jan 2023 16:19)    BLOOD CULTURE    RADIOLOGY & ADDITIONAL TESTS:    Imaging Personally Reviewed:  [ ] YES     Consultant(s) Notes Reviewed:      Care Discussed with Consultants/Other Providers: [facility] Medicine  Pager [phone]    Patient is a 57y old  Male who presents with a chief complaint of Bipolar disorder     (11 Jan 2023 12:24)      INTERVAL HPI/OVERNIGHT EVENTS: Had two altercations earlier today, explained that was provoked, was talking about his Christianity (Hinduism, studied theology in long-term, formerly Yazidism but did not like what was going on in long-term [employer]ism) when was disrespected. Initially didn't want to take prn med, but eventually acquiesced as preferred oral over IM administration. Wanted to apologize, does not want to jeopardize his treatment, is hoping to be able to get into supportive housing. Would like to speak to psych to explain his side of the story.     Otherwise FS noted to be high (>400s), discussed plan to uptitrate insulin regimen slowly (given prior episode of hypoglycemia/wish to avoid future episodes) and continue to monitor FS. Attributes prior episode of hypoglycemia to not eating much (in addition to medications).     MEDICATIONS  (STANDING):  diazepam    Tablet 10 milliGRAM(s) Oral two times a day  insulin glargine Injectable (LANTUS) 23 Unit(s) SubCutaneous at bedtime  insulin lispro (ADMELOG) corrective regimen sliding scale   SubCutaneous at bedtime  insulin lispro (ADMELOG) corrective regimen sliding scale   SubCutaneous three times a day before meals  insulin lispro Injectable (ADMELOG) 10 Unit(s) SubCutaneous three times a day before meals  lactobacillus acidophilus 1 Tablet(s) Oral two times a day with meals  lithium 900 milliGRAM(s) Oral at bedtime  loperamide 2 milliGRAM(s) Oral once  melatonin. 5 milliGRAM(s) Oral at bedtime  methadone    Tablet 30 milliGRAM(s) Oral daily  methadone   Dispersible Tablet 80 milliGRAM(s) Oral daily  multivitamin 1 Tablet(s) Oral daily  nicotine -  14 mG/24Hr(s) Patch 1 Patch Transdermal daily  OLANZapine 10 milliGRAM(s) Oral at bedtime  polyethylene glycol 3350 17 Gram(s) Oral two times a day  senna 1 Tablet(s) Oral at bedtime  spironolactone 25 milliGRAM(s) Oral daily    MEDICATIONS  (PRN):  acetaminophen     Tablet .. 650 milliGRAM(s) Oral every 6 hours PRN Temp greater or equal to 38C (100.4F), Mild Pain (1 - 3), Moderate Pain (4 - 6), Severe Pain (7 - 10)  benzocaine 15 mG/menthol 3.6 mG Lozenge 1 Lozenge Oral every 2 hours PRN cough  dextrose Oral Gel 15 Gram(s) Oral once PRN Blood Glucose LESS THAN 70 milliGRAM(s)/deciliter  gabapentin 300 milliGRAM(s) Oral every 6 hours PRN anxiety/agitation  guaiFENesin Oral Liquid (Sugar-Free) 100 milliGRAM(s) Oral every 6 hours PRN cough  melatonin. 5 milliGRAM(s) Oral at bedtime PRN Insomnia  OLANZapine Disintegrating Tablet 5 milliGRAM(s) Oral every 4 hours PRN agitation  OLANZapine Injectable 5 milliGRAM(s) IntraMuscular once PRN severe agitation  sodium chloride 0.65% Nasal 1 Spray(s) Both Nostrils four times a day PRN Nasal Congestion      Allergies    Cogentin (Unknown)  Haldol (Unknown)  Thorazine (Rash)    Intolerances    REVIEW OF SYSTEMS:  Please see interval HPI:    Vital Signs Last 24 Hrs  T(C): 36.3 (12 Jan 2023 08:29), Max: 36.3 (11 Jan 2023 17:15)  T(F): 97.3 (12 Jan 2023 08:29), Max: 97.3 (11 Jan 2023 17:15)  HR: 71 (12 Jan 2023 08:29) (71 - 71)  BP: 137/73 (12 Jan 2023 08:29) (137/73 - 137/73)  BP(mean): --  RR: --  SpO2: --      I&O's Detail        PHYSICAL EXAM:  GENERAL: NAD, able to independently ambulate, gets into boxing stance to explain altercation  HEAD:  NC/AT  EYES: EOMI, clear sclera/conjunctiva  ENMT: MMM, hearing intact to voice  NECK: supple, no JVD  NERVOUS SYSTEM:  moving all extremities, non-focal, although gets in boxing stance to explain altercation, not overtly aggressive to provider  CHEST/LUNG: Comfortable on RA, speaking in full sentences, no use of accessory  muscles   EXTREMITIES:  no c/c/e  SKIN: +multiple tattoos    LABS:    CAPILLARY BLOOD GLUCOSE  POCT Blood Glucose.: 408 mg/dL (12 Jan 2023 11:35)  POCT Blood Glucose.: 217 mg/dL (12 Jan 2023 08:04)  POCT Blood Glucose.: 166 mg/dL (11 Jan 2023 20:52)  POCT Blood Glucose.: 334 mg/dL (11 Jan 2023 16:19)    BLOOD CULTURE    RADIOLOGY & ADDITIONAL TESTS:    Imaging Personally Reviewed:  [ ] YES     Consultant(s) Notes Reviewed:      Care Discussed with Consultants/Other Providers:  Dr. Saez re: adjusting insulin regimen

## 2023-01-13 PROCEDURE — 99214 OFFICE O/P EST MOD 30 MIN: CPT | Mod: GC

## 2023-01-13 PROCEDURE — 99232 SBSQ HOSP IP/OBS MODERATE 35: CPT

## 2023-01-13 RX ORDER — GABAPENTIN 400 MG/1
800 CAPSULE ORAL ONCE
Refills: 0 | Status: COMPLETED | OUTPATIENT
Start: 2023-01-13 | End: 2023-01-13

## 2023-01-13 RX ORDER — OXYMETAZOLINE HYDROCHLORIDE 0.5 MG/ML
2 SPRAY NASAL EVERY 12 HOURS
Refills: 0 | Status: DISCONTINUED | OUTPATIENT
Start: 2023-01-13 | End: 2023-01-17

## 2023-01-13 RX ORDER — GABAPENTIN 400 MG/1
800 CAPSULE ORAL THREE TIMES A DAY
Refills: 0 | Status: DISCONTINUED | OUTPATIENT
Start: 2023-01-13 | End: 2023-01-17

## 2023-01-13 RX ORDER — INSULIN GLARGINE 100 [IU]/ML
30 INJECTION, SOLUTION SUBCUTANEOUS AT BEDTIME
Refills: 0 | Status: DISCONTINUED | OUTPATIENT
Start: 2023-01-13 | End: 2023-01-17

## 2023-01-13 RX ADMIN — Medication 3: at 12:10

## 2023-01-13 RX ADMIN — Medication 1 TABLET(S): at 16:06

## 2023-01-13 RX ADMIN — Medication 3: at 16:36

## 2023-01-13 RX ADMIN — METHADONE HYDROCHLORIDE 80 MILLIGRAM(S): 40 TABLET ORAL at 09:29

## 2023-01-13 RX ADMIN — SPIRONOLACTONE 25 MILLIGRAM(S): 25 TABLET, FILM COATED ORAL at 09:28

## 2023-01-13 RX ADMIN — Medication 2: at 08:11

## 2023-01-13 RX ADMIN — Medication 10 UNIT(S): at 08:10

## 2023-01-13 RX ADMIN — GABAPENTIN 800 MILLIGRAM(S): 400 CAPSULE ORAL at 15:49

## 2023-01-13 RX ADMIN — METHADONE HYDROCHLORIDE 30 MILLIGRAM(S): 40 TABLET ORAL at 09:29

## 2023-01-13 RX ADMIN — INSULIN GLARGINE 30 UNIT(S): 100 INJECTION, SOLUTION SUBCUTANEOUS at 20:56

## 2023-01-13 RX ADMIN — Medication 1 TABLET(S): at 09:28

## 2023-01-13 RX ADMIN — Medication 1 PATCH: at 09:29

## 2023-01-13 RX ADMIN — Medication 10 MILLIGRAM(S): at 09:28

## 2023-01-13 RX ADMIN — Medication 650 MILLIGRAM(S): at 10:46

## 2023-01-13 RX ADMIN — Medication 10 UNIT(S): at 16:36

## 2023-01-13 RX ADMIN — Medication 1 SPRAY(S): at 10:46

## 2023-01-13 RX ADMIN — Medication 1 PATCH: at 06:29

## 2023-01-13 RX ADMIN — Medication 10 UNIT(S): at 12:09

## 2023-01-13 NOTE — BH INPATIENT PSYCHIATRY PROGRESS NOTE - NSBHMETABOLIC_PSY_ALL_CORE_FT
BMI: BMI (kg/m2): 26.5 (01-10-23 @ 17:48)  HbA1c: A1C with Estimated Average Glucose Result: 10.2 % (01-08-23 @ 05:32)    Glucose: POCT Blood Glucose.: 237 mg/dL (01-13-23 @ 07:50)    BP: 137/73 (01-12-23 @ 08:29) (137/73 - 137/73)  Lipid Panel: Date/Time: 02-18-22 @ 07:38  Cholesterol, Serum: 104  Direct LDL: --  HDL Cholesterol, Serum: 40  Total Cholesterol/HDL Ration Measurement: --  Triglycerides, Serum: 157   BMI: BMI (kg/m2): 26.5 (01-10-23 @ 17:48)  HbA1c: A1C with Estimated Average Glucose Result: 10.2 % (01-08-23 @ 05:32)    Glucose: POCT Blood Glucose.: 272 mg/dL (01-13-23 @ 16:21)    BP: 137/73 (01-12-23 @ 08:29) (137/73 - 137/73)  Lipid Panel: Date/Time: 02-18-22 @ 07:38  Cholesterol, Serum: 104  Direct LDL: --  HDL Cholesterol, Serum: 40  Total Cholesterol/HDL Ration Measurement: --  Triglycerides, Serum: 157

## 2023-01-13 NOTE — BH INPATIENT PSYCHIATRY PROGRESS NOTE - NSBHATTESTCOMMENTATTENDFT_PSY_A_CORE
No
Pt more emotionally regulated today.  Socializing well with peers.  Asks for Ativan PRN for anxiety, discussed concerns with adding benzos to current regimen given already on Valium and is taking methadone.  Pt in agreement to reinstating home med of Neurontin, pt asks to resume his full dose, states he also takes this for seizure prophylaxis.
Pt reporting longstanding anxiety and ADHD, requesting benzos and Adderall.  [facility]OP confirms that pt had received these in recent past.    Following medication discussion, pt states he will discuss ADHD treatments with his medical and M[facility] providers post discharge.  Will start Valium 10 mg bid, which was last benzo prescription given to pt.  Will d/c Librium and Symptom triggered Ativan PRNs for now.  Continue other meds as ordered.
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
## 2023-01-13 NOTE — BH INPATIENT PSYCHIATRY PROGRESS NOTE - NSBHCHARTREVIEWVS_PSY_A_CORE FT
Vital Signs Last 24 Hrs  T(C): 36.2 (01-13-23 @ 08:39), Max: 36.2 (01-13-23 @ 08:39)  T(F): 97.2 (01-13-23 @ 08:39), Max: 97.2 (01-13-23 @ 08:39)  HR: --  BP: --  BP(mean): --  RR: --  SpO2: --    Orthostatic VS  01-13-23 @ 08:39  Lying BP: --/-- HR: --  Sitting BP: 134/66 HR: 67  Standing BP: --/-- HR: --  Site: --  Mode: --

## 2023-01-13 NOTE — BH INPATIENT PSYCHIATRY PROGRESS NOTE - MSE UNSTRUCTURED FT
Appearance: Dressed appropriately.  Good hygiene and grooming.  Found watching TV in common area.  Behavior: Cooperative.  Calm at time of interview.  Good eye contact.  Motor: No abnormal movements, no psychomotor slowing or activation.  Speech: Regular rate.  Mood: "Run down"  Affect: Neutral.  Thought Process: Linear.  Associations: Fair.  Thought Content: Focused on his COVID symptoms.  No SIIP or HIIP.  Insight: Limited.  Judgment: Fair on interview.  Attention: Fair.  Language: Fluent.  Gait: Intact.
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
## 2023-01-13 NOTE — BH INPATIENT PSYCHIATRY PROGRESS NOTE - NSBHATTESTBILLING_PSY_A_CORE
[id number] - low complexity OR 20-29 mins [id number] - moderate complexity OR 30-39 mins

## 2023-01-13 NOTE — BH INPATIENT PSYCHIATRY PROGRESS NOTE - CURRENT MEDICATION
MEDICATIONS  (STANDING):  diazepam    Tablet 10 milliGRAM(s) Oral two times a day  insulin glargine Injectable (LANTUS) 23 Unit(s) SubCutaneous at bedtime  insulin lispro (ADMELOG) corrective regimen sliding scale   SubCutaneous at bedtime  insulin lispro (ADMELOG) corrective regimen sliding scale   SubCutaneous three times a day before meals  insulin lispro Injectable (ADMELOG) 10 Unit(s) SubCutaneous three times a day before meals  lactobacillus acidophilus 1 Tablet(s) Oral two times a day with meals  lithium 900 milliGRAM(s) Oral at bedtime  loperamide 2 milliGRAM(s) Oral once  melatonin. 5 milliGRAM(s) Oral at bedtime  methadone    Tablet 30 milliGRAM(s) Oral daily  methadone   Dispersible Tablet 80 milliGRAM(s) Oral daily  multivitamin 1 Tablet(s) Oral daily  nicotine -  14 mG/24Hr(s) Patch 1 Patch Transdermal daily  OLANZapine 5 milliGRAM(s) Oral at bedtime  polyethylene glycol 3350 17 Gram(s) Oral two times a day  senna 1 Tablet(s) Oral at bedtime  spironolactone 25 milliGRAM(s) Oral daily    MEDICATIONS  (PRN):  acetaminophen     Tablet .. 650 milliGRAM(s) Oral every 6 hours PRN Temp greater or equal to 38C (100.4F), Mild Pain (1 - 3), Moderate Pain (4 - 6), Severe Pain (7 - 10)  benzocaine 15 mG/menthol 3.6 mG Lozenge 1 Lozenge Oral every 2 hours PRN cough  dextrose Oral Gel 15 Gram(s) Oral once PRN Blood Glucose LESS THAN 70 milliGRAM(s)/deciliter  gabapentin 300 milliGRAM(s) Oral every 6 hours PRN anxiety/agitation  guaiFENesin Oral Liquid (Sugar-Free) 100 milliGRAM(s) Oral every 6 hours PRN cough  melatonin. 5 milliGRAM(s) Oral at bedtime PRN Insomnia  OLANZapine 2.5 milliGRAM(s) Oral every 4 hours PRN agitation  OLANZapine Injectable 5 milliGRAM(s) IntraMuscular once PRN severe agitation  oxymetazoline 0.05% Nasal Spray 2 Spray(s) Both Nostrils every 12 hours PRN nasal congestion  QUEtiapine 50 milliGRAM(s) Oral every 4 hours PRN anxiety/agitation  sodium chloride 0.65% Nasal 1 Spray(s) Both Nostrils four times a day PRN Nasal Congestion   MEDICATIONS  (STANDING):  diazepam    Tablet 10 milliGRAM(s) Oral two times a day  gabapentin 800 milliGRAM(s) Oral three times a day  insulin glargine Injectable (LANTUS) 30 Unit(s) SubCutaneous at bedtime  insulin lispro (ADMELOG) corrective regimen sliding scale   SubCutaneous at bedtime  insulin lispro (ADMELOG) corrective regimen sliding scale   SubCutaneous three times a day before meals  insulin lispro Injectable (ADMELOG) 10 Unit(s) SubCutaneous three times a day before meals  lactobacillus acidophilus 1 Tablet(s) Oral two times a day with meals  lithium 900 milliGRAM(s) Oral at bedtime  loperamide 2 milliGRAM(s) Oral once  melatonin. 5 milliGRAM(s) Oral at bedtime  methadone    Tablet 30 milliGRAM(s) Oral daily  methadone   Dispersible Tablet 80 milliGRAM(s) Oral daily  multivitamin 1 Tablet(s) Oral daily  nicotine -  14 mG/24Hr(s) Patch 1 Patch Transdermal daily  OLANZapine 5 milliGRAM(s) Oral at bedtime  polyethylene glycol 3350 17 Gram(s) Oral two times a day  senna 1 Tablet(s) Oral at bedtime  spironolactone 25 milliGRAM(s) Oral daily    MEDICATIONS  (PRN):  acetaminophen     Tablet .. 650 milliGRAM(s) Oral every 6 hours PRN Temp greater or equal to 38C (100.4F), Mild Pain (1 - 3), Moderate Pain (4 - 6), Severe Pain (7 - 10)  benzocaine 15 mG/menthol 3.6 mG Lozenge 1 Lozenge Oral every 2 hours PRN cough  dextrose Oral Gel 15 Gram(s) Oral once PRN Blood Glucose LESS THAN 70 milliGRAM(s)/deciliter  guaiFENesin Oral Liquid (Sugar-Free) 100 milliGRAM(s) Oral every 6 hours PRN cough  melatonin. 5 milliGRAM(s) Oral at bedtime PRN Insomnia  OLANZapine 2.5 milliGRAM(s) Oral every 4 hours PRN agitation  OLANZapine Injectable 5 milliGRAM(s) IntraMuscular once PRN severe agitation  oxymetazoline 0.05% Nasal Spray 2 Spray(s) Both Nostrils every 12 hours PRN nasal congestion  QUEtiapine 50 milliGRAM(s) Oral every 4 hours PRN anxiety/agitation  sodium chloride 0.65% Nasal 1 Spray(s) Both Nostrils four times a day PRN Nasal Congestion

## 2023-01-13 NOTE — PROGRESS NOTE ADULT - SUBJECTIVE AND OBJECTIVE BOX
[facility] Medicine  Pager [phone]    Patient is a 57y old  Male who presents with a chief complaint of Bipolar disorder     (11 Jan 2023 12:24)      INTERVAL HPI/OVERNIGHT EVENTS:  Sitting in common area, doing artwork. Reviewed FS readings and plan to further titrate insulin. In agreement with plan. .Patient reports having Setswana toast (wanted four slices not two) and burgers for meals.     Goes off on long story about how he believes he is infested with parasites, has not travelled internationally but says that he got infected when an "" [place] a bottle [place] and that the insect bored its way into his nose... Feels that they crawl out of his anus and also into his fingertips. Expresses plan to see a travel doctor for further evaluation.     Also believes that he has hepC and requires treatment. Discussed role for confirmatory testing and outpatient followup prior to any therapy, he verbalized understanding. (review of labs from 2020 reveal hepC RNA negative).     MEDICATIONS  (STANDING):  diazepam    Tablet 10 milliGRAM(s) Oral two times a day  insulin glargine Injectable (LANTUS) 23 Unit(s) SubCutaneous at bedtime  insulin lispro (ADMELOG) corrective regimen sliding scale   SubCutaneous at bedtime  insulin lispro (ADMELOG) corrective regimen sliding scale   SubCutaneous three times a day before meals  insulin lispro Injectable (ADMELOG) 10 Unit(s) SubCutaneous three times a day before meals  lactobacillus acidophilus 1 Tablet(s) Oral two times a day with meals  lithium 900 milliGRAM(s) Oral at bedtime  loperamide 2 milliGRAM(s) Oral once  melatonin. 5 milliGRAM(s) Oral at bedtime  methadone    Tablet 30 milliGRAM(s) Oral daily  methadone   Dispersible Tablet 80 milliGRAM(s) Oral daily  multivitamin 1 Tablet(s) Oral daily  nicotine -  14 mG/24Hr(s) Patch 1 Patch Transdermal daily  OLANZapine 5 milliGRAM(s) Oral at bedtime  polyethylene glycol 3350 17 Gram(s) Oral two times a day  senna 1 Tablet(s) Oral at bedtime  spironolactone 25 milliGRAM(s) Oral daily    MEDICATIONS  (PRN):  acetaminophen     Tablet .. 650 milliGRAM(s) Oral every 6 hours PRN Temp greater or equal to 38C (100.4F), Mild Pain (1 - 3), Moderate Pain (4 - 6), Severe Pain (7 - 10)  benzocaine 15 mG/menthol 3.6 mG Lozenge 1 Lozenge Oral every 2 hours PRN cough  dextrose Oral Gel 15 Gram(s) Oral once PRN Blood Glucose LESS THAN 70 milliGRAM(s)/deciliter  gabapentin 300 milliGRAM(s) Oral every 6 hours PRN anxiety/agitation  guaiFENesin Oral Liquid (Sugar-Free) 100 milliGRAM(s) Oral every 6 hours PRN cough  melatonin. 5 milliGRAM(s) Oral at bedtime PRN Insomnia  OLANZapine 2.5 milliGRAM(s) Oral every 4 hours PRN agitation  OLANZapine Injectable 5 milliGRAM(s) IntraMuscular once PRN severe agitation  oxymetazoline 0.05% Nasal Spray 2 Spray(s) Both Nostrils every 12 hours PRN nasal congestion  QUEtiapine 50 milliGRAM(s) Oral every 4 hours PRN anxiety/agitation  sodium chloride 0.65% Nasal 1 Spray(s) Both Nostrils four times a day PRN Nasal Congestion      Allergies    Cogentin (Unknown)  Haldol (Unknown)  Thorazine (Rash)    Intolerances        REVIEW OF SYSTEMS:  Please see interval HPI:    Vital Signs Last 24 Hrs  T(C): 36.2 (13 Jan 2023 08:39), Max: 36.2 (13 Jan 2023 08:39)  T(F): 97.2 (13 Jan 2023 08:39), Max: 97.2 (13 Jan 2023 08:39)  HR: 67  BP: 134/66  BP(mean): --  RR: --  SpO2: --    I&O's Detail    PHYSICAL EXAM:  GENERAL: NAD, sitting at table coloring, gets up at times to demonstrate aspects of his story (bottle being thrown, insect crawling up nose, him trying to pry it out etc)  HEAD: NC/AT    EYES: EOMI, clear sclera/conjunctiva  ENMT: MMM,  hearing intact to voice  NECK: supple  NERVOUS SYSTEM: moving all extremities, +psychosis    CHEST/LUNG: Comfortable on RA, speaking in full sentences  EXTREMITIES:  no c/c/e  SKIN: +tattoos    LABS:  Hepatitis C Virus Interpretation: Reactive: (02.26.20 @ 15:01)  Hepatitis C RNA, Qualitative: NotDetec (02.27.20 @ 02:55)     CAPILLARY BLOOD GLUCOSE  POCT Blood Glucose.: 288 mg/dL (13 Jan 2023 11:30)  POCT Blood Glucose.: 237 mg/dL (13 Jan 2023 07:50)  POCT Blood Glucose.: 162 mg/dL (12 Jan 2023 20:14)  POCT Blood Glucose.: 217 mg/dL (12 Jan 2023 16:25)    BLOOD CULTURE    RADIOLOGY & ADDITIONAL TESTS:    Imaging Personally Reviewed:  [ ] YES     Consultant(s) Notes Reviewed:      Care Discussed with Consultants/Other Providers: diabetes educator Ginna re: counseling on diet, may benefit from endocrine assistance on discharge regimen as unclear if patient willing to do all the injections, also question of living situation

## 2023-01-13 NOTE — BH TREATMENT PLAN - NSTXVIOLNTINTERRN_PSY_ALL_CORE
Assess mood and behavior. Develop a trusting relationship with the patient and provide a supportive approach. At early signs of escalation, call a huddle to discuss possible interventions and/or crisis prevention. Utilize the Patient Engagement Specialists to proactively de-escalate patients.

## 2023-01-13 NOTE — BH INPATIENT PSYCHIATRY PROGRESS NOTE - PRN MEDS
MEDICATIONS  (PRN):  acetaminophen     Tablet .. 650 milliGRAM(s) Oral every 6 hours PRN Temp greater or equal to 38C (100.4F), Mild Pain (1 - 3), Moderate Pain (4 - 6), Severe Pain (7 - 10)  benzocaine 15 mG/menthol 3.6 mG Lozenge 1 Lozenge Oral every 2 hours PRN cough  dextrose Oral Gel 15 Gram(s) Oral once PRN Blood Glucose LESS THAN 70 milliGRAM(s)/deciliter  gabapentin 300 milliGRAM(s) Oral every 6 hours PRN anxiety/agitation  guaiFENesin Oral Liquid (Sugar-Free) 100 milliGRAM(s) Oral every 6 hours PRN cough  melatonin. 5 milliGRAM(s) Oral at bedtime PRN Insomnia  OLANZapine 2.5 milliGRAM(s) Oral every 4 hours PRN agitation  OLANZapine Injectable 5 milliGRAM(s) IntraMuscular once PRN severe agitation  oxymetazoline 0.05% Nasal Spray 2 Spray(s) Both Nostrils every 12 hours PRN nasal congestion  QUEtiapine 50 milliGRAM(s) Oral every 4 hours PRN anxiety/agitation  sodium chloride 0.65% Nasal 1 Spray(s) Both Nostrils four times a day PRN Nasal Congestion   MEDICATIONS  (PRN):  acetaminophen     Tablet .. 650 milliGRAM(s) Oral every 6 hours PRN Temp greater or equal to 38C (100.4F), Mild Pain (1 - 3), Moderate Pain (4 - 6), Severe Pain (7 - 10)  benzocaine 15 mG/menthol 3.6 mG Lozenge 1 Lozenge Oral every 2 hours PRN cough  dextrose Oral Gel 15 Gram(s) Oral once PRN Blood Glucose LESS THAN 70 milliGRAM(s)/deciliter  guaiFENesin Oral Liquid (Sugar-Free) 100 milliGRAM(s) Oral every 6 hours PRN cough  melatonin. 5 milliGRAM(s) Oral at bedtime PRN Insomnia  OLANZapine 2.5 milliGRAM(s) Oral every 4 hours PRN agitation  OLANZapine Injectable 5 milliGRAM(s) IntraMuscular once PRN severe agitation  oxymetazoline 0.05% Nasal Spray 2 Spray(s) Both Nostrils every 12 hours PRN nasal congestion  QUEtiapine 50 milliGRAM(s) Oral every 4 hours PRN anxiety/agitation  sodium chloride 0.65% Nasal 1 Spray(s) Both Nostrils four times a day PRN Nasal Congestion

## 2023-01-13 NOTE — BH TREATMENT PLAN - NSTXSUBMISINTERPR_PSY_ALL_CORE
Psych rehab recommends patient engages in individual and group therapy sessions in order for patient to gain psychoeducation, psychotherapy and support over the next seven days. Psych rehab will continuously work with patient to build therapeutic rapport.

## 2023-01-14 PROCEDURE — 99231 SBSQ HOSP IP/OBS SF/LOW 25: CPT

## 2023-01-14 RX ADMIN — Medication 10 UNIT(S): at 16:51

## 2023-01-14 RX ADMIN — Medication 5 MILLIGRAM(S): at 21:20

## 2023-01-14 RX ADMIN — OLANZAPINE 5 MILLIGRAM(S): 15 TABLET, FILM COATED ORAL at 21:20

## 2023-01-14 RX ADMIN — GABAPENTIN 800 MILLIGRAM(S): 400 CAPSULE ORAL at 21:19

## 2023-01-14 RX ADMIN — Medication 1: at 11:47

## 2023-01-14 RX ADMIN — Medication 10 UNIT(S): at 11:48

## 2023-01-14 RX ADMIN — Medication 1 PATCH: at 08:56

## 2023-01-14 RX ADMIN — Medication 1 TABLET(S): at 08:55

## 2023-01-14 RX ADMIN — INSULIN GLARGINE 30 UNIT(S): 100 INJECTION, SOLUTION SUBCUTANEOUS at 21:21

## 2023-01-14 RX ADMIN — METHADONE HYDROCHLORIDE 80 MILLIGRAM(S): 40 TABLET ORAL at 08:49

## 2023-01-14 RX ADMIN — Medication 1: at 16:51

## 2023-01-14 RX ADMIN — LITHIUM CARBONATE 900 MILLIGRAM(S): 300 TABLET, EXTENDED RELEASE ORAL at 21:19

## 2023-01-14 RX ADMIN — GABAPENTIN 800 MILLIGRAM(S): 400 CAPSULE ORAL at 12:14

## 2023-01-14 RX ADMIN — Medication 10 MILLIGRAM(S): at 21:19

## 2023-01-14 RX ADMIN — Medication 1 TABLET(S): at 08:49

## 2023-01-14 RX ADMIN — SPIRONOLACTONE 25 MILLIGRAM(S): 25 TABLET, FILM COATED ORAL at 08:49

## 2023-01-14 RX ADMIN — GABAPENTIN 800 MILLIGRAM(S): 400 CAPSULE ORAL at 08:49

## 2023-01-14 RX ADMIN — Medication 10 UNIT(S): at 08:23

## 2023-01-14 RX ADMIN — Medication 10 MILLIGRAM(S): at 08:49

## 2023-01-14 RX ADMIN — Medication 1 TABLET(S): at 17:51

## 2023-01-14 RX ADMIN — SENNA PLUS 1 TABLET(S): 8.6 TABLET ORAL at 21:19

## 2023-01-14 RX ADMIN — METHADONE HYDROCHLORIDE 30 MILLIGRAM(S): 40 TABLET ORAL at 08:49

## 2023-01-14 NOTE — BH INPATIENT PSYCHIATRY PROGRESS NOTE - PRN MEDS
MEDICATIONS  (PRN):  acetaminophen     Tablet .. 650 milliGRAM(s) Oral every 6 hours PRN Temp greater or equal to 38C (100.4F), Mild Pain (1 - 3), Moderate Pain (4 - 6), Severe Pain (7 - 10)  benzocaine 15 mG/menthol 3.6 mG Lozenge 1 Lozenge Oral every 2 hours PRN cough  dextrose Oral Gel 15 Gram(s) Oral once PRN Blood Glucose LESS THAN 70 milliGRAM(s)/deciliter  guaiFENesin Oral Liquid (Sugar-Free) 100 milliGRAM(s) Oral every 6 hours PRN cough  melatonin. 5 milliGRAM(s) Oral at bedtime PRN Insomnia  OLANZapine 2.5 milliGRAM(s) Oral every 4 hours PRN agitation  OLANZapine Injectable 5 milliGRAM(s) IntraMuscular once PRN severe agitation  oxymetazoline 0.05% Nasal Spray 2 Spray(s) Both Nostrils every 12 hours PRN nasal congestion  QUEtiapine 50 milliGRAM(s) Oral every 4 hours PRN anxiety/agitation  sodium chloride 0.65% Nasal 1 Spray(s) Both Nostrils four times a day PRN Nasal Congestion

## 2023-01-14 NOTE — BH INPATIENT PSYCHIATRY PROGRESS NOTE - CURRENT MEDICATION
MEDICATIONS  (STANDING):  diazepam    Tablet 10 milliGRAM(s) Oral two times a day  gabapentin 800 milliGRAM(s) Oral three times a day  insulin glargine Injectable (LANTUS) 30 Unit(s) SubCutaneous at bedtime  insulin lispro (ADMELOG) corrective regimen sliding scale   SubCutaneous at bedtime  insulin lispro (ADMELOG) corrective regimen sliding scale   SubCutaneous three times a day before meals  insulin lispro Injectable (ADMELOG) 10 Unit(s) SubCutaneous three times a day before meals  lactobacillus acidophilus 1 Tablet(s) Oral two times a day with meals  lithium 900 milliGRAM(s) Oral at bedtime  loperamide 2 milliGRAM(s) Oral once  melatonin. 5 milliGRAM(s) Oral at bedtime  methadone    Tablet 30 milliGRAM(s) Oral daily  methadone   Dispersible Tablet 80 milliGRAM(s) Oral daily  multivitamin 1 Tablet(s) Oral daily  nicotine -  14 mG/24Hr(s) Patch 1 Patch Transdermal daily  OLANZapine 5 milliGRAM(s) Oral at bedtime  polyethylene glycol 3350 17 Gram(s) Oral two times a day  senna 1 Tablet(s) Oral at bedtime  spironolactone 25 milliGRAM(s) Oral daily    MEDICATIONS  (PRN):  acetaminophen     Tablet .. 650 milliGRAM(s) Oral every 6 hours PRN Temp greater or equal to 38C (100.4F), Mild Pain (1 - 3), Moderate Pain (4 - 6), Severe Pain (7 - 10)  benzocaine 15 mG/menthol 3.6 mG Lozenge 1 Lozenge Oral every 2 hours PRN cough  dextrose Oral Gel 15 Gram(s) Oral once PRN Blood Glucose LESS THAN 70 milliGRAM(s)/deciliter  guaiFENesin Oral Liquid (Sugar-Free) 100 milliGRAM(s) Oral every 6 hours PRN cough  melatonin. 5 milliGRAM(s) Oral at bedtime PRN Insomnia  OLANZapine 2.5 milliGRAM(s) Oral every 4 hours PRN agitation  OLANZapine Injectable 5 milliGRAM(s) IntraMuscular once PRN severe agitation  oxymetazoline 0.05% Nasal Spray 2 Spray(s) Both Nostrils every 12 hours PRN nasal congestion  QUEtiapine 50 milliGRAM(s) Oral every 4 hours PRN anxiety/agitation  sodium chloride 0.65% Nasal 1 Spray(s) Both Nostrils four times a day PRN Nasal Congestion

## 2023-01-14 NOTE — BH INPATIENT PSYCHIATRY PROGRESS NOTE - NSBHMETABOLIC_PSY_ALL_CORE_FT
BMI: BMI (kg/m2): 26.5 (01-10-23 @ 17:48)  HbA1c: A1C with Estimated Average Glucose Result: 10.2 % (01-08-23 @ 05:32)    Glucose: POCT Blood Glucose.: 163 mg/dL (01-14-23 @ 11:32)    BP: 137/73 (01-12-23 @ 08:29) (137/73 - 137/73)  Lipid Panel: Date/Time: 02-18-22 @ 07:38  Cholesterol, Serum: 104  Direct LDL: --  HDL Cholesterol, Serum: 40  Total Cholesterol/HDL Ration Measurement: --  Triglycerides, Serum: 157

## 2023-01-14 NOTE — BH INPATIENT PSYCHIATRY PROGRESS NOTE - MSE UNSTRUCTURED FT
Appearance: Dressed appropriately.  Good hygiene and grooming.  Found watching TV in common area.  Behavior: Cooperative.  Calm at time of interview.  Good eye contact.  Motor: No abnormal movements, no psychomotor slowing or activation.  Speech: Regular rate.  Mood: "better"  Affect: Neutral.  Thought Process: Linear. organized.   Associations: Fair.  Thought Content: denies suicidal ideation and hi. denies AVH   Insight: Limited.  Judgment: Fair on interview.  Attention: Fair.  Language: Fluent.  Gait: Intact.

## 2023-01-14 NOTE — BH INPATIENT PSYCHIATRY PROGRESS NOTE - NSBHASSESSSUMMFT_PSY_ALL_CORE
57 year old man, undomiciled, on disability, single, documented diagnoses of bipolar 1 disorder, antisocial personality disorder, PMHx pituitary tumor, HFrEF, HTN, T2DM, and GERD, 1 remote SA via injecting bleach, numerous psych hospitalizations (last at [facility] in [place] from 10/9/2022 - 10/12/2022), multiple ED visits in the past few months for medical, MH and substance use disorder, hx of multiple incarcerations (last 9 years ago, patient served 10 year sentence for manslaughter and was released from [facility] in ), hx of violence and aggression on the psych unit and in the community, hx of polysubstance abuse (daily ETOH use c/b reportedly ETOH withdrawal seizures per patient report, MJ use, cocaine use, past heroin use, now on methadone maintenance 110mg/day through [facility] in [place]), +family history (father  of drug/alcohol overdose, and half sister has schizophrenia), self presented with conditional HI to kill his ex-gf and paranoia in the context of substance abuse coupled with medication non compliance, found to be COVID+, admitted to [facility] for continued psychiatric care. Course complicated by episode of hypotension, bradycardia, and hypoglycemia and decreased responsiveness on 23, sent to [facility], where he stabilized after given narcan and his medications were adjusted, returned to  on 1/10/23.       Clinical suspicion that etiology of presentation likely more involving substance intoxication/withdrawal-induced, personality traits, secondary gain, rather than schizoaffective disorder.    no overt sx of jose or depression. pt adherent to treatment. no behavioral issues over this interval. will continue plan as below     Plan:  1.	Legal: continue   2.	Safety: routine obs  3.	Psychiatric: c/w Li 900mg qhs, zyprexa 5 mg qhs.  Continue methadone 110mg daily (confirmed with MMTP), valium 10mg BID. PRN gabapentin 300mg q6h for anxiety.  PRN seroquel 50 q4h for anxiety/agitation. PRNs zyprexa 2.5 mg q4h for anxiety/ agitation.  4.	Group/Milieu therapy   5.	Medical: COVID+ (prns for symptomatic treatment), hx HTN/HF (spironolactone 25mg), T2DM (lantus 23u qhs, admelog 10u TID - per hospitalist), nicotine patch 14mg/day. D/c CIWAS given his last drink was 23  6.	Collateral/Dispo: Likely transfer back to [place]

## 2023-01-15 RX ORDER — LACTOBACILLUS ACIDOPHILUS 100MM CELL
1 CAPSULE ORAL
Refills: 0 | Status: COMPLETED | OUTPATIENT
Start: 2023-01-15 | End: 2023-01-16

## 2023-01-15 RX ORDER — GABAPENTIN 400 MG/1
300 CAPSULE ORAL ONCE
Refills: 0 | Status: COMPLETED | OUTPATIENT
Start: 2023-01-15 | End: 2023-01-15

## 2023-01-15 RX ADMIN — SENNA PLUS 1 TABLET(S): 8.6 TABLET ORAL at 20:57

## 2023-01-15 RX ADMIN — POLYETHYLENE GLYCOL 3350 17 GRAM(S): 17 POWDER, FOR SOLUTION ORAL at 20:57

## 2023-01-15 RX ADMIN — Medication 10 MILLIGRAM(S): at 20:57

## 2023-01-15 RX ADMIN — GABAPENTIN 800 MILLIGRAM(S): 400 CAPSULE ORAL at 08:47

## 2023-01-15 RX ADMIN — SPIRONOLACTONE 25 MILLIGRAM(S): 25 TABLET, FILM COATED ORAL at 08:47

## 2023-01-15 RX ADMIN — Medication 1 PATCH: at 08:46

## 2023-01-15 RX ADMIN — GABAPENTIN 800 MILLIGRAM(S): 400 CAPSULE ORAL at 20:56

## 2023-01-15 RX ADMIN — LITHIUM CARBONATE 900 MILLIGRAM(S): 300 TABLET, EXTENDED RELEASE ORAL at 20:57

## 2023-01-15 RX ADMIN — Medication 650 MILLIGRAM(S): at 14:17

## 2023-01-15 RX ADMIN — GABAPENTIN 300 MILLIGRAM(S): 400 CAPSULE ORAL at 17:55

## 2023-01-15 RX ADMIN — Medication 1 TABLET(S): at 08:47

## 2023-01-15 RX ADMIN — Medication 3: at 16:53

## 2023-01-15 RX ADMIN — POLYETHYLENE GLYCOL 3350 17 GRAM(S): 17 POWDER, FOR SOLUTION ORAL at 11:12

## 2023-01-15 RX ADMIN — INSULIN GLARGINE 30 UNIT(S): 100 INJECTION, SOLUTION SUBCUTANEOUS at 22:04

## 2023-01-15 RX ADMIN — Medication 5 MILLIGRAM(S): at 20:56

## 2023-01-15 RX ADMIN — Medication 10 UNIT(S): at 08:07

## 2023-01-15 RX ADMIN — Medication 10 MILLIGRAM(S): at 08:47

## 2023-01-15 RX ADMIN — Medication 10 UNIT(S): at 12:16

## 2023-01-15 RX ADMIN — Medication 1 TABLET(S): at 17:10

## 2023-01-15 RX ADMIN — METHADONE HYDROCHLORIDE 80 MILLIGRAM(S): 40 TABLET ORAL at 08:47

## 2023-01-15 RX ADMIN — Medication 10 UNIT(S): at 16:54

## 2023-01-15 RX ADMIN — Medication 2: at 12:16

## 2023-01-15 RX ADMIN — METHADONE HYDROCHLORIDE 30 MILLIGRAM(S): 40 TABLET ORAL at 08:47

## 2023-01-15 RX ADMIN — OLANZAPINE 2.5 MILLIGRAM(S): 15 TABLET, FILM COATED ORAL at 14:42

## 2023-01-15 RX ADMIN — Medication 1: at 08:06

## 2023-01-15 RX ADMIN — GABAPENTIN 800 MILLIGRAM(S): 400 CAPSULE ORAL at 12:21

## 2023-01-16 RX ORDER — HYDROXYZINE HCL 10 MG
50 TABLET ORAL EVERY 8 HOURS
Refills: 0 | Status: DISCONTINUED | OUTPATIENT
Start: 2023-01-16 | End: 2023-01-17

## 2023-01-16 RX ORDER — SPIRONOLACTONE 25 MG/1
1 TABLET, FILM COATED ORAL
Qty: 0 | Refills: 0 | DISCHARGE
Start: 2023-01-16

## 2023-01-16 RX ORDER — INSULIN GLARGINE 100 [IU]/ML
30 INJECTION, SOLUTION SUBCUTANEOUS
Qty: 0 | Refills: 0 | DISCHARGE
Start: 2023-01-16

## 2023-01-16 RX ORDER — NICOTINE POLACRILEX 2 MG
1 GUM BUCCAL
Qty: 0 | Refills: 0 | DISCHARGE
Start: 2023-01-16

## 2023-01-16 RX ORDER — LANOLIN ALCOHOL/MO/W.PET/CERES
1 CREAM (GRAM) TOPICAL
Qty: 0 | Refills: 0 | DISCHARGE
Start: 2023-01-16

## 2023-01-16 RX ORDER — ACETAMINOPHEN 500 MG
2 TABLET ORAL
Qty: 0 | Refills: 0 | DISCHARGE
Start: 2023-01-16

## 2023-01-16 RX ORDER — INSULIN LISPRO 100/ML
10 VIAL (ML) SUBCUTANEOUS
Qty: 0 | Refills: 0 | DISCHARGE
Start: 2023-01-16

## 2023-01-16 RX ORDER — BENZOCAINE AND MENTHOL 5; 1 G/100ML; G/100ML
1 LIQUID ORAL
Qty: 0 | Refills: 0 | DISCHARGE

## 2023-01-16 RX ORDER — SENNA PLUS 8.6 MG/1
1 TABLET ORAL
Qty: 0 | Refills: 0 | DISCHARGE
Start: 2023-01-16

## 2023-01-16 RX ORDER — LITHIUM CARBONATE 300 MG/1
3 TABLET, EXTENDED RELEASE ORAL
Qty: 0 | Refills: 0 | DISCHARGE
Start: 2023-01-16

## 2023-01-16 RX ORDER — DIAZEPAM 5 MG
1 TABLET ORAL
Qty: 0 | Refills: 0 | DISCHARGE
Start: 2023-01-16

## 2023-01-16 RX ORDER — GABAPENTIN 400 MG/1
2 CAPSULE ORAL
Qty: 0 | Refills: 0 | DISCHARGE
Start: 2023-01-16

## 2023-01-16 RX ORDER — NICOTINE POLACRILEX 2 MG
1 GUM BUCCAL ONCE
Refills: 0 | Status: COMPLETED | OUTPATIENT
Start: 2023-01-16 | End: 2023-01-16

## 2023-01-16 RX ORDER — OLANZAPINE 15 MG/1
1 TABLET, FILM COATED ORAL
Qty: 0 | Refills: 0 | DISCHARGE
Start: 2023-01-16

## 2023-01-16 RX ORDER — OLANZAPINE 15 MG/1
1 TABLET, FILM COATED ORAL
Qty: 0 | Refills: 0 | DISCHARGE

## 2023-01-16 RX ORDER — METHADONE HYDROCHLORIDE 40 MG/1
3 TABLET ORAL
Qty: 0 | Refills: 0 | DISCHARGE
Start: 2023-01-16

## 2023-01-16 RX ORDER — METHADONE HYDROCHLORIDE 40 MG/1
2 TABLET ORAL
Qty: 0 | Refills: 0 | DISCHARGE
Start: 2023-01-16

## 2023-01-16 RX ORDER — SODIUM CHLORIDE 0.65 %
1 AEROSOL, SPRAY (ML) NASAL
Qty: 0 | Refills: 0 | DISCHARGE

## 2023-01-16 RX ORDER — POLYETHYLENE GLYCOL 3350 17 G/17G
17 POWDER, FOR SOLUTION ORAL
Qty: 0 | Refills: 0 | DISCHARGE
Start: 2023-01-16

## 2023-01-16 RX ORDER — LITHIUM CARBONATE 300 MG/1
900 TABLET, EXTENDED RELEASE ORAL
Qty: 0 | Refills: 0 | DISCHARGE

## 2023-01-16 RX ADMIN — Medication 0: at 20:14

## 2023-01-16 RX ADMIN — LITHIUM CARBONATE 900 MILLIGRAM(S): 300 TABLET, EXTENDED RELEASE ORAL at 20:11

## 2023-01-16 RX ADMIN — GABAPENTIN 800 MILLIGRAM(S): 400 CAPSULE ORAL at 12:39

## 2023-01-16 RX ADMIN — Medication 1 PATCH: at 19:20

## 2023-01-16 RX ADMIN — Medication 1 TABLET(S): at 08:35

## 2023-01-16 RX ADMIN — OLANZAPINE 2.5 MILLIGRAM(S): 15 TABLET, FILM COATED ORAL at 14:51

## 2023-01-16 RX ADMIN — Medication 1: at 08:05

## 2023-01-16 RX ADMIN — POLYETHYLENE GLYCOL 3350 17 GRAM(S): 17 POWDER, FOR SOLUTION ORAL at 20:11

## 2023-01-16 RX ADMIN — Medication 10 UNIT(S): at 12:01

## 2023-01-16 RX ADMIN — Medication 10 UNIT(S): at 08:06

## 2023-01-16 RX ADMIN — Medication 1 PATCH: at 06:50

## 2023-01-16 RX ADMIN — SENNA PLUS 1 TABLET(S): 8.6 TABLET ORAL at 20:12

## 2023-01-16 RX ADMIN — INSULIN GLARGINE 30 UNIT(S): 100 INJECTION, SOLUTION SUBCUTANEOUS at 20:14

## 2023-01-16 RX ADMIN — Medication 10 UNIT(S): at 16:51

## 2023-01-16 RX ADMIN — Medication 6: at 16:50

## 2023-01-16 RX ADMIN — GABAPENTIN 800 MILLIGRAM(S): 400 CAPSULE ORAL at 20:11

## 2023-01-16 RX ADMIN — Medication 1 PATCH: at 14:51

## 2023-01-16 RX ADMIN — Medication 1 TABLET(S): at 08:36

## 2023-01-16 RX ADMIN — SPIRONOLACTONE 25 MILLIGRAM(S): 25 TABLET, FILM COATED ORAL at 09:42

## 2023-01-16 RX ADMIN — GABAPENTIN 800 MILLIGRAM(S): 400 CAPSULE ORAL at 08:35

## 2023-01-16 RX ADMIN — Medication 1 PATCH: at 08:37

## 2023-01-16 RX ADMIN — Medication 50 MILLIGRAM(S): at 17:12

## 2023-01-16 RX ADMIN — Medication 5 MILLIGRAM(S): at 20:12

## 2023-01-16 RX ADMIN — Medication 1: at 12:00

## 2023-01-16 RX ADMIN — METHADONE HYDROCHLORIDE 30 MILLIGRAM(S): 40 TABLET ORAL at 08:38

## 2023-01-16 RX ADMIN — Medication 10 MILLIGRAM(S): at 08:37

## 2023-01-16 RX ADMIN — METHADONE HYDROCHLORIDE 80 MILLIGRAM(S): 40 TABLET ORAL at 08:38

## 2023-01-16 RX ADMIN — Medication 10 MILLIGRAM(S): at 20:11

## 2023-01-16 NOTE — BH INPATIENT PSYCHIATRY DISCHARGE NOTE - NSBHDCCONDITIONFT_PSY_A_CORE
Pt completed COVID isolation period and was transferred back to [facility] inpatient psychiatry. Pt completed COVID isolation period and was transferred to [facility] inpatient psychiatry. Pt completed COVID isolation and was transferred to [facility] inpatient psychiatry.

## 2023-01-16 NOTE — BH INPATIENT PSYCHIATRY DISCHARGE NOTE - LEGAL HISTORY
History of numerous incarcerations, the longest for 10 years due to manslaughter, was d/c from retirement in 2014. No arrests since his discharge from retirement.

## 2023-01-16 NOTE — BH INPATIENT PSYCHIATRY DISCHARGE NOTE - NSBHDCHANDOFFFT_PSY_ALL_CORE
Clinical handoff including hospital course, diagnosis, and treatment [phone]

## 2023-01-16 NOTE — BH INPATIENT PSYCHIATRY DISCHARGE NOTE - HOSPITAL COURSE
Pt was admitted to [facility] COVID+ unit from [facility] ER on 1/5/2023 with reported depression, psychosis, and also conditional suicidality and homicidality, in context of medication noncompliance and psychosocial stressors.  Pt upon arrival was linear in thought process, goal directed in behavior, and with logical thought content without any overt delusions.  Pt reported resolution of aforementioned conditional suicidality and homicidality upon arrival, and he acclimated quickly to inpatient unit and milieu.  Pt made request for team to continue process of securing mental health housing for pt.  Pt described having daily ETOH use and hx of alcohol withdrawal seizures, pt was started on CIWA with symptom triggered Librium and Ativan PRNs.  Team contacted [facility] ([phone]) who confirmed dose of methadone as 110 mg daily, with last pickup on day prior to admission.  Team also contacted his [facility] pharmacy in [place] ([phone]), confirmed home medications as spironolactone 25mg, clonidine 0.2mg BID, and carvedilol 12.5mg BID, and gabapentin 800mg TID.  Pt was continued on lithium  mg qhs and olanzapine 10 mg qhs on admission for mood stabilization.  On first day of admission 1/6/23, pt had increasing CIWA scores of 8 to 13.  Pt was placed on Librium taper, starting at 50 mg q4h x 24 hrs, with breakthrough symptom triggered Ativan PRN.  [facility] Medicine also met with pt for management of chronic medical problems.  On second day of admission 1/7/23, pt in afternoon was found to be hypoglycemic with fingerstick of 60, BP of 65/38, HR 65, SpO2 100% RA, presented as somnolent, drooling, and oriented x1.  Pt was transferred to [facility] ER and subsequently medically admitted from 1/7/2023 to 1/10/2023.  During this medical admission, pt completed rapid Librium taper without any further complication, but reported conditional suicidality and homicidality to [facility].  Following medical stabilization, pt was transferred back to [facility].    Upon arrival to [facility], pt again presented as linear in thought process, goal directed in behavior, without any overt jose or psychosis.  He reported resolution of conditional suicidality and homicidality.  Pt continued to pursue mental health housing, and does not wish to return to shelter setting.  Pt was continued on home meds of lithium, methadone, and gabapentin.  Olanzapine was reduced at pt's request due to oversedation.  Pt also requested to restart diazepam for anxiety, ISTOP confirmed that this was recently prescribed to pt.  Pt requested Adderall for his ADHD however was in acknowledgement that additional controlled substances would need to be discussed with his outpatient MMTP, and pt was in agreement with deferring restart to outpatient if MMTP was in agreement.      While pt carries historical diagnosis of bipolar I disorder, etiology of this presentation was multifactorial, with likely contributions from substance induced (intoxication/withdrawal) mood disorder, adjustment, personality traits vs full disorder, and secondary gain.  Throughout 2N hospitalization, pt consistently denied suicidality and homicidality, and remained future oriented.  He was visible on unit, social with peers, and while he had occasional interpersonal disputes with disruptive peers, he otherwise maintained good behavioral control.  He was compliant with medications and basic care, and attended to ADLs independently. Pt completed COVID isolation at [facility] 2N, and was subsequently transferred back to [facility] for conclusion of inpatient psychiatric care. Pt was admitted to [facility] COVID+ unit from [facility] ER on 1/5/2023 with reported depression, psychosis, and also conditional suicidality and homicidality, in context of medication noncompliance and psychosocial stressors.  Pt upon arrival was linear in thought process, goal directed in behavior, and with logical thought content without any overt delusions.  Pt reported resolution of aforementioned conditional suicidality and homicidality upon arrival, and he acclimated quickly to inpatient unit and milieu.  Pt made request for team to continue process of securing mental health housing for pt.  Pt described having daily ETOH use and hx of alcohol withdrawal seizures, pt was started on CIWA with symptom triggered Librium and Ativan PRNs.  Team contacted [facility] ([phone]) who confirmed dose of methadone as 110 mg daily, with last pickup on day prior to admission.  Team also contacted his [facility] pharmacy in [place] ([phone]), confirmed home medications as spironolactone 25mg, clonidine 0.2mg BID, and carvedilol 12.5mg BID, and gabapentin 800mg TID.  Pt was continued on lithium  mg qhs and olanzapine 10 mg qhs on admission for mood stabilization.  On first day of admission 1/6/23, pt had increasing CIWA scores of 8 to 13.  Pt was placed on Librium taper, starting at 50 mg q4h x 24 hrs, with breakthrough symptom triggered Ativan PRN.  [facility] Medicine also met with pt for management of chronic medical problems.  On second day of admission 1/7/23, pt in afternoon was found to be hypoglycemic with fingerstick of 60, BP of 65/38, HR 65, SpO2 100% RA, presented as somnolent, drooling, and oriented x1.  Pt was transferred to [facility] ER and subsequently medically admitted from 1/7/2023 to 1/10/2023.  During this medical admission, pt completed rapid Librium taper without any further complication, but reported conditional suicidality and homicidality to [facility].  Following medical stabilization, pt was transferred back to [facility].    Upon arrival to [facility], pt again presented as linear in thought process, goal directed in behavior, without any overt jose or psychosis.  He mostly reported resolution of conditional suicidality and homicidality, with brief resurgence of these conditional thoughts whenever emotionally dysregulated from on unit stressors.  Pt continued to pursue mental health housing, and does not wish to return to shelter setting.  Pt was continued on home meds of lithium, methadone, and gabapentin.  Olanzapine was reduced at pt's request due to oversedation.  Pt also requested to restart diazepam for anxiety, ISTOP confirmed that this was recently prescribed to pt.  Pt requested Adderall for his ADHD however was in acknowledgement that additional controlled substances would need to be discussed with his outpatient MMTP, and pt was in agreement with deferring restart to outpatient if MMTP was in agreement.      While pt carries historical diagnosis of bipolar I disorder, etiology of this presentation was multifactorial, with likely contributions from substance induced (intoxication/withdrawal) mood disorder, adjustment, personality traits vs full disorder, and secondary gain.  Throughout 2N hospitalization, pt consistently denied suicidality and homicidality, and remained future oriented.  He was visible on unit, social with peers, and while he had occasional interpersonal disputes with disruptive peers, he otherwise maintained good behavioral control.  He was compliant with medications and basic care, and attended to ADLs independently. Pt completed COVID isolation at [facility] 2N, and was subsequently transferred back to [facility] for conclusion of inpatient psychiatric care. Pt was admitted to [facility] COVID+ unit from [facility] ER on 1/5/2023, where he had presented with depression, psychosis, and also conditional suicidality and homicidality, in context of medication noncompliance and psychosocial stressors.  Pt upon arrival to [facility] was linear in thought process, goal directed in behavior, with logical thought content, and without any overt jose or psychosis.  Pt reported resolution of aforementioned conditional suicidality and homicidality upon arrival, and he acclimated quickly to inpatient unit and milieu.  Pt requested that team obtain supportive housing for him.  Pt described having daily ETOH use and hx of alcohol withdrawal seizures, pt was started on CIWA with symptom triggered Librium and Ativan PRNs.  Team contacted [facility] ([phone]) who confirmed dose of methadone as 110 mg daily, with last pickup on day prior to admission.  Team also contacted his [facility] pharmacy in [place] ([phone]), confirmed home medications as spironolactone 25 mg, clonidine 0.2 mg BID, and carvedilol 12.5 mg BID, and gabapentin 800 mg TID.  Pt was continued on lithium  mg qhs and olanzapine 10 mg qhs on admission for mood stabilization.  On first day of admission 1/6/23, pt had increasing CIWA scores of 8 to 13.  Pt was placed on Librium taper, starting at 50 mg q4h x 24 hrs, with breakthrough symptom triggered Ativan PRN.  [facility] Medicine also met with pt for management of chronic medical problems.  On second day of admission 1/7/23, pt in afternoon was found to be hypoglycemic with fingerstick of 60, BP of 65/38, HR 65, SpO2 100% RA, presented as somnolent with sialorrhea, and oriented x1.  Pt was transferred to [facility] ER and subsequently medically admitted from 1/7/2023 to 1/10/2023.  During this medical admission, pt completed rapid Librium taper without any further complication, but had return of conditional suicidality and homicidality.  Following medical stabilization, pt was transferred back to [facility].    Upon arrival to [facility], pt again presented as linear in thought process, goal directed in behavior, without any overt jose or psychosis.  He largely denied any further conditional suicidality and homicidality throughout admission, with only brief resurgence of these conditional thoughts whenever he became emotionally dysregulated from on-unit and/or chronic psychosocial stressors.  He was visible on unit, social with peers, and while he had occasional interpersonal disputes with disruptive peers, he otherwise maintained good behavioral control.  He was compliant with medications and basic care, and attended to ADLs independently.  While pt carried historical diagnosis of bipolar I disorder, etiology of this presentation was multifactorial, with likely contributions from substance induced (intoxication/withdrawal) mood disorder, adjustment, personality traits vs full disorder, and secondary gain.  Pt was continued on home meds of lithium, methadone, and gabapentin.  Olanzapine was reduced at pt's request due to oversedation.  Pt also requested to restart diazepam for anxiety, ISTOP confirmed that this was recently prescribed to pt.  Pt requested Adderall for his ADHD however was in acknowledgement that additional controlled substances would need to be revisited later with his MMTP.  Pt continued to pursue supportive housing, and did not wish to return to shelter.  Pt completed COVID isolation at [facility], and was subsequently transferred back to [facility] for continuation of inpatient psychiatric care. Pt was admitted to [facility] COVID+ unit from [facility] ER on 1/5/2023, where he had presented with depression, psychosis, and also conditional suicidality and homicidality, in context of medication noncompliance and psychosocial stressors.  Pt upon arrival to [facility] was linear in thought process, goal directed in behavior, with logical thought content, and without any overt jose or psychosis.  Pt reported resolution of aforementioned conditional suicidality and homicidality upon arrival, and he acclimated quickly to inpatient unit and milieu.  Pt requested that team obtain supportive housing for him.  Pt described having daily ETOH use and hx of alcohol withdrawal seizures, pt was started on CIWA with symptom triggered Librium and Ativan PRNs.  Team contacted [facility] ([phone]) who confirmed dose of methadone as 110 mg daily, with last pickup on day prior to admission.  Team also contacted his [facility] pharmacy in [place] ([phone]), confirmed home medications as spironolactone 25 mg, clonidine 0.2 mg BID, and carvedilol 12.5 mg BID, and gabapentin 800 mg TID.  Pt was continued on lithium  mg qhs and olanzapine 10 mg qhs on admission for mood stabilization.  On first day of admission 1/6/23, pt had increasing CIWA scores of 8 to 13.  Pt was placed on Librium taper, starting at 50 mg q4h x 24 hrs, with breakthrough symptom triggered Ativan PRN.  [facility] Medicine also met with pt for management of chronic medical problems.  On second day of admission 1/7/23, pt in afternoon was found to be hypoglycemic with fingerstick of 60, BP of 65/38, HR 65, SpO2 100% RA, presented as somnolent with sialorrhea, and oriented x1.  Pt was transferred to [facility] ER and subsequently medically admitted from 1/7/2023 to 1/10/2023.  During this medical admission, pt completed rapid Librium taper without any further complication, but had return of conditional suicidality and homicidality.  Following medical stabilization, pt was transferred back to [facility].    Upon return to [facility], pt again presented as linear in thought process, goal directed in behavior, without any overt jose or psychosis.  He continued to have periods of emotional dysregulation from on-unit and/or chronic psychosocial stressors, during which he would experience intermittent conditional suicidality and/or homicidality.  He was visible on unit, social with peers, and while he had occasional interpersonal disputes with disruptive peers, he otherwise maintained good behavioral control.  He was compliant with medications and basic care, and attended to ADLs independently.  While pt carried historical diagnosis of bipolar I disorder, etiology of this presentation was multifactorial, with likely contributions from substance induced (intoxication/withdrawal) mood disorder, adjustment, personality traits vs full disorder, and secondary gain.  Pt was continued on home meds of lithium, methadone, and gabapentin.  Olanzapine was reduced at pt's request due to oversedation.  Pt also requested to restart diazepam for anxiety, ISTOP confirmed that this was recently prescribed to pt.  Pt requested Adderall for his ADHD however was in acknowledgement that additional controlled substances would need to be revisited later with his MMTP.  Pt continued to pursue supportive housing, and did not wish to return to shelter.  Pt completed COVID isolation at [facility], and was subsequently transferred back to [facility] for continuation of inpatient psychiatric care.

## 2023-01-16 NOTE — BH INPATIENT PSYCHIATRY DISCHARGE NOTE - NSBHDCRISKMITIGATEFT_PSY_ALL_CORE
Risk assessment on transfer: Pt has chronic risk factors of bipolar disorder, prior admissions, hx of suicide attempt, hx of aggression, hx of incarceration for manslaughter, hx of substance use, family hx of SMI, chronic medical problems, homelessness, unemployment, with acute risk factors of recent mood symptoms, now being treated with inpatient care.  Protective factors of supportive sister, future orientation, therapeutic alliances, MMTP.  Pt is high CHRONIC risk of harm, continues to be acute risk due to inability to safely care for self, requires further admission for safety, stabilization, and safe disposition planning.  Risk assessment on transfer: Pt has chronic risk factors of bipolar disorder, prior admissions, hx of suicide attempt, hx of aggression, hx of incarceration for manslaughter, hx of substance use, family hx of SMI, chronic medical problems, homelessness, unemployment, with acute risk factors of recent mood symptoms, now being treated with inpatient care.  Protective factors of supportive sister, future orientation, therapeutic alliances, MMTP.  Pt is high CHRONIC risk of harm, continues to be acute risk due to inability to safely care for self, requires further admission for safety, stabilization, and safe disposition planning.  Pt has concluded COVID isolation and will be transferring to [facility] inpatient psychiatric unit.

## 2023-01-16 NOTE — BH INPATIENT PSYCHIATRY DISCHARGE NOTE - ATTENDING DISCHARGE PHYSICAL EXAMINATION:
MSE on transfer:  Appearance: Dressed appropriately.  Fair hygiene and grooming.   Behavior: Cooperative.  Calm.  Good eye contact.   Motor: No abnormal movements, no psychomotor slowing or activation.  Speech: Regular rate.  Mood: "Fine."  Affect: Neutral.  Thought Process: Linear.  Associations: Fair.  Thought Content: Denies AVH.  Denies SIIP or HIIP.  Insight: Limited.  Judgment: Fair on interview.  Attention: Fair.  Language: Fluent.  Gait: Intact.  MSE on Transfer:  Appearance: Dressed appropriately.  Fair hygiene and grooming.   Behavior: Cooperative.  Calm.  Good eye contact.   Motor: No abnormal movements, no psychomotor slowing or activation.  Speech: Regular rate.  Mood: "Fine."  Affect: Neutral.  Thought Process: Linear.  Associations: Fair.  Thought Content:  Focused on securing supportive housing.  Denies AVH.  Denies any suicidality or homicidality today at time of interview.  Insight: Limited.  Judgment: Fair on interview.  Attention: Fair.  Language: Fluent.  Gait: Intact.

## 2023-01-16 NOTE — BH INPATIENT PSYCHIATRY DISCHARGE NOTE - NSDCMRMEDTOKEN_GEN_ALL_CORE_FT
guaiFENesin 100 mg/5 mL oral liquid: 5 milliliter(s) orally every 6 hours, As Needed  hydrOXYzine hydrochloride 25 mg oral tablet: 1 tab(s) orally every 6 hours, As needed, Anxiety  lithium carbonate extended release: 900 milligram(s) orally once a day (at bedtime)  menthol-benzocaine 3.6 mg-15 mg mucous membrane lozenge: 1 lozenge mucous membrane every 2 hours, As Needed  methadone 10 mg oral tablet: 11 tab(s) orally once a day  Multiple Vitamins oral capsule: 1 cap(s) orally once a day  nicotine 14 mg/24 hr transdermal film, extended release: 14 milligram(s) transdermal once a day   Ocean 0.65% nasal spray: 1 spray(s) nasal 4 times a day, As Needed  OLANZapine 15 mg oral tablet: 10 milligram(s) orally once a day (at bedtime)  OLANZapine 5 mg oral tablet, disintegratin tab(s) orally every 4 hours, As Needed  polyethylene glycol 3350 oral powder for reconstitution: 17 gram(s) orally 2 times a day  senna leaf extract oral tablet: 1 tab(s) orally once a day (at bedtime)  spironolactone 25 mg oral tablet: 1 tab(s) orally once a day   acetaminophen 325 mg oral tablet: 2 tab(s) orally every 6 hours, As needed, Temp greater or equal to 38C (100.4F), Mild Pain (1 - 3), Moderate Pain (4 - 6), Severe Pain (7 - 10)  diazePAM 10 mg oral tablet: 1 tab(s) orally 2 times a day  gabapentin 400 mg oral capsule: 2 cap(s) orally 3 times a day  insulin glargine 100 units/mL subcutaneous solution: 30 unit(s) subcutaneous once a day (at bedtime)  insulin lispro 100 units/mL injectable solution: 10 unit(s) subcutaneous 3 times a day (before meals)  lithium 300 mg oral capsule: 3 cap(s) orally once a day (at bedtime)  melatonin 5 mg oral tablet: 1 tab(s) orally once a day (at bedtime)  methadone 10 mg oral tablet: 3 tab(s) orally once a day  methadone 40 mg oral tablet, dispersible: 2 tab(s) orally once a day  Multiple Vitamins oral tablet: 1 tab(s) orally once a day  nicotine 14 mg/24 hr transdermal film, extended release: 1 film(s) transdermal once a day  OLANZapine: 5 milligram(s) intramuscular every 6 hours, As Needed agitation  OLANZapine 2.5 mg oral tablet: 1 tab(s) orally every 4 hours, As needed, agitation  OLANZapine 5 mg oral tablet: 1 tab(s) orally once a day (at bedtime)  polyethylene glycol 3350 oral powder for reconstitution: 17 gram(s) orally 2 times a day  senna leaf extract oral tablet: 1 tab(s) orally once a day (at bedtime)  spironolactone 25 mg oral tablet: 1 tab(s) orally once a day
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
## 2023-01-16 NOTE — BH INPATIENT PSYCHIATRY DISCHARGE NOTE - NSBHDCMEDICALFT_PSY_A_CORE
At time of hospitalization, CDC guidelines recommended discontinuation of COVID isolation after 5 days and if fever free for at least 24 hours.  Pt at time of transfer completed >12 days, and was afebrile throughout this time.  Pt was asymptomatic at time of transfer.  Medical admission as described above.  Upon return to [facility], pt was restarted on spironolactone but not carvedilol or clonidine given that his vitals were largely stable.  At time of hospitalization, CDC guidelines recommended discontinuation of COVID isolation after 5 days and if fever free for at least 24 hours.  Pt at time of transfer completed >12 days, and was afebrile throughout this time.  Pt was asymptomatic at time of transfer.

## 2023-01-16 NOTE — BH INPATIENT PSYCHIATRY DISCHARGE NOTE - NSBHMETABOLIC_PSY_ALL_CORE_FT
BMI: BMI (kg/m2): 26.5 (01-10-23 @ 17:48)  HbA1c: A1C with Estimated Average Glucose Result: 10.2 % (01-08-23 @ 05:32)    Glucose: POCT Blood Glucose.: 133 mg/dL (01-16-23 @ 20:09)    BP: 150/81 (01-15-23 @ 08:30) (150/81 - 150/81)  Lipid Panel: Date/Time: 02-18-22 @ 07:38  Cholesterol, Serum: 104  Direct LDL: --  HDL Cholesterol, Serum: 40  Total Cholesterol/HDL Ration Measurement: --  Triglycerides, Serum: 157

## 2023-01-17 ENCOUNTER — INPATIENT (INPATIENT)
Facility: HOSPITAL | Age: 58
LOS: 7 days | Discharge: ANOTHER HOSPITAL | DRG: 885 | End: 2023-01-25
Attending: PSYCHIATRY & NEUROLOGY | Admitting: PSYCHIATRY & NEUROLOGY
Payer: COMMERCIAL

## 2023-01-17 VITALS — DIASTOLIC BLOOD PRESSURE: 83 MMHG | TEMPERATURE: 98 F | SYSTOLIC BLOOD PRESSURE: 112 MMHG

## 2023-01-17 LAB
GLUCOSE BLDC GLUCOMTR-MCNC: 285 MG/DL — HIGH (ref 70–99)
SARS-COV-2 RNA SPEC QL NAA+PROBE: SIGNIFICANT CHANGE UP

## 2023-01-17 PROCEDURE — 99231 SBSQ HOSP IP/OBS SF/LOW 25: CPT

## 2023-01-17 RX ORDER — NICOTINE POLACRILEX 2 MG
1 GUM BUCCAL DAILY
Refills: 0 | Status: DISCONTINUED | OUTPATIENT
Start: 2023-01-17 | End: 2023-01-25

## 2023-01-17 RX ORDER — SPIRONOLACTONE 25 MG/1
25 TABLET, FILM COATED ORAL DAILY
Refills: 0 | Status: DISCONTINUED | OUTPATIENT
Start: 2023-01-17 | End: 2023-01-25

## 2023-01-17 RX ORDER — GLUCAGON INJECTION, SOLUTION 0.5 MG/.1ML
1 INJECTION, SOLUTION SUBCUTANEOUS ONCE
Refills: 0 | Status: DISCONTINUED | OUTPATIENT
Start: 2023-01-17 | End: 2023-01-25

## 2023-01-17 RX ORDER — OLANZAPINE 15 MG/1
2.5 TABLET, FILM COATED ORAL EVERY 4 HOURS
Refills: 0 | Status: DISCONTINUED | OUTPATIENT
Start: 2023-01-17 | End: 2023-01-25

## 2023-01-17 RX ORDER — LANOLIN ALCOHOL/MO/W.PET/CERES
5 CREAM (GRAM) TOPICAL AT BEDTIME
Refills: 0 | Status: DISCONTINUED | OUTPATIENT
Start: 2023-01-17 | End: 2023-01-25

## 2023-01-17 RX ORDER — SODIUM CHLORIDE 9 MG/ML
1000 INJECTION, SOLUTION INTRAVENOUS
Refills: 0 | Status: DISCONTINUED | OUTPATIENT
Start: 2023-01-17 | End: 2023-01-25

## 2023-01-17 RX ORDER — DEXTROSE 50 % IN WATER 50 %
15 SYRINGE (ML) INTRAVENOUS ONCE
Refills: 0 | Status: DISCONTINUED | OUTPATIENT
Start: 2023-01-17 | End: 2023-01-25

## 2023-01-17 RX ORDER — OLANZAPINE 15 MG/1
5 TABLET, FILM COATED ORAL AT BEDTIME
Refills: 0 | Status: DISCONTINUED | OUTPATIENT
Start: 2023-01-17 | End: 2023-01-25

## 2023-01-17 RX ORDER — METHADONE HYDROCHLORIDE 40 MG/1
110 TABLET ORAL DAILY
Refills: 0 | Status: DISCONTINUED | OUTPATIENT
Start: 2023-01-17 | End: 2023-01-18

## 2023-01-17 RX ORDER — DEXTROSE 50 % IN WATER 50 %
25 SYRINGE (ML) INTRAVENOUS ONCE
Refills: 0 | Status: DISCONTINUED | OUTPATIENT
Start: 2023-01-17 | End: 2023-01-25

## 2023-01-17 RX ORDER — NICOTINE POLACRILEX 2 MG
2 GUM BUCCAL EVERY 4 HOURS
Refills: 0 | Status: DISCONTINUED | OUTPATIENT
Start: 2023-01-17 | End: 2023-01-25

## 2023-01-17 RX ORDER — POLYETHYLENE GLYCOL 3350 17 G/17G
17 POWDER, FOR SOLUTION ORAL
Refills: 0 | Status: DISCONTINUED | OUTPATIENT
Start: 2023-01-17 | End: 2023-01-25

## 2023-01-17 RX ORDER — LITHIUM CARBONATE 300 MG/1
900 TABLET, EXTENDED RELEASE ORAL AT BEDTIME
Refills: 0 | Status: DISCONTINUED | OUTPATIENT
Start: 2023-01-17 | End: 2023-01-25

## 2023-01-17 RX ORDER — GABAPENTIN 400 MG/1
800 CAPSULE ORAL THREE TIMES A DAY
Refills: 0 | Status: DISCONTINUED | OUTPATIENT
Start: 2023-01-17 | End: 2023-01-25

## 2023-01-17 RX ORDER — INSULIN LISPRO 100/ML
10 VIAL (ML) SUBCUTANEOUS
Refills: 0 | Status: DISCONTINUED | OUTPATIENT
Start: 2023-01-17 | End: 2023-01-25

## 2023-01-17 RX ORDER — DIAZEPAM 5 MG
10 TABLET ORAL
Refills: 0 | Status: DISCONTINUED | OUTPATIENT
Start: 2023-01-17 | End: 2023-01-18

## 2023-01-17 RX ORDER — DEXTROSE 50 % IN WATER 50 %
12.5 SYRINGE (ML) INTRAVENOUS ONCE
Refills: 0 | Status: DISCONTINUED | OUTPATIENT
Start: 2023-01-17 | End: 2023-01-25

## 2023-01-17 RX ORDER — INSULIN GLARGINE 100 [IU]/ML
30 INJECTION, SOLUTION SUBCUTANEOUS AT BEDTIME
Refills: 0 | Status: DISCONTINUED | OUTPATIENT
Start: 2023-01-17 | End: 2023-01-25

## 2023-01-17 RX ORDER — SENNA PLUS 8.6 MG/1
1 TABLET ORAL AT BEDTIME
Refills: 0 | Status: DISCONTINUED | OUTPATIENT
Start: 2023-01-17 | End: 2023-01-25

## 2023-01-17 RX ADMIN — METHADONE HYDROCHLORIDE 80 MILLIGRAM(S): 40 TABLET ORAL at 08:42

## 2023-01-17 RX ADMIN — Medication 10 MILLIGRAM(S): at 21:27

## 2023-01-17 RX ADMIN — Medication 2 MILLIGRAM(S): at 19:13

## 2023-01-17 RX ADMIN — SPIRONOLACTONE 25 MILLIGRAM(S): 25 TABLET, FILM COATED ORAL at 08:41

## 2023-01-17 RX ADMIN — Medication 2 MILLIGRAM(S): at 19:34

## 2023-01-17 RX ADMIN — LITHIUM CARBONATE 900 MILLIGRAM(S): 300 TABLET, EXTENDED RELEASE ORAL at 21:26

## 2023-01-17 RX ADMIN — GABAPENTIN 800 MILLIGRAM(S): 400 CAPSULE ORAL at 08:41

## 2023-01-17 RX ADMIN — METHADONE HYDROCHLORIDE 30 MILLIGRAM(S): 40 TABLET ORAL at 08:41

## 2023-01-17 RX ADMIN — GABAPENTIN 800 MILLIGRAM(S): 400 CAPSULE ORAL at 21:31

## 2023-01-17 RX ADMIN — Medication 10 MILLIGRAM(S): at 08:41

## 2023-01-17 RX ADMIN — Medication 10 UNIT(S): at 08:40

## 2023-01-17 RX ADMIN — Medication 1 TABLET(S): at 08:41

## 2023-01-17 RX ADMIN — SENNA PLUS 1 TABLET(S): 8.6 TABLET ORAL at 21:28

## 2023-01-17 RX ADMIN — POLYETHYLENE GLYCOL 3350 17 GRAM(S): 17 POWDER, FOR SOLUTION ORAL at 21:29

## 2023-01-17 RX ADMIN — Medication 10 UNIT(S): at 11:36

## 2023-01-17 RX ADMIN — GABAPENTIN 800 MILLIGRAM(S): 400 CAPSULE ORAL at 12:00

## 2023-01-17 RX ADMIN — Medication 2 MILLIGRAM(S): at 13:50

## 2023-01-17 RX ADMIN — INSULIN GLARGINE 30 UNIT(S): 100 INJECTION, SOLUTION SUBCUTANEOUS at 21:28

## 2023-01-17 RX ADMIN — Medication 1 PATCH: at 08:40

## 2023-01-17 RX ADMIN — OLANZAPINE 2.5 MILLIGRAM(S): 15 TABLET, FILM COATED ORAL at 12:52

## 2023-01-17 RX ADMIN — Medication 2: at 11:35

## 2023-01-17 RX ADMIN — Medication 5 MILLIGRAM(S): at 21:27

## 2023-01-17 RX ADMIN — POLYETHYLENE GLYCOL 3350 17 GRAM(S): 17 POWDER, FOR SOLUTION ORAL at 08:42

## 2023-01-17 NOTE — BH INPATIENT PSYCHIATRY PROGRESS NOTE - NSTXDCHOUSGOAL_PSY_ALL_CORE
Will agree to participate in housing process
father
Will agree to participate in housing process

## 2023-01-17 NOTE — BH INPATIENT PSYCHIATRY PROGRESS NOTE - NSTXSUBMISINTERMD_PSY_ALL_CORE
medication and psychotherapy

## 2023-01-17 NOTE — BH DISCHARGE NOTE NURSING/SOCIAL WORK/PSYCH REHAB - NSCDUDCCRISIS_PSY_A_CORE
[facility]  1 (346) [facility] ([phone])  Text "WELL" to 80650  Website: www.[employer]/.[employer] [facility] 1 (812) 621-SISB (3086) Website: www.safehorizon.org/.National Suicide Prevention Lifeline 3 (843) 701-1689/.  [facility]  1 (559) LIFENET (752-6620)/.  [facility]  [address] [place] [address] [phone]   Hours:  Monday through Friday from 9 AM to 3 PM/988 Suicide and Crisis Lifeline

## 2023-01-17 NOTE — BH DISCHARGE NOTE NURSING/SOCIAL WORK/PSYCH REHAB - DISCHARGE INSTRUCTIONS AFTERCARE APPOINTMENTS
In order to check the location, date, or time of your aftercare appointment, please refer to your Discharge Instructions Document given to you upon leaving the hospital.  If you have lost the instructions please call [phone]

## 2023-01-17 NOTE — BH INPATIENT PSYCHIATRY PROGRESS NOTE - PRN MEDS
MEDICATIONS  (PRN):  acetaminophen     Tablet .. 650 milliGRAM(s) Oral every 6 hours PRN Temp greater or equal to 38C (100.4F), Mild Pain (1 - 3), Moderate Pain (4 - 6), Severe Pain (7 - 10)  benzocaine 15 mG/menthol 3.6 mG Lozenge 1 Lozenge Oral every 2 hours PRN cough  dextrose Oral Gel 15 Gram(s) Oral once PRN Blood Glucose LESS THAN 70 milliGRAM(s)/deciliter  guaiFENesin Oral Liquid (Sugar-Free) 100 milliGRAM(s) Oral every 6 hours PRN cough  hydrOXYzine hydrochloride 50 milliGRAM(s) Oral every 8 hours PRN Anxiety  melatonin. 5 milliGRAM(s) Oral at bedtime PRN Insomnia  OLANZapine 2.5 milliGRAM(s) Oral every 4 hours PRN agitation  OLANZapine Injectable 5 milliGRAM(s) IntraMuscular once PRN severe agitation  oxymetazoline 0.05% Nasal Spray 2 Spray(s) Both Nostrils every 12 hours PRN nasal congestion  QUEtiapine 50 milliGRAM(s) Oral every 4 hours PRN anxiety/agitation  sodium chloride 0.65% Nasal 1 Spray(s) Both Nostrils four times a day PRN Nasal Congestion   MEDICATIONS  (PRN):  acetaminophen     Tablet .. 650 milliGRAM(s) Oral every 6 hours PRN Temp greater or equal to 38C (100.4F), Mild Pain (1 - 3), Moderate Pain (4 - 6), Severe Pain (7 - 10)  benzocaine 15 mG/menthol 3.6 mG Lozenge 1 Lozenge Oral every 2 hours PRN cough  guaiFENesin Oral Liquid (Sugar-Free) 100 milliGRAM(s) Oral every 6 hours PRN cough  hydrOXYzine hydrochloride 50 milliGRAM(s) Oral every 8 hours PRN Anxiety  melatonin. 5 milliGRAM(s) Oral at bedtime PRN Insomnia  OLANZapine 2.5 milliGRAM(s) Oral every 4 hours PRN agitation  OLANZapine Injectable 5 milliGRAM(s) IntraMuscular once PRN severe agitation  oxymetazoline 0.05% Nasal Spray 2 Spray(s) Both Nostrils every 12 hours PRN nasal congestion  QUEtiapine 50 milliGRAM(s) Oral every 4 hours PRN anxiety/agitation  sodium chloride 0.65% Nasal 1 Spray(s) Both Nostrils four times a day PRN Nasal Congestion

## 2023-01-17 NOTE — BH INPATIENT PSYCHIATRY PROGRESS NOTE - NSBHCHARTREVIEWVS_PSY_A_CORE FT
Vital Signs Last 24 Hrs  T(C): 36.4 (01-17-23 @ 08:37), Max: 36.4 (01-17-23 @ 08:37)  T(F): 97.6 (01-17-23 @ 08:37), Max: 97.6 (01-17-23 @ 08:37)  HR: --  BP: 112/83 (01-17-23 @ 08:37) (112/83 - 112/83)  BP(mean): 87 (01-17-23 @ 08:37) (87 - 87)  RR: --  SpO2: --    Orthostatic VS  01-16-23 @ 10:29  Lying BP: --/-- HR: --  Sitting BP: 139/86 HR: 69  Standing BP: --/-- HR: --  Site: --  Mode: --

## 2023-01-17 NOTE — BH DISCHARGE NOTE NURSING/SOCIAL WORK/PSYCH REHAB - NSDCPRRECOMMEND_PSY_ALL_CORE
Psych rehab recommends that patient receive psychoeducation to assist with symptoms, follow outpatient treatment as prescribed and be compliant with medication.

## 2023-01-17 NOTE — BH INPATIENT PSYCHIATRY PROGRESS NOTE - NSBHMETABOLIC_PSY_ALL_CORE_FT
BMI: BMI (kg/m2): 26.5 (01-10-23 @ 17:48)  HbA1c: A1C with Estimated Average Glucose Result: 10.2 % (01-08-23 @ 05:32)    Glucose: POCT Blood Glucose.: 243 mg/dL (01-17-23 @ 11:33)    BP: 112/83 (01-17-23 @ 08:37) (112/83 - 150/81)  Lipid Panel: Date/Time: 02-18-22 @ 07:38  Cholesterol, Serum: 104  Direct LDL: --  HDL Cholesterol, Serum: 40  Total Cholesterol/HDL Ration Measurement: --  Triglycerides, Serum: 157
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
## 2023-01-17 NOTE — BH DISCHARGE NOTE NURSING/SOCIAL WORK/PSYCH REHAB - NSDCPRGOAL_PSY_ALL_CORE
Upon approach, patient was pleasant and willing to meet with writer to discuss recovery progress. Patient was elevated and demonstrated poor boundaries throughout meeting. Patient is being transferred to [facility] to further his treatment as he is not yet ready for discharge. During hospitalization, patient was active in the milieu and attended some groups. In groups patient was typically elevated and had to be redirected. Patient was noted to be socializing with other peers, and conducting himself generally well on the unit. Patient was compliant with medication and treatment. Patient is kempt and has been seen dressed casually on the unit. Patient was compliant in safety planning and was provided with a Press-Ganey survey. By the end of hospitalization, patient did meet his goal of verbalizing 3 consequences of substance use. Patient denies SI/HI/AVH at this time.

## 2023-01-17 NOTE — BH INPATIENT PSYCHIATRY PROGRESS NOTE - NSBHFUPINTERVALCCFT_PSY_A_CORE
follow up for homicidality
follow up mood 
Follow up for depressive symptoms and previously reported SI/HI.
Follow up for depressive symptoms and previously reported SI/HI.
follow up mood

## 2023-01-17 NOTE — BH INPATIENT PSYCHIATRY PROGRESS NOTE - MSE UNSTRUCTURED FT
Appearance: Dressed appropriately.  Good hygiene and grooming.  Found watching TV in common area.  Behavior: Cooperative.  Calm at time of interview.  Good eye contact.  Motor: No abnormal movements, no psychomotor slowing or activation.  Speech: Regular rate.  Mood: "better"  Affect: Neutral.  Thought Process: Linear. organized.   Associations: Fair.  Thought Content: denies suicidal ideation and hi. denies AVH   Insight: Limited.  Judgment: Fair on interview.  Attention: Fair.  Language: Fluent.  Gait: Intact.  Appearance: Dressed appropriately.  Fair hygiene and grooming.   Behavior: Cooperative.  Calm.  Good eye contact.   Motor: No abnormal movements, no psychomotor slowing or activation.  Speech: Regular rate.  Mood: "Fine."  Affect: Neutral.  Thought Process: Linear.  Associations: Fair.  Thought Content:  Focused on securing supportive housing.  Denies AVH.  Denies any suicidality or homicidality today at time of interview.  Insight: Limited.  Judgment: Fair on interview.  Attention: Fair.  Language: Fluent.  Gait: Intact.

## 2023-01-17 NOTE — BH INPATIENT PSYCHIATRY PROGRESS NOTE - NSTXVIOLNTGOAL_PSY_ALL_CORE
Will identify thoughts/feelings/behaviors prior to loss of control
History of withdrawal seizures
Will identify thoughts/feelings/behaviors prior to loss of control
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
## 2023-01-17 NOTE — BH INPATIENT PSYCHIATRY PROGRESS NOTE - NSTXPROBDCHOUS_PSY_ALL_CORE
DISCHARGE ISSUE - LACK OF APPROPRIATE HOUSING

## 2023-01-17 NOTE — BH INPATIENT PSYCHIATRY PROGRESS NOTE - NSICDXBHPRIMARYDX_PSY_ALL_CORE
Bipolar illness   F31.9  
Bipolar I disorder   F31.9

## 2023-01-17 NOTE — BH INPATIENT PSYCHIATRY PROGRESS NOTE - NSDCCRITERIA_PSY_ALL_CORE
When pt is no longer an acute or imminent risk of harm to self or others, and is able to care for self safely, pt may then be discharged.

## 2023-01-17 NOTE — BH INPATIENT PSYCHIATRY PROGRESS NOTE - NSBHASSESSSUMMFT_PSY_ALL_CORE
57 year old man, undomiciled, on disability, single, documented diagnoses of bipolar 1 disorder, antisocial personality disorder, PMHx pituitary tumor, HFrEF, HTN, T2DM, and GERD, 1 remote SA via injecting bleach, numerous psych hospitalizations (last at [facility] in [place] from 10/9/2022 - 10/12/2022), multiple ED visits in the past few months for medical, MH and substance use disorder, hx of multiple incarcerations (last 9 years ago, patient served 10 year sentence for manslaughter and was released from nursing home in ), hx of violence and aggression on the psych unit and in the community, hx of polysubstance abuse (daily ETOH use c/b reportedly ETOH withdrawal seizures per patient report, MJ use, cocaine use, past heroin use, now on methadone maintenance 110mg/day through [facility] in [place]), +family history (father  of drug/alcohol overdose, and half sister has schizophrenia), self presented with conditional HI to kill his ex-gf and paranoia in the context of substance abuse coupled with medication non compliance, found to be COVID+, admitted to [facility] for continued psychiatric care. Course complicated by episode of hypotension, bradycardia, and hypoglycemia and decreased responsiveness on 23, sent to [facility], where he stabilized after given narcan and his medications were adjusted, returned to  on 1/10/23.   Has historical diagnosis of bipolar I disorder, but clinical suspicion that etiology of presentation likely more involving substance intoxication/withdrawal-induced, persistent depressive disorder, personality traits vs full disorder, secondary gain.      Continues to have periods of emotional dysregulation with conditional thoughts of self harm or harm to others, but no overt SIIP or HIIP today.  Pt to transfer to [facility] today.    Risk assessment on transfer: Pt has chronic risk factors of bipolar disorder, prior admissions, hx of suicide attempt, hx of aggression, hx of incarceration for manslaughter, hx of substance use, family hx of SMI, chronic medical problems, homelessness, unemployment, with acute risk factors of recent mood symptoms, now being treated with inpatient care.  Protective factors of supportive sister, future orientation, therapeutic alliances, MMTP.  Pt is high CHRONIC risk of harm, continues to be acute risk due to inability to safely care for self, requires further admission for safety, stabilization, and safe disposition planning.  Pt has concluded COVID isolation and will be transferring to [facility] inpatient psychiatric unit. 57 year old man, undomiciled, on disability, single, documented diagnoses of bipolar 1 disorder, antisocial personality disorder, PMHx pituitary tumor, HFrEF, HTN, T2DM, and GERD, 1 remote SA via injecting bleach, numerous psych hospitalizations (last at [facility] in [place] from 10/9/2022 - 10/12/2022), multiple ED visits in the past few months for medical, MH and substance use disorder, hx of multiple incarcerations (last 9 years ago, patient served 10 year sentence for manslaughter and was released from [facility] in ), hx of violence and aggression on the psych unit and in the community, hx of polysubstance abuse (daily ETOH use c/b reportedly ETOH withdrawal seizures per patient report, MJ use, cocaine use, past heroin use, now on methadone maintenance 110mg/day through [facility] in [place]), +family history (father  of drug/alcohol overdose, and half sister has schizophrenia), self presented with conditional HI to kill his ex-gf and paranoia in the context of substance abuse coupled with medication non compliance, found to be COVID+, admitted to [facility] for continued psychiatric care. Course complicated by episode of hypotension, bradycardia, and hypoglycemia and decreased responsiveness on 23, sent to [facility], where he stabilized after given narcan and his medications were adjusted, returned to  on 1/10/23.   Has historical diagnosis of bipolar I disorder, but clinical suspicion that etiology of presentation likely more involving substance intoxication/withdrawal-induced mood symptoms, adjustment, personality traits vs full disorder, secondary gain.      Continues to have periods of emotional dysregulation with conditional thoughts of self harm or harm to others, but no overt SIIP or HIIP today.  Pt to transfer to [facility] today.    Risk assessment on transfer: Pt has chronic risk factors of bipolar disorder, prior admissions, hx of suicide attempt, hx of aggression, hx of incarceration for manslaughter, hx of substance use, family hx of SMI, chronic medical problems, homelessness, unemployment, with acute risk factors of recent mood symptoms, now being treated with inpatient care.  Protective factors of supportive sister, future orientation, therapeutic alliances, MMTP.  Pt is high CHRONIC risk of harm, continues to be acute risk due to inability to safely care for self, requires further admission for safety, stabilization, and safe disposition planning.  Pt has concluded COVID isolation and will be transferring to [facility] inpatient psychiatric unit.

## 2023-01-17 NOTE — BH DISCHARGE NOTE NURSING/SOCIAL WORK/PSYCH REHAB - PATIENT PORTAL LINK FT
You can access the FollowMyHealth Patient Portal offered by [facility] by registering at the following website: [email or web address]. By joining [facility]’s FollowMyHealth portal, you will also be able to view your health information using other applications (apps) compatible with our system.

## 2023-01-17 NOTE — BH PATIENT PROFILE - HOME MEDICATIONS
insulin lispro 100 units/mL injectable solution , 10 unit(s) subcutaneous 3 times a day (before meals)  OLANZapine , 5 milligram(s) intramuscular every 6 hours, As Needed agitation  melatonin 5 mg oral tablet , 1 tab(s) orally once a day (at bedtime)  OLANZapine 2.5 mg oral tablet , 1 tab(s) orally every 4 hours, As needed, agitation  insulin glargine 100 units/mL subcutaneous solution , 30 unit(s) subcutaneous once a day (at bedtime)  Multiple Vitamins oral tablet , 1 tab(s) orally once a day  nicotine 14 mg/24 hr transdermal film, extended release , 1 film(s) transdermal once a day  senna leaf extract oral tablet , 1 tab(s) orally once a day (at bedtime)  polyethylene glycol 3350 oral powder for reconstitution , 17 gram(s) orally 2 times a day  spironolactone 25 mg oral tablet , 1 tab(s) orally once a day  diazePAM 10 mg oral tablet , 1 tab(s) orally 2 times a day  OLANZapine 5 mg oral tablet , 1 tab(s) orally once a day (at bedtime)  lithium 300 mg oral capsule , 3 cap(s) orally once a day (at bedtime)  gabapentin 400 mg oral capsule , 2 cap(s) orally 3 times a day  methadone 40 mg oral tablet, dispersible , 2 tab(s) orally once a day  methadone 10 mg oral tablet , 3 tab(s) orally once a day  acetaminophen 325 mg oral tablet , 2 tab(s) orally every 6 hours, As needed, Temp greater or equal to 38C (100.4F), Mild Pain (1 - 3), Moderate Pain (4 - 6), Severe Pain (7 - 10)

## 2023-01-17 NOTE — BH INPATIENT PSYCHIATRY PROGRESS NOTE - NSTXSUBMISGOAL_PSY_ALL_CORE
Will verbalize 3 adverse consequences of use

## 2023-01-17 NOTE — BH INPATIENT PSYCHIATRY PROGRESS NOTE - CURRENT MEDICATION
MEDICATIONS  (STANDING):  diazepam    Tablet 10 milliGRAM(s) Oral two times a day  gabapentin 800 milliGRAM(s) Oral three times a day  insulin glargine Injectable (LANTUS) 30 Unit(s) SubCutaneous at bedtime  insulin lispro (ADMELOG) corrective regimen sliding scale   SubCutaneous three times a day before meals  insulin lispro (ADMELOG) corrective regimen sliding scale   SubCutaneous at bedtime  insulin lispro Injectable (ADMELOG) 10 Unit(s) SubCutaneous three times a day before meals  lithium 900 milliGRAM(s) Oral at bedtime  loperamide 2 milliGRAM(s) Oral once  melatonin. 5 milliGRAM(s) Oral at bedtime  multivitamin 1 Tablet(s) Oral daily  nicotine -  14 mG/24Hr(s) Patch 1 Patch Transdermal daily  OLANZapine 5 milliGRAM(s) Oral at bedtime  polyethylene glycol 3350 17 Gram(s) Oral two times a day  senna 1 Tablet(s) Oral at bedtime  spironolactone 25 milliGRAM(s) Oral daily    MEDICATIONS  (PRN):  acetaminophen     Tablet .. 650 milliGRAM(s) Oral every 6 hours PRN Temp greater or equal to 38C (100.4F), Mild Pain (1 - 3), Moderate Pain (4 - 6), Severe Pain (7 - 10)  benzocaine 15 mG/menthol 3.6 mG Lozenge 1 Lozenge Oral every 2 hours PRN cough  dextrose Oral Gel 15 Gram(s) Oral once PRN Blood Glucose LESS THAN 70 milliGRAM(s)/deciliter  guaiFENesin Oral Liquid (Sugar-Free) 100 milliGRAM(s) Oral every 6 hours PRN cough  hydrOXYzine hydrochloride 50 milliGRAM(s) Oral every 8 hours PRN Anxiety  melatonin. 5 milliGRAM(s) Oral at bedtime PRN Insomnia  OLANZapine 2.5 milliGRAM(s) Oral every 4 hours PRN agitation  OLANZapine Injectable 5 milliGRAM(s) IntraMuscular once PRN severe agitation  oxymetazoline 0.05% Nasal Spray 2 Spray(s) Both Nostrils every 12 hours PRN nasal congestion  QUEtiapine 50 milliGRAM(s) Oral every 4 hours PRN anxiety/agitation  sodium chloride 0.65% Nasal 1 Spray(s) Both Nostrils four times a day PRN Nasal Congestion   MEDICATIONS  (STANDING):  diazepam    Tablet 10 milliGRAM(s) Oral two times a day  gabapentin 800 milliGRAM(s) Oral three times a day  insulin glargine Injectable (LANTUS) 30 Unit(s) SubCutaneous at bedtime  insulin lispro (ADMELOG) corrective regimen sliding scale   SubCutaneous three times a day before meals  insulin lispro (ADMELOG) corrective regimen sliding scale   SubCutaneous at bedtime  insulin lispro Injectable (ADMELOG) 10 Unit(s) SubCutaneous three times a day before meals  lithium 900 milliGRAM(s) Oral at bedtime  loperamide 2 milliGRAM(s) Oral once  melatonin. 5 milliGRAM(s) Oral at bedtime  multivitamin 1 Tablet(s) Oral daily  nicotine -  14 mG/24Hr(s) Patch 1 Patch Transdermal daily  OLANZapine 5 milliGRAM(s) Oral at bedtime  polyethylene glycol 3350 17 Gram(s) Oral two times a day  senna 1 Tablet(s) Oral at bedtime  spironolactone 25 milliGRAM(s) Oral daily    MEDICATIONS  (PRN):  acetaminophen     Tablet .. 650 milliGRAM(s) Oral every 6 hours PRN Temp greater or equal to 38C (100.4F), Mild Pain (1 - 3), Moderate Pain (4 - 6), Severe Pain (7 - 10)  benzocaine 15 mG/menthol 3.6 mG Lozenge 1 Lozenge Oral every 2 hours PRN cough  guaiFENesin Oral Liquid (Sugar-Free) 100 milliGRAM(s) Oral every 6 hours PRN cough  hydrOXYzine hydrochloride 50 milliGRAM(s) Oral every 8 hours PRN Anxiety  melatonin. 5 milliGRAM(s) Oral at bedtime PRN Insomnia  OLANZapine 2.5 milliGRAM(s) Oral every 4 hours PRN agitation  OLANZapine Injectable 5 milliGRAM(s) IntraMuscular once PRN severe agitation  oxymetazoline 0.05% Nasal Spray 2 Spray(s) Both Nostrils every 12 hours PRN nasal congestion  QUEtiapine 50 milliGRAM(s) Oral every 4 hours PRN anxiety/agitation  sodium chloride 0.65% Nasal 1 Spray(s) Both Nostrils four times a day PRN Nasal Congestion

## 2023-01-17 NOTE — BH INPATIENT PSYCHIATRY PROGRESS NOTE - NSBHCONSULTIPREASON_PSY_A_CORE
danger to others; mental illness expected to respond to inpatient care

## 2023-01-18 VITALS
RESPIRATION RATE: 18 BRPM | OXYGEN SATURATION: 95 % | SYSTOLIC BLOOD PRESSURE: 114 MMHG | HEART RATE: 63 BPM | DIASTOLIC BLOOD PRESSURE: 58 MMHG | TEMPERATURE: 98 F

## 2023-01-18 DIAGNOSIS — F60.2 ANTISOCIAL PERSONALITY DISORDER: ICD-10-CM

## 2023-01-18 DIAGNOSIS — S06.9XAA UNSPECIFIED INTRACRANIAL INJURY WITH LOSS OF CONSCIOUSNESS STATUS UNKNOWN, INITIAL ENCOUNTER: ICD-10-CM

## 2023-01-18 LAB
GLUCOSE BLDC GLUCOMTR-MCNC: 104 MG/DL — HIGH (ref 70–99)
GLUCOSE BLDC GLUCOMTR-MCNC: 126 MG/DL — HIGH (ref 70–99)
GLUCOSE BLDC GLUCOMTR-MCNC: 262 MG/DL — HIGH (ref 70–99)
GLUCOSE BLDC GLUCOMTR-MCNC: 274 MG/DL — HIGH (ref 70–99)
GLUCOSE BLDC GLUCOMTR-MCNC: 488 MG/DL — CRITICAL HIGH (ref 70–99)

## 2023-01-18 PROCEDURE — 99223 1ST HOSP IP/OBS HIGH 75: CPT

## 2023-01-18 RX ORDER — METHADONE HYDROCHLORIDE 40 MG/1
110 TABLET ORAL ONCE
Refills: 0 | Status: DISCONTINUED | OUTPATIENT
Start: 2023-01-18 | End: 2023-01-18

## 2023-01-18 RX ORDER — DIAZEPAM 5 MG
10 TABLET ORAL THREE TIMES A DAY
Refills: 0 | Status: COMPLETED | OUTPATIENT
Start: 2023-01-18 | End: 2023-01-25

## 2023-01-18 RX ORDER — METHADONE HYDROCHLORIDE 40 MG/1
110 TABLET ORAL DAILY
Refills: 0 | Status: DISCONTINUED | OUTPATIENT
Start: 2023-01-18 | End: 2023-01-25

## 2023-01-18 RX ADMIN — Medication 1 TABLET(S): at 10:45

## 2023-01-18 RX ADMIN — GABAPENTIN 800 MILLIGRAM(S): 400 CAPSULE ORAL at 21:13

## 2023-01-18 RX ADMIN — Medication 10 UNIT(S): at 07:58

## 2023-01-18 RX ADMIN — LITHIUM CARBONATE 900 MILLIGRAM(S): 300 TABLET, EXTENDED RELEASE ORAL at 21:14

## 2023-01-18 RX ADMIN — POLYETHYLENE GLYCOL 3350 17 GRAM(S): 17 POWDER, FOR SOLUTION ORAL at 10:42

## 2023-01-18 RX ADMIN — POLYETHYLENE GLYCOL 3350 17 GRAM(S): 17 POWDER, FOR SOLUTION ORAL at 21:13

## 2023-01-18 RX ADMIN — Medication 10 UNIT(S): at 12:24

## 2023-01-18 RX ADMIN — Medication 1 PATCH: at 10:42

## 2023-01-18 RX ADMIN — Medication 10 MILLIGRAM(S): at 10:43

## 2023-01-18 RX ADMIN — METHADONE HYDROCHLORIDE 110 MILLIGRAM(S): 40 TABLET ORAL at 07:53

## 2023-01-18 RX ADMIN — Medication 10 MILLIGRAM(S): at 21:13

## 2023-01-18 RX ADMIN — GABAPENTIN 800 MILLIGRAM(S): 400 CAPSULE ORAL at 14:27

## 2023-01-18 RX ADMIN — Medication 5 MILLIGRAM(S): at 21:14

## 2023-01-18 RX ADMIN — OLANZAPINE 5 MILLIGRAM(S): 15 TABLET, FILM COATED ORAL at 21:13

## 2023-01-18 RX ADMIN — Medication 10 MILLIGRAM(S): at 15:39

## 2023-01-18 RX ADMIN — SPIRONOLACTONE 25 MILLIGRAM(S): 25 TABLET, FILM COATED ORAL at 10:44

## 2023-01-18 RX ADMIN — INSULIN GLARGINE 30 UNIT(S): 100 INJECTION, SOLUTION SUBCUTANEOUS at 21:47

## 2023-01-18 RX ADMIN — SENNA PLUS 1 TABLET(S): 8.6 TABLET ORAL at 21:13

## 2023-01-18 RX ADMIN — GABAPENTIN 800 MILLIGRAM(S): 400 CAPSULE ORAL at 10:44

## 2023-01-18 NOTE — BH INPATIENT PSYCHIATRY ASSESSMENT NOTE - PAST PSYCHOTROPIC MEDICATION
has also been on fluoxetine, sertraline, Wellbutrin, Lexapro, and gabapentin, also states history of Adderall

## 2023-01-18 NOTE — BH INPATIENT PSYCHIATRY ASSESSMENT NOTE - NSTXVIOLNTINTERMD_PSY_ALL_CORE
on zyprexa, valium, and neurontin. Calm and at is baseline which is very poor. He has a significant degree of impulse control disorder.

## 2023-01-18 NOTE — BH INPATIENT PSYCHIATRY ASSESSMENT NOTE - NSTXOTHERINTERMD_PSY_ALL_CORE
Medicine following. Coreg started. Pt has very poor adherence to diabetes treatment. He can be followed by the trail of the sugar packets.

## 2023-01-18 NOTE — BH INPATIENT PSYCHIATRY ASSESSMENT NOTE - NSBHATTESTCOMMENTATTENDFT_PSY_A_CORE
Pt's presentation is similar to when he is last on 8U. Hypersexual, poor impulse control, and intrusive. Agreed to trial of zyprexa if valium in creased to 10 mg q8h which I thought was the making of a helpful med regimen. Pt is already highly nonadherent to diabetic diet.

## 2023-01-18 NOTE — BH INPATIENT PSYCHIATRY ASSESSMENT NOTE - DETAILS
1 remote attempt by injecting bleach, per patient. Patient now uses suicidality mainly as a bargaining tool. Today states "If I'm discharged without housing I'm going to overdose on fentanyl."  sedation 2/2 antipsychotic medications  does not own a firearm, but states he can easily access firearms.

## 2023-01-18 NOTE — BH INPATIENT PSYCHIATRY ASSESSMENT NOTE - NSICDXBHSECONDARYDX_PSY_ALL_CORE
[address] [address]    PATIENT NAME:  JACE HALL  YOB: 1986  MRN:  [id number]  ATTENDING PHYSICIAN:  Savannah Sosa M.D.  ROOM:  [id number]  DICTATING PHYSICIAN:  Savannah Sosa M.D.  UNIT#/ACCOUNT#:  [id number]  DATE OF ADMISSION:  04/12/2017  DATE OF DISCHARGE:  04/14/2017    PRINCIPAL DIAGNOSIS:  Tenosynovitis, left hand, secondary to dog bite.    PRINCIPAL CONSULTANTS:  1. Gabriel Warren M.D.  2. Modesto Hernández M.D.    PRINCIPAL PROCEDURES:  Incision and drainage and IV antibiotics.    HISTORY:  A 31-year-old female without significant history, who presents after  being bitten on her left hand by her dog.  For details, see H and P.    PHYSICAL EXAMINATION:  Swollen dorsum of left hand including the fingers and  thumb.    LABORATORY DATA:  White count 11,000.    HOSPITAL COURSE:  Infection, left hand, secondary to dog bite.  The patient  had limited mobility with her thumb.  It was quite swollen.  The patient did  have incision and drainage, IV antibiotics with Unasyn while here, and will be  discharged home on Augmentin to follow up with Infectious Disease Service and  hand surgeon per their recommendation.  The patient did receive a tetanus  shot.      Savannah Sosa M.D.    Author ID:  [id number]  MedQ / IJN: [id number] / Job#: [id number]  D: 04/14/2017 15:42:54  T: 04/14/2017 18:02:30           Electronically Signed On 04/15/2017 11:17  __________________________________________________   SAVANNAH NASH    
TBI (traumatic brain injury)   S06.9XAA  Antisocial personality disorder   F60.2

## 2023-01-18 NOTE — BH INPATIENT PSYCHIATRY ASSESSMENT NOTE - NSBHMETABOLIC_PSY_ALL_CORE_FT
BMI: BMI (kg/m2): 26.5 (01-10-23 @ 17:48)  HbA1c: A1C with Estimated Average Glucose Result: 10.2 % (01-08-23 @ 05:32)    Glucose: POCT Blood Glucose.: 126 mg/dL (01-18-23 @ 12:22)    BP: 158/94 (01-18-23 @ 09:18) (114/58 - 158/94)  Lipid Panel: Date/Time: 02-18-22 @ 07:38  Cholesterol, Serum: 104  Direct LDL: --  HDL Cholesterol, Serum: 40  Total Cholesterol/HDL Ration Measurement: --  Triglycerides, Serum: 157   BMI: BMI (kg/m2): 30.3 (01-23-23 @ 15:04)  HbA1c: A1C with Estimated Average Glucose Result: 9.8 % (01-20-23 @ 08:49)    Glucose: POCT Blood Glucose.: 310 mg/dL (01-25-23 @ 11:24)    BP: 171/77 (01-25-23 @ 12:50) (151/72 - 181/76)  Lipid Panel: Date/Time: 01-20-23 @ 08:49  Cholesterol, Serum: 178  Direct LDL: --  HDL Cholesterol, Serum: 45  Total Cholesterol/HDL Ration Measurement: --  Triglycerides, Serum: 161

## 2023-01-18 NOTE — BH INPATIENT PSYCHIATRY ASSESSMENT NOTE - NSBHASSESSSUMMFT_PSY_ALL_CORE
"Mr. Bueno is a 57 year old man, domiciled with sister in [place], on disability, single, documented diagnoses of bipolar 1 disorder, antisocial personality disorder, PMHx pituitary tumor, HTN, T2DM, and GERD (only compliant with HTN medications), 1 remote suicide attempt via injecting bleach per patient, with numerous psychiatric hospitalizations (last at [facility] in [place] from 10/9/2022 - 10/12/2022), multiple ED visits in the past few months for medical, MH and substance use disorder, history of multiple incarcerations (last nine years ago, patient served 10 year sentence for manslaughter and was released from [facility] in ), history of violence and aggression both on the psychiatric unit and in the community,  history of polysubstance abuse (daily ETOH use c/b reportedly ETOH withdrawal seizures per patient report, MJ use, cocaine use, past heroin use, now on methadone maintenance through [facility] in [place]), +family history (father  of drug/alcohol overdose, and half sister has schizophrenia), he self presented to with homicidal ideation and paranoia in the context of substance abuse coupled with medication non compliance. Transferred from [facility].     Patient demands supportive housing and nursing assistance for management of his diabetes. He makes conditional suicidal statements such as "If I'm discharged without housing I'm going to get 2 bundles of fentanyl and just go to sleep." He reports he needs help with "panic, anxiety, PTSD, and ADHD". He requests increased dose of valium and addition of ativan and Adderall. He was re-directed and agreement was reached regarding dosing of valium and zyprexa. Ongoing management of likely jose for improved functioning in community and resolution of SI/HI.     - C/w lithium 900mg nightly  - c/w Olanzapine 5mg nightly  - C/w valium 10mg TID  - C/w gabapentin 800mg TID   - C/W methadone 110mg daily  - Medications, including psychotropic and medical, were reconciled. C/w insulin 30 lantus nightly + 10 admelog TID w/ sliding scale, C/w spironolactone 25mg

## 2023-01-18 NOTE — BH INPATIENT PSYCHIATRY ASSESSMENT NOTE - CURRENT MEDICATION
MEDICATIONS  (STANDING):  dextrose 5%. 1000 milliLiter(s) (50 mL/Hr) IV Continuous <Continuous>  dextrose 5%. 1000 milliLiter(s) (100 mL/Hr) IV Continuous <Continuous>  dextrose 50% Injectable 25 Gram(s) IV Push once  dextrose 50% Injectable 12.5 Gram(s) IV Push once  dextrose 50% Injectable 25 Gram(s) IV Push once  diazepam    Tablet 10 milliGRAM(s) Oral two times a day  gabapentin 800 milliGRAM(s) Oral three times a day  glucagon  Injectable 1 milliGRAM(s) IntraMuscular once  insulin glargine Injectable (LANTUS) 30 Unit(s) SubCutaneous at bedtime  insulin lispro Injectable (ADMELOG) 10 Unit(s) SubCutaneous three times a day before meals  lithium 900 milliGRAM(s) Oral at bedtime  melatonin. 5 milliGRAM(s) Oral at bedtime  methadone    Tablet 110 milliGRAM(s) Oral daily  multivitamin 1 Tablet(s) Oral daily  nicotine -  14 mG/24Hr(s) Patch 1 Patch Transdermal daily  OLANZapine 5 milliGRAM(s) Oral at bedtime  polyethylene glycol 3350 17 Gram(s) Oral two times a day  senna 1 Tablet(s) Oral at bedtime  spironolactone 25 milliGRAM(s) Oral daily    MEDICATIONS  (PRN):  dextrose Oral Gel 15 Gram(s) Oral once PRN Blood Glucose LESS THAN 70 milliGRAM(s)/deciliter  nicotine  Polacrilex Gum 2 milliGRAM(s) Oral every 4 hours PRN breakthrough cravings  OLANZapine 2.5 milliGRAM(s) Oral every 4 hours PRN agitation   MEDICATIONS  (STANDING):  dextrose 5%. 1000 milliLiter(s) (50 mL/Hr) IV Continuous <Continuous>  dextrose 5%. 1000 milliLiter(s) (100 mL/Hr) IV Continuous <Continuous>  dextrose 50% Injectable 25 Gram(s) IV Push once  dextrose 50% Injectable 12.5 Gram(s) IV Push once  dextrose 50% Injectable 25 Gram(s) IV Push once  diazepam    Tablet 10 milliGRAM(s) Oral three times a day  gabapentin 800 milliGRAM(s) Oral three times a day  glucagon  Injectable 1 milliGRAM(s) IntraMuscular once  insulin glargine Injectable (LANTUS) 30 Unit(s) SubCutaneous at bedtime  insulin lispro Injectable (ADMELOG) 10 Unit(s) SubCutaneous three times a day before meals  lithium 900 milliGRAM(s) Oral at bedtime  melatonin. 5 milliGRAM(s) Oral at bedtime  methadone    Tablet 110 milliGRAM(s) Oral daily  multivitamin 1 Tablet(s) Oral daily  nicotine -  14 mG/24Hr(s) Patch 1 Patch Transdermal daily  OLANZapine 5 milliGRAM(s) Oral at bedtime  polyethylene glycol 3350 17 Gram(s) Oral two times a day  senna 1 Tablet(s) Oral at bedtime  spironolactone 25 milliGRAM(s) Oral daily    MEDICATIONS  (PRN):  dextrose Oral Gel 15 Gram(s) Oral once PRN Blood Glucose LESS THAN 70 milliGRAM(s)/deciliter  nicotine  Polacrilex Gum 2 milliGRAM(s) Oral every 4 hours PRN breakthrough cravings  OLANZapine 2.5 milliGRAM(s) Oral every 4 hours PRN agitation   MEDICATIONS  (STANDING):    MEDICATIONS  (PRN):

## 2023-01-18 NOTE — BH SOCIAL WORK INITIAL PSYCHOSOCIAL EVALUATION - OTHER PAST PSYCHIATRIC HISTORY (INCLUDE DETAILS REGARDING ONSET, COURSE OF ILLNESS, INPATIENT/OUTPATIENT TREATMENT)
mayra Bueno is a 57 year old man, domiciled with sister in [place], on disability, single, documented diagnoses of bipolar 1 disorder, antisocial personality disorder, PMHx pituitary tumor, HTN, T2DM, and GERD (only compliant with HTN medications), 1 remote suicide attempt via injecting bleach per patient, with numerous psychiatric hospitalizations (last at [facility] in [place] from 10/9/2022 - 10/12/2022), multiple ED visits in the past few months for medical, MH and substance use disorder, history of multiple incarcerations (last nine years ago, patient served 10 year sentence for manslaughter and was released from [facility] in ), history of violence and aggression both on the psychiatric unit and in the community,  history of polysubstance abuse (daily ETOH use c/b reportedly ETOH withdrawal seizures per patient report, MJ use, cocaine use, past heroin use, now on methadone maintenance through [facility] in [place]), +family history (father  of drug/alcohol overdose, and half sister has schizophrenia), he self presented to with homicidal ideation and paranoia in the context of substance abuse coupled with medication non compliance. UTOX + for MJ, BDZ, Methadone, cocaine.    Per Psyckes:  Opioid related disorders | Unspecifed/Other Bipolar | Other psychoactive substance related disorders | Tobacco related disorder | Major  Depressive Disorder | Attention Defcit Hyperactivity Disorder | Alcohol related disorders | Sedative, hypnotic, or anxiolytic related disorders |  Unspecifed/Other Depressive Disorder | Other stimulant related disorders | Cocaine related disorders | Bipolar I | Antisocial Personality  Disorder | Delusional Disorder | Unspecifed/Other Psychotic Disorders | Substance-Induced Psychotic Disorder | Bipolar II (ICD10 only) |  Unspecifed/Other Anxiety Disorder | Cannabis related disorders | Schizoaffective Disorder | Substance-Induced Depressive Disorder |  Adjustment Disorder | Hallucinogen related disorders | PTSD | Schizophrenia | Brief Psychotic Disorder (ICD10 Only) | Disruptive Mood  Dysregulation Disorder (ICD10 only) | Paranoid Personality Disorder | Conduct Disorder | Panic Disorder | Somatic Symptom Disorder |  Substance-Induced Sleep Disorder | Unspecifed/Other Personality Disorder

## 2023-01-18 NOTE — BH INPATIENT PSYCHIATRY ASSESSMENT NOTE - NSBHCRANIAL_PSY_ALL_CORE
Recognizes 2 fingers or can read (II)/Smiles, shows teeth, opens mouth, sticks out tongue (V, VII, XI)/Normal speech (IX, X, XII)/Extraocular Eye Movement Intact  (III, IV, VI)/Shoulder shrug (XI)

## 2023-01-18 NOTE — BH INPATIENT PSYCHIATRY ASSESSMENT NOTE - DESCRIPTION
See HPI. Patient homeless and seeking housing. Sister that is supportive in [place]. Patient went to live with her for a time, but was fighting too much with her .

## 2023-01-18 NOTE — BH INPATIENT PSYCHIATRY ASSESSMENT NOTE - NSCOMMENTSUICRISKFACT_PSY_ALL_CORE
Patient is at elevated chronic risk given likely TBI, poor impulse control, substance abuse, and non compliance with treatment.

## 2023-01-18 NOTE — BH INPATIENT PSYCHIATRY ASSESSMENT NOTE - NSBHSAALC_PSY_A_CORE FT
see. HPI. ongoing use of 1-2 pints vodka daily prior to admission. 35 years sober prior to relapse this year.

## 2023-01-18 NOTE — BH INPATIENT PSYCHIATRY ASSESSMENT NOTE - NSBHPHYSICALEXAM_PSY_ALL_CORE
GEN: in no acute distress  HEENT: NC/AT, EOMI, PERRL, no scleral icterus or conjunctival injection. OP wnl; no erythema, edema, or exudate.   NECK: No lymphadenopathy. No carotid bruits.   LUNGS: CTAB, no w/r/r, no accessory muscle use    CV: RRR normal S1 and S2 no S3 or S4, no murmurs   ABD: soft, non-tender, non-distended, no masses or organomegaly.   EXT: Moving all extremities equally  NEURO: alert+oriented x4, gait intact  SKIN: No jaundice, cyanosis, clubbing, edema, rashes, or other lesions.

## 2023-01-18 NOTE — BH INPATIENT PSYCHIATRY ASSESSMENT NOTE - NSBHCHARTREVIEWVS_PSY_A_CORE FT
Vital Signs Last 24 Hrs  T(C): 36.4 (01-18-23 @ 09:18), Max: 36.7 (01-18-23 @ 06:32)  T(F): 97.6 (01-18-23 @ 09:18), Max: 98 (01-18-23 @ 06:32)  HR: 70 (01-18-23 @ 09:18) (63 - 70)  BP: 158/94 (01-18-23 @ 09:18) (114/58 - 158/94)  BP(mean): --  RR: 18 (01-18-23 @ 09:18) (18 - 18)  SpO2: 95% (01-18-23 @ 09:18) (95% - 95%)     Vital Signs Last 24 Hrs  T(C): --  T(F): --  HR: --  BP: --  BP(mean): --  RR: --  SpO2: --

## 2023-01-18 NOTE — BH SOCIAL WORK INITIAL PSYCHOSOCIAL EVALUATION - NSHIGHRISKBEHFT_PSY_ALL_CORE
Multiple ED visits in the past few months for medical, MH and substance use disorder, history of multiple incarcerations (last nine years ago, patient served 10 year sentence for manslaughter and was released from detention in 2014), history of violence and aggression both on the psychiatric unit and in the community,  history of polysubstance abuse. During interview, patient continues to makes conditional suicidal statements such as "If I'm discharged without housing I'm going to get 2 bundles of fentanyl and just go to sleep."
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
## 2023-01-18 NOTE — BH INPATIENT PSYCHIATRY ASSESSMENT NOTE - OTHER PAST PSYCHIATRIC HISTORY (INCLUDE DETAILS REGARDING ONSET, COURSE OF ILLNESS, INPATIENT/OUTPATIENT TREATMENT)
See HPI.  Many past hospitalizations. Reported hx of bipolar d/o, anti-social personality d/o, and polysubstance use. Trials on multiple antipsychotics and mood stabilizers with very limited compliance. 1 remote suicide attempt. Extensive violent hx in community (including incarceration for manslaughter) in on inpatient units. No current o/p psychiatric treatment. Received methadone from [facility].

## 2023-01-18 NOTE — BH INPATIENT PSYCHIATRY ASSESSMENT NOTE - RISK ASSESSMENT
Patient with chronically elevated violence and suicide risk due to hx of mental illness, TBI, poor impulse control, substance use, and medication non-compliance. Mitigation of risk by medication management and therapeutic milieu. Patient's acute violence and suicide risk is highly conditional on not being discharged.

## 2023-01-19 LAB
GLUCOSE BLDC GLUCOMTR-MCNC: 212 MG/DL — HIGH (ref 70–99)
GLUCOSE BLDC GLUCOMTR-MCNC: 232 MG/DL — HIGH (ref 70–99)
GLUCOSE BLDC GLUCOMTR-MCNC: 249 MG/DL — HIGH (ref 70–99)
GLUCOSE BLDC GLUCOMTR-MCNC: 320 MG/DL — HIGH (ref 70–99)

## 2023-01-19 PROCEDURE — 99233 SBSQ HOSP IP/OBS HIGH 50: CPT

## 2023-01-19 RX ORDER — FLUTICASONE PROPIONATE 50 MCG
1 SPRAY, SUSPENSION NASAL
Refills: 0 | Status: DISCONTINUED | OUTPATIENT
Start: 2023-01-19 | End: 2023-01-25

## 2023-01-19 RX ADMIN — Medication 10 MILLIGRAM(S): at 13:21

## 2023-01-19 RX ADMIN — POLYETHYLENE GLYCOL 3350 17 GRAM(S): 17 POWDER, FOR SOLUTION ORAL at 22:08

## 2023-01-19 RX ADMIN — SENNA PLUS 1 TABLET(S): 8.6 TABLET ORAL at 22:09

## 2023-01-19 RX ADMIN — Medication 10 UNIT(S): at 08:03

## 2023-01-19 RX ADMIN — Medication 2 MILLIGRAM(S): at 18:08

## 2023-01-19 RX ADMIN — METHADONE HYDROCHLORIDE 110 MILLIGRAM(S): 40 TABLET ORAL at 10:09

## 2023-01-19 RX ADMIN — GABAPENTIN 800 MILLIGRAM(S): 400 CAPSULE ORAL at 13:21

## 2023-01-19 RX ADMIN — Medication 1 TABLET(S): at 10:10

## 2023-01-19 RX ADMIN — OLANZAPINE 5 MILLIGRAM(S): 15 TABLET, FILM COATED ORAL at 23:10

## 2023-01-19 RX ADMIN — Medication 1 PATCH: at 11:08

## 2023-01-19 RX ADMIN — SPIRONOLACTONE 25 MILLIGRAM(S): 25 TABLET, FILM COATED ORAL at 10:10

## 2023-01-19 RX ADMIN — GABAPENTIN 800 MILLIGRAM(S): 400 CAPSULE ORAL at 22:09

## 2023-01-19 RX ADMIN — Medication 10 UNIT(S): at 12:23

## 2023-01-19 RX ADMIN — Medication 10 MILLIGRAM(S): at 10:10

## 2023-01-19 RX ADMIN — INSULIN GLARGINE 30 UNIT(S): 100 INJECTION, SOLUTION SUBCUTANEOUS at 22:07

## 2023-01-19 RX ADMIN — Medication 10 UNIT(S): at 17:23

## 2023-01-19 RX ADMIN — Medication 2 MILLIGRAM(S): at 22:14

## 2023-01-19 RX ADMIN — Medication 1 PATCH: at 18:10

## 2023-01-19 RX ADMIN — GABAPENTIN 800 MILLIGRAM(S): 400 CAPSULE ORAL at 10:41

## 2023-01-19 RX ADMIN — Medication 5 MILLIGRAM(S): at 22:17

## 2023-01-19 RX ADMIN — Medication 1 PATCH: at 10:10

## 2023-01-19 RX ADMIN — Medication 10 MILLIGRAM(S): at 22:10

## 2023-01-19 RX ADMIN — LITHIUM CARBONATE 900 MILLIGRAM(S): 300 TABLET, EXTENDED RELEASE ORAL at 22:08

## 2023-01-19 NOTE — DIETITIAN INITIAL EVALUATION ADULT - ADD RECOMMEND
1. Continue with diet order   2. Encourage pt to meed nutritional needs as able   3. Monitor labs: electrolytes, CMP, fingersticks   4. Monitor weights   5. Pain and bowel regimen per team   6. Will continue to assess/honor food preferences as able

## 2023-01-19 NOTE — DIETITIAN INITIAL EVALUATION ADULT - PERTINENT LABORATORY DATA
POCT Blood Glucose.: 232 mg/dL (01-19-23 @ 07:54)  A1C with Estimated Average Glucose Result: 10.2 % (01-08-23 @ 05:32)  A1C with Estimated Average Glucose Result: 9.3 % (02-18-22 @ 07:39)  A1C with Estimated Average Glucose Result: 9.2 % (02-17-22 @ 07:34)

## 2023-01-19 NOTE — BH INPATIENT PSYCHIATRY PROGRESS NOTE - NSBHCHARTREVIEWVS_PSY_A_CORE FT
Vital Signs Last 24 Hrs  T(C): 37.1 (01-19-23 @ 16:47), Max: 37.1 (01-19-23 @ 16:47)  T(F): 98.8 (01-19-23 @ 16:47), Max: 98.8 (01-19-23 @ 16:47)  HR: 74 (01-19-23 @ 16:47) (69 - 74)  BP: 145/72 (01-19-23 @ 16:47) (145/72 - 166/87)  BP(mean): --  RR: 18 (01-19-23 @ 16:47) (18 - 19)  SpO2: 95% (01-19-23 @ 16:47) (95% - 95%)     Vital Signs Last 24 Hrs  T(C): --  T(F): --  HR: --  BP: --  BP(mean): --  RR: --  SpO2: --

## 2023-01-19 NOTE — DIETITIAN INITIAL EVALUATION ADULT - PERTINENT MEDS FT
MEDICATIONS  (STANDING):  dextrose 5%. 1000 milliLiter(s) (50 mL/Hr) IV Continuous <Continuous>  dextrose 5%. 1000 milliLiter(s) (100 mL/Hr) IV Continuous <Continuous>  dextrose 50% Injectable 25 Gram(s) IV Push once  dextrose 50% Injectable 12.5 Gram(s) IV Push once  dextrose 50% Injectable 25 Gram(s) IV Push once  diazepam    Tablet 10 milliGRAM(s) Oral three times a day  gabapentin 800 milliGRAM(s) Oral three times a day  glucagon  Injectable 1 milliGRAM(s) IntraMuscular once  insulin glargine Injectable (LANTUS) 30 Unit(s) SubCutaneous at bedtime  insulin lispro Injectable (ADMELOG) 10 Unit(s) SubCutaneous three times a day before meals  lithium 900 milliGRAM(s) Oral at bedtime  melatonin. 5 milliGRAM(s) Oral at bedtime  methadone    Tablet 110 milliGRAM(s) Oral daily  multivitamin 1 Tablet(s) Oral daily  nicotine -  14 mG/24Hr(s) Patch 1 Patch Transdermal daily  OLANZapine 5 milliGRAM(s) Oral at bedtime  polyethylene glycol 3350 17 Gram(s) Oral two times a day  senna 1 Tablet(s) Oral at bedtime  spironolactone 25 milliGRAM(s) Oral daily    MEDICATIONS  (PRN):  dextrose Oral Gel 15 Gram(s) Oral once PRN Blood Glucose LESS THAN 70 milliGRAM(s)/deciliter  nicotine  Polacrilex Gum 2 milliGRAM(s) Oral every 4 hours PRN breakthrough cravings  OLANZapine 2.5 milliGRAM(s) Oral every 4 hours PRN agitation

## 2023-01-19 NOTE — BH INPATIENT PSYCHIATRY PROGRESS NOTE - NSBHFUPINTERVALHXFT_PSY_A_CORE
Pt on Zyprexa and valium. Loud & intrusive with markedly poor executive function. Not good at filtering himself. Often saying inappropriate things to peers.

## 2023-01-19 NOTE — BH INPATIENT PSYCHIATRY PROGRESS NOTE - CURRENT MEDICATION
MEDICATIONS  (STANDING):  dextrose 5%. 1000 milliLiter(s) (50 mL/Hr) IV Continuous <Continuous>  dextrose 5%. 1000 milliLiter(s) (100 mL/Hr) IV Continuous <Continuous>  dextrose 50% Injectable 12.5 Gram(s) IV Push once  dextrose 50% Injectable 25 Gram(s) IV Push once  dextrose 50% Injectable 25 Gram(s) IV Push once  diazepam    Tablet 10 milliGRAM(s) Oral three times a day  fluticasone propionate 50 MICROgram(s)/spray Nasal Spray 1 Spray(s) Both Nostrils two times a day  gabapentin 800 milliGRAM(s) Oral three times a day  glucagon  Injectable 1 milliGRAM(s) IntraMuscular once  insulin glargine Injectable (LANTUS) 30 Unit(s) SubCutaneous at bedtime  insulin lispro Injectable (ADMELOG) 10 Unit(s) SubCutaneous three times a day before meals  lithium 900 milliGRAM(s) Oral at bedtime  melatonin. 5 milliGRAM(s) Oral at bedtime  methadone    Tablet 110 milliGRAM(s) Oral daily  multivitamin 1 Tablet(s) Oral daily  nicotine -  14 mG/24Hr(s) Patch 1 Patch Transdermal daily  OLANZapine 5 milliGRAM(s) Oral at bedtime  polyethylene glycol 3350 17 Gram(s) Oral two times a day  senna 1 Tablet(s) Oral at bedtime  spironolactone 25 milliGRAM(s) Oral daily    MEDICATIONS  (PRN):  dextrose Oral Gel 15 Gram(s) Oral once PRN Blood Glucose LESS THAN 70 milliGRAM(s)/deciliter  nicotine  Polacrilex Gum 2 milliGRAM(s) Oral every 4 hours PRN breakthrough cravings  OLANZapine 2.5 milliGRAM(s) Oral every 4 hours PRN agitation   MEDICATIONS  (STANDING):    MEDICATIONS  (PRN):

## 2023-01-19 NOTE — BH INPATIENT PSYCHIATRY PROGRESS NOTE - PRN MEDS
MEDICATIONS  (PRN):  dextrose Oral Gel 15 Gram(s) Oral once PRN Blood Glucose LESS THAN 70 milliGRAM(s)/deciliter  nicotine  Polacrilex Gum 2 milliGRAM(s) Oral every 4 hours PRN breakthrough cravings  OLANZapine 2.5 milliGRAM(s) Oral every 4 hours PRN agitation   MEDICATIONS  (PRN):

## 2023-01-19 NOTE — BH INPATIENT PSYCHIATRY PROGRESS NOTE - NSBHMETABOLIC_PSY_ALL_CORE_FT
BMI: BMI (kg/m2): 26.5 (01-10-23 @ 17:48)  HbA1c: A1C with Estimated Average Glucose Result: 10.2 % (01-08-23 @ 05:32)    Glucose: POCT Blood Glucose.: 320 mg/dL (01-19-23 @ 22:05)    BP: 145/72 (01-19-23 @ 16:47) (114/58 - 177/93)  Lipid Panel: Date/Time: 02-18-22 @ 07:38  Cholesterol, Serum: 104  Direct LDL: --  HDL Cholesterol, Serum: 40  Total Cholesterol/HDL Ration Measurement: --  Triglycerides, Serum: 157   BMI: BMI (kg/m2): 30.3 (01-23-23 @ 15:04)  HbA1c: A1C with Estimated Average Glucose Result: 9.8 % (01-20-23 @ 08:49)    Glucose: POCT Blood Glucose.: 310 mg/dL (01-25-23 @ 11:24)    BP: 171/77 (01-25-23 @ 12:50) (151/72 - 181/76)  Lipid Panel: Date/Time: 01-20-23 @ 08:49  Cholesterol, Serum: 178  Direct LDL: --  HDL Cholesterol, Serum: 45  Total Cholesterol/HDL Ration Measurement: --  Triglycerides, Serum: 161

## 2023-01-19 NOTE — DIETITIAN INITIAL EVALUATION ADULT - OTHER CALCULATIONS
Based on Standards of Care pt above % IBW (144%) thus ideal body weight used for all calculations (142#). Fluid recs per team.
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
## 2023-01-20 LAB
A1C WITH ESTIMATED AVERAGE GLUCOSE RESULT: 9.8 % — HIGH (ref 4–5.6)
CHOLEST SERPL-MCNC: 178 MG/DL — SIGNIFICANT CHANGE UP
ESTIMATED AVERAGE GLUCOSE: 235 MG/DL — HIGH (ref 68–114)
GLUCOSE BLDC GLUCOMTR-MCNC: 224 MG/DL — HIGH (ref 70–99)
GLUCOSE BLDC GLUCOMTR-MCNC: 234 MG/DL — HIGH (ref 70–99)
GLUCOSE BLDC GLUCOMTR-MCNC: 236 MG/DL — HIGH (ref 70–99)
GLUCOSE BLDC GLUCOMTR-MCNC: 250 MG/DL — HIGH (ref 70–99)
HDLC SERPL-MCNC: 45 MG/DL — SIGNIFICANT CHANGE UP
LIPID PNL WITH DIRECT LDL SERPL: 101 MG/DL — HIGH
NON HDL CHOLESTEROL: 133 MG/DL — HIGH
TRIGL SERPL-MCNC: 161 MG/DL — HIGH

## 2023-01-20 PROCEDURE — 99232 SBSQ HOSP IP/OBS MODERATE 35: CPT

## 2023-01-20 RX ADMIN — Medication 5 MILLIGRAM(S): at 23:12

## 2023-01-20 RX ADMIN — Medication 1 PATCH: at 08:26

## 2023-01-20 RX ADMIN — Medication 1 PATCH: at 10:48

## 2023-01-20 RX ADMIN — Medication 1 SPRAY(S): at 21:15

## 2023-01-20 RX ADMIN — Medication 10 UNIT(S): at 12:38

## 2023-01-20 RX ADMIN — Medication 10 UNIT(S): at 17:23

## 2023-01-20 RX ADMIN — GABAPENTIN 800 MILLIGRAM(S): 400 CAPSULE ORAL at 10:49

## 2023-01-20 RX ADMIN — LITHIUM CARBONATE 900 MILLIGRAM(S): 300 TABLET, EXTENDED RELEASE ORAL at 21:15

## 2023-01-20 RX ADMIN — Medication 1 TABLET(S): at 10:49

## 2023-01-20 RX ADMIN — OLANZAPINE 5 MILLIGRAM(S): 15 TABLET, FILM COATED ORAL at 23:12

## 2023-01-20 RX ADMIN — SENNA PLUS 1 TABLET(S): 8.6 TABLET ORAL at 21:15

## 2023-01-20 RX ADMIN — Medication 1 PATCH: at 18:55

## 2023-01-20 RX ADMIN — GABAPENTIN 800 MILLIGRAM(S): 400 CAPSULE ORAL at 21:15

## 2023-01-20 RX ADMIN — POLYETHYLENE GLYCOL 3350 17 GRAM(S): 17 POWDER, FOR SOLUTION ORAL at 10:51

## 2023-01-20 RX ADMIN — Medication 2 MILLIGRAM(S): at 13:43

## 2023-01-20 RX ADMIN — Medication 2 MILLIGRAM(S): at 22:12

## 2023-01-20 RX ADMIN — METHADONE HYDROCHLORIDE 110 MILLIGRAM(S): 40 TABLET ORAL at 10:49

## 2023-01-20 RX ADMIN — Medication 1 SPRAY(S): at 10:50

## 2023-01-20 RX ADMIN — INSULIN GLARGINE 30 UNIT(S): 100 INJECTION, SOLUTION SUBCUTANEOUS at 21:15

## 2023-01-20 RX ADMIN — POLYETHYLENE GLYCOL 3350 17 GRAM(S): 17 POWDER, FOR SOLUTION ORAL at 21:14

## 2023-01-20 RX ADMIN — SPIRONOLACTONE 25 MILLIGRAM(S): 25 TABLET, FILM COATED ORAL at 10:50

## 2023-01-20 RX ADMIN — Medication 1 PATCH: at 11:19

## 2023-01-20 RX ADMIN — GABAPENTIN 800 MILLIGRAM(S): 400 CAPSULE ORAL at 12:59

## 2023-01-20 RX ADMIN — Medication 10 MILLIGRAM(S): at 10:50

## 2023-01-20 RX ADMIN — Medication 10 MILLIGRAM(S): at 12:59

## 2023-01-20 RX ADMIN — Medication 10 MILLIGRAM(S): at 21:14

## 2023-01-20 NOTE — BH INPATIENT PSYCHIATRY PROGRESS NOTE - NSBHCHARTREVIEWVS_PSY_A_CORE FT
Vital Signs Last 24 Hrs  T(C): 36.8 (01-20-23 @ 09:30), Max: 37.1 (01-19-23 @ 16:47)  T(F): 98.2 (01-20-23 @ 09:30), Max: 98.8 (01-19-23 @ 16:47)  HR: 70 (01-20-23 @ 09:30) (70 - 74)  BP: 128/61 (01-20-23 @ 09:30) (128/61 - 145/72)  BP(mean): --  RR: 18 (01-20-23 @ 09:30) (18 - 18)  SpO2: 97% (01-20-23 @ 09:30) (95% - 97%)     Vital Signs Last 24 Hrs  T(C): --  T(F): --  HR: --  BP: --  BP(mean): --  RR: --  SpO2: --

## 2023-01-20 NOTE — BH INPATIENT PSYCHIATRY PROGRESS NOTE - NSBHFUPINTERVALHXFT_PSY_A_CORE
Patient seen today. He reports feeling "". Reports good sleep and appetite. Patient denies SI/HI, A/V hallucinations. Denies adverse medication effects. No complaints.  Patient seen today. He reports feeling "ok. Better I'd say". Reports good sleep and appetite. He feels valium prevents his urge to drink. Patient denies SI/HI, A/V hallucinations. Denies adverse medication effects. No complaints. He is participating in groups.

## 2023-01-20 NOTE — BH INPATIENT PSYCHIATRY PROGRESS NOTE - CURRENT MEDICATION
MEDICATIONS  (STANDING):  dextrose 5%. 1000 milliLiter(s) (50 mL/Hr) IV Continuous <Continuous>  dextrose 5%. 1000 milliLiter(s) (100 mL/Hr) IV Continuous <Continuous>  dextrose 50% Injectable 25 Gram(s) IV Push once  dextrose 50% Injectable 12.5 Gram(s) IV Push once  dextrose 50% Injectable 25 Gram(s) IV Push once  diazepam    Tablet 10 milliGRAM(s) Oral three times a day  fluticasone propionate 50 MICROgram(s)/spray Nasal Spray 1 Spray(s) Both Nostrils two times a day  gabapentin 800 milliGRAM(s) Oral three times a day  glucagon  Injectable 1 milliGRAM(s) IntraMuscular once  insulin glargine Injectable (LANTUS) 30 Unit(s) SubCutaneous at bedtime  insulin lispro Injectable (ADMELOG) 10 Unit(s) SubCutaneous three times a day before meals  lithium 900 milliGRAM(s) Oral at bedtime  melatonin. 5 milliGRAM(s) Oral at bedtime  methadone    Tablet 110 milliGRAM(s) Oral daily  multivitamin 1 Tablet(s) Oral daily  nicotine -  14 mG/24Hr(s) Patch 1 Patch Transdermal daily  OLANZapine 5 milliGRAM(s) Oral at bedtime  polyethylene glycol 3350 17 Gram(s) Oral two times a day  senna 1 Tablet(s) Oral at bedtime  spironolactone 25 milliGRAM(s) Oral daily    MEDICATIONS  (PRN):  dextrose Oral Gel 15 Gram(s) Oral once PRN Blood Glucose LESS THAN 70 milliGRAM(s)/deciliter  nicotine  Polacrilex Gum 2 milliGRAM(s) Oral every 4 hours PRN breakthrough cravings  OLANZapine 2.5 milliGRAM(s) Oral every 4 hours PRN agitation   MEDICATIONS  (STANDING):    MEDICATIONS  (PRN):

## 2023-01-20 NOTE — BH INPATIENT PSYCHIATRY PROGRESS NOTE - NSBHMETABOLIC_PSY_ALL_CORE_FT
BMI: BMI (kg/m2): 26.5 (01-10-23 @ 17:48)  HbA1c: A1C with Estimated Average Glucose Result: 9.8 % (01-20-23 @ 08:49)    Glucose: POCT Blood Glucose.: 234 mg/dL (01-20-23 @ 12:35)    BP: 128/61 (01-20-23 @ 09:30) (114/58 - 177/93)  Lipid Panel: Date/Time: 01-20-23 @ 08:49  Cholesterol, Serum: 178  Direct LDL: --  HDL Cholesterol, Serum: 45  Total Cholesterol/HDL Ration Measurement: --  Triglycerides, Serum: 161   BMI: BMI (kg/m2): 30.3 (01-23-23 @ 15:04)  HbA1c: A1C with Estimated Average Glucose Result: 9.8 % (01-20-23 @ 08:49)    Glucose: POCT Blood Glucose.: 310 mg/dL (01-25-23 @ 11:24)    BP: 171/77 (01-25-23 @ 12:50) (151/72 - 181/76)  Lipid Panel: Date/Time: 01-20-23 @ 08:49  Cholesterol, Serum: 178  Direct LDL: --  HDL Cholesterol, Serum: 45  Total Cholesterol/HDL Ration Measurement: --  Triglycerides, Serum: 161

## 2023-01-21 LAB
GLUCOSE BLDC GLUCOMTR-MCNC: 163 MG/DL — HIGH (ref 70–99)
GLUCOSE BLDC GLUCOMTR-MCNC: 239 MG/DL — HIGH (ref 70–99)
GLUCOSE BLDC GLUCOMTR-MCNC: 265 MG/DL — HIGH (ref 70–99)
GLUCOSE BLDC GLUCOMTR-MCNC: 290 MG/DL — HIGH (ref 70–99)

## 2023-01-21 RX ORDER — DEXTROSE 50 % IN WATER 50 %
25 SYRINGE (ML) INTRAVENOUS ONCE
Refills: 0 | Status: DISCONTINUED | OUTPATIENT
Start: 2023-01-21 | End: 2023-01-25

## 2023-01-21 RX ORDER — SODIUM CHLORIDE 9 MG/ML
1000 INJECTION, SOLUTION INTRAVENOUS
Refills: 0 | Status: DISCONTINUED | OUTPATIENT
Start: 2023-01-21 | End: 2023-01-25

## 2023-01-21 RX ORDER — GABAPENTIN 400 MG/1
600 CAPSULE ORAL DAILY
Refills: 0 | Status: DISCONTINUED | OUTPATIENT
Start: 2023-01-21 | End: 2023-01-23

## 2023-01-21 RX ORDER — DEXTROSE 50 % IN WATER 50 %
15 SYRINGE (ML) INTRAVENOUS ONCE
Refills: 0 | Status: DISCONTINUED | OUTPATIENT
Start: 2023-01-21 | End: 2023-01-25

## 2023-01-21 RX ORDER — GLUCAGON INJECTION, SOLUTION 0.5 MG/.1ML
1 INJECTION, SOLUTION SUBCUTANEOUS ONCE
Refills: 0 | Status: DISCONTINUED | OUTPATIENT
Start: 2023-01-21 | End: 2023-01-25

## 2023-01-21 RX ORDER — DEXTROSE 50 % IN WATER 50 %
12.5 SYRINGE (ML) INTRAVENOUS ONCE
Refills: 0 | Status: DISCONTINUED | OUTPATIENT
Start: 2023-01-21 | End: 2023-01-25

## 2023-01-21 RX ORDER — INSULIN LISPRO 100/ML
VIAL (ML) SUBCUTANEOUS
Refills: 0 | Status: DISCONTINUED | OUTPATIENT
Start: 2023-01-21 | End: 2023-01-23

## 2023-01-21 RX ADMIN — OLANZAPINE 5 MILLIGRAM(S): 15 TABLET, FILM COATED ORAL at 22:10

## 2023-01-21 RX ADMIN — SPIRONOLACTONE 25 MILLIGRAM(S): 25 TABLET, FILM COATED ORAL at 10:22

## 2023-01-21 RX ADMIN — GABAPENTIN 800 MILLIGRAM(S): 400 CAPSULE ORAL at 15:39

## 2023-01-21 RX ADMIN — Medication 1 TABLET(S): at 10:22

## 2023-01-21 RX ADMIN — POLYETHYLENE GLYCOL 3350 17 GRAM(S): 17 POWDER, FOR SOLUTION ORAL at 22:09

## 2023-01-21 RX ADMIN — Medication 5 MILLIGRAM(S): at 22:09

## 2023-01-21 RX ADMIN — Medication 1 PATCH: at 10:23

## 2023-01-21 RX ADMIN — GABAPENTIN 800 MILLIGRAM(S): 400 CAPSULE ORAL at 10:22

## 2023-01-21 RX ADMIN — INSULIN GLARGINE 30 UNIT(S): 100 INJECTION, SOLUTION SUBCUTANEOUS at 22:17

## 2023-01-21 RX ADMIN — LITHIUM CARBONATE 900 MILLIGRAM(S): 300 TABLET, EXTENDED RELEASE ORAL at 22:10

## 2023-01-21 RX ADMIN — SENNA PLUS 1 TABLET(S): 8.6 TABLET ORAL at 22:10

## 2023-01-21 RX ADMIN — POLYETHYLENE GLYCOL 3350 17 GRAM(S): 17 POWDER, FOR SOLUTION ORAL at 10:22

## 2023-01-21 RX ADMIN — Medication 10 MILLIGRAM(S): at 22:10

## 2023-01-21 RX ADMIN — Medication 10 MILLIGRAM(S): at 15:39

## 2023-01-21 RX ADMIN — Medication 10 UNIT(S): at 08:12

## 2023-01-21 RX ADMIN — Medication 10 MILLIGRAM(S): at 10:22

## 2023-01-21 RX ADMIN — Medication 1 PATCH: at 10:20

## 2023-01-21 RX ADMIN — GABAPENTIN 800 MILLIGRAM(S): 400 CAPSULE ORAL at 22:10

## 2023-01-21 RX ADMIN — Medication 1 SPRAY(S): at 22:09

## 2023-01-21 RX ADMIN — Medication 10 UNIT(S): at 12:07

## 2023-01-21 RX ADMIN — METHADONE HYDROCHLORIDE 110 MILLIGRAM(S): 40 TABLET ORAL at 10:21

## 2023-01-21 RX ADMIN — GABAPENTIN 600 MILLIGRAM(S): 400 CAPSULE ORAL at 18:42

## 2023-01-21 RX ADMIN — Medication 10 UNIT(S): at 17:33

## 2023-01-21 RX ADMIN — Medication 2 MILLIGRAM(S): at 13:33

## 2023-01-21 NOTE — BH INPATIENT PSYCHIATRY PROGRESS NOTE - NSBHFUPINTERVALHXFT_PSY_A_CORE
No acute interval events. Patient without behavioral agitation; no PRNs required. Pt with intact sleep and appetite. Pt seen this afternoon; he reports concerns about FSG > 200 and is requesting ISS with meals (ordered now). He also requests PRN medication for anxiety. He begins to negotiate with me about various medications including hydroxyzine, gabapentin, valium. I agreed to offer a single daily dose of gabapentin PRN as pt already on this medication and preferable to offer this medicine rather than add a new medication such as hydroxyzine which would increase polypharmacy and compound risk of QT prolonging medications given concomitant use of methadone. Pt in agreement with plan. Pt denying SI/HI/AH/VH

## 2023-01-21 NOTE — BH INPATIENT PSYCHIATRY PROGRESS NOTE - PRN MEDS
MEDICATIONS  (PRN):  dextrose Oral Gel 15 Gram(s) Oral once PRN Blood Glucose LESS THAN 70 milliGRAM(s)/deciliter  nicotine  Polacrilex Gum 2 milliGRAM(s) Oral every 4 hours PRN breakthrough cravings  OLANZapine 2.5 milliGRAM(s) Oral every 4 hours PRN agitation

## 2023-01-21 NOTE — BH INPATIENT PSYCHIATRY PROGRESS NOTE - NSBHMETABOLIC_PSY_ALL_CORE_FT
BMI: BMI (kg/m2): 26.5 (01-10-23 @ 17:48)  HbA1c: A1C with Estimated Average Glucose Result: 9.8 % (01-20-23 @ 08:49)    Glucose: POCT Blood Glucose.: 265 mg/dL (01-21-23 @ 17:31)    BP: 163/87 (01-21-23 @ 17:00) (128/61 - 166/87)  Lipid Panel: Date/Time: 01-20-23 @ 08:49  Cholesterol, Serum: 178  Direct LDL: --  HDL Cholesterol, Serum: 45  Total Cholesterol/HDL Ration Measurement: --  Triglycerides, Serum: 161

## 2023-01-21 NOTE — BH INPATIENT PSYCHIATRY PROGRESS NOTE - NSBHCHARTREVIEWVS_PSY_A_CORE FT
Vital Signs Last 24 Hrs  T(C): 36.5 (01-21-23 @ 17:00), Max: 37.1 (01-21-23 @ 10:14)  T(F): 97.7 (01-21-23 @ 17:00), Max: 98.7 (01-21-23 @ 10:14)  HR: 78 (01-21-23 @ 17:00) (74 - 78)  BP: 163/87 (01-21-23 @ 17:00) (139/78 - 163/87)  BP(mean): --  RR: 16 (01-21-23 @ 17:00) (16 - 16)  SpO2: 98% (01-21-23 @ 17:00) (94% - 98%)

## 2023-01-21 NOTE — BH INPATIENT PSYCHIATRY PROGRESS NOTE - CURRENT MEDICATION
MEDICATIONS  (STANDING):  dextrose 5%. 1000 milliLiter(s) (50 mL/Hr) IV Continuous <Continuous>  dextrose 5%. 1000 milliLiter(s) (100 mL/Hr) IV Continuous <Continuous>  dextrose 50% Injectable 25 Gram(s) IV Push once  dextrose 50% Injectable 12.5 Gram(s) IV Push once  dextrose 50% Injectable 25 Gram(s) IV Push once  diazepam    Tablet 10 milliGRAM(s) Oral three times a day  fluticasone propionate 50 MICROgram(s)/spray Nasal Spray 1 Spray(s) Both Nostrils two times a day  gabapentin 800 milliGRAM(s) Oral three times a day  glucagon  Injectable 1 milliGRAM(s) IntraMuscular once  insulin glargine Injectable (LANTUS) 30 Unit(s) SubCutaneous at bedtime  insulin lispro Injectable (ADMELOG) 10 Unit(s) SubCutaneous three times a day before meals  lithium 900 milliGRAM(s) Oral at bedtime  melatonin. 5 milliGRAM(s) Oral at bedtime  methadone    Tablet 110 milliGRAM(s) Oral daily  multivitamin 1 Tablet(s) Oral daily  nicotine -  14 mG/24Hr(s) Patch 1 Patch Transdermal daily  OLANZapine 5 milliGRAM(s) Oral at bedtime  polyethylene glycol 3350 17 Gram(s) Oral two times a day  senna 1 Tablet(s) Oral at bedtime  spironolactone 25 milliGRAM(s) Oral daily    MEDICATIONS  (PRN):  dextrose Oral Gel 15 Gram(s) Oral once PRN Blood Glucose LESS THAN 70 milliGRAM(s)/deciliter  nicotine  Polacrilex Gum 2 milliGRAM(s) Oral every 4 hours PRN breakthrough cravings  OLANZapine 2.5 milliGRAM(s) Oral every 4 hours PRN agitation

## 2023-01-22 LAB
GLUCOSE BLDC GLUCOMTR-MCNC: 288 MG/DL — HIGH (ref 70–99)
GLUCOSE BLDC GLUCOMTR-MCNC: 300 MG/DL — HIGH (ref 70–99)
GLUCOSE BLDC GLUCOMTR-MCNC: 341 MG/DL — HIGH (ref 70–99)

## 2023-01-22 RX ORDER — INSULIN LISPRO 100/ML
VIAL (ML) SUBCUTANEOUS AT BEDTIME
Refills: 0 | Status: DISCONTINUED | OUTPATIENT
Start: 2023-01-22 | End: 2023-01-23

## 2023-01-22 RX ADMIN — POLYETHYLENE GLYCOL 3350 17 GRAM(S): 17 POWDER, FOR SOLUTION ORAL at 21:05

## 2023-01-22 RX ADMIN — Medication 1 PATCH: at 11:02

## 2023-01-22 RX ADMIN — Medication 10 MILLIGRAM(S): at 21:06

## 2023-01-22 RX ADMIN — Medication 10 UNIT(S): at 08:00

## 2023-01-22 RX ADMIN — Medication 3: at 08:00

## 2023-01-22 RX ADMIN — GABAPENTIN 800 MILLIGRAM(S): 400 CAPSULE ORAL at 14:39

## 2023-01-22 RX ADMIN — Medication 2: at 21:51

## 2023-01-22 RX ADMIN — GABAPENTIN 600 MILLIGRAM(S): 400 CAPSULE ORAL at 01:38

## 2023-01-22 RX ADMIN — Medication 1 PATCH: at 18:02

## 2023-01-22 RX ADMIN — POLYETHYLENE GLYCOL 3350 17 GRAM(S): 17 POWDER, FOR SOLUTION ORAL at 11:02

## 2023-01-22 RX ADMIN — Medication 5 MILLIGRAM(S): at 21:06

## 2023-01-22 RX ADMIN — Medication 1 SPRAY(S): at 21:12

## 2023-01-22 RX ADMIN — SENNA PLUS 1 TABLET(S): 8.6 TABLET ORAL at 21:05

## 2023-01-22 RX ADMIN — Medication 10 UNIT(S): at 12:09

## 2023-01-22 RX ADMIN — GABAPENTIN 800 MILLIGRAM(S): 400 CAPSULE ORAL at 21:05

## 2023-01-22 RX ADMIN — SPIRONOLACTONE 25 MILLIGRAM(S): 25 TABLET, FILM COATED ORAL at 11:02

## 2023-01-22 RX ADMIN — LITHIUM CARBONATE 900 MILLIGRAM(S): 300 TABLET, EXTENDED RELEASE ORAL at 21:06

## 2023-01-22 RX ADMIN — Medication 1 PATCH: at 11:15

## 2023-01-22 RX ADMIN — GABAPENTIN 800 MILLIGRAM(S): 400 CAPSULE ORAL at 11:00

## 2023-01-22 RX ADMIN — Medication 10 MILLIGRAM(S): at 10:59

## 2023-01-22 RX ADMIN — OLANZAPINE 5 MILLIGRAM(S): 15 TABLET, FILM COATED ORAL at 21:51

## 2023-01-22 RX ADMIN — Medication 3: at 12:10

## 2023-01-22 RX ADMIN — METHADONE HYDROCHLORIDE 110 MILLIGRAM(S): 40 TABLET ORAL at 11:02

## 2023-01-22 RX ADMIN — Medication 1 SPRAY(S): at 11:00

## 2023-01-22 RX ADMIN — Medication 10 MILLIGRAM(S): at 14:39

## 2023-01-22 RX ADMIN — INSULIN GLARGINE 30 UNIT(S): 100 INJECTION, SOLUTION SUBCUTANEOUS at 21:07

## 2023-01-22 RX ADMIN — Medication 1 TABLET(S): at 11:02

## 2023-01-23 LAB
GLUCOSE BLDC GLUCOMTR-MCNC: 186 MG/DL — HIGH (ref 70–99)
GLUCOSE BLDC GLUCOMTR-MCNC: 190 MG/DL — HIGH (ref 70–99)
GLUCOSE BLDC GLUCOMTR-MCNC: 246 MG/DL — HIGH (ref 70–99)
GLUCOSE BLDC GLUCOMTR-MCNC: 308 MG/DL — HIGH (ref 70–99)

## 2023-01-23 PROCEDURE — 99232 SBSQ HOSP IP/OBS MODERATE 35: CPT

## 2023-01-23 RX ORDER — CARVEDILOL PHOSPHATE 80 MG/1
6.25 CAPSULE, EXTENDED RELEASE ORAL EVERY 12 HOURS
Refills: 0 | Status: DISCONTINUED | OUTPATIENT
Start: 2023-01-23 | End: 2023-01-25

## 2023-01-23 RX ORDER — INSULIN LISPRO 100/ML
VIAL (ML) SUBCUTANEOUS
Refills: 0 | Status: DISCONTINUED | OUTPATIENT
Start: 2023-01-23 | End: 2023-01-25

## 2023-01-23 RX ORDER — GABAPENTIN 400 MG/1
1 CAPSULE ORAL
Qty: 0 | Refills: 0 | DISCHARGE
Start: 2023-01-23

## 2023-01-23 RX ORDER — CARVEDILOL PHOSPHATE 80 MG/1
1 CAPSULE, EXTENDED RELEASE ORAL
Qty: 0 | Refills: 0 | DISCHARGE
Start: 2023-01-23

## 2023-01-23 RX ORDER — OLANZAPINE 15 MG/1
1 TABLET, FILM COATED ORAL
Qty: 0 | Refills: 0 | DISCHARGE
Start: 2023-01-23

## 2023-01-23 RX ORDER — GABAPENTIN 400 MG/1
600 CAPSULE ORAL THREE TIMES A DAY
Refills: 0 | Status: DISCONTINUED | OUTPATIENT
Start: 2023-01-23 | End: 2023-01-25

## 2023-01-23 RX ORDER — OLANZAPINE 15 MG/1
5 TABLET, FILM COATED ORAL
Qty: 0 | Refills: 0 | DISCHARGE

## 2023-01-23 RX ORDER — DIAZEPAM 5 MG
1 TABLET ORAL
Qty: 0 | Refills: 0 | DISCHARGE
Start: 2023-01-23

## 2023-01-23 RX ADMIN — Medication 8: at 21:51

## 2023-01-23 RX ADMIN — METHADONE HYDROCHLORIDE 110 MILLIGRAM(S): 40 TABLET ORAL at 10:38

## 2023-01-23 RX ADMIN — GABAPENTIN 800 MILLIGRAM(S): 400 CAPSULE ORAL at 10:39

## 2023-01-23 RX ADMIN — GABAPENTIN 600 MILLIGRAM(S): 400 CAPSULE ORAL at 02:42

## 2023-01-23 RX ADMIN — Medication 2: at 12:52

## 2023-01-23 RX ADMIN — LITHIUM CARBONATE 900 MILLIGRAM(S): 300 TABLET, EXTENDED RELEASE ORAL at 21:45

## 2023-01-23 RX ADMIN — Medication 10 UNIT(S): at 08:02

## 2023-01-23 RX ADMIN — Medication 10 MILLIGRAM(S): at 10:42

## 2023-01-23 RX ADMIN — Medication 1 SPRAY(S): at 21:49

## 2023-01-23 RX ADMIN — GABAPENTIN 800 MILLIGRAM(S): 400 CAPSULE ORAL at 21:44

## 2023-01-23 RX ADMIN — Medication 10 UNIT(S): at 12:47

## 2023-01-23 RX ADMIN — Medication 1 PATCH: at 19:31

## 2023-01-23 RX ADMIN — Medication 1: at 07:59

## 2023-01-23 RX ADMIN — POLYETHYLENE GLYCOL 3350 17 GRAM(S): 17 POWDER, FOR SOLUTION ORAL at 10:41

## 2023-01-23 RX ADMIN — Medication 1 PATCH: at 10:39

## 2023-01-23 RX ADMIN — Medication 1 TABLET(S): at 10:41

## 2023-01-23 RX ADMIN — CARVEDILOL PHOSPHATE 6.25 MILLIGRAM(S): 80 CAPSULE, EXTENDED RELEASE ORAL at 21:44

## 2023-01-23 RX ADMIN — Medication 2: at 17:28

## 2023-01-23 RX ADMIN — GABAPENTIN 800 MILLIGRAM(S): 400 CAPSULE ORAL at 13:13

## 2023-01-23 RX ADMIN — Medication 1 SPRAY(S): at 10:42

## 2023-01-23 RX ADMIN — Medication 10 UNIT(S): at 17:21

## 2023-01-23 RX ADMIN — Medication 5 MILLIGRAM(S): at 21:44

## 2023-01-23 RX ADMIN — POLYETHYLENE GLYCOL 3350 17 GRAM(S): 17 POWDER, FOR SOLUTION ORAL at 21:43

## 2023-01-23 RX ADMIN — INSULIN GLARGINE 30 UNIT(S): 100 INJECTION, SOLUTION SUBCUTANEOUS at 21:50

## 2023-01-23 RX ADMIN — Medication 1 PATCH: at 11:19

## 2023-01-23 RX ADMIN — Medication 10 MILLIGRAM(S): at 21:45

## 2023-01-23 RX ADMIN — SPIRONOLACTONE 25 MILLIGRAM(S): 25 TABLET, FILM COATED ORAL at 10:39

## 2023-01-23 RX ADMIN — SENNA PLUS 1 TABLET(S): 8.6 TABLET ORAL at 21:43

## 2023-01-23 RX ADMIN — OLANZAPINE 5 MILLIGRAM(S): 15 TABLET, FILM COATED ORAL at 21:44

## 2023-01-23 RX ADMIN — Medication 1 PATCH: at 07:36

## 2023-01-23 RX ADMIN — Medication 10 MILLIGRAM(S): at 13:13

## 2023-01-23 NOTE — BH INPATIENT PSYCHIATRY PROGRESS NOTE - NSBHATTESTCOMMENTATTENDFT_PSY_A_CORE
Pt falling asleep during community meeting then requesting more meds and double portions. Likely for discharge later this week. Pt very near his baseline if not at it. 
Pt improving on zyprexa and valium albeit oversedated at times and still has difficulty filtering his comments for peers. Not that far from baseline. Trying ot see if any kind of supportive housing or respite can be found.

## 2023-01-23 NOTE — BH INPATIENT PSYCHIATRY DISCHARGE NOTE - NSDCCPCAREPLAN_GEN_ALL_CORE_FT
PRINCIPAL DISCHARGE DIAGNOSIS  Diagnosis: Bipolar 1 disorder  Assessment and Plan of Treatment: medicaition management w/ zyprexa, lithium, and valuim      SECONDARY DISCHARGE DIAGNOSES  Diagnosis: Antisocial personality disorder  Assessment and Plan of Treatment: supportive therapy intervention. Management of violence risk    Diagnosis: Polysubstance abuse  Assessment and Plan of Treatment: motivational interviewing. Prevention of withdrawal. Methadone maintanence.

## 2023-01-23 NOTE — BH INPATIENT PSYCHIATRY DISCHARGE NOTE - OTHER PAST PSYCHIATRIC HISTORY (INCLUDE DETAILS REGARDING ONSET, COURSE OF ILLNESS, INPATIENT/OUTPATIENT TREATMENT)
mayra Bueno is a 57 year old man, domiciled with sister in [place], on disability, single, documented diagnoses of bipolar 1 disorder, antisocial personality disorder, PMHx pituitary tumor, HTN, T2DM, and GERD (only compliant with HTN medications), 1 remote suicide attempt via injecting bleach per patient, with numerous psychiatric hospitalizations (last at [facility] in [place] from 10/9/2022 - 10/12/2022), multiple ED visits in the past few months for medical, MH and substance use disorder, history of multiple incarcerations (last nine years ago, patient served 10 year sentence for manslaughter and was released from [facility] in ), history of violence and aggression both on the psychiatric unit and in the community,  history of polysubstance abuse (daily ETOH use c/b reportedly ETOH withdrawal seizures per patient report, MJ use, cocaine use, past heroin use, now on methadone maintenance through [facility] in [place]), +family history (father  of drug/alcohol overdose, and half sister has schizophrenia), he self presented to with homicidal ideation and paranoia in the context of substance abuse coupled with medication non compliance. UTOX + for MJ, BDZ, Methadone, cocaine.    Per Psyckes:  Opioid related disorders | Unspecifed/Other Bipolar | Other psychoactive substance related disorders | Tobacco related disorder | Major  Depressive Disorder | Attention Defcit Hyperactivity Disorder | Alcohol related disorders | Sedative, hypnotic, or anxiolytic related disorders |  Unspecifed/Other Depressive Disorder | Other stimulant related disorders | Cocaine related disorders | Bipolar I | Antisocial Personality  Disorder | Delusional Disorder | Unspecifed/Other Psychotic Disorders | Substance-Induced Psychotic Disorder | Bipolar II (ICD10 only) |  Unspecifed/Other Anxiety Disorder | Cannabis related disorders | Schizoaffective Disorder | Substance-Induced Depressive Disorder |  Adjustment Disorder | Hallucinogen related disorders | PTSD | Schizophrenia | Brief Psychotic Disorder (ICD10 Only) | Disruptive Mood  Dysregulation Disorder (ICD10 only) | Paranoid Personality Disorder | Conduct Disorder | Panic Disorder | Somatic Symptom Disorder |  Substance-Induced Sleep Disorder | Unspecifed/Other Personality Disorder

## 2023-01-23 NOTE — BH INPATIENT PSYCHIATRY DISCHARGE NOTE - VIOLENCE RISK FACTORS:
Antisocial behavior/cognition (past or present)/Substance abuse/Impulsivity/Noncompliance with treatment/Irritability

## 2023-01-23 NOTE — BH INPATIENT PSYCHIATRY DISCHARGE NOTE - ATTENDING DISCHARGE PHYSICAL EXAMINATION:
MSE- Well groomed and fairly related, good EC. -PMR/A Speech: wnl Mood: "I'm good."   Affect: full-range, appropriate TP- circumstantial TC: -SI/HI/AH/VH/PI. I&J: fair

## 2023-01-23 NOTE — BH INPATIENT PSYCHIATRY DISCHARGE NOTE - NSBHSUICIDESTATUS_PSY_ALL_CORE
Patient has numerous factors, selected above, which contribute to chronically elevated suicide risk. Most importantly, impulsivity, substance abuse/withdrawal, homelessness, and social isolation. Significant progress was made throughout hospitalization: housing interviews in progress and current abstinence from street drugs. Patient has been complaint with medication while hospitalized and appears much improved.

## 2023-01-23 NOTE — BH INPATIENT PSYCHIATRY DISCHARGE NOTE - NSBHDCRISKMITIGATEFT_PSY_ALL_CORE
Safety planning. As above, Significant progress was made throughout hospitalization: housing interviews in progress and current abstinence from street drugs. Patient has been complaint with medication while hospitalized and appears much improved. SI/HI remains entirely conditional for gain of housing.

## 2023-01-23 NOTE — BH INPATIENT PSYCHIATRY DISCHARGE NOTE - NSBHDCMEDICALFT_PSY_A_CORE
Home medications of insulin 30 lantus nightly + 10 admelog TID, and spironolactone 25mg were continued for DM and HTN respectively. Patient was started on nicotine replacement. GInsulin sliding scale, moderate dose, was implemented for control of blood glucose. Coreg 6.25 mg BID was added to the patient's regimen for improved BP control and cardio-protective effect, per medicine consultant. Home medications of insulin 30 lantus nightly + 10 admelog TID, and spironolactone 25mg were continued for DM and HTN respectively. Patient was started on nicotine replacement. Insulin sliding scale, moderate dose, was implemented for control of blood glucose. Coreg 6.25 mg BID was added to the patient's regimen for improved BP control and cardio-protective effect, per medicine consultant.

## 2023-01-23 NOTE — BH INPATIENT PSYCHIATRY PROGRESS NOTE - PRN MEDS
MEDICATIONS  (PRN):  dextrose Oral Gel 15 Gram(s) Oral once PRN Blood Glucose LESS THAN 70 milliGRAM(s)/deciliter  dextrose Oral Gel 15 Gram(s) Oral once PRN Blood Glucose LESS THAN 70 milliGRAM(s)/deciliter  gabapentin 600 milliGRAM(s) Oral daily PRN anxiety  nicotine  Polacrilex Gum 2 milliGRAM(s) Oral every 4 hours PRN breakthrough cravings  OLANZapine 2.5 milliGRAM(s) Oral every 4 hours PRN agitation   MEDICATIONS  (PRN):

## 2023-01-23 NOTE — BH INPATIENT PSYCHIATRY DISCHARGE NOTE - NSBHDCRISKMITIGATE_PSY_ALL_CORE
Safety planning Safety planning/Referral to health home/Medications targeting suicidality/non-suicidal self injurious behavior

## 2023-01-23 NOTE — BH INPATIENT PSYCHIATRY PROGRESS NOTE - NSBHFUPINTERVALHXFT_PSY_A_CORE
No acute interval events. Patient without behavioral agitation; no PRNs required. Pt with intact sleep and appetite. Pt denying SI/HI/AH/VH. Continues to disrupt unit, perpetually triggering other patients. Continues to negotiate medication changes daily, today seeking clonidine, more gabapentin. Continues to complain about meals and nutrition despite repeated meetings with nutritionist. Patient seen by internal medicine consultant and recommended changes for BP and BG. Added coreg and increased SS to moderate scale.

## 2023-01-23 NOTE — BH INPATIENT PSYCHIATRY PROGRESS NOTE - NSICDXBHSECONDARYDX_PSY_ALL_CORE
TBI (traumatic brain injury)   S06.9XAA  Antisocial personality disorder   F60.2  

## 2023-01-23 NOTE — BH INPATIENT PSYCHIATRY PROGRESS NOTE - NSDCCRITERIA_PSY_ALL_CORE
diminution in manic symptoms and resolution of SI/HI 

## 2023-01-23 NOTE — CONSULT NOTE ADULT - ATTENDING COMMENTS
58 yo M w/ HTN, DM2 insulin required, HFrEF 35% ( only on Spironolactone 25mg), polysubstance abuse, bipolar disorder initially presenting w/ homicidal ideation/paranoia,, found to be COVID+, [facility] course c/b altered mental status/hypotension/bradycardia/hypoglycemia on 1/7, transferred to [facility] for further evaluation/management, symptoms attributed to medications/polypharmacy, regimen adjusted, now transferred back to [facility] for further psychiatric management. Med consult called for uncontrolled 's and hyperglycemia.     # HTN  # HFrEF with EF 35%   - Pt agrees to take Coreg 6.25mg po bid for duo effect of BP control amd CHF management ( GDMT)   - c/w Spironolactone 25mg po daily   - further consider room for ACEI/ARN/Arni     # D2DM/insulin required  # Hyperglycemia   - A1C 9.8%   - continue Lantus 30u sq qHS and Lispro 10U TID AC meals   - increase to mISS for better coverage of post-prandial hyperglycemia   - goal F [id number]     # Bipolar d/o  # Polysubstance abuse   - Lithium 900mg po qHS   - Olanzapine 5mg po qHS   -Methadone 110mg po daily     Med consult team to follow, POC as d/w Psych team thank you

## 2023-01-23 NOTE — BH INPATIENT PSYCHIATRY PROGRESS NOTE - NSBHASSESSSUMMFT_PSY_ALL_CORE
"Mr. Bueno is a 57 year old man, domiciled with sister in [place], on disability, single, documented diagnoses of bipolar 1 disorder, antisocial personality disorder, PMHx pituitary tumor, HTN, T2DM, and GERD (only compliant with HTN medications), 1 remote suicide attempt via injecting bleach per patient, with numerous psychiatric hospitalizations (last at [facility] in [place] from 10/9/2022 - 10/12/2022), multiple ED visits in the past few months for medical, MH and substance use disorder, history of multiple incarcerations (last nine years ago, patient served 10 year sentence for manslaughter and was released from [facility] in ), history of violence and aggression both on the psychiatric unit and in the community,  history of polysubstance abuse (daily ETOH use c/b reportedly ETOH withdrawal seizures per patient report, MJ use, cocaine use, past heroin use, now on methadone maintenance through [facility] in [place]), +family history (father  of drug/alcohol overdose, and half sister has schizophrenia), he self presented to with homicidal ideation and paranoia in the context of substance abuse coupled with medication non compliance. Transferred from [facility].     Patient demands supportive housing and nursing assistance for management of his diabetes. He makes conditional suicidal statements such as "If I'm discharged without housing I'm going to get 2 bundles of fentanyl and just go to sleep." He reports he needs help with "panic, anxiety, PTSD, and ADHD". He requests increased dose of valium and addition of ativan and Adderall. He was re-directed and agreement was reached regarding dosing of valium and zyprexa. Ongoing management of likely jose for improved functioning in community and resolution of SI/HI.     - C/w lithium 900mg nightly  - c/w Olanzapine 5mg nightly  - C/w valium 10mg TID  - C/w gabapentin 800mg TID; gabapentin 600 mg PO daily PRN for anxiety  - C/W methadone 110mg daily  - Medications, including psychotropic and medical, were reconciled. C/w insulin 30 lantus nightly + 10 admelog TID + moderate SS, C/w spironolactone 25mg, added Coreg today for BPs/cardiac protective.  - Medicine consulted regarding BP control and recommendations appreciated. 
"Mr. Bueno is a 57 year old man, domiciled with sister in [place], on disability, single, documented diagnoses of bipolar 1 disorder, antisocial personality disorder, PMHx pituitary tumor, HTN, T2DM, and GERD (only compliant with HTN medications), 1 remote suicide attempt via injecting bleach per patient, with numerous psychiatric hospitalizations (last at [facility] in [place] from 10/9/2022 - 10/12/2022), multiple ED visits in the past few months for medical, MH and substance use disorder, history of multiple incarcerations (last nine years ago, patient served 10 year sentence for manslaughter and was released from correction in ), history of violence and aggression both on the psychiatric unit and in the community,  history of polysubstance abuse (daily ETOH use c/b reportedly ETOH withdrawal seizures per patient report, MJ use, cocaine use, past heroin use, now on methadone maintenance through [facility] in [place]), +family history (father  of drug/alcohol overdose, and half sister has schizophrenia), he self presented to with homicidal ideation and paranoia in the context of substance abuse coupled with medication non compliance. Transferred from [facility].     Patient demands supportive housing and nursing assistance for management of his diabetes. He makes conditional suicidal statements such as "If I'm discharged without housing I'm going to get 2 bundles of fentanyl and just go to sleep." He reports he needs help with "panic, anxiety, PTSD, and ADHD". He requests increased dose of valium and addition of ativan and Adderall. He was re-directed and agreement was reached regarding dosing of valium and zyprexa. Ongoing management of likely jose for improved functioning in community and resolution of SI/HI.     - C/w lithium 900mg nightly  - c/w Olanzapine 5mg nightly  - C/w valium 10mg TID  - C/w gabapentin 800mg TID   - C/W methadone 110mg daily  - Medications, including psychotropic and medical, were reconciled. C/w insulin 30 lantus nightly + 10 admelog TID w/ sliding scale, C/w spironolactone 25mg
"Mr. Bueno is a 57 year old man, domiciled with sister in [place], on disability, single, documented diagnoses of bipolar 1 disorder, antisocial personality disorder, PMHx pituitary tumor, HTN, T2DM, and GERD (only compliant with HTN medications), 1 remote suicide attempt via injecting bleach per patient, with numerous psychiatric hospitalizations (last at [facility] in [place] from 10/9/2022 - 10/12/2022), multiple ED visits in the past few months for medical, MH and substance use disorder, history of multiple incarcerations (last nine years ago, patient served 10 year sentence for manslaughter and was released from [facility] in ), history of violence and aggression both on the psychiatric unit and in the community,  history of polysubstance abuse (daily ETOH use c/b reportedly ETOH withdrawal seizures per patient report, MJ use, cocaine use, past heroin use, now on methadone maintenance through [facility] in [place]), +family history (father  of drug/alcohol overdose, and half sister has schizophrenia), he self presented to with homicidal ideation and paranoia in the context of substance abuse coupled with medication non compliance. Transferred from [facility].     Patient demands supportive housing and nursing assistance for management of his diabetes. He makes conditional suicidal statements such as "If I'm discharged without housing I'm going to get 2 bundles of fentanyl and just go to sleep." He reports he needs help with "panic, anxiety, PTSD, and ADHD". He requests increased dose of valium and addition of ativan and Adderall. He was re-directed and agreement was reached regarding dosing of valium and zyprexa. Ongoing management of likely jose for improved functioning in community and resolution of SI/HI.     - C/w lithium 900mg nightly  - c/w Olanzapine 5mg nightly  - C/w valium 10mg TID  - C/w gabapentin 800mg TID; ADD gabapentin 600 mg PO daily PRN for anxiety  - C/W methadone 110mg daily  - Medications, including psychotropic and medical, were reconciled. C/w insulin 30 lantus nightly + 10 admelog TID, C/w spironolactone 25mg
"Mr. Bueno is a 57 year old man, domiciled with sister in [place], on disability, single, documented diagnoses of bipolar 1 disorder, antisocial personality disorder, PMHx pituitary tumor, HTN, T2DM, and GERD (only compliant with HTN medications), 1 remote suicide attempt via injecting bleach per patient, with numerous psychiatric hospitalizations (last at [facility] in [place] from 10/9/2022 - 10/12/2022), multiple ED visits in the past few months for medical, MH and substance use disorder, history of multiple incarcerations (last nine years ago, patient served 10 year sentence for manslaughter and was [facility] in ), history of violence and aggression both on the psychiatric unit and in the community,  history of polysubstance abuse (daily ETOH use c/b reportedly ETOH withdrawal seizures per patient report, MJ use, cocaine use, past heroin use, now on methadone maintenance through [facility] in [place]), +family history (father  of drug/alcohol overdose, and half sister has schizophrenia), he self presented to with homicidal ideation and paranoia in the context of substance abuse coupled with medication non compliance. Transferred from [facility].     Patient demands supportive housing and nursing assistance for management of his diabetes. He makes conditional suicidal statements such as "If I'm discharged without housing I'm going to get 2 bundles of fentanyl and just go to sleep." He reports he needs help with "panic, anxiety, PTSD, and ADHD". He requests increased dose of valium and addition of ativan and Adderall. He was re-directed and agreement was reached regarding dosing of valium and zyprexa. Ongoing management of likely jose for improved functioning in community and resolution of SI/HI.     - C/w lithium 900mg nightly  - c/w Olanzapine 5mg nightly  - C/w valium 10mg TID  - C/w gabapentin 800mg TID   - C/W methadone 110mg daily  - Medications, including psychotropic and medical, were reconciled. C/w insulin 30 lantus nightly + 10 admelog TID, C/w spironolactone 25mg

## 2023-01-23 NOTE — BH INPATIENT PSYCHIATRY PROGRESS NOTE - NSTXCOPEINTERMD_PSY_ALL_CORE
supportive therapy provided for regulation of moods

## 2023-01-23 NOTE — BH INPATIENT PSYCHIATRY PROGRESS NOTE - CURRENT MEDICATION
MEDICATIONS  (STANDING):  carvedilol 6.25 milliGRAM(s) Oral every 12 hours  dextrose 5%. 1000 milliLiter(s) (100 mL/Hr) IV Continuous <Continuous>  dextrose 5%. 1000 milliLiter(s) (50 mL/Hr) IV Continuous <Continuous>  dextrose 5%. 1000 milliLiter(s) (100 mL/Hr) IV Continuous <Continuous>  dextrose 5%. 1000 milliLiter(s) (50 mL/Hr) IV Continuous <Continuous>  dextrose 50% Injectable 25 Gram(s) IV Push once  dextrose 50% Injectable 12.5 Gram(s) IV Push once  dextrose 50% Injectable 25 Gram(s) IV Push once  dextrose 50% Injectable 25 Gram(s) IV Push once  dextrose 50% Injectable 12.5 Gram(s) IV Push once  dextrose 50% Injectable 25 Gram(s) IV Push once  diazepam    Tablet 10 milliGRAM(s) Oral three times a day  fluticasone propionate 50 MICROgram(s)/spray Nasal Spray 1 Spray(s) Both Nostrils two times a day  gabapentin 800 milliGRAM(s) Oral three times a day  glucagon  Injectable 1 milliGRAM(s) IntraMuscular once  glucagon  Injectable 1 milliGRAM(s) IntraMuscular once  insulin glargine Injectable (LANTUS) 30 Unit(s) SubCutaneous at bedtime  insulin lispro (ADMELOG) corrective regimen sliding scale   SubCutaneous three times a day before meals  insulin lispro Injectable (ADMELOG) 10 Unit(s) SubCutaneous three times a day before meals  lithium 900 milliGRAM(s) Oral at bedtime  melatonin. 5 milliGRAM(s) Oral at bedtime  methadone    Tablet 110 milliGRAM(s) Oral daily  multivitamin 1 Tablet(s) Oral daily  nicotine -  14 mG/24Hr(s) Patch 1 Patch Transdermal daily  OLANZapine 5 milliGRAM(s) Oral at bedtime  polyethylene glycol 3350 17 Gram(s) Oral two times a day  senna 1 Tablet(s) Oral at bedtime  spironolactone 25 milliGRAM(s) Oral daily    MEDICATIONS  (PRN):  dextrose Oral Gel 15 Gram(s) Oral once PRN Blood Glucose LESS THAN 70 milliGRAM(s)/deciliter  dextrose Oral Gel 15 Gram(s) Oral once PRN Blood Glucose LESS THAN 70 milliGRAM(s)/deciliter  gabapentin 600 milliGRAM(s) Oral daily PRN anxiety  nicotine  Polacrilex Gum 2 milliGRAM(s) Oral every 4 hours PRN breakthrough cravings  OLANZapine 2.5 milliGRAM(s) Oral every 4 hours PRN agitation   MEDICATIONS  (STANDING):    MEDICATIONS  (PRN):

## 2023-01-23 NOTE — BH INPATIENT PSYCHIATRY DISCHARGE NOTE - NSBHMETABOLIC_PSY_ALL_CORE_FT
BMI: BMI (kg/m2): 30.3 (01-23-23 @ 15:04)  HbA1c: A1C with Estimated Average Glucose Result: 9.8 % (01-20-23 @ 08:49)    Glucose: POCT Blood Glucose.: 246 mg/dL (01-23-23 @ 12:44)    BP: 149/82 (01-23-23 @ 08:40) (139/78 - 163/87)  Lipid Panel: Date/Time: 01-20-23 @ 08:49  Cholesterol, Serum: 178  Direct LDL: --  HDL Cholesterol, Serum: 45  Total Cholesterol/HDL Ration Measurement: --  Triglycerides, Serum: 161
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
## 2023-01-23 NOTE — BH INPATIENT PSYCHIATRY PROGRESS NOTE - NSBHCHARTREVIEWVS_PSY_A_CORE FT
Vital Signs Last 24 Hrs  T(C): 36.9 (01-23-23 @ 08:40), Max: 36.9 (01-23-23 @ 08:40)  T(F): 98.5 (01-23-23 @ 08:40), Max: 98.5 (01-23-23 @ 08:40)  HR: 79 (01-23-23 @ 08:40) (79 - 92)  BP: 149/82 (01-23-23 @ 08:40) (149/82 - 158/85)  BP(mean): --  RR: 18 (01-23-23 @ 08:40) (18 - 20)  SpO2: 94% (01-23-23 @ 08:40) (94% - 95%)     Vital Signs Last 24 Hrs  T(C): --  T(F): --  HR: --  BP: --  BP(mean): --  RR: --  SpO2: --

## 2023-01-23 NOTE — BH INPATIENT PSYCHIATRY DISCHARGE NOTE - NSDCMRMEDTOKEN_GEN_ALL_CORE_FT
carvedilol 6.25 mg oral tablet: 1 tab(s) orally every 12 hours  diazePAM 10 mg oral tablet: 1 tab(s) orally 3 times a day  gabapentin 600 mg oral tablet: 1 tab(s) orally once a day, As needed, anxiety  gabapentin 800 mg oral tablet: 1 tab(s) orally 3 times a day  insulin glargine 100 units/mL subcutaneous solution: 30 unit(s) subcutaneous once a day (at bedtime)  insulin lispro 100 units/mL injectable solution: 10 unit(s) subcutaneous 3 times a day (before meals)  lithium 300 mg oral capsule: 3 cap(s) orally once a day (at bedtime)  melatonin 5 mg oral tablet: 1 tab(s) orally once a day (at bedtime)  methadone 10 mg oral tablet: 3 tab(s) orally once a day  methadone 40 mg oral tablet, dispersible: 2 tab(s) orally once a day  Multiple Vitamins oral tablet: 1 tab(s) orally once a day  nicotine 14 mg/24 hr transdermal film, extended release: 1 film(s) transdermal once a day  OLANZapine 5 mg oral tablet: 1 tab(s) orally once a day (at bedtime)  senna leaf extract oral tablet: 1 tab(s) orally once a day (at bedtime)  spironolactone 25 mg oral tablet: 1 tab(s) orally once a day   carvedilol 6.25 mg oral tablet: 1 tab(s) orally every 12 hours  gabapentin 800 mg oral tablet: 1 tab(s) orally 3 times a day  insulin glargine 100 units/mL subcutaneous solution: 30 unit(s) subcutaneous once a day (at bedtime)  insulin lispro 100 units/mL injectable solution: 10 unit(s) subcutaneous 3 times a day (before meals)  lithium 300 mg oral capsule: 3 cap(s) orally once a day (at bedtime)  melatonin 5 mg oral tablet: 1 tab(s) orally once a day (at bedtime)  methadone 10 mg oral tablet: 3 tab(s) orally once a day  methadone 40 mg oral tablet, dispersible: 2 tab(s) orally once a day  Multiple Vitamins oral tablet: 1 tab(s) orally once a day  nicotine 14 mg/24 hr transdermal film, extended release: 1 film(s) transdermal once a day  OLANZapine 5 mg oral tablet: 1 tab(s) orally once a day (at bedtime)  senna leaf extract oral tablet: 1 tab(s) orally once a day (at bedtime)  spironolactone 25 mg oral tablet: 1 tab(s) orally once a day   carvedilol 6.25 mg oral tablet: 1 tab(s) orally every 12 hours  gabapentin 800 mg oral tablet: 1 tab(s) orally 3 times a day  gabapentin 800 mg oral tablet: 1 tab(s) orally 3 times a day  gabapentin 800 mg oral tablet: 1 tab(s) orally 3 times a day  insulin glargine: 30 unit(s) subcutaneous once a day  insulin glargine 100 units/mL subcutaneous solution: 30 unit(s) subcutaneous once a day (at bedtime)  insulin lispro: 10 unit(s) subcutaneous 3 times a day  insulin lispro 100 units/mL injectable solution: 10 unit(s) subcutaneous 3 times a day (before meals)  lithium 300 mg oral capsule: 3 cap(s) orally once a day (at bedtime)  melatonin 5 mg oral tablet: 1 tab(s) orally once a day (at bedtime)  methadone 10 mg oral tablet: 3 tab(s) orally once a day  methadone 40 mg oral tablet, dispersible: 2 tab(s) orally once a day  Multiple Vitamins oral tablet: 1 tab(s) orally once a day  nicotine 14 mg/24 hr transdermal film, extended release: 1 film(s) transdermal once a day  OLANZapine 5 mg oral tablet: 1 tab(s) orally once a day (at bedtime)  senna leaf extract oral tablet: 1 tab(s) orally once a day (at bedtime)  spironolactone 25 mg oral tablet: 1 tab(s) orally once a day  Valium 10 mg oral tablet: 1 tab(s) orally 3 times a day MDD:30 mg   carvedilol 6.25 mg oral tablet: 1 tab(s) orally every 12 hours  gabapentin 800 mg oral tablet: 1 tab(s) orally 3 times a day  gabapentin 800 mg oral tablet: 1 tab(s) orally 3 times a day  gabapentin 800 mg oral tablet: 1 tab(s) orally 3 times a day  insulin glargine: 30 unit(s) subcutaneous once a day  insulin glargine 100 units/mL subcutaneous solution: 30 unit(s) subcutaneous once a day (at bedtime)  insulin lispro: 10 unit(s) subcutaneous 3 times a day  insulin lispro 100 units/mL injectable solution: 10 unit(s) subcutaneous 3 times a day (before meals)  lisinopril 20 mg oral tablet: 1 tab(s) orally once a day   lithium 300 mg oral capsule: 3 cap(s) orally once a day (at bedtime)  melatonin 5 mg oral tablet: 1 tab(s) orally once a day (at bedtime)  methadone 10 mg oral tablet: 3 tab(s) orally once a day  methadone 40 mg oral tablet, dispersible: 2 tab(s) orally once a day  Multiple Vitamins oral tablet: 1 tab(s) orally once a day  nicotine 14 mg/24 hr transdermal film, extended release: 1 film(s) transdermal once a day  OLANZapine 5 mg oral tablet: 1 tab(s) orally once a day (at bedtime)  senna leaf extract oral tablet: 1 tab(s) orally once a day (at bedtime)  spironolactone 25 mg oral tablet: 1 tab(s) orally once a day  Valium 10 mg oral tablet: 1 tab(s) orally 3 times a day MDD:30 mg
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
## 2023-01-23 NOTE — CONSULT NOTE ADULT - ASSESSMENT
57 year old man, domiciled with sister in [place], on disability, single, documented diagnoses of bipolar 1 disorder, antisocial personality disorder, PMHx pituitary tumor, HTN, HF, T2DM, and GERD (only compliant with HTN medications) presents to psychiatry unit for psychosis and depression. Medicine consulted for HTN management.     #HTN   - patient is only on spirnolactone 25mg daily   - SBP ~160  Recommend:   - start coreg 6.25 BID for HTN and also for HF  - will reassess if additional medication or higher dose of coreg is needed  - hold parameters should be SBP <100, HR< 60     #HFrEF  - per primary team EF ~35%   - Patient not on GDMT  - will start patient on beta blocker as above   - can add ACE/ARB as BP tolerates   - outpatient follow up, HF 2/2 substance use? ischemic work up unknown     Plan discussed with attending physician. Medicine will continue to follow.

## 2023-01-23 NOTE — BH INPATIENT PSYCHIATRY PROGRESS NOTE - NSBHATTESTSTAFFAMEND_PSY_A_CORE
[phone]
I have personally seen and examined this patient. I fully participated in the care of this patient. I have made amendments to the documentation where appropriate and otherwise agree with the history, physical exam, and plan as documented by the
I have personally seen and examined this patient. I fully participated in the care of this patient. I have made amendments to the documentation where appropriate and otherwise agree with the history, physical exam, and plan as documented by the

## 2023-01-23 NOTE — BH INPATIENT PSYCHIATRY DISCHARGE NOTE - REASON FOR ADMISSION
Mr. Bueno is a 57 year old man, domiciled with sister in [place], on disability, single, documented diagnoses of bipolar 1 disorder, antisocial personality disorder, PMHx pituitary tumor, HTN, T2DM, and GERD (only compliant with HTN medications), 1 remote suicide attempt via injecting bleach per patient, with numerous psychiatric hospitalizations (last at [facility] in [place] from 10/9/2022 - 10/12/2022), multiple ED visits in the past few months for medical, MH and substance use disorder, history of multiple incarcerations (last nine years ago, patient served 10 year sentence for manslaughter and was released from alf in 2014), history of violence and aggression both on the psychiatric unit and in the community,  history of polysubstance abuse (daily ETOH use c/b reportedly ETOH withdrawal seizures per patient report, MJ use, cocaine use, past heroin use, now on methadone maintenance through [facility] in [place]).     He self-presented with homicidal ideation and paranoia in the context of substance abuse and medication non compliance. Transferred from [facility]. Patient demands supportive housing. He makes conditional suicidal statements such as "If I'm discharged without housing I'm going to get 2 bundles of fentanyl and just go to sleep."

## 2023-01-23 NOTE — CONSULT NOTE ADULT - SUBJECTIVE AND OBJECTIVE BOX
MEDICINE CONSULT NOTE     HPI:     PAST MEDICAL/SURGICAL HISTORY  PAST MEDICAL & SURGICAL HISTORY:  Diabetes mellitus      HTN (hypertension)      GERD (gastroesophageal reflux disease)      History of pituitary tumor      Heart failure          REVIEW OF SYSTEMS:  As stated in HPI.     T(C): 36.9 (01-23-23 @ 08:40), Max: 36.9 (01-23-23 @ 08:40)  HR: 79 (01-23-23 @ 08:40) (79 - 92)  BP: 149/82 (01-23-23 @ 08:40) (149/82 - 158/85)  RR: 18 (01-23-23 @ 08:40) (18 - 20)  SpO2: 94% (01-23-23 @ 08:40) (94% - 95%)  Wt(kg): --Vital Signs Last 24 Hrs  T(C): 36.9 (23 Jan 2023 08:40), Max: 36.9 (23 Jan 2023 08:40)  T(F): 98.5 (23 Jan 2023 08:40), Max: 98.5 (23 Jan 2023 08:40)  HR: 79 (23 Jan 2023 08:40) (79 - 92)  BP: 149/82 (23 Jan 2023 08:40) (149/82 - 158/85)  BP(mean): --  RR: 18 (23 Jan 2023 08:40) (18 - 20)  SpO2: 94% (23 Jan 2023 08:40) (94% - 95%)    Parameters below as of 23 Jan 2023 08:40  Patient On (Oxygen Delivery Method): room air        PHYSICAL EXAM:  GENERAL: NAD,   HEAD:  Atraumatic, Normocephalic  EYES: EOMI, PERRLA, conjunctiva and sclera clear  ENMT: No tonsillar erythema, exudates, or enlargement; Moist mucous membranes,   NECK: Supple, No JVD,   NERVOUS SYSTEM:  Alert & Oriented X3, CN intact   CHEST/LUNG: Clear to percussion bilaterally; No rales, rhonchi, wheezing, or rubs  HEART: Regular rate and rhythm; No murmurs, rubs, or gallops  ABDOMEN: Soft, Nontender, Nondistended; Bowel sounds present  EXTREMITIES:  2+ Peripheral Pulses, No clubbing, cyanosis, or edema  SKIN: No rashes or lesions    Consultant(s) Notes Reviewed:  [x ] YES  [ ] NO  Care Discussed with Consultants/Other Providers [ x] YES  [ ] NO    LABS:  CBC       BMP      CMP      PT/INR      Amylase/Lipase            RADIOLOGY & ADDITIONAL TESTS:    Imaging Personally Reviewed MEDICINE CONSULT NOTE     HPI:     Myles is a 57 year old man, domiciled with sister in [place], on disability, single, documented diagnoses of bipolar 1 disorder, antisocial personality disorder, PMHx pituitary tumor, HTN, T2DM, and GERD (only compliant with HTN medications), 1 remote suicide attempt via injecting bleach per patient, with numerous psychiatric hospitalizations (last at [facility] in [place] from 10/9/2022 - 10/12/2022), multiple ED visits in the past few months for medical, MH and substance use disorder, history of multiple incarcerations (last nine years ago, patient served 10 year sentence for manslaughter and was released from care home in ), history of violence and aggression both on the psychiatric unit and in the community,  history of polysubstance abuse (daily ETOH use c/b reportedly ETOH withdrawal seizures per patient report, MJ use, cocaine use, past heroin use, now on methadone maintenance through [facility] in [place]), +family history (father  of drug/alcohol overdose, and half sister has schizophrenia), he self presented to with homicidal ideation and paranoia in the context of substance abuse coupled with medication non compliance. UTOX + for MJ, BDZ, Methadone, cocaine.        PAST MEDICAL/SURGICAL HISTORY  PAST MEDICAL & SURGICAL HISTORY:  Diabetes mellitus      HTN (hypertension)      GERD (gastroesophageal reflux disease)      History of pituitary tumor      Heart failure          REVIEW OF SYSTEMS:  As stated in HPI.     T(C): 36.9 (23 @ 08:40), Max: 36.9 (23 @ 08:40)  HR: 79 (23 @ 08:40) (79 - 92)  BP: 149/82 (23 @ 08:40) (149/82 - 158/85)  RR: 18 (23 @ 08:40) (18 - 20)  SpO2: 94% (23 @ 08:40) (94% - 95%)  Wt(kg): --Vital Signs Last 24 Hrs  T(C): 36.9 (2023 08:40), Max: 36.9 (2023 08:40)  T(F): 98.5 (2023 08:40), Max: 98.5 (2023 08:40)  HR: 79 (2023 08:40) (79 - 92)  BP: 149/82 (2023 08:40) (149/82 - 158/85)  BP(mean): --  RR: 18 (2023 08:40) (18 - 20)  SpO2: 94% (2023 08:40) (94% - 95%)    Parameters below as of 2023 08:40  Patient On (Oxygen Delivery Method): room air        PHYSICAL EXAM:  GENERAL: NAD,   HEAD:  Atraumatic, Normocephalic  EYES: EOMI, PERRLA, conjunctiva and sclera clear  ENMT: No tonsillar erythema, exudates, or enlargement; Moist mucous membranes,   NECK: Supple, No JVD,   NERVOUS SYSTEM:  Alert & Oriented X3, CN intact   CHEST/LUNG: Clear to percussion bilaterally; No rales, rhonchi, wheezing, or rubs  HEART: Regular rate and rhythm; No murmurs, rubs, or gallops  ABDOMEN: Soft, Nontender, Nondistended; Bowel sounds present  EXTREMITIES:  2+ Peripheral Pulses, No clubbing, cyanosis, or edema  SKIN: No rashes or lesions    Consultant(s) Notes Reviewed:  [x ] YES  [ ] NO  Care Discussed with Consultants/Other Providers [ x] YES  [ ] NO    LABS:  CBC       BMP      CMP      PT/INR      Amylase/Lipase            RADIOLOGY & ADDITIONAL TESTS:    Imaging Personally Reviewed

## 2023-01-23 NOTE — BH INPATIENT PSYCHIATRY PROGRESS NOTE - NSBHMETABOLIC_PSY_ALL_CORE_FT
BMI: BMI (kg/m2): 30.3 (01-23-23 @ 15:04)  HbA1c: A1C with Estimated Average Glucose Result: 9.8 % (01-20-23 @ 08:49)    Glucose: POCT Blood Glucose.: 246 mg/dL (01-23-23 @ 12:44)    BP: 149/82 (01-23-23 @ 08:40) (139/78 - 163/87)  Lipid Panel: Date/Time: 01-20-23 @ 08:49  Cholesterol, Serum: 178  Direct LDL: --  HDL Cholesterol, Serum: 45  Total Cholesterol/HDL Ration Measurement: --  Triglycerides, Serum: 161   BMI: BMI (kg/m2): 30.3 (01-23-23 @ 15:04)  HbA1c: A1C with Estimated Average Glucose Result: 9.8 % (01-20-23 @ 08:49)    Glucose: POCT Blood Glucose.: 310 mg/dL (01-25-23 @ 11:24)    BP: 171/77 (01-25-23 @ 12:50) (151/72 - 181/76)  Lipid Panel: Date/Time: 01-20-23 @ 08:49  Cholesterol, Serum: 178  Direct LDL: --  HDL Cholesterol, Serum: 45  Total Cholesterol/HDL Ration Measurement: --  Triglycerides, Serum: 161

## 2023-01-23 NOTE — BH INPATIENT PSYCHIATRY PROGRESS NOTE - NSTXSUBMISINTERMD_PSY_ALL_CORE
Maintaining methadone treatment. Motivational interviewing 

## 2023-01-24 LAB
GLUCOSE BLDC GLUCOMTR-MCNC: 136 MG/DL — HIGH (ref 70–99)
GLUCOSE BLDC GLUCOMTR-MCNC: 178 MG/DL — HIGH (ref 70–99)
GLUCOSE BLDC GLUCOMTR-MCNC: 184 MG/DL — HIGH (ref 70–99)
GLUCOSE BLDC GLUCOMTR-MCNC: 236 MG/DL — HIGH (ref 70–99)

## 2023-01-24 PROCEDURE — 99239 HOSP IP/OBS DSCHRG MGMT >30: CPT

## 2023-01-24 PROCEDURE — 99232 SBSQ HOSP IP/OBS MODERATE 35: CPT | Mod: GC

## 2023-01-24 RX ORDER — SPIRONOLACTONE 25 MG/1
1 TABLET, FILM COATED ORAL
Qty: 30 | Refills: 0
Start: 2023-01-24 | End: 2023-02-22

## 2023-01-24 RX ORDER — INSULIN GLARGINE 100 [IU]/ML
30 INJECTION, SOLUTION SUBCUTANEOUS
Qty: 9 | Refills: 0
Start: 2023-01-24 | End: 2023-02-22

## 2023-01-24 RX ORDER — LANOLIN ALCOHOL/MO/W.PET/CERES
1 CREAM (GRAM) TOPICAL
Qty: 0 | Refills: 0 | DISCHARGE

## 2023-01-24 RX ORDER — INSULIN GLARGINE 100 [IU]/ML
30 INJECTION, SOLUTION SUBCUTANEOUS
Qty: 0 | Refills: 0 | DISCHARGE
Start: 2023-01-24

## 2023-01-24 RX ORDER — GABAPENTIN 400 MG/1
1 CAPSULE ORAL
Qty: 0 | Refills: 0 | DISCHARGE
Start: 2023-01-24

## 2023-01-24 RX ORDER — INSULIN LISPRO 100/ML
10 VIAL (ML) SUBCUTANEOUS
Qty: 9 | Refills: 0
Start: 2023-01-24 | End: 2023-02-22

## 2023-01-24 RX ORDER — SENNA PLUS 8.6 MG/1
1 TABLET ORAL
Qty: 30 | Refills: 0
Start: 2023-01-24 | End: 2023-02-22

## 2023-01-24 RX ORDER — INSULIN LISPRO 100/ML
10 VIAL (ML) SUBCUTANEOUS
Qty: 0 | Refills: 0 | DISCHARGE
Start: 2023-01-24

## 2023-01-24 RX ORDER — GABAPENTIN 400 MG/1
1 CAPSULE ORAL
Qty: 90 | Refills: 0
Start: 2023-01-24 | End: 2023-02-22

## 2023-01-24 RX ORDER — OLANZAPINE 15 MG/1
1 TABLET, FILM COATED ORAL
Qty: 30 | Refills: 0
Start: 2023-01-24 | End: 2023-02-22

## 2023-01-24 RX ORDER — LANOLIN ALCOHOL/MO/W.PET/CERES
1 CREAM (GRAM) TOPICAL
Qty: 30 | Refills: 0
Start: 2023-01-24 | End: 2023-02-22

## 2023-01-24 RX ORDER — DIAZEPAM 5 MG
1 TABLET ORAL
Qty: 90 | Refills: 0
Start: 2023-01-24 | End: 2023-02-22

## 2023-01-24 RX ORDER — LITHIUM CARBONATE 300 MG/1
3 TABLET, EXTENDED RELEASE ORAL
Qty: 90 | Refills: 0
Start: 2023-01-24 | End: 2023-02-22

## 2023-01-24 RX ORDER — NICOTINE POLACRILEX 2 MG
1 GUM BUCCAL
Qty: 30 | Refills: 0
Start: 2023-01-24 | End: 2023-02-22

## 2023-01-24 RX ORDER — CARVEDILOL PHOSPHATE 80 MG/1
1 CAPSULE, EXTENDED RELEASE ORAL
Qty: 60 | Refills: 0
Start: 2023-01-24 | End: 2023-02-22

## 2023-01-24 RX ADMIN — Medication 1 PATCH: at 08:10

## 2023-01-24 RX ADMIN — Medication 10 UNIT(S): at 12:37

## 2023-01-24 RX ADMIN — CARVEDILOL PHOSPHATE 6.25 MILLIGRAM(S): 80 CAPSULE, EXTENDED RELEASE ORAL at 21:31

## 2023-01-24 RX ADMIN — Medication 10 UNIT(S): at 09:04

## 2023-01-24 RX ADMIN — Medication 10 MILLIGRAM(S): at 10:29

## 2023-01-24 RX ADMIN — GABAPENTIN 600 MILLIGRAM(S): 400 CAPSULE ORAL at 17:09

## 2023-01-24 RX ADMIN — SPIRONOLACTONE 25 MILLIGRAM(S): 25 TABLET, FILM COATED ORAL at 10:29

## 2023-01-24 RX ADMIN — Medication 5 MILLIGRAM(S): at 21:31

## 2023-01-24 RX ADMIN — POLYETHYLENE GLYCOL 3350 17 GRAM(S): 17 POWDER, FOR SOLUTION ORAL at 10:28

## 2023-01-24 RX ADMIN — POLYETHYLENE GLYCOL 3350 17 GRAM(S): 17 POWDER, FOR SOLUTION ORAL at 21:30

## 2023-01-24 RX ADMIN — GABAPENTIN 800 MILLIGRAM(S): 400 CAPSULE ORAL at 10:29

## 2023-01-24 RX ADMIN — OLANZAPINE 5 MILLIGRAM(S): 15 TABLET, FILM COATED ORAL at 21:31

## 2023-01-24 RX ADMIN — Medication 1 SPRAY(S): at 10:27

## 2023-01-24 RX ADMIN — Medication 1 PATCH: at 10:28

## 2023-01-24 RX ADMIN — INSULIN GLARGINE 30 UNIT(S): 100 INJECTION, SOLUTION SUBCUTANEOUS at 21:32

## 2023-01-24 RX ADMIN — Medication 1 TABLET(S): at 10:29

## 2023-01-24 RX ADMIN — GABAPENTIN 800 MILLIGRAM(S): 400 CAPSULE ORAL at 14:17

## 2023-01-24 RX ADMIN — CARVEDILOL PHOSPHATE 6.25 MILLIGRAM(S): 80 CAPSULE, EXTENDED RELEASE ORAL at 10:30

## 2023-01-24 RX ADMIN — METHADONE HYDROCHLORIDE 110 MILLIGRAM(S): 40 TABLET ORAL at 10:28

## 2023-01-24 RX ADMIN — GABAPENTIN 800 MILLIGRAM(S): 400 CAPSULE ORAL at 21:31

## 2023-01-24 RX ADMIN — Medication 2: at 08:06

## 2023-01-24 RX ADMIN — Medication 10 MILLIGRAM(S): at 14:16

## 2023-01-24 RX ADMIN — LITHIUM CARBONATE 900 MILLIGRAM(S): 300 TABLET, EXTENDED RELEASE ORAL at 21:31

## 2023-01-24 RX ADMIN — SENNA PLUS 1 TABLET(S): 8.6 TABLET ORAL at 21:31

## 2023-01-24 RX ADMIN — Medication 10 MILLIGRAM(S): at 21:31

## 2023-01-24 RX ADMIN — Medication 1 PATCH: at 10:58

## 2023-01-24 RX ADMIN — Medication 10 UNIT(S): at 17:38

## 2023-01-24 RX ADMIN — Medication 1 SPRAY(S): at 21:32

## 2023-01-24 RX ADMIN — Medication 2: at 12:37

## 2023-01-24 RX ADMIN — Medication 1 PATCH: at 17:45

## 2023-01-24 RX ADMIN — Medication 4: at 17:39

## 2023-01-24 NOTE — PROGRESS NOTE ADULT - SUBJECTIVE AND OBJECTIVE BOX
INTERVAL HPI/OVERNIGHT EVENTS:  24 hr events: started on coreg 6.25 BID and gabapentin  This morning: Patient was seen and examined at bedside. Very fatigued.    VITAL SIGNS:  T(F): 98.3 (01-24-23 @ 09:10)  HR: 71 (01-24-23 @ 09:10)  BP: 151/72 (01-24-23 @ 09:10)  RR: 18 (01-24-23 @ 09:10)  SpO2: 93% (01-24-23 @ 09:10)  Wt(kg): --    PHYSICAL EXAM:    Constitutional: NAD, deferred rest of exam as pt very fatigued.    MEDICATIONS  (STANDING):  carvedilol 6.25 milliGRAM(s) Oral every 12 hours  dextrose 5%. 1000 milliLiter(s) (100 mL/Hr) IV Continuous <Continuous>  dextrose 5%. 1000 milliLiter(s) (50 mL/Hr) IV Continuous <Continuous>  dextrose 5%. 1000 milliLiter(s) (50 mL/Hr) IV Continuous <Continuous>  dextrose 5%. 1000 milliLiter(s) (100 mL/Hr) IV Continuous <Continuous>  dextrose 50% Injectable 25 Gram(s) IV Push once  dextrose 50% Injectable 12.5 Gram(s) IV Push once  dextrose 50% Injectable 25 Gram(s) IV Push once  dextrose 50% Injectable 25 Gram(s) IV Push once  dextrose 50% Injectable 12.5 Gram(s) IV Push once  dextrose 50% Injectable 25 Gram(s) IV Push once  diazepam    Tablet 10 milliGRAM(s) Oral three times a day  fluticasone propionate 50 MICROgram(s)/spray Nasal Spray 1 Spray(s) Both Nostrils two times a day  gabapentin 800 milliGRAM(s) Oral three times a day  glucagon  Injectable 1 milliGRAM(s) IntraMuscular once  glucagon  Injectable 1 milliGRAM(s) IntraMuscular once  insulin glargine Injectable (LANTUS) 30 Unit(s) SubCutaneous at bedtime  insulin lispro (ADMELOG) corrective regimen sliding scale   SubCutaneous Before meals and at bedtime  insulin lispro Injectable (ADMELOG) 10 Unit(s) SubCutaneous three times a day before meals  lithium 900 milliGRAM(s) Oral at bedtime  melatonin. 5 milliGRAM(s) Oral at bedtime  methadone    Tablet 110 milliGRAM(s) Oral daily  multivitamin 1 Tablet(s) Oral daily  nicotine -  14 mG/24Hr(s) Patch 1 Patch Transdermal daily  OLANZapine 5 milliGRAM(s) Oral at bedtime  polyethylene glycol 3350 17 Gram(s) Oral two times a day  senna 1 Tablet(s) Oral at bedtime  spironolactone 25 milliGRAM(s) Oral daily    MEDICATIONS  (PRN):  dextrose Oral Gel 15 Gram(s) Oral once PRN Blood Glucose LESS THAN 70 milliGRAM(s)/deciliter  dextrose Oral Gel 15 Gram(s) Oral once PRN Blood Glucose LESS THAN 70 milliGRAM(s)/deciliter  gabapentin 600 milliGRAM(s) Oral three times a day PRN anxiety  nicotine  Polacrilex Gum 2 milliGRAM(s) Oral every 4 hours PRN breakthrough cravings  OLANZapine 2.5 milliGRAM(s) Oral every 4 hours PRN agitation      Allergies    Cogentin (Unknown)  Haldol (Unknown)  Thorazine (Rash)    Intolerances        LABS:                      RADIOLOGY & ADDITIONAL TESTS:  Reviewed

## 2023-01-24 NOTE — BH DISCHARGE NOTE NURSING/SOCIAL WORK/PSYCH REHAB - NSDCCRTYPESERV_GEN_ALL_CORE_FT
organization that assists consumers of mental health services in receiving the vital benefits to which they are entitled.

## 2023-01-24 NOTE — PROGRESS NOTE ADULT - ASSESSMENT
57M domiciled with sister in [place], on disability, single, documented diagnoses of bipolar 1 disorder, antisocial personality disorder with PMH pituitary tumor, HTN, HF, T2DM, and GERD (only compliant with HTN medications) presents to psychiatry unit for psychosis and depression. Medicine consulted for HTN management.     #HTN   - patient is only on spironolactone 25mg daily   - SBP ~160  Recommend:   - continue spironolactone 25 qD and coreg 6.25 BID for HTN and also for HF  - will reassess if additional medication or higher dose of coreg is needed  - hold parameters should be SBP <100, HR< 60   - can add ACE/ARB as BP tolerates for HF indication, too    #HFrEF  - per primary team EF ~35%   - Patient not on GDMT  - continue coreg beta blocker as above   - can add ACE/ARB as BP tolerates   - outpatient follow up, HF 2/2 substance use? ischemic work up unknown     # D2DM/insulin required  # Hyperglycemia   - A1C 9.8% (1/20/23)  - continue Lantus 30u sq qHS   - would increase Lispro from 10U TID AC meals to 13 units premeal as having hyperglycemia throughout day which seems to be 2/2 post meal spikes, and required 16 units correctional in 24 hr period  - moderate ISS for better coverage of post-prandial hyperglycemia   - goal F [id number]     Plan discussed with attending physician. Medicine will continue to follow.      57M domiciled with sister in [place], on disability, single, documented diagnoses of bipolar 1 disorder, antisocial personality disorder with PMH pituitary tumor, HTN, HF, T2DM, and GERD (only compliant with HTN medications) presents to psychiatry unit for psychosis and depression. Medicine consulted for HTN management.     #HTN   - patient is only on spironolactone 25mg daily   - SBP ~160  Recommend:   - continue spironolactone 25 qD and coreg 6.25 BID for HTN and also for HF  - will reassess if additional medication or higher dose of coreg is needed  - hold parameters should be SBP <100, HR< 60   - can add ACE/ARB as BP tolerates for HF indication, too    #HFrEF  - per primary team EF ~35%   - Patient not on GDMT  - continue coreg beta blocker as above   - can add ACE/ARB as BP tolerates   - outpatient follow up, HF 2/2 substance use? ischemic work up unknown     # D2DM/insulin required  # Hyperglycemia   - A1C 9.8% (1/20/23)  - continue Lantus 30u sq qHS   - would increase Lispro from 10U TID AC meals to 13 units premeal as having hyperglycemia throughout day which seems to be 2/2 post meal spikes, and required 16 units correctional in 24 hr period  - moderate ISS for better coverage of post-prandial hyperglycemia   - goal F [id number]     Recommendations preliminary until attending attestation. Medicine will continue to follow.

## 2023-01-24 NOTE — BH DISCHARGE NOTE NURSING/SOCIAL WORK/PSYCH REHAB - NSDCPEWEB_GEN_ALL_CORE
[facility] for Tobacco Control website --- [email or web address] website --- [email or web address]

## 2023-01-24 NOTE — BH DISCHARGE NOTE NURSING/SOCIAL WORK/PSYCH REHAB - NSDCPRGOAL_PSY_ALL_CORE
PT attended select groups during admission.  Pt has been loud and intrusive on the unit and sometimes verbally aggressive.  Pt has used inappropriate and offensive language in groups and in the milieu.  Pt has gotten into several verbal altercations during admission.  Pt is sometimes redirectable and has been apologetic for some of his behavior.  Pt has demonstrated poor impulse control but has stated that he knows that he should step away from situations where he feels angry and when he may be verbally aggressive.  Pt has been social with select peers.  Pt completed a safety plan and was able to identify some coping strategies.

## 2023-01-24 NOTE — BH DISCHARGE NOTE NURSING/SOCIAL WORK/PSYCH REHAB - NSDCPEEMAIL_GEN_ALL_CORE
[facility] for Tobacco Control email [email or web address]

## 2023-01-24 NOTE — BH DISCHARGE NOTE NURSING/SOCIAL WORK/PSYCH REHAB - NSTOBACCOOTHER_PSY_ALL_CORE_FT
Call [facility] Smokers' Quitline at [phone] ([phone]) or visit [email or web address]

## 2023-01-24 NOTE — BH DISCHARGE NOTE NURSING/SOCIAL WORK/PSYCH REHAB - PATIENT PORTAL LINK FT
You can access the FollowMyHealth Patient Portal offered by [facility] by registering at the following website: [email or web address]. By joining [facility]’s FollowMyHealth portal, you will also be able to view your health information using other applications (apps) compatible with our system.

## 2023-01-25 VITALS — SYSTOLIC BLOOD PRESSURE: 171 MMHG | DIASTOLIC BLOOD PRESSURE: 77 MMHG | HEART RATE: 71 BPM

## 2023-01-25 LAB
GLUCOSE BLDC GLUCOMTR-MCNC: 192 MG/DL — HIGH (ref 70–99)
GLUCOSE BLDC GLUCOMTR-MCNC: 310 MG/DL — HIGH (ref 70–99)

## 2023-01-25 PROCEDURE — 83036 HEMOGLOBIN GLYCOSYLATED A1C: CPT

## 2023-01-25 PROCEDURE — 99232 SBSQ HOSP IP/OBS MODERATE 35: CPT | Mod: GC

## 2023-01-25 PROCEDURE — U0003: CPT

## 2023-01-25 PROCEDURE — 82962 GLUCOSE BLOOD TEST: CPT

## 2023-01-25 PROCEDURE — 80061 LIPID PANEL: CPT

## 2023-01-25 PROCEDURE — 94640 AIRWAY INHALATION TREATMENT: CPT

## 2023-01-25 PROCEDURE — U0005: CPT

## 2023-01-25 PROCEDURE — 36415 COLL VENOUS BLD VENIPUNCTURE: CPT

## 2023-01-25 RX ORDER — LISINOPRIL 2.5 MG/1
20 TABLET ORAL DAILY
Refills: 0 | Status: DISCONTINUED | OUTPATIENT
Start: 2023-01-25 | End: 2023-01-25

## 2023-01-25 RX ORDER — LISINOPRIL 2.5 MG/1
1 TABLET ORAL
Qty: 30 | Refills: 0
Start: 2023-01-25 | End: 2023-02-23

## 2023-01-25 RX ADMIN — Medication 10 MILLIGRAM(S): at 14:32

## 2023-01-25 RX ADMIN — GABAPENTIN 800 MILLIGRAM(S): 400 CAPSULE ORAL at 14:32

## 2023-01-25 RX ADMIN — SPIRONOLACTONE 25 MILLIGRAM(S): 25 TABLET, FILM COATED ORAL at 10:24

## 2023-01-25 RX ADMIN — Medication 10 MILLIGRAM(S): at 10:24

## 2023-01-25 RX ADMIN — METHADONE HYDROCHLORIDE 110 MILLIGRAM(S): 40 TABLET ORAL at 10:26

## 2023-01-25 RX ADMIN — CARVEDILOL PHOSPHATE 6.25 MILLIGRAM(S): 80 CAPSULE, EXTENDED RELEASE ORAL at 11:42

## 2023-01-25 RX ADMIN — LISINOPRIL 20 MILLIGRAM(S): 2.5 TABLET ORAL at 13:02

## 2023-01-25 RX ADMIN — Medication 1 SPRAY(S): at 10:24

## 2023-01-25 RX ADMIN — Medication 1 PATCH: at 10:30

## 2023-01-25 RX ADMIN — Medication 8: at 11:28

## 2023-01-25 RX ADMIN — GABAPENTIN 800 MILLIGRAM(S): 400 CAPSULE ORAL at 10:25

## 2023-01-25 RX ADMIN — POLYETHYLENE GLYCOL 3350 17 GRAM(S): 17 POWDER, FOR SOLUTION ORAL at 11:43

## 2023-01-25 RX ADMIN — Medication 1 TABLET(S): at 10:24

## 2023-01-25 RX ADMIN — Medication 10 UNIT(S): at 11:27

## 2023-01-25 NOTE — BH TREATMENT PLAN - NSTXCOPEINTERRN_PSY_ALL_CORE
Patient is encouraged to use coping skills that worked best in past.
Encourage patient to adhere with medications and treatment. Encourage patient to attend groups and verbalize his feelings and concerns. Help patient identify coping strategies that have worked in the past and support the use of these strategies.

## 2023-01-25 NOTE — BH TREATMENT PLAN - NSTXOTHERINTERMD_PSY_ALL_CORE
Medicine following. Coreg started.  Medicine following. Coreg started. Pt has very poor adherence to diabetes treatment. He can be followed by the trail of the sugar packets.

## 2023-01-25 NOTE — BH TREATMENT PLAN - NSTXVIOLNTINTERMD_PSY_ALL_CORE
on zyprexa, valium, and neurontin. Calm and at is baseline which is very poor. He has a significant degree of impulse control disorder. 
on zyprexa, valium, and neurontin. Calm and at is baseline which is very poor. He has a significant degree of impulse control disorder.

## 2023-01-25 NOTE — BH TREATMENT PLAN - NSTXSUBMISINTERMD_PSY_ALL_CORE
Maintaining methadone treatment. Motivational interviewing  Maintaining methadone treatment. Motivational interviewing, taking valium for impulse control

## 2023-01-25 NOTE — BH TREATMENT PLAN - NSTXSUBMISINTERPR_PSY_ALL_CORE
Pt will be invited and encouraged to attend all offered groups
Pt will be prompted to attend when a speaker is present on the unit

## 2023-01-25 NOTE — BH TREATMENT PLAN - NSTXCOPEINTERPR_PSY_ALL_CORE
Pt will be invited and encouraged to attend all offered groups
Pt will continue to be invited and encouraged to attend all offered groups

## 2023-01-25 NOTE — BH TREATMENT PLAN - NSTXCAREGIVERPARTICIPATE_PSY_P_CORE
No, patient unwilling to involve family/caregiver Family/Caregiver participated in identification of needs/problems/goals for treatment/No, patient unwilling to involve family/caregiver

## 2023-01-25 NOTE — BH TREATMENT PLAN - NSTXCOPEINTERMD_PSY_ALL_CORE
supportive therapy provided for regulation of moods supportive therapy provided for regulation of moods, help with impulse control

## 2023-01-25 NOTE — PROGRESS NOTE ADULT - SUBJECTIVE AND OBJECTIVE BOX
INTERVAL HPI/OVERNIGHT EVENTS:    This morning: Patient was seen and examined at bedside. Feeling well overall.     VITAL SIGNS:  T(F): 98.6 (01-25-23 @ 09:00)  HR: 71 (01-25-23 @ 12:50)  BP: 171/77 (01-25-23 @ 12:50)  RR: 18 (01-25-23 @ 09:00)  SpO2: 95% (01-25-23 @ 09:00)  Wt(kg): --    PHYSICAL EXAM:    Constitutional: NAD  HEENT: EOMI, sclera non-icteric   Respiratory: breathing comfortably on RA  Gastrointestinal: obese abdomen  Neurological: alert and oriented, responds appropriately    MEDICATIONS  (STANDING):  carvedilol 6.25 milliGRAM(s) Oral every 12 hours  dextrose 5%. 1000 milliLiter(s) (100 mL/Hr) IV Continuous <Continuous>  dextrose 5%. 1000 milliLiter(s) (50 mL/Hr) IV Continuous <Continuous>  dextrose 5%. 1000 milliLiter(s) (100 mL/Hr) IV Continuous <Continuous>  dextrose 5%. 1000 milliLiter(s) (50 mL/Hr) IV Continuous <Continuous>  dextrose 50% Injectable 25 Gram(s) IV Push once  dextrose 50% Injectable 12.5 Gram(s) IV Push once  dextrose 50% Injectable 25 Gram(s) IV Push once  dextrose 50% Injectable 25 Gram(s) IV Push once  dextrose 50% Injectable 12.5 Gram(s) IV Push once  dextrose 50% Injectable 25 Gram(s) IV Push once  diazepam    Tablet 10 milliGRAM(s) Oral three times a day  fluticasone propionate 50 MICROgram(s)/spray Nasal Spray 1 Spray(s) Both Nostrils two times a day  gabapentin 800 milliGRAM(s) Oral three times a day  glucagon  Injectable 1 milliGRAM(s) IntraMuscular once  glucagon  Injectable 1 milliGRAM(s) IntraMuscular once  insulin glargine Injectable (LANTUS) 30 Unit(s) SubCutaneous at bedtime  insulin lispro (ADMELOG) corrective regimen sliding scale   SubCutaneous Before meals and at bedtime  insulin lispro Injectable (ADMELOG) 10 Unit(s) SubCutaneous three times a day before meals  lisinopril 20 milliGRAM(s) Oral daily  lithium 900 milliGRAM(s) Oral at bedtime  melatonin. 5 milliGRAM(s) Oral at bedtime  multivitamin 1 Tablet(s) Oral daily  nicotine -  14 mG/24Hr(s) Patch 1 Patch Transdermal daily  OLANZapine 5 milliGRAM(s) Oral at bedtime  polyethylene glycol 3350 17 Gram(s) Oral two times a day  senna 1 Tablet(s) Oral at bedtime  spironolactone 25 milliGRAM(s) Oral daily    MEDICATIONS  (PRN):  dextrose Oral Gel 15 Gram(s) Oral once PRN Blood Glucose LESS THAN 70 milliGRAM(s)/deciliter  dextrose Oral Gel 15 Gram(s) Oral once PRN Blood Glucose LESS THAN 70 milliGRAM(s)/deciliter  gabapentin 600 milliGRAM(s) Oral three times a day PRN anxiety  nicotine  Polacrilex Gum 2 milliGRAM(s) Oral every 4 hours PRN breakthrough cravings  OLANZapine 2.5 milliGRAM(s) Oral every 4 hours PRN agitation      Allergies    Cogentin (Unknown)  Haldol (Unknown)  Thorazine (Rash)    Intolerances        LABS:                      RADIOLOGY & ADDITIONAL TESTS:  Reviewed

## 2023-01-25 NOTE — PROGRESS NOTE ADULT - ATTENDING COMMENTS
HTN/HFrEF: Carvedilol 6.25mg po q12hr, c/w Spironolactone, restart Lisinopril 20mg po daily from home to optimize BP control and for GDMT for CHF  T2DM/Hyperglycemia: FS reviewed. c/w Lantus 30U sq qHS and Lispro 10U TID AC meals, c/w mISS, goal -180   Psych meds per Psychiatry. medically ok for d/c home today
HTN/HFrEF: 's since added Carvedilol 6.25mg po q12hr, c/w Spironolactone, consider adding ARB as part of DMGT   T2DM/Hyperglycemia: FS reviewed. c/w Lantus 30U sq qHS and Lispro 10U TID AC meals, c/w mISS, goal -180   Psych meds per Psychiatry

## 2023-01-25 NOTE — BH TREATMENT PLAN - NSDCCRITERIA_PSY_ALL_CORE
diminution in manic symptoms and resolution of SI/HI 
diminution in manic symptoms and resolution of SI/HI

## 2023-01-25 NOTE — PROGRESS NOTE ADULT - ASSESSMENT
57M domiciled with sister in [place], on disability, single, documented diagnoses of bipolar 1 disorder, antisocial personality disorder with PMH pituitary tumor, HTN, HF, T2DM, and GERD (only compliant with HTN medications) presents to psychiatry unit for psychosis and depression. Medicine consulted for HTN management.     #HTN   - patient is only on spironolactone 25mg daily   - SBP ~160  Recommend:   - continue spironolactone 25 qD and coreg 6.25 BID for HTN and also for HF  - start lisinopril 20 qD for HTN and heart failure  - hold parameters should be SBP <100, HR< 60     #HFrEF  - per primary team EF ~35%   - Patient not on GDMT  - continue coreg beta blocker and start ACEi as above   - outpatient follow up, HF 2/2 substance use? ischemic work up unknown     # D2DM/insulin required  # Hyperglycemia   - A1C 9.8% (1/20/23)  - continue Lantus 30u sq qHS   - continue Lispro 10U TID AC meals  - moderate ISS for better coverage of post-prandial hyperglycemia   - goal F [id number]   - diabetes education and/or outpt f/u for further insulin titration (elevated a1c despite being on insulin at home, and has had some post meal spikes during this hospitalization)    Recommendations preliminary until attending attestation. Medicine will continue to follow.

## 2023-01-27 NOTE — BH SOCIAL WORK CONFIRMATION FOLLOW UP NOTE - NSLINKEDTOLOC_PSY_ALL_CORE
[facility]  26-Jan-2023 07:00  [facility]  [address] [place] [id number]  [phone]

## 2023-01-27 NOTE — BH SOCIAL WORK CONFIRMATION FOLLOW UP NOTE - NSCOMMENTS_PSY_ALL_CORE
SW called provider above. Pt did not show for walk-in appt. Pt also stated he does not have a phone thus SW unable to complete caring call.

## 2023-01-30 DIAGNOSIS — R45.850 HOMICIDAL IDEATIONS: ICD-10-CM

## 2023-01-30 DIAGNOSIS — Z91.51 PERSONAL HISTORY OF SUICIDAL BEHAVIOR: ICD-10-CM

## 2023-01-30 DIAGNOSIS — Z59.01 SHELTERED HOMELESSNESS: ICD-10-CM

## 2023-01-30 DIAGNOSIS — F19.10 OTHER PSYCHOACTIVE SUBSTANCE ABUSE, UNCOMPLICATED: ICD-10-CM

## 2023-01-30 DIAGNOSIS — F31.9 BIPOLAR DISORDER, UNSPECIFIED: ICD-10-CM

## 2023-01-30 DIAGNOSIS — Z79.4 LONG TERM (CURRENT) USE OF INSULIN: ICD-10-CM

## 2023-01-30 DIAGNOSIS — Z91.14 PATIENT'S OTHER NONCOMPLIANCE WITH MEDICATION REGIMEN: ICD-10-CM

## 2023-01-30 DIAGNOSIS — F32.A DEPRESSION, UNSPECIFIED: ICD-10-CM

## 2023-01-30 DIAGNOSIS — E11.9 TYPE 2 DIABETES MELLITUS WITHOUT COMPLICATIONS: ICD-10-CM

## 2023-01-30 DIAGNOSIS — I10 ESSENTIAL (PRIMARY) HYPERTENSION: ICD-10-CM

## 2023-01-30 DIAGNOSIS — U07.1 COVID-19: ICD-10-CM

## 2023-01-30 DIAGNOSIS — K21.9 GASTRO-ESOPHAGEAL REFLUX DISEASE WITHOUT ESOPHAGITIS: ICD-10-CM

## 2023-01-30 DIAGNOSIS — F60.2 ANTISOCIAL PERSONALITY DISORDER: ICD-10-CM

## 2023-01-30 SDOH — ECONOMIC STABILITY - HOUSING INSECURITY: SHELTERED HOMELESSNESS: Z59.01

## 2023-01-30 NOTE — ED BEHAVIORAL HEALTH ASSESSMENT NOTE - SECONDARY DX3
[de-identified] : **Pronounces her name St. VERA**\par 76F presenting for an initial consultation to the cyst clinic.\par She was referred by Dr. Joanne Gotti (nephrologist), Adilia Robles (PCP) \par \par She has stage IV CKD and says she is being evaluated for need for dialysis. \par She was recently hospitalized Lopez 2023 with abdominal pain and CT demonstrated 1.8 cm pancreatic cyst. \par \par She reports chronic LUQ abdominal pain. She was hospitalized in 2021 for GI bleeding. EGD demonstrated a gastric ulcer and a 1 cm subepithelial lesion, suspicious for pancreatic rest. H.pylori was negative.An EUS was recommended and biopsy/ final pathology confirmed pancreatic rest. \par \par CT A/P 1/4/15- incidental finding of 8 mm hypodense pancreatic tail lesion. \par \par CT 2017: 8mm\par CT 2020: slight interval growth to 11 mm \par \par MRI 5/14/21  Stable 1.1 cm nonspecific cystic lesion in the pancreatic tail; Follow up abdominal MR may be pursued in 12 months to ensure stability. \par \par EUS with Dr Gerardo Rebollar on 10/12/21- one 11 mm submucosal nodule. After EUS, complete removal accomplished by band EMR. Six clips placed with closure of resection bed. Pathology: Submucosal pancreatic heterotopic tissue composed of a combination\par of pancreatic exocrine (acinar tissue), endocrine (islet cells), pancreatic stroma and ductal epithelium.\par \par CT CAP 1/29/23  Redemonstrates 1.8 cm cystic lesion in the pancreatic tail. \par \par PMH: CHF, CVA, CKD, DM, HLD, HTN. Surgical history of hip and knee replacement, as well as a right breast lumpectomy.\par No family history of cancer. \par \par Social History: She lives in [place] with her . Took an uber to the office today.  Amphetamine use disorder, moderate, dependence

## 2023-05-02 NOTE — PROGRESS NOTE BEHAVIORAL HEALTH - LANGUAGE
No abnormalities noted
Gender dysphoria in adult   F64.0  Panic attacks   F41.0  
No abnormalities noted

## 2023-05-11 NOTE — BH TREATMENT PLAN - NSTXSUBMISINTERRN_PSY_ALL_CORE
Assist patient in determining which lifestyle behaviors are having a negative impact on maintaining health. Assess pts motivation to alter lifestyle behavior. Provide patient education regarding Closure 2 Information: This tab is for additional flaps and grafts, including complex repair and grafts and complex repair and flaps. You can also specify a different location for the additional defect, if the location is the same you do not need to select a new one. We will insert the automated text for the repair you select below just as we do for solitary flaps and grafts. Please note that at this time if you select a location with a different insurance zone you will need to override the ICD10 and CPT if appropriate.

## 2023-05-12 NOTE — BH PATIENT PROFILE - FUNCTIONAL ASSESSMENT - BASIC MOBILITY 2.
Detail Level: Detailed Depth Of Biopsy: dermis Was A Bandage Applied: Yes Size Of Lesion In Cm: 0.4 Biopsy Type: H and E Biopsy Method: double edge Personna blade Anesthesia Type: 1% lidocaine with epinephrine Anesthesia Volume In Cc (Will Not Render If 0): 0.5 Additional Anesthesia Volume In Cc (Will Not Render If 0): 0 Hemostasis: Aluminum Chloride Wound Care: Petrolatum Dressing: bandage Destruction After The Procedure: No Type Of Destruction Used: Curettage Curettage Text: The wound bed was treated with curettage after the biopsy was performed. Cryotherapy Text: The wound bed was treated with cryotherapy after the biopsy was performed. Electrodesiccation Text: The wound bed was treated with electrodesiccation after the biopsy was performed. Electrodesiccation And Curettage Text: The wound bed was treated with electrodesiccation and curettage after the biopsy was performed. Silver Nitrate Text: The wound bed was treated with silver nitrate after the biopsy was performed. Lab: 6 Lab Facility: 3 Consent: Written consent was obtained and risks were reviewed including but not limited to scarring, infection, bleeding, scabbing, incomplete removal, nerve damage and allergy to anesthesia. Post-Care Instructions: I reviewed with the patient in detail post-care instructions. Patient is to keep the biopsy site dry overnight, and then apply bacitracin twice daily until healed. Patient may apply hydrogen peroxide soaks to remove any crusting. Notification Instructions: Patient will be notified of biopsy results. However, patient instructed to call the office if not contacted within 2 weeks. Billing Type: Third-Party Bill Information: Selecting Yes will display possible errors in your note based on the variables you have selected. This validation is only offered as a suggestion for you. PLEASE NOTE THAT THE VALIDATION TEXT WILL BE REMOVED WHEN YOU FINALIZE YOUR NOTE. IF YOU WANT TO FAX A PRELIMINARY NOTE YOU WILL NEED TO TOGGLE THIS TO 'NO' IF YOU DO NOT WANT IT IN YOUR FAXED NOTE. 4 = No assist / stand by assistance

## 2023-05-22 NOTE — BH TREATMENT PLAN - NSTXCAREGIVERAGREEMENT_PSY_P_CORE
Extended lab orders. Patient does not have family/caregiver involved in care Yes Doxepin Pregnancy And Lactation Text: This medication is Pregnancy Category C and it isn't known if it is safe during pregnancy. It is also excreted in breast milk and breast feeding isn't recommended.

## 2023-08-11 ENCOUNTER — INPATIENT (INPATIENT)
Facility: HOSPITAL | Age: 58
LOS: 10 days | Discharge: HOME | DRG: 885 | End: 2023-08-22
Attending: PSYCHIATRY & NEUROLOGY | Admitting: PSYCHIATRY & NEUROLOGY
Payer: COMMERCIAL

## 2023-08-11 VITALS
HEART RATE: 100 BPM | OXYGEN SATURATION: 95 % | TEMPERATURE: 98 F | RESPIRATION RATE: 18 BRPM | DIASTOLIC BLOOD PRESSURE: 106 MMHG | SYSTOLIC BLOOD PRESSURE: 166 MMHG

## 2023-08-11 LAB
A1C WITH ESTIMATED AVERAGE GLUCOSE RESULT: 8.3 % — HIGH (ref 4–5.6)
ALBUMIN SERPL ELPH-MCNC: 4.4 G/DL — SIGNIFICANT CHANGE UP (ref 3.3–5)
ALP SERPL-CCNC: 92 U/L — SIGNIFICANT CHANGE UP (ref 40–120)
ALT FLD-CCNC: 18 U/L — SIGNIFICANT CHANGE UP (ref 10–45)
ANION GAP SERPL CALC-SCNC: 13 MMOL/L — SIGNIFICANT CHANGE UP (ref 5–17)
APAP SERPL-MCNC: <5 UG/ML — LOW (ref 10–30)
AST SERPL-CCNC: 22 U/L — SIGNIFICANT CHANGE UP (ref 10–40)
BASOPHILS # BLD AUTO: 0.03 K/UL — SIGNIFICANT CHANGE UP (ref 0–0.2)
BASOPHILS NFR BLD AUTO: 0.5 % — SIGNIFICANT CHANGE UP (ref 0–2)
BILIRUB SERPL-MCNC: 0.4 MG/DL — SIGNIFICANT CHANGE UP (ref 0.2–1.2)
BUN SERPL-MCNC: 12 MG/DL — SIGNIFICANT CHANGE UP (ref 7–23)
CALCIUM SERPL-MCNC: 10.3 MG/DL — SIGNIFICANT CHANGE UP (ref 8.4–10.5)
CHLORIDE SERPL-SCNC: 95 MMOL/L — LOW (ref 96–108)
CO2 SERPL-SCNC: 29 MMOL/L — SIGNIFICANT CHANGE UP (ref 22–31)
CREAT SERPL-MCNC: 1.09 MG/DL — SIGNIFICANT CHANGE UP (ref 0.5–1.3)
EGFR: 80 ML/MIN/1.73M2 — SIGNIFICANT CHANGE UP
EOSINOPHIL # BLD AUTO: 0.11 K/UL — SIGNIFICANT CHANGE UP (ref 0–0.5)
EOSINOPHIL NFR BLD AUTO: 1.8 % — SIGNIFICANT CHANGE UP (ref 0–6)
ESTIMATED AVERAGE GLUCOSE: 192 MG/DL — HIGH (ref 68–114)
ETHANOL SERPL-MCNC: <10 MG/DL — SIGNIFICANT CHANGE UP (ref 0–10)
GLUCOSE BLDC GLUCOMTR-MCNC: 203 MG/DL — HIGH (ref 70–99)
GLUCOSE BLDC GLUCOMTR-MCNC: 205 MG/DL — HIGH (ref 70–99)
GLUCOSE BLDC GLUCOMTR-MCNC: 227 MG/DL — HIGH (ref 70–99)
GLUCOSE SERPL-MCNC: 245 MG/DL — HIGH (ref 70–99)
HCT VFR BLD CALC: 41.2 % — SIGNIFICANT CHANGE UP (ref 39–50)
HGB BLD-MCNC: 13.7 G/DL — SIGNIFICANT CHANGE UP (ref 13–17)
IMM GRANULOCYTES NFR BLD AUTO: 0.2 % — SIGNIFICANT CHANGE UP (ref 0–0.9)
LITHIUM SERPL-MCNC: <.05 MMOL/L — LOW (ref 0.6–1.2)
LYMPHOCYTES # BLD AUTO: 2.4 K/UL — SIGNIFICANT CHANGE UP (ref 1–3.3)
LYMPHOCYTES # BLD AUTO: 38.2 % — SIGNIFICANT CHANGE UP (ref 13–44)
MCHC RBC-ENTMCNC: 29.6 PG — SIGNIFICANT CHANGE UP (ref 27–34)
MCHC RBC-ENTMCNC: 33.3 GM/DL — SIGNIFICANT CHANGE UP (ref 32–36)
MCV RBC AUTO: 89 FL — SIGNIFICANT CHANGE UP (ref 80–100)
MONOCYTES # BLD AUTO: 0.61 K/UL — SIGNIFICANT CHANGE UP (ref 0–0.9)
MONOCYTES NFR BLD AUTO: 9.7 % — SIGNIFICANT CHANGE UP (ref 2–14)
NEUTROPHILS # BLD AUTO: 3.12 K/UL — SIGNIFICANT CHANGE UP (ref 1.8–7.4)
NEUTROPHILS NFR BLD AUTO: 49.6 % — SIGNIFICANT CHANGE UP (ref 43–77)
NRBC # BLD: 0 /100 WBCS — SIGNIFICANT CHANGE UP (ref 0–0)
PLATELET # BLD AUTO: 210 K/UL — SIGNIFICANT CHANGE UP (ref 150–400)
POTASSIUM SERPL-MCNC: 3.6 MMOL/L — SIGNIFICANT CHANGE UP (ref 3.5–5.3)
POTASSIUM SERPL-SCNC: 3.6 MMOL/L — SIGNIFICANT CHANGE UP (ref 3.5–5.3)
PROT SERPL-MCNC: 7.5 G/DL — SIGNIFICANT CHANGE UP (ref 6–8.3)
RBC # BLD: 4.63 M/UL — SIGNIFICANT CHANGE UP (ref 4.2–5.8)
RBC # FLD: 13.5 % — SIGNIFICANT CHANGE UP (ref 10.3–14.5)
SALICYLATES SERPL-MCNC: <0.3 MG/DL — LOW (ref 2.8–20)
SARS-COV-2 RNA SPEC QL NAA+PROBE: SIGNIFICANT CHANGE UP
SODIUM SERPL-SCNC: 137 MMOL/L — SIGNIFICANT CHANGE UP (ref 135–145)
TSH SERPL-MCNC: 0.62 UIU/ML — SIGNIFICANT CHANGE UP (ref 0.27–4.2)
WBC # BLD: 6.28 K/UL — SIGNIFICANT CHANGE UP (ref 3.8–10.5)
WBC # FLD AUTO: 6.28 K/UL — SIGNIFICANT CHANGE UP (ref 3.8–10.5)

## 2023-08-11 PROCEDURE — 90792 PSYCH DIAG EVAL W/MED SRVCS: CPT | Mod: 95

## 2023-08-11 PROCEDURE — 99285 EMERGENCY DEPT VISIT HI MDM: CPT

## 2023-08-11 PROCEDURE — ZZZZZ: CPT

## 2023-08-11 RX ORDER — DEXTROSE 50 % IN WATER 50 %
12.5 SYRINGE (ML) INTRAVENOUS ONCE
Refills: 0 | Status: DISCONTINUED | OUTPATIENT
Start: 2023-08-11 | End: 2023-08-22

## 2023-08-11 RX ORDER — DEXTROSE 50 % IN WATER 50 %
15 SYRINGE (ML) INTRAVENOUS ONCE
Refills: 0 | Status: DISCONTINUED | OUTPATIENT
Start: 2023-08-11 | End: 2023-08-22

## 2023-08-11 RX ORDER — SODIUM CHLORIDE 9 MG/ML
1000 INJECTION, SOLUTION INTRAVENOUS
Refills: 0 | Status: DISCONTINUED | OUTPATIENT
Start: 2023-08-11 | End: 2023-08-22

## 2023-08-11 RX ORDER — GLUCAGON INJECTION, SOLUTION 0.5 MG/.1ML
1 INJECTION, SOLUTION SUBCUTANEOUS ONCE
Refills: 0 | Status: DISCONTINUED | OUTPATIENT
Start: 2023-08-11 | End: 2023-08-22

## 2023-08-11 RX ORDER — INSULIN LISPRO 100/ML
VIAL (ML) SUBCUTANEOUS
Refills: 0 | Status: DISCONTINUED | OUTPATIENT
Start: 2023-08-11 | End: 2023-08-22

## 2023-08-11 RX ORDER — DEXTROSE 50 % IN WATER 50 %
25 SYRINGE (ML) INTRAVENOUS ONCE
Refills: 0 | Status: DISCONTINUED | OUTPATIENT
Start: 2023-08-11 | End: 2023-08-22

## 2023-08-11 RX ORDER — METHADONE HYDROCHLORIDE 40 MG/1
130 TABLET ORAL DAILY
Refills: 0 | Status: DISCONTINUED | OUTPATIENT
Start: 2023-08-11 | End: 2023-08-11

## 2023-08-11 RX ORDER — METHADONE HYDROCHLORIDE 40 MG/1
130 TABLET ORAL DAILY
Refills: 0 | Status: DISCONTINUED | OUTPATIENT
Start: 2023-08-12 | End: 2023-08-18

## 2023-08-11 RX ORDER — ACETAMINOPHEN 500 MG
650 TABLET ORAL EVERY 6 HOURS
Refills: 0 | Status: DISCONTINUED | OUTPATIENT
Start: 2023-08-11 | End: 2023-08-22

## 2023-08-11 RX ORDER — CARVEDILOL PHOSPHATE 80 MG/1
6.25 CAPSULE, EXTENDED RELEASE ORAL EVERY 12 HOURS
Refills: 0 | Status: DISCONTINUED | OUTPATIENT
Start: 2023-08-11 | End: 2023-08-22

## 2023-08-11 RX ORDER — DIAZEPAM 5 MG
10 TABLET ORAL THREE TIMES A DAY
Refills: 0 | Status: DISCONTINUED | OUTPATIENT
Start: 2023-08-11 | End: 2023-08-18

## 2023-08-11 RX ORDER — ATORVASTATIN CALCIUM 80 MG/1
40 TABLET, FILM COATED ORAL AT BEDTIME
Refills: 0 | Status: DISCONTINUED | OUTPATIENT
Start: 2023-08-11 | End: 2023-08-22

## 2023-08-11 RX ORDER — LISINOPRIL 2.5 MG/1
10 TABLET ORAL DAILY
Refills: 0 | Status: DISCONTINUED | OUTPATIENT
Start: 2023-08-11 | End: 2023-08-22

## 2023-08-11 RX ORDER — OLANZAPINE 15 MG/1
5 TABLET, FILM COATED ORAL EVERY 6 HOURS
Refills: 0 | Status: DISCONTINUED | OUTPATIENT
Start: 2023-08-11 | End: 2023-08-22

## 2023-08-11 RX ORDER — METHADONE HYDROCHLORIDE 40 MG/1
130 TABLET ORAL ONCE
Refills: 0 | Status: DISCONTINUED | OUTPATIENT
Start: 2023-08-11 | End: 2023-08-11

## 2023-08-11 RX ADMIN — METHADONE HYDROCHLORIDE 130 MILLIGRAM(S): 40 TABLET ORAL at 11:09

## 2023-08-11 RX ADMIN — LISINOPRIL 10 MILLIGRAM(S): 2.5 TABLET ORAL at 17:21

## 2023-08-11 RX ADMIN — ATORVASTATIN CALCIUM 40 MILLIGRAM(S): 80 TABLET, FILM COATED ORAL at 21:38

## 2023-08-11 RX ADMIN — Medication 4: at 21:38

## 2023-08-11 RX ADMIN — Medication 4: at 17:20

## 2023-08-11 RX ADMIN — Medication 10 MILLIGRAM(S): at 21:37

## 2023-08-11 NOTE — ED PROVIDER NOTE - OBJECTIVE STATEMENT
57M hx bipolar, htn, dm, c/o SI/HI. pt states feeling like this for past 2 days. states "they are out to get me." denies AH. pt states takes neurontin, valium. states has not taken his lithium in 2-3 wks. has been hospitalized on 8U multiple times in the past. admits to using fentanyl today.

## 2023-08-11 NOTE — ED BEHAVIORAL HEALTH ASSESSMENT NOTE - NSBHSACONSEQUENCE_PSY_A_CORE FT
N/A Alcohol withdrawal seizures Alcohol withdrawal seizures, accidental overdose on fentanyl in the past

## 2023-08-11 NOTE — ED PROVIDER NOTE - NSICDXPASTMEDICALHX_GEN_ALL_CORE_FT
PAST MEDICAL HISTORY:  Diabetes mellitus     GERD (gastroesophageal reflux disease)     H/O bipolar disorder     Heart failure     History of pituitary tumor     HTN (hypertension)

## 2023-08-11 NOTE — ED BEHAVIORAL HEALTH PROGRESS NOTE - DETAILS
ED aware of dispo Patient has forensic history of incarceration for 9 years secondary to manslaughter Spoke with nursing staff regarding patients admission to [facility]

## 2023-08-11 NOTE — ED PROVIDER NOTE - PROGRESS NOTE DETAILS
pt evaluated by telepsych - to be reevaluated by psych in the morning Ree: pt received from night team at s/o; pt p/w si/hi, stopped taking Li 2 wk ago, and admits to using fentanyl. Seen by telepsych and awaiting re-evaluation by day team to determine dispo. Will continue on constant obs.

## 2023-08-11 NOTE — ED BEHAVIORAL HEALTH PROGRESS NOTE - VIOLENCE PROTECTIVE FACTORS:
Residential stability/Relationship stability/Engagement in treatment/Insight into violence risk and need for management/treatment

## 2023-08-11 NOTE — ED BEHAVIORAL HEALTH PROGRESS NOTE - NSBHATTESTCOMMENTATTENDFT_PSY_A_CORE
57 year old M w/PPHx of Bipolar Disorder type 2, antisocial personality disorder, forensic hx significant for incarceration secondary to manslaughter, currently non adherent to medication secondary to tremor, presenting with complaints of HI. Patient presents with active mood sx and Hi, requesting a voluntary admission. Plan:=admit to inpatient psychiatry, voluntary status.

## 2023-08-11 NOTE — ED BEHAVIORAL HEALTH ASSESSMENT NOTE - DETAILS
see HPI Father - alcohol and heroin used, patient states his father passed away in front of him of drug overdose when he wasa kid, half sister has schizophrenia Pt does not have a handgun, but states that he can easily access one if he wants. He was DESTINEY'd on his admission in february 2022 due to shanelle comments Per HPI d/w ED provider Pt does not have a handgun, but states that he can easily access one if he wants. He was DESTINEY'cheng on his admission in february 2022 due to similar comments self Father - alcohol and heroin used, half sister has schizophrenia chart history: patient states his father passed away in front of him of drug overdose when he was a kid cogentin, haldol, thorazine - listed as allergies

## 2023-08-11 NOTE — ED BEHAVIORAL HEALTH PROGRESS NOTE - ADDITIONAL DETAILS / COMMENTS
Orientation: Time Place Person  Attention: WNL  Registration: WNL  Recall at 3 minutes:WNL  Unable to assess due to:

## 2023-08-11 NOTE — ED BEHAVIORAL HEALTH ASSESSMENT NOTE - NSBHSUBSTUSED_PSY_A_CORE
Alcohol/Benzodiazepines/Cocaine/Crack/Heroin/Opiates Alcohol/Amphetamines.../Benzodiazepines/Cocaine/Crack/Heroin/Opiates

## 2023-08-11 NOTE — BH PATIENT PROFILE - FUNCTIONAL ASSESSMENT - BASIC MOBILITY 6.
4-calculated by average/Not able to assess (calculate score using [facility] averaging method)

## 2023-08-11 NOTE — ED BEHAVIORAL HEALTH ASSESSMENT NOTE - SUMMARY
Mr. Bueno is a 57 year old man, domiciled with sister in [place], on disability, single, documented diagnoses of bipolar 1 disorder, antisocial personality disorder, PMHx pituitary tumor, HTN, T2DM, and GERD (only compliant with HTN medications), 1 remote suicide attempt via injecting bleech per patient, with numerous psychiatric hospitalizations (last at [facility] in [place] from 10/9/2022 - 10/12/2022), multiple ED visits in the past few months for medical, MH and substance use disorder, history of multiple incarcerations (last nine years ago, patient served 10 year sentence for manslaughter and was released from group home in ), history of violence and aggression both on the psychiatric unit and in the community,  history of polysubstance abuse (daily ETOH use c/b reportedly ETOH withdrawal seizures per patient report, MJ use, cocaine use, past heroin use, now on methadone maintenance through [facility] in [place]), +family history (father  of drug/alcohol overdose, and half sister has schizophrenia), he self presented with homicidal ideation and paranoia in the context of substance abuse coupled with medication non compliance. Pt is COVID positive.     Patient presents with homicidal ideation and suicidal ideation in the context of paranoia that people are following him around due to his ex-girlfriend/wife. He expressed active homicidal ideation, with plan to chop his ex and her daughter w/axradha and states that he may act on his violent thoughts if he goes home. The patient also endorsed vague suicidal ideation to overdose in the context of his paranoia. Pt has similar presentations to ED in the past, and upon admission psychiatrists have noted that his paranoia is prominent and that he improves on Lithium and antipsychotic medication. However, patient also uses a lot of substances which is likely interfering with his functioning and his mood, and he has axis 2 pathology, largely antisocial personality traits, which also contribute to his frequent ED presentations. Pt also is somewhat provocative with practitioners, and it is unclear what elements of his presentation are related to secondary gain, and what elements are due to substance use disorder, and what is due to a primary mood or psychotic disorder.  Nonetheless, the patient is an acute risk to himself and others due to his active homicidal ideation coupled with his suicidal ideation. He adamantly wants inpatient admission for stabilization and is also interested in attending rehabilitation for substance use disorder after inpatient.     Plan:   - Pt will be admitted on  legal status, maintain 1:1 until transfer, pending bed at [facility] for COVID+, no CO required on inpatient as patient states he can remain safe while in the hospital and presence of CO will likely exacerbate patient's baseline irritability   - monitor vital signs  - Consider restarting Lithium for mood stability and Zyprexa for paranoia/delusions as these medications were effective during his last available admission.   - Medical: Continue home medications for HTN, GERD, and DM2, consider endocrinology consult as patient has not been compliant with home DM2 medications and was previously on insulin  - PRNs: Zyprexa 5-10mg q6 hrs po PRN or IM for agitation/severe agitation. Patient reporting possible heart failure so would avoid haldol for now. Can also use Ativan 2mg po PRN for severe agitation. Avoid concomitant IV/IM benzo use if he is given zyprexa IV/IM for at least 4 hours.  - Pt was safe ACT'd in February   - I do not have the contact information for patient's ex or ex's daughter; however, given patient's homicidal ideation towards these individuals, primary team should attempt to get this information and discuss with patient duty to warn/protect  - CIWA for ETOH w/drawal, continue home Methadone (ED to call [facility] Methadone clinic to confirm dose) 58 yo M, domiciled in private residence, on disability, single, documented diagnoses of bipolar 1 disorder, antisocial personality disorder, PMHx pituitary tumor, HTN, T2DM, and GERD, 1 remote suicide attempt via injecting bleach per patient, multiple psychiatric hospitalizations (last at [facility] May 2023), multiple ED visits for medical, MH and substance use disorder, history of incarceration (served 8 year sentence for manslaughter and was released from long-term in ), history of substance use (alcohol, cannabis, cocaine, opiate, crystal meth, now on methadone maintenance through [facility] in [place]), +family history (father  of drug/alcohol overdose, and half sister has schizophrenia), who self presents with homicidal ideation and paranoia in the context of substance use and medication non adherence.     Patient presents with homicidal ideation and suicidal ideation in the context of paranoia that people are following him around due to his ex-girlfriend/wife. He expressed active homicidal ideation, with plan to chop his ex and her daughter w/axe and states that he may act on his violent thoughts if he goes home. The patient also endorsed vague suicidal ideation to overdose in the context of his paranoia. Pt has similar presentations to ED in the past, and upon admission psychiatrists have noted that his paranoia is prominent and that he improves on Lithium and antipsychotic medication. However, patient also uses a lot of substances which is likely interfering with his functioning and his mood, and he has axis 2 pathology, largely antisocial personality traits, which also contribute to his frequent ED presentations. Pt also is somewhat provocative with practitioners, and it is unclear what elements of his presentation are related to secondary gain, and what elements are due to substance use disorder, and what is due to a primary mood or psychotic disorder.  Nonetheless, the patient is an acute risk to himself and others due to his active homicidal ideation coupled with his suicidal ideation. He adamantly wants inpatient admission for stabilization and is also interested in attending rehabilitation for substance use disorder after inpatient.     Plan:   - Pt will be admitted on  legal status, maintain 1:1 until transfer, pending bed at [facility] for COVID+, no CO required on inpatient as patient states he can remain safe while in the hospital and presence of CO will likely exacerbate patient's baseline irritability   - monitor vital signs  - Consider restarting Lithium for mood stability and Zyprexa for paranoia/delusions as these medications were effective during his last available admission.   - Medical: Continue home medications for HTN, GERD, and DM2, consider endocrinology consult as patient has not been compliant with home DM2 medications and was previously on insulin  - PRNs: Zyprexa 5-10mg q6 hrs po PRN or IM for agitation/severe agitation. Patient reporting possible heart failure so would avoid haldol for now. Can also use Ativan 2mg po PRN for severe agitation. Avoid concomitant IV/IM benzo use if he is given zyprexa IV/IM for at least 4 hours.  - Pt was safe ACT'd in February   - I do not have the contact information for patient's ex or ex's daughter; however, given patient's homicidal ideation towards these individuals, primary team should attempt to get this information and discuss with patient duty to warn/protect  - CIWA for ETOH w/drawal, continue home Methadone (ED to call [facility] Methadone clinic to confirm dose) 58 yo M, domiciled in private residence, on disability, single, documented diagnoses of bipolar 1 disorder, antisocial personality disorder, PMHx pituitary tumor, HTN, T2DM, and GERD, 1 remote suicide attempt via injecting bleach per patient, multiple psychiatric hospitalizations (last at [facility] May 2023), multiple ED visits for medical, MH and substance use disorder, history of incarceration (served 8 year sentence for manslaughter and was released from group home in ), history of substance use (alcohol, cannabis, cocaine, opiate, crystal meth, now on methadone maintenance through [facility] in [place]), +family history (father  of drug/alcohol overdose, and half sister has schizophrenia), who self presents with homicidal ideation and paranoia in the context of substance use and medication non adherence.       Plan:   - Hold for reassessment for availability of rehab resources given history of antisocial personality, substance use disorder, and presence of secondary gain to obtain an alternative housing placement  - Medical: Continue home medications for HTN, GERD, and DM2  - PRNs: Zyprexa 5-10mg q6 hrs po PRN or IM for agitation/severe agitation. Patient reporting possible heart failure so would avoid haldol for now. Can also use Ativan 2mg po PRN for severe agitation. Avoid concomitant IV/IM benzo use if he is given zyprexa IV/IM for at least 4 hours.  - Pt was safe ACT'd in 2022  - Continue home Methadone (ED to call [facility] Methadone clinic to confirm dose)  - Confirm home medications with pharmacy 56 yo M, domiciled in private residence, on disability, single, documented diagnoses of bipolar 1 disorder, antisocial personality disorder, PMHx pituitary tumor, HTN, T2DM, and GERD, 1 remote suicide attempt via injecting bleach per patient, multiple psychiatric hospitalizations (last at [facility] May 2023), multiple ED visits for medical, MH and substance use disorder, history of incarceration (served 8 year sentence for manslaughter and was released from [facility] in ), history of substance use (alcohol, cannabis, cocaine, opiate, crystal meth, now on methadone maintenance through [facility] in [place]), +family history (father  of drug/alcohol overdose, and half sister has schizophrenia), who self presents with homicidal ideation and paranoia in the context of substance use and medication non adherence.     Patient presents with paranoia that multiple people are following him including his ex-girlfriend and her daughter though it is unclear whether these are flashbacks or intrusive memories from the past. Patient has poor insight into the association between his medication nonadherence and worsening of symptoms. There is an element of secondary gain as housing seems to be patient's primary reason for seeking help and stopping medication. Verbal threats of violence are similar to previous presentations, patient is at chronic risk of violence toward others that is acutely elevated due to recent discontinuation of lithium and history of violence toward others. Patient is help seeking for substance use programs and perseverative on inpatient psych admission first. Recommend holding for reassessment for availability of rehab resources versus inpatient admission given history of antisocial personality, substance use disorders, and presence of secondary gain to obtain an alternative housing placement.      Plan:   - Hold for reassessment for availability of rehab resources  - Continue home Methadone (ED to call [facility] Methadone clinic to confirm dose)  - Confirm home medications with pharmacy  - Medical: Continue home medications for HTN, GERD, and DM2  - PRNs: Zyprexa 5-10mg q6 hrs po PRN or IM for agitation/severe agitation. Patient reporting possible heart failure so would avoid haldol for now. Can also use Ativan 2mg po PRN for severe agitation. Avoid concomitant IV/IM benzo use if he is given zyprexa IV/IM for at least 4 hours.  - Pt was safe ACT'd in 2022

## 2023-08-11 NOTE — ED BEHAVIORAL HEALTH PROGRESS NOTE - VIOLENCE RISK FACTORS:
Antisocial behavior/cognition (past or present)/Violent ideation/threat/speech/Substance abuse/Affective dysregulation/Impulsivity/Noncompliance with treatment/Community stressors that increase the risk of destabilization/Irritability/History of violation of a legal mandate (e.g., parole, probation, AOT)

## 2023-08-11 NOTE — ED BEHAVIORAL HEALTH NOTE - BEHAVIORAL HEALTH NOTE
==================    PRE-HOSPITAL COURSE    ===================    SOURCE:  RN and secondhand ED documentation    DETAILS: BTCM spoke to covering RN who states that pt has remained in behavioral control in ED. BTCM to callback to speak to primary RN receive additional information regarding pt's ED course.

## 2023-08-11 NOTE — ED BEHAVIORAL HEALTH ASSESSMENT NOTE - NSBHSAOPI_PSY_A_CORE FT
history of heroin use, now on MMT chart history of heroin use, now on MMT, last used fentanyl yesterday

## 2023-08-11 NOTE — BH PATIENT PROFILE - HOME MEDICATIONS
lisinopril 20 mg oral tablet , 1 tab(s) orally once a day   insulin glargine , 30 unit(s) subcutaneous once a day  insulin lispro , 10 unit(s) subcutaneous 3 times a day  gabapentin 800 mg oral tablet , 1 tab(s) orally 3 times a day  gabapentin 800 mg oral tablet , 1 tab(s) orally 3 times a day  gabapentin 800 mg oral tablet , 1 tab(s) orally 3 times a day  Valium 10 mg oral tablet , 1 tab(s) orally 3 times a day MDD:30 mg  carvedilol 6.25 mg oral tablet , 1 tab(s) orally every 12 hours  OLANZapine 5 mg oral tablet , 1 tab(s) orally once a day (at bedtime)  insulin lispro 100 units/mL injectable solution , 10 unit(s) subcutaneous 3 times a day (before meals)  melatonin 5 mg oral tablet , 1 tab(s) orally once a day (at bedtime)  insulin glargine 100 units/mL subcutaneous solution , 30 unit(s) subcutaneous once a day (at bedtime)  Multiple Vitamins oral tablet , 1 tab(s) orally once a day  nicotine 14 mg/24 hr transdermal film, extended release , 1 film(s) transdermal once a day  senna leaf extract oral tablet , 1 tab(s) orally once a day (at bedtime)  spironolactone 25 mg oral tablet , 1 tab(s) orally once a day  lithium 300 mg oral capsule , 3 cap(s) orally once a day (at bedtime)  methadone 40 mg oral tablet, dispersible , 2 tab(s) orally once a day  methadone 10 mg oral tablet , 3 tab(s) orally once a day

## 2023-08-11 NOTE — BH PATIENT PROFILE - FALL HARM RISK - UNIVERSAL INTERVENTIONS
Bed in lowest position, wheels locked, appropriate side rails in place/Call bell, personal items and telephone in reach/Instruct patient to call for assistance before getting out of bed or chair/Non-slip footwear when patient is out of bed/[place] to call system/Physically safe environment - no spills, clutter or unnecessary equipment/Purposeful Proactive Rounding/Room/bathroom lighting operational, light cord in reach
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
## 2023-08-11 NOTE — ED ADULT NURSE NOTE - OBJECTIVE STATEMENT
57yM endorsing SI and HI x 2 days. Denies he has a plan to hurt himself. States "I had a bag of fentanyl earlier today. I also stopped taking my psych meds because it made me get tremors."

## 2023-08-11 NOTE — ED BEHAVIORAL HEALTH ASSESSMENT NOTE - CURRENT MEDICATION
Methadone 110mg daily ([facility]), carvedilol, clonidine, spirolactone 25mg once a day Methadone 130mg daily ([facility]), carvedilol 12.5 mg, lisinopril 20 mg, spirolactone 25mg once a day, valium 10 mg TID, insulin, neurontin    Not verified by pharmacy Methadone 130mg daily ([facility]), carvedilol 12.5 mg, lisinopril 20 mg, spirolactone 25mg once a day, valium 10 mg TID, insulin, neurontin    Not verified by pharmacy: [address]

## 2023-08-11 NOTE — ED ADULT NURSE REASSESSMENT NOTE - NS ED NURSE REASSESS COMMENT FT1
Pt remains on 1:1, sleeping with NAD distress, spon breathing on RA, unlabored. Waiting for psych in AM.
Pt wanded by security, belongs secured, 1:1 in place. Safety precautions in place.
patient requesting methadone dose 130mg. uses [facility] in the [place] on [address] GLENDY Davidson made aware
received report from nightshift, rn. patient on safety watch 1:1 for suicidal ideations. patient endorses SI. cooperative with care. will continue to monitor.

## 2023-08-11 NOTE — ED BEHAVIORAL HEALTH ASSESSMENT NOTE - VIOLENCE RISK FACTORS:
Feeling of being under threat and being unable to control threat/Antisocial behavior/cognition (past or present)/Violent ideation/threat/speech/Substance abuse/Affective dysregulation/Impulsivity/History of being victimized/traumatized/Lack of insight into violence risk/need for treatment/Noncompliance with treatment/Community stressors that increase the risk of destabilization/Irritability Feeling of being under threat and being unable to control threat/Antisocial behavior/cognition (past or present)/Violent ideation/threat/speech/Substance abuse/Affective dysregulation/Impulsivity/History of being victimized/traumatized/Noncompliance with treatment/Community stressors that increase the risk of destabilization/Irritability
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
## 2023-08-11 NOTE — ED BEHAVIORAL HEALTH PROGRESS NOTE - DETAILS:
Patient was seen and evaluated this morning at bedside with one to one present. Chart was reviewed and no acute events were noted. No PRN medications were administered overnight. Patient was alert, irritable, labile but redirectable perseverative on admission. Patient presented to the ED with complaints of HI in the context of recent methamphetamine use/fetanyl use, off of his lithium for 2.5 weeks. Patient complained of worsening feelings of being followed as well as increased irritability and states he will hurt someone outside the hospital if he is discharged from the ED. Patient made admitted to passive SI no intent or plan. Patient complains of ongoing and worsening persecutory delusions and denies any auditory or visual hallucinations. Patients report today is consistent with the history documented on initial assessment by telepsych.

## 2023-08-11 NOTE — ED ADULT NURSE NOTE - NSFALLUNIVINTERV_ED_ALL_ED
Bed/Stretcher in lowest position, wheels locked, appropriate side rails in place/Call bell, personal items and telephone in reach/Instruct patient to call for assistance before getting out of bed/chair/stretcher/Non-slip footwear applied when patient is off stretcher/[place] to call system/Physically safe environment - no spills, clutter or unnecessary equipment/Purposeful proactive rounding/Room/bathroom lighting operational, light cord in reach

## 2023-08-11 NOTE — ED BEHAVIORAL HEALTH ASSESSMENT NOTE - DIFFERENTIAL
Bipolar 1 disorder   antisocial personality disorder    substance induced mood disorder  substance induced psychotic disorder

## 2023-08-11 NOTE — ED ADULT NURSE REASSESSMENT NOTE - TEMPERATURE IN FAHRENHEIT (DEGREES F)
[facility] phone call message - order or referral request for patient:     Order or referral being requested: Referral for same surgery: CPT code: 25283, Dx L73.2, procedure service date was on 02/22/2019. Their NPI number is [id number].   Fax number is [phone] Attn: Natacha HODGSON       Additional Comments:     OK to leave a message on voice mail? Yes    Primary language: English      needed? No    Call taken on June 12, 2019 at 12:05 PM by Gloria Zhao       97.7
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
## 2023-08-11 NOTE — BH SOCIAL WORK INITIAL PSYCHOSOCIAL EVALUATION - NSBHBARRIERS_PSY_ALL_CORE
Poor judgement Co-morbid personality/Lack of support/Lack of insight/Low motivation/Medical co-morbidities/Non-compliant with treatment/Poor judgement/Social withdrawal

## 2023-08-11 NOTE — ED BEHAVIORAL HEALTH ASSESSMENT NOTE - NSACTIVEVENT_PSY_ALL_CORE
Current or pending social isolation Pending incarceration or homelessness/Inadequate social supports
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
## 2023-08-11 NOTE — ED PROVIDER NOTE - IV ALTEPLASE DOOR HIDDEN
I spoke with patient, he does not want to report to ER at this time. He feels this pain is related to pancreas as prior not cardiac related. Pain level 4. He is requesting labs be ordered to evaluate (lipase specifically) He will complete at [address] facility. Please review and advise. Patient scheduled for MRI this Wednesday. He will report to ER if pain increases. show
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
## 2023-08-11 NOTE — ED BEHAVIORAL HEALTH ASSESSMENT NOTE - NSBHSAALC_PSY_A_CORE FT
daily use, see HPI chart history of daily use, denies current use; history of alcohol withdrawal seizures
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
## 2023-08-11 NOTE — ED BEHAVIORAL HEALTH ASSESSMENT NOTE - NSSUICPROTFACT_PSY_ALL_CORE
Identifies reasons for living/Supportive social network of family or friends Identifies reasons for living/Supportive social network of family or friends/Positive therapeutic relationships

## 2023-08-12 DIAGNOSIS — F19.90 OTHER PSYCHOACTIVE SUBSTANCE USE, UNSPECIFIED, UNCOMPLICATED: ICD-10-CM

## 2023-08-12 DIAGNOSIS — F11.90 OPIOID USE, UNSPECIFIED, UNCOMPLICATED: ICD-10-CM

## 2023-08-12 DIAGNOSIS — F60.2 ANTISOCIAL PERSONALITY DISORDER: ICD-10-CM

## 2023-08-12 LAB
GLUCOSE BLDC GLUCOMTR-MCNC: 201 MG/DL — HIGH (ref 70–99)
GLUCOSE BLDC GLUCOMTR-MCNC: 240 MG/DL — HIGH (ref 70–99)
GLUCOSE BLDC GLUCOMTR-MCNC: 254 MG/DL — HIGH (ref 70–99)
GLUCOSE BLDC GLUCOMTR-MCNC: 372 MG/DL — HIGH (ref 70–99)

## 2023-08-12 PROCEDURE — 90792 PSYCH DIAG EVAL W/MED SRVCS: CPT

## 2023-08-12 RX ORDER — MIRTAZAPINE 45 MG/1
15 TABLET, ORALLY DISINTEGRATING ORAL AT BEDTIME
Refills: 0 | Status: DISCONTINUED | OUTPATIENT
Start: 2023-08-12 | End: 2023-08-18

## 2023-08-12 RX ORDER — DIVALPROEX SODIUM 500 MG/1
500 TABLET, DELAYED RELEASE ORAL
Refills: 0 | Status: DISCONTINUED | OUTPATIENT
Start: 2023-08-12 | End: 2023-08-22

## 2023-08-12 RX ADMIN — OLANZAPINE 5 MILLIGRAM(S): 15 TABLET, FILM COATED ORAL at 21:37

## 2023-08-12 RX ADMIN — Medication 10 MILLIGRAM(S): at 07:58

## 2023-08-12 RX ADMIN — Medication 4: at 17:41

## 2023-08-12 RX ADMIN — Medication 6: at 08:03

## 2023-08-12 RX ADMIN — Medication 4: at 12:12

## 2023-08-12 RX ADMIN — LISINOPRIL 10 MILLIGRAM(S): 2.5 TABLET ORAL at 07:58

## 2023-08-12 RX ADMIN — Medication 10 MILLIGRAM(S): at 14:21

## 2023-08-12 RX ADMIN — Medication 10: at 21:51

## 2023-08-12 RX ADMIN — ATORVASTATIN CALCIUM 40 MILLIGRAM(S): 80 TABLET, FILM COATED ORAL at 21:35

## 2023-08-12 RX ADMIN — METHADONE HYDROCHLORIDE 130 MILLIGRAM(S): 40 TABLET ORAL at 09:50

## 2023-08-12 RX ADMIN — Medication 10 MILLIGRAM(S): at 21:35
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
## 2023-08-12 NOTE — BH INPATIENT PSYCHIATRY ASSESSMENT NOTE - NS ED BHA REVIEW OF ED CHART AVAILABLE INVESTIGATIONS REVIEWED
TRANSFER - OUT REPORT:     Verbal report given to: francie. Report consisted of patient's Situation, Background, Assessment and   Recommendations(SBAR). Opportunity for questions and clarification was provided. Patient transported with a Registered Nurse. Patient transported to: [facility]. Yes

## 2023-08-12 NOTE — BH INPATIENT PSYCHIATRY ASSESSMENT NOTE - NSBHCHARTREVIEWLAB_PSY_A_CORE FT
CBC:            13.7   6.28  )-----------( 210      ( 08-11-23 @ 01:53 )             41.2     Chem:         ( 08-11-23 @ 01:53 )  137  |  95<L>  |  12  ----------------------------<  245<H>  3.6   |  29  |  1.09    Liver Functions: ( 08-11-23 @ 01:53 )  Alb: 4.4 g/dL / Pro: 7.5 g/dL / ALK PHOS: 92 U/L / ALT: 18 U/L / AST: 22 U/L / GGT: x          TSH (08/11/23): 0.624  Lithium Level (08/11/23): <.05 (subtherapeutic)

## 2023-08-12 NOTE — BH INPATIENT PSYCHIATRY ASSESSMENT NOTE - NSBHCONSULTIPREASON_PSY_A_CORE
danger to self; mental illness expected to respond to inpatient care danger to others; mental illness expected to respond to inpatient care

## 2023-08-12 NOTE — BH INPATIENT PSYCHIATRY ASSESSMENT NOTE - VIOLENCE PROTECTIVE FACTORS:
In office to day with his wife. He fell right after I saw him . He can;'t find his old walker. He needs one . Will write for a new one. Also he cannot tolerate the night time dose of the oxy does better with the IR will write for bedtime dose. Keyla Fonseca M.D.    
None Known

## 2023-08-12 NOTE — BH INPATIENT PSYCHIATRY ASSESSMENT NOTE - NSBHMEDSOTHERFT_PSY_A_CORE
Methadone Maintenance 130 mg PO daily ([facility]), Diazepam 10 mg PO TID, Gabapentin 800 mg PO TID, Guanfacine HCl 1 mg PO daily, Lithium Carbonate 150 mg PO TID, Nicotine 21 mg/24h patch, Atorvastatin Calcium 40 mg PO daily, Carvedilol 6.25 mg PO BID, Lisinopril 20 mg PO daily, Metformin HCL 1000 PO BID

## 2023-08-12 NOTE — BH INPATIENT PSYCHIATRY ASSESSMENT NOTE - NSICDXBHSECONDARYDX_PSY_ALL_CORE
Opioid use disorder   F11.90  Polysubstance use disorder   F19.90  Antisocial personality disorder   F60.2
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
## 2023-08-12 NOTE — BH INPATIENT PSYCHIATRY ASSESSMENT NOTE - NSCURPASTPSYDX_PSY_ALL_CORE
Mood disorder/Psychotic disorder/ADHD/Alcohol/Substance Use disorders/Cluster B Personality disorder/traits/Conduct problems
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
## 2023-08-12 NOTE — BH INPATIENT PSYCHIATRY ASSESSMENT NOTE - NSBHSAOPI_PSY_A_CORE FT
History of Opioid Use Disorder.  Patient currently on methadone maintenance therapy.  Methadone 130 mg daily ([facility]).  He reports that he last used Fentanyl on 08/10/23.

## 2023-08-12 NOTE — BH INPATIENT PSYCHIATRY ASSESSMENT NOTE - VIOLENCE RISK FACTORS:
Antisocial behavior/cognition (past or present)/Substance abuse/Impulsivity/History of being victimized/traumatized/Noncompliance with treatment

## 2023-08-12 NOTE — BH INPATIENT PSYCHIATRY ASSESSMENT NOTE - NSDCCRITERIA_PSY_ALL_CORE
Resolution of HI, decrease frequency and intensity of SI, improvement in depressive symptoms and paranoia

## 2023-08-12 NOTE — BH INPATIENT PSYCHIATRY ASSESSMENT NOTE - DETAILS
Father with Polysubstance Use Disorder (Alcohol, Opiates), half-sister has schizophrenia chart history: patient states his father passed away in front of him of drug overdose when he was a kid see HPI See HPI chart history: patient states his father passed away in front of him of drug overdose when he was a kid. Tremors from taking Lithium

## 2023-08-12 NOTE — BH INPATIENT PSYCHIATRY ASSESSMENT NOTE - NSCOMMENTSUICRISKFACT_PSY_ALL_CORE
Chronic elevated risk for suicide/self-harm given a history of one suicide attempt, male gender, history of impulsivity, ongoing substance use, diagnosis of Bipolar Disorder, poor coping mechanisms, poor social support which is acutely elevated given worsening paranoia, HI and SI.
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
## 2023-08-12 NOTE — BH INPATIENT PSYCHIATRY ASSESSMENT NOTE - CURRENT MEDICATION
MEDICATIONS  (STANDING):  atorvastatin 40 milliGRAM(s) Oral at bedtime  carvedilol 6.25 milliGRAM(s) Oral every 12 hours  dextrose 5%. 1000 milliLiter(s) (100 mL/Hr) IV Continuous <Continuous>  dextrose 5%. 1000 milliLiter(s) (50 mL/Hr) IV Continuous <Continuous>  dextrose 50% Injectable 25 Gram(s) IV Push once  dextrose 50% Injectable 25 Gram(s) IV Push once  dextrose 50% Injectable 12.5 Gram(s) IV Push once  diazepam    Tablet 10 milliGRAM(s) Oral three times a day  glucagon  Injectable 1 milliGRAM(s) IntraMuscular once  insulin lispro (ADMELOG) corrective regimen sliding scale   SubCutaneous Before meals and at bedtime  lisinopril 10 milliGRAM(s) Oral daily  methadone    Tablet 130 milliGRAM(s) Oral daily    MEDICATIONS  (PRN):  acetaminophen     Tablet .. 650 milliGRAM(s) Oral every 6 hours PRN Moderate Pain (4 - 6)  aluminum hydroxide/magnesium hydroxide/simethicone Suspension 30 milliLiter(s) Oral every 4 hours PRN Dyspepsia  dextrose Oral Gel 15 Gram(s) Oral once PRN Blood Glucose LESS THAN 70 milliGRAM(s)/deciliter  OLANZapine 5 milliGRAM(s) Oral every 6 hours PRN Agitation   MEDICATIONS  (STANDING):  atorvastatin 40 milliGRAM(s) Oral at bedtime  carvedilol 6.25 milliGRAM(s) Oral every 12 hours  dextrose 5%. 1000 milliLiter(s) (50 mL/Hr) IV Continuous <Continuous>  dextrose 5%. 1000 milliLiter(s) (100 mL/Hr) IV Continuous <Continuous>  dextrose 50% Injectable 25 Gram(s) IV Push once  dextrose 50% Injectable 25 Gram(s) IV Push once  dextrose 50% Injectable 12.5 Gram(s) IV Push once  diazepam    Tablet 10 milliGRAM(s) Oral three times a day  glucagon  Injectable 1 milliGRAM(s) IntraMuscular once  insulin lispro (ADMELOG) corrective regimen sliding scale   SubCutaneous Before meals and at bedtime  lisinopril 10 milliGRAM(s) Oral daily  methadone    Tablet 130 milliGRAM(s) Oral daily    MEDICATIONS  (PRN):  acetaminophen     Tablet .. 650 milliGRAM(s) Oral every 6 hours PRN Moderate Pain (4 - 6)  aluminum hydroxide/magnesium hydroxide/simethicone Suspension 30 milliLiter(s) Oral every 4 hours PRN Dyspepsia  dextrose Oral Gel 15 Gram(s) Oral once PRN Blood Glucose LESS THAN 70 milliGRAM(s)/deciliter  OLANZapine 5 milliGRAM(s) Oral every 6 hours PRN Agitation
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
## 2023-08-12 NOTE — BH INPATIENT PSYCHIATRY ASSESSMENT NOTE - PAST PSYCHOTROPIC MEDICATION
Methadone 130mg daily ([facility]), carvedilol 12.5 mg, lisinopril 20 mg, spirolactone 25mg once a day, valium 10 mg TID, insulin, neurontin    Past trials of fluoxetine, sertraline, wellbutrin, lexapro, and gabapentin).  Patient also reports prior prescription for Adderall See PPH above.

## 2023-08-12 NOTE — BH INPATIENT PSYCHIATRY ASSESSMENT NOTE - NSBHCHARTREVIEWVS_PSY_A_CORE FT
Vital Signs Last 24 Hrs  T(C): 36.9 (08-12-23 @ 10:30), Max: 36.9 (08-12-23 @ 10:30)  T(F): 98.4 (08-12-23 @ 10:30), Max: 98.4 (08-12-23 @ 10:30)  HR: 60 (08-12-23 @ 10:30) (53 - 65)  BP: 124/75 (08-12-23 @ 10:30) (124/75 - 161/90)  BP(mean): --  RR: 17 (08-12-23 @ 10:30) (16 - 18)  SpO2: 99% (08-12-23 @ 10:30) (97% - 99%)     Vital Signs Last 24 Hrs  T(C): 36.7 (08-12-23 @ 16:17), Max: 36.9 (08-12-23 @ 10:30)  T(F): 98.1 (08-12-23 @ 16:17), Max: 98.4 (08-12-23 @ 10:30)  HR: 53 (08-12-23 @ 16:17) (53 - 60)  BP: 126/73 (08-12-23 @ 16:17) (124/75 - 155/90)  BP(mean): --  RR: 18 (08-12-23 @ 16:17) (17 - 18)  SpO2: 99% (08-12-23 @ 16:17) (99% - 99%)

## 2023-08-12 NOTE — BH INPATIENT PSYCHIATRY ASSESSMENT NOTE - NSBHMETABOLIC_PSY_ALL_CORE_FT
BMI: BMI (kg/m2): 30.3 (01-23-23 @ 15:04)  HbA1c: A1C with Estimated Average Glucose Result: 8.3 % (08-11-23 @ 14:49)    Glucose: POCT Blood Glucose.: 254 mg/dL (08-12-23 @ 08:02)    BP: 124/75 (08-12-23 @ 10:30) (124/75 - 166/106)  Lipid Panel: Date/Time: 01-20-23 @ 08:49  Cholesterol, Serum: 178  Direct LDL: --  HDL Cholesterol, Serum: 45  Total Cholesterol/HDL Ration Measurement: --  Triglycerides, Serum: 161   BMI: BMI (kg/m2): 30.3 (01-23-23 @ 15:04)  HbA1c: A1C with Estimated Average Glucose Result: 8.3 % (08-11-23 @ 14:49)    Glucose: POCT Blood Glucose.: 240 mg/dL (08-12-23 @ 17:15)    BP: 126/73 (08-12-23 @ 16:17) (124/75 - 166/106)  Lipid Panel: Date/Time: 01-20-23 @ 08:49  Cholesterol, Serum: 178  Direct LDL: --  HDL Cholesterol, Serum: 45  Total Cholesterol/HDL Ration Measurement: --  Triglycerides, Serum: 161

## 2023-08-12 NOTE — BH INPATIENT PSYCHIATRY ASSESSMENT NOTE - DESCRIPTION
Per HPI The patient was born and raised in the [place], [place] and [place].  The patient states that he split time between his parents who were .  He has three sisters.  He reports that he has been homeless for the past three years and has been "in and out of shelters."  He completed up to 11th grade education.  He is unemployed and receives SSI.  He has no children.  He denies any history of  service.  He identifies as Quaker and as a cisgender heterosexual male.

## 2023-08-13 LAB
GLUCOSE BLDC GLUCOMTR-MCNC: 161 MG/DL — HIGH (ref 70–99)
GLUCOSE BLDC GLUCOMTR-MCNC: 194 MG/DL — HIGH (ref 70–99)
GLUCOSE BLDC GLUCOMTR-MCNC: 341 MG/DL — HIGH (ref 70–99)
GLUCOSE BLDC GLUCOMTR-MCNC: 393 MG/DL — HIGH (ref 70–99)

## 2023-08-13 PROCEDURE — 99231 SBSQ HOSP IP/OBS SF/LOW 25: CPT

## 2023-08-13 RX ADMIN — DIVALPROEX SODIUM 500 MILLIGRAM(S): 500 TABLET, DELAYED RELEASE ORAL at 10:19

## 2023-08-13 RX ADMIN — MIRTAZAPINE 15 MILLIGRAM(S): 45 TABLET, ORALLY DISINTEGRATING ORAL at 21:39

## 2023-08-13 RX ADMIN — DIVALPROEX SODIUM 500 MILLIGRAM(S): 500 TABLET, DELAYED RELEASE ORAL at 21:39

## 2023-08-13 RX ADMIN — Medication 10 MILLIGRAM(S): at 07:06

## 2023-08-13 RX ADMIN — ATORVASTATIN CALCIUM 40 MILLIGRAM(S): 80 TABLET, FILM COATED ORAL at 21:39

## 2023-08-13 RX ADMIN — Medication 8: at 21:45

## 2023-08-13 RX ADMIN — METHADONE HYDROCHLORIDE 130 MILLIGRAM(S): 40 TABLET ORAL at 10:19

## 2023-08-13 RX ADMIN — Medication 10 MILLIGRAM(S): at 21:38

## 2023-08-13 RX ADMIN — Medication 10: at 16:41

## 2023-08-13 RX ADMIN — Medication 2: at 08:07

## 2023-08-13 RX ADMIN — Medication 2: at 11:52

## 2023-08-13 RX ADMIN — Medication 10 MILLIGRAM(S): at 14:00

## 2023-08-13 NOTE — BH INPATIENT PSYCHIATRY PROGRESS NOTE - NSBHFUPINTERVALHXFT_PSY_A_CORE
57 year old heterosexual cisgender male, single, domiciled with a friend, unemployed (receives SSI), with PMH of GERD, Diabetes mellitus type 2, HTN, history of pituitary tumor and PPH bipolar 1 disorder, antisocial personality disorder, conduct disorder, polysubstance use disorder (opioid, alcohol, tobacco, cocaine, amphetamine, cannabis) history of one remote suicide attempt via injecting bleach per patient, history of multiple inpatient psychiatric hospitalizations most recently at [facility] (23 – 23) for Brief Psychotic Disorder, [facility] (23 – 23) for Bipolar Disorder Unspecified, well documented history of multiple ED visits for medical, mental health and substance use disorder, history of incarceration (served 8 year sentence for manslaughter and was released from [facility] in ), currently on methadone maintenance through [facility] in the [place], +family history (father  of drug/alcohol overdose, and half sister has schizophrenia), BIB self reporting homicidal ideation, suicidal ideation and paranoia in the context of substance use and medication non-adherence. Too sleepy to undergo full evaluation.
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
## 2023-08-13 NOTE — BH INPATIENT PSYCHIATRY PROGRESS NOTE - CURRENT MEDICATION
MEDICATIONS  (STANDING):  atorvastatin 40 milliGRAM(s) Oral at bedtime  carvedilol 6.25 milliGRAM(s) Oral every 12 hours  dextrose 5%. 1000 milliLiter(s) (50 mL/Hr) IV Continuous <Continuous>  dextrose 5%. 1000 milliLiter(s) (100 mL/Hr) IV Continuous <Continuous>  dextrose 50% Injectable 12.5 Gram(s) IV Push once  dextrose 50% Injectable 25 Gram(s) IV Push once  dextrose 50% Injectable 25 Gram(s) IV Push once  diazepam    Tablet 10 milliGRAM(s) Oral three times a day  divalproex  milliGRAM(s) Oral two times a day  glucagon  Injectable 1 milliGRAM(s) IntraMuscular once  insulin lispro (ADMELOG) corrective regimen sliding scale   SubCutaneous Before meals and at bedtime  lisinopril 10 milliGRAM(s) Oral daily  methadone    Tablet 130 milliGRAM(s) Oral daily    MEDICATIONS  (PRN):  acetaminophen     Tablet .. 650 milliGRAM(s) Oral every 6 hours PRN Moderate Pain (4 - 6)  aluminum hydroxide/magnesium hydroxide/simethicone Suspension 30 milliLiter(s) Oral every 4 hours PRN Dyspepsia  dextrose Oral Gel 15 Gram(s) Oral once PRN Blood Glucose LESS THAN 70 milliGRAM(s)/deciliter  mirtazapine 15 milliGRAM(s) Oral at bedtime PRN insomnia  OLANZapine 5 milliGRAM(s) Oral every 6 hours PRN Agitation

## 2023-08-14 LAB
GLUCOSE BLDC GLUCOMTR-MCNC: 200 MG/DL — HIGH (ref 70–99)
GLUCOSE BLDC GLUCOMTR-MCNC: 202 MG/DL — HIGH (ref 70–99)
GLUCOSE BLDC GLUCOMTR-MCNC: 234 MG/DL — HIGH (ref 70–99)
GLUCOSE BLDC GLUCOMTR-MCNC: 293 MG/DL — HIGH (ref 70–99)

## 2023-08-14 RX ORDER — RISPERIDONE 4 MG/1
1 TABLET ORAL AT BEDTIME
Refills: 0 | Status: DISCONTINUED | OUTPATIENT
Start: 2023-08-14 | End: 2023-08-21

## 2023-08-14 RX ORDER — INSULIN GLARGINE 100 [IU]/ML
10 INJECTION, SOLUTION SUBCUTANEOUS AT BEDTIME
Refills: 0 | Status: DISCONTINUED | OUTPATIENT
Start: 2023-08-14 | End: 2023-08-15

## 2023-08-14 RX ADMIN — ATORVASTATIN CALCIUM 40 MILLIGRAM(S): 80 TABLET, FILM COATED ORAL at 21:45

## 2023-08-14 RX ADMIN — MIRTAZAPINE 15 MILLIGRAM(S): 45 TABLET, ORALLY DISINTEGRATING ORAL at 21:44

## 2023-08-14 RX ADMIN — METHADONE HYDROCHLORIDE 130 MILLIGRAM(S): 40 TABLET ORAL at 10:30

## 2023-08-14 RX ADMIN — Medication 4: at 17:07

## 2023-08-14 RX ADMIN — RISPERIDONE 1 MILLIGRAM(S): 4 TABLET ORAL at 21:44

## 2023-08-14 RX ADMIN — Medication 10 MILLIGRAM(S): at 14:30

## 2023-08-14 RX ADMIN — DIVALPROEX SODIUM 500 MILLIGRAM(S): 500 TABLET, DELAYED RELEASE ORAL at 10:29

## 2023-08-14 RX ADMIN — INSULIN GLARGINE 10 UNIT(S): 100 INJECTION, SOLUTION SUBCUTANEOUS at 21:46

## 2023-08-14 RX ADMIN — Medication 10 MILLIGRAM(S): at 06:48

## 2023-08-14 RX ADMIN — LISINOPRIL 10 MILLIGRAM(S): 2.5 TABLET ORAL at 10:29

## 2023-08-14 RX ADMIN — Medication 6: at 21:46

## 2023-08-14 RX ADMIN — Medication 10 MILLIGRAM(S): at 21:45

## 2023-08-14 RX ADMIN — Medication 2: at 12:17

## 2023-08-14 RX ADMIN — DIVALPROEX SODIUM 500 MILLIGRAM(S): 500 TABLET, DELAYED RELEASE ORAL at 21:44

## 2023-08-14 RX ADMIN — Medication 4: at 08:04
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
## 2023-08-14 NOTE — BH INPATIENT PSYCHIATRY PROGRESS NOTE - CURRENT MEDICATION
MEDICATIONS  (STANDING):  atorvastatin 40 milliGRAM(s) Oral at bedtime  carvedilol 6.25 milliGRAM(s) Oral every 12 hours  dextrose 5%. 1000 milliLiter(s) (100 mL/Hr) IV Continuous <Continuous>  dextrose 5%. 1000 milliLiter(s) (50 mL/Hr) IV Continuous <Continuous>  dextrose 50% Injectable 12.5 Gram(s) IV Push once  dextrose 50% Injectable 25 Gram(s) IV Push once  dextrose 50% Injectable 25 Gram(s) IV Push once  diazepam    Tablet 10 milliGRAM(s) Oral three times a day  divalproex  milliGRAM(s) Oral two times a day  glucagon  Injectable 1 milliGRAM(s) IntraMuscular once  insulin lispro (ADMELOG) corrective regimen sliding scale   SubCutaneous Before meals and at bedtime  lisinopril 10 milliGRAM(s) Oral daily  methadone    Tablet 130 milliGRAM(s) Oral daily    MEDICATIONS  (PRN):  acetaminophen     Tablet .. 650 milliGRAM(s) Oral every 6 hours PRN Moderate Pain (4 - 6)  aluminum hydroxide/magnesium hydroxide/simethicone Suspension 30 milliLiter(s) Oral every 4 hours PRN Dyspepsia  dextrose Oral Gel 15 Gram(s) Oral once PRN Blood Glucose LESS THAN 70 milliGRAM(s)/deciliter  mirtazapine 15 milliGRAM(s) Oral at bedtime PRN insomnia  OLANZapine 5 milliGRAM(s) Oral every 6 hours PRN Agitation

## 2023-08-15 DIAGNOSIS — E11.9 TYPE 2 DIABETES MELLITUS WITHOUT COMPLICATIONS: ICD-10-CM

## 2023-08-15 LAB
GLUCOSE BLDC GLUCOMTR-MCNC: 229 MG/DL — HIGH (ref 70–99)
GLUCOSE BLDC GLUCOMTR-MCNC: 297 MG/DL — HIGH (ref 70–99)
GLUCOSE BLDC GLUCOMTR-MCNC: 352 MG/DL — HIGH (ref 70–99)
GLUCOSE BLDC GLUCOMTR-MCNC: 361 MG/DL — HIGH (ref 70–99)
GLUCOSE BLDC GLUCOMTR-MCNC: 431 MG/DL — HIGH (ref 70–99)
GLUCOSE BLDC GLUCOMTR-MCNC: >600 MG/DL — CRITICAL HIGH (ref 70–99)

## 2023-08-15 PROCEDURE — 99253 IP/OBS CNSLTJ NEW/EST LOW 45: CPT

## 2023-08-15 RX ORDER — INSULIN LISPRO 100/ML
12 VIAL (ML) SUBCUTANEOUS
Refills: 0 | Status: DISCONTINUED | OUTPATIENT
Start: 2023-08-15 | End: 2023-08-22

## 2023-08-15 RX ORDER — INSULIN GLARGINE 100 [IU]/ML
20 INJECTION, SOLUTION SUBCUTANEOUS AT BEDTIME
Refills: 0 | Status: DISCONTINUED | OUTPATIENT
Start: 2023-08-15 | End: 2023-08-16

## 2023-08-15 RX ADMIN — METHADONE HYDROCHLORIDE 130 MILLIGRAM(S): 40 TABLET ORAL at 10:18

## 2023-08-15 RX ADMIN — Medication 10 MILLIGRAM(S): at 14:23

## 2023-08-15 RX ADMIN — Medication 12 UNIT(S): at 16:51

## 2023-08-15 RX ADMIN — Medication 10: at 11:31

## 2023-08-15 RX ADMIN — Medication 12: at 21:56

## 2023-08-15 RX ADMIN — ATORVASTATIN CALCIUM 40 MILLIGRAM(S): 80 TABLET, FILM COATED ORAL at 21:43

## 2023-08-15 RX ADMIN — Medication 10 MILLIGRAM(S): at 07:18

## 2023-08-15 RX ADMIN — DIVALPROEX SODIUM 500 MILLIGRAM(S): 500 TABLET, DELAYED RELEASE ORAL at 21:43

## 2023-08-15 RX ADMIN — Medication 10 MILLIGRAM(S): at 21:43

## 2023-08-15 RX ADMIN — Medication 6: at 16:50

## 2023-08-15 RX ADMIN — Medication 4: at 07:55

## 2023-08-15 RX ADMIN — DIVALPROEX SODIUM 500 MILLIGRAM(S): 500 TABLET, DELAYED RELEASE ORAL at 10:18

## 2023-08-15 RX ADMIN — RISPERIDONE 1 MILLIGRAM(S): 4 TABLET ORAL at 21:43

## 2023-08-15 RX ADMIN — INSULIN GLARGINE 20 UNIT(S): 100 INJECTION, SOLUTION SUBCUTANEOUS at 21:55

## 2023-08-15 RX ADMIN — LISINOPRIL 10 MILLIGRAM(S): 2.5 TABLET ORAL at 07:19
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
## 2023-08-15 NOTE — BH INPATIENT PSYCHIATRY PROGRESS NOTE - CURRENT MEDICATION
MEDICATIONS  (STANDING):  atorvastatin 40 milliGRAM(s) Oral at bedtime  carvedilol 6.25 milliGRAM(s) Oral every 12 hours  dextrose 5%. 1000 milliLiter(s) (50 mL/Hr) IV Continuous <Continuous>  dextrose 5%. 1000 milliLiter(s) (100 mL/Hr) IV Continuous <Continuous>  dextrose 50% Injectable 25 Gram(s) IV Push once  dextrose 50% Injectable 12.5 Gram(s) IV Push once  dextrose 50% Injectable 25 Gram(s) IV Push once  diazepam    Tablet 10 milliGRAM(s) Oral three times a day  divalproex  milliGRAM(s) Oral two times a day  glucagon  Injectable 1 milliGRAM(s) IntraMuscular once  insulin glargine Injectable (LANTUS) 20 Unit(s) SubCutaneous at bedtime  insulin lispro (ADMELOG) corrective regimen sliding scale   SubCutaneous Before meals and at bedtime  insulin lispro Injectable (ADMELOG) 12 Unit(s) SubCutaneous three times a day before meals  lisinopril 10 milliGRAM(s) Oral daily  methadone    Tablet 130 milliGRAM(s) Oral daily  risperiDONE   Tablet 1 milliGRAM(s) Oral at bedtime    MEDICATIONS  (PRN):  acetaminophen     Tablet .. 650 milliGRAM(s) Oral every 6 hours PRN Moderate Pain (4 - 6)  aluminum hydroxide/magnesium hydroxide/simethicone Suspension 30 milliLiter(s) Oral every 4 hours PRN Dyspepsia  dextrose Oral Gel 15 Gram(s) Oral once PRN Blood Glucose LESS THAN 70 milliGRAM(s)/deciliter  mirtazapine 15 milliGRAM(s) Oral at bedtime PRN insomnia  OLANZapine 5 milliGRAM(s) Oral every 6 hours PRN Agitation

## 2023-08-15 NOTE — CONSULT NOTE ADULT - SUBJECTIVE AND OBJECTIVE BOX
HISTORY OF PRESENT ILLNESS  JOSEP BAH is a 57y Male with a past medical history of     CAPILLARY BLOOD GLUCOSE & INSULIN RECEIVED  200 mg/dL (08-14 @ 12:14)  234 mg/dL (08-14 @ 17:05)  293 mg/dL (08-14 @ 21:42)  229 mg/dL (08-15 @ 07:37)      DIABETES HISTORY  - Age at diagnosis:   - Symptoms at time of diagnosis:   - Current Therapy:  - History of other regimens:   - History of hypoglycemia:   - History of DKA/HHS:   - Complications:   - Home FSG:        > Fasting: *** mg/dL.        > Before meals: *** mg/dL.        > Bedtime: *** mg/dL.  - Diet:          > Breakfast:         > Lunch:        > Dinner:        > Snacks:  - Physical activity:    - Outpatient follow-up:     PAST MEDICAL & SURGICAL HISTORY  As per history of present illness.     FAMILY HISTORY  - Diabetes:  - Thyroid:  - Autoimmune:  - Other:    SOCIAL HISTORY  - Work:  - Alcohol:  - Smoking:  - Recreational Drugs:    ALLERGIES  Compazine (Short breath)  Seafood (Anaphylaxis)  Haldol (Unknown)  Thorazine (Rash)  Cogentin (Unknown)    CURRENT MEDICATIONS  acetaminophen     Tablet .. 650 milliGRAM(s) Oral every 6 hours PRN  aluminum hydroxide/magnesium hydroxide/simethicone Suspension 30 milliLiter(s) Oral every 4 hours PRN  atorvastatin 40 milliGRAM(s) Oral at bedtime  carvedilol 6.25 milliGRAM(s) Oral every 12 hours  dextrose 5%. 1000 milliLiter(s) IV Continuous <Continuous>  dextrose 5%. 1000 milliLiter(s) IV Continuous <Continuous>  dextrose 50% Injectable 25 Gram(s) IV Push once  dextrose 50% Injectable 12.5 Gram(s) IV Push once  dextrose 50% Injectable 25 Gram(s) IV Push once  dextrose Oral Gel 15 Gram(s) Oral once PRN  diazepam    Tablet 10 milliGRAM(s) Oral three times a day  divalproex  milliGRAM(s) Oral two times a day  glucagon  Injectable 1 milliGRAM(s) IntraMuscular once  insulin glargine Injectable (LANTUS) 10 Unit(s) SubCutaneous at bedtime  insulin lispro (ADMELOG) corrective regimen sliding scale   SubCutaneous Before meals and at bedtime  lisinopril 10 milliGRAM(s) Oral daily  methadone    Tablet 130 milliGRAM(s) Oral daily  mirtazapine 15 milliGRAM(s) Oral at bedtime PRN  OLANZapine 5 milliGRAM(s) Oral every 6 hours PRN  risperiDONE   Tablet 1 milliGRAM(s) Oral at bedtime    REVIEW OF SYSTEMS  Constitutional:  Negative fever, chills or loss of appetite.  Eyes:  Negative blurry vision or double vision.  Cardiovascular:  Negative for chest pain or palpitations.  Respiratory:  Negative for cough, wheezing, or shortness of breath.   Gastrointestinal:  Negative for nausea, vomiting, diarrhea, constipation, or abdominal pain.  Genitourinary:  Negative frequency, urgency or dysuria.  Neurologic:  No headache, confusion, dizziness, lightheadedness.    PHYSICAL EXAM  Vital Signs Last 24 Hrs  T(C): 36.8 (15 Aug 2023 06:27), Max: 37 (14 Aug 2023 16:46)  T(F): 98.3 (15 Aug 2023 06:27), Max: 98.6 (14 Aug 2023 16:46)  HR: 55 (15 Aug 2023 06:27) (55 - 67)  BP: 172/83 (15 Aug 2023 06:27) (138/77 - 172/83)  BP(mean): --  RR: 16 (15 Aug 2023 06:27) (16 - 18)  SpO2: 95% (15 Aug 2023 06:27) (94% - 96%)    Parameters below as of 15 Aug 2023 06:27  Patient On (Oxygen Delivery Method): room air    Constitutional: Awake, alert, in no acute distress.   HEENT: Normocephalic, atraumatic, JENNIFER, no proptosis or lid retraction.   Neck: supple, no acanthosis, no thyromegaly or palpable thyroid nodules.  Respiratory: Lungs clear to ausculation bilaterally.   Cardiovascular: regular rhythm, normal S1 and S2, no audible murmurs.   GI: soft, non-tender, non-distended, bowel sounds present, no masses appreciated.  Extremities: No lower extremity edema, peripheral pulses present.   Skin: no rashes.   Psychiatric: AAO x 3. Normal affect/mood.     LABS  CBC - WBC/HGB/HTC/PLT: 6.28/13.7/41.2/210 (08-11-23)  BMP: Na/K/Cl/Bicarb/BUN/Cr/Gluc: 137/3.6/95/29/12/1.09/245 (08-11-23)  Anion Gap: 13 (08-11-23)  eGFR: 80 (08-11-23)  Calcium: 10.3 (08-11-23)  Phosphorus: -- (08-11-23)  Magnesium: -- (08-11-23)  LFT - Alb/Tprot/Tbili/Dbili/AlkPhos/ALT/AST: 4.4/--/0.4/--/92/18/22 (08-11-23)    Thyroid Stimulating Hormone, Serum: 0.624 (08-11-23)      ASSESSMENT / RECOMMENDATIONS    A1C: 8.3 %  BUN: 12  Creatinine: 1.09  GFR: 80  Weight:   BMI:   EF:     # Type 2 diabetes mellitus with hyperglycemia  - Please continue lantus *** units at bedtime.   - Continue lispro *** units before each meal.  - Continue lispro moderate / low dose sliding scale before meals and at bedtime.  - Patient's fingerstick glucose goal is 100-180 mg/dL.    - For discharge, patient can ***.    - Patient can follow up at discharge with [facility] by calling [phone] to make an appointment.      Case discussed with Dr. Marquis. Primary team updated.       HISTORY OF PRESENT ILLNESS  · Chief Complaint: The patient is a 57y Male complaining of suicidal thoughts.  · HPI Objective Statement: 57M hx bipolar, htn, dm, c/o SI/HI. pt states feeling like this for past 2 days. states "they are out to get me." denies AH. pt states takes neurontin, valium. states has not taken his lithium in 2-3 wks. has been hospitalized on 8U multiple times in the past. admits to using fentanyl today.    CAPILLARY BLOOD GLUCOSE & INSULIN RECEIVED  200 mg/dL (08-14 @ 12:14)  234 mg/dL (08-14 @ 17:05)  293 mg/dL (08-14 @ 21:42)  229 mg/dL (08-15 @ 07:37)      DIABETES HISTORY  He was diagnosed with diabetes 30 yrs. ago, and reports having been on insulin since then. His home regimen is 12U humalog TID with meals, and 32U lantus at bedtime. While inpatient, he has been placed on 6U TID with meals plus the corrective moderate ISS  (for which he has been requiring an additional 6-10U, which he occasionally refuses) and 17U of Lantus plus the corrective low ISS at bedtime. He reports that he "hates the diet" here and that at home he eats Hungarian toast at least twice daily and consumes Ensure TID. Per the nurses and PCAs on the unit, he reportedly rummages through the food carts and walks off with 6 cups of juice at a time or multiple cakes.  - Age at diagnosis:   - Symptoms at time of diagnosis:   - Current Therapy:  - History of other regimens:   - History of hypoglycemia:   - History of DKA/HHS:   - Complications:   - Home FSG:        > Fasting: *** mg/dL.        > Before meals: *** mg/dL.        > Bedtime: *** mg/dL.  - Diet:          > Breakfast:         > Lunch:        > Dinner:        > Snacks:  - Physical activity:    - Outpatient follow-up:     Feb 2022: #IDDM2  Patient has a 30yr history of IDDM2, on home 12U humalog TID w meals and 32U lantus qhs with poor glycemic control and BG in the 200-400s.    Recommendations:  1. Please increase lantus to 40U qhs   2. Please increase humalog to 20U plus corrective mISS with meals  3. Please check FSH, LH  4. We do not recommend supplementing testosterone in this patient at this time    Recommendations discussed with attending endocrinologist on service, Dr. Brenner.    PAST MEDICAL & SURGICAL HISTORY  As per history of present illness.     FAMILY HISTORY  - Diabetes:  - Thyroid:  - Autoimmune:  - Other:    SOCIAL HISTORY  - Work:  - Alcohol:  - Smoking:  - Recreational Drugs:    ALLERGIES  Compazine (Short breath)  Seafood (Anaphylaxis)  Haldol (Unknown)  Thorazine (Rash)  Cogentin (Unknown)    CURRENT MEDICATIONS  acetaminophen     Tablet .. 650 milliGRAM(s) Oral every 6 hours PRN  aluminum hydroxide/magnesium hydroxide/simethicone Suspension 30 milliLiter(s) Oral every 4 hours PRN  atorvastatin 40 milliGRAM(s) Oral at bedtime  carvedilol 6.25 milliGRAM(s) Oral every 12 hours  dextrose 5%. 1000 milliLiter(s) IV Continuous <Continuous>  dextrose 5%. 1000 milliLiter(s) IV Continuous <Continuous>  dextrose 50% Injectable 25 Gram(s) IV Push once  dextrose 50% Injectable 12.5 Gram(s) IV Push once  dextrose 50% Injectable 25 Gram(s) IV Push once  dextrose Oral Gel 15 Gram(s) Oral once PRN  diazepam    Tablet 10 milliGRAM(s) Oral three times a day  divalproex  milliGRAM(s) Oral two times a day  glucagon  Injectable 1 milliGRAM(s) IntraMuscular once  insulin glargine Injectable (LANTUS) 10 Unit(s) SubCutaneous at bedtime  insulin lispro (ADMELOG) corrective regimen sliding scale   SubCutaneous Before meals and at bedtime  lisinopril 10 milliGRAM(s) Oral daily  methadone    Tablet 130 milliGRAM(s) Oral daily  mirtazapine 15 milliGRAM(s) Oral at bedtime PRN  OLANZapine 5 milliGRAM(s) Oral every 6 hours PRN  risperiDONE   Tablet 1 milliGRAM(s) Oral at bedtime    REVIEW OF SYSTEMS  Constitutional:  Negative fever, chills or loss of appetite.  Eyes:  Negative blurry vision or double vision.  Cardiovascular:  Negative for chest pain or palpitations.  Respiratory:  Negative for cough, wheezing, or shortness of breath.   Gastrointestinal:  Negative for nausea, vomiting, diarrhea, constipation, or abdominal pain.  Genitourinary:  Negative frequency, urgency or dysuria.  Neurologic:  No headache, confusion, dizziness, lightheadedness.    PHYSICAL EXAM  Vital Signs Last 24 Hrs  T(C): 36.8 (15 Aug 2023 06:27), Max: 37 (14 Aug 2023 16:46)  T(F): 98.3 (15 Aug 2023 06:27), Max: 98.6 (14 Aug 2023 16:46)  HR: 55 (15 Aug 2023 06:27) (55 - 67)  BP: 172/83 (15 Aug 2023 06:27) (138/77 - 172/83)  BP(mean): --  RR: 16 (15 Aug 2023 06:27) (16 - 18)  SpO2: 95% (15 Aug 2023 06:27) (94% - 96%)    Parameters below as of 15 Aug 2023 06:27  Patient On (Oxygen Delivery Method): room air    Constitutional: Awake, alert, in no acute distress.   HEENT: Normocephalic, atraumatic, JENNIFER, no proptosis or lid retraction.   Neck: supple, no acanthosis, no thyromegaly or palpable thyroid nodules.  Respiratory: Lungs clear to ausculation bilaterally.   Cardiovascular: regular rhythm, normal S1 and S2, no audible murmurs.   GI: soft, non-tender, non-distended, bowel sounds present, no masses appreciated.  Extremities: No lower extremity edema, peripheral pulses present.   Skin: no rashes.   Psychiatric: AAO x 3. Normal affect/mood.     LABS  CBC - WBC/HGB/HTC/PLT: 6.28/13.7/41.2/210 (08-11-23)  BMP: Na/K/Cl/Bicarb/BUN/Cr/Gluc: 137/3.6/95/29/12/1.09/245 (08-11-23)  Anion Gap: 13 (08-11-23)  eGFR: 80 (08-11-23)  Calcium: 10.3 (08-11-23)  Phosphorus: -- (08-11-23)  Magnesium: -- (08-11-23)  LFT - Alb/Tprot/Tbili/Dbili/AlkPhos/ALT/AST: 4.4/--/0.4/--/92/18/22 (08-11-23)    Thyroid Stimulating Hormone, Serum: 0.624 (08-11-23)            HISTORY OF PRESENT ILLNESS  57M hx bipolar, htn, t2dm, c/o SI/HI who not taken his lithium in 2-3 wks.  Endocrine consulted for type 2 diabetes management. Known to service from previous admissions. In Feb 2023, he was receiving lantus 40 units at bedtime and lispro 20 units with meals. History of noncompliance with diabetic diet.     Patient seen and examined. He reports being depressed, with no improvement. He is eating well, though reports it is less than he eats at home.    CAPILLARY BLOOD GLUCOSE & INSULIN RECEIVED  200 mg/dL (08-14 @ 12:14) - Lispro 2  234 mg/dL (08-14 @ 17:05) - Lispro 4  293 mg/dL (08-14 @ 21:42) - Lantus 10 + lispro 6  229 mg/dL (08-15 @ 07:37) - Lispro 4. Ate Persian toast and reportedly sugar free syrup, though he was not on consistent carb diet at the time.  352 mg/dL (08-15 @ lunch)        DIABETES HISTORY  He was diagnosed with diabetes 30 yrs. ago. He was initially on metromin, but it make he sweaty and weak and he has been on insulin since. His home regimen is Lantus 10 units at bedtime an dlispro 10 units before meals.. While inpatient, he has been placed on 6U TID with meals plus the corrective moderate ISS  (for which he has been requiring an additional 6-10U, which he occasionally refuses) and 17U of Lantus plus the corrective low ISS at bedtime. He reports that he "hates the diet" here and that at home he eats Malay toast at least twice daily and consumes Ensure TID. Per the nurses and PCAs on the unit, he reportedly rummages through the food carts and walks off with 6 cups of juice at a time or multiple cakes.  - Age at diagnosis:   - Symptoms at time of diagnosis:   - Current Therapy:  - History of other regimens:   - History of hypoglycemia:   - History of DKA/HHS:   - Complications:   - Home FSG:        > Fasting: *** mg/dL.        > Before meals: *** mg/dL.        > Bedtime: *** mg/dL.  - Diet:          > Breakfast:         > Lunch:        > Dinner:        > Snacks:  - Physical activity:    - Outpatient follow-up:     Feb 2022: #IDDM2  Patient has a 30yr history of IDDM2, on home 12U humalog TID w meals and 32U lantus qhs with poor glycemic control and BG in the 200-400s.    Recommendations:  1. Please increase lantus to 40U qhs   2. Please increase humalog to 20U plus corrective mISS with meals  3. Please check FSH, LH  4. We do not recommend supplementing testosterone in this patient at this time    Recommendations discussed with attending endocrinologist on service, Dr. Brenner.    PAST MEDICAL & SURGICAL HISTORY  As per history of present illness.     FAMILY HISTORY  - Diabetes:  - Thyroid:  - Autoimmune:  - Other:    SOCIAL HISTORY  - Work:  - Alcohol:  - Smoking:  - Recreational Drugs:    ALLERGIES  Compazine (Short breath)  Seafood (Anaphylaxis)  Haldol (Unknown)  Thorazine (Rash)  Cogentin (Unknown)    CURRENT MEDICATIONS  acetaminophen     Tablet .. 650 milliGRAM(s) Oral every 6 hours PRN  aluminum hydroxide/magnesium hydroxide/simethicone Suspension 30 milliLiter(s) Oral every 4 hours PRN  atorvastatin 40 milliGRAM(s) Oral at bedtime  carvedilol 6.25 milliGRAM(s) Oral every 12 hours  dextrose 5%. 1000 milliLiter(s) IV Continuous <Continuous>  dextrose 5%. 1000 milliLiter(s) IV Continuous <Continuous>  dextrose 50% Injectable 25 Gram(s) IV Push once  dextrose 50% Injectable 12.5 Gram(s) IV Push once  dextrose 50% Injectable 25 Gram(s) IV Push once  dextrose Oral Gel 15 Gram(s) Oral once PRN  diazepam    Tablet 10 milliGRAM(s) Oral three times a day  divalproex  milliGRAM(s) Oral two times a day  glucagon  Injectable 1 milliGRAM(s) IntraMuscular once  insulin glargine Injectable (LANTUS) 10 Unit(s) SubCutaneous at bedtime  insulin lispro (ADMELOG) corrective regimen sliding scale   SubCutaneous Before meals and at bedtime  lisinopril 10 milliGRAM(s) Oral daily  methadone    Tablet 130 milliGRAM(s) Oral daily  mirtazapine 15 milliGRAM(s) Oral at bedtime PRN  OLANZapine 5 milliGRAM(s) Oral every 6 hours PRN  risperiDONE   Tablet 1 milliGRAM(s) Oral at bedtime    REVIEW OF SYSTEMS  Constitutional:  Negative fever, chills or loss of appetite.  Eyes:  Negative blurry vision or double vision.  Cardiovascular:  Negative for chest pain or palpitations.  Respiratory:  Negative for cough, wheezing, or shortness of breath.   Gastrointestinal:  Negative for nausea, vomiting, diarrhea, constipation, or abdominal pain.  Genitourinary:  Negative frequency, urgency or dysuria.  Neurologic:  No headache, confusion, dizziness, lightheadedness.    PHYSICAL EXAM  Vital Signs Last 24 Hrs  T(C): 36.8 (15 Aug 2023 06:27), Max: 37 (14 Aug 2023 16:46)  T(F): 98.3 (15 Aug 2023 06:27), Max: 98.6 (14 Aug 2023 16:46)  HR: 55 (15 Aug 2023 06:27) (55 - 67)  BP: 172/83 (15 Aug 2023 06:27) (138/77 - 172/83)  BP(mean): --  RR: 16 (15 Aug 2023 06:27) (16 - 18)  SpO2: 95% (15 Aug 2023 06:27) (94% - 96%)    Parameters below as of 15 Aug 2023 06:27  Patient On (Oxygen Delivery Method): room air    Constitutional: Awake, alert, in no acute distress.   HEENT: Normocephalic, atraumatic, JENNIFER, no proptosis or lid retraction.   Neck: supple, no acanthosis, no thyromegaly or palpable thyroid nodules.  Respiratory: Lungs clear to ausculation bilaterally.   Cardiovascular: regular rhythm, normal S1 and S2, no audible murmurs.   GI: soft, non-tender, non-distended, bowel sounds present, no masses appreciated.  Extremities: No lower extremity edema, peripheral pulses present.   Skin: no rashes.   Psychiatric: AAO x 3. Normal affect/mood.     LABS  CBC - WBC/HGB/HTC/PLT: 6.28/13.7/41.2/210 (08-11-23)  BMP: Na/K/Cl/Bicarb/BUN/Cr/Gluc: 137/3.6/95/29/12/1.09/245 (08-11-23)  Anion Gap: 13 (08-11-23)  eGFR: 80 (08-11-23)  Calcium: 10.3 (08-11-23)  Phosphorus: -- (08-11-23)  Magnesium: -- (08-11-23)  LFT - Alb/Tprot/Tbili/Dbili/AlkPhos/ALT/AST: 4.4/--/0.4/--/92/18/22 (08-11-23)    Thyroid Stimulating Hormone, Serum: 0.624 (08-11-23)            HISTORY OF PRESENT ILLNESS  57M hx bipolar, htn, t2dm, c/o SI/HI who not taken his lithium in 2-3 wks.  Endocrine consulted for type 2 diabetes management. Known to service from previous admissions. During admission in Feb 2023, he was receiving lantus 40 units at bedtime and lispro 20 units with meals. History of noncompliance with diabetic diet.     Patient seen and examined. He reports being depressed, with no improvement. He is eating well, though reports it is less than he eats at home.    CAPILLARY BLOOD GLUCOSE & INSULIN RECEIVED  200 mg/dL (08-14 @ 12:14) - Lispro 2  234 mg/dL (08-14 @ 17:05) - Lispro 4  293 mg/dL (08-14 @ 21:42) - Lantus 10 + lispro 6  229 mg/dL (08-15 @ 07:37) - Lispro 4. Ate Turkmen toast and reportedly sugar free syrup, though he was not on consistent carb diet at the time.  352 mg/dL (08-15 @ lunch)      DIABETES HISTORY  He was diagnosed with diabetes 30 yrs. ago. He was initially on metformin, but it make he sweaty and weak and he has been on insulin since. His home regimen is Lantus 20 units at bedtime and lispro 10 units before meals. He misses a few dose of novolog per week. He reports fasting FSG 180mg/dL and bedtime FSG 150mg/dL. Denies hypoglycemia or DKA. He reports his diet is pretty reasonable (though he eats large amounts0, except he drinks 2L regular soday/day and "is addicted." Insulin regimen has not been changed recently. He is overdue for eye exam, and reports decreased/blurry vision. He reports peripheral neuropathy and takes gabapentin 800mg TID. He sees a PCP approx every 2 weeks.    PAST MEDICAL & SURGICAL HISTORY  As per history of present illness.     SOCIAL HISTORY  - Undomiciled  - Alcohol: yes  - Smoking: yes  - Recreational Drugs: yes    ALLERGIES  Compazine (Short breath)  Seafood (Anaphylaxis)  Haldol (Unknown)  Thorazine (Rash)  Cogentin (Unknown)    CURRENT MEDICATIONS  acetaminophen     Tablet .. 650 milliGRAM(s) Oral every 6 hours PRN  aluminum hydroxide/magnesium hydroxide/simethicone Suspension 30 milliLiter(s) Oral every 4 hours PRN  atorvastatin 40 milliGRAM(s) Oral at bedtime  carvedilol 6.25 milliGRAM(s) Oral every 12 hours  dextrose 5%. 1000 milliLiter(s) IV Continuous <Continuous>  dextrose 5%. 1000 milliLiter(s) IV Continuous <Continuous>  dextrose 50% Injectable 25 Gram(s) IV Push once  dextrose 50% Injectable 12.5 Gram(s) IV Push once  dextrose 50% Injectable 25 Gram(s) IV Push once  dextrose Oral Gel 15 Gram(s) Oral once PRN  diazepam    Tablet 10 milliGRAM(s) Oral three times a day  divalproex  milliGRAM(s) Oral two times a day  glucagon  Injectable 1 milliGRAM(s) IntraMuscular once  insulin glargine Injectable (LANTUS) 10 Unit(s) SubCutaneous at bedtime  insulin lispro (ADMELOG) corrective regimen sliding scale   SubCutaneous Before meals and at bedtime  lisinopril 10 milliGRAM(s) Oral daily  methadone    Tablet 130 milliGRAM(s) Oral daily  mirtazapine 15 milliGRAM(s) Oral at bedtime PRN  OLANZapine 5 milliGRAM(s) Oral every 6 hours PRN  risperiDONE   Tablet 1 milliGRAM(s) Oral at bedtime    REVIEW OF SYSTEMS  Constitutional:  Negative fever, chills or loss of appetite.  Eyes:  Negative blurry vision or double vision.  Cardiovascular:  Negative for chest pain or palpitations.  Respiratory:  Negative for cough, wheezing, or shortness of breath.   Gastrointestinal:  Negative for nausea, vomiting, diarrhea, constipation, or abdominal pain.  Genitourinary:  Negative frequency, urgency or dysuria.  Neurologic:  No headache, confusion, dizziness, lightheadedness.    PHYSICAL EXAM  Vital Signs Last 24 Hrs  T(C): 36.8 (15 Aug 2023 06:27), Max: 37 (14 Aug 2023 16:46)  T(F): 98.3 (15 Aug 2023 06:27), Max: 98.6 (14 Aug 2023 16:46)  HR: 55 (15 Aug 2023 06:27) (55 - 67)  BP: 172/83 (15 Aug 2023 06:27) (138/77 - 172/83)  BP(mean): --  RR: 16 (15 Aug 2023 06:27) (16 - 18)  SpO2: 95% (15 Aug 2023 06:27) (94% - 96%)    Parameters below as of 15 Aug 2023 06:27  Patient On (Oxygen Delivery Method): room air    Constitutional: Awake, alert, in no acute distress.   HEENT: Normocephalic, atraumatic, JENNIFER, no proptosis or lid retraction.   Neck: supple, no acanthosis, no thyromegaly or palpable thyroid nodules.  Respiratory: Lungs clear to ausculation bilaterally.   Cardiovascular: regular rhythm, normal S1 and S2, no audible murmurs.   GI: soft, non-tender, non-distended, bowel sounds present, no masses appreciated.  Extremities: No lower extremity edema, peripheral pulses present.   Skin: no rashes.   Psychiatric: AAO x 3. Normal affect/mood.     LABS  CBC - WBC/HGB/HTC/PLT: 6.28/13.7/41.2/210 (08-11-23)  BMP: Na/K/Cl/Bicarb/BUN/Cr/Gluc: 137/3.6/95/29/12/1.09/245 (08-11-23)  Anion Gap: 13 (08-11-23)  eGFR: 80 (08-11-23)  Calcium: 10.3 (08-11-23)  Phosphorus: -- (08-11-23)  Magnesium: -- (08-11-23)  LFT - Alb/Tprot/Tbili/Dbili/AlkPhos/ALT/AST: 4.4/--/0.4/--/92/18/22 (08-11-23)    Thyroid Stimulating Hormone, Serum: 0.624 (08-11-23)
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
## 2023-08-15 NOTE — CONSULT NOTE ADULT - PROBLEM SELECTOR RECOMMENDATION 9
# Type 2 diabetes mellitus with hyperglycemia  - Please continue lantus *** units at bedtime.   - Continue lispro *** units before each meal.  - Continue lispro moderate / low dose sliding scale before meals and at bedtime.  - Patient's fingerstick glucose goal is 100-180 mg/dL.    - For discharge, patient can ***.    - Patient can follow up at discharge with [facility] by calling [phone] to make an appointment.      Case discussed with Dr. Marquis. Primary team updated. Type 2 diabetes mellitus with hyperglycemia  - Please increase lantus to 20 units at bedtime.   - Start lispro 10 units before each meal.  - Consistent carb diet. Discussed with patient the difference between regular and consistent carb diet, specifically sweetened beverages and he was agreeable for consistent carb at time of visit.  - Continue lispro moderate dose sliding scale before meals and at bedtime.  - Patient's fingerstick glucose goal is 100-180 mg/dL.    - For discharge, patient can continue basal/bolus, doses TBD. He uses vial and syringe. He declines trying any oral medications, despite the fact that it would decrease disease management burden which was discussed.  - Patient can follow up at discharge with [facility] by calling [phone] to make an appointment.      Case discussed with Dr. Marquis. Primary team updated. Type 2 diabetes mellitus with hyperglycemia  - Please increase lantus to 20 units at bedtime.   - Start lispro 12 units before each meal.  - Consistent carb diet. Discussed with patient the difference between regular and consistent carb diet, specifically sweetened beverages and he was agreeable for consistent carb at time of visit.  - Continue lispro moderate dose sliding scale before meals and at bedtime.  - Patient's fingerstick glucose goal is 100-180 mg/dL.    - For discharge, patient can continue basal/bolus, doses TBD. He uses vial and syringe. He declines trying any oral medications, despite the fact that it would decrease disease management burden which was discussed.  - Patient can follow up at discharge with [facility] by calling [phone] to make an appointment.      Case discussed with Dr. Marquis. Primary team updated.

## 2023-08-15 NOTE — CONSULT NOTE ADULT - ASSESSMENT
A1C: 8.3 %  BUN: 12  Creatinine: 1.09  GFR: 80  Weight: Not documented  BMI:   EF: 35%   57M hx bipolar, htn, t2dm, c/o SI/HI who not taken his lithium in 2-3 wks. Endocrine consulted for uncontrolled diabetes.    A1C: 8.3 %  BUN: 12  Creatinine: 1.09  GFR: 80  Weight: Not documented  BMI:   EF: 35%

## 2023-08-16 LAB
GLUCOSE BLDC GLUCOMTR-MCNC: 185 MG/DL — HIGH (ref 70–99)
GLUCOSE BLDC GLUCOMTR-MCNC: 332 MG/DL — HIGH (ref 70–99)
GLUCOSE BLDC GLUCOMTR-MCNC: 340 MG/DL — HIGH (ref 70–99)

## 2023-08-16 PROCEDURE — 99232 SBSQ HOSP IP/OBS MODERATE 35: CPT

## 2023-08-16 RX ORDER — INSULIN GLARGINE 100 [IU]/ML
16 INJECTION, SOLUTION SUBCUTANEOUS AT BEDTIME
Refills: 0 | Status: DISCONTINUED | OUTPATIENT
Start: 2023-08-16 | End: 2023-08-22

## 2023-08-16 RX ADMIN — Medication 12 UNIT(S): at 08:07

## 2023-08-16 RX ADMIN — Medication 2: at 08:08

## 2023-08-16 RX ADMIN — METHADONE HYDROCHLORIDE 130 MILLIGRAM(S): 40 TABLET ORAL at 09:49

## 2023-08-16 RX ADMIN — Medication 10 MILLIGRAM(S): at 06:55

## 2023-08-16 RX ADMIN — ATORVASTATIN CALCIUM 40 MILLIGRAM(S): 80 TABLET, FILM COATED ORAL at 22:22

## 2023-08-16 RX ADMIN — Medication 12 UNIT(S): at 17:08

## 2023-08-16 RX ADMIN — Medication 10 MILLIGRAM(S): at 13:02

## 2023-08-16 RX ADMIN — DIVALPROEX SODIUM 500 MILLIGRAM(S): 500 TABLET, DELAYED RELEASE ORAL at 22:22

## 2023-08-16 RX ADMIN — DIVALPROEX SODIUM 500 MILLIGRAM(S): 500 TABLET, DELAYED RELEASE ORAL at 09:49

## 2023-08-16 RX ADMIN — Medication 8: at 22:27

## 2023-08-16 RX ADMIN — INSULIN GLARGINE 16 UNIT(S): 100 INJECTION, SOLUTION SUBCUTANEOUS at 22:23

## 2023-08-16 RX ADMIN — Medication 8: at 17:07

## 2023-08-16 RX ADMIN — MIRTAZAPINE 15 MILLIGRAM(S): 45 TABLET, ORALLY DISINTEGRATING ORAL at 22:24

## 2023-08-16 RX ADMIN — LISINOPRIL 10 MILLIGRAM(S): 2.5 TABLET ORAL at 09:49

## 2023-08-16 RX ADMIN — RISPERIDONE 1 MILLIGRAM(S): 4 TABLET ORAL at 22:22

## 2023-08-16 RX ADMIN — Medication 10 MILLIGRAM(S): at 22:34

## 2023-08-16 NOTE — PROGRESS NOTE ADULT - ASSESSMENT
57M hx bipolar, htn, t2dm, c/o SI/HI who not taken his lithium in 2-3 wks. Endocrine consulted for uncontrolled diabetes.    Unable to make premeal insulin adjustments at this time due to copious amount of juice consumed last night. He dropped almost 250mg/dL overnight with Lantus 20 units. Discussed with patient.    A1C: 8.3 %  BUN: 12  Creatinine: 1.09  GFR: 80  Weight: Not documented  EF: 35%
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
## 2023-08-16 NOTE — PROGRESS NOTE ADULT - PROBLEM SELECTOR PLAN 1
Type 2 diabetes mellitus with hyperglycemia  - Please increase lantus to 20 units at bedtime.   - Start lispro 12 units before each meal.  - Consistent carb diet. Discussed with patient the difference between regular and consistent carb diet, specifically sweetened beverages and he was agreeable for consistent carb at time of visit.  - Continue lispro moderate dose sliding scale before meals and at bedtime.  - Patient's fingerstick glucose goal is 100-180 mg/dL.    - For discharge, patient can continue basal/bolus, doses TBD. He uses vial and syringe. He declines trying any oral medications, despite the fact that it would decrease disease management burden which was discussed.  - Patient can follow up at discharge with [facility] by calling [phone] to make an appointment.      Case discussed with Dr. Marquis. Primary team updated. Type 2 diabetes mellitus with hyperglycemia  - Please decrease lantus to 16 units at bedtime.   - Continue lispro 12 units before each meal.  - Consistent carb diet. Discussed with patient the difference between regular and consistent carb diet, specifically sweetened beverages and he was agreeable for consistent carb at time of visit.  - Continue lispro moderate dose sliding scale before meals and at bedtime.  - Patient's fingerstick glucose goal is 100-180 mg/dL.    - For discharge, patient can continue basal/bolus, doses TBD. He uses vial and syringe. He declines trying any oral medications, despite the fact that it would decrease disease management burden which was discussed.  - Patient can follow up at discharge with [facility] by calling [phone] to make an appointment.      Case discussed with Dr. Marquis. Primary team updated.
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
## 2023-08-16 NOTE — PROGRESS NOTE ADULT - SUBJECTIVE AND OBJECTIVE BOX
SUBJECTIVE / INTERVAL HPI: Patient was seen and examined this morning. No change to mental state. Eating well. He had 8 glasses of juice after dinner. Discussed that we cannot increase insulin to cover juice, and that no amount of insulin will cover juice. He agrees to dilute juice to 50/50 as first step, and then we can go from there.    CAPILLARY BLOOD GLUCOSE & INSULIN RECEIVED  297 mg/dL (08-15 @ 16:49) - Lispro 12+6. Ate chicken, potato, and grape juice. and then 8 more cups of juice.  431 mg/dL (08-15 @ 21:52) - Lantus 20 + lispro 12  361 mg/dL (08-15 @ 22:56)  185 mg/dL (08-16 @ 07:49) - Lispro 12+2. Ate [place] toast with reportedly SF syrup.  mg/dL (08-16 @ lunch)      REVIEW OF SYSTEMS  Constitutional:  Negative fever, chills or loss of appetite.  Eyes:  Negative blurry vision or double vision.  Cardiovascular:  Negative for chest pain or palpitations.  Respiratory:  Negative for cough, wheezing, or shortness of breath.    Gastrointestinal:  Negative for nausea, vomiting, diarrhea, constipation, or abdominal pain.  Genitourinary:  Negative frequency, urgency or dysuria.  Neurologic:  No headache, confusion, dizziness, lightheadedness.    PHYSICAL EXAM  Vital Signs Last 24 Hrs  T(C): 36.7 (16 Aug 2023 10:58), Max: 36.9 (15 Aug 2023 21:00)  T(F): 98.1 (16 Aug 2023 10:58), Max: 98.4 (15 Aug 2023 21:00)  HR: 85 (16 Aug 2023 10:58) (63 - 85)  BP: 145/92 (16 Aug 2023 10:58) (119/80 - 145/92)  BP(mean): --  RR: 18 (16 Aug 2023 10:58) (16 - 18)  SpO2: 85% (16 Aug 2023 10:58) (85% - 98%)    Parameters below as of 16 Aug 2023 10:58  Patient On (Oxygen Delivery Method): room air    Constitutional: Awake, alert, in no acute distress.   HEENT: Normocephalic, atraumatic, JENNIFRE.  Respiratory: Lungs clear to ausculation bilaterally.   Cardiovascular: regular rhythm, normal S1 and S2, no audible murmurs.   GI: soft, non-tender, non-distended, bowel sounds present.  Extremities: No lower extremity edema.  Psychiatric: AAO x 3. Normal affect/mood.     LABS  CBC - WBC/HGB/HTC/PLT: 6.28/13.7/41.2/210 (08-11-23)  BMP - Na/K/Cl/Bicarb/BUN/Cr/Gluc/AG/eGFR: 137/3.6/95/29/12/1.09/245/13/80 (08-11-23)  Ca - 10.3 (08-11-23)  Phos - -- (08-11-23)  Mg - -- (08-11-23)  LFT - Alb/Tprot/Tbili/Dbili/AlkPhos/ALT/AST: 4.4/--/0.4/--/92/18/22 (08-11-23)    Thyroid Stimulating Hormone, Serum: 0.624 (08-11-23)      MEDICATIONS  MEDICATIONS  (STANDING):  atorvastatin 40 milliGRAM(s) Oral at bedtime  carvedilol 6.25 milliGRAM(s) Oral every 12 hours  dextrose 5%. 1000 milliLiter(s) (50 mL/Hr) IV Continuous <Continuous>  dextrose 5%. 1000 milliLiter(s) (100 mL/Hr) IV Continuous <Continuous>  dextrose 50% Injectable 25 Gram(s) IV Push once  dextrose 50% Injectable 12.5 Gram(s) IV Push once  dextrose 50% Injectable 25 Gram(s) IV Push once  diazepam    Tablet 10 milliGRAM(s) Oral three times a day  divalproex  milliGRAM(s) Oral two times a day  glucagon  Injectable 1 milliGRAM(s) IntraMuscular once  insulin glargine Injectable (LANTUS) 20 Unit(s) SubCutaneous at bedtime  insulin lispro (ADMELOG) corrective regimen sliding scale   SubCutaneous Before meals and at bedtime  insulin lispro Injectable (ADMELOG) 12 Unit(s) SubCutaneous three times a day before meals  lisinopril 10 milliGRAM(s) Oral daily  methadone    Tablet 130 milliGRAM(s) Oral daily  risperiDONE   Tablet 1 milliGRAM(s) Oral at bedtime    MEDICATIONS  (PRN):  acetaminophen     Tablet .. 650 milliGRAM(s) Oral every 6 hours PRN Moderate Pain (4 - 6)  aluminum hydroxide/magnesium hydroxide/simethicone Suspension 30 milliLiter(s) Oral every 4 hours PRN Dyspepsia  dextrose Oral Gel 15 Gram(s) Oral once PRN Blood Glucose LESS THAN 70 milliGRAM(s)/deciliter  mirtazapine 15 milliGRAM(s) Oral at bedtime PRN insomnia  OLANZapine 5 milliGRAM(s) Oral every 6 hours PRN Agitation

## 2023-08-17 LAB
GLUCOSE BLDC GLUCOMTR-MCNC: 240 MG/DL — HIGH (ref 70–99)
GLUCOSE BLDC GLUCOMTR-MCNC: 277 MG/DL — HIGH (ref 70–99)
GLUCOSE BLDC GLUCOMTR-MCNC: 352 MG/DL — HIGH (ref 70–99)
GLUCOSE BLDC GLUCOMTR-MCNC: 406 MG/DL — HIGH (ref 70–99)

## 2023-08-17 PROCEDURE — 99231 SBSQ HOSP IP/OBS SF/LOW 25: CPT

## 2023-08-17 PROCEDURE — 99232 SBSQ HOSP IP/OBS MODERATE 35: CPT

## 2023-08-17 RX ADMIN — Medication 6: at 11:41

## 2023-08-17 RX ADMIN — Medication 12 UNIT(S): at 11:42

## 2023-08-17 RX ADMIN — Medication 10 MILLIGRAM(S): at 07:38

## 2023-08-17 RX ADMIN — Medication 10 MILLIGRAM(S): at 21:56

## 2023-08-17 RX ADMIN — Medication 12 UNIT(S): at 08:06

## 2023-08-17 RX ADMIN — DIVALPROEX SODIUM 500 MILLIGRAM(S): 500 TABLET, DELAYED RELEASE ORAL at 10:04

## 2023-08-17 RX ADMIN — Medication 10 MILLIGRAM(S): at 14:00

## 2023-08-17 RX ADMIN — ATORVASTATIN CALCIUM 40 MILLIGRAM(S): 80 TABLET, FILM COATED ORAL at 21:56

## 2023-08-17 RX ADMIN — RISPERIDONE 1 MILLIGRAM(S): 4 TABLET ORAL at 21:56

## 2023-08-17 RX ADMIN — DIVALPROEX SODIUM 500 MILLIGRAM(S): 500 TABLET, DELAYED RELEASE ORAL at 21:56

## 2023-08-17 RX ADMIN — METHADONE HYDROCHLORIDE 130 MILLIGRAM(S): 40 TABLET ORAL at 10:04

## 2023-08-17 RX ADMIN — Medication 4: at 07:57

## 2023-08-17 RX ADMIN — Medication 10: at 16:45

## 2023-08-17 RX ADMIN — INSULIN GLARGINE 16 UNIT(S): 100 INJECTION, SOLUTION SUBCUTANEOUS at 21:54

## 2023-08-17 RX ADMIN — Medication 12: at 21:55

## 2023-08-17 RX ADMIN — Medication 12 UNIT(S): at 16:46

## 2023-08-17 RX ADMIN — LISINOPRIL 10 MILLIGRAM(S): 2.5 TABLET ORAL at 10:05

## 2023-08-17 NOTE — BH INPATIENT PSYCHIATRY PROGRESS NOTE - CURRENT MEDICATION
MEDICATIONS  (STANDING):  atorvastatin 40 milliGRAM(s) Oral at bedtime  carvedilol 6.25 milliGRAM(s) Oral every 12 hours  dextrose 5%. 1000 milliLiter(s) (50 mL/Hr) IV Continuous <Continuous>  dextrose 5%. 1000 milliLiter(s) (100 mL/Hr) IV Continuous <Continuous>  dextrose 50% Injectable 25 Gram(s) IV Push once  dextrose 50% Injectable 12.5 Gram(s) IV Push once  dextrose 50% Injectable 25 Gram(s) IV Push once  diazepam    Tablet 10 milliGRAM(s) Oral three times a day  divalproex  milliGRAM(s) Oral two times a day  glucagon  Injectable 1 milliGRAM(s) IntraMuscular once  insulin glargine Injectable (LANTUS) 16 Unit(s) SubCutaneous at bedtime  insulin lispro (ADMELOG) corrective regimen sliding scale   SubCutaneous Before meals and at bedtime  insulin lispro Injectable (ADMELOG) 12 Unit(s) SubCutaneous three times a day before meals  lisinopril 10 milliGRAM(s) Oral daily  methadone    Tablet 130 milliGRAM(s) Oral daily  risperiDONE   Tablet 1 milliGRAM(s) Oral at bedtime    MEDICATIONS  (PRN):  acetaminophen     Tablet .. 650 milliGRAM(s) Oral every 6 hours PRN Moderate Pain (4 - 6)  aluminum hydroxide/magnesium hydroxide/simethicone Suspension 30 milliLiter(s) Oral every 4 hours PRN Dyspepsia  dextrose Oral Gel 15 Gram(s) Oral once PRN Blood Glucose LESS THAN 70 milliGRAM(s)/deciliter  mirtazapine 15 milliGRAM(s) Oral at bedtime PRN insomnia  OLANZapine 5 milliGRAM(s) Oral every 6 hours PRN Agitation

## 2023-08-17 NOTE — PROGRESS NOTE ADULT - PROBLEM SELECTOR PLAN 1
Type 2 diabetes mellitus with hyperglycemia  - Please continue lantus to 16 units at bedtime.   - Continue lispro 12 units before each meal.  - Consistent carb diet. Discussed with patient the difference between regular and consistent carb diet, specifically sweetened beverages and he was agreeable for consistent carb at time of visit.  - Continue lispro moderate dose sliding scale before meals and at bedtime.  - Patient's fingerstick glucose goal is 100-180 mg/dL.    - For discharge, patient can continue basal/bolus insulin. He uses vial and syringe. He declines trying any oral medications, despite the fact that it would decrease disease management burden and increase safety which was discussed.  - Patient can follow up at discharge with [facility] by calling [phone] to make an appointment.      Case discussed with Dr. Marquis. Primary team updated.

## 2023-08-17 NOTE — BH PSYCHOLOGY ASSESSMENT - NSBHPSYCHOLASBEHAV_PSY_A_CORE FT
APPEARANCE: well groomed    EYE CONTACT: fair	    MOVEMENT: laying in bed    SPEECH: normal volume and tone    THOUGHT PROCESSING: linear, goal oriented    ORIENTATION: x3    THOUGHT CONTENT: unremarkable, denied AVH    MOOD: dysphoric    SUICIDAL/ HOMICIDAL IDEATION:denied    AFFECT:constricted    COOPERATION: cooperative

## 2023-08-17 NOTE — PROGRESS NOTE ADULT - SUBJECTIVE AND OBJECTIVE BOX
SUBJECTIVE / INTERVAL HPI: Patient was seen and examined this morning. Mental state the same. Voice is gurgly and he reports sore throat. He drank a lot of juice again yesterday. He declined FSG at lunch yesterday, so did not receive premeal insulin. Again explained that no amount of insulin will cover juice or snack in between meals. Diet change by primary team to include 3 snacks per day, which is unmanageable with insulin.    CAPILLARY BLOOD GLUCOSE & INSULIN RECEIVED  185 mg/dL (08-16 @ 07:49) - Lispro 12+2  332 mg/dL (08-16 @ 17:05) - Lispro 12+8. Ate chicken fingers, fruit cup, and cheesecake.  340 mg/dL (08-16 @ 22:20) - Lantus 16 + lispro 8  240 mg/dL (08-17 @ 07:33) - Lispro 12+4. Ate Turkmen toast and 4 oz juice.  277 mg/dL (08-17 @ 11:37)    REVIEW OF SYSTEMS  Constitutional:  Negative fever, chills or loss of appetite.  Eyes:  Negative blurry vision or double vision.  Cardiovascular:  Negative for chest pain or palpitations.  Respiratory:  Negative for cough, wheezing, or shortness of breath.    Gastrointestinal:  Negative for nausea, vomiting, diarrhea, constipation, or abdominal pain.  Genitourinary:  Negative frequency, urgency or dysuria.  Neurologic:  No headache, confusion, dizziness, lightheadedness.    PHYSICAL EXAM  Vital Signs Last 24 Hrs  T(C): 36.7 (17 Aug 2023 09:23), Max: 36.7 (17 Aug 2023 09:23)  T(F): 98 (17 Aug 2023 09:23), Max: 98 (17 Aug 2023 09:23)  HR: 84 (17 Aug 2023 09:23) (84 - 84)  BP: 168/89 (17 Aug 2023 09:23) (168/89 - 168/89)  BP(mean): --  RR: 18 (17 Aug 2023 09:23) (18 - 18)  SpO2: 96% (17 Aug 2023 09:23) (96% - 96%)    Parameters below as of 17 Aug 2023 09:23  Patient On (Oxygen Delivery Method): room air    Constitutional: Awake, alert, in no acute distress.   HEENT: Normocephalic, atraumatic, JENNIFER.  Respiratory: Lungs clear to ausculation bilaterally.   Cardiovascular: regular rhythm, normal S1 and S2, no audible murmurs.   GI: soft, non-tender, non-distended, bowel sounds present.  Extremities: No lower extremity edema.  Psychiatric: AAO x 3. Normal affect/mood.     LABS  CBC - WBC/HGB/HTC/PLT: 6.28/13.7/41.2/210 (08-11-23)  BMP - Na/K/Cl/Bicarb/BUN/Cr/Gluc/AG/eGFR: 137/3.6/95/29/12/1.09/245/13/80 (08-11-23)  Ca - 10.3 (08-11-23)  Phos - -- (08-11-23)  Mg - -- (08-11-23)  LFT - Alb/Tprot/Tbili/Dbili/AlkPhos/ALT/AST: 4.4/--/0.4/--/92/18/22 (08-11-23)    Thyroid Stimulating Hormone, Serum: 0.624 (08-11-23)      MEDICATIONS  MEDICATIONS  (STANDING):  atorvastatin 40 milliGRAM(s) Oral at bedtime  carvedilol 6.25 milliGRAM(s) Oral every 12 hours  dextrose 5%. 1000 milliLiter(s) (50 mL/Hr) IV Continuous <Continuous>  dextrose 5%. 1000 milliLiter(s) (100 mL/Hr) IV Continuous <Continuous>  dextrose 50% Injectable 25 Gram(s) IV Push once  dextrose 50% Injectable 12.5 Gram(s) IV Push once  dextrose 50% Injectable 25 Gram(s) IV Push once  diazepam    Tablet 10 milliGRAM(s) Oral three times a day  divalproex  milliGRAM(s) Oral two times a day  glucagon  Injectable 1 milliGRAM(s) IntraMuscular once  insulin glargine Injectable (LANTUS) 16 Unit(s) SubCutaneous at bedtime  insulin lispro (ADMELOG) corrective regimen sliding scale   SubCutaneous Before meals and at bedtime  insulin lispro Injectable (ADMELOG) 12 Unit(s) SubCutaneous three times a day before meals  lisinopril 10 milliGRAM(s) Oral daily  methadone    Tablet 130 milliGRAM(s) Oral daily  risperiDONE   Tablet 1 milliGRAM(s) Oral at bedtime    MEDICATIONS  (PRN):  acetaminophen     Tablet .. 650 milliGRAM(s) Oral every 6 hours PRN Moderate Pain (4 - 6)  aluminum hydroxide/magnesium hydroxide/simethicone Suspension 30 milliLiter(s) Oral every 4 hours PRN Dyspepsia  dextrose Oral Gel 15 Gram(s) Oral once PRN Blood Glucose LESS THAN 70 milliGRAM(s)/deciliter  mirtazapine 15 milliGRAM(s) Oral at bedtime PRN insomnia  OLANZapine 5 milliGRAM(s) Oral every 6 hours PRN Agitation
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
## 2023-08-17 NOTE — PROGRESS NOTE ADULT - ASSESSMENT
57M hx bipolar, htn, t2dm, c/o SI/HI who not taken his lithium in 2-3 wks. Endocrine consulted for uncontrolled diabetes.    Unable to make recommendations with unrestricted diet. Diet education has been provided to patient. He is not interested in change of medication regimen at discharge. Endocrine will sign off. Please contact with any further issues.    A1C: 8.3 %  BUN: 12  Creatinine: 1.09  GFR: 80  Weight: Not documented  EF: 35% 57M hx bipolar, htn, t2dm, c/o SI/HI who not taken his lithium in 2-3 wks. Endocrine consulted for uncontrolled diabetes.    Unable to make recommendations with unrestricted diet, and if insulin is raised to cover copious amounts of juice or snacks there is increased risk of hypoglycemia if the inappropriate food is missed at any point. Diet education has been provided to patient. He is not interested in change of medication regimen at discharge. Endocrine will sign off. Please contact with any further issues.    A1C: 8.3 %  BUN: 12  Creatinine: 1.09  GFR: 80  Weight: Not documented  EF: 35%

## 2023-08-17 NOTE — BH PSYCHOLOGY ASSESSMENT - NSBHPSYCHOLASREASON_PSY_A_CORE FT
Morenita was seen last Tuesday for a right ear infection and started antibiotics. The last few days here right ear has been in so much pain that she is not sleeping and there is now a yellow crusty discharge that is concerning to mom.   Met with Mr. Bueno for CAMS assessment to assess suicidality, he reported that his suicidal ideation is much better than when he came in, he still sometimes experiences it "comes and goes" but it is not as intense, and he does not have a plan nor intent right now. He reported that his psychological pain, agitation and hopelessness is high, unable to identify stress and denied any self hate. Pt reported that his overall risk of suicide is very low, and he is not intending to kill himself. He identified many reasons for living, which included family, reported that he does not have a reason for dying. His wish to live is 7/8 and wish to die is 1/8. One thing that would help him no longer feel suicidal is that his girlfriend is accusing him of something that makes him upset, and he wants to prove it is incorrect. About treatment for his suicidality, he reported that moving out of proximity of ex-girlfriend would be helpful, as he feels followed by her and her friends, and also reported that addressing his mood through medication and attending to his blood sugar.   Suicide Risk Assessment: Prior hospitalizations, history of incarceration, substance abuse history  Modifiable: Current increase in suicidal ideation  Protective: Denies intent for suicidality, future oriented, seeks help.  At this time, pt is denying suicidal intent, reported that his overall risk of suicide is low, at this time, he is low/mod risk for suicide, due to his static risk factors.

## 2023-08-18 LAB
GLUCOSE BLDC GLUCOMTR-MCNC: 238 MG/DL — HIGH (ref 70–99)
GLUCOSE BLDC GLUCOMTR-MCNC: 248 MG/DL — HIGH (ref 70–99)
GLUCOSE BLDC GLUCOMTR-MCNC: 254 MG/DL — HIGH (ref 70–99)
GLUCOSE BLDC GLUCOMTR-MCNC: 288 MG/DL — HIGH (ref 70–99)

## 2023-08-18 RX ORDER — METHADONE HYDROCHLORIDE 40 MG/1
130 TABLET ORAL DAILY
Refills: 0 | Status: DISCONTINUED | OUTPATIENT
Start: 2023-08-19 | End: 2023-08-22

## 2023-08-18 RX ORDER — DIAZEPAM 5 MG
10 TABLET ORAL THREE TIMES A DAY
Refills: 0 | Status: DISCONTINUED | OUTPATIENT
Start: 2023-08-18 | End: 2023-08-22

## 2023-08-18 RX ADMIN — Medication 6: at 22:06

## 2023-08-18 RX ADMIN — Medication 10 MILLIGRAM(S): at 08:07

## 2023-08-18 RX ADMIN — Medication 10 MILLIGRAM(S): at 22:06

## 2023-08-18 RX ADMIN — Medication 4: at 16:55

## 2023-08-18 RX ADMIN — METHADONE HYDROCHLORIDE 130 MILLIGRAM(S): 40 TABLET ORAL at 10:22

## 2023-08-18 RX ADMIN — INSULIN GLARGINE 16 UNIT(S): 100 INJECTION, SOLUTION SUBCUTANEOUS at 22:10

## 2023-08-18 RX ADMIN — ATORVASTATIN CALCIUM 40 MILLIGRAM(S): 80 TABLET, FILM COATED ORAL at 22:02

## 2023-08-18 RX ADMIN — DIVALPROEX SODIUM 500 MILLIGRAM(S): 500 TABLET, DELAYED RELEASE ORAL at 10:25

## 2023-08-18 RX ADMIN — Medication 12 UNIT(S): at 11:51

## 2023-08-18 RX ADMIN — Medication 12 UNIT(S): at 08:07

## 2023-08-18 RX ADMIN — Medication 10 MILLIGRAM(S): at 15:37

## 2023-08-18 RX ADMIN — Medication 6: at 08:06

## 2023-08-18 RX ADMIN — Medication 4: at 11:52

## 2023-08-18 RX ADMIN — LISINOPRIL 10 MILLIGRAM(S): 2.5 TABLET ORAL at 10:25

## 2023-08-18 RX ADMIN — DIVALPROEX SODIUM 500 MILLIGRAM(S): 500 TABLET, DELAYED RELEASE ORAL at 22:02

## 2023-08-18 RX ADMIN — RISPERIDONE 1 MILLIGRAM(S): 4 TABLET ORAL at 22:02

## 2023-08-18 RX ADMIN — Medication 12 UNIT(S): at 16:55

## 2023-08-18 NOTE — BH INPATIENT PSYCHIATRY PROGRESS NOTE - NSICDXBHTERTIARYDX_PSY_ALL_CORE
R/O Substance induced mood disorder   F19.94  

## 2023-08-18 NOTE — BH INPATIENT PSYCHIATRY PROGRESS NOTE - CURRENT MEDICATION
MEDICATIONS  (STANDING):  atorvastatin 40 milliGRAM(s) Oral at bedtime  carvedilol 6.25 milliGRAM(s) Oral every 12 hours  dextrose 5%. 1000 milliLiter(s) (100 mL/Hr) IV Continuous <Continuous>  dextrose 5%. 1000 milliLiter(s) (50 mL/Hr) IV Continuous <Continuous>  dextrose 50% Injectable 25 Gram(s) IV Push once  dextrose 50% Injectable 12.5 Gram(s) IV Push once  dextrose 50% Injectable 25 Gram(s) IV Push once  diazepam    Tablet 10 milliGRAM(s) Oral three times a day  divalproex  milliGRAM(s) Oral two times a day  glucagon  Injectable 1 milliGRAM(s) IntraMuscular once  insulin glargine Injectable (LANTUS) 16 Unit(s) SubCutaneous at bedtime  insulin lispro (ADMELOG) corrective regimen sliding scale   SubCutaneous Before meals and at bedtime  insulin lispro Injectable (ADMELOG) 12 Unit(s) SubCutaneous three times a day before meals  lisinopril 10 milliGRAM(s) Oral daily  methadone    Tablet 130 milliGRAM(s) Oral daily  risperiDONE   Tablet 1 milliGRAM(s) Oral at bedtime    MEDICATIONS  (PRN):  acetaminophen     Tablet .. 650 milliGRAM(s) Oral every 6 hours PRN Moderate Pain (4 - 6)  aluminum hydroxide/magnesium hydroxide/simethicone Suspension 30 milliLiter(s) Oral every 4 hours PRN Dyspepsia  dextrose Oral Gel 15 Gram(s) Oral once PRN Blood Glucose LESS THAN 70 milliGRAM(s)/deciliter  OLANZapine 5 milliGRAM(s) Oral every 6 hours PRN Agitation
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
## 2023-08-18 NOTE — BH INPATIENT PSYCHIATRY PROGRESS NOTE - NSBHFUPINTERVALHXFT_PSY_A_CORE
Pt seen in conference room for interview. Pt reports feeling very tired and slurring his speech. He attributes these symptoms to his risperidone 1mg and mirtazipine 15mg nightly PRN, which he has been taking at night. The patient was informed that it is far more likely that his tiredness is secondary to his Valium 10mg TID. The patient was also informed that rehab centers would not accept him if is taking valium or other benzodiazepines. The patient refused to consider reducing his valium, stating that he requires it for anxiety and would rather not go to rehab then taper off valium. The patient agreed to discontinue mirtazapine as he denies insomnia.  the patient agreed that he would reduce his morning valium from 10mg to 5mg tomorrow if he still feels tired during the day.     The patient to reported HI and SI, which he says is at its baseline level.  Pt seen in conference room for interview. Pt reports feeling very tired and slurring his speech. He attributes these symptoms to his risperidone 1mg and mirtazipine 15mg nightly PRN, which he has been taking at night. The patient was informed that it is far more likely that his tiredness is secondary to his Valium 10mg TID. The patient was also informed that rehab centers would not accept him if is taking valium or other benzodiazepines. The patient refused to consider reducing his valium, stating that he requires it for anxiety and would rather not go to rehab then taper off valium. The patient agreed to discontinue mirtazapine as he denies insomnia.  the patient agreed that he would reduce his morning valium from 10mg to 5mg tomorrow if he still feels tired during the day.     Patient counseled on his diabetes and he agrees to reduce his intake of juice.     The patient to reported HI and SI, which he says is at its baseline level.
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
## 2023-08-19 LAB
GLUCOSE BLDC GLUCOMTR-MCNC: 173 MG/DL — HIGH (ref 70–99)
GLUCOSE BLDC GLUCOMTR-MCNC: 271 MG/DL — HIGH (ref 70–99)
GLUCOSE BLDC GLUCOMTR-MCNC: 374 MG/DL — HIGH (ref 70–99)
GLUCOSE BLDC GLUCOMTR-MCNC: 437 MG/DL — HIGH (ref 70–99)
GLUCOSE BLDC GLUCOMTR-MCNC: 483 MG/DL — CRITICAL HIGH (ref 70–99)
VALPROATE SERPL-MCNC: 68.4 UG/ML — SIGNIFICANT CHANGE UP (ref 50–100)

## 2023-08-19 RX ADMIN — Medication 10 MILLIGRAM(S): at 12:53

## 2023-08-19 RX ADMIN — DIVALPROEX SODIUM 500 MILLIGRAM(S): 500 TABLET, DELAYED RELEASE ORAL at 10:00

## 2023-08-19 RX ADMIN — Medication 12 UNIT(S): at 08:06

## 2023-08-19 RX ADMIN — Medication 10: at 12:49

## 2023-08-19 RX ADMIN — LISINOPRIL 10 MILLIGRAM(S): 2.5 TABLET ORAL at 10:00

## 2023-08-19 RX ADMIN — INSULIN GLARGINE 16 UNIT(S): 100 INJECTION, SOLUTION SUBCUTANEOUS at 21:48

## 2023-08-19 RX ADMIN — Medication 12: at 21:48

## 2023-08-19 RX ADMIN — Medication 6: at 08:06

## 2023-08-19 RX ADMIN — Medication 10 MILLIGRAM(S): at 21:47

## 2023-08-19 RX ADMIN — DIVALPROEX SODIUM 500 MILLIGRAM(S): 500 TABLET, DELAYED RELEASE ORAL at 21:47

## 2023-08-19 RX ADMIN — ATORVASTATIN CALCIUM 40 MILLIGRAM(S): 80 TABLET, FILM COATED ORAL at 21:48

## 2023-08-19 RX ADMIN — Medication 2: at 16:59

## 2023-08-19 RX ADMIN — METHADONE HYDROCHLORIDE 130 MILLIGRAM(S): 40 TABLET ORAL at 10:01

## 2023-08-19 RX ADMIN — Medication 12 UNIT(S): at 17:00

## 2023-08-19 RX ADMIN — Medication 10 MILLIGRAM(S): at 10:00

## 2023-08-19 RX ADMIN — Medication 12 UNIT(S): at 12:52

## 2023-08-19 RX ADMIN — Medication 650 MILLIGRAM(S): at 10:01

## 2023-08-19 RX ADMIN — Medication 650 MILLIGRAM(S): at 10:30

## 2023-08-19 NOTE — BH CHART NOTE - NSEVENTNOTEFT_PSY_ALL_CORE
Called by nursing staff regarding FS>400 before bedtime. Afterward pt received both bedtime lantus as well as sliding scale correctional insulin around 9pm. However per nursing, after administration pt continued to eat afterward; additional FS checked at 11pm and found to be in 480s.     Discussed with medicine consult team -- will follow up with recs regarding further insulin management.     Called by nursing staff regarding FS>400 before bedtime. Afterward pt received both bedtime lantus as well as sliding scale correctional insulin around 9pm. However per nursing, after administration pt continued to eat afterward; additional FS checked at 11pm and found to be in 480s. Advised nursing to recheck FS in 30 more mins to allow further time for SA insulin to peak.    Repeat FS taken around 12am; value of 256. Advised nursing to administer 4u more of SA insulin before bed and ensure pt no longer eating after this.    Discussed with medicine consult team -- will follow up with recs regarding further insulin management.

## 2023-08-20 LAB
GLUCOSE BLDC GLUCOMTR-MCNC: 213 MG/DL — HIGH (ref 70–99)
GLUCOSE BLDC GLUCOMTR-MCNC: 250 MG/DL — HIGH (ref 70–99)
GLUCOSE BLDC GLUCOMTR-MCNC: 256 MG/DL — HIGH (ref 70–99)
GLUCOSE BLDC GLUCOMTR-MCNC: 289 MG/DL — HIGH (ref 70–99)
GLUCOSE BLDC GLUCOMTR-MCNC: 290 MG/DL — HIGH (ref 70–99)

## 2023-08-20 PROCEDURE — 99254 IP/OBS CNSLTJ NEW/EST MOD 60: CPT

## 2023-08-20 RX ADMIN — Medication 6: at 17:04

## 2023-08-20 RX ADMIN — Medication 10 MILLIGRAM(S): at 21:21

## 2023-08-20 RX ADMIN — Medication 6: at 21:23

## 2023-08-20 RX ADMIN — METHADONE HYDROCHLORIDE 130 MILLIGRAM(S): 40 TABLET ORAL at 10:18

## 2023-08-20 RX ADMIN — Medication 10 MILLIGRAM(S): at 10:19

## 2023-08-20 RX ADMIN — Medication 4: at 07:51

## 2023-08-20 RX ADMIN — Medication 650 MILLIGRAM(S): at 11:41

## 2023-08-20 RX ADMIN — DIVALPROEX SODIUM 500 MILLIGRAM(S): 500 TABLET, DELAYED RELEASE ORAL at 21:20

## 2023-08-20 RX ADMIN — Medication 12 UNIT(S): at 07:51

## 2023-08-20 RX ADMIN — LISINOPRIL 10 MILLIGRAM(S): 2.5 TABLET ORAL at 10:19

## 2023-08-20 RX ADMIN — ATORVASTATIN CALCIUM 40 MILLIGRAM(S): 80 TABLET, FILM COATED ORAL at 21:20

## 2023-08-20 RX ADMIN — DIVALPROEX SODIUM 500 MILLIGRAM(S): 500 TABLET, DELAYED RELEASE ORAL at 10:19

## 2023-08-20 RX ADMIN — INSULIN GLARGINE 16 UNIT(S): 100 INJECTION, SOLUTION SUBCUTANEOUS at 21:22

## 2023-08-20 RX ADMIN — Medication 650 MILLIGRAM(S): at 11:09

## 2023-08-20 RX ADMIN — Medication 12 UNIT(S): at 12:39

## 2023-08-20 RX ADMIN — Medication 10 MILLIGRAM(S): at 12:41

## 2023-08-20 RX ADMIN — Medication 4: at 12:39

## 2023-08-20 RX ADMIN — Medication 12 UNIT(S): at 17:04

## 2023-08-20 NOTE — CONSULT NOTE ADULT - SUBJECTIVE AND OBJECTIVE BOX
HPI:  history mostly from record.  57 year old heterosexual cisgender male, single, domiciled with a friend, unemployed (receives SSI), with PMH of GERD, Diabetes mellitus type 2, HTN, history of pituitary tumor and PPH bipolar 1 disorder, antisocial personality disorder, conduct disorder, polysubstance use disorder (opioid, alcohol, tobacco, cocaine, amphetamine, cannabis) history of one remote suicide attempt via injecting bleach per patient, history of multiple inpatient psychiatric hospitalizations most recently at [facility] (23 – 23) for Brief Psychotic Disorder, [facility] (23 – 23) for Bipolar Disorder Unspecified, well documented history of multiple ED visits for medical, mental health and substance use disorder, history of incarceration (served 8 year sentence for manslaughter and was released from [facility] in ), currently on methadone maintenance through [facility] in the [place], +family history (father  of drug/alcohol overdose, and half sister has schizophrenia), BIB self reporting homicidal ideation, suicidal ideation and paranoia in the context of substance use and medication non-adherence.   - pt was being followed closely by Endocrine for poorly controlled DM   medicine consulted as well- sine he is taking juice multiple times and sugar has been out of control  - pt seen -encounter time 11 am -he is ambulatory -seen lots of empty juice can on the table  - he stated his sugar is poorly controlled in 400s- know that the goal is to keep below 180   we talked about Juices are making his sugar going up -stated he is aware about that -so far he has not cut back on the juice -   encouraged him to reduce/stay away from juice.       PAST MEDICAL & SURGICAL HISTORY:  Diabetes mellitus      HTN (hypertension)      GERD (gastroesophageal reflux disease)      History of pituitary tumor      Heart failure      H/O bipolar disorder      No significant past surgical history        Home Medications:  gabapentin 800 mg oral tablet: 1 tab(s) orally 3 times a day (2023 12:21)  gabapentin 800 mg oral tablet: 1 tab(s) orally 3 times a day (2023 12:21)  gabapentin 800 mg oral tablet: 1 tab(s) orally 3 times a day (2023 12:21)  insulin glargine: 30 unit(s) subcutaneous once a day (2023 12:21)  insulin lispro: 10 unit(s) subcutaneous 3 times a day (2023 12:21)  methadone 10 mg oral tablet: 3 tab(s) orally once a day (2023 22:43)  methadone 40 mg oral tablet, dispersible: 2 tab(s) orally once a day (2023 22:43)    Allergies    Compazine (Short breath)  Seafood (Anaphylaxis)  Haldol (Unknown)  Thorazine (Rash)  Cogentin (Unknown)    Intolerances      FAMILY HISTORY:    Social History:      REVIEW OF SYSTEMS:  12 points system reviewed all negative except in [place].    Diet, Consistent Carbohydrate Gestational w/3 Snacks (23 @ 17:45) [Active]      CURRENT MEDICATIONS:   acetaminophen     Tablet .. 650 milliGRAM(s) Oral every 6 hours PRN  aluminum hydroxide/magnesium hydroxide/simethicone Suspension 30 milliLiter(s) Oral every 4 hours PRN  atorvastatin 40 milliGRAM(s) Oral at bedtime  carvedilol 6.25 milliGRAM(s) Oral every 12 hours  dextrose 5%. 1000 milliLiter(s) IV Continuous <Continuous>  dextrose 5%. 1000 milliLiter(s) IV Continuous <Continuous>  dextrose 50% Injectable 25 Gram(s) IV Push once  dextrose 50% Injectable 12.5 Gram(s) IV Push once  dextrose 50% Injectable 25 Gram(s) IV Push once  dextrose Oral Gel 15 Gram(s) Oral once PRN  diazepam    Tablet 10 milliGRAM(s) Oral three times a day  divalproex  milliGRAM(s) Oral two times a day  glucagon  Injectable 1 milliGRAM(s) IntraMuscular once  insulin glargine Injectable (LANTUS) 16 Unit(s) SubCutaneous at bedtime  insulin lispro (ADMELOG) corrective regimen sliding scale   SubCutaneous Before meals and at bedtime  insulin lispro Injectable (ADMELOG) 12 Unit(s) SubCutaneous three times a day before meals  lisinopril 10 milliGRAM(s) Oral daily  methadone    Tablet 130 milliGRAM(s) Oral daily  OLANZapine 5 milliGRAM(s) Oral every 6 hours PRN  risperiDONE   Tablet 1 milliGRAM(s) Oral at bedtime      VITAL SIGNS, INS/OUTS (last 24 hours):  Vital Signs Last 24 Hrs  T(C): 37 (20 Aug 2023 10:05), Max: 37 (20 Aug 2023 10:05)  T(F): 98.6 (20 Aug 2023 10:05), Max: 98.6 (20 Aug 2023 10:05)  HR: 72 (20 Aug 2023 10:05) (72 - 82)  BP: 114/73 (20 Aug 2023 10:05) (110/69 - 114/73)  BP(mean): --  RR: 16 (20 Aug 2023 10:05) (16 - 18)  SpO2: 98% (20 Aug 2023 10:05) (96% - 98%)    Parameters below as of 19 Aug 2023 17:37  Patient On (Oxygen Delivery Method): room air      I&O's Summary      PHYSICAL EXAM:  General exam: ambulating  no tachypnea noted.  saturating well on room air.       CAPILLARY BLOOD GLUCOSE      POCT Blood Glucose.: 250 mg/dL (20 Aug 2023 12:37)  POCT Blood Glucose.: 213 mg/dL (20 Aug 2023 07:33)  POCT Blood Glucose.: 256 mg/dL (20 Aug 2023 00:02)  POCT Blood Glucose.: 483 mg/dL (19 Aug 2023 23:01)  POCT Blood Glucose.: 437 mg/dL (19 Aug 2023 21:44)  POCT Blood Glucose.: 173 mg/dL (19 Aug 2023 16:57)      OTHER LABS:        MICRODATA:      IMAGING:    EKG:     #Diet - Diet, Consistent Carbohydrate Gestational w/3 Snacks (23 @ 17:45) [Active]        #DVT PPx -

## 2023-08-20 NOTE — CONSULT NOTE ADULT - ASSESSMENT
57 year old heterosexual cisgender male, single, domiciled with a friend, unemployed (receives SSI), with PMH of GERD, Diabetes mellitus type 2, HTN, history of pituitary tumor and PPH bipolar 1 disorder, antisocial personality disorder, conduct disorder, polysubstance use disorder (opioid, alcohol, tobacco, cocaine, amphetamine, cannabis) history of one remote suicide attempt via injecting bleach per patient, history of multiple inpatient psychiatric hospitalizations most recently at [facility] (23 – 23) for Brief Psychotic Disorder, [facility] (23 – 23) for Bipolar Disorder Unspecified, well documented history of multiple ED visits for medical, mental health and substance use disorder, history of incarceration (served 8 year sentence for manslaughter and was released from senior living in ), currently on methadone maintenance through [facility] in the [place], +family history (father  of drug/alcohol overdose, and half sister has schizophrenia), BIB self reporting homicidal ideation, suicidal ideation and paranoia in the context of substance use and medication non-adherence.   - pt was being followed closely by Endocrine for poorly controlled DM   medicine consulted as well- sine he is taking juice multiple times and sugar has been out of control    - pt seen -encounter time 11 am -he is ambulatory -seen lots of empty juice can on the table  - he stated his sugar is poorly controlled in 400s- know that the goal is to keep below 180   we talked about Juices are making his sugar going up -stated he is aware about that -so far he has not cut back on the juice -   encouraged him to reduce/stay away from juice.   - Endocrine rec appreciated, continue with basal/nutritional and correctional insulin as per endocrine rec  - would give carb consistent diet   - would reinforce on compliance with juice - we talked about that today -would need frequent reminder.    - Hypertension and hyperlipidemia.  HR: 72 (20 Aug 2023 10:05) (72 - 82)  BP: 114/73 (20 Aug 2023 10:05) (110/69 - 114/73)  - BP control at goal   - continue atorvastatin 40 milliGRAM(s) Oral at bedtime  - continue carvedilol 6.25 milliGRAM(s) Oral every 12 hours    MMTP   bipolar disorder  -management as per  team.     Thank you for allowing medicine to participate in the care, [facility] team.

## 2023-08-21 VITALS
OXYGEN SATURATION: 96 % | TEMPERATURE: 98 F | RESPIRATION RATE: 18 BRPM | DIASTOLIC BLOOD PRESSURE: 72 MMHG | SYSTOLIC BLOOD PRESSURE: 169 MMHG | HEART RATE: 80 BPM

## 2023-08-21 LAB
GLUCOSE BLDC GLUCOMTR-MCNC: 134 MG/DL — HIGH (ref 70–99)
GLUCOSE BLDC GLUCOMTR-MCNC: 243 MG/DL — HIGH (ref 70–99)
GLUCOSE BLDC GLUCOMTR-MCNC: 255 MG/DL — HIGH (ref 70–99)
GLUCOSE BLDC GLUCOMTR-MCNC: 293 MG/DL — HIGH (ref 70–99)

## 2023-08-21 PROCEDURE — 99232 SBSQ HOSP IP/OBS MODERATE 35: CPT

## 2023-08-21 RX ORDER — ATORVASTATIN CALCIUM 80 MG/1
1 TABLET, FILM COATED ORAL
Qty: 30 | Refills: 0
Start: 2023-08-21 | End: 2023-09-19

## 2023-08-21 RX ORDER — LOPERAMIDE HCL 2 MG
2 TABLET ORAL
Refills: 0 | Status: DISCONTINUED | OUTPATIENT
Start: 2023-08-21 | End: 2023-08-21

## 2023-08-21 RX ORDER — LISINOPRIL 2.5 MG/1
1 TABLET ORAL
Qty: 30 | Refills: 0
Start: 2023-08-21

## 2023-08-21 RX ORDER — CARVEDILOL PHOSPHATE 80 MG/1
1 CAPSULE, EXTENDED RELEASE ORAL
Qty: 60 | Refills: 0
Start: 2023-08-21 | End: 2023-09-19

## 2023-08-21 RX ORDER — DIVALPROEX SODIUM 500 MG/1
1 TABLET, DELAYED RELEASE ORAL
Refills: 0
Start: 2023-08-21

## 2023-08-21 RX ORDER — DIAZEPAM 5 MG
1 TABLET ORAL
Qty: 21 | Refills: 0
Start: 2023-08-21 | End: 2023-08-27

## 2023-08-21 RX ORDER — INSULIN GLARGINE 100 [IU]/ML
16 INJECTION, SOLUTION SUBCUTANEOUS
Qty: 1 | Refills: 1
Start: 2023-08-21 | End: 2023-10-19

## 2023-08-21 RX ORDER — LISINOPRIL 2.5 MG/1
1 TABLET ORAL
Qty: 30 | Refills: 0
Start: 2023-08-21 | End: 2023-09-19

## 2023-08-21 RX ORDER — DIVALPROEX SODIUM 500 MG/1
1 TABLET, DELAYED RELEASE ORAL
Qty: 60 | Refills: 0
Start: 2023-08-21 | End: 2023-09-19

## 2023-08-21 RX ORDER — LOPERAMIDE HCL 2 MG
4 TABLET ORAL THREE TIMES A DAY
Refills: 0 | Status: DISCONTINUED | OUTPATIENT
Start: 2023-08-21 | End: 2023-08-22

## 2023-08-21 RX ADMIN — DIVALPROEX SODIUM 500 MILLIGRAM(S): 500 TABLET, DELAYED RELEASE ORAL at 22:16

## 2023-08-21 RX ADMIN — Medication 2 MILLIGRAM(S): at 13:01

## 2023-08-21 RX ADMIN — ATORVASTATIN CALCIUM 40 MILLIGRAM(S): 80 TABLET, FILM COATED ORAL at 22:16

## 2023-08-21 RX ADMIN — Medication 2 MILLIGRAM(S): at 16:42

## 2023-08-21 RX ADMIN — Medication 4 MILLIGRAM(S): at 19:29

## 2023-08-21 RX ADMIN — Medication 10 MILLIGRAM(S): at 11:43

## 2023-08-21 RX ADMIN — Medication 12 UNIT(S): at 08:08

## 2023-08-21 RX ADMIN — Medication 4: at 12:14

## 2023-08-21 RX ADMIN — Medication 10 MILLIGRAM(S): at 16:40

## 2023-08-21 RX ADMIN — Medication 6: at 08:08

## 2023-08-21 RX ADMIN — LISINOPRIL 10 MILLIGRAM(S): 2.5 TABLET ORAL at 11:44

## 2023-08-21 RX ADMIN — INSULIN GLARGINE 16 UNIT(S): 100 INJECTION, SOLUTION SUBCUTANEOUS at 22:15

## 2023-08-21 RX ADMIN — Medication 6: at 22:15

## 2023-08-21 RX ADMIN — Medication 10 MILLIGRAM(S): at 22:16

## 2023-08-21 RX ADMIN — METHADONE HYDROCHLORIDE 130 MILLIGRAM(S): 40 TABLET ORAL at 11:45

## 2023-08-21 RX ADMIN — DIVALPROEX SODIUM 500 MILLIGRAM(S): 500 TABLET, DELAYED RELEASE ORAL at 11:44

## 2023-08-21 RX ADMIN — Medication 12 UNIT(S): at 12:16

## 2023-08-21 NOTE — BH INPATIENT PSYCHIATRY PROGRESS NOTE - NSBHMSETHTCONTENT_PSY_A_CORE
Delusions/Suicidality/Homicidality
Delusions/Homicidality
Delusions/Suicidality/Homicidality

## 2023-08-21 NOTE — BH INPATIENT PSYCHIATRY DISCHARGE NOTE - NSBHFUPINTERVALHXFT_PSY_A_CORE
Pt's mood is euthymic and stable. He denies SI. He continues to have chronic HI towards his ex-girlfriend and her daughter but does not have a plan or intent. HI appears to be at baseline. Sedation has improved with cessation of mirtazapine. Pt has refused risperidone x2 due to sedation concerns and does not want to continue this medication. He has new concern today of diarrhea, denies hematochezia, melena, fevers, vomiting. Agrees to try loperamide. Pt is not willing to decrease valium which is an exclusion criteria for many rehab centers. Pt is agreeable to discharge tomorrow.
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
## 2023-08-21 NOTE — BH INPATIENT PSYCHIATRY PROGRESS NOTE - NSBHCONSBHPROVCNTCTNOFT_PSY_A_CORE
Defer to primary team.
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
## 2023-08-21 NOTE — BH INPATIENT PSYCHIATRY DISCHARGE NOTE - NSBHSAOPI_PSY_A_CORE FT
History of Opioid Use Disorder.  Patient currently on methadone maintenance therapy.  Methadone 130 mg daily ([facility]).  He reports that he last used Fentanyl on 08/10/23.

## 2023-08-21 NOTE — BH INPATIENT PSYCHIATRY PROGRESS NOTE - PRN MEDS
MEDICATIONS  (PRN):  acetaminophen     Tablet .. 650 milliGRAM(s) Oral every 6 hours PRN Moderate Pain (4 - 6)  aluminum hydroxide/magnesium hydroxide/simethicone Suspension 30 milliLiter(s) Oral every 4 hours PRN Dyspepsia  dextrose Oral Gel 15 Gram(s) Oral once PRN Blood Glucose LESS THAN 70 milliGRAM(s)/deciliter  loperamide 2 milliGRAM(s) Oral two times a day PRN diarrhea  OLANZapine 5 milliGRAM(s) Oral every 6 hours PRN Agitation  
MEDICATIONS  (PRN):  acetaminophen     Tablet .. 650 milliGRAM(s) Oral every 6 hours PRN Moderate Pain (4 - 6)  aluminum hydroxide/magnesium hydroxide/simethicone Suspension 30 milliLiter(s) Oral every 4 hours PRN Dyspepsia  dextrose Oral Gel 15 Gram(s) Oral once PRN Blood Glucose LESS THAN 70 milliGRAM(s)/deciliter  mirtazapine 15 milliGRAM(s) Oral at bedtime PRN insomnia  OLANZapine 5 milliGRAM(s) Oral every 6 hours PRN Agitation  
MEDICATIONS  (PRN):  acetaminophen     Tablet .. 650 milliGRAM(s) Oral every 6 hours PRN Moderate Pain (4 - 6)  aluminum hydroxide/magnesium hydroxide/simethicone Suspension 30 milliLiter(s) Oral every 4 hours PRN Dyspepsia  dextrose Oral Gel 15 Gram(s) Oral once PRN Blood Glucose LESS THAN 70 milliGRAM(s)/deciliter  OLANZapine 5 milliGRAM(s) Oral every 6 hours PRN Agitation

## 2023-08-21 NOTE — BH INPATIENT PSYCHIATRY PROGRESS NOTE - NSBHATTESTCOMMENTATTENDFT_PSY_A_CORE
57 year old heterosexual cisgender male, single, domiciled with a friend, unemployed (receives SSI), with PMH of GERD, Diabetes mellitus type 2, HTN, history of pituitary tumor and PPH bipolar 1 disorder, antisocial personality disorder, conduct disorder, polysubstance use disorder (opioid, alcohol, tobacco, cocaine, amphetamine, cannabis) history of one remote suicide attempt via injecting bleach per patient, history of multiple inpatient psychiatric hospitalizations most recently at [facility] (23 – 23) for Brief Psychotic Disorder, [facility] (23 – 23) for Bipolar Disorder Unspecified, well documented history of multiple ED visits for medical, mental health and substance use disorder, history of incarceration (served 8 year sentence for manslaughter and was released from longterm in ), currently on methadone maintenance through [facility] in the [place], +family history (father  of drug/alcohol overdose, and half sister has schizophrenia), BIB self reporting homicidal ideation, suicidal ideation and paranoia in the context of substance use and medication non-adherence.   Pt responding well to current medications with no side effects reported or observed, no safety concerns regarding pt's anticipated discharge.
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
## 2023-08-21 NOTE — BH INPATIENT PSYCHIATRY PROGRESS NOTE - NSBHMSEMOOD_PSY_A_CORE
Depressed/Angry
Normal

## 2023-08-21 NOTE — BH INPATIENT PSYCHIATRY DISCHARGE NOTE - NSBHDCVIOLRISK_PSY_A_CORE
DOL 7 former 29 week female s/p phototherapy for hyperbilirubinemia. Stable in open crib and in room air. All po feeds. Weight stable. Vital Signs:    BP 74/46   Pulse 168   Temp 98.4 °F (36.9 °C)   Resp 48   Ht 17.5\" (44.5 cm) Comment: Filed from Delivery Summary  Wt 4 lb 1.8 oz (1.865 kg)   HC 30 cm (11.81\") Comment: Filed from Delivery Summary  SpO2 99%   BMI 9.44 kg/m²     Weight - Scale: 4 lb 1.8 oz (1.865 kg)  (-1%)      Physical Exam:       General:  Resting comfortably. No distress. Head: AFOF   Skin: No jaundice. Cardiovascular: Normal rate, regular rhythm. No murmur or gallop. Well-perfused. Pulmonary/Chest: Lungs clear bilaterally. Abdominal: Soft without distention. Neurological: Responds appropriately to stimulation. Normal tone. Labs:    None    Patient Active Problem List    Diagnosis Date Noted    Hyperbilirubinemia 2021     , gestational age 35 completed weeks 2021       Assessment:     9days old  infant. Plan:     CR monitoring due to prematurity risks. Follow feeding stamina and weight gain. Begin discharge planning when infant reaches 35 weeks corrected gestation. Mother with h/o RSV. Will need to be symptom free and have negative test prior to visiting or discharging to her care. no

## 2023-08-21 NOTE — BH INPATIENT PSYCHIATRY PROGRESS NOTE - CURRENT MEDICATION
MEDICATIONS  (STANDING):  atorvastatin 40 milliGRAM(s) Oral at bedtime  carvedilol 6.25 milliGRAM(s) Oral every 12 hours  dextrose 5%. 1000 milliLiter(s) (100 mL/Hr) IV Continuous <Continuous>  dextrose 5%. 1000 milliLiter(s) (50 mL/Hr) IV Continuous <Continuous>  dextrose 50% Injectable 25 Gram(s) IV Push once  dextrose 50% Injectable 25 Gram(s) IV Push once  dextrose 50% Injectable 12.5 Gram(s) IV Push once  diazepam    Tablet 10 milliGRAM(s) Oral three times a day  divalproex  milliGRAM(s) Oral two times a day  glucagon  Injectable 1 milliGRAM(s) IntraMuscular once  insulin glargine Injectable (LANTUS) 16 Unit(s) SubCutaneous at bedtime  insulin lispro (ADMELOG) corrective regimen sliding scale   SubCutaneous Before meals and at bedtime  insulin lispro Injectable (ADMELOG) 12 Unit(s) SubCutaneous three times a day before meals  lisinopril 10 milliGRAM(s) Oral daily  methadone    Tablet 130 milliGRAM(s) Oral daily  risperiDONE   Tablet 1 milliGRAM(s) Oral at bedtime    MEDICATIONS  (PRN):  acetaminophen     Tablet .. 650 milliGRAM(s) Oral every 6 hours PRN Moderate Pain (4 - 6)  aluminum hydroxide/magnesium hydroxide/simethicone Suspension 30 milliLiter(s) Oral every 4 hours PRN Dyspepsia  dextrose Oral Gel 15 Gram(s) Oral once PRN Blood Glucose LESS THAN 70 milliGRAM(s)/deciliter  loperamide 2 milliGRAM(s) Oral two times a day PRN diarrhea  OLANZapine 5 milliGRAM(s) Oral every 6 hours PRN Agitation   MEDICATIONS  (STANDING):  atorvastatin 40 milliGRAM(s) Oral at bedtime  carvedilol 6.25 milliGRAM(s) Oral every 12 hours  dextrose 5%. 1000 milliLiter(s) (100 mL/Hr) IV Continuous <Continuous>  dextrose 5%. 1000 milliLiter(s) (50 mL/Hr) IV Continuous <Continuous>  dextrose 50% Injectable 12.5 Gram(s) IV Push once  dextrose 50% Injectable 25 Gram(s) IV Push once  dextrose 50% Injectable 25 Gram(s) IV Push once  diazepam    Tablet 10 milliGRAM(s) Oral three times a day  divalproex  milliGRAM(s) Oral two times a day  glucagon  Injectable 1 milliGRAM(s) IntraMuscular once  insulin glargine Injectable (LANTUS) 16 Unit(s) SubCutaneous at bedtime  insulin lispro (ADMELOG) corrective regimen sliding scale   SubCutaneous Before meals and at bedtime  insulin lispro Injectable (ADMELOG) 12 Unit(s) SubCutaneous three times a day before meals  lisinopril 10 milliGRAM(s) Oral daily  methadone    Tablet 130 milliGRAM(s) Oral daily  risperiDONE   Tablet 1 milliGRAM(s) Oral at bedtime    MEDICATIONS  (PRN):  acetaminophen     Tablet .. 650 milliGRAM(s) Oral every 6 hours PRN Moderate Pain (4 - 6)  aluminum hydroxide/magnesium hydroxide/simethicone Suspension 30 milliLiter(s) Oral every 4 hours PRN Dyspepsia  dextrose Oral Gel 15 Gram(s) Oral once PRN Blood Glucose LESS THAN 70 milliGRAM(s)/deciliter  loperamide 2 milliGRAM(s) Oral two times a day PRN diarrhea  OLANZapine 5 milliGRAM(s) Oral every 6 hours PRN Agitation   MEDICATIONS  (STANDING):  atorvastatin 40 milliGRAM(s) Oral at bedtime  carvedilol 6.25 milliGRAM(s) Oral every 12 hours  dextrose 5%. 1000 milliLiter(s) (100 mL/Hr) IV Continuous <Continuous>  dextrose 5%. 1000 milliLiter(s) (50 mL/Hr) IV Continuous <Continuous>  dextrose 50% Injectable 12.5 Gram(s) IV Push once  dextrose 50% Injectable 25 Gram(s) IV Push once  dextrose 50% Injectable 25 Gram(s) IV Push once  diazepam    Tablet 10 milliGRAM(s) Oral three times a day  divalproex  milliGRAM(s) Oral two times a day  glucagon  Injectable 1 milliGRAM(s) IntraMuscular once  insulin glargine Injectable (LANTUS) 16 Unit(s) SubCutaneous at bedtime  insulin lispro (ADMELOG) corrective regimen sliding scale   SubCutaneous Before meals and at bedtime  insulin lispro Injectable (ADMELOG) 12 Unit(s) SubCutaneous three times a day before meals  lisinopril 10 milliGRAM(s) Oral daily  methadone    Tablet 130 milliGRAM(s) Oral daily    MEDICATIONS  (PRN):  acetaminophen     Tablet .. 650 milliGRAM(s) Oral every 6 hours PRN Moderate Pain (4 - 6)  aluminum hydroxide/magnesium hydroxide/simethicone Suspension 30 milliLiter(s) Oral every 4 hours PRN Dyspepsia  dextrose Oral Gel 15 Gram(s) Oral once PRN Blood Glucose LESS THAN 70 milliGRAM(s)/deciliter  loperamide 2 milliGRAM(s) Oral two times a day PRN diarrhea  OLANZapine 5 milliGRAM(s) Oral every 6 hours PRN Agitation

## 2023-08-21 NOTE — BH INPATIENT PSYCHIATRY DISCHARGE NOTE - DESCRIPTION
The patient was born and raised in the [place], [place].  The patient states that he split time between his parents who were .  He has three sisters.  He reports that he has been homeless for the past three years and has been "in and out of shelters."  He completed up to 11th grade education.  He is unemployed and receives SSI.  He has no children.  He denies any history of  service.  He identifies as Sabianist and as a cisgender heterosexual male.
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
## 2023-08-21 NOTE — BH INPATIENT PSYCHIATRY PROGRESS NOTE - ADDITIONAL DETAILS / COMMENTS
Orientation: Time Place Person  Attention: WNL  Registration: WNL  Recall at 3 minutes:WNL

## 2023-08-21 NOTE — PROGRESS NOTE ADULT - SUBJECTIVE AND OBJECTIVE BOX
Patient is a 57y old  Male who presents with a chief complaint of     OVERNIGHT EVENTS/INTERVAL HPI: As per night team, no overnight events. Patient seen and examined at bedside. ________________. Otherwise, no complaints at this time. Patient denies fevers, sweats, chills, SOB, dyspnea, chest pain, nausea/vomiting, diarrhea, constipation, abdominal pain. 12 pt ROS otherwise negative.     REVIEW OF SYSTEMS:  All other review of systems is negative unless indicated above.    OBJECTIVE:  T(C): 36.8 (08-21-23 @ 09:10), Max: 36.8 (08-21-23 @ 09:10)  HR: 80 (08-21-23 @ 09:10) (80 - 82)  BP: 169/72 (08-21-23 @ 09:10) (135/82 - 169/72)  RR: 18 (08-21-23 @ 09:10) (16 - 18)  SpO2: 96% (08-21-23 @ 09:10) (96% - 98%)  Daily     Daily     Physical Exam:  General: in no acute distress, non-toxic appearing  Eyes: EOMI intact bilaterally. Anicteric sclerae, moist conjunctivae  HENT: Moist mucous membranes  Neck: Trachea midline, supple  Lungs: Normal respiratory effort. No accessory muscle use  Neurological: Alert and appropriately conversant  Skin: Warm and dry. No obvious rash     Medications:  MEDICATIONS  (STANDING):  atorvastatin 40 milliGRAM(s) Oral at bedtime  carvedilol 6.25 milliGRAM(s) Oral every 12 hours  dextrose 5%. 1000 milliLiter(s) (50 mL/Hr) IV Continuous <Continuous>  dextrose 5%. 1000 milliLiter(s) (100 mL/Hr) IV Continuous <Continuous>  dextrose 50% Injectable 12.5 Gram(s) IV Push once  dextrose 50% Injectable 25 Gram(s) IV Push once  dextrose 50% Injectable 25 Gram(s) IV Push once  diazepam    Tablet 10 milliGRAM(s) Oral three times a day  divalproex  milliGRAM(s) Oral two times a day  glucagon  Injectable 1 milliGRAM(s) IntraMuscular once  insulin glargine Injectable (LANTUS) 16 Unit(s) SubCutaneous at bedtime  insulin lispro (ADMELOG) corrective regimen sliding scale   SubCutaneous Before meals and at bedtime  insulin lispro Injectable (ADMELOG) 12 Unit(s) SubCutaneous three times a day before meals  lisinopril 10 milliGRAM(s) Oral daily  methadone    Tablet 130 milliGRAM(s) Oral daily  risperiDONE   Tablet 1 milliGRAM(s) Oral at bedtime    MEDICATIONS  (PRN):  acetaminophen     Tablet .. 650 milliGRAM(s) Oral every 6 hours PRN Moderate Pain (4 - 6)  aluminum hydroxide/magnesium hydroxide/simethicone Suspension 30 milliLiter(s) Oral every 4 hours PRN Dyspepsia  dextrose Oral Gel 15 Gram(s) Oral once PRN Blood Glucose LESS THAN 70 milliGRAM(s)/deciliter  loperamide 2 milliGRAM(s) Oral two times a day PRN diarrhea  OLANZapine 5 milliGRAM(s) Oral every 6 hours PRN Agitation      Labs:              COVID-19 PCR: Alexandr (11 Aug 2023 01:57)      Radiology: Reviewed Patient is a 57y old  Male who presents with a chief complaint of     OVERNIGHT EVENTS/INTERVAL HPI: As per night team, no overnight events. Has been making an effort to decrease his juice intake. Complaining of crampy abdominal pain since this morning after eating an old sandwich, with multiple episodes of loose diarrhea over last 2 hours. Denying nausea/vomiting.    REVIEW OF SYSTEMS:  All other review of systems is negative unless indicated above.    OBJECTIVE:  T(C): 36.8 (08-21-23 @ 09:10), Max: 36.8 (08-21-23 @ 09:10)  HR: 80 (08-21-23 @ 09:10) (80 - 82)  BP: 169/72 (08-21-23 @ 09:10) (135/82 - 169/72)  RR: 18 (08-21-23 @ 09:10) (16 - 18)  SpO2: 96% (08-21-23 @ 09:10) (96% - 98%)  Daily     Daily     Physical Exam:  General: in no acute distress, non-toxic appearing  Eyes: EOMI intact bilaterally. Anicteric sclerae, moist conjunctivae  HENT: Moist mucous membranes  Neck: Trachea midline, supple  Lungs: Normal respiratory effort. No accessory muscle use  Neurological: Alert and appropriately conversant  Skin: Warm and dry. No obvious rash     Medications:  MEDICATIONS  (STANDING):  atorvastatin 40 milliGRAM(s) Oral at bedtime  carvedilol 6.25 milliGRAM(s) Oral every 12 hours  dextrose 5%. 1000 milliLiter(s) (50 mL/Hr) IV Continuous <Continuous>  dextrose 5%. 1000 milliLiter(s) (100 mL/Hr) IV Continuous <Continuous>  dextrose 50% Injectable 12.5 Gram(s) IV Push once  dextrose 50% Injectable 25 Gram(s) IV Push once  dextrose 50% Injectable 25 Gram(s) IV Push once  diazepam    Tablet 10 milliGRAM(s) Oral three times a day  divalproex  milliGRAM(s) Oral two times a day  glucagon  Injectable 1 milliGRAM(s) IntraMuscular once  insulin glargine Injectable (LANTUS) 16 Unit(s) SubCutaneous at bedtime  insulin lispro (ADMELOG) corrective regimen sliding scale   SubCutaneous Before meals and at bedtime  insulin lispro Injectable (ADMELOG) 12 Unit(s) SubCutaneous three times a day before meals  lisinopril 10 milliGRAM(s) Oral daily  methadone    Tablet 130 milliGRAM(s) Oral daily  risperiDONE   Tablet 1 milliGRAM(s) Oral at bedtime    MEDICATIONS  (PRN):  acetaminophen     Tablet .. 650 milliGRAM(s) Oral every 6 hours PRN Moderate Pain (4 - 6)  aluminum hydroxide/magnesium hydroxide/simethicone Suspension 30 milliLiter(s) Oral every 4 hours PRN Dyspepsia  dextrose Oral Gel 15 Gram(s) Oral once PRN Blood Glucose LESS THAN 70 milliGRAM(s)/deciliter  loperamide 2 milliGRAM(s) Oral two times a day PRN diarrhea  OLANZapine 5 milliGRAM(s) Oral every 6 hours PRN Agitation      Labs:    COVID-19 PCR: Alexandr (11 Aug 2023 01:57)    Radiology: Reviewed

## 2023-08-21 NOTE — BH INPATIENT PSYCHIATRY PROGRESS NOTE - NSICDXBHSECONDARYDX_PSY_ALL_CORE
Opioid use disorder   F11.90  Polysubstance use disorder   F19.90  Antisocial personality disorder   F60.2   Opioid use disorder   F11.90  Polysubstance use disorder   F19.90  Antisocial personality disorder   F60.2  Type 2 diabetes mellitus   E11.9
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
## 2023-08-21 NOTE — BH INPATIENT PSYCHIATRY PROGRESS NOTE - NSBHMETABOLIC_PSY_ALL_CORE_FT
BMI: BMI (kg/m2): 30.3 (01-23-23 @ 15:04)  HbA1c: A1C with Estimated Average Glucose Result: 8.3 % (08-11-23 @ 14:49)    Glucose: POCT Blood Glucose.: 238 mg/dL (08-18-23 @ 11:47)    BP: 128/79 (08-18-23 @ 07:08) (119/80 - 168/89)  Lipid Panel: Date/Time: 01-20-23 @ 08:49  Cholesterol, Serum: 178  Direct LDL: --  HDL Cholesterol, Serum: 45  Total Cholesterol/HDL Ration Measurement: --  Triglycerides, Serum: 161  
BMI: BMI (kg/m2): 30.3 (01-23-23 @ 15:04)  HbA1c: A1C with Estimated Average Glucose Result: 8.3 % (08-11-23 @ 14:49)    Glucose: POCT Blood Glucose.: 352 mg/dL (08-17-23 @ 16:37)    BP: 121/76 (08-17-23 @ 16:35) (119/80 - 172/83)  Lipid Panel: Date/Time: 01-20-23 @ 08:49  Cholesterol, Serum: 178  Direct LDL: --  HDL Cholesterol, Serum: 45  Total Cholesterol/HDL Ration Measurement: --  Triglycerides, Serum: 161  
BMI: BMI (kg/m2): 30.3 (01-23-23 @ 15:04)  HbA1c: A1C with Estimated Average Glucose Result: 8.3 % (08-11-23 @ 14:49)    Glucose: POCT Blood Glucose.: 341 mg/dL (08-13-23 @ 21:42)    BP: 126/73 (08-12-23 @ 16:17) (124/75 - 166/106)  Lipid Panel: Date/Time: 01-20-23 @ 08:49  Cholesterol, Serum: 178  Direct LDL: --  HDL Cholesterol, Serum: 45  Total Cholesterol/HDL Ration Measurement: --  Triglycerides, Serum: 161  
BMI: BMI (kg/m2): 30.3 (01-23-23 @ 15:04)  HbA1c: A1C with Estimated Average Glucose Result: 8.3 % (08-11-23 @ 14:49)    Glucose: POCT Blood Glucose.: 185 mg/dL (08-16-23 @ 07:49)    BP: 145/92 (08-16-23 @ 10:58) (119/80 - 172/83)  Lipid Panel: Date/Time: 01-20-23 @ 08:49  Cholesterol, Serum: 178  Direct LDL: --  HDL Cholesterol, Serum: 45  Total Cholesterol/HDL Ration Measurement: --  Triglycerides, Serum: 161  
BMI: BMI (kg/m2): 30.3 (01-23-23 @ 15:04)  HbA1c: A1C with Estimated Average Glucose Result: 8.3 % (08-11-23 @ 14:49)    Glucose: POCT Blood Glucose.: 200 mg/dL (08-14-23 @ 12:14)    BP: 126/73 (08-12-23 @ 16:17) (124/75 - 155/90)  Lipid Panel: Date/Time: 01-20-23 @ 08:49  Cholesterol, Serum: 178  Direct LDL: --  HDL Cholesterol, Serum: 45  Total Cholesterol/HDL Ration Measurement: --  Triglycerides, Serum: 161  
BMI: BMI (kg/m2): 30.3 (01-23-23 @ 15:04)  HbA1c: A1C with Estimated Average Glucose Result: 8.3 % (08-11-23 @ 14:49)    Glucose: POCT Blood Glucose.: 297 mg/dL (08-15-23 @ 16:49)    BP: 161/68 (08-15-23 @ 10:46) (138/77 - 172/83)  Lipid Panel: Date/Time: 01-20-23 @ 08:49  Cholesterol, Serum: 178  Direct LDL: --  HDL Cholesterol, Serum: 45  Total Cholesterol/HDL Ration Measurement: --  Triglycerides, Serum: 161  
BMI: BMI (kg/m2): 30.3 (01-23-23 @ 15:04)  HbA1c: A1C with Estimated Average Glucose Result: 8.3 % (08-11-23 @ 14:49)    Glucose: POCT Blood Glucose.: 243 mg/dL (08-21-23 @ 12:11)    BP: 169/72 (08-21-23 @ 09:10) (110/69 - 169/72)  Lipid Panel: Date/Time: 01-20-23 @ 08:49  Cholesterol, Serum: 178  Direct LDL: --  HDL Cholesterol, Serum: 45  Total Cholesterol/HDL Ration Measurement: --  Triglycerides, Serum: 161

## 2023-08-21 NOTE — BH INPATIENT PSYCHIATRY DISCHARGE NOTE - NSBHMETABOLIC_PSY_ALL_CORE_FT
BMI: BMI (kg/m2): 30.3 (01-23-23 @ 15:04)  HbA1c: A1C with Estimated Average Glucose Result: 8.3 % (08-11-23 @ 14:49)    Glucose: POCT Blood Glucose.: 243 mg/dL (08-21-23 @ 12:11)    BP: 169/72 (08-21-23 @ 09:10) (110/69 - 169/72)  Lipid Panel: Date/Time: 01-20-23 @ 08:49  Cholesterol, Serum: 178  Direct LDL: --  HDL Cholesterol, Serum: 45  Total Cholesterol/HDL Ration Measurement: --  Triglycerides, Serum: 161

## 2023-08-21 NOTE — BH INPATIENT PSYCHIATRY PROGRESS NOTE - NSTXSUICIDGOAL_PSY_ALL_CORE
Will develop a suicide prevention/safety plan

## 2023-08-21 NOTE — BH INPATIENT PSYCHIATRY DISCHARGE NOTE - HOSPITAL COURSE
Patient admitted for suicidal ideation and homicidal ideation. Started on depakote 500mg BID and risperidone 1mg BID. VPA level on 8/22 is 68.4  Patient later refused risperidone due concerns for sedation. Methadone 130mg and Valium 10mg TID were maintained. The patient was informed that rehab programs would require that he taper of the Valium. However, he adamantly refused.     Importantly, the patient has been highly non-complaint with his diet recommendations for diabetes. Despite education and counseling, he drinks large amounts of juice and pours many packets of sugar into his coffee. Endocrinology was following him on the unit and stated that his diabetes could not be adequately controlled unless he makes dietary changes.     On discharge, patient reports that he will not harm himself or others when he leaves the hospital and that he follow up with psychiatry outpatient..

## 2023-08-21 NOTE — BH INPATIENT PSYCHIATRY PROGRESS NOTE - NSBHFUPINTERVALCCFT_PSY_A_CORE
"I want Wellbutrin"
"People outside the hospital were saying things about me"
I have diarrhea today
"I want Wellbutrin"
Ongoing treatment of mood dysregulation
"I'm suicidal and homicidal"
"I have been feeling very tired"

## 2023-08-21 NOTE — BH INPATIENT PSYCHIATRY PROGRESS NOTE - NSBHATTESTBILLING_PSY_A_CORE
40046-Bjkejybmum OBS or IP - low complexity OR 25-34 mins
02804-Xvoilnkbst OBS or IP - moderate complexity OR 35-49 mins

## 2023-08-21 NOTE — BH INPATIENT PSYCHIATRY PROGRESS NOTE - NSBHCHARTREVIEWVS_PSY_A_CORE FT
Vital Signs Last 24 Hrs  T(C): --  T(F): --  HR: --  BP: --  BP(mean): --  RR: --  SpO2: --    
Vital Signs Last 24 Hrs  T(C): 36.9 (08-17-23 @ 16:35), Max: 36.9 (08-17-23 @ 16:35)  T(F): 98.4 (08-17-23 @ 16:35), Max: 98.4 (08-17-23 @ 16:35)  HR: 66 (08-17-23 @ 16:35) (66 - 84)  BP: 121/76 (08-17-23 @ 16:35) (121/76 - 168/89)  BP(mean): --  RR: 18 (08-17-23 @ 16:35) (18 - 18)  SpO2: 99% (08-17-23 @ 16:35) (96% - 99%)    
Vital Signs Last 24 Hrs  T(C): --  T(F): --  HR: --  BP: --  BP(mean): --  RR: --  SpO2: --    
Vital Signs Last 24 Hrs  T(C): 36.7 (08-16-23 @ 10:58), Max: 36.9 (08-15-23 @ 21:00)  T(F): 98.1 (08-16-23 @ 10:58), Max: 98.4 (08-15-23 @ 21:00)  HR: 85 (08-16-23 @ 10:58) (63 - 85)  BP: 145/92 (08-16-23 @ 10:58) (119/80 - 145/92)  BP(mean): --  RR: 18 (08-16-23 @ 10:58) (16 - 18)  SpO2: 85% (08-16-23 @ 10:58) (85% - 98%)    
Vital Signs Last 24 Hrs  T(C): 36.8 (08-21-23 @ 09:10), Max: 36.8 (08-21-23 @ 09:10)  T(F): 98.2 (08-21-23 @ 09:10), Max: 98.2 (08-21-23 @ 09:10)  HR: 80 (08-21-23 @ 09:10) (80 - 82)  BP: 169/72 (08-21-23 @ 09:10) (135/82 - 169/72)  BP(mean): --  RR: 18 (08-21-23 @ 09:10) (16 - 18)  SpO2: 96% (08-21-23 @ 09:10) (96% - 98%)    
Vital Signs Last 24 Hrs  T(C): 36.7 (08-18-23 @ 07:08), Max: 36.9 (08-17-23 @ 16:35)  T(F): 98.1 (08-18-23 @ 07:08), Max: 98.4 (08-17-23 @ 16:35)  HR: 56 (08-18-23 @ 07:08) (56 - 66)  BP: 128/79 (08-18-23 @ 07:08) (121/76 - 128/79)  BP(mean): --  RR: 16 (08-18-23 @ 07:08) (16 - 18)  SpO2: 95% (08-18-23 @ 07:08) (95% - 99%)    
Vital Signs Last 24 Hrs  T(C): 36.6 (08-15-23 @ 10:46), Max: 36.8 (08-15-23 @ 06:27)  T(F): 97.9 (08-15-23 @ 10:46), Max: 98.3 (08-15-23 @ 06:27)  HR: 68 (08-15-23 @ 10:46) (55 - 68)  BP: 161/68 (08-15-23 @ 10:46) (147/81 - 172/83)  BP(mean): --  RR: 16 (08-15-23 @ 10:46) (16 - 16)  SpO2: 99% (08-15-23 @ 10:46) (95% - 99%)

## 2023-08-21 NOTE — BH INPATIENT PSYCHIATRY DISCHARGE NOTE - NSDCMRMEDTOKEN_GEN_ALL_CORE_FT
carvedilol 6.25 mg oral tablet: 1 tab(s) orally every 12 hours  gabapentin 800 mg oral tablet: 1 tab(s) orally 3 times a day  gabapentin 800 mg oral tablet: 1 tab(s) orally 3 times a day  gabapentin 800 mg oral tablet: 1 tab(s) orally 3 times a day  insulin glargine: 30 unit(s) subcutaneous once a day  insulin glargine 100 units/mL subcutaneous solution: 30 unit(s) subcutaneous once a day (at bedtime)  insulin lispro: 10 unit(s) subcutaneous 3 times a day  insulin lispro 100 units/mL injectable solution: 10 unit(s) subcutaneous 3 times a day (before meals)  lisinopril 20 mg oral tablet: 1 tab(s) orally once a day   Valium 10 mg oral tablet: 1 tab(s) orally 3 times a day MDD:30 mg   alcohol swabs: Apply topically to affected area 4 times a day  atorvastatin 20 mg oral tablet: 1 tab(s) orally once a day  carvedilol 6.25 mg oral tablet: 1 tab(s) orally every 12 hours  Depakote 500 mg oral delayed release tablet: 1 tab(s) orally 2 times a day  glucometer (per patient&#x27;s insurance): Test blood sugars four times a day. Dispense #1 glucometer.  insulin lispro 100 units/mL injectable solution: 12 unit(s) injectable 3 times a day (before meals)  lancets: 1 application subcutaneously 4 times a day  Lantus 100 units/mL subcutaneous solution: 16 unit(s) subcutaneous once a day (at bedtime)  lisinopril 10 mg oral tablet: 1 tab(s) orally once a day  test strips (per patient&#x27;s insurance): 1 application subcutaneously 4 times a day. ** Compatible with patient&#x27;s glucometer **  U-100 Insulin Syringe, 3/10 mL: 1 application subcutaneously 4 times a day ** 3/10 mL holds up to 30 units of insulin **
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
## 2023-08-21 NOTE — BH INPATIENT PSYCHIATRY DISCHARGE NOTE - REASON FOR ADMISSION
57 year old man with pmh of traumatic brain injury and pph of bipolar admitted for homicidal ideation and suicidal ideation.

## 2023-08-22 LAB
GLUCOSE BLDC GLUCOMTR-MCNC: 146 MG/DL — HIGH (ref 70–99)
GLUCOSE BLDC GLUCOMTR-MCNC: 236 MG/DL — HIGH (ref 70–99)

## 2023-08-22 PROCEDURE — 87635 SARS-COV-2 COVID-19 AMP PRB: CPT

## 2023-08-22 PROCEDURE — 82962 GLUCOSE BLOOD TEST: CPT

## 2023-08-22 PROCEDURE — 80164 ASSAY DIPROPYLACETIC ACD TOT: CPT

## 2023-08-22 PROCEDURE — 80307 DRUG TEST PRSMV CHEM ANLYZR: CPT

## 2023-08-22 PROCEDURE — 83036 HEMOGLOBIN GLYCOSYLATED A1C: CPT

## 2023-08-22 PROCEDURE — 93005 ELECTROCARDIOGRAM TRACING: CPT

## 2023-08-22 PROCEDURE — 80053 COMPREHEN METABOLIC PANEL: CPT

## 2023-08-22 PROCEDURE — 99285 EMERGENCY DEPT VISIT HI MDM: CPT | Mod: 25

## 2023-08-22 PROCEDURE — 85025 COMPLETE CBC W/AUTO DIFF WBC: CPT

## 2023-08-22 PROCEDURE — 84443 ASSAY THYROID STIM HORMONE: CPT

## 2023-08-22 PROCEDURE — 80178 ASSAY OF LITHIUM: CPT

## 2023-08-22 PROCEDURE — 99232 SBSQ HOSP IP/OBS MODERATE 35: CPT

## 2023-08-22 PROCEDURE — 36415 COLL VENOUS BLD VENIPUNCTURE: CPT

## 2023-08-22 RX ORDER — INSULIN GLARGINE 100 [IU]/ML
16 INJECTION, SOLUTION SUBCUTANEOUS
Qty: 1 | Refills: 1
Start: 2023-08-22

## 2023-08-22 RX ORDER — ISOPROPYL ALCOHOL, BENZOCAINE .7; .06 ML/ML; ML/ML
0 SWAB TOPICAL
Qty: 100 | Refills: 1
Start: 2023-08-22

## 2023-08-22 RX ORDER — INSULIN LISPRO 100/ML
12 VIAL (ML) SUBCUTANEOUS
Qty: 2 | Refills: 1
Start: 2023-08-22

## 2023-08-22 RX ORDER — LOPERAMIDE HCL 2 MG
1 TABLET ORAL
Qty: 21 | Refills: 0
Start: 2023-08-22 | End: 2023-08-28

## 2023-08-22 RX ADMIN — Medication 650 MILLIGRAM(S): at 00:55

## 2023-08-22 RX ADMIN — LISINOPRIL 10 MILLIGRAM(S): 2.5 TABLET ORAL at 09:26

## 2023-08-22 RX ADMIN — Medication 4 MILLIGRAM(S): at 12:49

## 2023-08-22 RX ADMIN — Medication 4 MILLIGRAM(S): at 09:25

## 2023-08-22 RX ADMIN — Medication 4: at 12:17

## 2023-08-22 RX ADMIN — METHADONE HYDROCHLORIDE 130 MILLIGRAM(S): 40 TABLET ORAL at 09:26

## 2023-08-22 RX ADMIN — Medication 4 MILLIGRAM(S): at 02:14

## 2023-08-22 RX ADMIN — DIVALPROEX SODIUM 500 MILLIGRAM(S): 500 TABLET, DELAYED RELEASE ORAL at 09:26

## 2023-08-22 RX ADMIN — Medication 10 MILLIGRAM(S): at 09:26

## 2023-08-22 RX ADMIN — Medication 12 UNIT(S): at 12:18

## 2023-08-22 RX ADMIN — Medication 10 MILLIGRAM(S): at 12:49

## 2023-08-22 RX ADMIN — Medication 650 MILLIGRAM(S): at 00:25

## 2023-08-22 NOTE — PROGRESS NOTE ADULT - ASSESSMENT
57 year old heterosexual cisgender male, single, domiciled with a friend, unemployed (receives SSI), with PMH of GERD, Diabetes mellitus type 2, HTN, history of pituitary tumor and PPH bipolar 1 disorder, antisocial personality disorder, conduct disorder, polysubstance use disorder (opioid, alcohol, tobacco, cocaine, amphetamine, cannabis) history of one remote suicide attempt via injecting bleach per patient, history of multiple inpatient psychiatric hospitalizations most recently at [facility] (23 – 23) for Brief Psychotic Disorder, [facility] (23 – 23) for Bipolar Disorder Unspecified, well documented history of multiple ED visits for medical, mental health and substance use disorder, history of incarceration (served 8 year sentence for manslaughter and was released from group home in ), currently on methadone maintenance through [facility] in the [place], +family history (father  of drug/alcohol overdose, and half sister has schizophrenia), BIB self reporting homicidal ideation, suicidal ideation and paranoia in the context of substance use and medication non-adherence.     #diarrhea  Started yesterday with crampy abdominal pain and several loose stools. This morning abd pain resolved, diarrhea improving and tolerating diet. Low concern for c. diff, given no recent antibiotic use or hospitalization. Likely in setting of patient eating leftover non- refrigerated food as nursing staff reporting patient stores sandwiches in his room for hours.   - supportive measures (encourage PO intake)  - if patient stays inpatient and diarrhea continues, recommend sending GI PCR stool sample     #diabetes mellitus  A1c 8.3, sugars initially in 400s, now downtrending to 200s and improved to 146 this morning.  Patient expresses awareness that his sugars are elevated because of his juice intake, is able to express knowledge that the goal is <180. Patient reports he has cut down on his juice intake.  - encouraged to reduce/stop juice intake, would continue to reinforce limiting juice intake  - endocrine consulted, recs appreciated         - lantus 16u qHS         - lispro 12u pre-meal         - mISS before meals and at bedtime  - continue consistent carb diet    #HTN  #HLD  BP at goal   - continue atorvastatin 40mg qHS  - continue carvedilol 6.25mg q12h    #MMTP  bipolar disorder  - management as per  team    Patient is cleared for discharge from medicine standpoint.   Recommendations not final until attested by attending.

## 2023-08-22 NOTE — BH DISCHARGE NOTE NURSING/SOCIAL WORK/PSYCH REHAB - NSTOBACCOOTHER_PSY_ALL_CORE_FT
Call [facility] Smokers' Quitline at [phone] ([phone]) or visit [email or web address] if interested.

## 2023-08-22 NOTE — BH DISCHARGE NOTE NURSING/SOCIAL WORK/PSYCH REHAB - NSDCADDINFO1FT_PSY_ALL_CORE
Appointment on Thursday, 08/24/2023 at 10:00AM. This is an in-person intake appointment. Please bring a copy of your photo ID and insurance card.

## 2023-08-22 NOTE — BH DISCHARGE NOTE NURSING/SOCIAL WORK/PSYCH REHAB - NSDCPRGOAL_PSY_ALL_CORE
Pt attended select groups during admission.  Pt demonstrated improvement in mood during admission.  Upon admission, pt appeared depressed and flat regularly; pt kept to himself and was mostly isolative at that time.  After several days, pt started demonstrated wider range of affect, appeared less depressed and became more interactive and engaged with others.  Pt participated appropriately in groups.  Pt endorsed readiness for discharge and completed a safety plan.

## 2023-08-22 NOTE — BH DISCHARGE NOTE NURSING/SOCIAL WORK/PSYCH REHAB - PATIENT PORTAL LINK FT
You can access the FollowMyHealth Patient Portal offered by [facility] by registering at the following website: [email or web address]. By joining [facility] FollowMyHealth portal, you will also be able to view your health information using other applications (apps) compatible with our system.

## 2023-08-22 NOTE — PROGRESS NOTE ADULT - ATTENDING COMMENTS
Assessment and Recommendation:   · Assessment	  57 year old heterosexual cisgender male, single, domiciled with a friend, unemployed (receives SSI), with PMH of GERD, Diabetes mellitus type 2, HTN, history of pituitary tumor and PPH bipolar 1 disorder, antisocial personality disorder, conduct disorder, polysubstance use disorder (opioid, alcohol, tobacco, cocaine, amphetamine, cannabis) history of one remote suicide attempt via injecting bleach per patient, history of multiple inpatient psychiatric hospitalizations most recently at [facility] (23 – 23) for Brief Psychotic Disorder, [facility] (23 – 23) for Bipolar Disorder Unspecified, well documented history of multiple ED visits for medical, mental health and substance use disorder, history of incarceration (served 8 year sentence for manslaughter and was released from halfway in ), currently on methadone maintenance through [facility] in the [place], +family history (father  of drug/alcohol overdose, and half sister has schizophrenia), BIB self reporting homicidal ideation, suicidal ideation and paranoia in the context of substance use and medication non-adherence.   - pt was being followed closely by Endocrine for poorly controlled DM   medicine consulted as well- sine he is taking juice multiple times and sugar has been out of control    pt seen ambulating, no more abdominal pain, less loose stool * taking tea    he is aware to stay away from juices  being discharged today - declined vitals earlier.    - Hypertension and hyperlipidemia.  -intermittently refusing Coreg , and sometimes refuse to have vitals taken.  - continue atorvastatin 40 milliGRAM(s) Oral at bedtime  - continue carvedilol 6.25 milliGRAM(s) Oral every 12 hours    MMTP   bipolar disorder  -management as per BH team.     pt is educated on medication compliance and also compliance with diet for DM - avoiding juice.  Thank you for allowing medicine to participate in the care, dw BH team.
Assessment and Recommendation:   · Assessment	  57 year old heterosexual cisgender male, single, domiciled with a friend, unemployed (receives SSI), with PMH of GERD, Diabetes mellitus type 2, HTN, history of pituitary tumor and PPH bipolar 1 disorder, antisocial personality disorder, conduct disorder, polysubstance use disorder (opioid, alcohol, tobacco, cocaine, amphetamine, cannabis) history of one remote suicide attempt via injecting bleach per patient, history of multiple inpatient psychiatric hospitalizations most recently at [facility] (23 – 23) for Brief Psychotic Disorder, [facility] (23 – 23) for Bipolar Disorder Unspecified, well documented history of multiple ED visits for medical, mental health and substance use disorder, history of incarceration (served 8 year sentence for manslaughter and was released from alf in ), currently on methadone maintenance through [facility] in the [place], +family history (father  of drug/alcohol overdose, and half sister has schizophrenia), BIB self reporting homicidal ideation, suicidal ideation and paranoia in the context of substance use and medication non-adherence.   - pt was being followed closely by Endocrine for poorly controlled DM   medicine consulted as well- sine he is taking juice multiple times and sugar has been out of control    pt seen ambulating, reported loose bowel within last 2 hours, stating he ate some sandwich left on room temp for 12 hours  supportive care for now, if he continue to have loose motion can check stool pcr., low suspicion for c diff  - blood glucose in the 200-300  - Endocrine rec appreciated, continue with basal/nutritional and correctional insulin as per endocrine rec  - would give carb consistent diet    reinforced on compliance with diet, avoiding juice.    - Hypertension and hyperlipidemia.  -intermittently refusing Coreg ( reason for elevated BP today )  - continue atorvastatin 40 milliGRAM(s) Oral at bedtime  - continue carvedilol 6.25 milliGRAM(s) Oral every 12 hours    MMTP   bipolar disorder  -management as per  team.     Thank you for allowing medicine to participate in the care, dw  team.

## 2023-08-22 NOTE — BH DISCHARGE NOTE NURSING/SOCIAL WORK/PSYCH REHAB - NSCDUDCCRISIS_PSY_A_CORE
[facility] Well  1 [phone]) [facility]-WELL ([phone])  Text "WELL" to 68309  Website: www.[employer]/.[employer] 1 (294) 621-HOPE (7022) Website: www.safehorizon.org/.National Suicide Prevention Lifeline 6 (665) 310-8281/.  [employer]  1 (225) LIFENET (537-6190)/988 Suicide and Crisis Lifeline

## 2023-08-22 NOTE — BH DISCHARGE NOTE NURSING/SOCIAL WORK/PSYCH REHAB - NSDCPEWEB_GEN_ALL_CORE
[facility] for Tobacco Control website --- [email or web address] website --- [email or web address]

## 2023-08-22 NOTE — PROGRESS NOTE ADULT - SUBJECTIVE AND OBJECTIVE BOX
SUBJECTIVE / INTERVAL HPI: Patient seen ambulating in hallway. Patient reports continued diarrhea however frequency slowing down this morning. Abdominal pain resolved this morning, denies N/V and is tolerating diet. Reports he is feeling otherwise ok and has been drinking a lot of tea. Reports that he has cut down on his juice intake in attempts to improve his blood sugar. Further encouraged less juice intake.   Refused vitals this morning.       PHYSICAL EXAM:    General: standing in front of nurses station, comfortable in NAD   HEENT: NC/AT; MMM  Neck: supple  Psychiatric: pleasant mood and affect  Dermatologic: no appreciable wounds or damage to the skin    VITAL SIGNS:  Vital Signs Last 24 Hrs  T(C): --  T(F): --  HR: --  BP: --  BP(mean): --  RR: --  SpO2: --          MEDICATIONS:  MEDICATIONS  (STANDING):  atorvastatin 40 milliGRAM(s) Oral at bedtime  carvedilol 6.25 milliGRAM(s) Oral every 12 hours  dextrose 5%. 1000 milliLiter(s) (50 mL/Hr) IV Continuous <Continuous>  dextrose 5%. 1000 milliLiter(s) (100 mL/Hr) IV Continuous <Continuous>  dextrose 50% Injectable 12.5 Gram(s) IV Push once  dextrose 50% Injectable 25 Gram(s) IV Push once  dextrose 50% Injectable 25 Gram(s) IV Push once  diazepam    Tablet 10 milliGRAM(s) Oral three times a day  divalproex  milliGRAM(s) Oral two times a day  glucagon  Injectable 1 milliGRAM(s) IntraMuscular once  insulin glargine Injectable (LANTUS) 16 Unit(s) SubCutaneous at bedtime  insulin lispro (ADMELOG) corrective regimen sliding scale   SubCutaneous Before meals and at bedtime  insulin lispro Injectable (ADMELOG) 12 Unit(s) SubCutaneous three times a day before meals  lisinopril 10 milliGRAM(s) Oral daily  methadone    Tablet 130 milliGRAM(s) Oral daily    MEDICATIONS  (PRN):  acetaminophen     Tablet .. 650 milliGRAM(s) Oral every 6 hours PRN Moderate Pain (4 - 6)  aluminum hydroxide/magnesium hydroxide/simethicone Suspension 30 milliLiter(s) Oral every 4 hours PRN Dyspepsia  dextrose Oral Gel 15 Gram(s) Oral once PRN Blood Glucose LESS THAN 70 milliGRAM(s)/deciliter  loperamide 4 milliGRAM(s) Oral three times a day PRN diarrhea  OLANZapine 5 milliGRAM(s) Oral every 6 hours PRN Agitation      ALLERGIES:  Allergies    Compazine (Short breath)  Seafood (Anaphylaxis)  Haldol (Unknown)  Thorazine (Rash)  Cogentin (Unknown)    Intolerances        LABS:              CAPILLARY BLOOD GLUCOSE      POCT Blood Glucose.: 236 mg/dL (22 Aug 2023 12:15)      RADIOLOGY & ADDITIONAL TESTS: Reviewed.

## 2023-08-22 NOTE — BH DISCHARGE NOTE NURSING/SOCIAL WORK/PSYCH REHAB - NSDCPEEMAIL_GEN_ALL_CORE
[facility] for Tobacco Control email [email or web address]

## 2023-08-23 NOTE — BH SOCIAL WORK CONFIRMATION FOLLOW UP NOTE - NSCOMMENTS_PSY_ALL_CORE
CARING CONTACT [[id number]] @ [[id number]] Patient discharged on [08/22/2023]. No caring call required as pt was assessed at Low Acute Suicide Risk on admission.  --- CARING CONTACT [[id number]] @ [[id number]] Patient discharged on [08/22/2023]. No caring call required as pt was assessed at Low Acute Suicide Risk on admission.  ---  LINKAGE CALL [[id number]] @ [6752] NORM e-mailed [[facility]] on [08/25/2023] to verify if patient attended appointment on [08/25/2023]. Patient did not attend outpatient appointment.

## 2023-08-25 ENCOUNTER — HOSPITAL ENCOUNTER (INPATIENT)
Dept: HOSPITAL 74 - YASAS | Age: 58
LOS: 5 days | Discharge: HOME | DRG: 773 | End: 2023-08-30
Attending: ALLERGY & IMMUNOLOGY | Admitting: ALLERGY & IMMUNOLOGY
Payer: COMMERCIAL

## 2023-08-25 VITALS — BODY MASS INDEX: 29 KG/M2

## 2023-08-25 DIAGNOSIS — F17.210: ICD-10-CM

## 2023-08-25 DIAGNOSIS — Z86.79: ICD-10-CM

## 2023-08-25 DIAGNOSIS — F31.9: ICD-10-CM

## 2023-08-25 DIAGNOSIS — I25.10: ICD-10-CM

## 2023-08-25 DIAGNOSIS — E10.9: ICD-10-CM

## 2023-08-25 DIAGNOSIS — Z87.820: ICD-10-CM

## 2023-08-25 DIAGNOSIS — F12.20: ICD-10-CM

## 2023-08-25 DIAGNOSIS — Z86.19: ICD-10-CM

## 2023-08-25 DIAGNOSIS — R56.1: ICD-10-CM

## 2023-08-25 DIAGNOSIS — Z79.4: ICD-10-CM

## 2023-08-25 DIAGNOSIS — F41.9: ICD-10-CM

## 2023-08-25 DIAGNOSIS — I10: ICD-10-CM

## 2023-08-25 DIAGNOSIS — F11.20: ICD-10-CM

## 2023-08-25 DIAGNOSIS — F10.230: Primary | ICD-10-CM

## 2023-08-25 DIAGNOSIS — U07.1: ICD-10-CM

## 2023-08-25 DIAGNOSIS — Z88.8: ICD-10-CM

## 2023-08-25 DIAGNOSIS — F14.20: ICD-10-CM

## 2023-08-25 PROCEDURE — HZ2ZZZZ DETOXIFICATION SERVICES FOR SUBSTANCE ABUSE TREATMENT: ICD-10-PCS | Performed by: SURGERY

## 2023-08-25 RX ADMIN — Medication SCH MG: at 22:19

## 2023-08-25 RX ADMIN — INSULIN ASPART SCH UNITS: 100 INJECTION, SOLUTION INTRAVENOUS; SUBCUTANEOUS at 22:21

## 2023-08-25 RX ADMIN — LEVETIRACETAM SCH MG: 500 TABLET, FILM COATED ORAL at 22:19

## 2023-08-25 RX ADMIN — INSULIN ASPART SCH UNITS: 100 INJECTION, SOLUTION INTRAVENOUS; SUBCUTANEOUS at 16:41

## 2023-08-26 LAB
ALBUMIN SERPL-MCNC: 2.8 G/DL (ref 3.4–5)
ALP SERPL-CCNC: 56 U/L (ref 45–117)
ALT SERPL-CCNC: 19 U/L (ref 13–61)
ANION GAP SERPL CALC-SCNC: 5 MMOL/L (ref 8–16)
AST SERPL-CCNC: 16 U/L (ref 15–37)
BILIRUB SERPL-MCNC: 0.2 MG/DL (ref 0.2–1)
BUN SERPL-MCNC: 13.2 MG/DL (ref 7–18)
CALCIUM SERPL-MCNC: 8.7 MG/DL (ref 8.5–10.1)
CHLORIDE SERPL-SCNC: 103 MMOL/L (ref 98–107)
CO2 SERPL-SCNC: 36 MMOL/L (ref 21–32)
CREAT SERPL-MCNC: 0.8 MG/DL (ref 0.55–1.3)
DEPRECATED RDW RBC AUTO: 14 % (ref 11.9–15.9)
GLUCOSE SERPL-MCNC: 132 MG/DL (ref 74–106)
HCT VFR BLD CALC: 37.3 % (ref 35.4–49)
HGB BLD-MCNC: 12.2 GM/DL (ref 11.7–16.9)
MCH RBC QN AUTO: 28.7 PG (ref 25.7–33.7)
MCHC RBC AUTO-ENTMCNC: 32.6 G/DL (ref 32–35.9)
MCV RBC: 88.3 FL (ref 80–96)
PLATELET # BLD AUTO: 158 10^3/UL (ref 134–434)
PMV BLD: 10.4 FL (ref 7.5–11.1)
POTASSIUM SERPLBLD-SCNC: 4 MMOL/L (ref 3.5–5.1)
PROT SERPL-MCNC: 5.6 G/DL (ref 6.4–8.2)
RBC # BLD AUTO: 4.23 M/MM3 (ref 4–5.6)
SODIUM SERPL-SCNC: 144 MMOL/L (ref 136–145)
WBC # BLD AUTO: 4.1 K/MM3 (ref 4–10)

## 2023-08-26 RX ADMIN — INSULIN ASPART SCH: 100 INJECTION, SOLUTION INTRAVENOUS; SUBCUTANEOUS at 11:37

## 2023-08-26 RX ADMIN — GABAPENTIN SCH MG: 300 CAPSULE ORAL at 21:59

## 2023-08-26 RX ADMIN — METHOCARBAMOL PRN MG: 500 TABLET ORAL at 10:10

## 2023-08-26 RX ADMIN — LEVETIRACETAM SCH MG: 500 TABLET, FILM COATED ORAL at 21:59

## 2023-08-26 RX ADMIN — GABAPENTIN SCH MG: 300 CAPSULE ORAL at 15:08

## 2023-08-26 RX ADMIN — SPIRONOLACTONE SCH MG: 25 TABLET, FILM COATED ORAL at 15:08

## 2023-08-26 RX ADMIN — DIVALPROEX SODIUM SCH MG: 500 TABLET, DELAYED RELEASE ORAL at 21:59

## 2023-08-26 RX ADMIN — CARVEDILOL SCH MG: 12.5 TABLET, FILM COATED ORAL at 21:59

## 2023-08-26 RX ADMIN — INSULIN ASPART SCH UNITS: 100 INJECTION, SOLUTION INTRAVENOUS; SUBCUTANEOUS at 06:12

## 2023-08-26 RX ADMIN — METHOCARBAMOL PRN MG: 500 TABLET ORAL at 17:22

## 2023-08-26 RX ADMIN — Medication SCH MG: at 21:59

## 2023-08-26 RX ADMIN — LISINOPRIL SCH MG: 20 TABLET ORAL at 15:08

## 2023-08-26 RX ADMIN — INSULIN ASPART SCH UNITS: 100 INJECTION, SOLUTION INTRAVENOUS; SUBCUTANEOUS at 16:44

## 2023-08-26 RX ADMIN — Medication SCH MG: at 21:58

## 2023-08-26 RX ADMIN — LEVETIRACETAM SCH MG: 500 TABLET, FILM COATED ORAL at 10:10

## 2023-08-26 RX ADMIN — INSULIN ASPART SCH UNITS: 100 INJECTION, SOLUTION INTRAVENOUS; SUBCUTANEOUS at 21:56

## 2023-08-26 RX ADMIN — METHOCARBAMOL PRN MG: 500 TABLET ORAL at 00:35

## 2023-08-26 RX ADMIN — Medication SCH TAB: at 10:10

## 2023-08-27 RX ADMIN — GABAPENTIN SCH MG: 300 CAPSULE ORAL at 05:43

## 2023-08-27 RX ADMIN — GABAPENTIN SCH MG: 300 CAPSULE ORAL at 22:05

## 2023-08-27 RX ADMIN — Medication SCH MG: at 22:05

## 2023-08-27 RX ADMIN — LISINOPRIL SCH MG: 20 TABLET ORAL at 10:08

## 2023-08-27 RX ADMIN — LEVETIRACETAM SCH MG: 500 TABLET, FILM COATED ORAL at 10:09

## 2023-08-27 RX ADMIN — METHOCARBAMOL PRN MG: 500 TABLET ORAL at 07:40

## 2023-08-27 RX ADMIN — INSULIN ASPART SCH: 100 INJECTION, SOLUTION INTRAVENOUS; SUBCUTANEOUS at 08:14

## 2023-08-27 RX ADMIN — METHOCARBAMOL PRN MG: 500 TABLET ORAL at 22:06

## 2023-08-27 RX ADMIN — IBUPROFEN PRN MG: 600 TABLET, FILM COATED ORAL at 19:21

## 2023-08-27 RX ADMIN — CARVEDILOL SCH MG: 12.5 TABLET, FILM COATED ORAL at 22:04

## 2023-08-27 RX ADMIN — INSULIN ASPART SCH UNITS: 100 INJECTION, SOLUTION INTRAVENOUS; SUBCUTANEOUS at 22:07

## 2023-08-27 RX ADMIN — METHOCARBAMOL PRN MG: 500 TABLET ORAL at 01:05

## 2023-08-27 RX ADMIN — CARVEDILOL SCH MG: 12.5 TABLET, FILM COATED ORAL at 10:09

## 2023-08-27 RX ADMIN — Medication SCH MG: at 22:04

## 2023-08-27 RX ADMIN — DIVALPROEX SODIUM SCH MG: 500 TABLET, DELAYED RELEASE ORAL at 22:05

## 2023-08-27 RX ADMIN — SPIRONOLACTONE SCH MG: 25 TABLET, FILM COATED ORAL at 10:09

## 2023-08-27 RX ADMIN — METHOCARBAMOL PRN MG: 500 TABLET ORAL at 16:35

## 2023-08-27 RX ADMIN — INSULIN ASPART SCH UNITS: 100 INJECTION, SOLUTION INTRAVENOUS; SUBCUTANEOUS at 11:42

## 2023-08-27 RX ADMIN — LEVETIRACETAM SCH MG: 500 TABLET, FILM COATED ORAL at 22:04

## 2023-08-27 RX ADMIN — DIVALPROEX SODIUM SCH MG: 500 TABLET, DELAYED RELEASE ORAL at 10:09

## 2023-08-27 RX ADMIN — INSULIN ASPART SCH UNITS: 100 INJECTION, SOLUTION INTRAVENOUS; SUBCUTANEOUS at 16:30

## 2023-08-27 RX ADMIN — Medication SCH TAB: at 10:10

## 2023-08-27 RX ADMIN — GABAPENTIN SCH MG: 300 CAPSULE ORAL at 13:47

## 2023-08-27 RX ADMIN — NICOTINE SCH MG: 21 PATCH TRANSDERMAL at 10:39

## 2023-08-28 RX ADMIN — Medication SCH MG: at 22:29

## 2023-08-28 RX ADMIN — GABAPENTIN SCH MG: 300 CAPSULE ORAL at 22:29

## 2023-08-28 RX ADMIN — DIVALPROEX SODIUM SCH MG: 500 TABLET, DELAYED RELEASE ORAL at 09:59

## 2023-08-28 RX ADMIN — METHOCARBAMOL PRN MG: 500 TABLET ORAL at 07:36

## 2023-08-28 RX ADMIN — INSULIN ASPART SCH UNITS: 100 INJECTION, SOLUTION INTRAVENOUS; SUBCUTANEOUS at 17:22

## 2023-08-28 RX ADMIN — INSULIN ASPART SCH UNITS: 100 INJECTION, SOLUTION INTRAVENOUS; SUBCUTANEOUS at 11:57

## 2023-08-28 RX ADMIN — SPIRONOLACTONE SCH MG: 25 TABLET, FILM COATED ORAL at 09:59

## 2023-08-28 RX ADMIN — CARVEDILOL SCH MG: 12.5 TABLET, FILM COATED ORAL at 22:29

## 2023-08-28 RX ADMIN — INSULIN DETEMIR SCH UNITS: 100 INJECTION, SOLUTION SUBCUTANEOUS at 22:29

## 2023-08-28 RX ADMIN — BISMUTH SUBSALICYLATE PRN MG: 525 LIQUID ORAL at 22:28

## 2023-08-28 RX ADMIN — LOPERAMIDE HYDROCHLORIDE PRN MG: 2 CAPSULE ORAL at 15:56

## 2023-08-28 RX ADMIN — DIVALPROEX SODIUM SCH MG: 500 TABLET, DELAYED RELEASE ORAL at 22:29

## 2023-08-28 RX ADMIN — Medication SCH TAB: at 10:00

## 2023-08-28 RX ADMIN — LOPERAMIDE HYDROCHLORIDE PRN MG: 2 CAPSULE ORAL at 05:40

## 2023-08-28 RX ADMIN — NICOTINE SCH MG: 21 PATCH TRANSDERMAL at 10:02

## 2023-08-28 RX ADMIN — METHOCARBAMOL PRN MG: 500 TABLET ORAL at 18:36

## 2023-08-28 RX ADMIN — GABAPENTIN SCH MG: 300 CAPSULE ORAL at 13:12

## 2023-08-28 RX ADMIN — IBUPROFEN PRN MG: 600 TABLET, FILM COATED ORAL at 08:37

## 2023-08-28 RX ADMIN — GABAPENTIN SCH MG: 300 CAPSULE ORAL at 05:35

## 2023-08-28 RX ADMIN — LEVETIRACETAM SCH MG: 500 TABLET, FILM COATED ORAL at 09:59

## 2023-08-28 RX ADMIN — CARVEDILOL SCH MG: 12.5 TABLET, FILM COATED ORAL at 09:59

## 2023-08-28 RX ADMIN — LISINOPRIL SCH MG: 20 TABLET ORAL at 09:59

## 2023-08-28 RX ADMIN — INSULIN ASPART SCH UNITS: 100 INJECTION, SOLUTION INTRAVENOUS; SUBCUTANEOUS at 06:02

## 2023-08-29 DIAGNOSIS — R45.850 HOMICIDAL IDEATIONS: ICD-10-CM

## 2023-08-29 DIAGNOSIS — E11.65 TYPE 2 DIABETES MELLITUS WITH HYPERGLYCEMIA: ICD-10-CM

## 2023-08-29 DIAGNOSIS — F17.210 NICOTINE DEPENDENCE, CIGARETTES, UNCOMPLICATED: ICD-10-CM

## 2023-08-29 DIAGNOSIS — R45.851 SUICIDAL IDEATIONS: ICD-10-CM

## 2023-08-29 DIAGNOSIS — F39 UNSPECIFIED MOOD [AFFECTIVE] DISORDER: ICD-10-CM

## 2023-08-29 DIAGNOSIS — F25.0 SCHIZOAFFECTIVE DISORDER, BIPOLAR TYPE: ICD-10-CM

## 2023-08-29 DIAGNOSIS — Z88.8 ALLERGY STATUS TO OTHER DRUGS, MEDICAMENTS AND BIOLOGICAL SUBSTANCES: ICD-10-CM

## 2023-08-29 DIAGNOSIS — E78.5 HYPERLIPIDEMIA, UNSPECIFIED: ICD-10-CM

## 2023-08-29 DIAGNOSIS — Z91.013 ALLERGY TO SEAFOOD: ICD-10-CM

## 2023-08-29 DIAGNOSIS — Z91.119 PATIENT'S NONCOMPLIANCE WITH DIETARY REGIMEN DUE TO UNSPECIFIED REASON: ICD-10-CM

## 2023-08-29 DIAGNOSIS — R19.7 DIARRHEA, UNSPECIFIED: ICD-10-CM

## 2023-08-29 DIAGNOSIS — I11.0 HYPERTENSIVE HEART DISEASE WITH HEART FAILURE: ICD-10-CM

## 2023-08-29 DIAGNOSIS — F19.90 OTHER PSYCHOACTIVE SUBSTANCE USE, UNSPECIFIED, UNCOMPLICATED: ICD-10-CM

## 2023-08-29 DIAGNOSIS — Z79.4 LONG TERM (CURRENT) USE OF INSULIN: ICD-10-CM

## 2023-08-29 DIAGNOSIS — I50.22 CHRONIC SYSTOLIC (CONGESTIVE) HEART FAILURE: ICD-10-CM

## 2023-08-29 DIAGNOSIS — K21.9 GASTRO-ESOPHAGEAL REFLUX DISEASE WITHOUT ESOPHAGITIS: ICD-10-CM

## 2023-08-29 DIAGNOSIS — F32.A DEPRESSION, UNSPECIFIED: ICD-10-CM

## 2023-08-29 DIAGNOSIS — E11.40 TYPE 2 DIABETES MELLITUS WITH DIABETIC NEUROPATHY, UNSPECIFIED: ICD-10-CM

## 2023-08-29 DIAGNOSIS — Z79.84 LONG TERM (CURRENT) USE OF ORAL HYPOGLYCEMIC DRUGS: ICD-10-CM

## 2023-08-29 RX ADMIN — SPIRONOLACTONE SCH MG: 25 TABLET, FILM COATED ORAL at 09:43

## 2023-08-29 RX ADMIN — NICOTINE SCH MG: 21 PATCH TRANSDERMAL at 09:44

## 2023-08-29 RX ADMIN — DIPHENOXYLATE HYDROCHLORIDE AND ATROPINE SULFATE PRN COMBO: 2.5; .025 TABLET ORAL at 15:36

## 2023-08-29 RX ADMIN — CARVEDILOL SCH MG: 12.5 TABLET, FILM COATED ORAL at 21:55

## 2023-08-29 RX ADMIN — INSULIN ASPART SCH: 100 INJECTION, SOLUTION INTRAVENOUS; SUBCUTANEOUS at 06:26

## 2023-08-29 RX ADMIN — LOPERAMIDE HYDROCHLORIDE PRN MG: 2 CAPSULE ORAL at 14:27

## 2023-08-29 RX ADMIN — GABAPENTIN SCH MG: 300 CAPSULE ORAL at 06:02

## 2023-08-29 RX ADMIN — INSULIN ASPART SCH UNITS: 100 INJECTION, SOLUTION INTRAVENOUS; SUBCUTANEOUS at 17:24

## 2023-08-29 RX ADMIN — BISMUTH SUBSALICYLATE PRN MG: 525 LIQUID ORAL at 11:41

## 2023-08-29 RX ADMIN — METHOCARBAMOL PRN MG: 500 TABLET ORAL at 17:24

## 2023-08-29 RX ADMIN — GABAPENTIN SCH MG: 300 CAPSULE ORAL at 21:55

## 2023-08-29 RX ADMIN — Medication SCH TAB: at 09:43

## 2023-08-29 RX ADMIN — LOPERAMIDE HYDROCHLORIDE PRN MG: 2 CAPSULE ORAL at 22:57

## 2023-08-29 RX ADMIN — Medication SCH MG: at 21:55

## 2023-08-29 RX ADMIN — METHOCARBAMOL PRN MG: 500 TABLET ORAL at 00:38

## 2023-08-29 RX ADMIN — HYDROCORTISONE SCH APPLIC: 25 CREAM TOPICAL at 16:58

## 2023-08-29 RX ADMIN — LOPERAMIDE HYDROCHLORIDE PRN MG: 2 CAPSULE ORAL at 06:31

## 2023-08-29 RX ADMIN — INSULIN ASPART SCH UNITS: 100 INJECTION, SOLUTION INTRAVENOUS; SUBCUTANEOUS at 11:34

## 2023-08-29 RX ADMIN — LISINOPRIL SCH MG: 20 TABLET ORAL at 09:43

## 2023-08-29 RX ADMIN — IBUPROFEN PRN MG: 600 TABLET, FILM COATED ORAL at 00:38

## 2023-08-29 RX ADMIN — DIVALPROEX SODIUM SCH MG: 500 TABLET, DELAYED RELEASE ORAL at 09:43

## 2023-08-29 RX ADMIN — INSULIN DETEMIR SCH UNITS: 100 INJECTION, SOLUTION SUBCUTANEOUS at 21:56

## 2023-08-29 RX ADMIN — METHOCARBAMOL PRN MG: 500 TABLET ORAL at 07:00

## 2023-08-29 RX ADMIN — BISMUTH SUBSALICYLATE PRN MG: 525 LIQUID ORAL at 01:47

## 2023-08-29 RX ADMIN — GABAPENTIN SCH MG: 300 CAPSULE ORAL at 13:00

## 2023-08-29 RX ADMIN — BISMUTH SUBSALICYLATE PRN MG: 525 LIQUID ORAL at 09:49

## 2023-08-29 RX ADMIN — CARVEDILOL SCH MG: 12.5 TABLET, FILM COATED ORAL at 09:43

## 2023-08-29 RX ADMIN — IBUPROFEN PRN MG: 600 TABLET, FILM COATED ORAL at 17:25

## 2023-08-29 RX ADMIN — DIVALPROEX SODIUM SCH MG: 500 TABLET, DELAYED RELEASE ORAL at 21:55

## 2023-08-29 RX ADMIN — Medication SCH MG: at 21:56

## 2023-08-30 VITALS — DIASTOLIC BLOOD PRESSURE: 79 MMHG | SYSTOLIC BLOOD PRESSURE: 132 MMHG | HEART RATE: 78 BPM | TEMPERATURE: 97.4 F

## 2023-08-30 VITALS — RESPIRATION RATE: 18 BRPM

## 2023-08-30 RX ADMIN — Medication SCH TAB: at 10:43

## 2023-08-30 RX ADMIN — METHOCARBAMOL PRN MG: 500 TABLET ORAL at 02:47

## 2023-08-30 RX ADMIN — LISINOPRIL SCH MG: 20 TABLET ORAL at 10:45

## 2023-08-30 RX ADMIN — CARVEDILOL SCH MG: 12.5 TABLET, FILM COATED ORAL at 10:44

## 2023-08-30 RX ADMIN — NICOTINE SCH MG: 21 PATCH TRANSDERMAL at 10:45

## 2023-08-30 RX ADMIN — SPIRONOLACTONE SCH MG: 25 TABLET, FILM COATED ORAL at 10:44

## 2023-08-30 RX ADMIN — GABAPENTIN SCH MG: 300 CAPSULE ORAL at 05:46

## 2023-08-30 RX ADMIN — GABAPENTIN SCH: 300 CAPSULE ORAL at 13:28

## 2023-08-30 RX ADMIN — INSULIN ASPART SCH UNITS: 100 INJECTION, SOLUTION INTRAVENOUS; SUBCUTANEOUS at 12:03

## 2023-08-30 RX ADMIN — BISMUTH SUBSALICYLATE PRN MG: 525 LIQUID ORAL at 06:21

## 2023-08-30 RX ADMIN — INSULIN ASPART SCH UNITS: 100 INJECTION, SOLUTION INTRAVENOUS; SUBCUTANEOUS at 06:16

## 2023-08-30 RX ADMIN — DIPHENOXYLATE HYDROCHLORIDE AND ATROPINE SULFATE PRN COMBO: 2.5; .025 TABLET ORAL at 02:47

## 2023-08-30 RX ADMIN — DIPHENOXYLATE HYDROCHLORIDE AND ATROPINE SULFATE PRN COMBO: 2.5; .025 TABLET ORAL at 10:46

## 2023-08-30 RX ADMIN — HYDROCORTISONE SCH APPLIC: 25 CREAM TOPICAL at 10:43

## 2023-08-30 RX ADMIN — DIVALPROEX SODIUM SCH MG: 500 TABLET, DELAYED RELEASE ORAL at 10:44

## 2023-10-08 VITALS
RESPIRATION RATE: 18 BRPM | HEART RATE: 89 BPM | WEIGHT: 203.93 LBS | TEMPERATURE: 98 F | OXYGEN SATURATION: 94 % | DIASTOLIC BLOOD PRESSURE: 94 MMHG | SYSTOLIC BLOOD PRESSURE: 138 MMHG

## 2023-10-08 PROBLEM — Z86.59 PERSONAL HISTORY OF OTHER MENTAL AND BEHAVIORAL DISORDERS: Chronic | Status: ACTIVE | Noted: 2023-08-11

## 2023-10-08 LAB
AMPHET UR-MCNC: POSITIVE
ANION GAP SERPL CALC-SCNC: 11 MMOL/L — SIGNIFICANT CHANGE UP (ref 5–17)
APAP SERPL-MCNC: <5 UG/ML — LOW (ref 10–30)
APPEARANCE UR: CLEAR — SIGNIFICANT CHANGE UP
BACTERIA # UR AUTO: PRESENT /HPF
BARBITURATES UR SCN-MCNC: NEGATIVE — SIGNIFICANT CHANGE UP
BASOPHILS # BLD AUTO: 0.06 K/UL — SIGNIFICANT CHANGE UP (ref 0–0.2)
BASOPHILS NFR BLD AUTO: 0.7 % — SIGNIFICANT CHANGE UP (ref 0–2)
BENZODIAZ UR-MCNC: POSITIVE
BILIRUB UR-MCNC: NEGATIVE — SIGNIFICANT CHANGE UP
BUN SERPL-MCNC: 25 MG/DL — HIGH (ref 7–23)
CALCIUM SERPL-MCNC: 9.5 MG/DL — SIGNIFICANT CHANGE UP (ref 8.4–10.5)
CHLORIDE SERPL-SCNC: 88 MMOL/L — LOW (ref 96–108)
CO2 SERPL-SCNC: 33 MMOL/L — HIGH (ref 22–31)
COCAINE METAB.OTHER UR-MCNC: POSITIVE
COLOR SPEC: YELLOW — SIGNIFICANT CHANGE UP
CREAT SERPL-MCNC: 0.88 MG/DL — SIGNIFICANT CHANGE UP (ref 0.5–1.3)
DIFF PNL FLD: NEGATIVE — SIGNIFICANT CHANGE UP
EGFR: 101 ML/MIN/1.73M2 — SIGNIFICANT CHANGE UP
EOSINOPHIL # BLD AUTO: 0.11 K/UL — SIGNIFICANT CHANGE UP (ref 0–0.5)
EOSINOPHIL NFR BLD AUTO: 1.3 % — SIGNIFICANT CHANGE UP (ref 0–6)
EPI CELLS # UR: SIGNIFICANT CHANGE UP /HPF (ref 0–5)
ETHANOL SERPL-MCNC: <10 MG/DL — SIGNIFICANT CHANGE UP (ref 0–10)
GLUCOSE SERPL-MCNC: 343 MG/DL — HIGH (ref 70–99)
GLUCOSE UR QL: >=1000
HCT VFR BLD CALC: 37.2 % — LOW (ref 39–50)
HGB BLD-MCNC: 13.2 G/DL — SIGNIFICANT CHANGE UP (ref 13–17)
IMM GRANULOCYTES NFR BLD AUTO: 0.2 % — SIGNIFICANT CHANGE UP (ref 0–0.9)
KETONES UR-MCNC: ABNORMAL MG/DL
LEUKOCYTE ESTERASE UR-ACNC: ABNORMAL
LYMPHOCYTES # BLD AUTO: 2.82 K/UL — SIGNIFICANT CHANGE UP (ref 1–3.3)
LYMPHOCYTES # BLD AUTO: 32.7 % — SIGNIFICANT CHANGE UP (ref 13–44)
MCHC RBC-ENTMCNC: 29.7 PG — SIGNIFICANT CHANGE UP (ref 27–34)
MCHC RBC-ENTMCNC: 35.5 GM/DL — SIGNIFICANT CHANGE UP (ref 32–36)
MCV RBC AUTO: 83.8 FL — SIGNIFICANT CHANGE UP (ref 80–100)
METHADONE UR-MCNC: POSITIVE
MONOCYTES # BLD AUTO: 0.95 K/UL — HIGH (ref 0–0.9)
MONOCYTES NFR BLD AUTO: 11 % — SIGNIFICANT CHANGE UP (ref 2–14)
NEUTROPHILS # BLD AUTO: 4.66 K/UL — SIGNIFICANT CHANGE UP (ref 1.8–7.4)
NEUTROPHILS NFR BLD AUTO: 54.1 % — SIGNIFICANT CHANGE UP (ref 43–77)
NITRITE UR-MCNC: NEGATIVE — SIGNIFICANT CHANGE UP
NRBC # BLD: 0 /100 WBCS — SIGNIFICANT CHANGE UP (ref 0–0)
OPIATES UR-MCNC: NEGATIVE — SIGNIFICANT CHANGE UP
PCP SPEC-MCNC: SIGNIFICANT CHANGE UP
PCP UR-MCNC: NEGATIVE — SIGNIFICANT CHANGE UP
PH UR: 6 — SIGNIFICANT CHANGE UP (ref 5–8)
PLATELET # BLD AUTO: 150 K/UL — SIGNIFICANT CHANGE UP (ref 150–400)
POTASSIUM SERPL-MCNC: 3.8 MMOL/L — SIGNIFICANT CHANGE UP (ref 3.5–5.3)
POTASSIUM SERPL-SCNC: 3.8 MMOL/L — SIGNIFICANT CHANGE UP (ref 3.5–5.3)
PROT UR-MCNC: ABNORMAL MG/DL
RBC # BLD: 4.44 M/UL — SIGNIFICANT CHANGE UP (ref 4.2–5.8)
RBC # FLD: 12.9 % — SIGNIFICANT CHANGE UP (ref 10.3–14.5)
RBC CASTS # UR COMP ASSIST: < 5 /HPF — SIGNIFICANT CHANGE UP
SALICYLATES SERPL-MCNC: <0.3 MG/DL — LOW (ref 2.8–20)
SARS-COV-2 RNA SPEC QL NAA+PROBE: SIGNIFICANT CHANGE UP
SODIUM SERPL-SCNC: 132 MMOL/L — LOW (ref 135–145)
SP GR SPEC: >=1.03 — SIGNIFICANT CHANGE UP (ref 1–1.03)
THC UR QL: POSITIVE
UROBILINOGEN FLD QL: 0.2 E.U./DL — SIGNIFICANT CHANGE UP
WBC # BLD: 8.62 K/UL — SIGNIFICANT CHANGE UP (ref 3.8–10.5)
WBC # FLD AUTO: 8.62 K/UL — SIGNIFICANT CHANGE UP (ref 3.8–10.5)
WBC UR QL: ABNORMAL /HPF

## 2023-10-08 PROCEDURE — 93010 ELECTROCARDIOGRAM REPORT: CPT

## 2023-10-08 PROCEDURE — 90792 PSYCH DIAG EVAL W/MED SRVCS: CPT

## 2023-10-08 PROCEDURE — 99285 EMERGENCY DEPT VISIT HI MDM: CPT

## 2023-10-08 RX ORDER — SODIUM CHLORIDE 9 MG/ML
1000 INJECTION, SOLUTION INTRAVENOUS
Refills: 0 | Status: DISCONTINUED | OUTPATIENT
Start: 2023-10-08 | End: 2023-11-06

## 2023-10-08 RX ORDER — DEXTROSE 50 % IN WATER 50 %
25 SYRINGE (ML) INTRAVENOUS ONCE
Refills: 0 | Status: DISCONTINUED | OUTPATIENT
Start: 2023-10-08 | End: 2023-11-06

## 2023-10-08 RX ORDER — DIAZEPAM 5 MG
10 TABLET ORAL
Refills: 0 | Status: DISCONTINUED | OUTPATIENT
Start: 2023-10-08 | End: 2023-10-13

## 2023-10-08 RX ORDER — NICOTINE POLACRILEX 2 MG
1 GUM BUCCAL DAILY
Refills: 0 | Status: DISCONTINUED | OUTPATIENT
Start: 2023-10-08 | End: 2023-11-06

## 2023-10-08 RX ORDER — CARVEDILOL PHOSPHATE 80 MG/1
6.25 CAPSULE, EXTENDED RELEASE ORAL EVERY 12 HOURS
Refills: 0 | Status: DISCONTINUED | OUTPATIENT
Start: 2023-10-08 | End: 2023-11-06

## 2023-10-08 RX ORDER — DIAZEPAM 5 MG
10 TABLET ORAL ONCE
Refills: 0 | Status: DISCONTINUED | OUTPATIENT
Start: 2023-10-08 | End: 2023-10-08

## 2023-10-08 RX ORDER — INSULIN LISPRO 100/ML
12 VIAL (ML) SUBCUTANEOUS
Refills: 0 | Status: DISCONTINUED | OUTPATIENT
Start: 2023-10-08 | End: 2023-10-29

## 2023-10-08 RX ORDER — GLUCAGON INJECTION, SOLUTION 0.5 MG/.1ML
1 INJECTION, SOLUTION SUBCUTANEOUS ONCE
Refills: 0 | Status: DISCONTINUED | OUTPATIENT
Start: 2023-10-08 | End: 2023-11-06

## 2023-10-08 RX ORDER — RISPERIDONE 4 MG/1
2 TABLET ORAL AT BEDTIME
Refills: 0 | Status: DISCONTINUED | OUTPATIENT
Start: 2023-10-08 | End: 2023-11-06

## 2023-10-08 RX ORDER — INSULIN GLARGINE 100 [IU]/ML
16 INJECTION, SOLUTION SUBCUTANEOUS AT BEDTIME
Refills: 0 | Status: DISCONTINUED | OUTPATIENT
Start: 2023-10-08 | End: 2023-11-06

## 2023-10-08 RX ORDER — INSULIN LISPRO 100/ML
VIAL (ML) SUBCUTANEOUS
Refills: 0 | Status: DISCONTINUED | OUTPATIENT
Start: 2023-10-08 | End: 2023-11-06

## 2023-10-08 RX ORDER — LISINOPRIL 2.5 MG/1
10 TABLET ORAL DAILY
Refills: 0 | Status: DISCONTINUED | OUTPATIENT
Start: 2023-10-08 | End: 2023-10-30

## 2023-10-08 RX ORDER — DEXTROSE 50 % IN WATER 50 %
12.5 SYRINGE (ML) INTRAVENOUS ONCE
Refills: 0 | Status: DISCONTINUED | OUTPATIENT
Start: 2023-10-08 | End: 2023-11-06

## 2023-10-08 RX ORDER — ATORVASTATIN CALCIUM 80 MG/1
10 TABLET, FILM COATED ORAL AT BEDTIME
Refills: 0 | Status: DISCONTINUED | OUTPATIENT
Start: 2023-10-08 | End: 2023-10-21

## 2023-10-08 RX ORDER — IBUPROFEN 200 MG
600 TABLET ORAL ONCE
Refills: 0 | Status: COMPLETED | OUTPATIENT
Start: 2023-10-08 | End: 2023-10-08

## 2023-10-08 RX ORDER — DIVALPROEX SODIUM 500 MG/1
500 TABLET, DELAYED RELEASE ORAL
Refills: 0 | Status: DISCONTINUED | OUTPATIENT
Start: 2023-10-08 | End: 2023-10-20

## 2023-10-08 RX ORDER — LIDOCAINE 4 G/100G
1 CREAM TOPICAL ONCE
Refills: 0 | Status: COMPLETED | OUTPATIENT
Start: 2023-10-08 | End: 2023-10-08

## 2023-10-08 RX ORDER — DEXTROSE 50 % IN WATER 50 %
15 SYRINGE (ML) INTRAVENOUS ONCE
Refills: 0 | Status: DISCONTINUED | OUTPATIENT
Start: 2023-10-08 | End: 2023-11-06

## 2023-10-08 RX ADMIN — LIDOCAINE 1 PATCH: 4 CREAM TOPICAL at 11:56

## 2023-10-08 RX ADMIN — CARVEDILOL PHOSPHATE 6.25 MILLIGRAM(S): 80 CAPSULE, EXTENDED RELEASE ORAL at 14:28

## 2023-10-08 RX ADMIN — Medication 10 MILLIGRAM(S): at 11:56

## 2023-10-08 RX ADMIN — Medication 600 MILLIGRAM(S): at 12:25

## 2023-10-08 RX ADMIN — LISINOPRIL 10 MILLIGRAM(S): 2.5 TABLET ORAL at 14:28

## 2023-10-08 RX ADMIN — Medication 10 MILLIGRAM(S): at 19:57

## 2023-10-08 RX ADMIN — Medication 10: at 14:37

## 2023-10-08 RX ADMIN — Medication 1 PATCH: at 14:26

## 2023-10-08 RX ADMIN — Medication 600 MILLIGRAM(S): at 11:55

## 2023-10-08 RX ADMIN — INSULIN GLARGINE 16 UNIT(S): 100 INJECTION, SOLUTION SUBCUTANEOUS at 21:14

## 2023-10-08 RX ADMIN — ATORVASTATIN CALCIUM 10 MILLIGRAM(S): 80 TABLET, FILM COATED ORAL at 19:57

## 2023-10-08 RX ADMIN — RISPERIDONE 2 MILLIGRAM(S): 4 TABLET ORAL at 19:57

## 2023-10-08 RX ADMIN — DIVALPROEX SODIUM 500 MILLIGRAM(S): 500 TABLET, DELAYED RELEASE ORAL at 14:29

## 2023-10-08 RX ADMIN — Medication 12 UNIT(S): at 14:29

## 2023-10-08 NOTE — ED BEHAVIORAL HEALTH ASSESSMENT NOTE - DIFFERENTIAL
Bipolar 1 disorder   antisocial personality disorder    substance induced mood disorder  psychotic disorder due to TBI

## 2023-10-08 NOTE — ED ADULT NURSE NOTE - NS ED NURSE RECORD ANOTHER HT AND WT
[Normal Sclera/Conjunctiva] : normal sclera/conjunctiva [Normal Oropharynx] : the oropharynx was normal [No JVD] : no jugular venous distention [Normal] : normal rate, regular rhythm, normal S1 and S2 and no murmur heard [No Carotid Bruits] : no carotid bruits [No Edema] : there was no peripheral edema [Soft] : abdomen soft [Non-distended] : non-distended [Non Tender] : non-tender [No Masses] : no abdominal mass palpated [No HSM] : no HSM [Normal Supraclavicular Nodes] : no supraclavicular lymphadenopathy [Normal Anterior Cervical Nodes] : no anterior cervical lymphadenopathy [Normal Posterior Cervical Nodes] : no posterior cervical lymphadenopathy [No Spinal Tenderness] : no spinal tenderness [No CVA Tenderness] : no CVA  tenderness [Coordination Grossly Intact] : coordination grossly intact [No Focal Deficits] : no focal deficits [Normal Gait] : normal gait [Deep Tendon Reflexes (DTR)] : deep tendon reflexes were 2+ and symmetric Yes

## 2023-10-08 NOTE — ED BEHAVIORAL HEALTH ASSESSMENT NOTE - PAST PSYCHOTROPIC MEDICATION
negative Per HPI (has also been on fluoxetine, zyprexa, neurontin, sertraline, wellbutrin, lexapro, and gabapentin) also states history of adderall
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
## 2023-10-08 NOTE — ED BEHAVIORAL HEALTH ASSESSMENT NOTE - LEGAL HISTORY
History of numerous incarcerations, the longest for 10 years due to manslaughter, was d/c from intermediate in 2014. No arrests since his discharge from intermediate.

## 2023-10-08 NOTE — ED PROVIDER NOTE - PROGRESS NOTE DETAILS
Pt discussed w psych, who know pt well; recommend we give pt his diazepam and will be down to eval pt. Per Dr Hong, pt will be a vol admit but no beds available 2/2 rn/pt ratio issues.  Pt medically clear. Pt ordered for his daily meds, including insulin.  Psych wanted pt to receive depakote 500 mg bid, risperdal 2 mg qhs, diazepam 10 mg bid.  Will plan to sign pt out at the end of my shift at 6p. Pt pending psych dispo; pt signed out to Dr Montalvo. Filemon Montalvo MD: Patient signed out to me by  Director--currently pending psych bed, is a voluntary admit for paranoia/SI/HI. Reassessed, resting comfortably, requesting food--given saltine crackers and applesauce--1:1 ED staff member alerted regarding plastic spoon given to patient, will ensure it will be thrown away once he finishes eating.

## 2023-10-08 NOTE — ED PROVIDER NOTE - PHYSICAL EXAMINATION
VITAL SIGNS: I have reviewed nursing notes and confirm.  CONSTITUTIONAL:  in no acute distress.   SKIN:  warm and dry, no acute rash.   HEAD:  normocephalic, atraumatic.  EYES: PERRL, EOM intact; conjunctiva and sclera clear.  ENT: No nasal discharge; airway clear.   NECK: Supple; non tender.  CARD: S1, S2 normal; no murmurs, gallops, or rubs. Regular rate and rhythm.   RESP:  Clear to auscultation b/l, no wheezes, rales or rhonchi.  ABD: Normal bowel sounds; soft; non-distended; non-tender; no guarding/ rebound.  MSK: Normal ROM. No clubbing, cyanosis or edema. no ttp bilat le, back nontender  NEURO: Alert, oriented, grossly unremarkable  PSYCH: Cooperative, mood and affect appropriate.

## 2023-10-08 NOTE — ED ADULT NURSE NOTE - OBJECTIVE STATEMENT
56 y/o male here for homicidal thoughts pt stated " he had a girlfriend and broke up with her but after that people are following around and they're are trying to kill him and he will kill him back, he doesn't want to fight and would like to be admitted to a long term facility ". pt changed into gown, personal belonging checked and kept in safe area, security check done and constant observation started.

## 2023-10-08 NOTE — ED ADULT NURSE REASSESSMENT NOTE - NS ED NURSE REASSESS COMMENT FT1
Assumed care of pt. at 2000  Pt. in no distress and well appearing  Pt. in hallway bed 7  Pt. sts. that he is tired  But, not offering further information about his feelings  Pt. is calm and cooperative  Pt's mother called and provided information about pt's history  Information was not provided to Mother  This RN spoke with pt. after and pt. sts. that information can be provided to Tonya (Mother) and Mariana (sister)  Tonya [phone] Mariana [phone]
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
## 2023-10-08 NOTE — ED BEHAVIORAL HEALTH ASSESSMENT NOTE - NS ED BHA PLAN HANDOFF TO INPATIENT PROVIDER YESNO
Per 10-1-19 ov note from PCP patient is to have MA Diagnostic Digital Bilateral and - US Breast Left Complete 4 Quadrants but no orders are in epic. Please order tests-Thank you    TC's: please assist-she would like these tests to be done before her scheduled echocardiogram or before her other scheduled specialty appointments at the [facility] since she will in the same building as the mammo/ultrasound are done. Please schedule once orders are placed and notify patient-Thank you    Please route chart or message to me when done so I can follow up with patient to make sure the tests are completed and follow up plan understood by patient    Vibha Carvajal, JODY  [facility]-Care Coordination  [facility]  [facility]  [phone]          Yes

## 2023-10-08 NOTE — ED BEHAVIORAL HEALTH ASSESSMENT NOTE - CURRENT MEDICATION
Methadone 130mg daily ([facility] Methadone [facility])
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
## 2023-10-08 NOTE — ED BEHAVIORAL HEALTH ASSESSMENT NOTE - NS ED BHA REVIEW OF ED CHART VITAL SIGNS REVIEWED
CLINICAL NUTRITION SERVICES  -  ASSESSMENT NOTE    Recommendations Ordered by Registered Dietitian (RD):   Add Thera vit M daily   Add PRN high protein shakes/smoothies   Malnutrition: (4/1)  % Weight Loss:  Weight loss does not meet criteria for malnutrition   % Intake:  </= 50% for >/= 5 days (severe malnutrition)  Subcutaneous Fat Loss:  Unable to assess  Muscle Loss:  Unable to assess  Fluid Retention:  Mild 2+    Malnutrition Diagnosis: Non-Severe malnutrition  In Context of:  Acute illness or injury     REASON FOR ASSESSMENT  Sandhya Trujillo is a 63 year old female seen by Registered Dietitian for [facility] - recent surgery (TAHBSO and rectal prolapse repair), a fib/DVT/PE (on chronic anticoagulation with Coumadin), morbid obesity,GERD/hiatal hernia, history of GCA/PMR/polymyositis, anxiety/depression, dyslipidemia, diastolic CHF, chronic pain syndrome (on controlled substance agreement), FERMIN on CPAP who presented to ER with worsening postoperative abdominal pain along with poor PO intake.    NUTRITION HISTORY  - Information obtained from EMR.  - Poor PO intake reported on admission r/t post op pain. Now 12 days post op.   - Per review of records, it appears pt has had reduced oral intake for at least the past couple months related to abdominal pain and early satiety.     - Unable to reach patient x2 attempts to call into room (Current departmental policy during COVID-19 Pandemic)    - Kiwi allergy    CURRENT NUTRITION ORDERS  Diet Order:     Regular - Since 3/28    Current Intake/Tolerance:  0% intakes recorded 3/30 and prior. On 3/31 pt consumed 100% for one meal, 4/1 50% for one meal. RN documentation shows no appetite, low appetite, fair appetite over the past 3 days.   Barriers - abd pain, poor appetite, early satiety.     NUTRITION FOCUSED PHYSICAL ASSESSMENT FOR DIAGNOSING MALNUTRITION)  No:  Unable - Current departmental policy during COVID-19 Pandemic           Observed:    Unable    Obtained from  "Chart/Interdisciplinary Team:  Mild 2+  Last BM 4/1  Melvin - Nutrition 3;Total 16    ANTHROPOMETRICS  Height: 5' 10\"  Weight: 127 kg (280 lb)   Body mass index is 40.2 kg/m .  Weight Status:  Obesity Grade III BMI >40  IBW: 68.2 kg  % IBW: 186%  Weight History: Using lower weight of 280# on admission there is potential for 8# loss in the past month (3%).   Wt Readings from Last 10 Encounters:   03/30/20 143.3 kg (315 lb 14.7 oz)   02/27/20 (P) 130.6 kg (288 lb)   02/05/20 131.1 kg (289 lb)   01/31/20 129.3 kg (285 lb)   01/13/20 130.6 kg (288 lb)   12/06/19 130.9 kg (288 lb 9.6 oz)   11/06/19 130.6 kg (288 lb)   10/14/19 129.7 kg (286 lb)   10/07/19 130.2 kg (287 lb)   08/20/19 132.9 kg (293 lb)     LABS  Labs reviewed    MEDICATIONS  Medications reviewed  Colace/Miralax  Lasix 20 mg daily   Solumedrol     ASSESSED NUTRITION NEEDS PER APPROVED PRACTICE GUIDELINES:  Dosing Weight 83 kg - adjusted  Estimated Energy Needs: 0095-3957 kcals (25-30 Kcal/Kg)  Justification: maintenance  Estimated Protein Needs: 100-125 grams protein (1.2-1.5 g pro/Kg)  Justification: post-op and preservation of lean body mass  Estimated Fluid Needs: 1 mL/kcal  Justification: maintenance    MALNUTRITION:  % Weight Loss:  Weight loss does not meet criteria for malnutrition   % Intake:  </= 50% for >/= 5 days (severe malnutrition)  Subcutaneous Fat Loss:  Unable to assess  Muscle Loss:  Unable to assess  Fluid Retention:  Mild 2+    Malnutrition Diagnosis: Non-Severe malnutrition  In Context of:  Acute illness or injury    NUTRITION DIAGNOSIS:  Inadequate oral intake related to reduced appetite, early satiety w/ post op pain as evidenced by 0% intakes recorded for most of admission.       NUTRITION INTERVENTIONS  Recommendations / Nutrition Prescription  Diet as ordered - Regular    Add Thera vit M daily   Add PRN high protein shakes/smoothies    Implementation  Nutrition education: Per Provider order if indicated   Medical Food Supplement " and Multivitamin/Mineral: see above.     Nutrition Goals  Intake of >/=50% meals BID-TID.       MONITORING AND EVALUATION:  Progress towards goals will be monitored and evaluated per protocol and Practice Guidelines    Jacinda Padilla RD, LD  Pager: [phone]               Yes

## 2023-10-08 NOTE — ED BEHAVIORAL HEALTH ASSESSMENT NOTE - NSCURPASTPSYDX_PSY_ALL_CORE
Mood disorder/Psychotic disorder/TBI/Alcohol/Substance Use disorders/Cluster B Personality disorder/traits/Conduct problems

## 2023-10-08 NOTE — ED PROVIDER NOTE - CLINICAL SUMMARY MEDICAL DECISION MAKING FREE TEXT BOX
Pt c/o passive SI, HI, paranoia, also chronic knee and back pain w/o neuro deficits on exam.  Pt placed on 1:1; plan med clearance eval and psych consult; dispo per psych. See progress notes for further mdm related documentation.
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
## 2023-10-08 NOTE — ED BEHAVIORAL HEALTH ASSESSMENT NOTE - VIOLENCE RISK FACTORS:
Feeling of being under threat and being unable to control threat/Antisocial behavior/cognition (past or present)/Violent ideation/threat/speech/Substance abuse/Affective dysregulation/Impulsivity/History of being victimized/traumatized/Noncompliance with treatment/Community stressors that increase the risk of destabilization/Irritability/History of violation of a legal mandate (e.g., parole, probation, AOT)
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
## 2023-10-09 ENCOUNTER — INPATIENT (INPATIENT)
Facility: HOSPITAL | Age: 58
LOS: 27 days | DRG: 885 | End: 2023-11-06
Attending: PSYCHIATRY & NEUROLOGY | Admitting: PSYCHIATRY & NEUROLOGY
Payer: COMMERCIAL

## 2023-10-09 DIAGNOSIS — F31.64 BIPOLAR DISORDER, CURRENT EPISODE MIXED, SEVERE, WITH PSYCHOTIC FEATURES: ICD-10-CM

## 2023-10-09 DIAGNOSIS — F11.90 OPIOID USE, UNSPECIFIED, UNCOMPLICATED: ICD-10-CM

## 2023-10-09 DIAGNOSIS — S06.9XAS UNSPECIFIED INTRACRANIAL INJURY WITH LOSS OF CONSCIOUSNESS STATUS UNKNOWN, SEQUELA: ICD-10-CM

## 2023-10-09 DIAGNOSIS — F15.20 OTHER STIMULANT DEPENDENCE, UNCOMPLICATED: ICD-10-CM

## 2023-10-09 LAB
GLUCOSE BLDC GLUCOMTR-MCNC: 169 MG/DL — HIGH (ref 70–99)
GLUCOSE BLDC GLUCOMTR-MCNC: 182 MG/DL — HIGH (ref 70–99)
GLUCOSE BLDC GLUCOMTR-MCNC: 220 MG/DL — HIGH (ref 70–99)

## 2023-10-09 PROCEDURE — 99284 EMERGENCY DEPT VISIT MOD MDM: CPT

## 2023-10-09 RX ORDER — ACETAMINOPHEN 500 MG
650 TABLET ORAL EVERY 6 HOURS
Refills: 0 | Status: DISCONTINUED | OUTPATIENT
Start: 2023-10-09 | End: 2023-11-06

## 2023-10-09 RX ORDER — OLANZAPINE 15 MG/1
5 TABLET, FILM COATED ORAL EVERY 6 HOURS
Refills: 0 | Status: DISCONTINUED | OUTPATIENT
Start: 2023-10-09 | End: 2023-11-06

## 2023-10-09 RX ORDER — METHADONE HYDROCHLORIDE 40 MG/1
130 TABLET ORAL DAILY
Refills: 0 | Status: DISCONTINUED | OUTPATIENT
Start: 2023-10-09 | End: 2023-10-09

## 2023-10-09 RX ORDER — METHADONE HYDROCHLORIDE 40 MG/1
130 TABLET ORAL EVERY 24 HOURS
Refills: 0 | Status: DISCONTINUED | OUTPATIENT
Start: 2023-10-10 | End: 2023-10-13

## 2023-10-09 RX ORDER — METHADONE HYDROCHLORIDE 40 MG/1
130 TABLET ORAL ONCE
Refills: 0 | Status: DISCONTINUED | OUTPATIENT
Start: 2023-10-09 | End: 2023-10-09

## 2023-10-09 RX ORDER — LANOLIN ALCOHOL/MO/W.PET/CERES
3 CREAM (GRAM) TOPICAL AT BEDTIME
Refills: 0 | Status: DISCONTINUED | OUTPATIENT
Start: 2023-10-09 | End: 2023-11-06

## 2023-10-09 RX ADMIN — Medication 2: at 18:05

## 2023-10-09 RX ADMIN — Medication 0: at 13:16

## 2023-10-09 RX ADMIN — Medication 12 UNIT(S): at 18:04

## 2023-10-09 RX ADMIN — Medication 6: at 09:27

## 2023-10-09 RX ADMIN — DIVALPROEX SODIUM 500 MILLIGRAM(S): 500 TABLET, DELAYED RELEASE ORAL at 06:36

## 2023-10-09 RX ADMIN — ATORVASTATIN CALCIUM 10 MILLIGRAM(S): 80 TABLET, FILM COATED ORAL at 22:10

## 2023-10-09 RX ADMIN — Medication 10 MILLIGRAM(S): at 06:11

## 2023-10-09 RX ADMIN — Medication 12 UNIT(S): at 09:28

## 2023-10-09 RX ADMIN — CARVEDILOL PHOSPHATE 6.25 MILLIGRAM(S): 80 CAPSULE, EXTENDED RELEASE ORAL at 19:15

## 2023-10-09 RX ADMIN — Medication 12 UNIT(S): at 14:05

## 2023-10-09 RX ADMIN — Medication 4: at 22:10

## 2023-10-09 RX ADMIN — Medication 3 MILLIGRAM(S): at 22:10

## 2023-10-09 RX ADMIN — INSULIN GLARGINE 16 UNIT(S): 100 INJECTION, SOLUTION SUBCUTANEOUS at 22:10

## 2023-10-09 RX ADMIN — RISPERIDONE 2 MILLIGRAM(S): 4 TABLET ORAL at 22:10

## 2023-10-09 RX ADMIN — LISINOPRIL 10 MILLIGRAM(S): 2.5 TABLET ORAL at 06:11

## 2023-10-09 RX ADMIN — CARVEDILOL PHOSPHATE 6.25 MILLIGRAM(S): 80 CAPSULE, EXTENDED RELEASE ORAL at 06:11

## 2023-10-09 RX ADMIN — METHADONE HYDROCHLORIDE 130 MILLIGRAM(S): 40 TABLET ORAL at 11:23

## 2023-10-09 RX ADMIN — Medication 10 MILLIGRAM(S): at 18:37

## 2023-10-09 RX ADMIN — Medication 1 PATCH: at 09:12

## 2023-10-09 RX ADMIN — Medication 1 PATCH: at 15:06

## 2023-10-09 RX ADMIN — DIVALPROEX SODIUM 500 MILLIGRAM(S): 500 TABLET, DELAYED RELEASE ORAL at 18:37
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
## 2023-10-09 NOTE — ED BEHAVIORAL HEALTH PROGRESS NOTE - DETAILS:
Patient assessed in the ED by attending and resident. Upon approach he is in the presence o fa 1:1, resting comfortably in bed, dressed in a hospital gown, there's an empty breakfast plate at his side. He is calm and cooperative to interview. He states that he was able to sleep well last night, and that his appetite has been intact. He continues to endorse feeling "I want to hurt myself more than others", and continues to endorse paranoid ideas about his girlfriend spreading lies about him and people following him. However, he states that he feels safe while in the emergency department and hospital in general. He states that he last took his methadone dose yesterday, and advocates to get his methadone dose confirmed today.    No HI/AVH elicited.
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
## 2023-10-09 NOTE — BH PATIENT PROFILE - HOME MEDICATIONS
diazePAM 10 mg oral tablet , 1 tab(s) orally  olanzapine  insulin lispro 100 units/mL injectable solution , 12 unit(s) injectable 3 times a day (before meals)  Lantus 100 units/mL subcutaneous solution , 16 unit(s) subcutaneous once a day (at bedtime)  test strips (per patient's insurance) , 1 application subcutaneously 4 times a day. ** Compatible with patient's glucometer **  lancets , 1 application subcutaneously 4 times a day  glucometer (per patient's insurance) , Test blood sugars four times a day. Dispense #1 glucometer.  alcohol swabs , Apply topically to affected area 4 times a day  U-100 Insulin Syringe, 3/10 mL , 1 application subcutaneously 4 times a day ** 3/10 mL holds up to 30 units of insulin **  lisinopril 10 mg oral tablet , 1 tab(s) orally once a day  atorvastatin 20 mg oral tablet , 1 tab(s) orally once a day  Depakote 500 mg oral delayed release tablet , 1 tab(s) orally 2 times a day  carvedilol 6.25 mg oral tablet , 1 tab(s) orally every 12 hours

## 2023-10-09 NOTE — ED BEHAVIORAL HEALTH PROGRESS NOTE - COLLATERAL INFORMATION (NAME, PHONE, RELATIONSHIP):
Per nurse at [facility] [phone]- Methadone dose confirmed to be 130 mg, last  was on Saturday, received a methadone bottle of 130 mg for sunday

## 2023-10-09 NOTE — ED BEHAVIORAL HEALTH PROGRESS NOTE - NSICDXBHSECONDARYDX_PSY_ALL_CORE
Opioid use disorder   F11.90  Amphetamine use disorder, severe   F15.20  Traumatic brain injury, with unknown loss of consciousness status, sequela   S06.9XAS

## 2023-10-09 NOTE — BH PATIENT PROFILE - FALL HARM RISK - HARM RISK INTERVENTIONS

## 2023-10-09 NOTE — ED BEHAVIORAL HEALTH PROGRESS NOTE - SUMMARY
Risk factors include long standing paranoid delusions, current SI and inability to contract, homelessness, substance use, hx of suicide attempt and aggression towards others, cluster B traits, difficult to manage medical issues, few social supports in the area, and traumatic brain injury with pronounced impulse control issues. Protective factors include he is extremely help-seeking and hx of malingering for a place to stay.

## 2023-10-09 NOTE — ED BEHAVIORAL HEALTH PROGRESS NOTE - ADDITIONAL COLLATERAL INFORMATION (NAME, PHONE, RELATIONSHIP):
Per ED Staff (PA)- patient has been under good behavioral control, slept overnight, has not been agitated and has not required any  medications for agitation
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
## 2023-10-09 NOTE — ED BEHAVIORAL HEALTH PROGRESS NOTE - STANDING MEDS RECEIVED IN ED DETAILS FREE TEXT
MEDICATIONS  (STANDING):  atorvastatin 10 milliGRAM(s) Oral at bedtime  carvedilol 6.25 milliGRAM(s) Oral every 12 hours  dextrose 5%. 1000 milliLiter(s) (50 mL/Hr) IV Continuous <Continuous>  dextrose 5%. 1000 milliLiter(s) (100 mL/Hr) IV Continuous <Continuous>  dextrose 50% Injectable 25 Gram(s) IV Push once  dextrose 50% Injectable 12.5 Gram(s) IV Push once  dextrose 50% Injectable 25 Gram(s) IV Push once  diazepam    Tablet 10 milliGRAM(s) Oral two times a day  divalproex  milliGRAM(s) Oral two times a day  glucagon  Injectable 1 milliGRAM(s) IntraMuscular once  insulin glargine Injectable (LANTUS) 16 Unit(s) SubCutaneous at bedtime  insulin lispro (ADMELOG) corrective regimen sliding scale   SubCutaneous Before meals and at bedtime  insulin lispro Injectable (ADMELOG) 12 Unit(s) SubCutaneous three times a day before meals  lisinopril 10 milliGRAM(s) Oral daily  methadone    Tablet 130 milliGRAM(s) Oral Once  nicotine - 21 mG/24Hr(s) Patch 1 Patch Transdermal daily  risperiDONE   Tablet 2 milliGRAM(s) Oral at bedtime

## 2023-10-09 NOTE — ED BEHAVIORAL HEALTH PROGRESS NOTE - RISK ASSESSMENT
Chronic risk factors include long standing paranoid delusions, history of aggression, history of incarceration, multiple inpatient psychiatric hospitalization, history of suicide attempt, cluster B traits, impulsivity and traumatic brain injury. Modifiable risk factors include few social supports, current substance use, being undomiciled, current suicidal ideation and inability to contract for safety. Protective factors include he is extremely help-seeking and hx of malingering for a place to stay.

## 2023-10-10 LAB
CHOLEST SERPL-MCNC: 118 MG/DL — SIGNIFICANT CHANGE UP
GLUCOSE BLDC GLUCOMTR-MCNC: 107 MG/DL — HIGH (ref 70–99)
GLUCOSE BLDC GLUCOMTR-MCNC: 176 MG/DL — HIGH (ref 70–99)
GLUCOSE BLDC GLUCOMTR-MCNC: 198 MG/DL — HIGH (ref 70–99)
GLUCOSE BLDC GLUCOMTR-MCNC: 218 MG/DL — HIGH (ref 70–99)
GLUCOSE BLDC GLUCOMTR-MCNC: 218 MG/DL — HIGH (ref 70–99)
HDLC SERPL-MCNC: 36 MG/DL — LOW
LIPID PNL WITH DIRECT LDL SERPL: 65 MG/DL — SIGNIFICANT CHANGE UP
NON HDL CHOLESTEROL: 82 MG/DL — SIGNIFICANT CHANGE UP
TRIGL SERPL-MCNC: 87 MG/DL — SIGNIFICANT CHANGE UP

## 2023-10-10 PROCEDURE — 99223 1ST HOSP IP/OBS HIGH 75: CPT

## 2023-10-10 RX ADMIN — OLANZAPINE 5 MILLIGRAM(S): 15 TABLET, FILM COATED ORAL at 16:34

## 2023-10-10 RX ADMIN — Medication 2: at 17:07

## 2023-10-10 RX ADMIN — DIVALPROEX SODIUM 500 MILLIGRAM(S): 500 TABLET, DELAYED RELEASE ORAL at 07:39

## 2023-10-10 RX ADMIN — LISINOPRIL 10 MILLIGRAM(S): 2.5 TABLET ORAL at 07:40

## 2023-10-10 RX ADMIN — Medication 1 PATCH: at 07:46

## 2023-10-10 RX ADMIN — CARVEDILOL PHOSPHATE 6.25 MILLIGRAM(S): 80 CAPSULE, EXTENDED RELEASE ORAL at 18:52

## 2023-10-10 RX ADMIN — Medication 12 UNIT(S): at 07:53

## 2023-10-10 RX ADMIN — DIVALPROEX SODIUM 500 MILLIGRAM(S): 500 TABLET, DELAYED RELEASE ORAL at 18:52

## 2023-10-10 RX ADMIN — Medication 2: at 07:53

## 2023-10-10 RX ADMIN — Medication 10 MILLIGRAM(S): at 18:53

## 2023-10-10 RX ADMIN — Medication 1 PATCH: at 15:00

## 2023-10-10 RX ADMIN — Medication 12 UNIT(S): at 12:02

## 2023-10-10 RX ADMIN — Medication 1 PATCH: at 12:01

## 2023-10-10 RX ADMIN — CARVEDILOL PHOSPHATE 6.25 MILLIGRAM(S): 80 CAPSULE, EXTENDED RELEASE ORAL at 07:53

## 2023-10-10 RX ADMIN — Medication 1 PATCH: at 18:11

## 2023-10-10 RX ADMIN — Medication 12 UNIT(S): at 17:06

## 2023-10-10 RX ADMIN — Medication 10 MILLIGRAM(S): at 07:40

## 2023-10-10 RX ADMIN — METHADONE HYDROCHLORIDE 130 MILLIGRAM(S): 40 TABLET ORAL at 12:00

## 2023-10-10 NOTE — BH INPATIENT PSYCHIATRY ASSESSMENT NOTE - CURRENT MEDICATION
MEDICATIONS  (STANDING):  atorvastatin 10 milliGRAM(s) Oral at bedtime  carvedilol 6.25 milliGRAM(s) Oral every 12 hours  dextrose 5%. 1000 milliLiter(s) (100 mL/Hr) IV Continuous <Continuous>  dextrose 5%. 1000 milliLiter(s) (50 mL/Hr) IV Continuous <Continuous>  dextrose 50% Injectable 25 Gram(s) IV Push once  dextrose 50% Injectable 25 Gram(s) IV Push once  dextrose 50% Injectable 12.5 Gram(s) IV Push once  diazepam    Tablet 10 milliGRAM(s) Oral two times a day  divalproex  milliGRAM(s) Oral two times a day  glucagon  Injectable 1 milliGRAM(s) IntraMuscular once  insulin glargine Injectable (LANTUS) 16 Unit(s) SubCutaneous at bedtime  insulin lispro (ADMELOG) corrective regimen sliding scale   SubCutaneous Before meals and at bedtime  insulin lispro Injectable (ADMELOG) 12 Unit(s) SubCutaneous three times a day before meals  lisinopril 10 milliGRAM(s) Oral daily  methadone  Concentrate 130 milliGRAM(s) Oral every 24 hours  nicotine - 21 mG/24Hr(s) Patch 1 Patch Transdermal daily  risperiDONE   Tablet 2 milliGRAM(s) Oral at bedtime    MEDICATIONS  (PRN):  acetaminophen     Tablet .. 650 milliGRAM(s) Oral every 6 hours PRN Temp greater or equal to 38C (100.4F), Mild Pain (1 - 3)  dextrose Oral Gel 15 Gram(s) Oral once PRN Blood Glucose LESS THAN 70 milliGRAM(s)/deciliter  melatonin 3 milliGRAM(s) Oral at bedtime PRN Insomnia  OLANZapine 5 milliGRAM(s) Oral every 6 hours PRN agitation   MEDICATIONS  (STANDING):    MEDICATIONS  (PRN):

## 2023-10-10 NOTE — BH INPATIENT PSYCHIATRY ASSESSMENT NOTE - NSBHCHARTREVIEWVS_PSY_A_CORE FT
Vital Signs Last 24 Hrs  T(C): 37.9 (10-10-23 @ 06:18), Max: 37.9 (10-10-23 @ 06:18)  T(F): 100.3 (10-10-23 @ 06:18), Max: 100.3 (10-10-23 @ 06:18)  HR: 69 (10-10-23 @ 06:18) (69 - 69)  BP: 125/80 (10-10-23 @ 06:18) (125/80 - 125/80)  RR: 16 (10-10-23 @ 06:18) (16 - 16)  SpO2: 96% (10-10-23 @ 06:18) (96% - 96%)     Vital Signs Last 24 Hrs  T(C): --  T(F): --  HR: --  BP: --  BP(mean): --  RR: --  SpO2: --

## 2023-10-10 NOTE — BH INPATIENT PSYCHIATRY ASSESSMENT NOTE - NSBHATTESTCOMMENTATTENDFT_PSY_A_CORE
Pt's presentation is similar to when he is last on 8U. Hypersexual, poor impulse control, and intrusive. Agreed to trial of zyprexa if valium in creased to 10 mg q8h which I thought was the making of a helpful med regimen. Pt is already highly nonadherent to diabetic diet.  Pt's presentation is similar to when he is last on 8U. Hypersexual, poor impulse control, and intrusive. which I thought was the making of a helpful med regimen. Pt is already highly nonadherent to diabetic diet.    We are counseling valium taper so that pt can go to inpatient rehab. Pt did not want to do this during last admission but seems more motivated this time. Calmer. Close top his baseline.

## 2023-10-10 NOTE — BH SOCIAL WORK INITIAL PSYCHOSOCIAL EVALUATION - NSBHBARRIERS_PSY_ALL_CORE
Co-morbid personality/Lack of support/Lack of insight/Low motivation/Medical co-morbidities/Non-compliant with treatment/Poor judgement/Social withdrawal

## 2023-10-10 NOTE — BH INPATIENT PSYCHIATRY ASSESSMENT NOTE - NSBHASSESSSUMMFT_PSY_ALL_CORE
Patient is a 16month male ex FT, with diarrhea/emesis. Patient spends time with dad so unclear when symptoms started. Mom picked him up from dad sunday night and monday morning noticed diarrhea in the diaper. 6 stools monday. Yesterday was with another relative and maybe had 10 stools. Had. Two total episodes of emesis yesterday, none today. Mom states no urine since diaper was put on at 0530 hrs. Drank about 24 oz pedialyte yesterday. Ate applesauce, rice yesterday. No sick contacts. Got 1yr vaccines. Hx of RAD, 2 week stay in NICU with intubation, unknown reason.  Patient well appearing, BCR. Mr. Bueno is a 57 year old man, undomiciled, single, on disability, with a past psychiatric history of Bipolar 1 disorder, polysubstance use disorder (crystal meth, cocaine, opioid, cannabis, K2, alcohol), currently on methadone 130 mg orally daily through [facility] in [place], and Valium 10 mg orally TID, antisocial personality disorder, history of medication non-adherence, several lifetime psychiatric hospitalizations, last at [facility] in 05/2023, history of incarceration, served 8 years in snf for manslaughter and was released in 2014, with a past medical history of TBI, pituitary tumor, HTN, T2DM, and GERD, who was admitted to [facility], transferred from [facility] ED, after self-presenting for suicidal and homicidal ideation, in context of substance intoxication and medication non-adherence.     #Bipolar 1 disorder  - Continue with Risperidone 2 mg orally nightly for psychosis and disorganization  - Continue with Divalproex  mg orally twice daily for mood stabilization  - Continue Valium 10 mg orally twice daily, and continue taper as tolerated    #Opioid use disorder   - Continue Methadone 130 mg orally daily for maintenance opioid use disorder     #DM   - Hba1c ordered  - Continue with Lantus 16 units nightly  - Continue with aspart 12 units TID with meals  - Continue with Atorvastatin 10 mg orally daily   - Continue with sliding scale and hypoglycemia protocol   - Place endo consult     #HTN/CHF  - Continue with Lisinopril 10 mg orally daily   - Continue with Carvedilol 6.25 mg orally twice daily for CHF

## 2023-10-10 NOTE — BH INPATIENT PSYCHIATRY ASSESSMENT NOTE - RISK ASSESSMENT
Patient with chronically elevated violence and suicide risk due to hx of mental illness, TBI, poor impulse control, substance use, and medication non-compliance. Mitigation of risk by medication management, group milieu therapy, and current inpatient hospitalization. He is a low elopement risk at this time as he is highly motivated for treatment, engaging, and wanting to get rehab. Currently in good behavioral control with peers/staff, no behavioral disturbances, adherent to medications and responding well.

## 2023-10-10 NOTE — BH INPATIENT PSYCHIATRY ASSESSMENT NOTE - NSCOMMENTSUICRISKFACT_PSY_ALL_CORE
Patient is at elevated chronic risk given likely TBI, poor impulse control, substance abuse, and non adherence with treatment.

## 2023-10-10 NOTE — BH INPATIENT PSYCHIATRY ASSESSMENT NOTE - NSBHPHYSICALEXAM_PSY_ALL_CORE
General: in no acute distress, in hospital attire, appears stated age, poor oral dentition  Respiratory: lungs clear to auscultation b/l, no wheezing, rhonchi, or adventitious breath sounds, no use of any accessory muscles, in no respiratory distress  Cardiovascular: S1 and S2 heard, no rubs, murmurs, or gallops, no S3 and no S4 heard   Gastrointestinal: abdomen is soft, non-tender, non-distended, no masses or organomegaly, bowel sounds heard   Extremities: Moving all extremities equally, no pitting edema noted   Neuro: alert+oriented x4, gait intact  Skin: No jaundice, cyanosis, clubbing, edema, rashes, or other lesions.
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
## 2023-10-10 NOTE — BH INPATIENT PSYCHIATRY ASSESSMENT NOTE - NSTXPSYCHOGOALOTHER_PSY_ALL_CORE
Less preoccupied with chronic delusions, no longer endorsing need to protect self from persecutory delusions including eliciting homicidal ideation

## 2023-10-10 NOTE — BH INPATIENT PSYCHIATRY ASSESSMENT NOTE - ADDITIONAL DETAILS ALL
see HPI - distant history of suicide attempt, currently denies any suicidal ideation, intent, or plan.

## 2023-10-10 NOTE — BH INPATIENT PSYCHIATRY ASSESSMENT NOTE - NSBHSUBSTUSED_PSY_A_CORE
Alcohol/Amphetamines.../Benzodiazepines/Cannabis/Cocaine/Crack/Heroin/Opiates/Prescription medications

## 2023-10-10 NOTE — BH INPATIENT PSYCHIATRY ASSESSMENT NOTE - NSBHSAALC_PSY_A_CORE FT
As per HPI, states he is currently not drinking alcohol, and reports that the Valium has reduced his cravings

## 2023-10-10 NOTE — BH SOCIAL WORK INITIAL PSYCHOSOCIAL EVALUATION - OTHER PAST PSYCHIATRIC HISTORY (INCLUDE DETAILS REGARDING ONSET, COURSE OF ILLNESS, INPATIENT/OUTPATIENT TREATMENT)
PPHx Bipolar I Disorder, Antisocial Personality Disorder, Conduct Disorder, Polysubstance Use Disorder  PMHx GERD, Diabetes Milltus Type II, Pituitary Tumor, TBI  Multiple prior psychiatric hospitalizations (most recently at [facility] in [place] 05/09/23 - 05/22/23 for Brief Psychotic Disorder)  Endorses hx SA (x1, remote via injecting bleach)  Endorses current HI, PI (thoughts of killing his ex girlfriend and her daughter; thoughts of using two full needles of insulin to kill others; thinks he is being followed by people)  Denies hx NSSIB  Denies current SI, AVH    Pt is a 58yo White male, domiciled with sister in [place], unemployed on disability, single. Pt initially presented to [facility] ED, BIBS a/b self c/o thoughts of hurting others using 2 full needles of insulin which he later discarded, in the context of psychiatric decompensation due to medication non-adherence and polysubstance use. Pt has a well-documented history of ED visits over the past few months for medical, mental health, and substance use issues. Pt likely to have secondary gain during inpatient hospitalization as patient has poor coping skills, inability to care for self when in the community. Pt has poor social supports including lack of peer engagement and strained family relationship with sister. Pt's medical history also precludes him from being able to engage fully in outpatient behavioral health programs (hx of TBI often triggers aggressive behavior), though pt does benefit from behavioral interventions that help deescalate his aggressive behavior. Pt would benefit from supportive counseling, intensive case management, dual diagnosis treatment, and supportive housing.
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
## 2023-10-10 NOTE — BH INPATIENT PSYCHIATRY ASSESSMENT NOTE - NSBHSAOPI_PSY_A_CORE FT
On maintenance methadone dose 130 mg orally daily at [facility]

## 2023-10-10 NOTE — BH INPATIENT PSYCHIATRY ASSESSMENT NOTE - PAST PSYCHOTROPIC MEDICATION
has also been on fluoxetine, sertraline, Wellbutrin, Lexapro, Gabapentin, Zyprexa, Depakote, and also states history of Adderall

## 2023-10-10 NOTE — BH INPATIENT PSYCHIATRY ASSESSMENT NOTE - NSTXSUBMISINTERMD_PSY_ALL_CORE
Continue with methadone 130 mg orally daily and Valium 10 mg orally twice daily, with goal of continued valium taper as tolerated
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
## 2023-10-10 NOTE — BH SOCIAL WORK INITIAL PSYCHOSOCIAL EVALUATION - NSHIGHRISKBEHFT_PSY_ALL_CORE
Multiple ED visits in the past few months for medical, MH and substance use disorder, history of multiple incarcerations (last nine years ago, patient served 10 year sentence for manslaughter and was released from long term in 2014), history of violence and aggression both on the psychiatric unit and in the community,  history of polysubstance abuse. Patient makes conditional suicidal statements such as "If I'm discharged without housing I'm going to get 2 bundles of fentanyl and just go to sleep."

## 2023-10-10 NOTE — BH INPATIENT PSYCHIATRY ASSESSMENT NOTE - NSBHMETABOLIC_PSY_ALL_CORE_FT
BMI: BMI (kg/m2): 29.5 (10-08-23 @ 10:28)  HbA1c: A1C with Estimated Average Glucose Result: 8.3 % (08-11-23 @ 14:49) - New A1c ordered for 10/11/23   Glucose: POCT Blood Glucose.: 107 mg/dL (10-10-23 @ 12:01)  BP: 125/80 (10-10-23 @ 06:18) (99/58 - 138/94)  Lipid Panel: Date/Time: 10-10-23 @ 05:30  Cholesterol, Serum: 118  Direct LDL: 65  HDL Cholesterol, Serum: 36  Triglycerides, Serum: 87   BMI: BMI (kg/m2): 31.6 (10-11-23 @ 11:12)  HbA1c: A1C with Estimated Average Glucose Result: 11.2 % (10-11-23 @ 11:04)    Glucose: POCT Blood Glucose.: 195 mg/dL (11-06-23 @ 07:47)    BP: 176/97 (11-06-23 @ 09:29) (129/77 - 197/80)  Lipid Panel: Date/Time: 10-10-23 @ 05:30  Cholesterol, Serum: 118  Direct LDL: --  HDL Cholesterol, Serum: 36  Total Cholesterol/HDL Ration Measurement: --  Triglycerides, Serum: 87

## 2023-10-10 NOTE — BH INPATIENT PSYCHIATRY ASSESSMENT NOTE - VIOLENCE PROTECTIVE FACTORS:
Yes - the patient is able to be screened Engagement in treatment/Insight into violence risk and need for management/treatment

## 2023-10-10 NOTE — BH INPATIENT PSYCHIATRY ASSESSMENT NOTE - NSCOMMENTSUICPROTRISKFACT_PSY_ALL_CORE
future oriented, appears to be highly motivated towards sobriety and wanting a rehab program, supportive sister
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
## 2023-10-10 NOTE — BH INPATIENT PSYCHIATRY ASSESSMENT NOTE - VIOLENCE RISK FACTORS:
Antisocial behavior/cognition (past or present)/Violent ideation/threat/speech/Substance abuse/Affective dysregulation/Impulsivity/Noncompliance with treatment/Community stressors that increase the risk of destabilization/Irritability

## 2023-10-11 LAB
A1C WITH ESTIMATED AVERAGE GLUCOSE RESULT: 11.2 % — HIGH (ref 4–5.6)
ESTIMATED AVERAGE GLUCOSE: 275 MG/DL — HIGH (ref 68–114)
GLUCOSE BLDC GLUCOMTR-MCNC: 181 MG/DL — HIGH (ref 70–99)
GLUCOSE BLDC GLUCOMTR-MCNC: 256 MG/DL — HIGH (ref 70–99)
GLUCOSE BLDC GLUCOMTR-MCNC: 309 MG/DL — HIGH (ref 70–99)
GLUCOSE BLDC GLUCOMTR-MCNC: 391 MG/DL — HIGH (ref 70–99)

## 2023-10-11 PROCEDURE — 99233 SBSQ HOSP IP/OBS HIGH 50: CPT

## 2023-10-11 RX ADMIN — Medication 12 UNIT(S): at 11:54

## 2023-10-11 RX ADMIN — LISINOPRIL 10 MILLIGRAM(S): 2.5 TABLET ORAL at 07:34

## 2023-10-11 RX ADMIN — Medication 12 UNIT(S): at 07:42

## 2023-10-11 RX ADMIN — Medication 10: at 22:03

## 2023-10-11 RX ADMIN — Medication 12 UNIT(S): at 17:05

## 2023-10-11 RX ADMIN — Medication 3 MILLIGRAM(S): at 21:58

## 2023-10-11 RX ADMIN — DIVALPROEX SODIUM 500 MILLIGRAM(S): 500 TABLET, DELAYED RELEASE ORAL at 07:34

## 2023-10-11 RX ADMIN — Medication 1 PATCH: at 18:02

## 2023-10-11 RX ADMIN — Medication 1 PATCH: at 12:30

## 2023-10-11 RX ADMIN — Medication 6: at 17:05

## 2023-10-11 RX ADMIN — ATORVASTATIN CALCIUM 10 MILLIGRAM(S): 80 TABLET, FILM COATED ORAL at 21:57

## 2023-10-11 RX ADMIN — Medication 1 PATCH: at 12:00

## 2023-10-11 RX ADMIN — Medication 10 MILLIGRAM(S): at 07:35

## 2023-10-11 RX ADMIN — METHADONE HYDROCHLORIDE 130 MILLIGRAM(S): 40 TABLET ORAL at 11:02

## 2023-10-11 RX ADMIN — Medication 8: at 11:54

## 2023-10-11 RX ADMIN — CARVEDILOL PHOSPHATE 6.25 MILLIGRAM(S): 80 CAPSULE, EXTENDED RELEASE ORAL at 17:08

## 2023-10-11 RX ADMIN — DIVALPROEX SODIUM 500 MILLIGRAM(S): 500 TABLET, DELAYED RELEASE ORAL at 17:08

## 2023-10-11 RX ADMIN — Medication 2: at 07:43

## 2023-10-11 RX ADMIN — RISPERIDONE 2 MILLIGRAM(S): 4 TABLET ORAL at 21:57

## 2023-10-11 RX ADMIN — Medication 1 PATCH: at 06:51

## 2023-10-11 RX ADMIN — Medication 10 MILLIGRAM(S): at 17:08

## 2023-10-11 RX ADMIN — INSULIN GLARGINE 16 UNIT(S): 100 INJECTION, SOLUTION SUBCUTANEOUS at 22:02

## 2023-10-11 NOTE — BH INPATIENT PSYCHIATRY PROGRESS NOTE - NSBHFUPINTERVALHXFT_PSY_A_CORE
Patient seen and evaluated. No acute events over night. No new complaints. Reports he feels "ok" slept well through the night, eating meals, and overall states he is doing better. Remains highly motivated towards sobriety, and stated that "this is it, it's all or nothing for me this time, I am getting older, I already served 9 years in [facility], I can't mess this up." He was calm and cooperative throughout, stated that at times he feel suicidal, but clarified that it is in the setting of intense guilt only when he uses, and is intoxicated. Stated he felt, "depressed" while on unit, but was feeling better on current medications, with no reported adverse effects noted. Tolerated valium taper well, will continue as tolerated. Denied any current suicidal or homicidal ideation, intent, or plan. He stated that once he is feeling better he will start attending groups, but is still catching up on sleep. He was also clean shaved, and told writer he was able to shave his head and face, keeping up with ADLs, and no behavioral disturbances.

## 2023-10-11 NOTE — BH INPATIENT PSYCHIATRY PROGRESS NOTE - CURRENT MEDICATION
MEDICATIONS  (STANDING):  atorvastatin 10 milliGRAM(s) Oral at bedtime  carvedilol 6.25 milliGRAM(s) Oral every 12 hours  dextrose 5%. 1000 milliLiter(s) (100 mL/Hr) IV Continuous <Continuous>  dextrose 5%. 1000 milliLiter(s) (50 mL/Hr) IV Continuous <Continuous>  dextrose 50% Injectable 25 Gram(s) IV Push once  dextrose 50% Injectable 12.5 Gram(s) IV Push once  dextrose 50% Injectable 25 Gram(s) IV Push once  diazepam    Tablet 10 milliGRAM(s) Oral two times a day  divalproex  milliGRAM(s) Oral two times a day  glucagon  Injectable 1 milliGRAM(s) IntraMuscular once  insulin glargine Injectable (LANTUS) 16 Unit(s) SubCutaneous at bedtime  insulin lispro (ADMELOG) corrective regimen sliding scale   SubCutaneous Before meals and at bedtime  insulin lispro Injectable (ADMELOG) 12 Unit(s) SubCutaneous three times a day before meals  lisinopril 10 milliGRAM(s) Oral daily  methadone  Concentrate 130 milliGRAM(s) Oral every 24 hours  nicotine - 21 mG/24Hr(s) Patch 1 Patch Transdermal daily  risperiDONE   Tablet 2 milliGRAM(s) Oral at bedtime    MEDICATIONS  (PRN):  acetaminophen     Tablet .. 650 milliGRAM(s) Oral every 6 hours PRN Temp greater or equal to 38C (100.4F), Mild Pain (1 - 3)  dextrose Oral Gel 15 Gram(s) Oral once PRN Blood Glucose LESS THAN 70 milliGRAM(s)/deciliter  melatonin 3 milliGRAM(s) Oral at bedtime PRN Insomnia  OLANZapine 5 milliGRAM(s) Oral every 6 hours PRN agitation   MEDICATIONS  (STANDING):    MEDICATIONS  (PRN):
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
## 2023-10-11 NOTE — BH INPATIENT PSYCHIATRY PROGRESS NOTE - PRN MEDS
MEDICATIONS  (PRN):  acetaminophen     Tablet .. 650 milliGRAM(s) Oral every 6 hours PRN Temp greater or equal to 38C (100.4F), Mild Pain (1 - 3)  dextrose Oral Gel 15 Gram(s) Oral once PRN Blood Glucose LESS THAN 70 milliGRAM(s)/deciliter  melatonin 3 milliGRAM(s) Oral at bedtime PRN Insomnia  OLANZapine 5 milliGRAM(s) Oral every 6 hours PRN agitation   MEDICATIONS  (PRN):

## 2023-10-11 NOTE — BH INPATIENT PSYCHIATRY PROGRESS NOTE - NSBHCHARTREVIEWVS_PSY_A_CORE FT
Vital Signs Last 24 Hrs  T(C): 36.4 (10-11-23 @ 08:29), Max: 37.3 (10-10-23 @ 20:49)  T(F): 97.5 (10-11-23 @ 08:29), Max: 99.1 (10-10-23 @ 20:49)  HR: 71 (10-11-23 @ 08:29) (58 - 71)  BP: 142/80 (10-11-23 @ 08:29) (115/74 - 153/75)  RR: 17 (10-11-23 @ 08:29) (16 - 17)  SpO2: 97% (10-11-23 @ 08:29) (95% - 97%)     Vital Signs Last 24 Hrs  T(C): --  T(F): --  HR: --  BP: --  BP(mean): --  RR: --  SpO2: --

## 2023-10-11 NOTE — BH INPATIENT PSYCHIATRY PROGRESS NOTE - NSBHATTESTBILLING_PSY_A_CORE
99223-Initial OBS or IP - high complexity OR  mins 38867-Dhtcxitnzq OBS or IP - high complexity OR 50-79 mins

## 2023-10-11 NOTE — BH INPATIENT PSYCHIATRY PROGRESS NOTE - NSBHATTESTCOMMENTATTENDFT_PSY_A_CORE
Pt's presentation is similar to when he is last on 8U. Hypersexual, poor impulse control, and intrusive. Agreed to trial of zyprexa if valium in creased to 10 mg q8h which I thought was the making of a helpful med regimen. Pt is already highly nonadherent to diabetic diet.  Undergoing valium taper and more motivated to attend inpatient rehab than he has been in the past.

## 2023-10-11 NOTE — BH INPATIENT PSYCHIATRY PROGRESS NOTE - NSBHMETABOLIC_PSY_ALL_CORE_FT
BMI: BMI (kg/m2): 31.6 (10-11-23 @ 11:12)  HbA1c: A1C with Estimated Average Glucose Result: 11.2 % (10-11-23 @ 11:04)    Glucose: POCT Blood Glucose.: 309 mg/dL (10-11-23 @ 11:52)    BP: 142/80 (10-11-23 @ 08:29) (99/58 - 153/75)  Lipid Panel: Date/Time: 10-10-23 @ 05:30  Cholesterol, Serum: 118  Direct LDL: 65  HDL Cholesterol, Serum: 36  Triglycerides, Serum: 87   BMI: BMI (kg/m2): 31.6 (10-11-23 @ 11:12)  HbA1c: A1C with Estimated Average Glucose Result: 11.2 % (10-11-23 @ 11:04)    Glucose: POCT Blood Glucose.: 195 mg/dL (11-06-23 @ 07:47)    BP: 176/97 (11-06-23 @ 09:29) (129/77 - 197/80)  Lipid Panel: Date/Time: 10-10-23 @ 05:30  Cholesterol, Serum: 118  Direct LDL: --  HDL Cholesterol, Serum: 36  Total Cholesterol/HDL Ration Measurement: --  Triglycerides, Serum: 87

## 2023-10-12 LAB
GLUCOSE BLDC GLUCOMTR-MCNC: 183 MG/DL — HIGH (ref 70–99)
GLUCOSE BLDC GLUCOMTR-MCNC: 196 MG/DL — HIGH (ref 70–99)
GLUCOSE BLDC GLUCOMTR-MCNC: 242 MG/DL — HIGH (ref 70–99)
GLUCOSE BLDC GLUCOMTR-MCNC: 287 MG/DL — HIGH (ref 70–99)

## 2023-10-12 PROCEDURE — 99232 SBSQ HOSP IP/OBS MODERATE 35: CPT

## 2023-10-12 RX ADMIN — ATORVASTATIN CALCIUM 10 MILLIGRAM(S): 80 TABLET, FILM COATED ORAL at 21:09

## 2023-10-12 RX ADMIN — METHADONE HYDROCHLORIDE 130 MILLIGRAM(S): 40 TABLET ORAL at 11:27

## 2023-10-12 RX ADMIN — Medication 1 PATCH: at 10:20

## 2023-10-12 RX ADMIN — RISPERIDONE 2 MILLIGRAM(S): 4 TABLET ORAL at 21:09

## 2023-10-12 RX ADMIN — DIVALPROEX SODIUM 500 MILLIGRAM(S): 500 TABLET, DELAYED RELEASE ORAL at 17:13

## 2023-10-12 RX ADMIN — INSULIN GLARGINE 16 UNIT(S): 100 INJECTION, SOLUTION SUBCUTANEOUS at 21:10

## 2023-10-12 RX ADMIN — Medication 10 MILLIGRAM(S): at 17:12

## 2023-10-12 RX ADMIN — Medication 4: at 08:00

## 2023-10-12 RX ADMIN — Medication 12 UNIT(S): at 07:59

## 2023-10-12 RX ADMIN — DIVALPROEX SODIUM 500 MILLIGRAM(S): 500 TABLET, DELAYED RELEASE ORAL at 07:10

## 2023-10-12 RX ADMIN — CARVEDILOL PHOSPHATE 6.25 MILLIGRAM(S): 80 CAPSULE, EXTENDED RELEASE ORAL at 17:12

## 2023-10-12 RX ADMIN — Medication 650 MILLIGRAM(S): at 14:05

## 2023-10-12 RX ADMIN — Medication 3 MILLIGRAM(S): at 21:10

## 2023-10-12 RX ADMIN — Medication 12 UNIT(S): at 16:41

## 2023-10-12 RX ADMIN — Medication 6: at 16:40

## 2023-10-12 RX ADMIN — Medication 10 MILLIGRAM(S): at 07:19

## 2023-10-12 RX ADMIN — Medication 2: at 21:11

## 2023-10-12 RX ADMIN — LISINOPRIL 10 MILLIGRAM(S): 2.5 TABLET ORAL at 15:17

## 2023-10-12 RX ADMIN — OLANZAPINE 5 MILLIGRAM(S): 15 TABLET, FILM COATED ORAL at 18:42

## 2023-10-12 NOTE — BH INPATIENT PSYCHIATRY PROGRESS NOTE - PRN MEDS
MEDICATIONS  (PRN):  acetaminophen     Tablet .. 650 milliGRAM(s) Oral every 6 hours PRN Temp greater or equal to 38C (100.4F), Mild Pain (1 - 3)  dextrose Oral Gel 15 Gram(s) Oral once PRN Blood Glucose LESS THAN 70 milliGRAM(s)/deciliter  melatonin 3 milliGRAM(s) Oral at bedtime PRN Insomnia  OLANZapine 5 milliGRAM(s) Oral every 6 hours PRN agitation

## 2023-10-12 NOTE — BH INPATIENT PSYCHIATRY PROGRESS NOTE - NSBHMETABOLIC_PSY_ALL_CORE_FT
BMI: BMI (kg/m2): 31.6 (10-11-23 @ 11:12)  HbA1c: A1C with Estimated Average Glucose Result: 11.2 % (10-11-23 @ 11:04)    Glucose: POCT Blood Glucose.: 242 mg/dL (10-12-23 @ 07:38)    BP: 133/83 (10-12-23 @ 07:15) (103/62 - 153/75)  Lipid Panel: Date/Time: 10-10-23 @ 05:30  Cholesterol, Serum: 118  Direct LDL: --  HDL Cholesterol, Serum: 36  Total Cholesterol/HDL Ration Measurement: --  Triglycerides, Serum: 87

## 2023-10-12 NOTE — BH INPATIENT PSYCHIATRY PROGRESS NOTE - NSBHFUPINTERVALHXFT_PSY_A_CORE
Patient seen in his room today, sleeping on approach, but cooperative, polite and pleasant with writer, stating 'I'm depressed', he also states that he feels that his evening meds are too strong as they make him feel tired during the day. He also states that his neurontin 800mg tid has yet to be restarted by his primary team. He reports having thoughts to harm self, but has no intent or plan. Denies hi/vh and PI, reporting that things are actually happening to him. vitals and labs reviewed
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
## 2023-10-12 NOTE — BH INPATIENT PSYCHIATRY PROGRESS NOTE - CURRENT MEDICATION
MEDICATIONS  (STANDING):  atorvastatin 10 milliGRAM(s) Oral at bedtime  carvedilol 6.25 milliGRAM(s) Oral every 12 hours  dextrose 5%. 1000 milliLiter(s) (50 mL/Hr) IV Continuous <Continuous>  dextrose 5%. 1000 milliLiter(s) (100 mL/Hr) IV Continuous <Continuous>  dextrose 50% Injectable 25 Gram(s) IV Push once  dextrose 50% Injectable 12.5 Gram(s) IV Push once  dextrose 50% Injectable 25 Gram(s) IV Push once  diazepam    Tablet 10 milliGRAM(s) Oral two times a day  divalproex  milliGRAM(s) Oral two times a day  glucagon  Injectable 1 milliGRAM(s) IntraMuscular once  insulin glargine Injectable (LANTUS) 16 Unit(s) SubCutaneous at bedtime  insulin lispro (ADMELOG) corrective regimen sliding scale   SubCutaneous Before meals and at bedtime  insulin lispro Injectable (ADMELOG) 12 Unit(s) SubCutaneous three times a day before meals  lisinopril 10 milliGRAM(s) Oral daily  methadone  Concentrate 130 milliGRAM(s) Oral every 24 hours  nicotine - 21 mG/24Hr(s) Patch 1 Patch Transdermal daily  risperiDONE   Tablet 2 milliGRAM(s) Oral at bedtime    MEDICATIONS  (PRN):  acetaminophen     Tablet .. 650 milliGRAM(s) Oral every 6 hours PRN Temp greater or equal to 38C (100.4F), Mild Pain (1 - 3)  dextrose Oral Gel 15 Gram(s) Oral once PRN Blood Glucose LESS THAN 70 milliGRAM(s)/deciliter  melatonin 3 milliGRAM(s) Oral at bedtime PRN Insomnia  OLANZapine 5 milliGRAM(s) Oral every 6 hours PRN agitation

## 2023-10-12 NOTE — BH INPATIENT PSYCHIATRY PROGRESS NOTE - NSBHCHARTREVIEWVS_PSY_A_CORE FT
Vital Signs Last 24 Hrs  T(C): 36.7 (10-12-23 @ 06:52), Max: 37.1 (10-11-23 @ 16:20)  T(F): 98 (10-12-23 @ 06:52), Max: 98.7 (10-11-23 @ 16:20)  HR: 60 (10-12-23 @ 07:15) (54 - 61)  BP: 133/83 (10-12-23 @ 07:15) (113/72 - 144/77)  BP(mean): --  RR: 18 (10-12-23 @ 06:52) (17 - 18)  SpO2: 94% (10-12-23 @ 06:52) (94% - 97%)

## 2023-10-13 LAB
GLUCOSE BLDC GLUCOMTR-MCNC: 156 MG/DL — HIGH (ref 70–99)
GLUCOSE BLDC GLUCOMTR-MCNC: 237 MG/DL — HIGH (ref 70–99)
GLUCOSE BLDC GLUCOMTR-MCNC: 307 MG/DL — HIGH (ref 70–99)
GLUCOSE BLDC GLUCOMTR-MCNC: 357 MG/DL — HIGH (ref 70–99)

## 2023-10-13 PROCEDURE — 99232 SBSQ HOSP IP/OBS MODERATE 35: CPT

## 2023-10-13 RX ORDER — DIAZEPAM 5 MG
7.5 TABLET ORAL AT BEDTIME
Refills: 0 | Status: DISCONTINUED | OUTPATIENT
Start: 2023-10-13 | End: 2023-10-17

## 2023-10-13 RX ORDER — METHADONE HYDROCHLORIDE 40 MG/1
130 TABLET ORAL DAILY
Refills: 0 | Status: DISCONTINUED | OUTPATIENT
Start: 2023-10-13 | End: 2023-10-20

## 2023-10-13 RX ORDER — DIAZEPAM 5 MG
10 TABLET ORAL DAILY
Refills: 0 | Status: DISCONTINUED | OUTPATIENT
Start: 2023-10-14 | End: 2023-10-16

## 2023-10-13 RX ADMIN — Medication 2: at 11:52

## 2023-10-13 RX ADMIN — CARVEDILOL PHOSPHATE 6.25 MILLIGRAM(S): 80 CAPSULE, EXTENDED RELEASE ORAL at 17:16

## 2023-10-13 RX ADMIN — Medication 7.5 MILLIGRAM(S): at 22:04

## 2023-10-13 RX ADMIN — Medication 12 UNIT(S): at 11:52

## 2023-10-13 RX ADMIN — LISINOPRIL 10 MILLIGRAM(S): 2.5 TABLET ORAL at 07:28

## 2023-10-13 RX ADMIN — Medication 8: at 16:47

## 2023-10-13 RX ADMIN — Medication 12 UNIT(S): at 08:06

## 2023-10-13 RX ADMIN — Medication 1 PATCH: at 07:05

## 2023-10-13 RX ADMIN — Medication 4: at 08:07

## 2023-10-13 RX ADMIN — RISPERIDONE 2 MILLIGRAM(S): 4 TABLET ORAL at 22:04

## 2023-10-13 RX ADMIN — DIVALPROEX SODIUM 500 MILLIGRAM(S): 500 TABLET, DELAYED RELEASE ORAL at 07:28

## 2023-10-13 RX ADMIN — INSULIN GLARGINE 16 UNIT(S): 100 INJECTION, SOLUTION SUBCUTANEOUS at 22:07

## 2023-10-13 RX ADMIN — Medication 12 UNIT(S): at 16:47

## 2023-10-13 RX ADMIN — Medication 10: at 22:08

## 2023-10-13 RX ADMIN — DIVALPROEX SODIUM 500 MILLIGRAM(S): 500 TABLET, DELAYED RELEASE ORAL at 16:46

## 2023-10-13 RX ADMIN — METHADONE HYDROCHLORIDE 130 MILLIGRAM(S): 40 TABLET ORAL at 10:44

## 2023-10-13 RX ADMIN — ATORVASTATIN CALCIUM 10 MILLIGRAM(S): 80 TABLET, FILM COATED ORAL at 22:04

## 2023-10-13 RX ADMIN — Medication 10 MILLIGRAM(S): at 07:28

## 2023-10-13 NOTE — BH INPATIENT PSYCHIATRY PROGRESS NOTE - NSBHFUPINTERVALHXFT_PSY_A_CORE
Patient seen in his room today. No acute events overnight. No new complaints. Calm and cooperative with treatment team. States he feels "very depressed" but slightly improved from when he arrived. Fully adherent to standing medications, no reported adverse effects. He is polite, states he is highly motivated for sobriety, and still interested in rehab. After he was informed he had been denied from a couple of rehab places, he said he was willing to do "whatever it takes" including continuing valium taper. He has been more visible on the unit, noted to attend several groups. Interacts appropriately with staff. He reports having intermittent thoughts to harm self, but has no intent or plan. Denies hi/vh and PI, reporting that things are actually happening to him. vitals and labs reviewed. Chronic delusions still present, but redirectable, and notably less preoccupied with them.

## 2023-10-13 NOTE — BH INPATIENT PSYCHIATRY PROGRESS NOTE - NSBHATTESTAPPBILLTIME_PSY_A_CORE
I attest my time as JOSUE is greater than 50% of the total combined time spent on qualifying patient care activities. I have reviewed and verified the documentation.
I attest my time as JOSUE is greater than 50% of the total combined time spent on qualifying patient care activities. I have reviewed and verified the documentation.

## 2023-10-13 NOTE — BH INPATIENT PSYCHIATRY PROGRESS NOTE - CURRENT MEDICATION
MEDICATIONS  (STANDING):  atorvastatin 10 milliGRAM(s) Oral at bedtime  carvedilol 6.25 milliGRAM(s) Oral every 12 hours  dextrose 5%. 1000 milliLiter(s) (50 mL/Hr) IV Continuous <Continuous>  dextrose 5%. 1000 milliLiter(s) (100 mL/Hr) IV Continuous <Continuous>  dextrose 50% Injectable 25 Gram(s) IV Push once  dextrose 50% Injectable 12.5 Gram(s) IV Push once  dextrose 50% Injectable 25 Gram(s) IV Push once  diazepam    Tablet 7.5 milliGRAM(s) Oral at bedtime  divalproex  milliGRAM(s) Oral two times a day  glucagon  Injectable 1 milliGRAM(s) IntraMuscular once  insulin glargine Injectable (LANTUS) 16 Unit(s) SubCutaneous at bedtime  insulin lispro (ADMELOG) corrective regimen sliding scale   SubCutaneous Before meals and at bedtime  insulin lispro Injectable (ADMELOG) 12 Unit(s) SubCutaneous three times a day before meals  lisinopril 10 milliGRAM(s) Oral daily  methadone    Tablet 130 milliGRAM(s) Oral daily  nicotine - 21 mG/24Hr(s) Patch 1 Patch Transdermal daily  risperiDONE   Tablet 2 milliGRAM(s) Oral at bedtime    MEDICATIONS  (PRN):  acetaminophen     Tablet .. 650 milliGRAM(s) Oral every 6 hours PRN Temp greater or equal to 38C (100.4F), Mild Pain (1 - 3)  dextrose Oral Gel 15 Gram(s) Oral once PRN Blood Glucose LESS THAN 70 milliGRAM(s)/deciliter  melatonin 3 milliGRAM(s) Oral at bedtime PRN Insomnia  OLANZapine 5 milliGRAM(s) Oral every 6 hours PRN agitation   MEDICATIONS  (STANDING):    MEDICATIONS  (PRN):

## 2023-10-13 NOTE — BH INPATIENT PSYCHIATRY PROGRESS NOTE - NSBHMETABOLIC_PSY_ALL_CORE_FT
BMI: BMI (kg/m2): 31.6 (10-11-23 @ 11:12)  HbA1c: A1C with Estimated Average Glucose Result: 11.2 % (10-11-23 @ 11:04)  Glucose: POCT Blood Glucose.: 156 mg/dL (10-13-23 @ 11:37)  BP: 110/70 (10-13-23 @ 08:03) (110/70 - 153/78)  Lipid Panel: Date/Time: 10-10-23 @ 05:30  Cholesterol, Serum: 118  Direct LDL: 65  HDL Cholesterol, Serum: 36  Triglycerides, Serum: 87   BMI: BMI (kg/m2): 31.6 (10-11-23 @ 11:12)  HbA1c: A1C with Estimated Average Glucose Result: 11.2 % (10-11-23 @ 11:04)    Glucose: POCT Blood Glucose.: 195 mg/dL (11-06-23 @ 07:47)    BP: 176/97 (11-06-23 @ 09:29) (129/77 - 197/80)  Lipid Panel: Date/Time: 10-10-23 @ 05:30  Cholesterol, Serum: 118  Direct LDL: --  HDL Cholesterol, Serum: 36  Total Cholesterol/HDL Ration Measurement: --  Triglycerides, Serum: 87

## 2023-10-13 NOTE — BH INPATIENT PSYCHIATRY PROGRESS NOTE - NSBHATTESTCOMMENTATTENDFT_PSY_A_CORE
Valium taper will be very slow as pt has been on it for decades and very worried about coming off it. Plan to uptitrate neurontin as valium is decreased.
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
## 2023-10-13 NOTE — BH INPATIENT PSYCHIATRY PROGRESS NOTE - NSBHCHARTREVIEWVS_PSY_A_CORE FT
Vital Signs Last 24 Hrs  T(C): 36.7 (10-13-23 @ 08:03), Max: 36.7 (10-13-23 @ 05:57)  T(F): 98.1 (10-13-23 @ 08:03), Max: 98.1 (10-13-23 @ 08:03)  HR: 53 (10-13-23 @ 08:03) (51 - 60)  BP: 110/70 (10-13-23 @ 08:03) (110/70 - 153/78)  RR: 18 (10-13-23 @ 08:03) (17 - 18)  SpO2: 96% (10-13-23 @ 08:03) (94% - 98%)     Vital Signs Last 24 Hrs  T(C): --  T(F): --  HR: --  BP: --  BP(mean): --  RR: --  SpO2: --

## 2023-10-14 LAB
GLUCOSE BLDC GLUCOMTR-MCNC: 164 MG/DL — HIGH (ref 70–99)
GLUCOSE BLDC GLUCOMTR-MCNC: 227 MG/DL — HIGH (ref 70–99)
GLUCOSE BLDC GLUCOMTR-MCNC: 258 MG/DL — HIGH (ref 70–99)
GLUCOSE BLDC GLUCOMTR-MCNC: 310 MG/DL — HIGH (ref 70–99)

## 2023-10-14 RX ADMIN — Medication 4: at 17:07

## 2023-10-14 RX ADMIN — Medication 650 MILLIGRAM(S): at 14:29

## 2023-10-14 RX ADMIN — CARVEDILOL PHOSPHATE 6.25 MILLIGRAM(S): 80 CAPSULE, EXTENDED RELEASE ORAL at 07:22

## 2023-10-14 RX ADMIN — Medication 12 UNIT(S): at 07:49

## 2023-10-14 RX ADMIN — LISINOPRIL 10 MILLIGRAM(S): 2.5 TABLET ORAL at 07:22

## 2023-10-14 RX ADMIN — CARVEDILOL PHOSPHATE 6.25 MILLIGRAM(S): 80 CAPSULE, EXTENDED RELEASE ORAL at 17:25

## 2023-10-14 RX ADMIN — Medication 1 PATCH: at 12:00

## 2023-10-14 RX ADMIN — DIVALPROEX SODIUM 500 MILLIGRAM(S): 500 TABLET, DELAYED RELEASE ORAL at 17:25

## 2023-10-14 RX ADMIN — Medication 8: at 23:13

## 2023-10-14 RX ADMIN — Medication 1 PATCH: at 20:16

## 2023-10-14 RX ADMIN — METHADONE HYDROCHLORIDE 130 MILLIGRAM(S): 40 TABLET ORAL at 10:18

## 2023-10-14 RX ADMIN — Medication 6: at 12:02

## 2023-10-14 RX ADMIN — DIVALPROEX SODIUM 500 MILLIGRAM(S): 500 TABLET, DELAYED RELEASE ORAL at 07:23

## 2023-10-14 RX ADMIN — Medication 650 MILLIGRAM(S): at 15:29

## 2023-10-14 RX ADMIN — OLANZAPINE 5 MILLIGRAM(S): 15 TABLET, FILM COATED ORAL at 23:17

## 2023-10-14 RX ADMIN — ATORVASTATIN CALCIUM 10 MILLIGRAM(S): 80 TABLET, FILM COATED ORAL at 21:25

## 2023-10-14 RX ADMIN — Medication 10 MILLIGRAM(S): at 10:17

## 2023-10-14 RX ADMIN — RISPERIDONE 2 MILLIGRAM(S): 4 TABLET ORAL at 23:15

## 2023-10-14 RX ADMIN — Medication 12 UNIT(S): at 17:07

## 2023-10-14 RX ADMIN — INSULIN GLARGINE 16 UNIT(S): 100 INJECTION, SOLUTION SUBCUTANEOUS at 23:14

## 2023-10-14 RX ADMIN — Medication 2: at 07:48

## 2023-10-14 RX ADMIN — Medication 12 UNIT(S): at 12:01

## 2023-10-14 RX ADMIN — Medication 7.5 MILLIGRAM(S): at 21:25

## 2023-10-14 RX ADMIN — Medication 3 MILLIGRAM(S): at 23:17
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
## 2023-10-14 NOTE — BH INPATIENT PSYCHIATRY PROGRESS NOTE - NSBHMETABOLIC_PSY_ALL_CORE_FT
BMI: BMI (kg/m2): 31.6 (10-11-23 @ 11:12)  HbA1c: A1C with Estimated Average Glucose Result: 11.2 % (10-11-23 @ 11:04)    Glucose: POCT Blood Glucose.: 227 mg/dL (10-14-23 @ 16:40)    BP: 153/92 (10-14-23 @ 16:55) (110/70 - 154/75)  Lipid Panel: Date/Time: 10-10-23 @ 05:30  Cholesterol, Serum: 118  Direct LDL: --  HDL Cholesterol, Serum: 36  Total Cholesterol/HDL Ration Measurement: --  Triglycerides, Serum: 87

## 2023-10-14 NOTE — BH INPATIENT PSYCHIATRY PROGRESS NOTE - NSBHCHARTREVIEWVS_PSY_A_CORE FT
Vital Signs Last 24 Hrs  T(C): 36.9 (10-14-23 @ 16:55), Max: 37.2 (10-14-23 @ 08:00)  T(F): 98.4 (10-14-23 @ 16:55), Max: 99 (10-14-23 @ 08:00)  HR: 56 (10-14-23 @ 16:55) (56 - 65)  BP: 153/92 (10-14-23 @ 16:55) (132/73 - 154/75)  BP(mean): --  RR: 18 (10-14-23 @ 16:55) (14 - 18)  SpO2: 93% (10-14-23 @ 16:55) (93% - 98%)
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
## 2023-10-14 NOTE — BH INPATIENT PSYCHIATRY PROGRESS NOTE - NSTXSUBMISGOALOTHER_PSY_ALL_CORE
Pt will attend 12-Step groups weekly

## 2023-10-14 NOTE — BH INPATIENT PSYCHIATRY PROGRESS NOTE - NSTXDIABGOALOTHER_PSY_ALL_CORE
Better glycemic control, endo consult 

## 2023-10-14 NOTE — BH INPATIENT PSYCHIATRY PROGRESS NOTE - CURRENT MEDICATION
MEDICATIONS  (STANDING):  atorvastatin 10 milliGRAM(s) Oral at bedtime  carvedilol 6.25 milliGRAM(s) Oral every 12 hours  dextrose 5%. 1000 milliLiter(s) (100 mL/Hr) IV Continuous <Continuous>  dextrose 5%. 1000 milliLiter(s) (50 mL/Hr) IV Continuous <Continuous>  dextrose 50% Injectable 25 Gram(s) IV Push once  dextrose 50% Injectable 25 Gram(s) IV Push once  dextrose 50% Injectable 12.5 Gram(s) IV Push once  diazepam    Tablet 10 milliGRAM(s) Oral daily  diazepam    Tablet 7.5 milliGRAM(s) Oral at bedtime  divalproex  milliGRAM(s) Oral two times a day  glucagon  Injectable 1 milliGRAM(s) IntraMuscular once  insulin glargine Injectable (LANTUS) 16 Unit(s) SubCutaneous at bedtime  insulin lispro (ADMELOG) corrective regimen sliding scale   SubCutaneous Before meals and at bedtime  insulin lispro Injectable (ADMELOG) 12 Unit(s) SubCutaneous three times a day before meals  lisinopril 10 milliGRAM(s) Oral daily  methadone    Tablet 130 milliGRAM(s) Oral daily  nicotine - 21 mG/24Hr(s) Patch 1 Patch Transdermal daily  risperiDONE   Tablet 2 milliGRAM(s) Oral at bedtime    MEDICATIONS  (PRN):  acetaminophen     Tablet .. 650 milliGRAM(s) Oral every 6 hours PRN Temp greater or equal to 38C (100.4F), Mild Pain (1 - 3)  dextrose Oral Gel 15 Gram(s) Oral once PRN Blood Glucose LESS THAN 70 milliGRAM(s)/deciliter  melatonin 3 milliGRAM(s) Oral at bedtime PRN Insomnia  OLANZapine 5 milliGRAM(s) Oral every 6 hours PRN agitation

## 2023-10-15 LAB
GLUCOSE BLDC GLUCOMTR-MCNC: 140 MG/DL — HIGH (ref 70–99)
GLUCOSE BLDC GLUCOMTR-MCNC: 233 MG/DL — HIGH (ref 70–99)
GLUCOSE BLDC GLUCOMTR-MCNC: 278 MG/DL — HIGH (ref 70–99)
GLUCOSE BLDC GLUCOMTR-MCNC: 286 MG/DL — HIGH (ref 70–99)

## 2023-10-15 RX ADMIN — LISINOPRIL 10 MILLIGRAM(S): 2.5 TABLET ORAL at 06:29

## 2023-10-15 RX ADMIN — DIVALPROEX SODIUM 500 MILLIGRAM(S): 500 TABLET, DELAYED RELEASE ORAL at 17:18

## 2023-10-15 RX ADMIN — INSULIN GLARGINE 16 UNIT(S): 100 INJECTION, SOLUTION SUBCUTANEOUS at 22:33

## 2023-10-15 RX ADMIN — Medication 12 UNIT(S): at 17:10

## 2023-10-15 RX ADMIN — Medication 12 UNIT(S): at 12:05

## 2023-10-15 RX ADMIN — Medication 4: at 12:05

## 2023-10-15 RX ADMIN — Medication 0: at 07:42

## 2023-10-15 RX ADMIN — Medication 6: at 17:10

## 2023-10-15 RX ADMIN — Medication 1 PATCH: at 06:21

## 2023-10-15 RX ADMIN — CARVEDILOL PHOSPHATE 6.25 MILLIGRAM(S): 80 CAPSULE, EXTENDED RELEASE ORAL at 22:34

## 2023-10-15 RX ADMIN — Medication 6: at 22:33

## 2023-10-15 RX ADMIN — CARVEDILOL PHOSPHATE 6.25 MILLIGRAM(S): 80 CAPSULE, EXTENDED RELEASE ORAL at 10:32

## 2023-10-15 RX ADMIN — ATORVASTATIN CALCIUM 10 MILLIGRAM(S): 80 TABLET, FILM COATED ORAL at 22:34

## 2023-10-15 RX ADMIN — Medication 7.5 MILLIGRAM(S): at 22:33

## 2023-10-15 RX ADMIN — Medication 12 UNIT(S): at 07:41

## 2023-10-15 RX ADMIN — RISPERIDONE 2 MILLIGRAM(S): 4 TABLET ORAL at 22:34

## 2023-10-15 RX ADMIN — METHADONE HYDROCHLORIDE 130 MILLIGRAM(S): 40 TABLET ORAL at 10:31

## 2023-10-15 RX ADMIN — Medication 1 PATCH: at 09:29

## 2023-10-15 RX ADMIN — Medication 1 PATCH: at 10:35

## 2023-10-15 RX ADMIN — Medication 10 MILLIGRAM(S): at 10:33

## 2023-10-15 RX ADMIN — DIVALPROEX SODIUM 500 MILLIGRAM(S): 500 TABLET, DELAYED RELEASE ORAL at 06:29

## 2023-10-16 LAB
GLUCOSE BLDC GLUCOMTR-MCNC: 198 MG/DL — HIGH (ref 70–99)
GLUCOSE BLDC GLUCOMTR-MCNC: 224 MG/DL — HIGH (ref 70–99)
GLUCOSE BLDC GLUCOMTR-MCNC: 252 MG/DL — HIGH (ref 70–99)
GLUCOSE BLDC GLUCOMTR-MCNC: 252 MG/DL — HIGH (ref 70–99)
GLUCOSE BLDC GLUCOMTR-MCNC: 307 MG/DL — HIGH (ref 70–99)
GLUCOSE BLDC GLUCOMTR-MCNC: 307 MG/DL — HIGH (ref 70–99)

## 2023-10-16 PROCEDURE — 99232 SBSQ HOSP IP/OBS MODERATE 35: CPT

## 2023-10-16 RX ORDER — DIAZEPAM 5 MG
5 TABLET ORAL DAILY
Refills: 0 | Status: DISCONTINUED | OUTPATIENT
Start: 2023-10-16 | End: 2023-10-22

## 2023-10-16 RX ADMIN — RISPERIDONE 2 MILLIGRAM(S): 4 TABLET ORAL at 22:52

## 2023-10-16 RX ADMIN — Medication 8: at 21:56

## 2023-10-16 RX ADMIN — Medication 4: at 12:11

## 2023-10-16 RX ADMIN — CARVEDILOL PHOSPHATE 6.25 MILLIGRAM(S): 80 CAPSULE, EXTENDED RELEASE ORAL at 09:58

## 2023-10-16 RX ADMIN — Medication 10 MILLIGRAM(S): at 09:58

## 2023-10-16 RX ADMIN — LISINOPRIL 10 MILLIGRAM(S): 2.5 TABLET ORAL at 09:58

## 2023-10-16 RX ADMIN — Medication 1 PATCH: at 10:10

## 2023-10-16 RX ADMIN — METHADONE HYDROCHLORIDE 130 MILLIGRAM(S): 40 TABLET ORAL at 09:59

## 2023-10-16 RX ADMIN — OLANZAPINE 5 MILLIGRAM(S): 15 TABLET, FILM COATED ORAL at 19:12

## 2023-10-16 RX ADMIN — Medication 2: at 07:59

## 2023-10-16 RX ADMIN — ATORVASTATIN CALCIUM 10 MILLIGRAM(S): 80 TABLET, FILM COATED ORAL at 21:50

## 2023-10-16 RX ADMIN — DIVALPROEX SODIUM 500 MILLIGRAM(S): 500 TABLET, DELAYED RELEASE ORAL at 07:13

## 2023-10-16 RX ADMIN — CARVEDILOL PHOSPHATE 6.25 MILLIGRAM(S): 80 CAPSULE, EXTENDED RELEASE ORAL at 21:50

## 2023-10-16 RX ADMIN — Medication 12 UNIT(S): at 07:59

## 2023-10-16 RX ADMIN — INSULIN GLARGINE 16 UNIT(S): 100 INJECTION, SOLUTION SUBCUTANEOUS at 21:55

## 2023-10-16 RX ADMIN — Medication 1 PATCH: at 12:44

## 2023-10-16 RX ADMIN — Medication 12 UNIT(S): at 16:42

## 2023-10-16 RX ADMIN — Medication 6: at 16:43

## 2023-10-16 RX ADMIN — Medication 1 PATCH: at 07:59

## 2023-10-16 RX ADMIN — Medication 7.5 MILLIGRAM(S): at 21:50

## 2023-10-16 RX ADMIN — Medication 650 MILLIGRAM(S): at 16:31

## 2023-10-16 RX ADMIN — DIVALPROEX SODIUM 500 MILLIGRAM(S): 500 TABLET, DELAYED RELEASE ORAL at 21:50

## 2023-10-16 RX ADMIN — Medication 3 MILLIGRAM(S): at 22:52

## 2023-10-16 RX ADMIN — Medication 12 UNIT(S): at 12:11

## 2023-10-16 NOTE — BH INPATIENT PSYCHIATRY PROGRESS NOTE - CURRENT MEDICATION
MEDICATIONS  (STANDING):  atorvastatin 10 milliGRAM(s) Oral at bedtime  carvedilol 6.25 milliGRAM(s) Oral every 12 hours  dextrose 5%. 1000 milliLiter(s) (100 mL/Hr) IV Continuous <Continuous>  dextrose 5%. 1000 milliLiter(s) (50 mL/Hr) IV Continuous <Continuous>  dextrose 50% Injectable 25 Gram(s) IV Push once  dextrose 50% Injectable 25 Gram(s) IV Push once  dextrose 50% Injectable 12.5 Gram(s) IV Push once  diazepam    Tablet 5 milliGRAM(s) Oral daily  diazepam    Tablet 7.5 milliGRAM(s) Oral at bedtime  divalproex  milliGRAM(s) Oral two times a day  glucagon  Injectable 1 milliGRAM(s) IntraMuscular once  insulin glargine Injectable (LANTUS) 16 Unit(s) SubCutaneous at bedtime  insulin lispro (ADMELOG) corrective regimen sliding scale   SubCutaneous Before meals and at bedtime  insulin lispro Injectable (ADMELOG) 12 Unit(s) SubCutaneous three times a day before meals  lisinopril 10 milliGRAM(s) Oral daily  methadone    Tablet 130 milliGRAM(s) Oral daily  nicotine - 21 mG/24Hr(s) Patch 1 Patch Transdermal daily  risperiDONE   Tablet 2 milliGRAM(s) Oral at bedtime    MEDICATIONS  (PRN):  acetaminophen     Tablet .. 650 milliGRAM(s) Oral every 6 hours PRN Temp greater or equal to 38C (100.4F), Mild Pain (1 - 3)  dextrose Oral Gel 15 Gram(s) Oral once PRN Blood Glucose LESS THAN 70 milliGRAM(s)/deciliter  melatonin 3 milliGRAM(s) Oral at bedtime PRN Insomnia  OLANZapine 5 milliGRAM(s) Oral every 6 hours PRN agitation   MEDICATIONS  (STANDING):    MEDICATIONS  (PRN):

## 2023-10-16 NOTE — BH INPATIENT PSYCHIATRY PROGRESS NOTE - NSBHATTESTBILLING_PSY_A_CORE
21171-Dfavjzqlzo OBS or IP - low complexity OR 25-34 mins 36686-Pnkhlutdzo OBS or IP - moderate complexity OR 35-49 mins

## 2023-10-16 NOTE — BH INPATIENT PSYCHIATRY PROGRESS NOTE - NSBHFUPINTERVALHXFT_PSY_A_CORE
Patient seen and evalauted. Chart reviewed. No acute events overnight, no new complaints. He remains calm, cooeprative, and engaging with treatment team. Highly motivated for rehab, and reiterating his continued long-term goal of maintaining sobriety. Continues to endorse feeling depressed, at times passively suicidal, no intent, or plan, and states he feel "very down" when he thinks about all of his mistakes and "how badly drugs messed my life up." He also is adamant that he is willing to do "anything it takes." He is in agreement with continued taper of Valium. He denies any cravings while on the unit, states he feels safe, attending groups, interacting appropriately with all staff and fellow patients. He adamantly denies any homicidal ideation, intent, or plan, endorses feeling significant regret about endorsing that, since he states "I was really messed up on drugs at the time." He has maintained full behavioral control, no issues. Overall, significant improvement in behavior and mood. Patient denies any auditory/visual hallucinations, he made no mention of any delusions today and was linear throughout.     Continue valium taper

## 2023-10-16 NOTE — BH INPATIENT PSYCHIATRY PROGRESS NOTE - NSBHASSESSSUMMFT_PSY_ALL_CORE
Patient is a 57 year old man, undomiciled, single, on disability, with PPH of bipolar 1 disorder, polysubstance use disorder (crystal meth, cocaine, opioid, cannabis, K2, alcohol), opioid use disorder on maintenance therapy, antisocial personality disorder, history of incarceration (8 years in snf for manslaughter and was released in 2014), with PMH of TBI, pituitary tumor, HTN, T2DM, and GERD, who was admitted to [facility], transferred from [facility] ED, after self-presenting for suicidal and homicidal ideation, in context of substance intoxication and medication non-adherence. On evaluation, patient reports continued depressed mood and passive SI with no intent/plan. He appears to have improved judgment as he has no intention of harming anyone. His delusions and paranoia remain, but are less severe at this time. It is unclear what medication increase he is referring to; continue to monitor for oversedation.     #Bipolar 1 disorder  - Continue with Risperidone 2 mg orally nightly for psychosis and disorganization  - Continue with Divalproex  mg orally twice daily for mood stabilization  - Continue Valium taper with 5 mg orally daily and 7.5 mg at bedtime, and continue taper as tolerated; patient remains highly motivated for continued taper   - Melatonin/Zyprexa PRN for insomnia/agitation respectively   - 2PC    #Opioid use disorder   - Continue Methadone 130 mg orally daily for maintenance opioid use disorder     #DM   - Hba1c 11.2   - Continue with Lantus 16 units nightly  - Continue with aspart 12 units TID with meals  - Continue with Atorvastatin 10 mg orally daily   - Continue with sliding scale and hypoglycemia protocol   - Endo consulted, recs appreciated, continue as per above     #HTN/CHF  - Continue with Lisinopril 10 mg orally daily   - Continue with Carvedilol 6.25 mg orally twice daily for CHF

## 2023-10-16 NOTE — BH INPATIENT PSYCHIATRY PROGRESS NOTE - NSBHATTESTCOMMENTATTENDFT_PSY_A_CORE
Pt awaiting inpatient rehab. Attending groups and interacting with peers. Undergoing valium taper and concomitant neurontin uptitration.

## 2023-10-16 NOTE — BH INPATIENT PSYCHIATRY PROGRESS NOTE - NSBHCHARTREVIEWVS_PSY_A_CORE FT
Vital Signs Last 24 Hrs  T(C): 36.8 (10-16-23 @ 08:23), Max: 36.8 (10-16-23 @ 08:23)  T(F): 98.3 (10-16-23 @ 08:23), Max: 98.3 (10-16-23 @ 08:23)  HR: 70 (10-16-23 @ 08:23) (57 - 70)  BP: 137/80 (10-16-23 @ 08:23) (90/54 - 137/80)  RR: 18 (10-16-23 @ 08:23) (16 - 19)  SpO2: 97% (10-16-23 @ 08:23) (96% - 98%)     Vital Signs Last 24 Hrs  T(C): --  T(F): --  HR: --  BP: --  BP(mean): --  RR: --  SpO2: --

## 2023-10-16 NOTE — BH INPATIENT PSYCHIATRY PROGRESS NOTE - NSBHMETABOLIC_PSY_ALL_CORE_FT
BMI: BMI (kg/m2): 31.6 (10-11-23 @ 11:12)  HbA1c: A1C with Estimated Average Glucose Result: 11.2 % (10-11-23 @ 11:04)  Glucose: POCT Blood Glucose.: 224 mg/dL (10-16-23 @ 12:09)  BP: 137/80 (10-16-23 @ 08:23) (90/54 - 154/75)  Lipid Panel: Date/Time: 10-10-23 @ 05:30  Cholesterol, Serum: 118  Direct LDL: 65  HDL Cholesterol, Serum: 36  Triglycerides, Serum: 87   BMI: BMI (kg/m2): 31.6 (10-11-23 @ 11:12)  HbA1c: A1C with Estimated Average Glucose Result: 11.2 % (10-11-23 @ 11:04)    Glucose: POCT Blood Glucose.: 195 mg/dL (11-06-23 @ 07:47)    BP: 176/97 (11-06-23 @ 09:29) (129/77 - 197/80)  Lipid Panel: Date/Time: 10-10-23 @ 05:30  Cholesterol, Serum: 118  Direct LDL: --  HDL Cholesterol, Serum: 36  Total Cholesterol/HDL Ration Measurement: --  Triglycerides, Serum: 87

## 2023-10-17 LAB
GLUCOSE BLDC GLUCOMTR-MCNC: 192 MG/DL — HIGH (ref 70–99)
GLUCOSE BLDC GLUCOMTR-MCNC: 192 MG/DL — HIGH (ref 70–99)
GLUCOSE BLDC GLUCOMTR-MCNC: 228 MG/DL — HIGH (ref 70–99)
GLUCOSE BLDC GLUCOMTR-MCNC: 228 MG/DL — HIGH (ref 70–99)
GLUCOSE BLDC GLUCOMTR-MCNC: 232 MG/DL — HIGH (ref 70–99)
GLUCOSE BLDC GLUCOMTR-MCNC: 232 MG/DL — HIGH (ref 70–99)
GLUCOSE BLDC GLUCOMTR-MCNC: 297 MG/DL — HIGH (ref 70–99)
GLUCOSE BLDC GLUCOMTR-MCNC: 297 MG/DL — HIGH (ref 70–99)

## 2023-10-17 RX ORDER — GABAPENTIN 400 MG/1
600 CAPSULE ORAL EVERY 8 HOURS
Refills: 0 | Status: DISCONTINUED | OUTPATIENT
Start: 2023-10-17 | End: 2023-10-18

## 2023-10-17 RX ORDER — DIAZEPAM 5 MG
5 TABLET ORAL AT BEDTIME
Refills: 0 | Status: DISCONTINUED | OUTPATIENT
Start: 2023-10-17 | End: 2023-10-20

## 2023-10-17 RX ORDER — GABAPENTIN 400 MG/1
600 CAPSULE ORAL ONCE
Refills: 0 | Status: COMPLETED | OUTPATIENT
Start: 2023-10-17 | End: 2023-10-17

## 2023-10-17 RX ADMIN — CARVEDILOL PHOSPHATE 6.25 MILLIGRAM(S): 80 CAPSULE, EXTENDED RELEASE ORAL at 10:33

## 2023-10-17 RX ADMIN — LISINOPRIL 10 MILLIGRAM(S): 2.5 TABLET ORAL at 10:33

## 2023-10-17 RX ADMIN — GABAPENTIN 600 MILLIGRAM(S): 400 CAPSULE ORAL at 11:52

## 2023-10-17 RX ADMIN — INSULIN GLARGINE 16 UNIT(S): 100 INJECTION, SOLUTION SUBCUTANEOUS at 21:38

## 2023-10-17 RX ADMIN — ATORVASTATIN CALCIUM 10 MILLIGRAM(S): 80 TABLET, FILM COATED ORAL at 21:39

## 2023-10-17 RX ADMIN — Medication 12 UNIT(S): at 17:02

## 2023-10-17 RX ADMIN — Medication 1 PATCH: at 11:53

## 2023-10-17 RX ADMIN — CARVEDILOL PHOSPHATE 6.25 MILLIGRAM(S): 80 CAPSULE, EXTENDED RELEASE ORAL at 22:26

## 2023-10-17 RX ADMIN — Medication 5 MILLIGRAM(S): at 21:38

## 2023-10-17 RX ADMIN — Medication 1 PATCH: at 12:45

## 2023-10-17 RX ADMIN — Medication 6: at 21:39

## 2023-10-17 RX ADMIN — METHADONE HYDROCHLORIDE 130 MILLIGRAM(S): 40 TABLET ORAL at 10:34

## 2023-10-17 RX ADMIN — Medication 12 UNIT(S): at 08:24

## 2023-10-17 RX ADMIN — GABAPENTIN 600 MILLIGRAM(S): 400 CAPSULE ORAL at 16:09

## 2023-10-17 RX ADMIN — RISPERIDONE 2 MILLIGRAM(S): 4 TABLET ORAL at 22:27

## 2023-10-17 RX ADMIN — Medication 4: at 12:31

## 2023-10-17 RX ADMIN — GABAPENTIN 600 MILLIGRAM(S): 400 CAPSULE ORAL at 21:38

## 2023-10-17 RX ADMIN — Medication 12 UNIT(S): at 12:31

## 2023-10-17 RX ADMIN — Medication 4: at 17:02

## 2023-10-17 RX ADMIN — Medication 3 MILLIGRAM(S): at 22:59

## 2023-10-17 RX ADMIN — OLANZAPINE 5 MILLIGRAM(S): 15 TABLET, FILM COATED ORAL at 16:43

## 2023-10-17 RX ADMIN — Medication 2: at 08:26

## 2023-10-17 RX ADMIN — OLANZAPINE 5 MILLIGRAM(S): 15 TABLET, FILM COATED ORAL at 22:59

## 2023-10-17 RX ADMIN — Medication 5 MILLIGRAM(S): at 10:33

## 2023-10-17 RX ADMIN — DIVALPROEX SODIUM 500 MILLIGRAM(S): 500 TABLET, DELAYED RELEASE ORAL at 07:24

## 2023-10-17 NOTE — BH INPATIENT PSYCHIATRY PROGRESS NOTE - NSBHASSESSSUMMFT_PSY_ALL_CORE
Patient is a 57 year old man, undomiciled, single, on disability, with PPH of bipolar 1 disorder, polysubstance use disorder (crystal meth, cocaine, opioid, cannabis, K2, alcohol), opioid use disorder on maintenance therapy, antisocial personality disorder, history of incarceration (8 years in senior care for manslaughter and was released in 2014), with PMH of TBI, pituitary tumor, HTN, T2DM, and GERD, who was admitted to [facility], transferred from [facility] ED, after self-presenting for suicidal and homicidal ideation, in context of substance intoxication and medication non-adherence. On evaluation, patient reports continued depressed mood and passive SI with no intent/plan. He appears to have improved judgment as he has no intention of harming anyone. His delusions and paranoia remain, but are less severe at this time. It is unclear what medication increase he is referring to; continue to monitor for oversedation.     #Bipolar 1 disorder  - Continue with Risperidone 2 mg orally nightly for psychosis and disorganization  - Continue with Divalproex  mg orally twice daily for mood stabilization  - Continue Valium taper with 5 mg orally twice daily and continue taper as tolerated; patient remains highly motivated for continued taper   - Start Gabapentin 600 mg orally TID for anxiety   - Melatonin/Zyprexa PRN for insomnia/agitation respectively   - 2PC    #Opioid use disorder   - Continue Methadone 130 mg orally daily for maintenance opioid use disorder     #DM   - Hba1c 11.2   - Continue with Lantus 16 units nightly  - Continue with aspart 12 units TID with meals  - Continue with Atorvastatin 10 mg orally daily   - Continue with sliding scale and hypoglycemia protocol   - Endo consulted, recs appreciated, continue as per above     #HTN/CHF  - Continue with Lisinopril 10 mg orally daily   - Continue with Carvedilol 6.25 mg orally twice daily for CHF

## 2023-10-17 NOTE — BH INPATIENT PSYCHIATRY PROGRESS NOTE - NSBHMETABOLIC_PSY_ALL_CORE_FT
BMI: BMI (kg/m2): 31.6 (10-11-23 @ 11:12)  HbA1c: A1C with Estimated Average Glucose Result: 11.2 % (10-11-23 @ 11:04)    Glucose: POCT Blood Glucose.: 192 mg/dL (10-17-23 @ 07:46)    BP: 108/65 (10-17-23 @ 06:29) (90/54 - 165/71)  Lipid Panel: Date/Time: 10-10-23 @ 05:30  Cholesterol, Serum: 118  Direct LDL: --  HDL Cholesterol, Serum: 36  Total Cholesterol/HDL Ration Measurement: --  Triglycerides, Serum: 87

## 2023-10-17 NOTE — BH INPATIENT PSYCHIATRY PROGRESS NOTE - CURRENT MEDICATION
MEDICATIONS  (STANDING):  atorvastatin 10 milliGRAM(s) Oral at bedtime  carvedilol 6.25 milliGRAM(s) Oral every 12 hours  dextrose 5%. 1000 milliLiter(s) (100 mL/Hr) IV Continuous <Continuous>  dextrose 5%. 1000 milliLiter(s) (50 mL/Hr) IV Continuous <Continuous>  dextrose 50% Injectable 25 Gram(s) IV Push once  dextrose 50% Injectable 25 Gram(s) IV Push once  dextrose 50% Injectable 12.5 Gram(s) IV Push once  diazepam    Tablet 5 milliGRAM(s) Oral daily  diazepam    Tablet 5 milliGRAM(s) Oral at bedtime  divalproex  milliGRAM(s) Oral two times a day  gabapentin 600 milliGRAM(s) Oral once  gabapentin 600 milliGRAM(s) Oral every 8 hours  glucagon  Injectable 1 milliGRAM(s) IntraMuscular once  insulin glargine Injectable (LANTUS) 16 Unit(s) SubCutaneous at bedtime  insulin lispro (ADMELOG) corrective regimen sliding scale   SubCutaneous Before meals and at bedtime  insulin lispro Injectable (ADMELOG) 12 Unit(s) SubCutaneous three times a day before meals  lisinopril 10 milliGRAM(s) Oral daily  methadone    Tablet 130 milliGRAM(s) Oral daily  nicotine - 21 mG/24Hr(s) Patch 1 Patch Transdermal daily  risperiDONE   Tablet 2 milliGRAM(s) Oral at bedtime    MEDICATIONS  (PRN):  acetaminophen     Tablet .. 650 milliGRAM(s) Oral every 6 hours PRN Temp greater or equal to 38C (100.4F), Mild Pain (1 - 3)  dextrose Oral Gel 15 Gram(s) Oral once PRN Blood Glucose LESS THAN 70 milliGRAM(s)/deciliter  melatonin 3 milliGRAM(s) Oral at bedtime PRN Insomnia  OLANZapine 5 milliGRAM(s) Oral every 6 hours PRN agitation

## 2023-10-17 NOTE — BH INPATIENT PSYCHIATRY PROGRESS NOTE - NSBHFUPINTERVALHXFT_PSY_A_CORE
Patient seen and evaluated. Chart reviewed. No acute events overnight, no new complaints. He remains calm, cooperative, and engaging with treatment team. As per staff, patient has been engaging in groups, and endorsing how hopeful he is about maintaining his sobriety this time around. He continues to be highly motivated for rehab, and reiterating his continued long-term goal of maintaining sobriety. States that he believes, he'll continue feeling "down" until he can "finally get, and stay sober." He denies any suicidal or homicidal ideation, intent, or plan today. Stated he felt a little nervous about continued valium taper, especially because he does not want to "have a seizure" but he went on to say that he is willing to continue until the "valium is completely off" because "that's what it takes to be sober." He denies any cravings while on the unit, states he feels safe, and interacting appropriately with all staff and fellow patients. He has maintained full behavioral control on the unit, with no issues. Overall, continues to exhibit significant improvement in behavior and mood. Patient denies any auditory/visual hallucinations, he continues to make no mention of any delusions and has been linear throughout.     Continue valium taper   To target anxiety symptoms during continued valium taper, gabapentin 600 mg orally TID started, patient previously had good response

## 2023-10-17 NOTE — BH INPATIENT PSYCHIATRY PROGRESS NOTE - NSBHCHARTREVIEWVS_PSY_A_CORE FT
Vital Signs Last 24 Hrs  T(C): 36.7 (10-17-23 @ 06:29), Max: 36.7 (10-17-23 @ 06:29)  T(F): 98 (10-17-23 @ 06:29), Max: 98 (10-17-23 @ 06:29)  HR: 52 (10-17-23 @ 06:29) (52 - 66)  BP: 108/65 (10-17-23 @ 06:29) (108/65 - 165/71)  BP(mean): --  RR: 18 (10-16-23 @ 16:38) (18 - 18)  SpO2: 96% (10-17-23 @ 06:29) (95% - 96%)

## 2023-10-18 LAB
ALBUMIN SERPL ELPH-MCNC: 3.7 G/DL — SIGNIFICANT CHANGE UP (ref 3.3–5)
ALBUMIN SERPL ELPH-MCNC: 3.7 G/DL — SIGNIFICANT CHANGE UP (ref 3.3–5)
ALP SERPL-CCNC: 61 U/L — SIGNIFICANT CHANGE UP (ref 40–120)
ALP SERPL-CCNC: 61 U/L — SIGNIFICANT CHANGE UP (ref 40–120)
ALT FLD-CCNC: 10 U/L — SIGNIFICANT CHANGE UP (ref 10–45)
ALT FLD-CCNC: 10 U/L — SIGNIFICANT CHANGE UP (ref 10–45)
ANION GAP SERPL CALC-SCNC: 10 MMOL/L — SIGNIFICANT CHANGE UP (ref 5–17)
ANION GAP SERPL CALC-SCNC: 10 MMOL/L — SIGNIFICANT CHANGE UP (ref 5–17)
AST SERPL-CCNC: 11 U/L — SIGNIFICANT CHANGE UP (ref 10–40)
AST SERPL-CCNC: 11 U/L — SIGNIFICANT CHANGE UP (ref 10–40)
BILIRUB SERPL-MCNC: <0.2 MG/DL — SIGNIFICANT CHANGE UP (ref 0.2–1.2)
BILIRUB SERPL-MCNC: <0.2 MG/DL — SIGNIFICANT CHANGE UP (ref 0.2–1.2)
BUN SERPL-MCNC: 18 MG/DL — SIGNIFICANT CHANGE UP (ref 7–23)
BUN SERPL-MCNC: 18 MG/DL — SIGNIFICANT CHANGE UP (ref 7–23)
CALCIUM SERPL-MCNC: 9.7 MG/DL — SIGNIFICANT CHANGE UP (ref 8.4–10.5)
CALCIUM SERPL-MCNC: 9.7 MG/DL — SIGNIFICANT CHANGE UP (ref 8.4–10.5)
CHLORIDE SERPL-SCNC: 96 MMOL/L — SIGNIFICANT CHANGE UP (ref 96–108)
CHLORIDE SERPL-SCNC: 96 MMOL/L — SIGNIFICANT CHANGE UP (ref 96–108)
CO2 SERPL-SCNC: 31 MMOL/L — SIGNIFICANT CHANGE UP (ref 22–31)
CO2 SERPL-SCNC: 31 MMOL/L — SIGNIFICANT CHANGE UP (ref 22–31)
CREAT SERPL-MCNC: 0.98 MG/DL — SIGNIFICANT CHANGE UP (ref 0.5–1.3)
CREAT SERPL-MCNC: 0.98 MG/DL — SIGNIFICANT CHANGE UP (ref 0.5–1.3)
EGFR: 90 ML/MIN/1.73M2 — SIGNIFICANT CHANGE UP
EGFR: 90 ML/MIN/1.73M2 — SIGNIFICANT CHANGE UP
GLUCOSE BLDC GLUCOMTR-MCNC: 213 MG/DL — HIGH (ref 70–99)
GLUCOSE BLDC GLUCOMTR-MCNC: 213 MG/DL — HIGH (ref 70–99)
GLUCOSE BLDC GLUCOMTR-MCNC: 231 MG/DL — HIGH (ref 70–99)
GLUCOSE BLDC GLUCOMTR-MCNC: 231 MG/DL — HIGH (ref 70–99)
GLUCOSE BLDC GLUCOMTR-MCNC: 261 MG/DL — HIGH (ref 70–99)
GLUCOSE BLDC GLUCOMTR-MCNC: 261 MG/DL — HIGH (ref 70–99)
GLUCOSE BLDC GLUCOMTR-MCNC: 277 MG/DL — HIGH (ref 70–99)
GLUCOSE BLDC GLUCOMTR-MCNC: 277 MG/DL — HIGH (ref 70–99)
GLUCOSE SERPL-MCNC: 217 MG/DL — HIGH (ref 70–99)
GLUCOSE SERPL-MCNC: 217 MG/DL — HIGH (ref 70–99)
HCT VFR BLD CALC: 39.8 % — SIGNIFICANT CHANGE UP (ref 39–50)
HCT VFR BLD CALC: 39.8 % — SIGNIFICANT CHANGE UP (ref 39–50)
HGB BLD-MCNC: 13.1 G/DL — SIGNIFICANT CHANGE UP (ref 13–17)
HGB BLD-MCNC: 13.1 G/DL — SIGNIFICANT CHANGE UP (ref 13–17)
MCHC RBC-ENTMCNC: 29.2 PG — SIGNIFICANT CHANGE UP (ref 27–34)
MCHC RBC-ENTMCNC: 29.2 PG — SIGNIFICANT CHANGE UP (ref 27–34)
MCHC RBC-ENTMCNC: 32.9 GM/DL — SIGNIFICANT CHANGE UP (ref 32–36)
MCHC RBC-ENTMCNC: 32.9 GM/DL — SIGNIFICANT CHANGE UP (ref 32–36)
MCV RBC AUTO: 88.6 FL — SIGNIFICANT CHANGE UP (ref 80–100)
MCV RBC AUTO: 88.6 FL — SIGNIFICANT CHANGE UP (ref 80–100)
NRBC # BLD: 0 /100 WBCS — SIGNIFICANT CHANGE UP (ref 0–0)
NRBC # BLD: 0 /100 WBCS — SIGNIFICANT CHANGE UP (ref 0–0)
PLATELET # BLD AUTO: 209 K/UL — SIGNIFICANT CHANGE UP (ref 150–400)
PLATELET # BLD AUTO: 209 K/UL — SIGNIFICANT CHANGE UP (ref 150–400)
POTASSIUM SERPL-MCNC: 4.8 MMOL/L — SIGNIFICANT CHANGE UP (ref 3.5–5.3)
POTASSIUM SERPL-MCNC: 4.8 MMOL/L — SIGNIFICANT CHANGE UP (ref 3.5–5.3)
POTASSIUM SERPL-SCNC: 4.8 MMOL/L — SIGNIFICANT CHANGE UP (ref 3.5–5.3)
POTASSIUM SERPL-SCNC: 4.8 MMOL/L — SIGNIFICANT CHANGE UP (ref 3.5–5.3)
PROT SERPL-MCNC: 6.6 G/DL — SIGNIFICANT CHANGE UP (ref 6–8.3)
PROT SERPL-MCNC: 6.6 G/DL — SIGNIFICANT CHANGE UP (ref 6–8.3)
RBC # BLD: 4.49 M/UL — SIGNIFICANT CHANGE UP (ref 4.2–5.8)
RBC # BLD: 4.49 M/UL — SIGNIFICANT CHANGE UP (ref 4.2–5.8)
RBC # FLD: 13 % — SIGNIFICANT CHANGE UP (ref 10.3–14.5)
RBC # FLD: 13 % — SIGNIFICANT CHANGE UP (ref 10.3–14.5)
SODIUM SERPL-SCNC: 137 MMOL/L — SIGNIFICANT CHANGE UP (ref 135–145)
SODIUM SERPL-SCNC: 137 MMOL/L — SIGNIFICANT CHANGE UP (ref 135–145)
VALPROATE SERPL-MCNC: 42.2 UG/ML — LOW (ref 50–100)
VALPROATE SERPL-MCNC: 42.2 UG/ML — LOW (ref 50–100)
WBC # BLD: 5.88 K/UL — SIGNIFICANT CHANGE UP (ref 3.8–10.5)
WBC # BLD: 5.88 K/UL — SIGNIFICANT CHANGE UP (ref 3.8–10.5)
WBC # FLD AUTO: 5.88 K/UL — SIGNIFICANT CHANGE UP (ref 3.8–10.5)
WBC # FLD AUTO: 5.88 K/UL — SIGNIFICANT CHANGE UP (ref 3.8–10.5)

## 2023-10-18 RX ORDER — GABAPENTIN 400 MG/1
800 CAPSULE ORAL THREE TIMES A DAY
Refills: 0 | Status: DISCONTINUED | OUTPATIENT
Start: 2023-10-18 | End: 2023-10-20

## 2023-10-18 RX ADMIN — Medication 5 MILLIGRAM(S): at 10:44

## 2023-10-18 RX ADMIN — LISINOPRIL 10 MILLIGRAM(S): 2.5 TABLET ORAL at 10:44

## 2023-10-18 RX ADMIN — ATORVASTATIN CALCIUM 10 MILLIGRAM(S): 80 TABLET, FILM COATED ORAL at 21:22

## 2023-10-18 RX ADMIN — Medication 6: at 21:28

## 2023-10-18 RX ADMIN — GABAPENTIN 600 MILLIGRAM(S): 400 CAPSULE ORAL at 13:02

## 2023-10-18 RX ADMIN — Medication 12 UNIT(S): at 13:00

## 2023-10-18 RX ADMIN — Medication 4: at 06:48

## 2023-10-18 RX ADMIN — Medication 5 MILLIGRAM(S): at 21:22

## 2023-10-18 RX ADMIN — DIVALPROEX SODIUM 500 MILLIGRAM(S): 500 TABLET, DELAYED RELEASE ORAL at 06:42

## 2023-10-18 RX ADMIN — GABAPENTIN 600 MILLIGRAM(S): 400 CAPSULE ORAL at 06:42

## 2023-10-18 RX ADMIN — RISPERIDONE 2 MILLIGRAM(S): 4 TABLET ORAL at 21:22

## 2023-10-18 RX ADMIN — Medication 4: at 17:06

## 2023-10-18 RX ADMIN — METHADONE HYDROCHLORIDE 130 MILLIGRAM(S): 40 TABLET ORAL at 10:50

## 2023-10-18 RX ADMIN — OLANZAPINE 5 MILLIGRAM(S): 15 TABLET, FILM COATED ORAL at 16:31

## 2023-10-18 RX ADMIN — Medication 1 PATCH: at 10:54

## 2023-10-18 RX ADMIN — DIVALPROEX SODIUM 500 MILLIGRAM(S): 500 TABLET, DELAYED RELEASE ORAL at 17:05

## 2023-10-18 RX ADMIN — Medication 6: at 13:00

## 2023-10-18 RX ADMIN — INSULIN GLARGINE 16 UNIT(S): 100 INJECTION, SOLUTION SUBCUTANEOUS at 22:11

## 2023-10-18 RX ADMIN — GABAPENTIN 800 MILLIGRAM(S): 400 CAPSULE ORAL at 21:22

## 2023-10-18 RX ADMIN — Medication 12 UNIT(S): at 06:48

## 2023-10-18 RX ADMIN — Medication 1 PATCH: at 10:53

## 2023-10-18 RX ADMIN — Medication 12 UNIT(S): at 17:06

## 2023-10-18 RX ADMIN — Medication 1 PATCH: at 19:17

## 2023-10-18 RX ADMIN — CARVEDILOL PHOSPHATE 6.25 MILLIGRAM(S): 80 CAPSULE, EXTENDED RELEASE ORAL at 21:22

## 2023-10-18 NOTE — BH INPATIENT PSYCHIATRY PROGRESS NOTE - NSBHMETABOLIC_PSY_ALL_CORE_FT
Open access colonoscopy.  Ref Dr Boucher   BMI: BMI (kg/m2): 31.6 (10-11-23 @ 11:12)  HbA1c: A1C with Estimated Average Glucose Result: 11.2 % (10-11-23 @ 11:04)    Glucose: POCT Blood Glucose.: 277 mg/dL (10-18-23 @ 12:59)    BP: 133/83 (10-18-23 @ 08:02) (90/54 - 166/83)  Lipid Panel: Date/Time: 10-10-23 @ 05:30  Cholesterol, Serum: 118  Direct LDL: --  HDL Cholesterol, Serum: 36  Total Cholesterol/HDL Ration Measurement: --  Triglycerides, Serum: 87

## 2023-10-18 NOTE — BH INPATIENT PSYCHIATRY PROGRESS NOTE - NSBHASSESSSUMMFT_PSY_ALL_CORE
Patient is a 57 year old man, undomiciled, single, on disability, with PPH of bipolar 1 disorder, polysubstance use disorder (crystal meth, cocaine, opioid, cannabis, K2, alcohol), opioid use disorder on maintenance therapy, antisocial personality disorder, history of incarceration (8 years in residential for manslaughter and was released in 2014), with PMH of TBI, pituitary tumor, HTN, T2DM, and GERD, who was admitted to [facility], transferred from [facility] ED, after self-presenting for suicidal and homicidal ideation, in context of substance intoxication and medication non-adherence. On evaluation, patient reports continued depressed mood and passive SI with no intent/plan. He appears to have improved judgment as he has no intention of harming anyone. His delusions and paranoia remain, but are less severe at this time. It is unclear what medication increase he is referring to; continue to monitor for oversedation.     #Bipolar 1 disorder  - Continue with Risperidone 2 mg orally nightly for psychosis and disorganization  - Continue with Divalproex  mg orally twice daily for mood stabilization  - Continue with Valium at 5 mg orally twice daily and continue taper as tolerated; patient remains highly motivated for continued taper and sobriety   - Increase Gabapentin to 800 mg orally TID from 600 mg orally TID for anxiety   - Melatonin/Zyprexa PRN for insomnia/agitation respectively   - 2P    #Opioid use disorder   - Continue Methadone 130 mg orally daily for maintenance opioid use disorder     #DM   - Hba1c 11.2   - Continue with Lantus 16 units nightly  - Continue with aspart 12 units TID with meals  - Continue with Atorvastatin 10 mg orally daily   - Continue with sliding scale and hypoglycemia protocol   - Endo consulted, recs appreciated, continue as per above     #HTN/CHF  - Continue with Lisinopril 10 mg orally daily   - Continue with Carvedilol 6.25 mg orally twice daily for CHF

## 2023-10-18 NOTE — BH INPATIENT PSYCHIATRY PROGRESS NOTE - NSBHCHARTREVIEWVS_PSY_A_CORE FT
Vital Signs Last 24 Hrs  T(C): 37 (10-18-23 @ 08:02), Max: 37.2 (10-17-23 @ 16:50)  T(F): 98.6 (10-18-23 @ 08:02), Max: 98.9 (10-17-23 @ 16:50)  HR: 67 (10-18-23 @ 08:02) (63 - 67)  BP: 133/83 (10-18-23 @ 08:02) (133/83 - 166/83)  BP(mean): --  RR: 18 (10-18-23 @ 08:02) (18 - 18)  SpO2: 96% (10-18-23 @ 08:02) (95% - 96%)

## 2023-10-18 NOTE — BH INPATIENT PSYCHIATRY PROGRESS NOTE - CURRENT MEDICATION
MEDICATIONS  (STANDING):  atorvastatin 10 milliGRAM(s) Oral at bedtime  carvedilol 6.25 milliGRAM(s) Oral every 12 hours  dextrose 5%. 1000 milliLiter(s) (50 mL/Hr) IV Continuous <Continuous>  dextrose 5%. 1000 milliLiter(s) (100 mL/Hr) IV Continuous <Continuous>  dextrose 50% Injectable 25 Gram(s) IV Push once  dextrose 50% Injectable 25 Gram(s) IV Push once  dextrose 50% Injectable 12.5 Gram(s) IV Push once  diazepam    Tablet 5 milliGRAM(s) Oral at bedtime  diazepam    Tablet 5 milliGRAM(s) Oral daily  divalproex  milliGRAM(s) Oral two times a day  gabapentin 800 milliGRAM(s) Oral three times a day  glucagon  Injectable 1 milliGRAM(s) IntraMuscular once  insulin glargine Injectable (LANTUS) 16 Unit(s) SubCutaneous at bedtime  insulin lispro (ADMELOG) corrective regimen sliding scale   SubCutaneous Before meals and at bedtime  insulin lispro Injectable (ADMELOG) 12 Unit(s) SubCutaneous three times a day before meals  lisinopril 10 milliGRAM(s) Oral daily  methadone    Tablet 130 milliGRAM(s) Oral daily  nicotine - 21 mG/24Hr(s) Patch 1 Patch Transdermal daily  risperiDONE   Tablet 2 milliGRAM(s) Oral at bedtime    MEDICATIONS  (PRN):  acetaminophen     Tablet .. 650 milliGRAM(s) Oral every 6 hours PRN Temp greater or equal to 38C (100.4F), Mild Pain (1 - 3)  dextrose Oral Gel 15 Gram(s) Oral once PRN Blood Glucose LESS THAN 70 milliGRAM(s)/deciliter  melatonin 3 milliGRAM(s) Oral at bedtime PRN Insomnia  OLANZapine 5 milliGRAM(s) Oral every 6 hours PRN agitation

## 2023-10-18 NOTE — BH INPATIENT PSYCHIATRY PROGRESS NOTE - NSBHFUPINTERVALHXFT_PSY_A_CORE
Patient seen and evaluated. Chart reviewed. No acute events overnight, no new complaints. He remains calm, cooperative, and engaging with treatment team. He reported that he was anxious yesterday, because he believed they gave him 2.5 mg of Valium last night, and was worried that the taper was going too fast. When writer informed him he was down to 5 mg orally BID, he was reassured, and reiterated his continued commitment to taper, and stated "trust me doc, I can't wait to be off of it, but I have been on it for 30 years, and I just want to continue doing it slowly." He was reassured, and responded well. Remains highly motivated, asked writer today, "any update on rehab?" He remains engaging, interacts appropriately with staff and fellow peers. He denies any suicidal or homicidal ideation, intent, or plan at this time, but states that he doesn't know what he would do if he relapsed. Continues to deny any cravings, agreeable to increase to Gabapentin 800 mg orally TID, which he had been on prior, and in preparation for continued Valium taper tomorrow. He has maintained full behavioral control on the unit, with no issues. Overall, continues to exhibit significant improvement in behavior and mood. Patient denies any auditory/visual hallucinations, he continues to make no mention of his chronic paranoid delusions.      To target anxiety symptoms during continued valium taper, gabapentin will be increased to 800 mg orally TID from 600 mg orally TID started, patient previously had good response at this dose

## 2023-10-19 LAB
GLUCOSE BLDC GLUCOMTR-MCNC: 164 MG/DL — HIGH (ref 70–99)
GLUCOSE BLDC GLUCOMTR-MCNC: 164 MG/DL — HIGH (ref 70–99)
GLUCOSE BLDC GLUCOMTR-MCNC: 200 MG/DL — HIGH (ref 70–99)
GLUCOSE BLDC GLUCOMTR-MCNC: 200 MG/DL — HIGH (ref 70–99)
GLUCOSE BLDC GLUCOMTR-MCNC: 254 MG/DL — HIGH (ref 70–99)
GLUCOSE BLDC GLUCOMTR-MCNC: 254 MG/DL — HIGH (ref 70–99)
GLUCOSE BLDC GLUCOMTR-MCNC: 324 MG/DL — HIGH (ref 70–99)
GLUCOSE BLDC GLUCOMTR-MCNC: 324 MG/DL — HIGH (ref 70–99)
VALPROATE SERPL-MCNC: 59.3 UG/ML — SIGNIFICANT CHANGE UP (ref 50–100)
VALPROATE SERPL-MCNC: 59.3 UG/ML — SIGNIFICANT CHANGE UP (ref 50–100)

## 2023-10-19 PROCEDURE — 99232 SBSQ HOSP IP/OBS MODERATE 35: CPT

## 2023-10-19 RX ADMIN — GABAPENTIN 800 MILLIGRAM(S): 400 CAPSULE ORAL at 10:31

## 2023-10-19 RX ADMIN — GABAPENTIN 800 MILLIGRAM(S): 400 CAPSULE ORAL at 17:11

## 2023-10-19 RX ADMIN — GABAPENTIN 800 MILLIGRAM(S): 400 CAPSULE ORAL at 21:51

## 2023-10-19 RX ADMIN — Medication 12 UNIT(S): at 07:51

## 2023-10-19 RX ADMIN — OLANZAPINE 5 MILLIGRAM(S): 15 TABLET, FILM COATED ORAL at 18:12

## 2023-10-19 RX ADMIN — CARVEDILOL PHOSPHATE 6.25 MILLIGRAM(S): 80 CAPSULE, EXTENDED RELEASE ORAL at 10:31

## 2023-10-19 RX ADMIN — ATORVASTATIN CALCIUM 10 MILLIGRAM(S): 80 TABLET, FILM COATED ORAL at 21:51

## 2023-10-19 RX ADMIN — DIVALPROEX SODIUM 500 MILLIGRAM(S): 500 TABLET, DELAYED RELEASE ORAL at 18:11

## 2023-10-19 RX ADMIN — Medication 1 PATCH: at 10:40

## 2023-10-19 RX ADMIN — Medication 1 PATCH: at 18:13

## 2023-10-19 RX ADMIN — Medication 1 PATCH: at 07:03

## 2023-10-19 RX ADMIN — INSULIN GLARGINE 16 UNIT(S): 100 INJECTION, SOLUTION SUBCUTANEOUS at 21:52

## 2023-10-19 RX ADMIN — Medication 8: at 21:52

## 2023-10-19 RX ADMIN — Medication 5 MILLIGRAM(S): at 10:29

## 2023-10-19 RX ADMIN — METHADONE HYDROCHLORIDE 130 MILLIGRAM(S): 40 TABLET ORAL at 10:31

## 2023-10-19 RX ADMIN — LISINOPRIL 10 MILLIGRAM(S): 2.5 TABLET ORAL at 10:29

## 2023-10-19 RX ADMIN — Medication 3 MILLIGRAM(S): at 23:12

## 2023-10-19 RX ADMIN — Medication 12 UNIT(S): at 17:08

## 2023-10-19 RX ADMIN — Medication 5 MILLIGRAM(S): at 21:51

## 2023-10-19 RX ADMIN — Medication 6: at 17:09

## 2023-10-19 RX ADMIN — RISPERIDONE 2 MILLIGRAM(S): 4 TABLET ORAL at 23:12

## 2023-10-19 RX ADMIN — DIVALPROEX SODIUM 500 MILLIGRAM(S): 500 TABLET, DELAYED RELEASE ORAL at 06:57

## 2023-10-19 RX ADMIN — Medication 1 PATCH: at 10:39

## 2023-10-19 RX ADMIN — CARVEDILOL PHOSPHATE 6.25 MILLIGRAM(S): 80 CAPSULE, EXTENDED RELEASE ORAL at 21:51

## 2023-10-19 RX ADMIN — Medication 2: at 07:50

## 2023-10-19 NOTE — BH INPATIENT PSYCHIATRY PROGRESS NOTE - CURRENT MEDICATION
MEDICATIONS  (STANDING):  atorvastatin 10 milliGRAM(s) Oral at bedtime  carvedilol 6.25 milliGRAM(s) Oral every 12 hours  dextrose 5%. 1000 milliLiter(s) (100 mL/Hr) IV Continuous <Continuous>  dextrose 5%. 1000 milliLiter(s) (50 mL/Hr) IV Continuous <Continuous>  dextrose 50% Injectable 12.5 Gram(s) IV Push once  dextrose 50% Injectable 25 Gram(s) IV Push once  dextrose 50% Injectable 25 Gram(s) IV Push once  diazepam    Tablet 5 milliGRAM(s) Oral daily  diazepam    Tablet 5 milliGRAM(s) Oral at bedtime  divalproex  milliGRAM(s) Oral two times a day  gabapentin 800 milliGRAM(s) Oral three times a day  glucagon  Injectable 1 milliGRAM(s) IntraMuscular once  insulin glargine Injectable (LANTUS) 16 Unit(s) SubCutaneous at bedtime  insulin lispro (ADMELOG) corrective regimen sliding scale   SubCutaneous Before meals and at bedtime  insulin lispro Injectable (ADMELOG) 12 Unit(s) SubCutaneous three times a day before meals  lisinopril 10 milliGRAM(s) Oral daily  methadone    Tablet 130 milliGRAM(s) Oral daily  nicotine - 21 mG/24Hr(s) Patch 1 Patch Transdermal daily  risperiDONE   Tablet 2 milliGRAM(s) Oral at bedtime    MEDICATIONS  (PRN):  acetaminophen     Tablet .. 650 milliGRAM(s) Oral every 6 hours PRN Temp greater or equal to 38C (100.4F), Mild Pain (1 - 3)  dextrose Oral Gel 15 Gram(s) Oral once PRN Blood Glucose LESS THAN 70 milliGRAM(s)/deciliter  melatonin 3 milliGRAM(s) Oral at bedtime PRN Insomnia  OLANZapine 5 milliGRAM(s) Oral every 6 hours PRN agitation

## 2023-10-19 NOTE — BH INPATIENT PSYCHIATRY PROGRESS NOTE - NSBHMETABOLIC_PSY_ALL_CORE_FT
BMI: BMI (kg/m2): 31.6 (10-11-23 @ 11:12)  HbA1c: A1C with Estimated Average Glucose Result: 11.2 % (10-11-23 @ 11:04)    Glucose: POCT Blood Glucose.: 164 mg/dL (10-19-23 @ 13:37)    BP: 144/75 (10-19-23 @ 11:00) (108/65 - 166/83)  Lipid Panel: Date/Time: 10-10-23 @ 05:30  Cholesterol, Serum: 118  Direct LDL: --  HDL Cholesterol, Serum: 36  Total Cholesterol/HDL Ration Measurement: --  Triglycerides, Serum: 87

## 2023-10-19 NOTE — BH INPATIENT PSYCHIATRY PROGRESS NOTE - NSBHASSESSSUMMFT_PSY_ALL_CORE
Patient is a 57 year old man, undomiciled, single, on disability, with PPH of bipolar 1 disorder, polysubstance use disorder (crystal meth, cocaine, opioid, cannabis, K2, alcohol), opioid use disorder on maintenance therapy, antisocial personality disorder, history of incarceration (8 years in [facility] for manslaughter and was released in 2014), with PMH of TBI, pituitary tumor, HTN, T2DM, and GERD, who was admitted to [facility], [place], transferred from [facility] ED, after self-presenting for suicidal and homicidal ideation, in context of substance intoxication and medication non-adherence. On evaluation, patient reports continued depressed mood and passive SI with no intent/plan. He appears to have improved judgment as he has no intention of harming anyone. His delusions and paranoia remain, but are less severe at this time. It is unclear what medication increase he is referring to; continue to monitor for oversedation.     #Bipolar 1 disorder  - Continue with Risperidone 2 mg orally nightly for psychosis and disorganization  - Continue with Divalproex  mg orally twice daily for mood stabilization  - Continue with Valium at 5 mg orally twice daily and continue taper as tolerated; patient remains highly motivated for continued taper and sobriety   - Increase Gabapentin to 800 mg orally TID from 600 mg orally TID for anxiety   - Melatonin/Zyprexa PRN for insomnia/agitation respectively   - [facility]    #Opioid use disorder   - Continue Methadone 130 mg orally daily for maintenance opioid use disorder     #DM   - Hba1c 11.2   - Continue with Lantus 16 units nightly  - Continue with aspart 12 units TID with meals  - Continue with Atorvastatin 10 mg orally daily   - Continue with sliding scale and hypoglycemia protocol   - Endo consulted, recs appreciated, continue as per above     #HTN/CHF  - Continue with Lisinopril 10 mg orally daily   - Continue with Carvedilol 6.25 mg orally twice daily for CHF    ;;10/19: patient was attending groups and social with peers but when the writer arrived was in bed and clearly avoided speaking to the writer.  No behavioral issues. aware of Valium taper issue as well as polysubstance issues ; homiicidal ideation; mood instability and past legal issues.  No acute changes at this time.   Current medications acetaminophen Tablet .. 650 milliGRAM(s) Oral every 6 hours PRN ;atorvastatin 10 milliGRAM(s) Oral at bedtime for HLD ;carvedilol 6.25 milliGRAM(s) Oral every 12 hours ;dextrose 5%. 1000 milliLiter(s) IV Continuous <Continuous> ;dextrose 5%. 1000 milliLiter(s) IV Continuous <Continuous> ;dextrose 50% Injectable 25 Gram(s) IV Push once ;dextrose 50% Injectable 12.5 Gram(s) IV Push once ;dextrose 50% Injectable 25 Gram(s) IV Push once ;dextrose Oral Gel 15 Gram(s) Oral once PRN ;diazepam Tablet 5 milliGRAM(s) Oral daily tapering dose ;diazepam Tablet 5 milliGRAM(s) Oral at bedtime tapering dose ;divalproex  milliGRAM(s) Oral two times a day for mood ;gabapentin 800 milliGRAM(s) Oral three times a day for anxiety and mood ;glucagon Injectable 1 milliGRAM(s) IntraMuscular once ;insulin glargine Injectable (LANTUS) 16 Unit(s) SubCutaneous at bedtime ;insulin lispro (ADMELOG) corrective regimen sliding scale SubCutaneous Before meals and at bedtime for DM ;insulin lispro Injectable (ADMELOG) 12 Unit(s) SubCutaneous three times a day before meals for DM ;lisinopril 10 milliGRAM(s) Oral daily for HLD ;melatonin 3 milliGRAM(s) Oral at bedtime PRN for insomnia ;methadone Tablet 130 milliGRAM(s) Oral daily for opioid dependence ;nicotine - 21 mG/24Hr(s) Patch 1 Patch Transdermal daily for nrt ;OLANZapine 5 milliGRAM(s) Oral every 6 hours PRN for agitation ;risperiDONE Tablet 2 milliGRAM(s) Oral at bedtime for mood ;

## 2023-10-19 NOTE — BH INPATIENT PSYCHIATRY PROGRESS NOTE - NSBHCHARTREVIEWVS_PSY_A_CORE FT
Vital Signs Last 24 Hrs  T(C): 37.4 (10-19-23 @ 11:00), Max: 37.4 (10-18-23 @ 16:19)  T(F): 99.4 (10-19-23 @ 11:00), Max: 99.4 (10-19-23 @ 11:00)  HR: 82 (10-19-23 @ 11:00) (66 - 82)  BP: 144/75 (10-19-23 @ 11:00) (130/89 - 144/75)  BP(mean): --  RR: 18 (10-19-23 @ 11:00) (18 - 18)  SpO2: 95% (10-19-23 @ 11:00) (95% - 98%)

## 2023-10-19 NOTE — BH INPATIENT PSYCHIATRY PROGRESS NOTE - NSBHMSESPEECH_PSY_A_CORE
Topical Clindamycin Counseling: Patient counseled that this medication may cause skin irritation or allergic reactions.  In the event of skin irritation, the patient was advised to reduce the amount of the drug applied or use it less frequently.   The patient verbalized understanding of the proper use and possible adverse effects of clindamycin.  All of the patient's questions and concerns were addressed. Aklief Pregnancy And Lactation Text: It is unknown if this medication is safe to use during pregnancy.  It is unknown if this medication is excreted in breast milk.  Breastfeeding women should use the topical cream on the smallest area of the skin for the shortest time needed while breastfeeding.  Do not apply to nipple and areola. Detail Level: Zone Tazorac Counseling:  Patient advised that medication is irritating and drying.  Patient may need to apply sparingly and wash off after an hour before eventually leaving it on overnight.  The patient verbalized understanding of the proper use and possible adverse effects of tazorac.  All of the patient's questions and concerns were addressed. Minocycline Counseling: Patient advised regarding possible photosensitivity and discoloration of the teeth, skin, lips, tongue and gums.  Patient instructed to avoid sunlight, if possible.  When exposed to sunlight, patients should wear protective clothing, sunglasses, and sunscreen.  The patient was instructed to call the office immediately if the following severe adverse effects occur:  hearing changes, easy bruising/bleeding, severe headache, or vision changes.  The patient verbalized understanding of the proper use and possible adverse effects of minocycline.  All of the patient's questions and concerns were addressed. Dapsone Pregnancy And Lactation Text: This medication is Pregnancy Category C and is not considered safe during pregnancy or breast feeding. Azelaic Acid Pregnancy And Lactation Text: This medication is considered safe during pregnancy and breast feeding. Sarecycline Counseling: Patient advised regarding possible photosensitivity and discoloration of the teeth, skin, lips, tongue and gums.  Patient instructed to avoid sunlight, if possible.  When exposed to sunlight, patients should wear protective clothing, sunglasses, and sunscreen.  The patient was instructed to call the office immediately if the following severe adverse effects occur:  hearing changes, easy bruising/bleeding, severe headache, or vision changes.  The patient verbalized understanding of the proper use and possible adverse effects of sarecycline.  All of the patient's questions and concerns were addressed. Bactrim Counseling:  I discussed with the patient the risks of sulfa antibiotics including but not limited to GI upset, allergic reaction, drug rash, diarrhea, dizziness, photosensitivity, and yeast infections.  Rarely, more serious reactions can occur including but not limited to aplastic anemia, agranulocytosis, methemoglobinemia, blood dyscrasias, liver or kidney failure, lung infiltrates or desquamative/blistering drug rashes. Azithromycin Counseling:  I discussed with the patient the risks of azithromycin including but not limited to GI upset, allergic reaction, drug rash, diarrhea, and yeast infections. Doxycycline Pregnancy And Lactation Text: This medication is Pregnancy Category D and not consider safe during pregnancy. It is also excreted in breast milk but is considered safe for shorter treatment courses. Include Pregnancy/Lactation Warning?: No Bactrim Pregnancy And Lactation Text: This medication is Pregnancy Category D and is known to cause fetal risk.  It is also excreted in breast milk. Topical Retinoid counseling:  Patient advised to apply a pea-sized amount only at bedtime and wait 30 minutes after washing their face before applying.  If too drying, patient may add a non-comedogenic moisturizer. The patient verbalized understanding of the proper use and possible adverse effects of retinoids.  All of the patient's questions and concerns were addressed. Topical Sulfur Applications Pregnancy And Lactation Text: This medication is Pregnancy Category C and has an unknown safety profile during pregnancy. It is unknown if this topical medication is excreted in breast milk. Spironolactone Pregnancy And Lactation Text: This medication can cause feminization of the male fetus and should be avoided during pregnancy. The active metabolite is also found in breast milk. Benzoyl Peroxide Counseling: Patient counseled that medicine may cause skin irritation and bleach clothing.  In the event of skin irritation, the patient was advised to reduce the amount of the drug applied or use it less frequently.   The patient verbalized understanding of the proper use and possible adverse effects of benzoyl peroxide.  All of the patient's questions and concerns were addressed. Isotretinoin Counseling: Patient should get monthly blood tests, not donate blood, not drive at night if vision affected, not share medication, and not undergo elective surgery for 6 months after tx completed. Side effects reviewed, pt to contact office should one occur. Sarecycline Pregnancy And Lactation Text: This medication is Pregnancy Category D and not consider safe during pregnancy. It is also excreted in breast milk. High Dose Vitamin A Counseling: Side effects reviewed, pt to contact office should one occur. Birth Control Pills Pregnancy And Lactation Text: This medication should be avoided if pregnant and for the first 30 days post-partum. Winlevi Pregnancy And Lactation Text: This medication is considered safe during pregnancy and breastfeeding. Tazorac Pregnancy And Lactation Text: This medication is not safe during pregnancy. It is unknown if this medication is excreted in breast milk. Aklief counseling:  Patient advised to apply a pea-sized amount only at bedtime and wait 30 minutes after washing their face before applying.  If too drying, patient may add a non-comedogenic moisturizer.  The most commonly reported side effects including irritation, redness, scaling, dryness, stinging, burning, itching, and increased risk of sunburn.  The patient verbalized understanding of the proper use and possible adverse effects of retinoids.  All of the patient's questions and concerns were addressed. High Dose Vitamin A Pregnancy And Lactation Text: High dose vitamin A therapy is contraindicated during pregnancy and breast feeding. Azelaic Acid Counseling: Patient counseled that medicine may cause skin irritation and to avoid applying near the eyes.  In the event of skin irritation, the patient was advised to reduce the amount of the drug applied or use it less frequently.   The patient verbalized understanding of the proper use and possible adverse effects of azelaic acid.  All of the patient's questions and concerns were addressed. Azithromycin Pregnancy And Lactation Text: This medication is considered safe during pregnancy and is also secreted in breast milk. Erythromycin Counseling:  I discussed with the patient the risks of erythromycin including but not limited to GI upset, allergic reaction, drug rash, diarrhea, increase in liver enzymes, and yeast infections. Doxycycline Counseling:  Patient counseled regarding possible photosensitivity and increased risk for sunburn.  Patient instructed to avoid sunlight, if possible.  When exposed to sunlight, patients should wear protective clothing, sunglasses, and sunscreen.  The patient was instructed to call the office immediately if the following severe adverse effects occur:  hearing changes, easy bruising/bleeding, severe headache, or vision changes.  The patient verbalized understanding of the proper use and possible adverse effects of doxycycline.  All of the patient's questions and concerns were addressed. Topical Clindamycin Pregnancy And Lactation Text: This medication is Pregnancy Category B and is considered safe during pregnancy. It is unknown if it is excreted in breast milk. Winlevi Counseling:  I discussed with the patient the risks of topical clascoterone including but not limited to erythema, scaling, itching, and stinging. Patient voiced their understanding. Benzoyl Peroxide Pregnancy And Lactation Text: This medication is Pregnancy Category C. It is unknown if benzoyl peroxide is excreted in breast milk. Topical Sulfur Applications Counseling: Topical Sulfur Counseling: Patient counseled that this medication may cause skin irritation or allergic reactions.  In the event of skin irritation, the patient was advised to reduce the amount of the drug applied or use it less frequently.   The patient verbalized understanding of the proper use and possible adverse effects of topical sulfur application.  All of the patient's questions and concerns were addressed. Spironolactone Counseling: Patient advised regarding risks of diarrhea, abdominal pain, hyperkalemia, birth defects (for female patients), liver toxicity and renal toxicity. The patient may need blood work to monitor liver and kidney function and potassium levels while on therapy. The patient verbalized understanding of the proper use and possible adverse effects of spironolactone.  All of the patient's questions and concerns were addressed. Erythromycin Pregnancy And Lactation Text: This medication is Pregnancy Category B and is considered safe during pregnancy. It is also excreted in breast milk. Topical Retinoid Pregnancy And Lactation Text: This medication is Pregnancy Category C. It is unknown if this medication is excreted in breast milk. Tetracycline Counseling: Patient counseled regarding possible photosensitivity and increased risk for sunburn.  Patient instructed to avoid sunlight, if possible.  When exposed to sunlight, patients should wear protective clothing, sunglasses, and sunscreen.  The patient was instructed to call the office immediately if the following severe adverse effects occur:  hearing changes, easy bruising/bleeding, severe headache, or vision changes.  The patient verbalized understanding of the proper use and possible adverse effects of tetracycline.  All of the patient's questions and concerns were addressed. Patient understands to avoid pregnancy while on therapy due to potential birth defects. Dapsone Counseling: I discussed with the patient the risks of dapsone including but not limited to hemolytic anemia, agranulocytosis, rashes, methemoglobinemia, kidney failure, peripheral neuropathy, headaches, GI upset, and liver toxicity.  Patients who start dapsone require monitoring including baseline LFTs and weekly CBCs for the first month, then every month thereafter.  The patient verbalized understanding of the proper use and possible adverse effects of dapsone.  All of the patient's questions and concerns were addressed. Birth Control Pills Counseling: Birth Control Pill Counseling: I discussed with the patient the potential side effects of OCPs including but not limited to increased risk of stroke, heart attack, thrombophlebitis, deep venous thrombosis, hepatic adenomas, breast changes, GI upset, headaches, and depression.  The patient verbalized understanding of the proper use and possible adverse effects of OCPs. All of the patient's questions and concerns were addressed. Isotretinoin Pregnancy And Lactation Text: This medication is Pregnancy Category X and is considered extremely dangerous during pregnancy. It is unknown if it is excreted in breast milk. Abnormal as indicated, otherwise normal...

## 2023-10-19 NOTE — BH INPATIENT PSYCHIATRY PROGRESS NOTE - NSBHFUPINTERVALHXFT_PSY_A_CORE
patient was attending groups and social with peers but when the writer arrived was in bed and clearly avoided speaking to the writer.  No behavioral issues. aware of Valium taper issue as well as polysubstance issues ; homiicidal ideation; mood instability and past legal issues.

## 2023-10-20 LAB
ALBUMIN SERPL ELPH-MCNC: 3.6 G/DL — SIGNIFICANT CHANGE UP (ref 3.3–5)
ALBUMIN SERPL ELPH-MCNC: 3.6 G/DL — SIGNIFICANT CHANGE UP (ref 3.3–5)
ALP SERPL-CCNC: 62 U/L — SIGNIFICANT CHANGE UP (ref 40–120)
ALP SERPL-CCNC: 62 U/L — SIGNIFICANT CHANGE UP (ref 40–120)
ALT FLD-CCNC: 10 U/L — SIGNIFICANT CHANGE UP (ref 10–45)
ALT FLD-CCNC: 10 U/L — SIGNIFICANT CHANGE UP (ref 10–45)
ANION GAP SERPL CALC-SCNC: 9 MMOL/L — SIGNIFICANT CHANGE UP (ref 5–17)
ANION GAP SERPL CALC-SCNC: 9 MMOL/L — SIGNIFICANT CHANGE UP (ref 5–17)
AST SERPL-CCNC: 12 U/L — SIGNIFICANT CHANGE UP (ref 10–40)
AST SERPL-CCNC: 12 U/L — SIGNIFICANT CHANGE UP (ref 10–40)
BILIRUB SERPL-MCNC: <0.2 MG/DL — SIGNIFICANT CHANGE UP (ref 0.2–1.2)
BILIRUB SERPL-MCNC: <0.2 MG/DL — SIGNIFICANT CHANGE UP (ref 0.2–1.2)
BUN SERPL-MCNC: 14 MG/DL — SIGNIFICANT CHANGE UP (ref 7–23)
BUN SERPL-MCNC: 14 MG/DL — SIGNIFICANT CHANGE UP (ref 7–23)
CALCIUM SERPL-MCNC: 9.6 MG/DL — SIGNIFICANT CHANGE UP (ref 8.4–10.5)
CALCIUM SERPL-MCNC: 9.6 MG/DL — SIGNIFICANT CHANGE UP (ref 8.4–10.5)
CHLORIDE SERPL-SCNC: 93 MMOL/L — LOW (ref 96–108)
CHLORIDE SERPL-SCNC: 93 MMOL/L — LOW (ref 96–108)
CO2 SERPL-SCNC: 34 MMOL/L — HIGH (ref 22–31)
CO2 SERPL-SCNC: 34 MMOL/L — HIGH (ref 22–31)
CREAT SERPL-MCNC: 1.03 MG/DL — SIGNIFICANT CHANGE UP (ref 0.5–1.3)
CREAT SERPL-MCNC: 1.03 MG/DL — SIGNIFICANT CHANGE UP (ref 0.5–1.3)
EGFR: 85 ML/MIN/1.73M2 — SIGNIFICANT CHANGE UP
EGFR: 85 ML/MIN/1.73M2 — SIGNIFICANT CHANGE UP
GLUCOSE BLDC GLUCOMTR-MCNC: 185 MG/DL — HIGH (ref 70–99)
GLUCOSE BLDC GLUCOMTR-MCNC: 185 MG/DL — HIGH (ref 70–99)
GLUCOSE BLDC GLUCOMTR-MCNC: 215 MG/DL — HIGH (ref 70–99)
GLUCOSE BLDC GLUCOMTR-MCNC: 215 MG/DL — HIGH (ref 70–99)
GLUCOSE BLDC GLUCOMTR-MCNC: 293 MG/DL — HIGH (ref 70–99)
GLUCOSE BLDC GLUCOMTR-MCNC: 293 MG/DL — HIGH (ref 70–99)
GLUCOSE BLDC GLUCOMTR-MCNC: 374 MG/DL — HIGH (ref 70–99)
GLUCOSE BLDC GLUCOMTR-MCNC: 374 MG/DL — HIGH (ref 70–99)
GLUCOSE SERPL-MCNC: 167 MG/DL — HIGH (ref 70–99)
GLUCOSE SERPL-MCNC: 167 MG/DL — HIGH (ref 70–99)
HCT VFR BLD CALC: 37.3 % — LOW (ref 39–50)
HCT VFR BLD CALC: 37.3 % — LOW (ref 39–50)
HGB BLD-MCNC: 12.2 G/DL — LOW (ref 13–17)
HGB BLD-MCNC: 12.2 G/DL — LOW (ref 13–17)
MCHC RBC-ENTMCNC: 29.4 PG — SIGNIFICANT CHANGE UP (ref 27–34)
MCHC RBC-ENTMCNC: 29.4 PG — SIGNIFICANT CHANGE UP (ref 27–34)
MCHC RBC-ENTMCNC: 32.7 GM/DL — SIGNIFICANT CHANGE UP (ref 32–36)
MCHC RBC-ENTMCNC: 32.7 GM/DL — SIGNIFICANT CHANGE UP (ref 32–36)
MCV RBC AUTO: 89.9 FL — SIGNIFICANT CHANGE UP (ref 80–100)
MCV RBC AUTO: 89.9 FL — SIGNIFICANT CHANGE UP (ref 80–100)
NRBC # BLD: 0 /100 WBCS — SIGNIFICANT CHANGE UP (ref 0–0)
NRBC # BLD: 0 /100 WBCS — SIGNIFICANT CHANGE UP (ref 0–0)
PLATELET # BLD AUTO: 213 K/UL — SIGNIFICANT CHANGE UP (ref 150–400)
PLATELET # BLD AUTO: 213 K/UL — SIGNIFICANT CHANGE UP (ref 150–400)
POTASSIUM SERPL-MCNC: 4.3 MMOL/L — SIGNIFICANT CHANGE UP (ref 3.5–5.3)
POTASSIUM SERPL-MCNC: 4.3 MMOL/L — SIGNIFICANT CHANGE UP (ref 3.5–5.3)
POTASSIUM SERPL-SCNC: 4.3 MMOL/L — SIGNIFICANT CHANGE UP (ref 3.5–5.3)
POTASSIUM SERPL-SCNC: 4.3 MMOL/L — SIGNIFICANT CHANGE UP (ref 3.5–5.3)
PROT SERPL-MCNC: 6.8 G/DL — SIGNIFICANT CHANGE UP (ref 6–8.3)
PROT SERPL-MCNC: 6.8 G/DL — SIGNIFICANT CHANGE UP (ref 6–8.3)
RBC # BLD: 4.15 M/UL — LOW (ref 4.2–5.8)
RBC # BLD: 4.15 M/UL — LOW (ref 4.2–5.8)
RBC # FLD: 13.3 % — SIGNIFICANT CHANGE UP (ref 10.3–14.5)
RBC # FLD: 13.3 % — SIGNIFICANT CHANGE UP (ref 10.3–14.5)
SODIUM SERPL-SCNC: 136 MMOL/L — SIGNIFICANT CHANGE UP (ref 135–145)
SODIUM SERPL-SCNC: 136 MMOL/L — SIGNIFICANT CHANGE UP (ref 135–145)
WBC # BLD: 6.51 K/UL — SIGNIFICANT CHANGE UP (ref 3.8–10.5)
WBC # BLD: 6.51 K/UL — SIGNIFICANT CHANGE UP (ref 3.8–10.5)
WBC # FLD AUTO: 6.51 K/UL — SIGNIFICANT CHANGE UP (ref 3.8–10.5)
WBC # FLD AUTO: 6.51 K/UL — SIGNIFICANT CHANGE UP (ref 3.8–10.5)

## 2023-10-20 PROCEDURE — 99253 IP/OBS CNSLTJ NEW/EST LOW 45: CPT | Mod: GC

## 2023-10-20 RX ORDER — METHADONE HYDROCHLORIDE 40 MG/1
130 TABLET ORAL DAILY
Refills: 0 | Status: DISCONTINUED | OUTPATIENT
Start: 2023-10-21 | End: 2023-10-26

## 2023-10-20 RX ORDER — ASPIRIN/CALCIUM CARB/MAGNESIUM 324 MG
81 TABLET ORAL DAILY
Refills: 0 | Status: DISCONTINUED | OUTPATIENT
Start: 2023-10-20 | End: 2023-11-06

## 2023-10-20 RX ORDER — SPIRONOLACTONE 25 MG/1
25 TABLET, FILM COATED ORAL DAILY
Refills: 0 | Status: DISCONTINUED | OUTPATIENT
Start: 2023-10-20 | End: 2023-11-06

## 2023-10-20 RX ORDER — DIPHENHYDRAMINE HCL 50 MG
50 CAPSULE ORAL ONCE
Refills: 0 | Status: COMPLETED | OUTPATIENT
Start: 2023-10-20 | End: 2023-10-20

## 2023-10-20 RX ORDER — GABAPENTIN 400 MG/1
1000 CAPSULE ORAL EVERY 8 HOURS
Refills: 0 | Status: DISCONTINUED | OUTPATIENT
Start: 2023-10-20 | End: 2023-10-22

## 2023-10-20 RX ORDER — DIAZEPAM 5 MG
2.5 TABLET ORAL AT BEDTIME
Refills: 0 | Status: DISCONTINUED | OUTPATIENT
Start: 2023-10-20 | End: 2023-10-25

## 2023-10-20 RX ORDER — DIVALPROEX SODIUM 500 MG/1
750 TABLET, DELAYED RELEASE ORAL
Refills: 0 | Status: DISCONTINUED | OUTPATIENT
Start: 2023-10-20 | End: 2023-10-23

## 2023-10-20 RX ADMIN — Medication 12 UNIT(S): at 08:09

## 2023-10-20 RX ADMIN — Medication 1 PATCH: at 07:43

## 2023-10-20 RX ADMIN — Medication 6: at 11:49

## 2023-10-20 RX ADMIN — Medication 4: at 08:09

## 2023-10-20 RX ADMIN — ATORVASTATIN CALCIUM 10 MILLIGRAM(S): 80 TABLET, FILM COATED ORAL at 21:53

## 2023-10-20 RX ADMIN — Medication 2: at 17:00

## 2023-10-20 RX ADMIN — GABAPENTIN 1000 MILLIGRAM(S): 400 CAPSULE ORAL at 21:53

## 2023-10-20 RX ADMIN — DIVALPROEX SODIUM 750 MILLIGRAM(S): 500 TABLET, DELAYED RELEASE ORAL at 21:53

## 2023-10-20 RX ADMIN — CARVEDILOL PHOSPHATE 6.25 MILLIGRAM(S): 80 CAPSULE, EXTENDED RELEASE ORAL at 10:35

## 2023-10-20 RX ADMIN — INSULIN GLARGINE 16 UNIT(S): 100 INJECTION, SOLUTION SUBCUTANEOUS at 22:28

## 2023-10-20 RX ADMIN — OLANZAPINE 5 MILLIGRAM(S): 15 TABLET, FILM COATED ORAL at 11:43

## 2023-10-20 RX ADMIN — Medication 12 UNIT(S): at 17:00

## 2023-10-20 RX ADMIN — Medication 10: at 22:32

## 2023-10-20 RX ADMIN — OLANZAPINE 5 MILLIGRAM(S): 15 TABLET, FILM COATED ORAL at 23:42

## 2023-10-20 RX ADMIN — Medication 2.5 MILLIGRAM(S): at 22:29

## 2023-10-20 RX ADMIN — RISPERIDONE 2 MILLIGRAM(S): 4 TABLET ORAL at 21:52

## 2023-10-20 RX ADMIN — Medication 3 MILLIGRAM(S): at 23:42

## 2023-10-20 RX ADMIN — METHADONE HYDROCHLORIDE 130 MILLIGRAM(S): 40 TABLET ORAL at 10:36

## 2023-10-20 RX ADMIN — Medication 50 MILLIGRAM(S): at 17:00

## 2023-10-20 RX ADMIN — GABAPENTIN 800 MILLIGRAM(S): 400 CAPSULE ORAL at 11:47

## 2023-10-20 RX ADMIN — CARVEDILOL PHOSPHATE 6.25 MILLIGRAM(S): 80 CAPSULE, EXTENDED RELEASE ORAL at 21:53

## 2023-10-20 RX ADMIN — Medication 5 MILLIGRAM(S): at 10:36

## 2023-10-20 RX ADMIN — LISINOPRIL 10 MILLIGRAM(S): 2.5 TABLET ORAL at 10:36

## 2023-10-20 RX ADMIN — DIVALPROEX SODIUM 500 MILLIGRAM(S): 500 TABLET, DELAYED RELEASE ORAL at 07:29

## 2023-10-20 RX ADMIN — GABAPENTIN 800 MILLIGRAM(S): 400 CAPSULE ORAL at 10:35

## 2023-10-20 RX ADMIN — Medication 12 UNIT(S): at 11:49

## 2023-10-20 NOTE — BH INPATIENT PSYCHIATRY PROGRESS NOTE - NSBHFUPINTERVALHXFT_PSY_A_CORE
Patient seen and evaluated. Chart reviewed. No acute events overnight, no new complaints. He remains calm, cooperative, and engaging with treatment team. Continues to report some anxiety regarding valium taper, but highly motivated, and willing to continue taper. Remains highly motivated for sobriety and told writer he was "looking forward" to the interview for rehab. He remains engaging, interacts appropriately with staff, denies any suicidal or homicidal ideation, intent, or plan at this time. Continues to deny any cravings of opioid, though he states that occasionally when he becomes anxious, he wonders if it's "withdrawals." Patient reassured. Patient in agreement to decrease Valium further to 2.5 mg orally nightly, and 5 mg orally daily, with goal of continued taper after the weekend, increase Gabapentin to 1000 mg orally TID, for anxiety, and Depakote increased to 750 mg orally BID from 500 mg orally BID, since Depakote level was subtherapeutic (59.3 on 10/19/23). Patient denies any auditory/visual hallucinations, he continues to make no mention of his chronic paranoid delusions. Today in afternoon, patient got involved in a verbal altercation with a peer over an issue with the television. Patient maintained behavioral control, was verbally redirected with no further issues. Towards early evening, patient reported some dyspnea and difficulty breathing. Vitals assessed, medicine consulted, recs to be appreciated.

## 2023-10-20 NOTE — CONSULT NOTE ADULT - ASSESSMENT
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 last known EF of 55% after once having an EF of 35%.  patient on GDMT outpatient as in HPI.   - c/w Coreg and Lisinopril  - add Spironolactone 25mg daily  - will cont to monitor for fluid overload signs. will diuresis if necessary, hold off for now  - obtain TTE w/o contrast w/ doppler    #ASCVD  patient w/ hx of stroke  - start Aspirin 81mg  - increase Atorvastatin to 40mg    #HTN  monitor blood pressures for now

## 2023-10-20 NOTE — BH INPATIENT PSYCHIATRY PROGRESS NOTE - NSBHCHARTREVIEWVS_PSY_A_CORE FT
Vital Signs Last 24 Hrs  T(C): 37.2 (10-20-23 @ 09:05), Max: 37.2 (10-20-23 @ 09:05)  T(F): 99 (10-20-23 @ 09:05), Max: 99 (10-20-23 @ 09:05)  HR: 68 (10-20-23 @ 09:05) (68 - 73)  BP: 152/66 (10-20-23 @ 09:05) (148/74 - 152/66)  RR: 18 (10-19-23 @ 20:26) (18 - 18)  SpO2: 95% (10-20-23 @ 09:05) (94% - 95%)

## 2023-10-20 NOTE — BH INPATIENT PSYCHIATRY PROGRESS NOTE - NSCGIIMPROVESX_PSY_ALL_CORE
Universal Safety Interventions 2 = Much improved - notably better with signficant reduction of symptoms; increase in the level of functioning but some symptoms remain

## 2023-10-20 NOTE — CONSULT NOTE ADULT - SUBJECTIVE AND OBJECTIVE BOX
Patient w/ hx of Epilepsy, CHF w/ recovered EF, GERD, Substance Use Disorder, stroke. Medicine consulted by psych resident with patient experiencing SOB and psych resident exam findings consisting of rales/rhonchi and patient stating he felt congested there. He followed with cardiology in [place] and last visit was 2 years ago. He states at one point he had an EF of 35% and his most recent visit he was at 55%. He has financial limitations to adhering to his GDMT and has only been taking lisnopril and coreg since leaving [place]. He was at one point on Coreg, Spironolactone, Lisinopril, and Lasix. He denies hx of heart attack. He was able to lay down flat and he spoke in complete sentences throughout the encounter    Subjective/ROS: Patient seen and examined at bedside.     Denies Fever/Chills, HA, CP, SOB, n/v, changes in bowel/urinary habits.  12pt ROS otherwise negative.    VITALS  Vital Signs Last 24 Hrs  T(C): 37.2 (20 Oct 2023 09:05), Max: 37.2 (20 Oct 2023 09:05)  T(F): 99 (20 Oct 2023 09:05), Max: 99 (20 Oct 2023 09:05)  HR: 68 (20 Oct 2023 09:05) (68 - 73)  BP: 152/66 (20 Oct 2023 09:05) (148/74 - 152/66)  BP(mean): --  RR: 18 (19 Oct 2023 20:26) (18 - 18)  SpO2: 95% (20 Oct 2023 09:05) (94% - 95%)    Parameters below as of 20 Oct 2023 09:05  Patient On (Oxygen Delivery Method): room air        CAPILLARY BLOOD GLUCOSE      POCT Blood Glucose.: 185 mg/dL (20 Oct 2023 17:01)  POCT Blood Glucose.: 293 mg/dL (20 Oct 2023 11:45)  POCT Blood Glucose.: 215 mg/dL (20 Oct 2023 07:33)  POCT Blood Glucose.: 324 mg/dL (19 Oct 2023 21:48)      PHYSICAL EXAM  General: NAD  Head: NC/AT; MMM; PERRL; EOMI;  Neck: Supple; no JVD  Respiratory: CTAB; no wheezes/rales/rhonchi  Cardiovascular: Regular rhythm/rate; S1/S2+, no murmurs, rubs gallops   Gastrointestinal: Soft; NTND; bowel sounds normal and present  Extremities: WWP; trace edema on bilateral extremities   Neurological: A&Ox3, CNII-XII grossly intact; no obvious focal deficits    MEDICATIONS  (STANDING):  aspirin  chewable 81 milliGRAM(s) Oral daily  atorvastatin 10 milliGRAM(s) Oral at bedtime  carvedilol 6.25 milliGRAM(s) Oral every 12 hours  dextrose 5%. 1000 milliLiter(s) (50 mL/Hr) IV Continuous <Continuous>  dextrose 5%. 1000 milliLiter(s) (100 mL/Hr) IV Continuous <Continuous>  dextrose 50% Injectable 25 Gram(s) IV Push once  dextrose 50% Injectable 12.5 Gram(s) IV Push once  dextrose 50% Injectable 25 Gram(s) IV Push once  diazepam    Tablet 5 milliGRAM(s) Oral daily  diazepam    Tablet 2.5 milliGRAM(s) Oral at bedtime  divalproex  milliGRAM(s) Oral two times a day  gabapentin 1000 milliGRAM(s) Oral three times a day  glucagon  Injectable 1 milliGRAM(s) IntraMuscular once  insulin glargine Injectable (LANTUS) 16 Unit(s) SubCutaneous at bedtime  insulin lispro (ADMELOG) corrective regimen sliding scale   SubCutaneous Before meals and at bedtime  insulin lispro Injectable (ADMELOG) 12 Unit(s) SubCutaneous three times a day before meals  lisinopril 10 milliGRAM(s) Oral daily  nicotine - 21 mG/24Hr(s) Patch 1 Patch Transdermal daily  risperiDONE   Tablet 2 milliGRAM(s) Oral at bedtime  spironolactone 25 milliGRAM(s) Oral daily    MEDICATIONS  (PRN):  acetaminophen     Tablet .. 650 milliGRAM(s) Oral every 6 hours PRN Temp greater or equal to 38C (100.4F), Mild Pain (1 - 3)  dextrose Oral Gel 15 Gram(s) Oral once PRN Blood Glucose LESS THAN 70 milliGRAM(s)/deciliter  melatonin 3 milliGRAM(s) Oral at bedtime PRN Insomnia  OLANZapine 5 milliGRAM(s) Oral every 6 hours PRN agitation      Seafood (Anaphylaxis)  Haldol (Unknown)  Cogentin (Unknown)  Thorazine (Rash)  Compazine (Short breath)      LABS                        12.2   6.51  )-----------( 213      ( 20 Oct 2023 16:35 )             37.3     10-20    136  |  93<L>  |  14  ----------------------------<  167<H>  4.3   |  34<H>  |  1.03    Ca    9.6      20 Oct 2023 16:35    TPro  6.8  /  Alb  3.6  /  TBili  <0.2  /  DBili  x   /  AST  12  /  ALT  10  /  AlkPhos  62  10-20      Urinalysis Basic - ( 20 Oct 2023 16:35 )    Color: x / Appearance: x / SG: x / pH: x  Gluc: 167 mg/dL / Ketone: x  / Bili: x / Urobili: x   Blood: x / Protein: x / Nitrite: x   Leuk Esterase: x / RBC: x / WBC x   Sq Epi: x / Non Sq Epi: x / Bacteria: x              IMAGING/EKG/ETC

## 2023-10-20 NOTE — BH INPATIENT PSYCHIATRY PROGRESS NOTE - NSBHASSESSSUMMFT_PSY_ALL_CORE
Patient is a 57 year old man, undomiciled, single, on disability, with PPH of bipolar 1 disorder, polysubstance use disorder (crystal meth, cocaine, opioid, cannabis, K2, alcohol), opioid use disorder on maintenance therapy, antisocial personality disorder, history of incarceration ([facility] years in [facility] for manslaughter and was released in 2014), with PMH of TBI, pituitary tumor, HTN, T2DM, and GERD, who was admitted to [facility], transferred from [facility] ED, after self-presenting for suicidal and homicidal ideation, in context of substance intoxication and medication non-adherence. On evaluation, patient reports continued depressed mood and passive SI with no intent/plan. He appears to have improved judgment as he has no intention of harming anyone. His delusions and paranoia remain, but are less severe at this time. It is unclear what medication increase he is referring to; continue to monitor for oversedation.     #Bipolar 1 disorder  - Continue with Risperidone 2 mg orally nightly for psychosis and disorganization  - Increase Divalproex XR to 750 mg orally BID from 500 mg orally twice daily for mood stabilization; subtherapeutic level 59.3 on 10/19/23   - Continue taper of Valium as follows: 5 mg orally daily and 2.5 mg orally nightly over the weekend, and continue taper as tolerated; patient remains highly motivated for continued taper and sobriety   - Increase Gabapentin to 1000 mg orally TID from 800 mg orally TID to target increased anxiety as Valium taper continues   - Melatonin/Zyprexa PRN for insomnia/agitation respectively   - 2P    #Opioid use disorder   - Continue Methadone 130 mg orally daily for maintenance opioid use disorder     #DM   - Hba1c 11.2   - Continue with Lantus 16 units nightly  - Continue with aspart 12 units TID with meals  - Continue with Atorvastatin 10 mg orally daily   - Continue with sliding scale and hypoglycemia protocol   - Endo consulted, recs appreciated, continue as per above     #HTN/CHF  - Continue with Lisinopril 10 mg orally daily   - Continue with Carvedilol 6.25 mg orally twice daily for CHF

## 2023-10-20 NOTE — CONSULT NOTE ADULT - ATTENDING COMMENTS
57M PMHx of Bipolar 1 disorder, polysusbtance abuse disorder, MMTP, hx TBI, pituitary tumor, CVA, HTN, DM, Epilepsy, CHF, He followed with cardiology in [place] and last visit was 2 years ago. He states at one point he had an EF of 35% and his most recent visit he was at 55%. Medicine consulted for CHF management     - Pt interviewed and examined with Med consult Dr. Rodríguez     - no clinical evidence of CHF exacerbation  - euvolemic in exam   - c/w GDMT: Carvedilol 6.25mg po bid , c/w Lisinopril 10mg daily , resume Spironolactone 25mg po daily   - Obtain TTE

## 2023-10-20 NOTE — BH INPATIENT PSYCHIATRY PROGRESS NOTE - NSBHMETABOLIC_PSY_ALL_CORE_FT
BMI: BMI (kg/m2): 31.6 (10-11-23 @ 11:12)  HbA1c: A1C with Estimated Average Glucose Result: 11.2 % (10-11-23 @ 11:04)  Glucose: POCT Blood Glucose.: 293 mg/dL (10-20-23 @ 11:45)  BP: 152/66 (10-20-23 @ 09:05) (130/89 - 166/83)  Lipid Panel: Date/Time: 10-10-23 @ 05:30  Cholesterol, Serum: 118  Direct LDL: 65  HDL Cholesterol, Serum: 36  Triglycerides, Serum: 87

## 2023-10-20 NOTE — BH INPATIENT PSYCHIATRY PROGRESS NOTE - PRN MEDS
MEDICATIONS  (PRN):  acetaminophen     Tablet .. 650 milliGRAM(s) Oral every 6 hours PRN Temp greater or equal to 38C (100.4F), Mild Pain (1 - 3)  dextrose Oral Gel 15 Gram(s) Oral once PRN Blood Glucose LESS THAN 70 milliGRAM(s)/deciliter  diphenhydrAMINE 50 milliGRAM(s) Oral once PRN anxiety  melatonin 3 milliGRAM(s) Oral at bedtime PRN Insomnia  OLANZapine 5 milliGRAM(s) Oral every 6 hours PRN agitation

## 2023-10-20 NOTE — BH INPATIENT PSYCHIATRY PROGRESS NOTE - CURRENT MEDICATION
MEDICATIONS  (STANDING):  atorvastatin 10 milliGRAM(s) Oral at bedtime  carvedilol 6.25 milliGRAM(s) Oral every 12 hours  dextrose 5%. 1000 milliLiter(s) (100 mL/Hr) IV Continuous <Continuous>  dextrose 5%. 1000 milliLiter(s) (50 mL/Hr) IV Continuous <Continuous>  dextrose 50% Injectable 25 Gram(s) IV Push once  dextrose 50% Injectable 12.5 Gram(s) IV Push once  dextrose 50% Injectable 25 Gram(s) IV Push once  diazepam    Tablet 2.5 milliGRAM(s) Oral at bedtime  diazepam    Tablet 5 milliGRAM(s) Oral daily  divalproex  milliGRAM(s) Oral two times a day  gabapentin 1000 milliGRAM(s) Oral three times a day  glucagon  Injectable 1 milliGRAM(s) IntraMuscular once  insulin glargine Injectable (LANTUS) 16 Unit(s) SubCutaneous at bedtime  insulin lispro (ADMELOG) corrective regimen sliding scale   SubCutaneous Before meals and at bedtime  insulin lispro Injectable (ADMELOG) 12 Unit(s) SubCutaneous three times a day before meals  lisinopril 10 milliGRAM(s) Oral daily  nicotine - 21 mG/24Hr(s) Patch 1 Patch Transdermal daily  risperiDONE   Tablet 2 milliGRAM(s) Oral at bedtime    MEDICATIONS  (PRN):  acetaminophen     Tablet .. 650 milliGRAM(s) Oral every 6 hours PRN Temp greater or equal to 38C (100.4F), Mild Pain (1 - 3)  dextrose Oral Gel 15 Gram(s) Oral once PRN Blood Glucose LESS THAN 70 milliGRAM(s)/deciliter  diphenhydrAMINE 50 milliGRAM(s) Oral once PRN anxiety  melatonin 3 milliGRAM(s) Oral at bedtime PRN Insomnia  OLANZapine 5 milliGRAM(s) Oral every 6 hours PRN agitation

## 2023-10-21 LAB
GLUCOSE BLDC GLUCOMTR-MCNC: 243 MG/DL — HIGH (ref 70–99)
GLUCOSE BLDC GLUCOMTR-MCNC: 243 MG/DL — HIGH (ref 70–99)
GLUCOSE BLDC GLUCOMTR-MCNC: 331 MG/DL — HIGH (ref 70–99)
GLUCOSE BLDC GLUCOMTR-MCNC: 331 MG/DL — HIGH (ref 70–99)
GLUCOSE BLDC GLUCOMTR-MCNC: 333 MG/DL — HIGH (ref 70–99)
GLUCOSE BLDC GLUCOMTR-MCNC: 333 MG/DL — HIGH (ref 70–99)

## 2023-10-21 PROCEDURE — 99232 SBSQ HOSP IP/OBS MODERATE 35: CPT | Mod: GC

## 2023-10-21 RX ORDER — ATORVASTATIN CALCIUM 80 MG/1
40 TABLET, FILM COATED ORAL AT BEDTIME
Refills: 0 | Status: DISCONTINUED | OUTPATIENT
Start: 2023-10-21 | End: 2023-11-06

## 2023-10-21 RX ADMIN — GABAPENTIN 1000 MILLIGRAM(S): 400 CAPSULE ORAL at 11:21

## 2023-10-21 RX ADMIN — Medication 8: at 21:50

## 2023-10-21 RX ADMIN — METHADONE HYDROCHLORIDE 130 MILLIGRAM(S): 40 TABLET ORAL at 10:50

## 2023-10-21 RX ADMIN — ATORVASTATIN CALCIUM 40 MILLIGRAM(S): 80 TABLET, FILM COATED ORAL at 21:49

## 2023-10-21 RX ADMIN — Medication 12 UNIT(S): at 12:15

## 2023-10-21 RX ADMIN — CARVEDILOL PHOSPHATE 6.25 MILLIGRAM(S): 80 CAPSULE, EXTENDED RELEASE ORAL at 10:43

## 2023-10-21 RX ADMIN — SPIRONOLACTONE 25 MILLIGRAM(S): 25 TABLET, FILM COATED ORAL at 10:44

## 2023-10-21 RX ADMIN — Medication 5 MILLIGRAM(S): at 10:50

## 2023-10-21 RX ADMIN — Medication 4: at 16:58

## 2023-10-21 RX ADMIN — GABAPENTIN 1000 MILLIGRAM(S): 400 CAPSULE ORAL at 21:50

## 2023-10-21 RX ADMIN — OLANZAPINE 5 MILLIGRAM(S): 15 TABLET, FILM COATED ORAL at 12:48

## 2023-10-21 RX ADMIN — Medication 8: at 12:15

## 2023-10-21 RX ADMIN — CARVEDILOL PHOSPHATE 6.25 MILLIGRAM(S): 80 CAPSULE, EXTENDED RELEASE ORAL at 21:49

## 2023-10-21 RX ADMIN — Medication 12 UNIT(S): at 16:57

## 2023-10-21 RX ADMIN — Medication 1 PATCH: at 10:50

## 2023-10-21 RX ADMIN — Medication 2.5 MILLIGRAM(S): at 21:49

## 2023-10-21 RX ADMIN — Medication 3 MILLIGRAM(S): at 23:34

## 2023-10-21 RX ADMIN — Medication 81 MILLIGRAM(S): at 10:44

## 2023-10-21 RX ADMIN — GABAPENTIN 1000 MILLIGRAM(S): 400 CAPSULE ORAL at 16:17

## 2023-10-21 RX ADMIN — LISINOPRIL 10 MILLIGRAM(S): 2.5 TABLET ORAL at 10:45

## 2023-10-21 RX ADMIN — RISPERIDONE 2 MILLIGRAM(S): 4 TABLET ORAL at 23:34

## 2023-10-21 RX ADMIN — INSULIN GLARGINE 16 UNIT(S): 100 INJECTION, SOLUTION SUBCUTANEOUS at 21:50

## 2023-10-21 RX ADMIN — OLANZAPINE 5 MILLIGRAM(S): 15 TABLET, FILM COATED ORAL at 23:34

## 2023-10-21 NOTE — PROGRESS NOTE ADULT - ASSESSMENT
57M PMHx of Bipolar 1 disorder, polysusbtance abuse disorder, MMTP, hx TBI, pituitary tumor, CVA, HTN, DM, Epilepsy, CHF, He followed with cardiology in [place] and last visit was 2 years ago. He states at one point he had an EF of 35% and his most recent visit he was at 55%. Medicine consulted for CHF management     #Hx of HFrEF  Heart failure with recovered ejection fraction. Last known EF of 55% after once having an EF of 35%.  patient on GDMT outpatient as in HPI.   - c/w Coreg 6.25mg q12h, Lisinopril 10mg Qd & Spironolactone 25mg Qd  - will cont to monitor for fluid overload signs. will diuresis if necessary, hold off for now  - obtain TTE w/o contrast w/ doppler    #ASCVD  patient w/ hx of stroke  - start Aspirin 81mg  - increase Atorvastatin to 40mg    #HTN  monitor blood pressures for now    Med consult will continue to follow.  Thank you for this consult. Please page [phone] for any questions.  Recommendations not final until attestation signed by attending.

## 2023-10-21 NOTE — PROGRESS NOTE ADULT - SUBJECTIVE AND OBJECTIVE BOX
OVERNIGHT EVENTS: INGRIS    SUBJECTIVE:  Patient seen and examined at bedside.  Patient was asleep at beginning of encounter. Endorses his current 130mg Methadone is "insufficient to sustain him" and requesting for 160mg dose, which was conveyed to the Psychiatry attending. No other complaints.    Vital Signs Last 12 Hrs  T(F): 98.6 (10-21-23 @ 08:45), Max: 98.6 (10-21-23 @ 08:45)  HR: 67 (10-21-23 @ 08:45) (67 - 67)  BP: 145/89 (10-21-23 @ 08:45) (145/89 - 145/89)  BP(mean): --  RR: 18 (10-21-23 @ 08:45) (18 - 18)  SpO2: 94% (10-21-23 @ 08:45) (94% - 94%)  I&O's Summary    PHYSICAL EXAM:  General: NAD    LABS:                        12.2   6.51  )-----------( 213      ( 20 Oct 2023 16:35 )             37.3     10-20    136  |  93<L>  |  14  ----------------------------<  167<H>  4.3   |  34<H>  |  1.03    Ca    9.6      20 Oct 2023 16:35    TPro  6.8  /  Alb  3.6  /  TBili  <0.2  /  DBili  x   /  AST  12  /  ALT  10  /  AlkPhos  62  10-20    Urinalysis Basic - ( 20 Oct 2023 16:35 )    Color: x / Appearance: x / SG: x / pH: x  Gluc: 167 mg/dL / Ketone: x  / Bili: x / Urobili: x   Blood: x / Protein: x / Nitrite: x   Leuk Esterase: x / RBC: x / WBC x   Sq Epi: x / Non Sq Epi: x / Bacteria: x    RADIOLOGY & ADDITIONAL TESTS:    MEDICATIONS  (STANDING):  aspirin  chewable 81 milliGRAM(s) Oral daily  atorvastatin 10 milliGRAM(s) Oral at bedtime  carvedilol 6.25 milliGRAM(s) Oral every 12 hours  dextrose 5%. 1000 milliLiter(s) (100 mL/Hr) IV Continuous <Continuous>  dextrose 5%. 1000 milliLiter(s) (50 mL/Hr) IV Continuous <Continuous>  dextrose 50% Injectable 25 Gram(s) IV Push once  dextrose 50% Injectable 12.5 Gram(s) IV Push once  dextrose 50% Injectable 25 Gram(s) IV Push once  diazepam    Tablet 5 milliGRAM(s) Oral daily  diazepam    Tablet 2.5 milliGRAM(s) Oral at bedtime  divalproex  milliGRAM(s) Oral two times a day  gabapentin 1000 milliGRAM(s) Oral every 8 hours  glucagon  Injectable 1 milliGRAM(s) IntraMuscular once  insulin glargine Injectable (LANTUS) 16 Unit(s) SubCutaneous at bedtime  insulin lispro (ADMELOG) corrective regimen sliding scale   SubCutaneous Before meals and at bedtime  insulin lispro Injectable (ADMELOG) 12 Unit(s) SubCutaneous three times a day before meals  lisinopril 10 milliGRAM(s) Oral daily  methadone    Tablet 130 milliGRAM(s) Oral daily  nicotine - 21 mG/24Hr(s) Patch 1 Patch Transdermal daily  risperiDONE   Tablet 2 milliGRAM(s) Oral at bedtime  spironolactone 25 milliGRAM(s) Oral daily    MEDICATIONS  (PRN):  acetaminophen     Tablet .. 650 milliGRAM(s) Oral every 6 hours PRN Temp greater or equal to 38C (100.4F), Mild Pain (1 - 3)  dextrose Oral Gel 15 Gram(s) Oral once PRN Blood Glucose LESS THAN 70 milliGRAM(s)/deciliter  melatonin 3 milliGRAM(s) Oral at bedtime PRN Insomnia  OLANZapine 5 milliGRAM(s) Oral every 6 hours PRN agitation

## 2023-10-22 LAB
GLUCOSE BLDC GLUCOMTR-MCNC: 183 MG/DL — HIGH (ref 70–99)
GLUCOSE BLDC GLUCOMTR-MCNC: 183 MG/DL — HIGH (ref 70–99)
GLUCOSE BLDC GLUCOMTR-MCNC: 199 MG/DL — HIGH (ref 70–99)
GLUCOSE BLDC GLUCOMTR-MCNC: 199 MG/DL — HIGH (ref 70–99)
GLUCOSE BLDC GLUCOMTR-MCNC: 292 MG/DL — HIGH (ref 70–99)
GLUCOSE BLDC GLUCOMTR-MCNC: 292 MG/DL — HIGH (ref 70–99)
GLUCOSE BLDC GLUCOMTR-MCNC: 344 MG/DL — HIGH (ref 70–99)
GLUCOSE BLDC GLUCOMTR-MCNC: 344 MG/DL — HIGH (ref 70–99)

## 2023-10-22 PROCEDURE — 99232 SBSQ HOSP IP/OBS MODERATE 35: CPT

## 2023-10-22 RX ORDER — DIAZEPAM 5 MG
2.5 TABLET ORAL DAILY
Refills: 0 | Status: DISCONTINUED | OUTPATIENT
Start: 2023-10-22 | End: 2023-10-26

## 2023-10-22 RX ORDER — GABAPENTIN 400 MG/1
1200 CAPSULE ORAL EVERY 8 HOURS
Refills: 0 | Status: DISCONTINUED | OUTPATIENT
Start: 2023-10-22 | End: 2023-11-06

## 2023-10-22 RX ADMIN — Medication 2: at 16:35

## 2023-10-22 RX ADMIN — Medication 12 UNIT(S): at 16:35

## 2023-10-22 RX ADMIN — Medication 2: at 07:27

## 2023-10-22 RX ADMIN — CARVEDILOL PHOSPHATE 6.25 MILLIGRAM(S): 80 CAPSULE, EXTENDED RELEASE ORAL at 21:20

## 2023-10-22 RX ADMIN — GABAPENTIN 1200 MILLIGRAM(S): 400 CAPSULE ORAL at 22:39

## 2023-10-22 RX ADMIN — GABAPENTIN 1000 MILLIGRAM(S): 400 CAPSULE ORAL at 13:05

## 2023-10-22 RX ADMIN — CARVEDILOL PHOSPHATE 6.25 MILLIGRAM(S): 80 CAPSULE, EXTENDED RELEASE ORAL at 11:01

## 2023-10-22 RX ADMIN — Medication 1 PATCH: at 11:25

## 2023-10-22 RX ADMIN — Medication 81 MILLIGRAM(S): at 11:01

## 2023-10-22 RX ADMIN — Medication 5 MILLIGRAM(S): at 11:01

## 2023-10-22 RX ADMIN — GABAPENTIN 1000 MILLIGRAM(S): 400 CAPSULE ORAL at 07:25

## 2023-10-22 RX ADMIN — ATORVASTATIN CALCIUM 40 MILLIGRAM(S): 80 TABLET, FILM COATED ORAL at 21:18

## 2023-10-22 RX ADMIN — METHADONE HYDROCHLORIDE 130 MILLIGRAM(S): 40 TABLET ORAL at 11:02

## 2023-10-22 RX ADMIN — Medication 6: at 22:42

## 2023-10-22 RX ADMIN — INSULIN GLARGINE 16 UNIT(S): 100 INJECTION, SOLUTION SUBCUTANEOUS at 22:42

## 2023-10-22 RX ADMIN — RISPERIDONE 2 MILLIGRAM(S): 4 TABLET ORAL at 21:18

## 2023-10-22 RX ADMIN — SPIRONOLACTONE 25 MILLIGRAM(S): 25 TABLET, FILM COATED ORAL at 11:01

## 2023-10-22 RX ADMIN — Medication 8: at 12:07

## 2023-10-22 RX ADMIN — LISINOPRIL 10 MILLIGRAM(S): 2.5 TABLET ORAL at 11:02

## 2023-10-22 RX ADMIN — Medication 2.5 MILLIGRAM(S): at 21:18

## 2023-10-22 RX ADMIN — Medication 12 UNIT(S): at 07:27

## 2023-10-22 RX ADMIN — OLANZAPINE 5 MILLIGRAM(S): 15 TABLET, FILM COATED ORAL at 21:20

## 2023-10-22 RX ADMIN — Medication 12 UNIT(S): at 12:08

## 2023-10-22 RX ADMIN — DIVALPROEX SODIUM 750 MILLIGRAM(S): 500 TABLET, DELAYED RELEASE ORAL at 21:18

## 2023-10-22 NOTE — BH INPATIENT PSYCHIATRY PROGRESS NOTE - NSBHCHARTREVIEWVS_PSY_A_CORE FT
Vital Signs Last 24 Hrs  T(C): 36.4 (10-22-23 @ 16:30), Max: 36.5 (10-22-23 @ 08:45)  T(F): 97.6 (10-22-23 @ 16:30), Max: 97.7 (10-22-23 @ 08:45)  HR: 72 (10-22-23 @ 16:30) (67 - 72)  BP: 127/77 (10-22-23 @ 16:30) (127/77 - 142/75)  BP(mean): --  RR: 18 (10-22-23 @ 16:30) (18 - 18)  SpO2: 91% (10-22-23 @ 16:30) (91% - 96%)

## 2023-10-22 NOTE — PROGRESS NOTE ADULT - ASSESSMENT
57M PMHx of Bipolar 1 disorder, polysubstance abuse disorder, MMTP, hx TBI, pituitary tumor, CVA, HTN, DM, Epilepsy, CHF, He followed with cardiology in [place] and last visit was 2 years ago. He states at one point he had an EF of 35% and his most recent visit he was at 55%. Medicine consulted for CHF management     # hx chronic CHF  # HTN  # HLD  - /73, will increase Lisinopril 10mg-> 20mg daily   - c/w GDMT: Carvedilol 6.25mg po bid, increase Lisinopril 10mg-> 20mg qPM , Spironolactone 25mg po qAM   - c/w ASA/Atorvastatin  - Obtain TTE to evaluate LVEF     # MMTP  - c/w Methadone 130mg po daily ( pt asking to go up to 160mg po daily, to d/w Psych attending)     # T2DM  - A1C 11.2%  - c/w Lantus 16U qHS and Humalog 12U AC meals TID  -  this am, but -333 yesterday, SANGITA RN reports pt is non-compliance to diet   - Rec: go up the premeal Humalog to 16U and Endocrine consult in am     # smoking   - Nicotine patch 21mg patch/d daily, taper to 14mg/d 2nd week then 7mg/d 3rd week  - smoking cessation advice    # Polysubstance abuse   # Bipolar disorder Type 1   # Hx Epilepsy   - taper valium per Psych- 5mg qAM and 2.5mg qHS   - c/w Divalproex DR 750mg po bid   - Gabapentin 1000mg po q8hr   - Risperidone 2mg po qHS   - Olanzapine 5mg po q6hr prn agitation     d/w SANGITA CARREON. Med consult team to follow thank you.

## 2023-10-22 NOTE — BH INPATIENT PSYCHIATRY PROGRESS NOTE - CURRENT MEDICATION
MEDICATIONS  (STANDING):  aspirin  chewable 81 milliGRAM(s) Oral daily  atorvastatin 40 milliGRAM(s) Oral at bedtime  carvedilol 6.25 milliGRAM(s) Oral every 12 hours  dextrose 5%. 1000 milliLiter(s) (100 mL/Hr) IV Continuous <Continuous>  dextrose 5%. 1000 milliLiter(s) (50 mL/Hr) IV Continuous <Continuous>  dextrose 50% Injectable 25 Gram(s) IV Push once  dextrose 50% Injectable 12.5 Gram(s) IV Push once  dextrose 50% Injectable 25 Gram(s) IV Push once  diazepam    Tablet 5 milliGRAM(s) Oral daily  diazepam    Tablet 2.5 milliGRAM(s) Oral at bedtime  divalproex  milliGRAM(s) Oral two times a day  gabapentin 1000 milliGRAM(s) Oral every 8 hours  glucagon  Injectable 1 milliGRAM(s) IntraMuscular once  insulin glargine Injectable (LANTUS) 16 Unit(s) SubCutaneous at bedtime  insulin lispro (ADMELOG) corrective regimen sliding scale   SubCutaneous Before meals and at bedtime  insulin lispro Injectable (ADMELOG) 12 Unit(s) SubCutaneous three times a day before meals  lisinopril 10 milliGRAM(s) Oral daily  methadone    Tablet 130 milliGRAM(s) Oral daily  nicotine - 21 mG/24Hr(s) Patch 1 Patch Transdermal daily  risperiDONE   Tablet 2 milliGRAM(s) Oral at bedtime  spironolactone 25 milliGRAM(s) Oral daily    MEDICATIONS  (PRN):  acetaminophen     Tablet .. 650 milliGRAM(s) Oral every 6 hours PRN Temp greater or equal to 38C (100.4F), Mild Pain (1 - 3)  dextrose Oral Gel 15 Gram(s) Oral once PRN Blood Glucose LESS THAN 70 milliGRAM(s)/deciliter  melatonin 3 milliGRAM(s) Oral at bedtime PRN Insomnia  OLANZapine 5 milliGRAM(s) Oral every 6 hours PRN agitation

## 2023-10-22 NOTE — PROGRESS NOTE ADULT - SUBJECTIVE AND OBJECTIVE BOX
Patient is a 57y old  Male who presents with a chief complaint of     INTERVAL EVENTS: NAEON    SUBJECTIVE:  Patient was seen and examined at bedside. Denies CP,SOB,etc.     Review of systems: No fever, chills, dizziness, HA, Changes in vision, CP, dyspnea, nausea or vomiting, dysuria, changes in bowel movements, LE edema. Rest of 12 point Review of systems negative unless otherwise documented elsewhere in note.     Diet, Consistent Carbohydrate w/Evening Snack (10-09-23 @ 16:45) [Active]      MEDICATIONS:  MEDICATIONS  (STANDING):  aspirin  chewable 81 milliGRAM(s) Oral daily  atorvastatin 40 milliGRAM(s) Oral at bedtime  carvedilol 6.25 milliGRAM(s) Oral every 12 hours  dextrose 5%. 1000 milliLiter(s) (100 mL/Hr) IV Continuous <Continuous>  dextrose 5%. 1000 milliLiter(s) (50 mL/Hr) IV Continuous <Continuous>  dextrose 50% Injectable 25 Gram(s) IV Push once  dextrose 50% Injectable 25 Gram(s) IV Push once  dextrose 50% Injectable 12.5 Gram(s) IV Push once  diazepam    Tablet 5 milliGRAM(s) Oral daily  diazepam    Tablet 2.5 milliGRAM(s) Oral at bedtime  divalproex  milliGRAM(s) Oral two times a day  gabapentin 1000 milliGRAM(s) Oral every 8 hours  glucagon  Injectable 1 milliGRAM(s) IntraMuscular once  insulin glargine Injectable (LANTUS) 16 Unit(s) SubCutaneous at bedtime  insulin lispro (ADMELOG) corrective regimen sliding scale   SubCutaneous Before meals and at bedtime  insulin lispro Injectable (ADMELOG) 12 Unit(s) SubCutaneous three times a day before meals  lisinopril 10 milliGRAM(s) Oral daily  methadone    Tablet 130 milliGRAM(s) Oral daily  nicotine - 21 mG/24Hr(s) Patch 1 Patch Transdermal daily  risperiDONE   Tablet 2 milliGRAM(s) Oral at bedtime  spironolactone 25 milliGRAM(s) Oral daily    MEDICATIONS  (PRN):  acetaminophen     Tablet .. 650 milliGRAM(s) Oral every 6 hours PRN Temp greater or equal to 38C (100.4F), Mild Pain (1 - 3)  dextrose Oral Gel 15 Gram(s) Oral once PRN Blood Glucose LESS THAN 70 milliGRAM(s)/deciliter  melatonin 3 milliGRAM(s) Oral at bedtime PRN Insomnia  OLANZapine 5 milliGRAM(s) Oral every 6 hours PRN agitation      Allergies    Seafood (Anaphylaxis)  Haldol (Unknown)  Cogentin (Unknown)  Thorazine (Rash)  Compazine (Short breath)    Intolerances        OBJECTIVE:  Vital Signs Last 24 Hrs  T(C): 36.5 (22 Oct 2023 08:45), Max: 37 (21 Oct 2023 16:41)  T(F): 97.7 (22 Oct 2023 08:45), Max: 98.6 (21 Oct 2023 16:41)  HR: 67 (22 Oct 2023 08:45) (67 - 69)  BP: 142/75 (22 Oct 2023 08:45) (142/75 - 155/73)  BP(mean): --  RR: 18 (22 Oct 2023 08:45) (18 - 18)  SpO2: 96% (22 Oct 2023 08:45) (91% - 96%)    Parameters below as of 22 Oct 2023 08:45  Patient On (Oxygen Delivery Method): room air      I&O's Summary      PHYSICAL EXAM:  Gen: Reclining in bed at time of exam, appears stated age  HEENT: NCAT, MMM, clear OP  Neck: supple, trachea at midline  CV: RRR, +S1/S2  Pulm: adequate respiratory effort, no increase in work of breathing  Abd: soft, NTND  Skin: warm and dry,   Ext: WWP, no LE edema  Neuro: AOx3, no gross focal neurological deficits  Psych: affect and behavior appropriate, pleasant at time of interview  :     LABS:                        12.2   6.51  )-----------( 213      ( 20 Oct 2023 16:35 )             37.3     10-20    136  |  93<L>  |  14  ----------------------------<  167<H>  4.3   |  34<H>  |  1.03    Ca    9.6      20 Oct 2023 16:35    TPro  6.8  /  Alb  3.6  /  TBili  <0.2  /  DBili  x   /  AST  12  /  ALT  10  /  AlkPhos  62  10-20    LIVER FUNCTIONS - ( 20 Oct 2023 16:35 )  Alb: 3.6 g/dL / Pro: 6.8 g/dL / ALK PHOS: 62 U/L / ALT: 10 U/L / AST: 12 U/L / GGT: x             CAPILLARY BLOOD GLUCOSE      POCT Blood Glucose.: 344 mg/dL (22 Oct 2023 12:03)  POCT Blood Glucose.: 183 mg/dL (22 Oct 2023 07:14)  POCT Blood Glucose.: 331 mg/dL (21 Oct 2023 21:44)  POCT Blood Glucose.: 243 mg/dL (21 Oct 2023 16:55)    Urinalysis Basic - ( 20 Oct 2023 16:35 )    Color: x / Appearance: x / SG: x / pH: x  Gluc: 167 mg/dL / Ketone: x  / Bili: x / Urobili: x   Blood: x / Protein: x / Nitrite: x   Leuk Esterase: x / RBC: x / WBC x   Sq Epi: x / Non Sq Epi: x / Bacteria: x        MICRODATA:      RADIOLOGY/OTHER STUDIES:    PCP  Pharmacy:   Emergency contact:

## 2023-10-22 NOTE — BH INPATIENT PSYCHIATRY PROGRESS NOTE - NSBHFUPINTERVALHXFT_PSY_A_CORE
Pt reporting feeling very anxious with "severe body changes" in the midst of valium taper. Pt still motivated for inpatient rehab. No signs of withdrawal at this time. Will continue taper and increase neurontin as needed. Pt applying to several rehabs.

## 2023-10-22 NOTE — BH INPATIENT PSYCHIATRY PROGRESS NOTE - NSBHASSESSSUMMFT_PSY_ALL_CORE
Patient is a 57 year old man, undomiciled, single, on disability, with PPH of bipolar 1 disorder, polysubstance use disorder (crystal meth, cocaine, opioid, cannabis, K2, alcohol), opioid use disorder on maintenance therapy, antisocial personality disorder, history of incarceration (8 years in [facility] for manslaughter and was released in 2014), with PMH of TBI, pituitary tumor, HTN, T2DM, and GERD, who was admitted to [facility], transferred from [facility] ED, after self-presenting for suicidal and homicidal ideation, in context of substance intoxication and medication non-adherence. On evaluation, patient reports continued depressed mood and passive SI with no intent/plan. He appears to have improved judgment as he has no intention of harming anyone. His delusions and paranoia remain, but are less severe at this time. It is unclear what medication increase he is referring to; continue to monitor for oversedation.     #Bipolar 1 disorder  - Continue with Risperidone 2 mg orally nightly for psychosis and disorganization  - Increase Divalproex XR to 750 mg orally BID from 500 mg orally twice daily for mood stabilization; subtherapeutic level 59.3 on 10/19/23   - Continue taper of Valium as follows: 5 mg orally daily and 2.5 mg orally nightly over the weekend, and continue taper as tolerated; patient remains highly motivated for continued taper and sobriety   - Increase Gabapentin to 1000 mg orally TID from 800 mg orally TID to target increased anxiety as Valium taper continues   - Melatonin/Zyprexa PRN for insomnia/agitation respectively   - 2P    #Opioid use disorder   - Continue Methadone 130 mg orally daily for maintenance opioid use disorder     #DM   - Hba1c 11.2   - Continue with Lantus 16 units nightly  - Continue with aspart 12 units TID with meals  - Continue with Atorvastatin 10 mg orally daily   - Continue with sliding scale and hypoglycemia protocol   - Endo consulted, recs appreciated, continue as per above     #HTN/CHF  - Continue with Lisinopril 10 mg orally daily   - Continue with Carvedilol 6.25 mg orally twice daily for CHF

## 2023-10-23 LAB
GLUCOSE BLDC GLUCOMTR-MCNC: 233 MG/DL — HIGH (ref 70–99)
GLUCOSE BLDC GLUCOMTR-MCNC: 233 MG/DL — HIGH (ref 70–99)
GLUCOSE BLDC GLUCOMTR-MCNC: 268 MG/DL — HIGH (ref 70–99)
GLUCOSE BLDC GLUCOMTR-MCNC: 268 MG/DL — HIGH (ref 70–99)
GLUCOSE BLDC GLUCOMTR-MCNC: 306 MG/DL — HIGH (ref 70–99)
GLUCOSE BLDC GLUCOMTR-MCNC: 306 MG/DL — HIGH (ref 70–99)
GLUCOSE BLDC GLUCOMTR-MCNC: 324 MG/DL — HIGH (ref 70–99)
GLUCOSE BLDC GLUCOMTR-MCNC: 324 MG/DL — HIGH (ref 70–99)

## 2023-10-23 PROCEDURE — 99232 SBSQ HOSP IP/OBS MODERATE 35: CPT

## 2023-10-23 PROCEDURE — 99232 SBSQ HOSP IP/OBS MODERATE 35: CPT | Mod: GC

## 2023-10-23 RX ORDER — DIVALPROEX SODIUM 500 MG/1
500 TABLET, DELAYED RELEASE ORAL DAILY
Refills: 0 | Status: DISCONTINUED | OUTPATIENT
Start: 2023-10-23 | End: 2023-11-06

## 2023-10-23 RX ORDER — DIVALPROEX SODIUM 500 MG/1
750 TABLET, DELAYED RELEASE ORAL AT BEDTIME
Refills: 0 | Status: DISCONTINUED | OUTPATIENT
Start: 2023-10-23 | End: 2023-11-06

## 2023-10-23 RX ADMIN — DIVALPROEX SODIUM 750 MILLIGRAM(S): 500 TABLET, DELAYED RELEASE ORAL at 11:37

## 2023-10-23 RX ADMIN — Medication 2.5 MILLIGRAM(S): at 11:38

## 2023-10-23 RX ADMIN — CARVEDILOL PHOSPHATE 6.25 MILLIGRAM(S): 80 CAPSULE, EXTENDED RELEASE ORAL at 11:38

## 2023-10-23 RX ADMIN — GABAPENTIN 1200 MILLIGRAM(S): 400 CAPSULE ORAL at 13:11

## 2023-10-23 RX ADMIN — SPIRONOLACTONE 25 MILLIGRAM(S): 25 TABLET, FILM COATED ORAL at 11:39

## 2023-10-23 RX ADMIN — CARVEDILOL PHOSPHATE 6.25 MILLIGRAM(S): 80 CAPSULE, EXTENDED RELEASE ORAL at 21:44

## 2023-10-23 RX ADMIN — ATORVASTATIN CALCIUM 40 MILLIGRAM(S): 80 TABLET, FILM COATED ORAL at 21:40

## 2023-10-23 RX ADMIN — Medication 81 MILLIGRAM(S): at 11:39

## 2023-10-23 RX ADMIN — GABAPENTIN 1200 MILLIGRAM(S): 400 CAPSULE ORAL at 07:35

## 2023-10-23 RX ADMIN — Medication 8: at 21:46

## 2023-10-23 RX ADMIN — Medication 8: at 12:26

## 2023-10-23 RX ADMIN — Medication 4: at 17:08

## 2023-10-23 RX ADMIN — Medication 6: at 07:36

## 2023-10-23 RX ADMIN — Medication 1 PATCH: at 10:28

## 2023-10-23 RX ADMIN — Medication 650 MILLIGRAM(S): at 17:07

## 2023-10-23 RX ADMIN — DIVALPROEX SODIUM 750 MILLIGRAM(S): 500 TABLET, DELAYED RELEASE ORAL at 21:40

## 2023-10-23 RX ADMIN — Medication 12 UNIT(S): at 17:08

## 2023-10-23 RX ADMIN — METHADONE HYDROCHLORIDE 130 MILLIGRAM(S): 40 TABLET ORAL at 11:36

## 2023-10-23 RX ADMIN — LISINOPRIL 10 MILLIGRAM(S): 2.5 TABLET ORAL at 11:37

## 2023-10-23 RX ADMIN — Medication 1 PATCH: at 18:14

## 2023-10-23 RX ADMIN — OLANZAPINE 5 MILLIGRAM(S): 15 TABLET, FILM COATED ORAL at 14:51

## 2023-10-23 RX ADMIN — Medication 12 UNIT(S): at 07:35

## 2023-10-23 RX ADMIN — Medication 3 MILLIGRAM(S): at 02:43

## 2023-10-23 RX ADMIN — Medication 650 MILLIGRAM(S): at 17:45

## 2023-10-23 RX ADMIN — GABAPENTIN 1200 MILLIGRAM(S): 400 CAPSULE ORAL at 21:40

## 2023-10-23 RX ADMIN — RISPERIDONE 2 MILLIGRAM(S): 4 TABLET ORAL at 21:40

## 2023-10-23 RX ADMIN — Medication 1 PATCH: at 11:35

## 2023-10-23 RX ADMIN — Medication 2.5 MILLIGRAM(S): at 21:44

## 2023-10-23 RX ADMIN — INSULIN GLARGINE 16 UNIT(S): 100 INJECTION, SOLUTION SUBCUTANEOUS at 21:42

## 2023-10-23 RX ADMIN — Medication 12 UNIT(S): at 12:26

## 2023-10-23 NOTE — PROGRESS NOTE ADULT - ASSESSMENT
57M PMHx of Bipolar 1 disorder, polysubstance abuse disorder, MMTP, hx TBI, pituitary tumor, CVA, HTN, DM, Epilepsy, CHF, He followed with cardiology in [place] and last visit was 2 years ago. He states at one point he had an EF of 35% and his most recent visit he was at 55%. Medicine consulted for CHF management     #Knee Pain  - ease obtain right knee xray, right knee ultrasound  - place lidocaine patch on knee    # hx chronic CHF  # HTN  # HLD  - /73, will increase Lisinopril 10mg-> 20mg daily   - c/w GDMT: Carvedilol 6.25mg po bid, Lisinopril 20mg qPM , Spironolactone 25mg po qAM   - c/w ASA/Atorvastatin  - Obtain TTE to evaluate LVEF     # MMTP  - c/w Methadone 130mg po daily ( pt asking to go up to 160mg po daily, to d/w Psych attending)     # T2DM  - A1C 11.2%  - c/w Lantus 16U qHS and Humalog 12U AC meals TID  -  this am, but -333 yesterday,  RN reports pt is non-compliance to diet   - Rec: go up the premeal Humalog to 16U and Endocrine consult in am   - consult endocrine for persistent hypoglycemia  - change diet to consistent carbohydrate    # smoking   - Nicotine patch 21mg patch/d daily, taper to 14mg/d 2nd week then 7mg/d 3rd week  - smoking cessation advice    # Polysubstance abuse   # Bipolar disorder Type 1   # Hx Epilepsy   - valium- 5mg qAM and 2.5mg qHS   - c/w Divalproex DR 750mg po bid   - Gabapentin 1000mg po q8hr   - Risperidone 2mg po qHS   - Olanzapine 5mg po q6hr prn agitation     d/w SANGITA RN. Med consult team to follow thank you.

## 2023-10-23 NOTE — BH INPATIENT PSYCHIATRY PROGRESS NOTE - NSBHFUPINTERVALHXFT_PSY_A_CORE
Pt refused depakote 750 mg twice a day but agreeing to 500 mg in am and 750 mg at night. Undergoing valium taper. Applying to rehabs. Motivated for rehab. Wants me to speak with his sister.

## 2023-10-23 NOTE — BH INPATIENT PSYCHIATRY PROGRESS NOTE - CURRENT MEDICATION
MEDICATIONS  (STANDING):  aspirin  chewable 81 milliGRAM(s) Oral daily  atorvastatin 40 milliGRAM(s) Oral at bedtime  carvedilol 6.25 milliGRAM(s) Oral every 12 hours  dextrose 5%. 1000 milliLiter(s) (100 mL/Hr) IV Continuous <Continuous>  dextrose 5%. 1000 milliLiter(s) (50 mL/Hr) IV Continuous <Continuous>  dextrose 50% Injectable 12.5 Gram(s) IV Push once  dextrose 50% Injectable 25 Gram(s) IV Push once  dextrose 50% Injectable 25 Gram(s) IV Push once  diazepam    Tablet 2.5 milliGRAM(s) Oral at bedtime  diazepam    Tablet 2.5 milliGRAM(s) Oral daily  divalproex  milliGRAM(s) Oral two times a day  gabapentin 1200 milliGRAM(s) Oral every 8 hours  glucagon  Injectable 1 milliGRAM(s) IntraMuscular once  insulin glargine Injectable (LANTUS) 16 Unit(s) SubCutaneous at bedtime  insulin lispro (ADMELOG) corrective regimen sliding scale   SubCutaneous Before meals and at bedtime  insulin lispro Injectable (ADMELOG) 12 Unit(s) SubCutaneous three times a day before meals  lisinopril 10 milliGRAM(s) Oral daily  methadone    Tablet 130 milliGRAM(s) Oral daily  nicotine - 21 mG/24Hr(s) Patch 1 Patch Transdermal daily  risperiDONE   Tablet 2 milliGRAM(s) Oral at bedtime  spironolactone 25 milliGRAM(s) Oral daily    MEDICATIONS  (PRN):  acetaminophen     Tablet .. 650 milliGRAM(s) Oral every 6 hours PRN Temp greater or equal to 38C (100.4F), Mild Pain (1 - 3)  dextrose Oral Gel 15 Gram(s) Oral once PRN Blood Glucose LESS THAN 70 milliGRAM(s)/deciliter  melatonin 3 milliGRAM(s) Oral at bedtime PRN Insomnia  OLANZapine 5 milliGRAM(s) Oral every 6 hours PRN agitation

## 2023-10-23 NOTE — PROGRESS NOTE ADULT - SUBJECTIVE AND OBJECTIVE BOX
O/N Events: NAEO    Subjective/ROS: Patient seen and examined at bedside. patient complaining of pain at the right knee    Denies Fever/Chills, HA, CP, SOB, n/v, changes in bowel/urinary habits.  12pt ROS otherwise negative.    VITALS  Vital Signs Last 24 Hrs  T(C): 37.2 (23 Oct 2023 09:01), Max: 37.2 (23 Oct 2023 09:01)  T(F): 98.9 (23 Oct 2023 09:01), Max: 98.9 (23 Oct 2023 09:01)  HR: 70 (23 Oct 2023 09:01) (70 - 72)  BP: 134/78 (23 Oct 2023 09:01) (127/77 - 134/78)  BP(mean): --  RR: 18 (22 Oct 2023 16:30) (18 - 18)  SpO2: 94% (23 Oct 2023 09:01) (91% - 94%)    Parameters below as of 23 Oct 2023 09:01  Patient On (Oxygen Delivery Method): room air        CAPILLARY BLOOD GLUCOSE      POCT Blood Glucose.: 306 mg/dL (23 Oct 2023 12:24)  POCT Blood Glucose.: 268 mg/dL (23 Oct 2023 07:33)  POCT Blood Glucose.: 292 mg/dL (22 Oct 2023 22:41)  POCT Blood Glucose.: 199 mg/dL (22 Oct 2023 16:13)      PHYSICAL EXAM  General: NAD  Extremities: WWP; no edema/cyanosis. tenderness at the right knee just distal to the patella  Neurological: A&Ox3, CNII-XII grossly intact; no obvious focal deficits    MEDICATIONS  (STANDING):  aspirin  chewable 81 milliGRAM(s) Oral daily  atorvastatin 40 milliGRAM(s) Oral at bedtime  carvedilol 6.25 milliGRAM(s) Oral every 12 hours  dextrose 5%. 1000 milliLiter(s) (100 mL/Hr) IV Continuous <Continuous>  dextrose 5%. 1000 milliLiter(s) (50 mL/Hr) IV Continuous <Continuous>  dextrose 50% Injectable 12.5 Gram(s) IV Push once  dextrose 50% Injectable 25 Gram(s) IV Push once  dextrose 50% Injectable 25 Gram(s) IV Push once  diazepam    Tablet 2.5 milliGRAM(s) Oral at bedtime  diazepam    Tablet 2.5 milliGRAM(s) Oral daily  divalproex  milliGRAM(s) Oral two times a day  gabapentin 1200 milliGRAM(s) Oral every 8 hours  glucagon  Injectable 1 milliGRAM(s) IntraMuscular once  insulin glargine Injectable (LANTUS) 16 Unit(s) SubCutaneous at bedtime  insulin lispro (ADMELOG) corrective regimen sliding scale   SubCutaneous Before meals and at bedtime  insulin lispro Injectable (ADMELOG) 12 Unit(s) SubCutaneous three times a day before meals  lisinopril 10 milliGRAM(s) Oral daily  methadone    Tablet 130 milliGRAM(s) Oral daily  nicotine - 21 mG/24Hr(s) Patch 1 Patch Transdermal daily  risperiDONE   Tablet 2 milliGRAM(s) Oral at bedtime  spironolactone 25 milliGRAM(s) Oral daily    MEDICATIONS  (PRN):  acetaminophen     Tablet .. 650 milliGRAM(s) Oral every 6 hours PRN Temp greater or equal to 38C (100.4F), Mild Pain (1 - 3)  dextrose Oral Gel 15 Gram(s) Oral once PRN Blood Glucose LESS THAN 70 milliGRAM(s)/deciliter  melatonin 3 milliGRAM(s) Oral at bedtime PRN Insomnia  OLANZapine 5 milliGRAM(s) Oral every 6 hours PRN agitation      Seafood (Anaphylaxis)  Haldol (Unknown)  Cogentin (Unknown)  Thorazine (Rash)  Compazine (Short breath)      LABS                      IMAGING/EKG/ETC

## 2023-10-23 NOTE — BH INPATIENT PSYCHIATRY PROGRESS NOTE - NSBHMETABOLIC_PSY_ALL_CORE_FT
BMI: BMI (kg/m2): 31.6 (10-11-23 @ 11:12)  HbA1c: A1C with Estimated Average Glucose Result: 11.2 % (10-11-23 @ 11:04)    Glucose: POCT Blood Glucose.: 306 mg/dL (10-23-23 @ 12:24)    BP: 134/78 (10-23-23 @ 09:01) (127/77 - 155/73)  Lipid Panel: Date/Time: 10-10-23 @ 05:30  Cholesterol, Serum: 118  Direct LDL: --  HDL Cholesterol, Serum: 36  Total Cholesterol/HDL Ration Measurement: --  Triglycerides, Serum: 87

## 2023-10-23 NOTE — BH INPATIENT PSYCHIATRY PROGRESS NOTE - NSBHASSESSSUMMFT_PSY_ALL_CORE
Patient is a 57 year old man, undomiciled, single, on disability, with PPH of bipolar 1 disorder, polysubstance use disorder (crystal meth, cocaine, opioid, cannabis, K2, alcohol), opioid use disorder on maintenance therapy, antisocial personality disorder, history of incarceration (8 years in jail for manslaughter and was released in 2014), with PMH of TBI, pituitary tumor, HTN, T2DM, and GERD, who was admitted to [facility], transferred from [facility] ED, after self-presenting for suicidal and homicidal ideation, in context of substance intoxication and medication non-adherence. On evaluation, patient reports continued depressed mood and passive SI with no intent/plan. He appears to have improved judgment as he has no intention of harming anyone. His delusions and paranoia remain, but are less severe at this time. It is unclear what medication increase he is referring to; continue to monitor for oversedation.     #Bipolar 1 disorder  - Continue with Risperidone 2 mg orally nightly for psychosis and disorganization  - Increase Divalproex XR to 750 mg orally BID from 500 mg orally twice daily for mood stabilization; subtherapeutic level 59.3 on 10/19/23   - Continue taper of Valium as follows: 5 mg orally daily and 2.5 mg orally nightly over the weekend, and continue taper as tolerated; patient remains highly motivated for continued taper and sobriety   - Increase Gabapentin to 1000 mg orally TID from 800 mg orally TID to target increased anxiety as Valium taper continues   - Melatonin/Zyprexa PRN for insomnia/agitation respectively   - 2P    #Opioid use disorder   - Continue Methadone 130 mg orally daily for maintenance opioid use disorder     #DM   - Hba1c 11.2   - Continue with Lantus 16 units nightly  - Continue with aspart 12 units TID with meals  - Continue with Atorvastatin 10 mg orally daily   - Continue with sliding scale and hypoglycemia protocol   - Endo consulted, recs appreciated, continue as per above     #HTN/CHF  - Continue with Lisinopril 10 mg orally daily   - Continue with Carvedilol 6.25 mg orally twice daily for CHF

## 2023-10-23 NOTE — BH INPATIENT PSYCHIATRY PROGRESS NOTE - NSBHCHARTREVIEWVS_PSY_A_CORE FT
Vital Signs Last 24 Hrs  T(C): 37.2 (10-23-23 @ 09:01), Max: 37.2 (10-23-23 @ 09:01)  T(F): 98.9 (10-23-23 @ 09:01), Max: 98.9 (10-23-23 @ 09:01)  HR: 70 (10-23-23 @ 09:01) (70 - 72)  BP: 134/78 (10-23-23 @ 09:01) (127/77 - 134/78)  BP(mean): --  RR: 18 (10-22-23 @ 16:30) (18 - 18)  SpO2: 94% (10-23-23 @ 09:01) (91% - 94%)

## 2023-10-24 LAB
GLUCOSE BLDC GLUCOMTR-MCNC: 191 MG/DL — HIGH (ref 70–99)
GLUCOSE BLDC GLUCOMTR-MCNC: 191 MG/DL — HIGH (ref 70–99)
GLUCOSE BLDC GLUCOMTR-MCNC: 203 MG/DL — HIGH (ref 70–99)
GLUCOSE BLDC GLUCOMTR-MCNC: 203 MG/DL — HIGH (ref 70–99)
GLUCOSE BLDC GLUCOMTR-MCNC: 223 MG/DL — HIGH (ref 70–99)
GLUCOSE BLDC GLUCOMTR-MCNC: 223 MG/DL — HIGH (ref 70–99)
GLUCOSE BLDC GLUCOMTR-MCNC: 226 MG/DL — HIGH (ref 70–99)
GLUCOSE BLDC GLUCOMTR-MCNC: 226 MG/DL — HIGH (ref 70–99)

## 2023-10-24 PROCEDURE — 99233 SBSQ HOSP IP/OBS HIGH 50: CPT | Mod: GC

## 2023-10-24 PROCEDURE — 93306 TTE W/DOPPLER COMPLETE: CPT | Mod: 26

## 2023-10-24 PROCEDURE — 73560 X-RAY EXAM OF KNEE 1 OR 2: CPT | Mod: 26,RT

## 2023-10-24 RX ADMIN — DIVALPROEX SODIUM 750 MILLIGRAM(S): 500 TABLET, DELAYED RELEASE ORAL at 21:42

## 2023-10-24 RX ADMIN — Medication 4: at 21:43

## 2023-10-24 RX ADMIN — Medication 2: at 08:10

## 2023-10-24 RX ADMIN — Medication 4: at 12:14

## 2023-10-24 RX ADMIN — GABAPENTIN 1200 MILLIGRAM(S): 400 CAPSULE ORAL at 13:26

## 2023-10-24 RX ADMIN — Medication 4: at 16:57

## 2023-10-24 RX ADMIN — CARVEDILOL PHOSPHATE 6.25 MILLIGRAM(S): 80 CAPSULE, EXTENDED RELEASE ORAL at 21:35

## 2023-10-24 RX ADMIN — Medication 81 MILLIGRAM(S): at 10:24

## 2023-10-24 RX ADMIN — METHADONE HYDROCHLORIDE 130 MILLIGRAM(S): 40 TABLET ORAL at 10:25

## 2023-10-24 RX ADMIN — DIVALPROEX SODIUM 500 MILLIGRAM(S): 500 TABLET, DELAYED RELEASE ORAL at 10:24

## 2023-10-24 RX ADMIN — ATORVASTATIN CALCIUM 40 MILLIGRAM(S): 80 TABLET, FILM COATED ORAL at 21:42

## 2023-10-24 RX ADMIN — Medication 3 MILLIGRAM(S): at 23:34

## 2023-10-24 RX ADMIN — Medication 3 MILLIGRAM(S): at 00:00

## 2023-10-24 RX ADMIN — Medication 12 UNIT(S): at 12:14

## 2023-10-24 RX ADMIN — Medication 12 UNIT(S): at 16:57

## 2023-10-24 RX ADMIN — INSULIN GLARGINE 16 UNIT(S): 100 INJECTION, SOLUTION SUBCUTANEOUS at 21:43

## 2023-10-24 RX ADMIN — Medication 2.5 MILLIGRAM(S): at 10:30

## 2023-10-24 RX ADMIN — RISPERIDONE 2 MILLIGRAM(S): 4 TABLET ORAL at 23:33

## 2023-10-24 RX ADMIN — OLANZAPINE 5 MILLIGRAM(S): 15 TABLET, FILM COATED ORAL at 00:08

## 2023-10-24 RX ADMIN — Medication 1 PATCH: at 18:28

## 2023-10-24 RX ADMIN — Medication 1 PATCH: at 10:24

## 2023-10-24 RX ADMIN — GABAPENTIN 1200 MILLIGRAM(S): 400 CAPSULE ORAL at 21:42

## 2023-10-24 RX ADMIN — CARVEDILOL PHOSPHATE 6.25 MILLIGRAM(S): 80 CAPSULE, EXTENDED RELEASE ORAL at 10:24

## 2023-10-24 RX ADMIN — Medication 1 PATCH: at 11:00

## 2023-10-24 RX ADMIN — LISINOPRIL 10 MILLIGRAM(S): 2.5 TABLET ORAL at 10:24

## 2023-10-24 RX ADMIN — OLANZAPINE 5 MILLIGRAM(S): 15 TABLET, FILM COATED ORAL at 23:33

## 2023-10-24 RX ADMIN — Medication 2.5 MILLIGRAM(S): at 21:42

## 2023-10-24 RX ADMIN — GABAPENTIN 1200 MILLIGRAM(S): 400 CAPSULE ORAL at 07:40

## 2023-10-24 RX ADMIN — Medication 12 UNIT(S): at 08:10

## 2023-10-24 RX ADMIN — SPIRONOLACTONE 25 MILLIGRAM(S): 25 TABLET, FILM COATED ORAL at 10:27

## 2023-10-24 NOTE — PROGRESS NOTE ADULT - SUBJECTIVE AND OBJECTIVE BOX
***INCOMPLETE***    SUBJECTIVE:  Patient seen and examined at bedside.    Vital Signs Last 12 Hrs  T(F): 98.1 (10-24-23 @ 09:03), Max: 98.1 (10-24-23 @ 09:03)  HR: 83 (10-24-23 @ 09:03) (83 - 83)  BP: 150/78 (10-24-23 @ 09:03) (150/78 - 150/78)  BP(mean): --  RR: 18 (10-24-23 @ 09:03) (18 - 18)  SpO2: 94% (10-24-23 @ 09:03) (94% - 94%)  I&O's Summary    PHYSICAL EXAM:    LABS:    RADIOLOGY & ADDITIONAL TESTS:    MEDICATIONS  (STANDING):  aspirin  chewable 81 milliGRAM(s) Oral daily  atorvastatin 40 milliGRAM(s) Oral at bedtime  carvedilol 6.25 milliGRAM(s) Oral every 12 hours  dextrose 5%. 1000 milliLiter(s) (100 mL/Hr) IV Continuous <Continuous>  dextrose 5%. 1000 milliLiter(s) (50 mL/Hr) IV Continuous <Continuous>  dextrose 50% Injectable 25 Gram(s) IV Push once  dextrose 50% Injectable 12.5 Gram(s) IV Push once  dextrose 50% Injectable 25 Gram(s) IV Push once  diazepam    Tablet 2.5 milliGRAM(s) Oral at bedtime  diazepam    Tablet 2.5 milliGRAM(s) Oral daily  divalproex  milliGRAM(s) Oral daily  divalproex  milliGRAM(s) Oral at bedtime  gabapentin 1200 milliGRAM(s) Oral every 8 hours  glucagon  Injectable 1 milliGRAM(s) IntraMuscular once  insulin glargine Injectable (LANTUS) 16 Unit(s) SubCutaneous at bedtime  insulin lispro (ADMELOG) corrective regimen sliding scale   SubCutaneous Before meals and at bedtime  insulin lispro Injectable (ADMELOG) 12 Unit(s) SubCutaneous three times a day before meals  lisinopril 10 milliGRAM(s) Oral daily  methadone    Tablet 130 milliGRAM(s) Oral daily  nicotine - 21 mG/24Hr(s) Patch 1 Patch Transdermal daily  risperiDONE   Tablet 2 milliGRAM(s) Oral at bedtime  spironolactone 25 milliGRAM(s) Oral daily    MEDICATIONS  (PRN):  acetaminophen     Tablet .. 650 milliGRAM(s) Oral every 6 hours PRN Temp greater or equal to 38C (100.4F), Mild Pain (1 - 3)  dextrose Oral Gel 15 Gram(s) Oral once PRN Blood Glucose LESS THAN 70 milliGRAM(s)/deciliter  melatonin 3 milliGRAM(s) Oral at bedtime PRN Insomnia  OLANZapine 5 milliGRAM(s) Oral every 6 hours PRN agitation   SUBJECTIVE:  Patient seen and examined at bedside.  Amenable to reduce carbohydrate intake for better glycemic control. C/o insufficient portions of food, requesting for "double protein" portions. Still c/o R knee pain, worse with ambulation. Other ROS negative.    Vital Signs Last 12 Hrs  T(F): 98.1 (10-24-23 @ 09:03), Max: 98.1 (10-24-23 @ 09:03)  HR: 83 (10-24-23 @ 09:03) (83 - 83)  BP: 150/78 (10-24-23 @ 09:03) (150/78 - 150/78)  BP(mean): --  RR: 18 (10-24-23 @ 09:03) (18 - 18)  SpO2: 94% (10-24-23 @ 09:03) (94% - 94%)  I&O's Summary    PHYSICAL EXAM:  General: NAD  Extremities: WWP; no edema/cyanosis. tenderness at the right knee just distal to the patella & in popliteal fossa  Neurological: A&Ox3, CNII-XII grossly intact; no obvious focal deficits    LABS:    RADIOLOGY & ADDITIONAL TESTS:    MEDICATIONS  (STANDING):  aspirin  chewable 81 milliGRAM(s) Oral daily  atorvastatin 40 milliGRAM(s) Oral at bedtime  carvedilol 6.25 milliGRAM(s) Oral every 12 hours  dextrose 5%. 1000 milliLiter(s) (100 mL/Hr) IV Continuous <Continuous>  dextrose 5%. 1000 milliLiter(s) (50 mL/Hr) IV Continuous <Continuous>  dextrose 50% Injectable 25 Gram(s) IV Push once  dextrose 50% Injectable 12.5 Gram(s) IV Push once  dextrose 50% Injectable 25 Gram(s) IV Push once  diazepam    Tablet 2.5 milliGRAM(s) Oral at bedtime  diazepam    Tablet 2.5 milliGRAM(s) Oral daily  divalproex  milliGRAM(s) Oral daily  divalproex  milliGRAM(s) Oral at bedtime  gabapentin 1200 milliGRAM(s) Oral every 8 hours  glucagon  Injectable 1 milliGRAM(s) IntraMuscular once  insulin glargine Injectable (LANTUS) 16 Unit(s) SubCutaneous at bedtime  insulin lispro (ADMELOG) corrective regimen sliding scale   SubCutaneous Before meals and at bedtime  insulin lispro Injectable (ADMELOG) 12 Unit(s) SubCutaneous three times a day before meals  lisinopril 10 milliGRAM(s) Oral daily  methadone    Tablet 130 milliGRAM(s) Oral daily  nicotine - 21 mG/24Hr(s) Patch 1 Patch Transdermal daily  risperiDONE   Tablet 2 milliGRAM(s) Oral at bedtime  spironolactone 25 milliGRAM(s) Oral daily    MEDICATIONS  (PRN):  acetaminophen     Tablet .. 650 milliGRAM(s) Oral every 6 hours PRN Temp greater or equal to 38C (100.4F), Mild Pain (1 - 3)  dextrose Oral Gel 15 Gram(s) Oral once PRN Blood Glucose LESS THAN 70 milliGRAM(s)/deciliter  melatonin 3 milliGRAM(s) Oral at bedtime PRN Insomnia  OLANZapine 5 milliGRAM(s) Oral every 6 hours PRN agitation

## 2023-10-24 NOTE — BH INPATIENT PSYCHIATRY PROGRESS NOTE - NSBHFUPINTERVALHXFT_PSY_A_CORE
Patient seen and evaluated. No acute events overnight. No new complaints. Continues to tolerate standing medications  refused depakote 750 mg twice a day but agreeing to 500 mg in am and 750 mg at night. Undergoing valium taper. Applying to rehabs. Motivated for rehab. Wants me to speak with his sister.  Patient seen and evaluated. No acute events overnight. No new complaints. Continues to tolerate standing medications. Remains highly motivated for rehab. Fully cooperative with treatment team. Down to Valium 2.5 mg orally twice daily, no withdrawals. In agreement to continue taper till he is completely off. Provided writer with sister's phone number, stated she had a couple of rehabs in mind. Writer and attending, Dr. Hong spoke to sister, she was updated on patient's condition, all questions answered, and SW given list of rehab centers patient was referring to. Patient currently denying any suicidal ideation, intent, or plan. He denies any auditory or visual hallucinations. No mention of chronic delusions. Significantly improved, in behavioral control, pleasant, and focused on recovery.

## 2023-10-24 NOTE — PROGRESS NOTE ADULT - ASSESSMENT
57M PMHx of Bipolar 1 disorder, polysubstance abuse disorder, MMTP, hx TBI, pituitary tumor, CVA, HTN, DM, Epilepsy, CHF, He followed with cardiology in [place] and last visit was 2 years ago. He states at one point he had an EF of 35% and his most recent visit he was at 55%. Medicine consulted for CHF management     -INCOMPLETE***    Med consult team to follow thank you.  57M PMHx of Bipolar 1 disorder, polysubstance abuse disorder, MMTP, hx TBI, pituitary tumor, CVA, HTN, DM, Epilepsy, CHF, He followed with cardiology in [place] and last visit was 2 years ago. He states at one point he had an EF of 35% and his most recent visit he was at 55%. Medicine consulted for CHF management     #Knee Pain  - Please obtain right knee xray, right knee ultrasound  - Place lidocaine patch on knee    # hx chronic CHF  # HTN  # HLD  - /73, Please increase Lisinopril  from 10mg to 20mg daily   - c/w GDMT: Carvedilol 6.25mg po bid, Lisinopril 20mg qPM , Spironolactone 25mg po qAM   - c/w ASA/Atorvastatin  - Obtain TTE to evaluate LVEF     # MMTP  - c/w Methadone 130mg po daily (pt asking to go up to 160mg po daily, to d/w Psych attending)     # T2DM  A1C 11.2%. 10/23-24 -324  - Please increase Lantus to 18U qHS and Humalog to 15U premeal  - c/w consistent carbohydrate diet & counseled to reduce carbohydrate intake for better glycemic control  - Informed inpatient  of patients request for double protein portion    # smoking   - Nicotine patch 21mg patch/d daily, taper to 14mg/d 2nd week then 7mg/d 3rd week  - Smoking cessation counselling    # Polysubstance abuse   # Bipolar disorder Type 1   # Hx Epilepsy   - On Valium taper, currently at 2.5mg qAM & qhs   - c/w Divalproex DR 500qAM & 750mg qhs  - Gabapentin 1000mg po q8hr   - Risperidone 2mg po qHS   - Olanzapine 5mg po q6hr prn agitation     Med consult team to follow thank you.

## 2023-10-24 NOTE — BH INPATIENT PSYCHIATRY PROGRESS NOTE - NSBHMETABOLIC_PSY_ALL_CORE_FT
BMI: BMI (kg/m2): 31.6 (10-11-23 @ 11:12)  HbA1c: A1C with Estimated Average Glucose Result: 11.2 % (10-11-23 @ 11:04)    Glucose: POCT Blood Glucose.: 226 mg/dL (10-24-23 @ 12:13)    BP: 150/78 (10-24-23 @ 09:03) (127/77 - 155/73)  Lipid Panel: Date/Time: 10-10-23 @ 05:30  Cholesterol, Serum: 118  Direct LDL: --  HDL Cholesterol, Serum: 36  Total Cholesterol/HDL Ration Measurement: --  Triglycerides, Serum: 87   BMI: BMI (kg/m2): 31.6 (10-11-23 @ 11:12)  HbA1c: A1C with Estimated Average Glucose Result: 11.2 % (10-11-23 @ 11:04)  Glucose: POCT Blood Glucose.: 226 mg/dL (10-24-23 @ 12:13)  BP: 143/74 (10-24-23 @ 16:21) (127/77 - 155/73)  Lipid Panel: Date/Time: 10-10-23 @ 05:30  Cholesterol, Serum: 118  Direct LDL: 65  HDL Cholesterol, Serum: 36  Triglycerides, Serum: 87

## 2023-10-24 NOTE — BH INPATIENT PSYCHIATRY PROGRESS NOTE - CURRENT MEDICATION
MEDICATIONS  (STANDING):  aspirin  chewable 81 milliGRAM(s) Oral daily  atorvastatin 40 milliGRAM(s) Oral at bedtime  carvedilol 6.25 milliGRAM(s) Oral every 12 hours  dextrose 5%. 1000 milliLiter(s) (100 mL/Hr) IV Continuous <Continuous>  dextrose 5%. 1000 milliLiter(s) (50 mL/Hr) IV Continuous <Continuous>  dextrose 50% Injectable 25 Gram(s) IV Push once  dextrose 50% Injectable 12.5 Gram(s) IV Push once  dextrose 50% Injectable 25 Gram(s) IV Push once  diazepam    Tablet 2.5 milliGRAM(s) Oral daily  diazepam    Tablet 2.5 milliGRAM(s) Oral at bedtime  divalproex  milliGRAM(s) Oral daily  divalproex  milliGRAM(s) Oral at bedtime  gabapentin 1200 milliGRAM(s) Oral every 8 hours  glucagon  Injectable 1 milliGRAM(s) IntraMuscular once  insulin glargine Injectable (LANTUS) 16 Unit(s) SubCutaneous at bedtime  insulin lispro (ADMELOG) corrective regimen sliding scale   SubCutaneous Before meals and at bedtime  insulin lispro Injectable (ADMELOG) 12 Unit(s) SubCutaneous three times a day before meals  lisinopril 10 milliGRAM(s) Oral daily  methadone    Tablet 130 milliGRAM(s) Oral daily  nicotine - 21 mG/24Hr(s) Patch 1 Patch Transdermal daily  risperiDONE   Tablet 2 milliGRAM(s) Oral at bedtime  spironolactone 25 milliGRAM(s) Oral daily    MEDICATIONS  (PRN):  acetaminophen     Tablet .. 650 milliGRAM(s) Oral every 6 hours PRN Temp greater or equal to 38C (100.4F), Mild Pain (1 - 3)  dextrose Oral Gel 15 Gram(s) Oral once PRN Blood Glucose LESS THAN 70 milliGRAM(s)/deciliter  melatonin 3 milliGRAM(s) Oral at bedtime PRN Insomnia  OLANZapine 5 milliGRAM(s) Oral every 6 hours PRN agitation   MEDICATIONS  (STANDING):  aspirin  chewable 81 milliGRAM(s) Oral daily  atorvastatin 40 milliGRAM(s) Oral at bedtime  carvedilol 6.25 milliGRAM(s) Oral every 12 hours  dextrose 5%. 1000 milliLiter(s) (50 mL/Hr) IV Continuous <Continuous>  dextrose 5%. 1000 milliLiter(s) (100 mL/Hr) IV Continuous <Continuous>  dextrose 50% Injectable 25 Gram(s) IV Push once  dextrose 50% Injectable 12.5 Gram(s) IV Push once  dextrose 50% Injectable 25 Gram(s) IV Push once  diazepam    Tablet 2.5 milliGRAM(s) Oral daily  diazepam    Tablet 2.5 milliGRAM(s) Oral at bedtime  divalproex  milliGRAM(s) Oral daily  divalproex  milliGRAM(s) Oral at bedtime  gabapentin 1200 milliGRAM(s) Oral every 8 hours  glucagon  Injectable 1 milliGRAM(s) IntraMuscular once  insulin glargine Injectable (LANTUS) 16 Unit(s) SubCutaneous at bedtime  insulin lispro (ADMELOG) corrective regimen sliding scale   SubCutaneous Before meals and at bedtime  insulin lispro Injectable (ADMELOG) 12 Unit(s) SubCutaneous three times a day before meals  lisinopril 10 milliGRAM(s) Oral daily  methadone    Tablet 130 milliGRAM(s) Oral daily  nicotine - 21 mG/24Hr(s) Patch 1 Patch Transdermal daily  risperiDONE   Tablet 2 milliGRAM(s) Oral at bedtime  spironolactone 25 milliGRAM(s) Oral daily    MEDICATIONS  (PRN):  acetaminophen     Tablet .. 650 milliGRAM(s) Oral every 6 hours PRN Temp greater or equal to 38C (100.4F), Mild Pain (1 - 3)  dextrose Oral Gel 15 Gram(s) Oral once PRN Blood Glucose LESS THAN 70 milliGRAM(s)/deciliter  melatonin 3 milliGRAM(s) Oral at bedtime PRN Insomnia  OLANZapine 5 milliGRAM(s) Oral every 6 hours PRN agitation

## 2023-10-24 NOTE — BH INPATIENT PSYCHIATRY PROGRESS NOTE - NSBHCHARTREVIEWVS_PSY_A_CORE FT
Vital Signs Last 24 Hrs  T(C): 36.7 (10-24-23 @ 09:03), Max: 36.7 (10-23-23 @ 16:37)  T(F): 98.1 (10-24-23 @ 09:03), Max: 98.1 (10-24-23 @ 09:03)  HR: 83 (10-24-23 @ 09:03) (69 - 83)  BP: 150/78 (10-24-23 @ 09:03) (132/87 - 150/78)  BP(mean): --  RR: 18 (10-24-23 @ 09:03) (17 - 18)  SpO2: 94% (10-24-23 @ 09:03) (92% - 94%)     Vital Signs Last 24 Hrs  T(C): 37.4 (10-24-23 @ 16:21), Max: 37.4 (10-24-23 @ 16:21)  T(F): 99.4 (10-24-23 @ 16:21), Max: 99.4 (10-24-23 @ 16:21)  HR: 82 (10-24-23 @ 16:21) (69 - 83)  BP: 143/74 (10-24-23 @ 16:21) (132/87 - 150/78)  RR: 18 (10-24-23 @ 16:21) (17 - 18)  SpO2: 90% (10-24-23 @ 16:21) (90% - 94%)

## 2023-10-24 NOTE — BH INPATIENT PSYCHIATRY PROGRESS NOTE - NSBHASSESSSUMMFT_PSY_ALL_CORE
Patient is a 57 year old man, undomiciled, single, on disability, with PPH of bipolar 1 disorder, polysubstance use disorder (crystal meth, cocaine, opioid, cannabis, K2, alcohol), opioid use disorder on maintenance therapy, antisocial personality disorder, history of incarceration (8 years in [facility] for manslaughter and was released in 2014), with PMH of TBI, pituitary tumor, HTN, T2DM, and GERD, who was admitted to [facility], transferred from [facility] ED, after self-presenting for suicidal and homicidal ideation, in context of substance intoxication and medication non-adherence. On evaluation, patient reports continued depressed mood and passive SI with no intent/plan. He appears to have improved judgment as he has no intention of harming anyone. His delusions and paranoia remain, but are less severe at this time. It is unclear what medication increase he is referring to; continue to monitor for oversedation.     #Bipolar 1 disorder  - Continue with Risperidone 2 mg orally nightly for psychosis and disorganization  - Increase Divalproex XR to 750 mg orally BID from 500 mg orally twice daily for mood stabilization; subtherapeutic level 59.3 on 10/19/23   - Continue taper of Valium as follows: 5 mg orally daily and 2.5 mg orally nightly over the weekend, and continue taper as tolerated; patient remains highly motivated for continued taper and sobriety   - Increase Gabapentin to 1000 mg orally TID from 800 mg orally TID to target increased anxiety as Valium taper continues   - Melatonin/Zyprexa PRN for insomnia/agitation respectively   - 2P    #Opioid use disorder   - Continue Methadone 130 mg orally daily for maintenance opioid use disorder     #DM   - Hba1c 11.2   - Continue with Lantus 16 units nightly  - Continue with aspart 12 units TID with meals  - Continue with Atorvastatin 10 mg orally daily   - Continue with sliding scale and hypoglycemia protocol   - Endo consulted, recs appreciated, continue as per above     #HTN/CHF  - Continue with Lisinopril 10 mg orally daily   - Continue with Carvedilol 6.25 mg orally twice daily for CHF Patient is a 57 year old man, undomiciled, single, on disability, with PPH of bipolar 1 disorder, polysubstance use disorder (crystal meth, cocaine, opioid, cannabis, K2, alcohol), opioid use disorder on maintenance therapy, antisocial personality disorder, history of incarceration (8 years in residential for manslaughter and was released in 2014), with PMH of TBI, pituitary tumor, HTN, T2DM, and GERD, who was admitted to [facility], transferred from [facility] ED, after self-presenting for suicidal and homicidal ideation, in context of substance intoxication and medication non-adherence. On evaluation, patient reports continued depressed mood and passive SI with no intent/plan. He appears to have improved judgment as he has no intention of harming anyone. His delusions and paranoia remain, but are less severe at this time. It is unclear what medication increase he is referring to; continue to monitor for oversedation.     #Bipolar 1 disorder  - Continue with Risperidone 2 mg orally nightly for psychosis and disorganization  - Continue with Divalproex  mg orally daily, and 750 mg orally nightly for mood stabilization; subtherapeutic level 59.3 on 10/19/23   - Continue taper of Valium, currently 2.5 mg orally twice daily; patient remains highly motivated for continued taper and to be completely off of it   - Continue Gabapentin to 1200 mg orally TID to target increased anxiety as Valium taper continues   - Melatonin/Zyprexa PRN for insomnia/agitation respectively   - 2P    #Opioid use disorder   - Continue Methadone 130 mg orally daily for maintenance opioid use disorder     #DM   - Hba1c 11.2   - Continue with Lantus 16 units nightly  - Continue with aspart 12 units TID with meals  - Continue with Atorvastatin 10 mg orally daily   - Continue with sliding scale and hypoglycemia protocol   - Endo consulted, recs appreciated, continue as per above     #HTN/CHF  - Continue with Lisinopril 10 mg orally daily   - Continue with Carvedilol 6.25 mg orally twice daily for CHF

## 2023-10-25 LAB
GLUCOSE BLDC GLUCOMTR-MCNC: 186 MG/DL — HIGH (ref 70–99)
GLUCOSE BLDC GLUCOMTR-MCNC: 186 MG/DL — HIGH (ref 70–99)
GLUCOSE BLDC GLUCOMTR-MCNC: 225 MG/DL — HIGH (ref 70–99)
GLUCOSE BLDC GLUCOMTR-MCNC: 225 MG/DL — HIGH (ref 70–99)
GLUCOSE BLDC GLUCOMTR-MCNC: 333 MG/DL — HIGH (ref 70–99)
GLUCOSE BLDC GLUCOMTR-MCNC: 333 MG/DL — HIGH (ref 70–99)
GLUCOSE BLDC GLUCOMTR-MCNC: 348 MG/DL — HIGH (ref 70–99)
GLUCOSE BLDC GLUCOMTR-MCNC: 348 MG/DL — HIGH (ref 70–99)

## 2023-10-25 PROCEDURE — 99233 SBSQ HOSP IP/OBS HIGH 50: CPT | Mod: GC

## 2023-10-25 PROCEDURE — 93971 EXTREMITY STUDY: CPT | Mod: 26,RT

## 2023-10-25 RX ORDER — DIAZEPAM 5 MG
2.5 TABLET ORAL AT BEDTIME
Refills: 0 | Status: DISCONTINUED | OUTPATIENT
Start: 2023-10-25 | End: 2023-10-26

## 2023-10-25 RX ADMIN — OLANZAPINE 5 MILLIGRAM(S): 15 TABLET, FILM COATED ORAL at 16:22

## 2023-10-25 RX ADMIN — DIVALPROEX SODIUM 750 MILLIGRAM(S): 500 TABLET, DELAYED RELEASE ORAL at 21:36

## 2023-10-25 RX ADMIN — Medication 1 PATCH: at 13:47

## 2023-10-25 RX ADMIN — Medication 3 MILLIGRAM(S): at 22:56

## 2023-10-25 RX ADMIN — Medication 8: at 21:44

## 2023-10-25 RX ADMIN — ATORVASTATIN CALCIUM 40 MILLIGRAM(S): 80 TABLET, FILM COATED ORAL at 21:36

## 2023-10-25 RX ADMIN — DIVALPROEX SODIUM 500 MILLIGRAM(S): 500 TABLET, DELAYED RELEASE ORAL at 10:01

## 2023-10-25 RX ADMIN — CARVEDILOL PHOSPHATE 6.25 MILLIGRAM(S): 80 CAPSULE, EXTENDED RELEASE ORAL at 21:36

## 2023-10-25 RX ADMIN — Medication 2.5 MILLIGRAM(S): at 21:36

## 2023-10-25 RX ADMIN — OLANZAPINE 5 MILLIGRAM(S): 15 TABLET, FILM COATED ORAL at 22:56

## 2023-10-25 RX ADMIN — GABAPENTIN 1200 MILLIGRAM(S): 400 CAPSULE ORAL at 07:16

## 2023-10-25 RX ADMIN — GABAPENTIN 1200 MILLIGRAM(S): 400 CAPSULE ORAL at 21:36

## 2023-10-25 RX ADMIN — RISPERIDONE 2 MILLIGRAM(S): 4 TABLET ORAL at 22:56

## 2023-10-25 RX ADMIN — METHADONE HYDROCHLORIDE 130 MILLIGRAM(S): 40 TABLET ORAL at 09:59

## 2023-10-25 RX ADMIN — Medication 12 UNIT(S): at 16:29

## 2023-10-25 RX ADMIN — Medication 1 PATCH: at 07:01

## 2023-10-25 RX ADMIN — Medication 8: at 16:30

## 2023-10-25 RX ADMIN — Medication 2.5 MILLIGRAM(S): at 11:10

## 2023-10-25 RX ADMIN — Medication 12 UNIT(S): at 07:46

## 2023-10-25 RX ADMIN — CARVEDILOL PHOSPHATE 6.25 MILLIGRAM(S): 80 CAPSULE, EXTENDED RELEASE ORAL at 10:02

## 2023-10-25 RX ADMIN — Medication 2: at 07:45

## 2023-10-25 RX ADMIN — GABAPENTIN 1200 MILLIGRAM(S): 400 CAPSULE ORAL at 14:56

## 2023-10-25 RX ADMIN — Medication 81 MILLIGRAM(S): at 10:01

## 2023-10-25 RX ADMIN — Medication 12 UNIT(S): at 12:50

## 2023-10-25 RX ADMIN — Medication 4: at 12:51

## 2023-10-25 RX ADMIN — INSULIN GLARGINE 16 UNIT(S): 100 INJECTION, SOLUTION SUBCUTANEOUS at 21:45

## 2023-10-25 NOTE — BH INPATIENT PSYCHIATRY PROGRESS NOTE - CURRENT MEDICATION
MEDICATIONS  (STANDING):  aspirin  chewable 81 milliGRAM(s) Oral daily  atorvastatin 40 milliGRAM(s) Oral at bedtime  carvedilol 6.25 milliGRAM(s) Oral every 12 hours  dextrose 5%. 1000 milliLiter(s) (100 mL/Hr) IV Continuous <Continuous>  dextrose 5%. 1000 milliLiter(s) (50 mL/Hr) IV Continuous <Continuous>  dextrose 50% Injectable 25 Gram(s) IV Push once  dextrose 50% Injectable 25 Gram(s) IV Push once  dextrose 50% Injectable 12.5 Gram(s) IV Push once  diazepam    Tablet 2.5 milliGRAM(s) Oral daily  diazepam    Tablet 2.5 milliGRAM(s) Oral at bedtime  divalproex  milliGRAM(s) Oral daily  divalproex  milliGRAM(s) Oral at bedtime  gabapentin 1200 milliGRAM(s) Oral every 8 hours  glucagon  Injectable 1 milliGRAM(s) IntraMuscular once  insulin glargine Injectable (LANTUS) 16 Unit(s) SubCutaneous at bedtime  insulin lispro (ADMELOG) corrective regimen sliding scale   SubCutaneous Before meals and at bedtime  insulin lispro Injectable (ADMELOG) 12 Unit(s) SubCutaneous three times a day before meals  lisinopril 10 milliGRAM(s) Oral daily  methadone    Tablet 130 milliGRAM(s) Oral daily  nicotine - 21 mG/24Hr(s) Patch 1 Patch Transdermal daily  risperiDONE   Tablet 2 milliGRAM(s) Oral at bedtime  spironolactone 25 milliGRAM(s) Oral daily    MEDICATIONS  (PRN):  acetaminophen     Tablet .. 650 milliGRAM(s) Oral every 6 hours PRN Temp greater or equal to 38C (100.4F), Mild Pain (1 - 3)  dextrose Oral Gel 15 Gram(s) Oral once PRN Blood Glucose LESS THAN 70 milliGRAM(s)/deciliter  melatonin 3 milliGRAM(s) Oral at bedtime PRN Insomnia  OLANZapine 5 milliGRAM(s) Oral every 6 hours PRN agitation

## 2023-10-25 NOTE — BH INPATIENT PSYCHIATRY PROGRESS NOTE - NSBHMETABOLIC_PSY_ALL_CORE_FT
BMI: BMI (kg/m2): 31.6 (10-11-23 @ 11:12)  HbA1c: A1C with Estimated Average Glucose Result: 11.2 % (10-11-23 @ 11:04)  Glucose: POCT Blood Glucose.: 225 mg/dL (10-25-23 @ 11:54)  BP: 114/72 (10-25-23 @ 08:39) (114/72 - 164/80)  Lipid Panel: Date/Time: 10-10-23 @ 05:30  Cholesterol, Serum: 118  Direct LDL: 65  HDL Cholesterol, Serum: 36  Triglycerides, Serum: 87

## 2023-10-25 NOTE — BH INPATIENT PSYCHIATRY PROGRESS NOTE - NSBHASSESSSUMMFT_PSY_ALL_CORE
Patient is a 57 year old man, undomiciled, single, on disability, with PPH of bipolar 1 disorder, polysubstance use disorder (crystal meth, cocaine, opioid, cannabis, K2, alcohol), opioid use disorder on maintenance therapy, antisocial personality disorder, history of incarceration (8 years in [facility] for manslaughter and was released in 2014), with PMH of TBI, pituitary tumor, HTN, T2DM, and GERD, who was admitted to [facility], transferred from [facility] ED, after self-presenting for suicidal and homicidal ideation, in context of substance intoxication and medication non-adherence. On evaluation, patient reports continued depressed mood and passive SI with no intent/plan. He appears to have improved judgment as he has no intention of harming anyone. His delusions and paranoia remain, but are less severe at this time. It is unclear what medication increase he is referring to; continue to monitor for oversedation.     #Bipolar 1 disorder  - Continue with Risperidone 2 mg orally nightly for psychosis and disorganization  - Continue with Divalproex  mg orally daily, and 750 mg orally nightly for mood stabilization; subtherapeutic level 59.3 on 10/19/23   - Continue taper of Valium, currently 2.5 mg orally twice daily; patient remains highly motivated for continued taper and to be completely off of it   - Continue Gabapentin to 1200 mg orally TID to target increased anxiety as Valium taper continues   - Melatonin/Zyprexa PRN for insomnia/agitation respectively   - 2P    #Opioid use disorder   - Continue Methadone 130 mg orally daily for maintenance opioid use disorder     #DM   - Hba1c 11.2   - Continue with Lantus 16 units nightly  - Continue with aspart 12 units TID with meals  - Continue with Atorvastatin 10 mg orally daily   - Continue with sliding scale and hypoglycemia protocol   - Endo consulted, recs appreciated, continue as per above     #HTN/CHF  - Continue with Lisinopril 10 mg orally daily   - Continue with Carvedilol 6.25 mg orally twice daily for CHF

## 2023-10-25 NOTE — PROGRESS NOTE ADULT - ASSESSMENT
57M PMHx of Bipolar 1 disorder, polysubstance abuse disorder, MMTP, hx TBI, pituitary tumor, CVA, HTN, DM, Epilepsy, CHF, He followed with cardiology in [place] and last visit was 2 years ago. He states at one point he had an EF of 35% and his most recent visit he was at 55%. Medicine consulted for CHF management     #Knee Pain  - Please obtain right knee xray, right knee ultrasound  - Place lidocaine patch on knee    # hx chronic CHF  # HTN  # HLD  - c/w Lisinopril 20mg daily   - c/w GDMT: Carvedilol 6.25mg po bid, Lisinopril 20mg qPM , Spironolactone 25mg po qAM   - c/w ASA/Atorvastatin  - TTE with normal EF and chambers, no valvular pathologies     # MMTP  - c/w Methadone 130mg po daily (pt asking to go up to 160mg po daily, to d/w Psych attending)     # T2DM  A1C 11.2%. 10/23-24 -324  - c/w Lantus to 16U qHS and increase Humalog to 14U premeal  - c/w consistent carbohydrate diet & counseled to reduce carbohydrate intake for better glycemic control  - Informed inpatient  of patients request for double protein portion    # smoking   - Nicotine patch 21mg patch/d daily, taper to 14mg/d 2nd week then 7mg/d 3rd week  - Smoking cessation counselling    # Polysubstance abuse   # Bipolar disorder Type 1   # Hx Epilepsy   - On Valium taper, currently at 2.5mg qAM & qhs   - c/w Divalproex DR 500qAM & 750mg qhs  - Gabapentin 1000mg po q8hr   - Risperidone 2mg po qHS   - Olanzapine 5mg po q6hr prn agitation     Med consult team to follow thank you.

## 2023-10-25 NOTE — BH INPATIENT PSYCHIATRY PROGRESS NOTE - NSBHFUPINTERVALHXFT_PSY_A_CORE
Patient seen and evaluated. No acute events overnight. No new complaints. Continues to tolerate standing medications. Staff reported patient was very "happy" and motivated regarding continued rehab interviews. In good behavioral control, will decrease valium taper further tomorrow in am, patient will only be on 2.5 mg orally nightly. At times endorses he feels anxious because he has "not been off of benzos in 35 years" but endorsed he felt "proud" that he has managed to taper off during this hospitalization. ECHO yesterday, unremarkable, EF is 60-65% no further recommendations from medicine, as patient has no evidence of heart failure at this time. Visible on unit, cooperates well with staff and peers. Patient is psychiatrically stable at this time, denies any and all complaints of depression, anxiety, or jose. Denies any suicidal or homicidal ideation, intent, or plan. He denies any auditory or visual hallucinations. Focused on recovery.     Discharge ready, pending placement into rehab

## 2023-10-25 NOTE — BH INPATIENT PSYCHIATRY PROGRESS NOTE - NSBHCHARTREVIEWVS_PSY_A_CORE FT
Vital Signs Last 24 Hrs  T(C): 37.1 (10-25-23 @ 08:39), Max: 37.4 (10-24-23 @ 16:21)  T(F): 98.7 (10-25-23 @ 08:39), Max: 99.4 (10-24-23 @ 16:21)  HR: 89 (10-25-23 @ 08:39) (82 - 89)  BP: 114/72 (10-25-23 @ 08:39) (114/72 - 164/80)  RR: 17 (10-25-23 @ 08:39) (17 - 18)  SpO2: 94% (10-25-23 @ 08:39) (90% - 94%)

## 2023-10-25 NOTE — PROGRESS NOTE ADULT - SUBJECTIVE AND OBJECTIVE BOX
CC: Patient is a 57y old  Male who presents with a chief complaint of     INTERVAL EVENTS: INGRIS    SUBJECTIVE / INTERVAL HPI: Patient seen and examined at bedside. feeling well this AM     ROS: negative unless otherwise stated above.    VITAL SIGNS:  Vital Signs Last 24 Hrs  T(C): 37.1 (25 Oct 2023 08:39), Max: 37.4 (24 Oct 2023 16:21)  T(F): 98.7 (25 Oct 2023 08:39), Max: 99.4 (24 Oct 2023 16:21)  HR: 89 (25 Oct 2023 08:39) (82 - 89)  BP: 114/72 (25 Oct 2023 08:39) (114/72 - 164/80)  BP(mean): --  RR: 17 (25 Oct 2023 08:39) (17 - 18)  SpO2: 94% (25 Oct 2023 08:39) (90% - 94%)    Parameters below as of 25 Oct 2023 08:39  Patient On (Oxygen Delivery Method): room air            PHYSICAL EXAM:    General: NAD  HEENT: MMM  Neck: supple  Cardiovascular: +S1/S2; RRR  Respiratory: CTA B/L; no W/R/R  Gastrointestinal: soft, NT/ND  Extremities: WWP; no edema, clubbing or cyanosis, still mild TTP of right knee   Vascular: 2+ radial, DP/PT pulses B/L  Neurological: AAOx3; no focal deficits    MEDICATIONS:  MEDICATIONS  (STANDING):  aspirin  chewable 81 milliGRAM(s) Oral daily  atorvastatin 40 milliGRAM(s) Oral at bedtime  carvedilol 6.25 milliGRAM(s) Oral every 12 hours  dextrose 5%. 1000 milliLiter(s) (50 mL/Hr) IV Continuous <Continuous>  dextrose 5%. 1000 milliLiter(s) (100 mL/Hr) IV Continuous <Continuous>  dextrose 50% Injectable 25 Gram(s) IV Push once  dextrose 50% Injectable 12.5 Gram(s) IV Push once  dextrose 50% Injectable 25 Gram(s) IV Push once  diazepam    Tablet 2.5 milliGRAM(s) Oral daily  divalproex  milliGRAM(s) Oral daily  divalproex  milliGRAM(s) Oral at bedtime  gabapentin 1200 milliGRAM(s) Oral every 8 hours  glucagon  Injectable 1 milliGRAM(s) IntraMuscular once  insulin glargine Injectable (LANTUS) 16 Unit(s) SubCutaneous at bedtime  insulin lispro (ADMELOG) corrective regimen sliding scale   SubCutaneous Before meals and at bedtime  insulin lispro Injectable (ADMELOG) 12 Unit(s) SubCutaneous three times a day before meals  lisinopril 10 milliGRAM(s) Oral daily  methadone    Tablet 130 milliGRAM(s) Oral daily  nicotine - 21 mG/24Hr(s) Patch 1 Patch Transdermal daily  risperiDONE   Tablet 2 milliGRAM(s) Oral at bedtime  spironolactone 25 milliGRAM(s) Oral daily    MEDICATIONS  (PRN):  acetaminophen     Tablet .. 650 milliGRAM(s) Oral every 6 hours PRN Temp greater or equal to 38C (100.4F), Mild Pain (1 - 3)  dextrose Oral Gel 15 Gram(s) Oral once PRN Blood Glucose LESS THAN 70 milliGRAM(s)/deciliter  melatonin 3 milliGRAM(s) Oral at bedtime PRN Insomnia  OLANZapine 5 milliGRAM(s) Oral every 6 hours PRN agitation      ALLERGIES:  Allergies    Seafood (Anaphylaxis)  Haldol (Unknown)  Cogentin (Unknown)  Thorazine (Rash)  Compazine (Short breath)    Intolerances        LABS:              CAPILLARY BLOOD GLUCOSE      POCT Blood Glucose.: 225 mg/dL (25 Oct 2023 11:54)      RADIOLOGY & ADDITIONAL TESTS: Reviewed.

## 2023-10-26 LAB
GLUCOSE BLDC GLUCOMTR-MCNC: 228 MG/DL — HIGH (ref 70–99)
GLUCOSE BLDC GLUCOMTR-MCNC: 228 MG/DL — HIGH (ref 70–99)
GLUCOSE BLDC GLUCOMTR-MCNC: 258 MG/DL — HIGH (ref 70–99)
GLUCOSE BLDC GLUCOMTR-MCNC: 258 MG/DL — HIGH (ref 70–99)
GLUCOSE BLDC GLUCOMTR-MCNC: 345 MG/DL — HIGH (ref 70–99)
GLUCOSE BLDC GLUCOMTR-MCNC: 345 MG/DL — HIGH (ref 70–99)

## 2023-10-26 PROCEDURE — 99232 SBSQ HOSP IP/OBS MODERATE 35: CPT

## 2023-10-26 RX ORDER — METHADONE HYDROCHLORIDE 40 MG/1
130 TABLET ORAL DAILY
Refills: 0 | Status: DISCONTINUED | OUTPATIENT
Start: 2023-10-27 | End: 2023-11-01

## 2023-10-26 RX ORDER — DIAZEPAM 5 MG
2.5 TABLET ORAL AT BEDTIME
Refills: 0 | Status: DISCONTINUED | OUTPATIENT
Start: 2023-10-26 | End: 2023-10-27

## 2023-10-26 RX ORDER — DIAZEPAM 5 MG
2.5 TABLET ORAL DAILY
Refills: 0 | Status: DISCONTINUED | OUTPATIENT
Start: 2023-10-27 | End: 2023-10-27

## 2023-10-26 RX ADMIN — DIVALPROEX SODIUM 750 MILLIGRAM(S): 500 TABLET, DELAYED RELEASE ORAL at 22:01

## 2023-10-26 RX ADMIN — Medication 81 MILLIGRAM(S): at 09:42

## 2023-10-26 RX ADMIN — OLANZAPINE 5 MILLIGRAM(S): 15 TABLET, FILM COATED ORAL at 22:55

## 2023-10-26 RX ADMIN — DIVALPROEX SODIUM 500 MILLIGRAM(S): 500 TABLET, DELAYED RELEASE ORAL at 09:42

## 2023-10-26 RX ADMIN — CARVEDILOL PHOSPHATE 6.25 MILLIGRAM(S): 80 CAPSULE, EXTENDED RELEASE ORAL at 22:09

## 2023-10-26 RX ADMIN — ATORVASTATIN CALCIUM 40 MILLIGRAM(S): 80 TABLET, FILM COATED ORAL at 22:02

## 2023-10-26 RX ADMIN — GABAPENTIN 1200 MILLIGRAM(S): 400 CAPSULE ORAL at 12:05

## 2023-10-26 RX ADMIN — Medication 12 UNIT(S): at 12:04

## 2023-10-26 RX ADMIN — GABAPENTIN 1200 MILLIGRAM(S): 400 CAPSULE ORAL at 06:44

## 2023-10-26 RX ADMIN — Medication 8: at 22:06

## 2023-10-26 RX ADMIN — CARVEDILOL PHOSPHATE 6.25 MILLIGRAM(S): 80 CAPSULE, EXTENDED RELEASE ORAL at 09:42

## 2023-10-26 RX ADMIN — GABAPENTIN 1200 MILLIGRAM(S): 400 CAPSULE ORAL at 22:01

## 2023-10-26 RX ADMIN — Medication 2.5 MILLIGRAM(S): at 09:41

## 2023-10-26 RX ADMIN — LISINOPRIL 10 MILLIGRAM(S): 2.5 TABLET ORAL at 09:42

## 2023-10-26 RX ADMIN — Medication 2.5 MILLIGRAM(S): at 22:02

## 2023-10-26 RX ADMIN — RISPERIDONE 2 MILLIGRAM(S): 4 TABLET ORAL at 22:55

## 2023-10-26 RX ADMIN — Medication 12 UNIT(S): at 07:52

## 2023-10-26 RX ADMIN — INSULIN GLARGINE 16 UNIT(S): 100 INJECTION, SOLUTION SUBCUTANEOUS at 22:07

## 2023-10-26 RX ADMIN — Medication 8: at 12:01

## 2023-10-26 RX ADMIN — Medication 3 MILLIGRAM(S): at 22:55

## 2023-10-26 RX ADMIN — Medication 4: at 16:49

## 2023-10-26 RX ADMIN — Medication 12 UNIT(S): at 16:50

## 2023-10-26 RX ADMIN — Medication 6: at 07:53

## 2023-10-26 RX ADMIN — METHADONE HYDROCHLORIDE 130 MILLIGRAM(S): 40 TABLET ORAL at 09:43

## 2023-10-26 NOTE — BH INPATIENT PSYCHIATRY PROGRESS NOTE - NSBHCHARTREVIEWVS_PSY_A_CORE FT
Vital Signs Last 24 Hrs  T(C): 36.8 (10-26-23 @ 16:28), Max: 37.2 (10-26-23 @ 09:03)  T(F): 98.2 (10-26-23 @ 16:28), Max: 98.9 (10-26-23 @ 09:03)  HR: 60 (10-26-23 @ 16:28) (60 - 76)  BP: 126/76 (10-26-23 @ 16:28) (126/76 - 146/84)  BP(mean): --  RR: 18 (10-26-23 @ 16:28) (18 - 18)  SpO2: 96% (10-26-23 @ 16:28) (95% - 96%)

## 2023-10-26 NOTE — PROGRESS NOTE ADULT - ASSESSMENT
57M PMHx of Bipolar 1 disorder, polysubstance abuse disorder, MMTP, hx TBI, pituitary tumor, CVA, HTN, DM, Epilepsy, CHF, He followed with cardiology in [place] and last visit was 2 years ago. He states at one point he had an EF of 35% and his most recent visit he was at 55%. Medicine consulted for CHF management. Fluid status stable. Euvolemic.     #Knee Pain  - Place lidocaine patch on knee    # hx chronic CHF  # HTN  # HLD  - c/w Lisinopril 20mg daily   - c/w GDMT: Carvedilol 6.25mg po bid, Lisinopril 20mg qPM , Spironolactone 25mg po qAM   - c/w ASA/Atorvastatin  - TTE with normal EF and chambers, no valvular pathologies     # MMTP  - c/w Methadone 130mg po daily (pt asking to go up to 160mg po daily, to d/w Psych attending)     # T2DM  A1C 11.2%. 10/23-24 -324  - c/w Lantus to 16U qHS and increase Humalog to 14U premeal  - c/w consistent carbohydrate diet & counseled to reduce carbohydrate intake for better glycemic control  - Informed inpatient  of patients request for double protein portion    # smoking   - Nicotine patch 21mg patch/d daily, taper to 14mg/d 2nd week then 7mg/d 3rd week  - Smoking cessation counselling    # Polysubstance abuse   # Bipolar disorder Type 1   # Hx Epilepsy   - On Valium taper, currently at 2.5mg qAM & qhs   - c/w Divalproex DR 500qAM & 750mg qhs  - Gabapentin 1000mg po q8hr   - Risperidone 2mg po qHS   - Olanzapine 5mg po q6hr prn agitation     Med consult team to follow thank you.  57M PMHx of Bipolar 1 disorder, polysubstance abuse disorder, MMTP, hx TBI, pituitary tumor, CVA, HTN, DM, Epilepsy, CHF, He followed with cardiology in [place] and last visit was 2 years ago. He states at one point he had an EF of 35% and his most recent visit he was at 55%. Medicine consulted for CHF management. Fluid status stable. Euvolemic.     #Knee Pain  - Place lidocaine patch on knee    #  HF with improved EF   # HTN  # HLD  - c/w Lisinopril 20mg daily   - c/w GDMT: Carvedilol 6.25mg po bid, Lisinopril 20mg qPM , Spironolactone 25mg po qAM   - c/w ASA/Atorvastatin  - TTE with normal EF and chambers, no valvular pathologies     # MMTP  - c/w Methadone 130mg po daily (pt asking to go up to 160mg po daily, to d/w Psych attending)     # T2DM  A1C 11.2%. 10/23-24 -324  - c/w Lantus to 16U qHS and increase Humalog to 14U premeal  - c/w consistent carbohydrate diet & counseled to reduce carbohydrate intake for better glycemic control  - Informed inpatient  of patients request for double protein portion    # smoking   - Nicotine patch 21mg patch/d daily, taper to 14mg/d 2nd week then 7mg/d 3rd week  - Smoking cessation counselling    # Polysubstance abuse   # Bipolar disorder Type 1   # Hx Epilepsy   - On Valium taper, currently at 2.5mg qAM & qhs   - c/w Divalproex DR 500qAM & 750mg qhs  - Gabapentin 1000mg po q8hr   - Risperidone 2mg po qHS   - Olanzapine 5mg po q6hr prn agitation     Med consult team to follow thank you.

## 2023-10-26 NOTE — BH INPATIENT PSYCHIATRY PROGRESS NOTE - NSBHASSESSSUMMFT_PSY_ALL_CORE
Patient is a 57 year old man, undomiciled, single, on disability, with PPH of bipolar 1 disorder, polysubstance use disorder (crystal meth, cocaine, opioid, cannabis, K2, alcohol), opioid use disorder on maintenance therapy, antisocial personality disorder, history of incarceration (8 years in [facility] for manslaughter and was released in 2014), with PMH of TBI, pituitary tumor, HTN, T2DM, and GERD, who was admitted to [facility], transferred from [facility] ED, after self-presenting for suicidal and homicidal ideation, in context of substance intoxication and medication non-adherence. On evaluation, patient reports continued depressed mood and passive SI with no intent/plan. He appears to have improved judgment as he has no intention of harming anyone. His delusions and paranoia remain, but are less severe at this time. It is unclear what medication increase he is referring to; continue to monitor for oversedation.     #Bipolar 1 disorder  - Continue with Risperidone 2 mg orally nightly for psychosis and disorganization  - Continue with Divalproex  mg orally daily, and 750 mg orally nightly for mood stabilization; subtherapeutic level 59.3 on 10/19/23   - Continue taper of Valium, currently 2.5 mg orally twice daily; patient remains highly motivated for continued taper and to be completely off of it   - Continue Gabapentin to 1200 mg orally TID to target increased anxiety as Valium taper continues   - Melatonin/Zyprexa PRN for insomnia/agitation respectively   - 2P    #Opioid use disorder   - Continue Methadone 130 mg orally daily for maintenance opioid use disorder     #DM   - Hba1c 11.2   - Continue with Lantus 16 units nightly  - Continue with aspart 12 units TID with meals  - Continue with Atorvastatin 10 mg orally daily   - Continue with sliding scale and hypoglycemia protocol   - Endo consulted, recs appreciated, continue as per above     #HTN/CHF  - Continue with Lisinopril 10 mg orally daily   - Continue with Carvedilol 6.25 mg orally twice daily for CHF

## 2023-10-26 NOTE — BH INPATIENT PSYCHIATRY PROGRESS NOTE - CURRENT MEDICATION
MEDICATIONS  (STANDING):  aspirin  chewable 81 milliGRAM(s) Oral daily  atorvastatin 40 milliGRAM(s) Oral at bedtime  carvedilol 6.25 milliGRAM(s) Oral every 12 hours  dextrose 5%. 1000 milliLiter(s) (100 mL/Hr) IV Continuous <Continuous>  dextrose 5%. 1000 milliLiter(s) (50 mL/Hr) IV Continuous <Continuous>  dextrose 50% Injectable 12.5 Gram(s) IV Push once  dextrose 50% Injectable 25 Gram(s) IV Push once  dextrose 50% Injectable 25 Gram(s) IV Push once  diazepam    Tablet 2.5 milliGRAM(s) Oral at bedtime  divalproex  milliGRAM(s) Oral daily  divalproex  milliGRAM(s) Oral at bedtime  gabapentin 1200 milliGRAM(s) Oral every 8 hours  glucagon  Injectable 1 milliGRAM(s) IntraMuscular once  insulin glargine Injectable (LANTUS) 16 Unit(s) SubCutaneous at bedtime  insulin lispro (ADMELOG) corrective regimen sliding scale   SubCutaneous Before meals and at bedtime  insulin lispro Injectable (ADMELOG) 12 Unit(s) SubCutaneous three times a day before meals  lisinopril 10 milliGRAM(s) Oral daily  nicotine - 21 mG/24Hr(s) Patch 1 Patch Transdermal daily  risperiDONE   Tablet 2 milliGRAM(s) Oral at bedtime  spironolactone 25 milliGRAM(s) Oral daily    MEDICATIONS  (PRN):  acetaminophen     Tablet .. 650 milliGRAM(s) Oral every 6 hours PRN Temp greater or equal to 38C (100.4F), Mild Pain (1 - 3)  dextrose Oral Gel 15 Gram(s) Oral once PRN Blood Glucose LESS THAN 70 milliGRAM(s)/deciliter  melatonin 3 milliGRAM(s) Oral at bedtime PRN Insomnia  OLANZapine 5 milliGRAM(s) Oral every 6 hours PRN agitation

## 2023-10-26 NOTE — BH INPATIENT PSYCHIATRY PROGRESS NOTE - NSBHMETABOLIC_PSY_ALL_CORE_FT
BMI: BMI (kg/m2): 31.6 (10-11-23 @ 11:12)  HbA1c: A1C with Estimated Average Glucose Result: 11.2 % (10-11-23 @ 11:04)    Glucose: POCT Blood Glucose.: 228 mg/dL (10-26-23 @ 16:32)    BP: 126/76 (10-26-23 @ 16:28) (114/72 - 164/80)  Lipid Panel: Date/Time: 10-10-23 @ 05:30  Cholesterol, Serum: 118  Direct LDL: --  HDL Cholesterol, Serum: 36  Total Cholesterol/HDL Ration Measurement: --  Triglycerides, Serum: 87

## 2023-10-26 NOTE — PROGRESS NOTE ADULT - SUBJECTIVE AND OBJECTIVE BOX
CC: Patient is a 57y old  Male who presents with a chief complaint of     INTERVAL EVENTS: INGRIS    SUBJECTIVE / INTERVAL HPI: Patient seen and examined at bedside. feeling well this AM     ROS: negative unless otherwise stated above.    VITAL SIGNS:  Vital Signs Last 24 Hrs  T(C): 37.2 (26 Oct 2023 09:03), Max: 37.2 (26 Oct 2023 09:03)  T(F): 98.9 (26 Oct 2023 09:03), Max: 98.9 (26 Oct 2023 09:03)  HR: 76 (26 Oct 2023 09:03) (76 - 76)  BP: 138/78 (26 Oct 2023 09:03) (137/77 - 146/84)  BP(mean): --  RR: 18 (26 Oct 2023 09:03) (18 - 18)  SpO2: 95% (26 Oct 2023 09:03) (91% - 95%)    Parameters below as of 26 Oct 2023 09:03  Patient On (Oxygen Delivery Method): room air            PHYSICAL EXAM:    General: NAD  HEENT: MMM  Neurological: AAOx3; no focal deficits    MEDICATIONS:  MEDICATIONS  (STANDING):  aspirin  chewable 81 milliGRAM(s) Oral daily  atorvastatin 40 milliGRAM(s) Oral at bedtime  carvedilol 6.25 milliGRAM(s) Oral every 12 hours  dextrose 5%. 1000 milliLiter(s) (100 mL/Hr) IV Continuous <Continuous>  dextrose 5%. 1000 milliLiter(s) (50 mL/Hr) IV Continuous <Continuous>  dextrose 50% Injectable 25 Gram(s) IV Push once  dextrose 50% Injectable 25 Gram(s) IV Push once  dextrose 50% Injectable 12.5 Gram(s) IV Push once  diazepam    Tablet 2.5 milliGRAM(s) Oral daily  diazepam    Tablet 2.5 milliGRAM(s) Oral at bedtime  divalproex  milliGRAM(s) Oral daily  divalproex  milliGRAM(s) Oral at bedtime  gabapentin 1200 milliGRAM(s) Oral every 8 hours  glucagon  Injectable 1 milliGRAM(s) IntraMuscular once  insulin glargine Injectable (LANTUS) 16 Unit(s) SubCutaneous at bedtime  insulin lispro (ADMELOG) corrective regimen sliding scale   SubCutaneous Before meals and at bedtime  insulin lispro Injectable (ADMELOG) 12 Unit(s) SubCutaneous three times a day before meals  lisinopril 10 milliGRAM(s) Oral daily  methadone    Tablet 130 milliGRAM(s) Oral daily  nicotine - 21 mG/24Hr(s) Patch 1 Patch Transdermal daily  risperiDONE   Tablet 2 milliGRAM(s) Oral at bedtime  spironolactone 25 milliGRAM(s) Oral daily    MEDICATIONS  (PRN):  acetaminophen     Tablet .. 650 milliGRAM(s) Oral every 6 hours PRN Temp greater or equal to 38C (100.4F), Mild Pain (1 - 3)  dextrose Oral Gel 15 Gram(s) Oral once PRN Blood Glucose LESS THAN 70 milliGRAM(s)/deciliter  melatonin 3 milliGRAM(s) Oral at bedtime PRN Insomnia  OLANZapine 5 milliGRAM(s) Oral every 6 hours PRN agitation      ALLERGIES:  Allergies    Seafood (Anaphylaxis)  Haldol (Unknown)  Cogentin (Unknown)  Thorazine (Rash)  Compazine (Short breath)    Intolerances        LABS:              CAPILLARY BLOOD GLUCOSE      POCT Blood Glucose.: 258 mg/dL (26 Oct 2023 07:49)      RADIOLOGY & ADDITIONAL TESTS: Reviewed.

## 2023-10-27 LAB
GLUCOSE BLDC GLUCOMTR-MCNC: 207 MG/DL — HIGH (ref 70–99)
GLUCOSE BLDC GLUCOMTR-MCNC: 207 MG/DL — HIGH (ref 70–99)
GLUCOSE BLDC GLUCOMTR-MCNC: 215 MG/DL — HIGH (ref 70–99)
GLUCOSE BLDC GLUCOMTR-MCNC: 215 MG/DL — HIGH (ref 70–99)
GLUCOSE BLDC GLUCOMTR-MCNC: 236 MG/DL — HIGH (ref 70–99)
GLUCOSE BLDC GLUCOMTR-MCNC: 236 MG/DL — HIGH (ref 70–99)
GLUCOSE BLDC GLUCOMTR-MCNC: 249 MG/DL — HIGH (ref 70–99)
GLUCOSE BLDC GLUCOMTR-MCNC: 249 MG/DL — HIGH (ref 70–99)

## 2023-10-27 PROCEDURE — 99232 SBSQ HOSP IP/OBS MODERATE 35: CPT

## 2023-10-27 RX ORDER — DIAZEPAM 5 MG
2.5 TABLET ORAL DAILY
Refills: 0 | Status: DISCONTINUED | OUTPATIENT
Start: 2023-10-28 | End: 2023-10-30

## 2023-10-27 RX ADMIN — LISINOPRIL 10 MILLIGRAM(S): 2.5 TABLET ORAL at 10:02

## 2023-10-27 RX ADMIN — DIVALPROEX SODIUM 500 MILLIGRAM(S): 500 TABLET, DELAYED RELEASE ORAL at 10:02

## 2023-10-27 RX ADMIN — Medication 650 MILLIGRAM(S): at 13:08

## 2023-10-27 RX ADMIN — Medication 4: at 07:52

## 2023-10-27 RX ADMIN — Medication 12 UNIT(S): at 12:00

## 2023-10-27 RX ADMIN — CARVEDILOL PHOSPHATE 6.25 MILLIGRAM(S): 80 CAPSULE, EXTENDED RELEASE ORAL at 10:03

## 2023-10-27 RX ADMIN — OLANZAPINE 5 MILLIGRAM(S): 15 TABLET, FILM COATED ORAL at 23:58

## 2023-10-27 RX ADMIN — CARVEDILOL PHOSPHATE 6.25 MILLIGRAM(S): 80 CAPSULE, EXTENDED RELEASE ORAL at 21:16

## 2023-10-27 RX ADMIN — Medication 650 MILLIGRAM(S): at 14:08

## 2023-10-27 RX ADMIN — Medication 12 UNIT(S): at 17:10

## 2023-10-27 RX ADMIN — DIVALPROEX SODIUM 750 MILLIGRAM(S): 500 TABLET, DELAYED RELEASE ORAL at 21:14

## 2023-10-27 RX ADMIN — Medication 3 MILLIGRAM(S): at 23:22

## 2023-10-27 RX ADMIN — Medication 4: at 17:09

## 2023-10-27 RX ADMIN — METHADONE HYDROCHLORIDE 130 MILLIGRAM(S): 40 TABLET ORAL at 10:04

## 2023-10-27 RX ADMIN — Medication 1 PATCH: at 10:20

## 2023-10-27 RX ADMIN — RISPERIDONE 2 MILLIGRAM(S): 4 TABLET ORAL at 23:21

## 2023-10-27 RX ADMIN — GABAPENTIN 1200 MILLIGRAM(S): 400 CAPSULE ORAL at 11:53

## 2023-10-27 RX ADMIN — Medication 4: at 21:17

## 2023-10-27 RX ADMIN — Medication 2.5 MILLIGRAM(S): at 10:02

## 2023-10-27 RX ADMIN — Medication 81 MILLIGRAM(S): at 10:03

## 2023-10-27 RX ADMIN — Medication 4: at 11:59

## 2023-10-27 RX ADMIN — GABAPENTIN 1200 MILLIGRAM(S): 400 CAPSULE ORAL at 21:14

## 2023-10-27 RX ADMIN — ATORVASTATIN CALCIUM 40 MILLIGRAM(S): 80 TABLET, FILM COATED ORAL at 21:16

## 2023-10-27 RX ADMIN — GABAPENTIN 1200 MILLIGRAM(S): 400 CAPSULE ORAL at 06:26

## 2023-10-27 RX ADMIN — OLANZAPINE 5 MILLIGRAM(S): 15 TABLET, FILM COATED ORAL at 17:48

## 2023-10-27 RX ADMIN — INSULIN GLARGINE 16 UNIT(S): 100 INJECTION, SOLUTION SUBCUTANEOUS at 21:39

## 2023-10-27 RX ADMIN — Medication 12 UNIT(S): at 07:52

## 2023-10-27 NOTE — BH INPATIENT PSYCHIATRY PROGRESS NOTE - NSBHMETABOLIC_PSY_ALL_CORE_FT
BMI: BMI (kg/m2): 31.6 (10-11-23 @ 11:12)  HbA1c: A1C with Estimated Average Glucose Result: 11.2 % (10-11-23 @ 11:04)    Glucose: POCT Blood Glucose.: 215 mg/dL (10-27-23 @ 11:49)    BP: 137/77 (10-27-23 @ 09:30) (114/72 - 164/80)  Lipid Panel: Date/Time: 10-10-23 @ 05:30  Cholesterol, Serum: 118  Direct LDL: --  HDL Cholesterol, Serum: 36  Total Cholesterol/HDL Ration Measurement: --  Triglycerides, Serum: 87   BMI: BMI (kg/m2): 31.6 (10-11-23 @ 11:12)  HbA1c: A1C with Estimated Average Glucose Result: 11.2 % (10-11-23 @ 11:04)  Glucose: POCT Blood Glucose.: 215 mg/dL (10-27-23 @ 11:49)  BP: 130/69 (10-27-23 @ 16:13) (114/72 - 164/80)  Lipid Panel: Date/Time: 10-10-23 @ 05:30  Cholesterol, Serum: 118  Direct LDL: 65  HDL Cholesterol, Serum: 36  Triglycerides, Serum: 87

## 2023-10-27 NOTE — BH INPATIENT PSYCHIATRY PROGRESS NOTE - CURRENT MEDICATION
MEDICATIONS  (STANDING):  aspirin  chewable 81 milliGRAM(s) Oral daily  atorvastatin 40 milliGRAM(s) Oral at bedtime  carvedilol 6.25 milliGRAM(s) Oral every 12 hours  dextrose 5%. 1000 milliLiter(s) (100 mL/Hr) IV Continuous <Continuous>  dextrose 5%. 1000 milliLiter(s) (50 mL/Hr) IV Continuous <Continuous>  dextrose 50% Injectable 25 Gram(s) IV Push once  dextrose 50% Injectable 25 Gram(s) IV Push once  dextrose 50% Injectable 12.5 Gram(s) IV Push once  divalproex  milliGRAM(s) Oral daily  divalproex  milliGRAM(s) Oral at bedtime  gabapentin 1200 milliGRAM(s) Oral every 8 hours  glucagon  Injectable 1 milliGRAM(s) IntraMuscular once  insulin glargine Injectable (LANTUS) 16 Unit(s) SubCutaneous at bedtime  insulin lispro (ADMELOG) corrective regimen sliding scale   SubCutaneous Before meals and at bedtime  insulin lispro Injectable (ADMELOG) 12 Unit(s) SubCutaneous three times a day before meals  lisinopril 10 milliGRAM(s) Oral daily  methadone    Tablet 130 milliGRAM(s) Oral daily  nicotine - 21 mG/24Hr(s) Patch 1 Patch Transdermal daily  risperiDONE   Tablet 2 milliGRAM(s) Oral at bedtime  spironolactone 25 milliGRAM(s) Oral daily    MEDICATIONS  (PRN):  acetaminophen     Tablet .. 650 milliGRAM(s) Oral every 6 hours PRN Temp greater or equal to 38C (100.4F), Mild Pain (1 - 3)  dextrose Oral Gel 15 Gram(s) Oral once PRN Blood Glucose LESS THAN 70 milliGRAM(s)/deciliter  melatonin 3 milliGRAM(s) Oral at bedtime PRN Insomnia  OLANZapine 5 milliGRAM(s) Oral every 6 hours PRN agitation   MEDICATIONS  (STANDING):  aspirin  chewable 81 milliGRAM(s) Oral daily  atorvastatin 40 milliGRAM(s) Oral at bedtime  carvedilol 6.25 milliGRAM(s) Oral every 12 hours  dextrose 5%. 1000 milliLiter(s) (100 mL/Hr) IV Continuous <Continuous>  dextrose 5%. 1000 milliLiter(s) (50 mL/Hr) IV Continuous <Continuous>  dextrose 50% Injectable 25 Gram(s) IV Push once  dextrose 50% Injectable 12.5 Gram(s) IV Push once  dextrose 50% Injectable 25 Gram(s) IV Push once  divalproex  milliGRAM(s) Oral daily  divalproex  milliGRAM(s) Oral at bedtime  gabapentin 1200 milliGRAM(s) Oral every 8 hours  glucagon  Injectable 1 milliGRAM(s) IntraMuscular once  insulin glargine Injectable (LANTUS) 16 Unit(s) SubCutaneous at bedtime  insulin lispro (ADMELOG) corrective regimen sliding scale   SubCutaneous Before meals and at bedtime  insulin lispro Injectable (ADMELOG) 12 Unit(s) SubCutaneous three times a day before meals  lisinopril 10 milliGRAM(s) Oral daily  methadone    Tablet 130 milliGRAM(s) Oral daily  nicotine - 21 mG/24Hr(s) Patch 1 Patch Transdermal daily  risperiDONE   Tablet 2 milliGRAM(s) Oral at bedtime  spironolactone 25 milliGRAM(s) Oral daily    MEDICATIONS  (PRN):  acetaminophen     Tablet .. 650 milliGRAM(s) Oral every 6 hours PRN Temp greater or equal to 38C (100.4F), Mild Pain (1 - 3)  dextrose Oral Gel 15 Gram(s) Oral once PRN Blood Glucose LESS THAN 70 milliGRAM(s)/deciliter  melatonin 3 milliGRAM(s) Oral at bedtime PRN Insomnia  OLANZapine 5 milliGRAM(s) Oral every 6 hours PRN agitation

## 2023-10-27 NOTE — BH INPATIENT PSYCHIATRY PROGRESS NOTE - NSBHFUPINTERVALHXFT_PSY_A_CORE
Patient seen and evaluated. No acute events overnight. No new complaints. Continues to tolerate standing medications. Patient remains highly motivated for rehab. Hopeful he will be accepted. States he has no adverse effects on standing medications. Reports some anxiety regarding last taper on  Staff reported patient was very "happy" and motivated regarding continued rehab interviews. In good behavioral control, will decrease valium taper further tomorrow in am, patient will only be on 2.5 mg orally nightly. At times endorses he feels anxious because he has "not been off of benzos in 35 years" but endorsed he felt "proud" that he has managed to taper off during this hospitalization. ECHO yesterday, unremarkable, EF is 60-65% no further recommendations from medicine, as patient has no evidence of heart failure at this time. Visible on unit, cooperates well with staff and peers. Patient is psychiatrically stable at this time, denies any and all complaints of depression, anxiety, or jose. Denies any suicidal or homicidal ideation, intent, or plan. He denies any auditory or visual hallucinations. Focused on recovery.     Discharge ready, pending placement into rehab  Patient seen and evaluated. No acute events overnight. No new complaints. Continues to tolerate standing medications. Patient remains highly motivated for rehab. Hopeful he will be accepted. States he has no adverse effects on standing medications. Reports continued anxiety regarding valium taper, but still cooperative and in agreement with plan. In good behavioral control, patient only receiving Valium 2.5 mg orally daily. With goals of tapering off completely early next week. Patient is psychiatrically stable at this time, denies any and all complaints of depression, anxiety, or jose. Denies any suicidal or homicidal ideation, intent, or plan. He denies any auditory or visual hallucinations. Focused on recovery.     Discharge ready, pending placement into rehab   Will consider increasing Gabapentin for anxiety if patient's anxiety worsens during final taper of valium

## 2023-10-27 NOTE — BH INPATIENT PSYCHIATRY PROGRESS NOTE - NSBHCHARTREVIEWVS_PSY_A_CORE FT
Vital Signs Last 24 Hrs  T(C): 36.4 (10-27-23 @ 09:30), Max: 36.8 (10-26-23 @ 16:28)  T(F): 97.5 (10-27-23 @ 09:30), Max: 98.2 (10-26-23 @ 16:28)  HR: 69 (10-27-23 @ 09:30) (60 - 71)  BP: 137/77 (10-27-23 @ 09:30) (126/76 - 138/82)  BP(mean): --  RR: 18 (10-27-23 @ 09:30) (18 - 18)  SpO2: 96% (10-27-23 @ 09:30) (95% - 96%)     Vital Signs Last 24 Hrs  T(C): 36.7 (10-27-23 @ 16:13), Max: 36.8 (10-26-23 @ 16:28)  T(F): 98.1 (10-27-23 @ 16:13), Max: 98.2 (10-26-23 @ 16:28)  HR: 73 (10-27-23 @ 16:13) (60 - 73)  BP: 130/69 (10-27-23 @ 16:13) (126/76 - 138/82)  RR: 18 (10-27-23 @ 16:13) (18 - 18)  SpO2: 90% (10-27-23 @ 16:13) (90% - 96%)

## 2023-10-27 NOTE — PROGRESS NOTE ADULT - SUBJECTIVE AND OBJECTIVE BOX
CC: Patient is a 57y old  Male who presents with a chief complaint of     INTERVAL EVENTS: INGRIS    SUBJECTIVE / INTERVAL HPI: Patient seen and examined at bedside. feeling well today     ROS: negative unless otherwise stated above.    VITAL SIGNS:  Vital Signs Last 24 Hrs  T(C): 36.4 (27 Oct 2023 09:30), Max: 36.8 (26 Oct 2023 16:28)  T(F): 97.5 (27 Oct 2023 09:30), Max: 98.2 (26 Oct 2023 16:28)  HR: 69 (27 Oct 2023 09:30) (60 - 71)  BP: 137/77 (27 Oct 2023 09:30) (126/76 - 138/82)  BP(mean): --  RR: 18 (27 Oct 2023 09:30) (18 - 18)  SpO2: 96% (27 Oct 2023 09:30) (95% - 96%)    Parameters below as of 27 Oct 2023 09:30  Patient On (Oxygen Delivery Method): room air            PHYSICAL EXAM:    General: NAD  HEENT: MMM  Neurological: AAOx3; no focal deficits    MEDICATIONS:  MEDICATIONS  (STANDING):  aspirin  chewable 81 milliGRAM(s) Oral daily  atorvastatin 40 milliGRAM(s) Oral at bedtime  carvedilol 6.25 milliGRAM(s) Oral every 12 hours  dextrose 5%. 1000 milliLiter(s) (100 mL/Hr) IV Continuous <Continuous>  dextrose 5%. 1000 milliLiter(s) (50 mL/Hr) IV Continuous <Continuous>  dextrose 50% Injectable 25 Gram(s) IV Push once  dextrose 50% Injectable 12.5 Gram(s) IV Push once  dextrose 50% Injectable 25 Gram(s) IV Push once  diazepam    Tablet 2.5 milliGRAM(s) Oral at bedtime  diazepam    Tablet 2.5 milliGRAM(s) Oral daily  divalproex  milliGRAM(s) Oral daily  divalproex  milliGRAM(s) Oral at bedtime  gabapentin 1200 milliGRAM(s) Oral every 8 hours  glucagon  Injectable 1 milliGRAM(s) IntraMuscular once  insulin glargine Injectable (LANTUS) 16 Unit(s) SubCutaneous at bedtime  insulin lispro (ADMELOG) corrective regimen sliding scale   SubCutaneous Before meals and at bedtime  insulin lispro Injectable (ADMELOG) 12 Unit(s) SubCutaneous three times a day before meals  lisinopril 10 milliGRAM(s) Oral daily  methadone    Tablet 130 milliGRAM(s) Oral daily  nicotine - 21 mG/24Hr(s) Patch 1 Patch Transdermal daily  risperiDONE   Tablet 2 milliGRAM(s) Oral at bedtime  spironolactone 25 milliGRAM(s) Oral daily    MEDICATIONS  (PRN):  acetaminophen     Tablet .. 650 milliGRAM(s) Oral every 6 hours PRN Temp greater or equal to 38C (100.4F), Mild Pain (1 - 3)  dextrose Oral Gel 15 Gram(s) Oral once PRN Blood Glucose LESS THAN 70 milliGRAM(s)/deciliter  melatonin 3 milliGRAM(s) Oral at bedtime PRN Insomnia  OLANZapine 5 milliGRAM(s) Oral every 6 hours PRN agitation      ALLERGIES:  Allergies    Seafood (Anaphylaxis)  Haldol (Unknown)  Cogentin (Unknown)  Thorazine (Rash)  Compazine (Short breath)    Intolerances        LABS:              CAPILLARY BLOOD GLUCOSE      POCT Blood Glucose.: 215 mg/dL (27 Oct 2023 11:49)      RADIOLOGY & ADDITIONAL TESTS: Reviewed.

## 2023-10-27 NOTE — PROGRESS NOTE ADULT - ASSESSMENT
57M PMHx of Bipolar 1 disorder, polysubstance abuse disorder, MMTP, hx TBI, pituitary tumor, CVA, HTN, DM, Epilepsy, CHF, He followed with cardiology in [place] and last visit was 2 years ago. He states at one point he had an EF of 35% and his most recent visit he was at 55%. Medicine consulted for CHF management. Fluid status stable. Euvolemic.     #Knee Pain  - Place lidocaine patch on knee    #  HF with improved EF   # HTN  # HLD  - c/w Lisinopril 20mg daily   - c/w GDMT: Carvedilol 6.25mg po bid, Lisinopril 20mg qPM , Spironolactone 25mg po qAM   - c/w ASA/Atorvastatin  - TTE with normal EF and chambers, no valvular pathologies     # MMTP  - c/w Methadone 130mg po daily (pt asking to go up to 160mg po daily, to d/w Psych attending)     # T2DM  A1C 11.2%. 10/23-24 -324  - c/w Lantus to 16U qHS and increase Humalog to 14U premeal  - c/w consistent carbohydrate diet & counseled to reduce carbohydrate intake for better glycemic control  - Informed inpatient  of patients request for double protein portion    # smoking   - Nicotine patch 21mg patch/d daily, taper to 14mg/d 2nd week then 7mg/d 3rd week  - Smoking cessation counselling    # Polysubstance abuse   # Bipolar disorder Type 1   # Hx Epilepsy   - On Valium taper, currently at 2.5mg qAM & qhs   - c/w Divalproex DR 500qAM & 750mg qhs  - Gabapentin 1000mg po q8hr   - Risperidone 2mg po qHS   - Olanzapine 5mg po q6hr prn agitation     Med consult team to follow thank you.

## 2023-10-27 NOTE — BH INPATIENT PSYCHIATRY PROGRESS NOTE - NSBHASSESSSUMMFT_PSY_ALL_CORE
Patient is a 57 year old man, undomiciled, single, on disability, with PPH of bipolar 1 disorder, polysubstance use disorder (crystal meth, cocaine, opioid, cannabis, K2, alcohol), opioid use disorder on maintenance therapy, antisocial personality disorder, history of incarceration (8 years in assisted for manslaughter and was released in 2014), with PMH of TBI, pituitary tumor, HTN, T2DM, and GERD, who was admitted to [facility], transferred from [facility] ED, after self-presenting for suicidal and homicidal ideation, in context of substance intoxication and medication non-adherence. On evaluation, patient reports continued depressed mood and passive SI with no intent/plan. He appears to have improved judgment as he has no intention of harming anyone. His delusions and paranoia remain, but are less severe at this time. It is unclear what medication increase he is referring to; continue to monitor for oversedation.     #Bipolar 1 disorder  - Continue with Risperidone 2 mg orally nightly for psychosis and disorganization  - Continue with Divalproex  mg orally daily, and 750 mg orally nightly for mood stabilization; subtherapeutic level 59.3 on 10/19/23   - Continue taper of Valium, currently 2.5 mg orally twice daily; patient remains highly motivated for continued taper and to be completely off of it   - Continue Gabapentin to 1200 mg orally TID to target increased anxiety as Valium taper continues   - Melatonin/Zyprexa PRN for insomnia/agitation respectively   - 2P    #Opioid use disorder   - Continue Methadone 130 mg orally daily for maintenance opioid use disorder     #DM   - Hba1c 11.2   - Continue with Lantus 16 units nightly  - Continue with aspart 12 units TID with meals  - Continue with Atorvastatin 10 mg orally daily   - Continue with sliding scale and hypoglycemia protocol   - Endo consulted, recs appreciated, continue as per above     #HTN/CHF  - Continue with Lisinopril 10 mg orally daily   - Continue with Carvedilol 6.25 mg orally twice daily for CHF Patient is a 57 year old man, undomiciled, single, on disability, with PPH of bipolar 1 disorder, polysubstance use disorder (crystal meth, cocaine, opioid, cannabis, K2, alcohol), opioid use disorder on maintenance therapy, antisocial personality disorder, history of incarceration (8 years in [facility] for manslaughter and was released in 2014), with PMH of TBI, pituitary tumor, HTN, T2DM, and GERD, who was admitted to [facility], transferred from [facility] ED, after self-presenting for suicidal and homicidal ideation, in context of substance intoxication and medication non-adherence. On evaluation, patient reports continued depressed mood and passive SI with no intent/plan. He appears to have improved judgment as he has no intention of harming anyone. His delusions and paranoia remain, but are less severe at this time. It is unclear what medication increase he is referring to; continue to monitor for oversedation.     #Bipolar 1 disorder  - Continue with Risperidone 2 mg orally nightly for psychosis and disorganization  - Continue with Divalproex  mg orally daily, and 750 mg orally nightly for mood stabilization; subtherapeutic level 59.3 on 10/19/23   - Continue taper of Valium, currently 2.5 mg orally daily; patient remains highly motivated for continued taper and to be completely off of it by Monday, 10/30/23  - Continue Gabapentin to 1200 mg orally TID to target increased anxiety as Valium taper continues   - Melatonin/Zyprexa PRN for insomnia/agitation respectively   - 2P    #Opioid use disorder   - Continue Methadone 130 mg orally daily for maintenance opioid use disorder     #DM   - Hba1c 11.2   - Continue with Lantus 16 units nightly  - Continue with aspart 12 units TID with meals  - Continue with Atorvastatin 10 mg orally daily   - Continue with sliding scale and hypoglycemia protocol   - Endo consulted, recs appreciated, continue as per above     #HTN/CHF  - Continue with Lisinopril 10 mg orally daily   - Continue with Carvedilol 6.25 mg orally twice daily for CHF

## 2023-10-28 LAB
GLUCOSE BLDC GLUCOMTR-MCNC: 162 MG/DL — HIGH (ref 70–99)
GLUCOSE BLDC GLUCOMTR-MCNC: 162 MG/DL — HIGH (ref 70–99)
GLUCOSE BLDC GLUCOMTR-MCNC: 163 MG/DL — HIGH (ref 70–99)
GLUCOSE BLDC GLUCOMTR-MCNC: 163 MG/DL — HIGH (ref 70–99)
GLUCOSE BLDC GLUCOMTR-MCNC: 237 MG/DL — HIGH (ref 70–99)
GLUCOSE BLDC GLUCOMTR-MCNC: 237 MG/DL — HIGH (ref 70–99)
GLUCOSE BLDC GLUCOMTR-MCNC: 246 MG/DL — HIGH (ref 70–99)
GLUCOSE BLDC GLUCOMTR-MCNC: 246 MG/DL — HIGH (ref 70–99)

## 2023-10-28 PROCEDURE — 99232 SBSQ HOSP IP/OBS MODERATE 35: CPT

## 2023-10-28 RX ADMIN — Medication 2: at 07:51

## 2023-10-28 RX ADMIN — GABAPENTIN 1200 MILLIGRAM(S): 400 CAPSULE ORAL at 22:05

## 2023-10-28 RX ADMIN — OLANZAPINE 5 MILLIGRAM(S): 15 TABLET, FILM COATED ORAL at 10:52

## 2023-10-28 RX ADMIN — DIVALPROEX SODIUM 500 MILLIGRAM(S): 500 TABLET, DELAYED RELEASE ORAL at 09:36

## 2023-10-28 RX ADMIN — CARVEDILOL PHOSPHATE 6.25 MILLIGRAM(S): 80 CAPSULE, EXTENDED RELEASE ORAL at 09:36

## 2023-10-28 RX ADMIN — Medication 1 PATCH: at 07:29

## 2023-10-28 RX ADMIN — METHADONE HYDROCHLORIDE 130 MILLIGRAM(S): 40 TABLET ORAL at 09:35

## 2023-10-28 RX ADMIN — Medication 3 MILLIGRAM(S): at 23:13

## 2023-10-28 RX ADMIN — Medication 12 UNIT(S): at 12:26

## 2023-10-28 RX ADMIN — RISPERIDONE 2 MILLIGRAM(S): 4 TABLET ORAL at 22:06

## 2023-10-28 RX ADMIN — OLANZAPINE 5 MILLIGRAM(S): 15 TABLET, FILM COATED ORAL at 23:13

## 2023-10-28 RX ADMIN — INSULIN GLARGINE 16 UNIT(S): 100 INJECTION, SOLUTION SUBCUTANEOUS at 22:06

## 2023-10-28 RX ADMIN — Medication 81 MILLIGRAM(S): at 09:36

## 2023-10-28 RX ADMIN — GABAPENTIN 1200 MILLIGRAM(S): 400 CAPSULE ORAL at 15:04

## 2023-10-28 RX ADMIN — Medication 4: at 17:01

## 2023-10-28 RX ADMIN — Medication 2.5 MILLIGRAM(S): at 09:35

## 2023-10-28 RX ADMIN — Medication 12 UNIT(S): at 07:51

## 2023-10-28 RX ADMIN — ATORVASTATIN CALCIUM 40 MILLIGRAM(S): 80 TABLET, FILM COATED ORAL at 22:05

## 2023-10-28 RX ADMIN — SPIRONOLACTONE 25 MILLIGRAM(S): 25 TABLET, FILM COATED ORAL at 09:36

## 2023-10-28 RX ADMIN — LISINOPRIL 10 MILLIGRAM(S): 2.5 TABLET ORAL at 09:36

## 2023-10-28 RX ADMIN — DIVALPROEX SODIUM 750 MILLIGRAM(S): 500 TABLET, DELAYED RELEASE ORAL at 22:05

## 2023-10-28 RX ADMIN — GABAPENTIN 1200 MILLIGRAM(S): 400 CAPSULE ORAL at 07:44

## 2023-10-28 RX ADMIN — Medication 1 PATCH: at 09:34

## 2023-10-28 RX ADMIN — Medication 2: at 12:26

## 2023-10-28 RX ADMIN — Medication 4: at 22:06

## 2023-10-28 RX ADMIN — CARVEDILOL PHOSPHATE 6.25 MILLIGRAM(S): 80 CAPSULE, EXTENDED RELEASE ORAL at 22:05

## 2023-10-28 RX ADMIN — Medication 12 UNIT(S): at 17:01

## 2023-10-28 NOTE — PROGRESS NOTE ADULT - SUBJECTIVE AND OBJECTIVE BOX
CC: Patient is a 57y old  Male who presents with a chief complaint of     INTERVAL EVENTS: INGRIS    SUBJECTIVE / INTERVAL HPI: Patient seen and examined at bedside.     ROS: negative unless otherwise stated above.    VITAL SIGNS:  Vital Signs Last 24 Hrs  T(C): 37.3 (27 Oct 2023 19:58), Max: 37.3 (27 Oct 2023 19:58)  T(F): 99.1 (27 Oct 2023 19:58), Max: 99.1 (27 Oct 2023 19:58)  HR: 81 (27 Oct 2023 19:58) (69 - 81)  BP: 143/74 (27 Oct 2023 19:58) (130/69 - 143/74)  BP(mean): --  RR: 18 (27 Oct 2023 19:58) (18 - 18)  SpO2: 94% (27 Oct 2023 19:58) (90% - 96%)    Parameters below as of 27 Oct 2023 19:58  Patient On (Oxygen Delivery Method): room air            PHYSICAL EXAM:    General: NAD  HEENT: MMM  Neck: supple  Cardiovascular: +S1/S2; RRR  Respiratory: CTA B/L; no W/R/R  Gastrointestinal: soft, NT/ND  Extremities: WWP; no edema, clubbing or cyanosis  Vascular: 2+ radial, DP/PT pulses B/L  Neurological: AAOx3; no focal deficits    MEDICATIONS:  MEDICATIONS  (STANDING):  aspirin  chewable 81 milliGRAM(s) Oral daily  atorvastatin 40 milliGRAM(s) Oral at bedtime  carvedilol 6.25 milliGRAM(s) Oral every 12 hours  dextrose 5%. 1000 milliLiter(s) (50 mL/Hr) IV Continuous <Continuous>  dextrose 5%. 1000 milliLiter(s) (100 mL/Hr) IV Continuous <Continuous>  dextrose 50% Injectable 25 Gram(s) IV Push once  dextrose 50% Injectable 25 Gram(s) IV Push once  dextrose 50% Injectable 12.5 Gram(s) IV Push once  diazepam    Tablet 2.5 milliGRAM(s) Oral daily  divalproex  milliGRAM(s) Oral daily  divalproex  milliGRAM(s) Oral at bedtime  gabapentin 1200 milliGRAM(s) Oral every 8 hours  glucagon  Injectable 1 milliGRAM(s) IntraMuscular once  insulin glargine Injectable (LANTUS) 16 Unit(s) SubCutaneous at bedtime  insulin lispro (ADMELOG) corrective regimen sliding scale   SubCutaneous Before meals and at bedtime  insulin lispro Injectable (ADMELOG) 12 Unit(s) SubCutaneous three times a day before meals  lisinopril 10 milliGRAM(s) Oral daily  methadone    Tablet 130 milliGRAM(s) Oral daily  nicotine - 21 mG/24Hr(s) Patch 1 Patch Transdermal daily  risperiDONE   Tablet 2 milliGRAM(s) Oral at bedtime  spironolactone 25 milliGRAM(s) Oral daily    MEDICATIONS  (PRN):  acetaminophen     Tablet .. 650 milliGRAM(s) Oral every 6 hours PRN Temp greater or equal to 38C (100.4F), Mild Pain (1 - 3)  dextrose Oral Gel 15 Gram(s) Oral once PRN Blood Glucose LESS THAN 70 milliGRAM(s)/deciliter  melatonin 3 milliGRAM(s) Oral at bedtime PRN Insomnia  OLANZapine 5 milliGRAM(s) Oral every 6 hours PRN agitation      ALLERGIES:  Allergies    Seafood (Anaphylaxis)  Haldol (Unknown)  Cogentin (Unknown)  Thorazine (Rash)  Compazine (Short breath)    Intolerances        LABS:              CAPILLARY BLOOD GLUCOSE      POCT Blood Glucose.: 236 mg/dL (27 Oct 2023 21:12)      RADIOLOGY & ADDITIONAL TESTS: Reviewed. CC: Patient is a 57y old  Male who presents with a chief complaint of     INTERVAL EVENTS: INGRIS    SUBJECTIVE / INTERVAL HPI: Patient seen and examined at bedside.     ROS: negative unless otherwise stated above.    VITAL SIGNS:  Vital Signs Last 24 Hrs  T(C): 37.3 (27 Oct 2023 19:58), Max: 37.3 (27 Oct 2023 19:58)  T(F): 99.1 (27 Oct 2023 19:58), Max: 99.1 (27 Oct 2023 19:58)  HR: 81 (27 Oct 2023 19:58) (69 - 81)  BP: 143/74 (27 Oct 2023 19:58) (130/69 - 143/74)  BP(mean): --  RR: 18 (27 Oct 2023 19:58) (18 - 18)  SpO2: 94% (27 Oct 2023 19:58) (90% - 96%)    Parameters below as of 27 Oct 2023 19:58  Patient On (Oxygen Delivery Method): room air            PHYSICAL EXAM:    General: NAD  HEENT: MMM  Neurological: AAOx3; no focal deficits    MEDICATIONS:  MEDICATIONS  (STANDING):  aspirin  chewable 81 milliGRAM(s) Oral daily  atorvastatin 40 milliGRAM(s) Oral at bedtime  carvedilol 6.25 milliGRAM(s) Oral every 12 hours  dextrose 5%. 1000 milliLiter(s) (50 mL/Hr) IV Continuous <Continuous>  dextrose 5%. 1000 milliLiter(s) (100 mL/Hr) IV Continuous <Continuous>  dextrose 50% Injectable 25 Gram(s) IV Push once  dextrose 50% Injectable 25 Gram(s) IV Push once  dextrose 50% Injectable 12.5 Gram(s) IV Push once  diazepam    Tablet 2.5 milliGRAM(s) Oral daily  divalproex  milliGRAM(s) Oral daily  divalproex  milliGRAM(s) Oral at bedtime  gabapentin 1200 milliGRAM(s) Oral every 8 hours  glucagon  Injectable 1 milliGRAM(s) IntraMuscular once  insulin glargine Injectable (LANTUS) 16 Unit(s) SubCutaneous at bedtime  insulin lispro (ADMELOG) corrective regimen sliding scale   SubCutaneous Before meals and at bedtime  insulin lispro Injectable (ADMELOG) 12 Unit(s) SubCutaneous three times a day before meals  lisinopril 10 milliGRAM(s) Oral daily  methadone    Tablet 130 milliGRAM(s) Oral daily  nicotine - 21 mG/24Hr(s) Patch 1 Patch Transdermal daily  risperiDONE   Tablet 2 milliGRAM(s) Oral at bedtime  spironolactone 25 milliGRAM(s) Oral daily    MEDICATIONS  (PRN):  acetaminophen     Tablet .. 650 milliGRAM(s) Oral every 6 hours PRN Temp greater or equal to 38C (100.4F), Mild Pain (1 - 3)  dextrose Oral Gel 15 Gram(s) Oral once PRN Blood Glucose LESS THAN 70 milliGRAM(s)/deciliter  melatonin 3 milliGRAM(s) Oral at bedtime PRN Insomnia  OLANZapine 5 milliGRAM(s) Oral every 6 hours PRN agitation      ALLERGIES:  Allergies    Seafood (Anaphylaxis)  Haldol (Unknown)  Cogentin (Unknown)  Thorazine (Rash)  Compazine (Short breath)    Intolerances        LABS:              CAPILLARY BLOOD GLUCOSE      POCT Blood Glucose.: 236 mg/dL (27 Oct 2023 21:12)      RADIOLOGY & ADDITIONAL TESTS: Reviewed.

## 2023-10-29 LAB
GLUCOSE BLDC GLUCOMTR-MCNC: 160 MG/DL — HIGH (ref 70–99)
GLUCOSE BLDC GLUCOMTR-MCNC: 160 MG/DL — HIGH (ref 70–99)
GLUCOSE BLDC GLUCOMTR-MCNC: 189 MG/DL — HIGH (ref 70–99)
GLUCOSE BLDC GLUCOMTR-MCNC: 189 MG/DL — HIGH (ref 70–99)
GLUCOSE BLDC GLUCOMTR-MCNC: 278 MG/DL — HIGH (ref 70–99)
GLUCOSE BLDC GLUCOMTR-MCNC: 278 MG/DL — HIGH (ref 70–99)
GLUCOSE BLDC GLUCOMTR-MCNC: 293 MG/DL — HIGH (ref 70–99)
GLUCOSE BLDC GLUCOMTR-MCNC: 293 MG/DL — HIGH (ref 70–99)

## 2023-10-29 PROCEDURE — 99232 SBSQ HOSP IP/OBS MODERATE 35: CPT

## 2023-10-29 RX ORDER — INSULIN LISPRO 100/ML
14 VIAL (ML) SUBCUTANEOUS
Refills: 0 | Status: DISCONTINUED | OUTPATIENT
Start: 2023-10-29 | End: 2023-11-06

## 2023-10-29 RX ADMIN — Medication 2: at 08:04

## 2023-10-29 RX ADMIN — Medication 2: at 16:58

## 2023-10-29 RX ADMIN — Medication 1 PATCH: at 06:45

## 2023-10-29 RX ADMIN — SPIRONOLACTONE 25 MILLIGRAM(S): 25 TABLET, FILM COATED ORAL at 10:09

## 2023-10-29 RX ADMIN — CARVEDILOL PHOSPHATE 6.25 MILLIGRAM(S): 80 CAPSULE, EXTENDED RELEASE ORAL at 21:19

## 2023-10-29 RX ADMIN — METHADONE HYDROCHLORIDE 130 MILLIGRAM(S): 40 TABLET ORAL at 10:00

## 2023-10-29 RX ADMIN — OLANZAPINE 5 MILLIGRAM(S): 15 TABLET, FILM COATED ORAL at 18:17

## 2023-10-29 RX ADMIN — Medication 3 MILLIGRAM(S): at 22:14

## 2023-10-29 RX ADMIN — GABAPENTIN 1200 MILLIGRAM(S): 400 CAPSULE ORAL at 15:18

## 2023-10-29 RX ADMIN — Medication 650 MILLIGRAM(S): at 16:23

## 2023-10-29 RX ADMIN — Medication 1 PATCH: at 11:24

## 2023-10-29 RX ADMIN — LISINOPRIL 10 MILLIGRAM(S): 2.5 TABLET ORAL at 10:08

## 2023-10-29 RX ADMIN — RISPERIDONE 2 MILLIGRAM(S): 4 TABLET ORAL at 22:15

## 2023-10-29 RX ADMIN — GABAPENTIN 1200 MILLIGRAM(S): 400 CAPSULE ORAL at 07:27

## 2023-10-29 RX ADMIN — Medication 6: at 12:56

## 2023-10-29 RX ADMIN — DIVALPROEX SODIUM 500 MILLIGRAM(S): 500 TABLET, DELAYED RELEASE ORAL at 10:08

## 2023-10-29 RX ADMIN — CARVEDILOL PHOSPHATE 6.25 MILLIGRAM(S): 80 CAPSULE, EXTENDED RELEASE ORAL at 10:09

## 2023-10-29 RX ADMIN — Medication 6: at 21:42

## 2023-10-29 RX ADMIN — GABAPENTIN 1200 MILLIGRAM(S): 400 CAPSULE ORAL at 21:19

## 2023-10-29 RX ADMIN — Medication 81 MILLIGRAM(S): at 10:08

## 2023-10-29 RX ADMIN — Medication 14 UNIT(S): at 16:57

## 2023-10-29 RX ADMIN — Medication 12 UNIT(S): at 08:05

## 2023-10-29 RX ADMIN — INSULIN GLARGINE 16 UNIT(S): 100 INJECTION, SOLUTION SUBCUTANEOUS at 21:42

## 2023-10-29 RX ADMIN — Medication 2.5 MILLIGRAM(S): at 10:07

## 2023-10-29 RX ADMIN — Medication 12 UNIT(S): at 12:57

## 2023-10-29 RX ADMIN — ATORVASTATIN CALCIUM 40 MILLIGRAM(S): 80 TABLET, FILM COATED ORAL at 21:19

## 2023-10-29 RX ADMIN — DIVALPROEX SODIUM 750 MILLIGRAM(S): 500 TABLET, DELAYED RELEASE ORAL at 21:19

## 2023-10-29 NOTE — PROGRESS NOTE ADULT - SUBJECTIVE AND OBJECTIVE BOX
Vital Signs Last 24 Hrs  T(C): 36.4 (29 Oct 2023 09:02), Max: 37.3 (28 Oct 2023 20:34)  T(F): 97.6 (29 Oct 2023 09:02), Max: 99.1 (28 Oct 2023 20:34)  HR: 80 (29 Oct 2023 09:02) (65 - 80)  BP: 147/82 (29 Oct 2023 09:02) (126/82 - 147/82)  BP(mean): --  ABP: --  ABP(mean): --  RR: 18 (28 Oct 2023 20:34) (18 - 18)  SpO2: 97% (29 Oct 2023 09:02) (70% - 97%)    O2 Parameters below as of 29 Oct 2023 09:02  Patient On (Oxygen Delivery Method): room air      MEDICATIONS  (STANDING):  aspirin  chewable 81 milliGRAM(s) Oral daily  atorvastatin 40 milliGRAM(s) Oral at bedtime  carvedilol 6.25 milliGRAM(s) Oral every 12 hours  dextrose 5%. 1000 milliLiter(s) (100 mL/Hr) IV Continuous <Continuous>  dextrose 5%. 1000 milliLiter(s) (50 mL/Hr) IV Continuous <Continuous>  dextrose 50% Injectable 25 Gram(s) IV Push once  dextrose 50% Injectable 25 Gram(s) IV Push once  dextrose 50% Injectable 12.5 Gram(s) IV Push once  diazepam    Tablet 2.5 milliGRAM(s) Oral daily  divalproex  milliGRAM(s) Oral daily  divalproex  milliGRAM(s) Oral at bedtime  gabapentin 1200 milliGRAM(s) Oral every 8 hours  glucagon  Injectable 1 milliGRAM(s) IntraMuscular once  insulin glargine Injectable (LANTUS) 16 Unit(s) SubCutaneous at bedtime  insulin lispro (ADMELOG) corrective regimen sliding scale   SubCutaneous Before meals and at bedtime  insulin lispro Injectable (ADMELOG) 12 Unit(s) SubCutaneous three times a day before meals  lisinopril 10 milliGRAM(s) Oral daily  methadone    Tablet 130 milliGRAM(s) Oral daily  nicotine - 21 mG/24Hr(s) Patch 1 Patch Transdermal daily  risperiDONE   Tablet 2 milliGRAM(s) Oral at bedtime  spironolactone 25 milliGRAM(s) Oral daily    MEDICATIONS  (PRN):  acetaminophen     Tablet .. 650 milliGRAM(s) Oral every 6 hours PRN Temp greater or equal to 38C (100.4F), Mild Pain (1 - 3)  dextrose Oral Gel 15 Gram(s) Oral once PRN Blood Glucose LESS THAN 70 milliGRAM(s)/deciliter  melatonin 3 milliGRAM(s) Oral at bedtime PRN Insomnia  OLANZapine 5 milliGRAM(s) Oral every 6 hours PRN agitation

## 2023-10-29 NOTE — PROGRESS NOTE ADULT - ASSESSMENT
57M PMHx of Bipolar 1 disorder, polysubstance abuse disorder, MMTP, hx TBI, pituitary tumor, CVA, HTN, DM, Epilepsy, CHF, He followed with cardiology in [place] and last visit was 2 years ago. He states at one point he had an EF of 35% and his most recent visit he was at 55%. Medicine consulted for CHF management      CC: Pt seen having second breakfast.  ambulating, glucose trend reviewed- in 160s this morning 200s in the day .   we talked about reducing intake of juice for glucose control.   no shortness of breath.      # hx chronic CHF  # HTN  # HLD  - TTE( 10/24); EF 60-65%  - BP acceptable, clinically euvolemic appearing-   - c/w GDMT: Carvedilol 6.25mg po bid and Lisinopril 10mg po daily   - on Spironolactone 25mg po qAM   - c/w ASA/Atorvastatin      # MMTP  - c/w Methadone 130mg po daily ( pt asking to go up to 160mg po daily, to d/w Psych attending)     # T2DM  - A1C 11.2%  - FS in 160s in am , with 200s during the day. , will c/w Lantus 16U qHS and slightly increased during day -  Humalog 14U AC meals TID   - c/w carb consistency diet   - pt encouraged to stay away from juice ( which can cause hyperglycemia -   - goal -180    # smoking   - Nicotine patch 21mg patch/d daily, taper to 14mg/d 2nd week then 7mg/d 3rd week  - smoking cessation advice    # Polysubstance abuse   # Bipolar disorder Type 1   # Hx Epilepsy   management per BH team.    # R knee pain likely 2/2 DJD   - APAP 650mg q6hr and Lidocaine patch 4% AA   - Fritz: no DVT   - x-ray R knee: There is moderate medial compartment arthrosis with mild lateral and   patellofemoral arthrosis. There is a small joint effusion.    dw  team, med consult will continue to follow .

## 2023-10-30 LAB
GLUCOSE BLDC GLUCOMTR-MCNC: 141 MG/DL — HIGH (ref 70–99)
GLUCOSE BLDC GLUCOMTR-MCNC: 141 MG/DL — HIGH (ref 70–99)
GLUCOSE BLDC GLUCOMTR-MCNC: 191 MG/DL — HIGH (ref 70–99)
GLUCOSE BLDC GLUCOMTR-MCNC: 191 MG/DL — HIGH (ref 70–99)
GLUCOSE BLDC GLUCOMTR-MCNC: 258 MG/DL — HIGH (ref 70–99)
GLUCOSE BLDC GLUCOMTR-MCNC: 258 MG/DL — HIGH (ref 70–99)
GLUCOSE BLDC GLUCOMTR-MCNC: 310 MG/DL — HIGH (ref 70–99)
GLUCOSE BLDC GLUCOMTR-MCNC: 310 MG/DL — HIGH (ref 70–99)

## 2023-10-30 PROCEDURE — 99232 SBSQ HOSP IP/OBS MODERATE 35: CPT

## 2023-10-30 RX ORDER — DIAZEPAM 5 MG
2.5 TABLET ORAL DAILY
Refills: 0 | Status: DISCONTINUED | OUTPATIENT
Start: 2023-10-31 | End: 2023-11-03

## 2023-10-30 RX ORDER — LISINOPRIL 2.5 MG/1
20 TABLET ORAL DAILY
Refills: 0 | Status: DISCONTINUED | OUTPATIENT
Start: 2023-10-31 | End: 2023-11-06

## 2023-10-30 RX ADMIN — OLANZAPINE 5 MILLIGRAM(S): 15 TABLET, FILM COATED ORAL at 22:41

## 2023-10-30 RX ADMIN — RISPERIDONE 2 MILLIGRAM(S): 4 TABLET ORAL at 22:41

## 2023-10-30 RX ADMIN — INSULIN GLARGINE 16 UNIT(S): 100 INJECTION, SOLUTION SUBCUTANEOUS at 22:37

## 2023-10-30 RX ADMIN — Medication 1 PATCH: at 19:32

## 2023-10-30 RX ADMIN — METHADONE HYDROCHLORIDE 130 MILLIGRAM(S): 40 TABLET ORAL at 10:14

## 2023-10-30 RX ADMIN — OLANZAPINE 5 MILLIGRAM(S): 15 TABLET, FILM COATED ORAL at 15:38

## 2023-10-30 RX ADMIN — Medication 2.5 MILLIGRAM(S): at 10:15

## 2023-10-30 RX ADMIN — LISINOPRIL 10 MILLIGRAM(S): 2.5 TABLET ORAL at 10:12

## 2023-10-30 RX ADMIN — Medication 3 MILLIGRAM(S): at 22:41

## 2023-10-30 RX ADMIN — DIVALPROEX SODIUM 750 MILLIGRAM(S): 500 TABLET, DELAYED RELEASE ORAL at 20:52

## 2023-10-30 RX ADMIN — GABAPENTIN 1200 MILLIGRAM(S): 400 CAPSULE ORAL at 20:53

## 2023-10-30 RX ADMIN — GABAPENTIN 1200 MILLIGRAM(S): 400 CAPSULE ORAL at 15:27

## 2023-10-30 RX ADMIN — Medication 14 UNIT(S): at 08:03

## 2023-10-30 RX ADMIN — Medication 14 UNIT(S): at 17:24

## 2023-10-30 RX ADMIN — Medication 6: at 08:03

## 2023-10-30 RX ADMIN — Medication 2: at 12:13

## 2023-10-30 RX ADMIN — Medication 14 UNIT(S): at 12:13

## 2023-10-30 RX ADMIN — CARVEDILOL PHOSPHATE 6.25 MILLIGRAM(S): 80 CAPSULE, EXTENDED RELEASE ORAL at 20:52

## 2023-10-30 RX ADMIN — Medication 8: at 21:02

## 2023-10-30 RX ADMIN — SPIRONOLACTONE 25 MILLIGRAM(S): 25 TABLET, FILM COATED ORAL at 10:12

## 2023-10-30 RX ADMIN — DIVALPROEX SODIUM 500 MILLIGRAM(S): 500 TABLET, DELAYED RELEASE ORAL at 10:13

## 2023-10-30 RX ADMIN — Medication 81 MILLIGRAM(S): at 10:12

## 2023-10-30 RX ADMIN — Medication 1 PATCH: at 15:28

## 2023-10-30 RX ADMIN — CARVEDILOL PHOSPHATE 6.25 MILLIGRAM(S): 80 CAPSULE, EXTENDED RELEASE ORAL at 10:12

## 2023-10-30 RX ADMIN — GABAPENTIN 1200 MILLIGRAM(S): 400 CAPSULE ORAL at 07:39

## 2023-10-30 RX ADMIN — ATORVASTATIN CALCIUM 40 MILLIGRAM(S): 80 TABLET, FILM COATED ORAL at 20:52

## 2023-10-30 NOTE — BH INPATIENT PSYCHIATRY PROGRESS NOTE - NSBHFUPINTERVALHXFT_PSY_A_CORE
Patient seen and evaluated. No acute events overnight. No new complaints. Continues to tolerate standing medications. States he is doing "okay" remains highly motivated for rehab, hopeful he will be accepted. No adverse effects. In good behavioral control, no issues, calm and cooperative with staff. Patient remains psychiatrically stable at this time, denies any and all complaints of depression, anxiety, psychosis or jose. Denies any suicidal or homicidal ideation, intent, or plan. He denies any auditory or visual hallucinations. Focused on recovery.     Discharge ready, pending placement into rehab   CBC, CMP, and depakote level ordered for tomorrow in am, 10/31/23

## 2023-10-30 NOTE — BH INPATIENT PSYCHIATRY PROGRESS NOTE - NSBHMETABOLIC_PSY_ALL_CORE_FT
BMI: BMI (kg/m2): 31.6 (10-11-23 @ 11:12)  HbA1c: A1C with Estimated Average Glucose Result: 11.2 % (10-11-23 @ 11:04)  Glucose: POCT Blood Glucose.: 191 mg/dL (10-30-23 @ 12:07)  BP: 101/59 (10-30-23 @ 08:51) (101/59 - 147/82)  Lipid Panel: Date/Time: 10-10-23 @ 05:30  Cholesterol, Serum: 118  Direct LDL: 65  HDL Cholesterol, Serum: 36  Triglycerides, Serum: 87

## 2023-10-30 NOTE — BH INPATIENT PSYCHIATRY PROGRESS NOTE - NSBHASSESSSUMMFT_PSY_ALL_CORE
Patient is a 57 year old man, undomiciled, single, on disability, with PPH of bipolar 1 disorder, polysubstance use disorder (crystal meth, cocaine, opioid, cannabis, K2, alcohol), opioid use disorder on maintenance therapy, antisocial personality disorder, history of incarceration (8 years in [facility] for manslaughter and was released in 2014), with PMH of TBI, pituitary tumor, HTN, T2DM, and GERD, who was admitted to [facility], transferred from [facility] ED, after self-presenting for suicidal and homicidal ideation, in context of substance intoxication and medication non-adherence. On evaluation, patient reports continued depressed mood and passive SI with no intent/plan. He appears to have improved judgment as he has no intention of harming anyone. His delusions and paranoia remain, but are less severe at this time. It is unclear what medication increase he is referring to; continue to monitor for oversedation.     #Bipolar 1 disorder  - Continue with Risperidone 2 mg orally nightly for psychosis and disorganization  - Continue with Divalproex  mg orally daily, and 750 mg orally nightly for mood stabilization; subtherapeutic level 59.3 on 10/19/23   - Continue taper of Valium, currently 2.5 mg orally daily; patient remains highly motivated for continued taper and to be completely off of it in a couple of days   - Continue Gabapentin to 1200 mg orally TID for chronic anxiety   - Melatonin/Zyprexa PRN for insomnia/agitation respectively   - [facility]    #Opioid use disorder   - Continue Methadone 130 mg orally daily for maintenance opioid use disorder     #DM   - Hba1c 11.2   - Continue with Lantus 16 units nightly  - Continue with aspart 12 units TID with meals  - Continue with Atorvastatin 10 mg orally daily   - Continue with sliding scale and hypoglycemia protocol   - Endo consulted, recs appreciated, continue as per above     #HTN/CHF  - Continue with Lisinopril 10 mg orally daily   - Continue with Carvedilol 6.25 mg orally twice daily for CHF

## 2023-10-30 NOTE — BH INPATIENT PSYCHIATRY PROGRESS NOTE - CURRENT MEDICATION
MEDICATIONS  (STANDING):  aspirin  chewable 81 milliGRAM(s) Oral daily  atorvastatin 40 milliGRAM(s) Oral at bedtime  carvedilol 6.25 milliGRAM(s) Oral every 12 hours  dextrose 5%. 1000 milliLiter(s) (100 mL/Hr) IV Continuous <Continuous>  dextrose 5%. 1000 milliLiter(s) (50 mL/Hr) IV Continuous <Continuous>  dextrose 50% Injectable 25 Gram(s) IV Push once  dextrose 50% Injectable 12.5 Gram(s) IV Push once  dextrose 50% Injectable 25 Gram(s) IV Push once  divalproex  milliGRAM(s) Oral daily  divalproex  milliGRAM(s) Oral at bedtime  gabapentin 1200 milliGRAM(s) Oral every 8 hours  glucagon  Injectable 1 milliGRAM(s) IntraMuscular once  insulin glargine Injectable (LANTUS) 16 Unit(s) SubCutaneous at bedtime  insulin lispro (ADMELOG) corrective regimen sliding scale   SubCutaneous Before meals and at bedtime  insulin lispro Injectable (ADMELOG) 14 Unit(s) SubCutaneous three times a day before meals  methadone    Tablet 130 milliGRAM(s) Oral daily  nicotine - 21 mG/24Hr(s) Patch 1 Patch Transdermal daily  risperiDONE   Tablet 2 milliGRAM(s) Oral at bedtime  spironolactone 25 milliGRAM(s) Oral daily    MEDICATIONS  (PRN):  acetaminophen     Tablet .. 650 milliGRAM(s) Oral every 6 hours PRN Temp greater or equal to 38C (100.4F), Mild Pain (1 - 3)  dextrose Oral Gel 15 Gram(s) Oral once PRN Blood Glucose LESS THAN 70 milliGRAM(s)/deciliter  melatonin 3 milliGRAM(s) Oral at bedtime PRN Insomnia  OLANZapine 5 milliGRAM(s) Oral every 6 hours PRN agitation

## 2023-10-30 NOTE — PROGRESS NOTE ADULT - ASSESSMENT
57M PMHx of Bipolar 1 disorder, polysubstance abuse disorder, MMTP, hx TBI, pituitary tumor, CVA, HTN, DM, Epilepsy, CHF, He followed with cardiology in [place] and last visit was 2 years ago. He states at one point he had an EF of 35% and his most recent visit he was at 55%. Medicine consulted for CHF management. Fluid status stable. Euvolemic.     #Knee Pain  - Place lidocaine patch on knee    #  HF with improved EF   # HTN  # HLD  - c/w Lisinopril 20mg daily   - c/w GDMT: Carvedilol 6.25mg po bid, Lisinopril 20mg qPM , Spironolactone 25mg po qAM   - c/w ASA/Atorvastatin  - TTE with normal EF and chambers, no valvular pathologies     # MMTP  - c/w Methadone 130mg po daily (pt asking to go up to 160mg po daily, to d/w Psych attending)     # T2DM  A1C 11.2%.   - c/w Lantus to 16U qHS and Humalog to 14U premeal  - c/w consistent carbohydrate diet & counseled to reduce carbohydrate intake for better glycemic control  - Informed inpatient  of patients request for double protein portion    # smoking   - Nicotine patch 21mg patch/d daily, taper to 14mg/d 2nd week then 7mg/d 3rd week  - Smoking cessation counselling    # Polysubstance abuse   # Bipolar disorder Type 1   # Hx Epilepsy   - On Valium taper, currently at 2.5mg qAM & qhs   - c/w Divalproex DR 500qAM & 750mg qhs  - Gabapentin 1000mg po q8hr   - Risperidone 2mg po qHS   - Olanzapine 5mg po q6hr prn agitation     Med consult team to sign off.

## 2023-10-30 NOTE — PROGRESS NOTE ADULT - ATTENDING COMMENTS
57M PMHx of Bipolar 1 disorder, polysubstance abuse disorder, MMTP, hx TBI, pituitary tumor, CVA, HTN, DM, Epilepsy, CHF, He followed with cardiology in [place] and last visit was 2 years ago. He states at one point he had an EF of 35% and his most recent visit he was at 55%. Medicine consulted for CHF management      CC: Pt seen w. Dr. Restrepo     # hx chronic CHF  # HTN  # HLD  - TTE( 10/24); EF 60-65%  - c/w GDMT: Carvedilol 6.25mg po bid and Lisinopril 10mg po daily   - d/c Spironolactone 25mg po qAM   - c/w ASA/Atorvastatin  - remains euvolemic, no need for lasix     # MMTP  - c/w Methadone 130mg po daily ( pt asking to go up to 160mg po daily, to d/w Psych attending)     # T2DM  - A1C 11.2%  - FS in high 200's , will c/w Lantus 16U qHS and slightly increased Humalog 14U AC meals TID,   - c/w carb consistency diet  (pt wants double portion/advise limit carb to 1/4 of the plate, and ok for high protein)   - goal -180    # smoking   - Nicotine patch 21mg patch/d daily, taper to 14mg/d 2nd week then 7mg/d 3rd week  - smoking cessation advice    # Polysubstance abuse   # Bipolar disorder Type 1   # Hx Epilepsy   - taper valium per Psych- 5mg qAM and 2.5mg qHS   - c/w Divalproex DR 750mg po bid   - Gabapentin 1000mg po q8hr   - Risperidone 2mg po qHS   - Olanzapine 5mg po q6hr prn agitation     # R knee pain likely 2/2 DJD   - APAP 650mg q6hr and Lidocaine patch 4% AA   - Duppler: no DVT   - x-ray R knee: There is moderate medial compartment arthrosis with mild lateral and   patellofemoral arthrosis. There is a small joint effusion.    d/w SANGITA CARREON. Med consult team to follow thank you.
57M PMHx of Bipolar 1 disorder, polysubstance abuse disorder, MMTP, hx TBI, pituitary tumor, CVA, HTN, DM, Epilepsy, CHF, He followed with cardiology in [place] and last visit was 2 years ago. He states at one point he had an EF of 35% and his most recent visit he was at 55%. Medicine consulted for CHF management      CC: Pt seen w. Dr. Restrepo   ambulating, glucose trend reviewed.    # hx chronic CHF  # HTN  # HLD  - TTE( 10/24); EF 60-65%  - BP acceptable, clinically euvolemic appearing-   - c/w GDMT: Carvedilol 6.25mg po bid and Lisinopril 10mg po daily   - on Spironolactone 25mg po qAM   - c/w ASA/Atorvastatin      # MMTP  - c/w Methadone 130mg po daily ( pt asking to go up to 160mg po daily, to d/w Psych attending)     # T2DM  - A1C 11.2%  - FS in high 200's , will c/w Lantus 16U qHS and slightly increased Humalog 14U AC meals TID   - c/w carb consistency diet  (pt wants double portion/advise limit carb to 1/4 of the plate, and ok for high protein)   - goal -180    # smoking   - Nicotine patch 21mg patch/d daily, taper to 14mg/d 2nd week then 7mg/d 3rd week  - smoking cessation advice    # Polysubstance abuse   # Bipolar disorder Type 1   # Hx Epilepsy   management per  team.    # R knee pain likely 2/2 DJD   - APAP 650mg q6hr and Lidocaine patch 4% AA   - Duppler: no DVT   - x-ray R knee: There is moderate medial compartment arthrosis with mild lateral and   patellofemoral arthrosis. There is a small joint effusion.    dw Dr Restrepo, med consult will continue to follow .
57M PMHx of Bipolar 1 disorder, polysubstance abuse disorder, MMTP, hx TBI, pituitary tumor, CVA, HTN, DM, Epilepsy, CHF, He followed with cardiology in [place] and last visit was 2 years ago. He states at one point he had an EF of 35% and his most recent visit he was at 55%. Medicine consulted for CHF management      CC: Pt seen w. Dr. Restrepo   ambulating, glucose trend reviewed.  he has a couple of juice at bedside ( some are empty ) we talked about reducing intake of juice for glucose control.       # hx chronic CHF  # HTN  # HLD  - TTE( 10/24); EF 60-65%  - BP acceptable, clinically euvolemic appearing-   - c/w GDMT: Carvedilol 6.25mg po bid and Lisinopril 10mg po daily   - on Spironolactone 25mg po qAM   - c/w ASA/Atorvastatin      # MMTP  - c/w Methadone 130mg po daily ( pt asking to go up to 160mg po daily, to d/w Psych attending)     # T2DM  - A1C 11.2%  - FS in high 200's , will c/w Lantus 16U qHS and slightly increased Humalog 14U AC meals TID   - c/w carb consistency diet  (pt wants double portion/advise limit carb to 1/4 of the plate, and ok for high protein)   - pt encouraged to stay away from juice ( which can cause hyperglycemia -   - goal -180    # smoking   - Nicotine patch 21mg patch/d daily, taper to 14mg/d 2nd week then 7mg/d 3rd week  - smoking cessation advice    # Polysubstance abuse   # Bipolar disorder Type 1   # Hx Epilepsy   management per  team.    # R knee pain likely 2/2 DJD   - APAP 650mg q6hr and Lidocaine patch 4% AA   - Duppler: no DVT   - x-ray R knee: There is moderate medial compartment arthrosis with mild lateral and   patellofemoral arthrosis. There is a small joint effusion.    dw Dr Restrepo, med consult will continue to follow .
57M PMHx of Bipolar 1 disorder, polysubstance abuse disorder, MMTP, hx TBI, pituitary tumor, CVA, HTN, DM, Epilepsy, CHF, He followed with cardiology in [place] and last visit was 2 years ago. He states at one point he had an EF of 35% and his most recent visit he was at 55%. Medicine consulted for CHF management     # hx chronic CHF  # HTN  # HLD  - c/w GDMT: Carvedilol 6.25mg po bid, increase Lisinopril 10mg-> 20mg qPM , Spironolactone 25mg po qAM   - c/w ASA/Atorvastatin  - Obtain TTE to evaluate LVEF   - remains euvolemic, no need for lasix     # MMTP  - c/w Methadone 130mg po daily ( pt asking to go up to 160mg po daily, to d/w Psych attending)     # T2DM  - A1C 11.2%  - c/w Lantus 16U qHS and Humalog 12U AC meals TID,  this am, likely need to go up on basal   - change to carb consistency diet   - goal -180    # smoking   - Nicotine patch 21mg patch/d daily, taper to 14mg/d 2nd week then 7mg/d 3rd week  - smoking cessation advice    # Polysubstance abuse   # Bipolar disorder Type 1   # Hx Epilepsy   - taper valium per Psych- 5mg qAM and 2.5mg qHS   - c/w Divalproex DR 750mg po bid   - Gabapentin 1000mg po q8hr   - Risperidone 2mg po qHS   - Olanzapine 5mg po q6hr prn agitation     # R knee pain   - APAP 650mg q6hr and Lidocaine patch 4% AA   - obtain x-ray R knee and doppler RLE to evaluate for Baker's cyst     d/w  RN. Med consult team to follow thank you.
57M PMHx of Bipolar 1 disorder, polysubstance abuse disorder, MMTP, hx TBI, pituitary tumor, CVA, HTN, DM, Epilepsy, CHF, He followed with cardiology in [place] and last visit was 2 years ago. He states at one point he had an EF of 35% and his most recent visit he was at 55%. Medicine consulted for CHF management     CC: Pt seen w. Dr. Trammell     # hx chronic CHF  # HTN  # HLD  - c/w GDMT: Carvedilol 6.25mg po bid , c/w Lisinopril 10mg qPM , Spironolactone 25mg po qAM   - c/w ASA/Atorvastatin  - Obtain TTE    # MMTP  - c/w Methadone 130mg po daily ( pt asking to go up to 160mg po daily, to d/w Psych attending)     # T2DM  - A1C 11.2%  - c/w Lantus 16U qHS and Humalog 12U AC meals TID    # smoking   - Nicotine patch 21mg patch daily   - smoking cessation advice    # Polysubstance abuse   # Bipolar disorder Type 1   # Hx Epilepsy   - taper valium per Psych- 5mg qAM and 2.5mg qHS   - c/w Divalproex DR 750mg po bid   - Gabapentin 1000mg po q8hr   - Risperidone 2mg po qHS   - Olanzapine 5mg po q6hr prn agitation     d/w  RN. Med consult team to follow thank you.
57M PMHx of Bipolar 1 disorder, polysubstance abuse disorder, MMTP, hx TBI, pituitary tumor, CVA, HTN, DM, Epilepsy, CHF, He followed with cardiology in [place] and last visit was 2 years ago. He states at one point he had an EF of 35% and his most recent visit he was at 55%. Medicine consulted for CHF management      CC: Pt seen wGuzman Restrepo   ambulating, glucose trend reviewed- in 160s this morning.   we talked about reducing intake of juice for glucose control.   no shortness of breath.      # hx chronic CHF  # HTN  # HLD  - TTE( 10/24); EF 60-65%  - BP acceptable, clinically euvolemic appearing-   - c/w GDMT: Carvedilol 6.25mg po bid and Lisinopril 10mg po daily   - on Spironolactone 25mg po qAM   - c/w ASA/Atorvastatin      # MMTP  - c/w Methadone 130mg po daily ( pt asking to go up to 160mg po daily, to d/w Psych attending)     # T2DM  - A1C 11.2%  - FS in high 200's , will c/w Lantus 16U qHS and slightly increased Humalog 14U AC meals TID   - c/w carb consistency diet  (pt wants double portion/advise limit carb to 1/4 of the plate, and ok for high protein)   - pt encouraged to stay away from juice ( which can cause hyperglycemia -   - goal -180    # smoking   - Nicotine patch 21mg patch/d daily, taper to 14mg/d 2nd week then 7mg/d 3rd week  - smoking cessation advice    # Polysubstance abuse   # Bipolar disorder Type 1   # Hx Epilepsy   management per  team.    # R knee pain likely 2/2 DJD   - APAP 650mg q6hr and Lidocaine patch 4% AA   - Duppler: no DVT   - x-ray R knee: There is moderate medial compartment arthrosis with mild lateral and   patellofemoral arthrosis. There is a small joint effusion.    dw Dr Restrepo, med consult will continue to follow .
57M PMHx of Bipolar 1 disorder, polysubstance abuse disorder, MMTP, hx TBI, pituitary tumor, CVA, HTN, DM, Epilepsy, CHF, He followed with cardiology in [place] and last visit was 2 years ago. He states at one point he had an EF of 35% and his most recent visit he was at 55%. Medicine consulted for CHF management     # hx chronic CHF  # HTN  # HLD  - c/w GDMT: Carvedilol 6.25mg po bid, increase Lisinopril 10mg-> 20mg qPM , Spironolactone 25mg po qAM   - c/w ASA/Atorvastatin  - Obtain TTE to evaluate LVEF   - remains euvolemic, no need for lasix     # MMTP  - c/w Methadone 130mg po daily ( pt asking to go up to 160mg po daily, to d/w Psych attending)     # T2DM  - A1C 11.2%  - FS in high 200's , increase Lantus 18U qHS and Humalog 15U AC meals TID,  this am, likely need to go up on basal   - change to carb consistency diet , diet counseling provided  - goal -180    # smoking   - Nicotine patch 21mg patch/d daily, taper to 14mg/d 2nd week then 7mg/d 3rd week  - smoking cessation advice    # Polysubstance abuse   # Bipolar disorder Type 1   # Hx Epilepsy   - taper valium per Psych- 5mg qAM and 2.5mg qHS   - c/w Divalproex DR 750mg po bid   - Gabapentin 1000mg po q8hr   - Risperidone 2mg po qHS   - Olanzapine 5mg po q6hr prn agitation     # R knee pain   - APAP 650mg q6hr and Lidocaine patch 4% AA   - obtain x-ray R knee and doppler RLE to evaluate for Baker's cyst     d/w SANGITA RN. Med consult team to follow thank you.
57M PMHx of Bipolar 1 disorder, polysubstance abuse disorder, MMTP, hx TBI, pituitary tumor, CVA, HTN, DM, Epilepsy, CHF, He followed with cardiology in [place] and last visit was 2 years ago. He states at one point he had an EF of 35% and his most recent visit he was at 55%. Medicine consulted for CHF management     pt seen and examined with Dr. Restrepo  sitting in front of tV drinking juice ( drinking one and two more juices with one water bottle  ambulating well, good air entry bilaterally, no edema noted on lower ext.       # hx chronic CHF  # HTN  # HLD  - TTE( 10/24); EF 60-65%  - BP acceptable, clinically euvolemic appearing- no sign of heart failure.   - c/w GDMT: Carvedilol 6.25mg po bid and Lisinopril 10mg po daily   - on Spironolactone 25mg po qAM   - c/w ASA/Atorvastatin      # MMTP  - c/w Methadone 130mg po daily ( pt asking to go up to 160mg po daily, to d/w Psych attending)     # T2DM  - A1C 11.2%  - will c/w Lantus 16U qHS and slightly increased during day -  Humalog 14U AC meals TID   - c/w carb consistency diet   - pt encouraged to stay away from juice ( which can cause hyperglycemia , reemphasized.   - goal -180    # smoking   - Nicotine patch 21mg patch/d daily, taper to 14mg/d 2nd week then 7mg/d 3rd week  - smoking cessation advice    # Polysubstance abuse   # Bipolar disorder Type 1   # Hx Epilepsy   management per  team.    # R knee pain likely 2/2 DJD   - APAP 650mg q6hr and Lidocaine patch 4% AA   - Duppler: no DVT   - x-ray R knee: There is moderate medial compartment arthrosis with mild lateral and   patellofemoral arthrosis. There is a small joint effusion.    on each and every visit, we emphasized on reducing/no juice to help with glucose control.  clinically no sign of heart failure, continue with current medication, medicine will sign off   please reconsult with questions, dw Dr. Restrepo and  team.

## 2023-10-30 NOTE — PROGRESS NOTE ADULT - SUBJECTIVE AND OBJECTIVE BOX
CC: Patient is a 57y old  Male who presents with a chief complaint of     INTERVAL EVENTS: INGRIS    SUBJECTIVE / INTERVAL HPI: Patient seen and examined at bedside.     ROS: negative unless otherwise stated above.    VITAL SIGNS:  Vital Signs Last 24 Hrs  T(C): 36.9 (29 Oct 2023 20:33), Max: 36.9 (29 Oct 2023 17:02)  T(F): 98.4 (29 Oct 2023 20:33), Max: 98.4 (29 Oct 2023 17:02)  HR: 79 (29 Oct 2023 20:33) (67 - 79)  BP: 110/84 (29 Oct 2023 20:33) (110/84 - 127/79)  BP(mean): --  RR: 18 (29 Oct 2023 20:33) (18 - 18)  SpO2: 96% (29 Oct 2023 20:33) (96% - 97%)    Parameters below as of 29 Oct 2023 20:33  Patient On (Oxygen Delivery Method): room air            PHYSICAL EXAM:    General: NAD  HEENT: MMM  Neck: supple  Cardiovascular: +S1/S2; RRR  Respiratory: CTA B/L; no W/R/R  Gastrointestinal: soft, NT/ND  Extremities: WWP; no edema, clubbing or cyanosis  Vascular: 2+ radial, DP/PT pulses B/L  Neurological: AAOx3; no focal deficits    MEDICATIONS:  MEDICATIONS  (STANDING):  aspirin  chewable 81 milliGRAM(s) Oral daily  atorvastatin 40 milliGRAM(s) Oral at bedtime  carvedilol 6.25 milliGRAM(s) Oral every 12 hours  dextrose 5%. 1000 milliLiter(s) (100 mL/Hr) IV Continuous <Continuous>  dextrose 5%. 1000 milliLiter(s) (50 mL/Hr) IV Continuous <Continuous>  dextrose 50% Injectable 12.5 Gram(s) IV Push once  dextrose 50% Injectable 25 Gram(s) IV Push once  dextrose 50% Injectable 25 Gram(s) IV Push once  diazepam    Tablet 2.5 milliGRAM(s) Oral daily  divalproex  milliGRAM(s) Oral daily  divalproex  milliGRAM(s) Oral at bedtime  gabapentin 1200 milliGRAM(s) Oral every 8 hours  glucagon  Injectable 1 milliGRAM(s) IntraMuscular once  insulin glargine Injectable (LANTUS) 16 Unit(s) SubCutaneous at bedtime  insulin lispro (ADMELOG) corrective regimen sliding scale   SubCutaneous Before meals and at bedtime  insulin lispro Injectable (ADMELOG) 14 Unit(s) SubCutaneous three times a day before meals  lisinopril 10 milliGRAM(s) Oral daily  methadone    Tablet 130 milliGRAM(s) Oral daily  nicotine - 21 mG/24Hr(s) Patch 1 Patch Transdermal daily  risperiDONE   Tablet 2 milliGRAM(s) Oral at bedtime  spironolactone 25 milliGRAM(s) Oral daily    MEDICATIONS  (PRN):  acetaminophen     Tablet .. 650 milliGRAM(s) Oral every 6 hours PRN Temp greater or equal to 38C (100.4F), Mild Pain (1 - 3)  dextrose Oral Gel 15 Gram(s) Oral once PRN Blood Glucose LESS THAN 70 milliGRAM(s)/deciliter  melatonin 3 milliGRAM(s) Oral at bedtime PRN Insomnia  OLANZapine 5 milliGRAM(s) Oral every 6 hours PRN agitation      ALLERGIES:  Allergies    Seafood (Anaphylaxis)  Haldol (Unknown)  Cogentin (Unknown)  Thorazine (Rash)  Compazine (Short breath)    Intolerances        LABS:              CAPILLARY BLOOD GLUCOSE      POCT Blood Glucose.: 258 mg/dL (30 Oct 2023 07:36)      RADIOLOGY & ADDITIONAL TESTS: Reviewed. CC: Patient is a 57y old  Male who presents with a chief complaint of     INTERVAL EVENTS: INGRIS    SUBJECTIVE / INTERVAL HPI: Patient seen and examined at bedside. feeling well this AM     ROS: negative unless otherwise stated above.    VITAL SIGNS:  Vital Signs Last 24 Hrs  T(C): 36.9 (29 Oct 2023 20:33), Max: 36.9 (29 Oct 2023 17:02)  T(F): 98.4 (29 Oct 2023 20:33), Max: 98.4 (29 Oct 2023 17:02)  HR: 79 (29 Oct 2023 20:33) (67 - 79)  BP: 110/84 (29 Oct 2023 20:33) (110/84 - 127/79)  BP(mean): --  RR: 18 (29 Oct 2023 20:33) (18 - 18)  SpO2: 96% (29 Oct 2023 20:33) (96% - 97%)    Parameters below as of 29 Oct 2023 20:33  Patient On (Oxygen Delivery Method): room air            PHYSICAL EXAM:    General: NAD  HEENT: MMM  Neurological: AAOx3; no focal deficits    MEDICATIONS:  MEDICATIONS  (STANDING):  aspirin  chewable 81 milliGRAM(s) Oral daily  atorvastatin 40 milliGRAM(s) Oral at bedtime  carvedilol 6.25 milliGRAM(s) Oral every 12 hours  dextrose 5%. 1000 milliLiter(s) (100 mL/Hr) IV Continuous <Continuous>  dextrose 5%. 1000 milliLiter(s) (50 mL/Hr) IV Continuous <Continuous>  dextrose 50% Injectable 12.5 Gram(s) IV Push once  dextrose 50% Injectable 25 Gram(s) IV Push once  dextrose 50% Injectable 25 Gram(s) IV Push once  diazepam    Tablet 2.5 milliGRAM(s) Oral daily  divalproex  milliGRAM(s) Oral daily  divalproex  milliGRAM(s) Oral at bedtime  gabapentin 1200 milliGRAM(s) Oral every 8 hours  glucagon  Injectable 1 milliGRAM(s) IntraMuscular once  insulin glargine Injectable (LANTUS) 16 Unit(s) SubCutaneous at bedtime  insulin lispro (ADMELOG) corrective regimen sliding scale   SubCutaneous Before meals and at bedtime  insulin lispro Injectable (ADMELOG) 14 Unit(s) SubCutaneous three times a day before meals  lisinopril 10 milliGRAM(s) Oral daily  methadone    Tablet 130 milliGRAM(s) Oral daily  nicotine - 21 mG/24Hr(s) Patch 1 Patch Transdermal daily  risperiDONE   Tablet 2 milliGRAM(s) Oral at bedtime  spironolactone 25 milliGRAM(s) Oral daily    MEDICATIONS  (PRN):  acetaminophen     Tablet .. 650 milliGRAM(s) Oral every 6 hours PRN Temp greater or equal to 38C (100.4F), Mild Pain (1 - 3)  dextrose Oral Gel 15 Gram(s) Oral once PRN Blood Glucose LESS THAN 70 milliGRAM(s)/deciliter  melatonin 3 milliGRAM(s) Oral at bedtime PRN Insomnia  OLANZapine 5 milliGRAM(s) Oral every 6 hours PRN agitation      ALLERGIES:  Allergies    Seafood (Anaphylaxis)  Haldol (Unknown)  Cogentin (Unknown)  Thorazine (Rash)  Compazine (Short breath)    Intolerances        LABS:              CAPILLARY BLOOD GLUCOSE      POCT Blood Glucose.: 258 mg/dL (30 Oct 2023 07:36)      RADIOLOGY & ADDITIONAL TESTS: Reviewed.

## 2023-10-30 NOTE — BH INPATIENT PSYCHIATRY PROGRESS NOTE - NSBHCHARTREVIEWVS_PSY_A_CORE FT
Vital Signs Last 24 Hrs  T(C): 37.1 (10-30-23 @ 08:51), Max: 37.1 (10-30-23 @ 08:51)  T(F): 98.8 (10-30-23 @ 08:51), Max: 98.8 (10-30-23 @ 08:51)  HR: 78 (10-30-23 @ 08:51) (67 - 79)  BP: 101/59 (10-30-23 @ 08:51) (101/59 - 127/79)  RR: 18 (10-29-23 @ 20:33) (18 - 18)  SpO2: 93% (10-30-23 @ 08:51) (93% - 97%)

## 2023-10-31 LAB
ALBUMIN SERPL ELPH-MCNC: 3.6 G/DL — SIGNIFICANT CHANGE UP (ref 3.3–5)
ALBUMIN SERPL ELPH-MCNC: 3.6 G/DL — SIGNIFICANT CHANGE UP (ref 3.3–5)
ALP SERPL-CCNC: 61 U/L — SIGNIFICANT CHANGE UP (ref 40–120)
ALP SERPL-CCNC: 61 U/L — SIGNIFICANT CHANGE UP (ref 40–120)
ALT FLD-CCNC: 13 U/L — SIGNIFICANT CHANGE UP (ref 10–45)
ALT FLD-CCNC: 13 U/L — SIGNIFICANT CHANGE UP (ref 10–45)
ANION GAP SERPL CALC-SCNC: 10 MMOL/L — SIGNIFICANT CHANGE UP (ref 5–17)
ANION GAP SERPL CALC-SCNC: 10 MMOL/L — SIGNIFICANT CHANGE UP (ref 5–17)
AST SERPL-CCNC: 18 U/L — SIGNIFICANT CHANGE UP (ref 10–40)
AST SERPL-CCNC: 18 U/L — SIGNIFICANT CHANGE UP (ref 10–40)
BILIRUB SERPL-MCNC: 0.2 MG/DL — SIGNIFICANT CHANGE UP (ref 0.2–1.2)
BILIRUB SERPL-MCNC: 0.2 MG/DL — SIGNIFICANT CHANGE UP (ref 0.2–1.2)
BUN SERPL-MCNC: 20 MG/DL — SIGNIFICANT CHANGE UP (ref 7–23)
BUN SERPL-MCNC: 20 MG/DL — SIGNIFICANT CHANGE UP (ref 7–23)
CALCIUM SERPL-MCNC: 9.6 MG/DL — SIGNIFICANT CHANGE UP (ref 8.4–10.5)
CALCIUM SERPL-MCNC: 9.6 MG/DL — SIGNIFICANT CHANGE UP (ref 8.4–10.5)
CHLORIDE SERPL-SCNC: 94 MMOL/L — LOW (ref 96–108)
CHLORIDE SERPL-SCNC: 94 MMOL/L — LOW (ref 96–108)
CO2 SERPL-SCNC: 26 MMOL/L — SIGNIFICANT CHANGE UP (ref 22–31)
CO2 SERPL-SCNC: 26 MMOL/L — SIGNIFICANT CHANGE UP (ref 22–31)
CREAT SERPL-MCNC: 0.79 MG/DL — SIGNIFICANT CHANGE UP (ref 0.5–1.3)
CREAT SERPL-MCNC: 0.79 MG/DL — SIGNIFICANT CHANGE UP (ref 0.5–1.3)
EGFR: 104 ML/MIN/1.73M2 — SIGNIFICANT CHANGE UP
EGFR: 104 ML/MIN/1.73M2 — SIGNIFICANT CHANGE UP
GLUCOSE BLDC GLUCOMTR-MCNC: 157 MG/DL — HIGH (ref 70–99)
GLUCOSE BLDC GLUCOMTR-MCNC: 157 MG/DL — HIGH (ref 70–99)
GLUCOSE BLDC GLUCOMTR-MCNC: 244 MG/DL — HIGH (ref 70–99)
GLUCOSE BLDC GLUCOMTR-MCNC: 244 MG/DL — HIGH (ref 70–99)
GLUCOSE BLDC GLUCOMTR-MCNC: 257 MG/DL — HIGH (ref 70–99)
GLUCOSE BLDC GLUCOMTR-MCNC: 257 MG/DL — HIGH (ref 70–99)
GLUCOSE BLDC GLUCOMTR-MCNC: 269 MG/DL — HIGH (ref 70–99)
GLUCOSE BLDC GLUCOMTR-MCNC: 269 MG/DL — HIGH (ref 70–99)
GLUCOSE BLDC GLUCOMTR-MCNC: 293 MG/DL — HIGH (ref 70–99)
GLUCOSE BLDC GLUCOMTR-MCNC: 293 MG/DL — HIGH (ref 70–99)
GLUCOSE BLDC GLUCOMTR-MCNC: 315 MG/DL — HIGH (ref 70–99)
GLUCOSE BLDC GLUCOMTR-MCNC: 315 MG/DL — HIGH (ref 70–99)
GLUCOSE SERPL-MCNC: 275 MG/DL — HIGH (ref 70–99)
GLUCOSE SERPL-MCNC: 275 MG/DL — HIGH (ref 70–99)
POTASSIUM SERPL-MCNC: 5 MMOL/L — SIGNIFICANT CHANGE UP (ref 3.5–5.3)
POTASSIUM SERPL-MCNC: 5 MMOL/L — SIGNIFICANT CHANGE UP (ref 3.5–5.3)
POTASSIUM SERPL-SCNC: 5 MMOL/L — SIGNIFICANT CHANGE UP (ref 3.5–5.3)
POTASSIUM SERPL-SCNC: 5 MMOL/L — SIGNIFICANT CHANGE UP (ref 3.5–5.3)
PROT SERPL-MCNC: 7 G/DL — SIGNIFICANT CHANGE UP (ref 6–8.3)
PROT SERPL-MCNC: 7 G/DL — SIGNIFICANT CHANGE UP (ref 6–8.3)
SODIUM SERPL-SCNC: 130 MMOL/L — LOW (ref 135–145)
SODIUM SERPL-SCNC: 130 MMOL/L — LOW (ref 135–145)
VALPROATE SERPL-MCNC: 59.4 UG/ML — SIGNIFICANT CHANGE UP (ref 50–100)
VALPROATE SERPL-MCNC: 59.4 UG/ML — SIGNIFICANT CHANGE UP (ref 50–100)

## 2023-10-31 PROCEDURE — 99232 SBSQ HOSP IP/OBS MODERATE 35: CPT

## 2023-10-31 RX ADMIN — ATORVASTATIN CALCIUM 40 MILLIGRAM(S): 80 TABLET, FILM COATED ORAL at 21:19

## 2023-10-31 RX ADMIN — Medication 6: at 11:57

## 2023-10-31 RX ADMIN — RISPERIDONE 2 MILLIGRAM(S): 4 TABLET ORAL at 22:25

## 2023-10-31 RX ADMIN — OLANZAPINE 5 MILLIGRAM(S): 15 TABLET, FILM COATED ORAL at 16:54

## 2023-10-31 RX ADMIN — Medication 2.5 MILLIGRAM(S): at 09:48

## 2023-10-31 RX ADMIN — GABAPENTIN 1200 MILLIGRAM(S): 400 CAPSULE ORAL at 21:18

## 2023-10-31 RX ADMIN — SPIRONOLACTONE 25 MILLIGRAM(S): 25 TABLET, FILM COATED ORAL at 09:51

## 2023-10-31 RX ADMIN — Medication 14 UNIT(S): at 17:18

## 2023-10-31 RX ADMIN — CARVEDILOL PHOSPHATE 6.25 MILLIGRAM(S): 80 CAPSULE, EXTENDED RELEASE ORAL at 09:50

## 2023-10-31 RX ADMIN — GABAPENTIN 1200 MILLIGRAM(S): 400 CAPSULE ORAL at 13:15

## 2023-10-31 RX ADMIN — Medication 3 MILLIGRAM(S): at 22:25

## 2023-10-31 RX ADMIN — DIVALPROEX SODIUM 750 MILLIGRAM(S): 500 TABLET, DELAYED RELEASE ORAL at 21:19

## 2023-10-31 RX ADMIN — LISINOPRIL 20 MILLIGRAM(S): 2.5 TABLET ORAL at 09:47

## 2023-10-31 RX ADMIN — CARVEDILOL PHOSPHATE 6.25 MILLIGRAM(S): 80 CAPSULE, EXTENDED RELEASE ORAL at 21:19

## 2023-10-31 RX ADMIN — METHADONE HYDROCHLORIDE 130 MILLIGRAM(S): 40 TABLET ORAL at 09:48

## 2023-10-31 RX ADMIN — OLANZAPINE 5 MILLIGRAM(S): 15 TABLET, FILM COATED ORAL at 22:25

## 2023-10-31 RX ADMIN — Medication 2: at 07:53

## 2023-10-31 RX ADMIN — Medication 1 PATCH: at 09:50

## 2023-10-31 RX ADMIN — Medication 14 UNIT(S): at 11:57

## 2023-10-31 RX ADMIN — Medication 6: at 17:18

## 2023-10-31 RX ADMIN — Medication 6: at 21:17

## 2023-10-31 RX ADMIN — Medication 14 UNIT(S): at 07:53

## 2023-10-31 RX ADMIN — Medication 81 MILLIGRAM(S): at 09:47

## 2023-10-31 RX ADMIN — INSULIN GLARGINE 16 UNIT(S): 100 INJECTION, SOLUTION SUBCUTANEOUS at 21:18

## 2023-10-31 RX ADMIN — GABAPENTIN 1200 MILLIGRAM(S): 400 CAPSULE ORAL at 07:36

## 2023-10-31 RX ADMIN — DIVALPROEX SODIUM 500 MILLIGRAM(S): 500 TABLET, DELAYED RELEASE ORAL at 09:49

## 2023-10-31 NOTE — BH TREATMENT PLAN - NSTXDIABINTERRN_PSY_ALL_CORE
Demonstrate FS self-check and administration.
assess patient knowledge of diabetic care and instruct as needed.

## 2023-10-31 NOTE — BH TREATMENT PLAN - NSTXSUBMISINTERRN_PSY_ALL_CORE
Attend 12 step program
Attend 12 step program
Attend 12 step program. Encourage healthy lifestyle. Determine current coping mechanisms.
Attend 12 step program

## 2023-10-31 NOTE — BH TREATMENT PLAN - NSTXPLANTHERAPYSESSIONSFT_PSY_ALL_CORE
10-24-23  Type of therapy: Creative arts therapy  Type of session: Group  Level of patient participation: Participates  Duration of participation: 45 minutes  Therapy conducted by: Psych rehab  Therapy Summary: Pt. demonstrated increased behavioral control in groups this week pt. remains tangental / slightly attention-seeking but is easily redirected without agitation. pt. remains highly social in the milieu

## 2023-10-31 NOTE — BH INPATIENT PSYCHIATRY PROGRESS NOTE - NSBHASSESSSUMMFT_PSY_ALL_CORE
Patient is a 57 year old man, undomiciled, single, on disability, with PPH of bipolar 1 disorder, polysubstance use disorder (crystal meth, cocaine, opioid, cannabis, K2, alcohol), opioid use disorder on maintenance therapy, antisocial personality disorder, history of incarceration (8 years in alf for manslaughter and was released in 2014), with PMH of TBI, pituitary tumor, HTN, T2DM, and GERD, who was admitted to [facility], transferred from [facility] ED, after self-presenting for suicidal and homicidal ideation, in context of substance intoxication and medication non-adherence. On evaluation, patient reports continued depressed mood and passive SI with no intent/plan. He appears to have improved judgment as he has no intention of harming anyone. His delusions and paranoia remain, but are less severe at this time. It is unclear what medication increase he is referring to; continue to monitor for oversedation.     #Bipolar 1 disorder  - Continue with Risperidone 2 mg orally nightly for psychosis and disorganization  - Continue with Divalproex  mg orally daily, and 750 mg orally nightly for mood stabilization; subtherapeutic level 59.3 on 10/19/23   - Continue taper of Valium, currently 2.5 mg orally daily; patient remains highly motivated for continued taper and to be completely off of it in a couple of days   - Continue Gabapentin to 1200 mg orally TID for chronic anxiety   - Melatonin/Zyprexa PRN for insomnia/agitation respectively   - [facility]    #Opioid use disorder   - Continue Methadone 130 mg orally daily for maintenance opioid use disorder     #DM   - Hba1c 11.2   - Continue with Lantus 16 units nightly  - Continue with aspart 12 units TID with meals  - Continue with Atorvastatin 10 mg orally daily   - Continue with sliding scale and hypoglycemia protocol   - Endo consulted, recs appreciated, continue as per above     #HTN/CHF  - Continue with Lisinopril 10 mg orally daily   - Continue with Carvedilol 6.25 mg orally twice daily for CHF

## 2023-10-31 NOTE — BH TREATMENT PLAN - NSPTSTATEDGOAL_PSY_ALL_CORE
"get into rehab so I can get sober, work on my sobriety, housing and long term stability" 

## 2023-10-31 NOTE — BH TREATMENT PLAN - NSTXIMPULSINTERMD_PSY_ALL_CORE
Depakote and Risperidone standing 

## 2023-10-31 NOTE — BH TREATMENT PLAN - NSTXIMPULSDATEEST_PSY_ALL_CORE
Spoke with pt gave him lab results pt verbalized understanding    
10-Oct-2023
10-Oct-2023
17-Oct-2023
10-Oct-2023

## 2023-10-31 NOTE — BH TREATMENT PLAN - NSTXDIABGOAL_PSY_ALL_CORE
Other...
Will be able to able to describe prescribed diabetic medications and how to properly take these medications

## 2023-10-31 NOTE — BH INPATIENT PSYCHIATRY PROGRESS NOTE - CURRENT MEDICATION
MEDICATIONS  (STANDING):  aspirin  chewable 81 milliGRAM(s) Oral daily  atorvastatin 40 milliGRAM(s) Oral at bedtime  carvedilol 6.25 milliGRAM(s) Oral every 12 hours  dextrose 5%. 1000 milliLiter(s) (100 mL/Hr) IV Continuous <Continuous>  dextrose 5%. 1000 milliLiter(s) (50 mL/Hr) IV Continuous <Continuous>  dextrose 50% Injectable 12.5 Gram(s) IV Push once  dextrose 50% Injectable 25 Gram(s) IV Push once  dextrose 50% Injectable 25 Gram(s) IV Push once  diazepam    Tablet 2.5 milliGRAM(s) Oral daily  divalproex  milliGRAM(s) Oral daily  divalproex  milliGRAM(s) Oral at bedtime  gabapentin 1200 milliGRAM(s) Oral every 8 hours  glucagon  Injectable 1 milliGRAM(s) IntraMuscular once  insulin glargine Injectable (LANTUS) 16 Unit(s) SubCutaneous at bedtime  insulin lispro (ADMELOG) corrective regimen sliding scale   SubCutaneous Before meals and at bedtime  insulin lispro Injectable (ADMELOG) 14 Unit(s) SubCutaneous three times a day before meals  lisinopril 20 milliGRAM(s) Oral daily  methadone    Tablet 130 milliGRAM(s) Oral daily  nicotine - 21 mG/24Hr(s) Patch 1 Patch Transdermal daily  risperiDONE   Tablet 2 milliGRAM(s) Oral at bedtime  spironolactone 25 milliGRAM(s) Oral daily    MEDICATIONS  (PRN):  acetaminophen     Tablet .. 650 milliGRAM(s) Oral every 6 hours PRN Temp greater or equal to 38C (100.4F), Mild Pain (1 - 3)  dextrose Oral Gel 15 Gram(s) Oral once PRN Blood Glucose LESS THAN 70 milliGRAM(s)/deciliter  melatonin 3 milliGRAM(s) Oral at bedtime PRN Insomnia  OLANZapine 5 milliGRAM(s) Oral every 6 hours PRN agitation

## 2023-10-31 NOTE — BH TREATMENT PLAN - NSTXPSYCHOGOAL_PSY_ALL_CORE
Will be able to report experiencing hallucinations to staff
Other...
Will be able to report experiencing hallucinations to staff
Will be able to report experiencing hallucinations to staff

## 2023-10-31 NOTE — BH INPATIENT PSYCHIATRY PROGRESS NOTE - NSBHFUPINTERVALHXFT_PSY_A_CORE
Patient seen and evaluated. No acute events overnight. No new complaints. Continues to tolerate standing medications. Reports feeling "great." Patient remains psychiatrically stable at this time, denies any and all complaints of depression, anxiety, psychosis or jose. Denies any suicidal or homicidal ideation, intent, or plan. He denies any auditory or visual hallucinations. Focused on recovery. Stated this is the best he has felt and he "really hopes rehab accepts me." He has been in good behavioral control, no adverse effects reported on any medications. Stated he will do "whatever it takes" to get into rehab. Highly motivated.     Discharge ready, pending placement into rehab

## 2023-10-31 NOTE — BH TREATMENT PLAN - NSBHPRIMARYDX_PSY_ALL_CORE
Bipolar disorder, current episode mixed, severe, with psychotic features    

## 2023-10-31 NOTE — BH TREATMENT PLAN - NSTXIMPULSINTERRN_PSY_ALL_CORE
Determine efficacy of current coping mechanisms. Provide therapeutic communication. Administer medications as needed.
assess patient for changes in mood and intervene as needed to maintain patient safety

## 2023-10-31 NOTE — BH TREATMENT PLAN - NSTXSUBMISINTERPR_PSY_ALL_CORE
Pt will be prompted to attend when a speaker is present
Pt will be prompted to attend when a speaker is present
Pt will be prompted to attend when a speaker is present on the unit
Pt. will continue to be invited and encouraged to attend 12-step groups when a speaker is on the unit.

## 2023-10-31 NOTE — BH TREATMENT PLAN - NSTXSUBMISGOAL_PSY_ALL_CORE
Will identify 2 physical complications of substance abuse
Other...
Will identify 2 physical complications of substance abuse
Will identify 2 physical complications of substance abuse

## 2023-10-31 NOTE — BH TREATMENT PLAN - NSTXCAREGIVERPARTICIPATE_PSY_P_CORE
No, patient unwilling to involve family/caregiver

## 2023-10-31 NOTE — BH INPATIENT PSYCHIATRY PROGRESS NOTE - NSBHMETABOLIC_PSY_ALL_CORE_FT
BMI: BMI (kg/m2): 31.6 (10-11-23 @ 11:12)  HbA1c: A1C with Estimated Average Glucose Result: 11.2 % (10-11-23 @ 11:04)  Glucose: POCT Blood Glucose.: 157 mg/dL (10-31-23 @ 07:38)  BP: 117/78 (10-31-23 @ 08:48) (101/59 - 147/82)  Lipid Panel: Date/Time: 10-10-23 @ 05:30  Cholesterol, Serum: 118  Direct LDL: 65  HDL Cholesterol, Serum: 36  Triglycerides, Serum: 87   BMI: BMI (kg/m2): 31.6 (10-11-23 @ 11:12)  HbA1c: A1C with Estimated Average Glucose Result: 11.2 % (10-11-23 @ 11:04)    Glucose: POCT Blood Glucose.: 157 mg/dL (10-31-23 @ 07:38)    BP: 117/78 (10-31-23 @ 08:48) (101/59 - 147/82)  Lipid Panel: Date/Time: 10-10-23 @ 05:30  Cholesterol, Serum: 118  Direct LDL: --  HDL Cholesterol, Serum: 36  Total Cholesterol/HDL Ration Measurement: --  Triglycerides, Serum: 87

## 2023-10-31 NOTE — BH TREATMENT PLAN - NSTXPSYCHOINTERPR_PSY_ALL_CORE
Pt will continue to be invited and encouraged to attend all offered groups
Pt. will continue to be invited and encouraged to attend all offered groups.

## 2023-10-31 NOTE — BH TREATMENT PLAN - NSCMSPTSTRENGTHS_PSY_ALL_CORE
Highly motivated for treatment/Positive attitude/Supportive family

## 2023-10-31 NOTE — BH INPATIENT PSYCHIATRY PROGRESS NOTE - NSBHCHARTREVIEWVS_PSY_A_CORE FT
Vital Signs Last 24 Hrs  T(C): 36.7 (10-31-23 @ 08:48), Max: 36.9 (10-30-23 @ 19:32)  T(F): 98.1 (10-31-23 @ 08:48), Max: 98.5 (10-30-23 @ 19:32)  HR: 83 (10-31-23 @ 08:48) (69 - 83)  BP: 117/78 (10-31-23 @ 08:48) (117/78 - 130/75)  RR: 18 (10-31-23 @ 08:48) (17 - 18)  SpO2: 94% (10-31-23 @ 08:48) (94% - 97%)     Vital Signs Last 24 Hrs  T(C): 36.7 (10-31-23 @ 08:48), Max: 36.9 (10-30-23 @ 19:32)  T(F): 98.1 (10-31-23 @ 08:48), Max: 98.5 (10-30-23 @ 19:32)  HR: 83 (10-31-23 @ 08:48) (69 - 83)  BP: 117/78 (10-31-23 @ 08:48) (117/78 - 130/75)  BP(mean): --  RR: 18 (10-31-23 @ 08:48) (17 - 18)  SpO2: 94% (10-31-23 @ 08:48) (94% - 97%)

## 2023-10-31 NOTE — ED PROVIDER NOTE - NS ED MD DISPO ISOLATION TYPES
Assessment/Plan:    Carotid stenosis, asymptomatic, bilateral  59-year-old male with HTN, HLD, CKD3, first-degree AV block, mild bilateral ICA stenosis, left subclavian artery stenosis, hearing impairment. Patient referred by PCP for vascular evaluation     -CV duplex 9/5/23 showed bilateral ICA less than 50% stenosis and left subclavian artery greater than 50% stenosis  -Patient found to have mild bilateral carotid stenosis on screening several years ago and has been followed by PCP  -New onset greater than 50% left subclavian artery stenosis  -Patient is asymptomatic from a carotid and subclavian artery standpoint therefore recommended continued surveillance and medical management   -BP goal <140/<90   -Continue ASA and statin therapy   -Will repeat duplex in 1 year and follow up in the office in 1 year     Subclavian artery stenosis, left ([address])  -Left subclavian artery stenosis greater than 50% on recent carotid duplex  -Asymptomatic from a subclavian artery standpoint  -Recommended blood pressure check in right arm for accuracy  -Palpable brachial radial pulse bilaterally  -Continue ASA and statin therapy   -Will repeat duplex in 1 year  -Follow-up in the office in 1 year    Hypercholesterolemia  -Total cholesterol 132, triglycerides 86, HDL 61, LDL 54  -Dietary modifications  -Continue statin therapy       Diagnoses and all orders for this visit:    Subclavian artery stenosis, left ([address])  -     Ambulatory Referral to Vascular Surgery  -     VAS carotid complete study; Future    Carotid stenosis, asymptomatic, bilateral  -     Ambulatory Referral to Vascular Surgery  -     VAS carotid complete study; Future      Subjective:      Patient ID: Kasandra Kilpatrick is a 69 y.o. male. Patient presents to review the CV done 9/5/23. Pt denies TIA/CVA symptoms. He is taking ASA81 and Simvastatin. He is not a smoker.      HPI  59-year-old male with HTN, HLD, CKD3, first-degree AV block, mild bilateral ICA stenosis, left subclavian artery stenosis, hearing impairment. Patient referred by PCP for vascular evaluation   Patient vascular screening in 2015 that showed mild bilateral ICA stenosis, normal abdominal aorta and SUNI bilaterally. Patient has been followed by PCP for carotid stenosis over the past several years. On the most recent duplex had finding of left subclavian artery stenosis greater than 50% and stable bilateral ICA less than percent stenosis. He is on aspirin and simvastatin. He is a non-smoker. He denies any prior history of stroke or MI. He is able to do his daily activities without any major limitation. He denies any upper or lower extremity claudication symptoms. No lower extremity swelling. The following portions of the patient's history were reviewed and updated as appropriate: allergies, current medications, past family history, past medical history, past social history, past surgical history, and problem list.  ROS reviewed     Review of Systems   All other systems reviewed and are negative. Objective:  I have reviewed and made appropriate changes to the review of systems input by the medical assistant.     Vitals:    10/31/23 1058 10/31/23 1059 10/31/23 1123   BP: 152/90 144/90 138/80   BP Location: Left arm Right arm Right arm   Patient Position: Sitting Sitting Sitting   Cuff Size: Standard Standard    Pulse: 84     Weight: 95.3 kg (210 lb)     Height: 5' 7" (1.702 m)         Patient Active Problem List   Diagnosis    Benign essential hypertension    Carotid stenosis, asymptomatic, bilateral    First degree AV block    Hypercholesterolemia    BMI 33.0-33.9,adult    Seborrheic keratoses    Chronic right shoulder pain    Osteoarthritis    Mixed conductive and sensorineural hearing loss of both ears    Hypokalemia    Dry eye syndrome of both eyes    Stage 3a chronic kidney disease (HCC)    Subclavian artery stenosis, left (HCC)       Past Surgical History:   Procedure Laterality Date    COLONOSCOPY 2004    complete colonoscopy    NEUROPLASTY / TRANSPOSITION MEDIAN NERVE AT CARPAL TUNNEL BILATERAL  2002    ROTATOR CUFF REPAIR Bilateral     Right - Resolved - Aug 2004; Left - Resolved - Feb 2006       Family History   Problem Relation Age of Onset    Crohn's disease Mother        Social History     Socioeconomic History    Marital status: /Civil Union     Spouse name: Not on file    Number of children: Not on file    Years of education: Not on file    Highest education level: Not on file   Occupational History    Not on file   Tobacco Use    Smoking status: Never    Smokeless tobacco: Never   Substance and Sexual Activity    Alcohol use: Yes    Drug use: No    Sexual activity: Not on file   Other Topics Concern    Not on file   Social History Narrative    Not on file     Social Determinants of Health     Financial Resource Strain: Low Risk  (4/18/2023)    Overall Financial Resource Strain (CARDIA)     Difficulty of Paying Living Expenses: Not hard at all   Food Insecurity: Not on file   Transportation Needs: No Transportation Needs (4/18/2023)    PRAPARE - Transportation     Lack of Transportation (Medical): No     Lack of Transportation (Non-Medical):  No   Physical Activity: Not on file   Stress: Not on file   Social Connections: Not on file   Intimate Partner Violence: Not on file   Housing Stability: Not on file       No Known Allergies      Current Outpatient Medications:     Ascorbic Acid (VITAMIN C) 100 MG tablet, Take 100 mg by mouth daily, Disp: , Rfl:     aspirin 81 MG tablet, Take 1 tablet by mouth daily, Disp: , Rfl:     chlorthalidone 25 mg tablet, TAKE 1 TABLET BY MOUTH  DAILY, Disp: 90 tablet, Rfl: 3    cholecalciferol (VITAMIN D3) 1,000 units tablet, Take 500 Units by mouth daily, Disp: , Rfl:     clotrimazole-betamethasone (LOTRISONE) 1-0.05 % cream, Apply topically 2 (two) times a day as needed (rash), Disp: 30 g, Rfl: 2    glucosamine 500 MG CAPS capsule, Take 500 mg by mouth, Disp: , Rfl:     losartan (COZAAR) 50 mg tablet, TAKE 1 TABLET BY MOUTH  DAILY, Disp: 90 tablet, Rfl: 3    Multiple Vitamins-Minerals (MULTIVITAMIN WITH MINERALS) tablet, Take 1 tablet by mouth daily, Disp: , Rfl:     Omega-3 Fatty Acids (FISH OIL) 1,000 mg, Take 1,000 mg by mouth daily, Disp: , Rfl:     potassium chloride (K-DUR,KLOR-CON) 20 mEq tablet, TAKE 1 TABLET BY MOUTH  TWICE DAILY, Disp: 180 tablet, Rfl: 3    RESTASIS 0.05 % ophthalmic emulsion, INSTILL 1 DROP BY OPHTHALMIC ROUTE 2 TIMES EVERY DAY INTO BOTH EYES AS NEEDED, Disp: , Rfl: 4    simvastatin (ZOCOR) 40 mg tablet, TAKE 1 TABLET BY MOUTH  DAILY, Disp: 90 tablet, Rfl: 3      /80 (BP Location: Right arm, Patient Position: Sitting)   Pulse 84   Ht 5' 7" (1.702 m)   Wt 95.3 kg (210 lb)   BMI 32.89 kg/m²          Physical Exam  Vitals and nursing note reviewed. Constitutional:       Appearance: Normal appearance. He is well-developed. He is obese. HENT:      Head: Normocephalic and atraumatic. Eyes:      Extraocular Movements: Extraocular movements intact. Neck:      Vascular: No carotid bruit. Cardiovascular:      Pulses:           Carotid pulses are 2+ on the right side and 2+ on the left side. Radial pulses are 2+ on the right side and 2+ on the left side. Femoral pulses are 2+ on the right side and 2+ on the left side. Posterior tibial pulses are 2+ on the right side and 2+ on the left side. Heart sounds: Normal heart sounds. Pulmonary:      Effort: Pulmonary effort is normal.      Breath sounds: Normal breath sounds. Abdominal:      General: Bowel sounds are normal.      Palpations: Abdomen is soft. Musculoskeletal:         General: No swelling. Normal range of motion. Cervical back: Neck supple. Skin:     General: Skin is warm. Neurological:      General: No focal deficit present. Mental Status: He is alert and oriented to person, place, and time.    Psychiatric:         Mood and Affect: Mood normal.         Behavior: Behavior normal. None

## 2023-10-31 NOTE — BH TREATMENT PLAN - NSTXPSYCHOINTERRN_PSY_ALL_CORE
Provide reality based reorientation. Administer medication as needed. Maintain medication compliance.

## 2023-10-31 NOTE — BH TREATMENT PLAN - NSTXIMPULSGOAL_PSY_ALL_CORE
Will be able to demonstrate the ability to pause before acting out negatively
Will be able to describe/demonstrate alternative ways of managing his/her emotional state on the unit

## 2023-10-31 NOTE — BH INPATIENT PSYCHIATRY PROGRESS NOTE - NSBHATTESTCOMMENTATTENDFT_PSY_A_CORE
Patient is a 57 year old man, undomiciled, single, on disability, with PPH of bipolar 1 disorder, polysubstance use disorder (crystal meth, cocaine, opioid, cannabis, K2, alcohol), opioid use disorder on maintenance therapy, antisocial personality disorder, history of incarceration ([facility] years in [facility] for [place] and was released in 2014), with PMH of TBI, pituitary tumor, HTN, T2DM, and GERD, who was admitted to [facility], transferred from [facility] ED, after self-presenting for suicidal and homicidal ideation, in context of substance intoxication and medication non-adherence.  Pt appears to be responding well to current medications, will continue.

## 2023-11-01 LAB
GLUCOSE BLDC GLUCOMTR-MCNC: 137 MG/DL — HIGH (ref 70–99)
GLUCOSE BLDC GLUCOMTR-MCNC: 137 MG/DL — HIGH (ref 70–99)
GLUCOSE BLDC GLUCOMTR-MCNC: 242 MG/DL — HIGH (ref 70–99)
GLUCOSE BLDC GLUCOMTR-MCNC: 242 MG/DL — HIGH (ref 70–99)
GLUCOSE BLDC GLUCOMTR-MCNC: 277 MG/DL — HIGH (ref 70–99)

## 2023-11-01 RX ORDER — METHADONE HYDROCHLORIDE 40 MG/1
130 TABLET ORAL DAILY
Refills: 0 | Status: DISCONTINUED | OUTPATIENT
Start: 2023-11-01 | End: 2023-11-06

## 2023-11-01 RX ADMIN — Medication 4: at 12:11

## 2023-11-01 RX ADMIN — GABAPENTIN 1200 MILLIGRAM(S): 400 CAPSULE ORAL at 07:25

## 2023-11-01 RX ADMIN — GABAPENTIN 1200 MILLIGRAM(S): 400 CAPSULE ORAL at 21:35

## 2023-11-01 RX ADMIN — Medication 14 UNIT(S): at 16:58

## 2023-11-01 RX ADMIN — RISPERIDONE 2 MILLIGRAM(S): 4 TABLET ORAL at 22:07

## 2023-11-01 RX ADMIN — Medication 14 UNIT(S): at 12:12

## 2023-11-01 RX ADMIN — Medication 6: at 22:08

## 2023-11-01 RX ADMIN — DIVALPROEX SODIUM 500 MILLIGRAM(S): 500 TABLET, DELAYED RELEASE ORAL at 10:11

## 2023-11-01 RX ADMIN — Medication 3 MILLIGRAM(S): at 22:07

## 2023-11-01 RX ADMIN — OLANZAPINE 5 MILLIGRAM(S): 15 TABLET, FILM COATED ORAL at 22:08

## 2023-11-01 RX ADMIN — CARVEDILOL PHOSPHATE 6.25 MILLIGRAM(S): 80 CAPSULE, EXTENDED RELEASE ORAL at 10:10

## 2023-11-01 RX ADMIN — METHADONE HYDROCHLORIDE 130 MILLIGRAM(S): 40 TABLET ORAL at 11:03

## 2023-11-01 RX ADMIN — Medication 14 UNIT(S): at 08:00

## 2023-11-01 RX ADMIN — DIVALPROEX SODIUM 750 MILLIGRAM(S): 500 TABLET, DELAYED RELEASE ORAL at 21:34

## 2023-11-01 RX ADMIN — ATORVASTATIN CALCIUM 40 MILLIGRAM(S): 80 TABLET, FILM COATED ORAL at 21:34

## 2023-11-01 RX ADMIN — CARVEDILOL PHOSPHATE 6.25 MILLIGRAM(S): 80 CAPSULE, EXTENDED RELEASE ORAL at 21:36

## 2023-11-01 RX ADMIN — LISINOPRIL 20 MILLIGRAM(S): 2.5 TABLET ORAL at 10:11

## 2023-11-01 RX ADMIN — SPIRONOLACTONE 25 MILLIGRAM(S): 25 TABLET, FILM COATED ORAL at 10:11

## 2023-11-01 RX ADMIN — Medication 81 MILLIGRAM(S): at 10:10

## 2023-11-01 RX ADMIN — Medication 6: at 16:58

## 2023-11-01 RX ADMIN — INSULIN GLARGINE 16 UNIT(S): 100 INJECTION, SOLUTION SUBCUTANEOUS at 22:08

## 2023-11-01 RX ADMIN — Medication 1 PATCH: at 10:11

## 2023-11-01 RX ADMIN — GABAPENTIN 1200 MILLIGRAM(S): 400 CAPSULE ORAL at 13:52

## 2023-11-01 RX ADMIN — Medication 2.5 MILLIGRAM(S): at 10:10

## 2023-11-02 LAB
GLUCOSE BLDC GLUCOMTR-MCNC: 160 MG/DL — HIGH (ref 70–99)
GLUCOSE BLDC GLUCOMTR-MCNC: 160 MG/DL — HIGH (ref 70–99)
GLUCOSE BLDC GLUCOMTR-MCNC: 176 MG/DL — HIGH (ref 70–99)
GLUCOSE BLDC GLUCOMTR-MCNC: 176 MG/DL — HIGH (ref 70–99)
GLUCOSE BLDC GLUCOMTR-MCNC: 287 MG/DL — HIGH (ref 70–99)
GLUCOSE BLDC GLUCOMTR-MCNC: 287 MG/DL — HIGH (ref 70–99)
GLUCOSE BLDC GLUCOMTR-MCNC: 365 MG/DL — HIGH (ref 70–99)
GLUCOSE BLDC GLUCOMTR-MCNC: 365 MG/DL — HIGH (ref 70–99)

## 2023-11-02 PROCEDURE — 99232 SBSQ HOSP IP/OBS MODERATE 35: CPT

## 2023-11-02 RX ADMIN — Medication 1 PATCH: at 10:31

## 2023-11-02 RX ADMIN — Medication 2: at 16:51

## 2023-11-02 RX ADMIN — Medication 14 UNIT(S): at 07:56

## 2023-11-02 RX ADMIN — GABAPENTIN 1200 MILLIGRAM(S): 400 CAPSULE ORAL at 07:46

## 2023-11-02 RX ADMIN — CARVEDILOL PHOSPHATE 6.25 MILLIGRAM(S): 80 CAPSULE, EXTENDED RELEASE ORAL at 10:28

## 2023-11-02 RX ADMIN — Medication 10: at 21:24

## 2023-11-02 RX ADMIN — Medication 14 UNIT(S): at 12:15

## 2023-11-02 RX ADMIN — ATORVASTATIN CALCIUM 40 MILLIGRAM(S): 80 TABLET, FILM COATED ORAL at 21:19

## 2023-11-02 RX ADMIN — OLANZAPINE 5 MILLIGRAM(S): 15 TABLET, FILM COATED ORAL at 22:22

## 2023-11-02 RX ADMIN — Medication 81 MILLIGRAM(S): at 10:29

## 2023-11-02 RX ADMIN — GABAPENTIN 1200 MILLIGRAM(S): 400 CAPSULE ORAL at 13:16

## 2023-11-02 RX ADMIN — RISPERIDONE 2 MILLIGRAM(S): 4 TABLET ORAL at 22:22

## 2023-11-02 RX ADMIN — Medication 14 UNIT(S): at 16:52

## 2023-11-02 RX ADMIN — Medication 2: at 07:56

## 2023-11-02 RX ADMIN — CARVEDILOL PHOSPHATE 6.25 MILLIGRAM(S): 80 CAPSULE, EXTENDED RELEASE ORAL at 21:19

## 2023-11-02 RX ADMIN — LISINOPRIL 20 MILLIGRAM(S): 2.5 TABLET ORAL at 10:29

## 2023-11-02 RX ADMIN — Medication 2.5 MILLIGRAM(S): at 10:28

## 2023-11-02 RX ADMIN — GABAPENTIN 1200 MILLIGRAM(S): 400 CAPSULE ORAL at 21:19

## 2023-11-02 RX ADMIN — Medication 3 MILLIGRAM(S): at 22:21

## 2023-11-02 RX ADMIN — METHADONE HYDROCHLORIDE 130 MILLIGRAM(S): 40 TABLET ORAL at 10:27

## 2023-11-02 RX ADMIN — SPIRONOLACTONE 25 MILLIGRAM(S): 25 TABLET, FILM COATED ORAL at 10:29

## 2023-11-02 RX ADMIN — Medication 6: at 12:15

## 2023-11-02 RX ADMIN — INSULIN GLARGINE 16 UNIT(S): 100 INJECTION, SOLUTION SUBCUTANEOUS at 21:24

## 2023-11-02 RX ADMIN — DIVALPROEX SODIUM 750 MILLIGRAM(S): 500 TABLET, DELAYED RELEASE ORAL at 21:19

## 2023-11-02 RX ADMIN — DIVALPROEX SODIUM 500 MILLIGRAM(S): 500 TABLET, DELAYED RELEASE ORAL at 10:29

## 2023-11-02 NOTE — BH INPATIENT PSYCHIATRY PROGRESS NOTE - NSBHCHARTREVIEWVS_PSY_A_CORE FT
Vital Signs Last 24 Hrs  T(C): 36.9 (11-02-23 @ 17:28), Max: 36.9 (11-02-23 @ 17:28)  T(F): 98.4 (11-02-23 @ 17:28), Max: 98.4 (11-02-23 @ 17:28)  HR: 83 (11-02-23 @ 17:28) (63 - 83)  BP: 171/98 (11-02-23 @ 17:28) (148/83 - 171/98)  BP(mean): --  RR: 18 (11-02-23 @ 17:28) (18 - 18)  SpO2: 95% (11-02-23 @ 17:28) (95% - 97%)

## 2023-11-02 NOTE — BH INPATIENT PSYCHIATRY PROGRESS NOTE - NSBHMETABOLIC_PSY_ALL_CORE_FT
BMI: BMI (kg/m2): 31.6 (10-11-23 @ 11:12)  HbA1c: A1C with Estimated Average Glucose Result: 11.2 % (10-11-23 @ 11:04)    Glucose: POCT Blood Glucose.: 160 mg/dL (11-02-23 @ 16:46)    BP: 171/98 (11-02-23 @ 17:28) (117/78 - 177/96)  Lipid Panel: Date/Time: 10-10-23 @ 05:30  Cholesterol, Serum: 118  Direct LDL: --  HDL Cholesterol, Serum: 36  Total Cholesterol/HDL Ration Measurement: --  Triglycerides, Serum: 87

## 2023-11-02 NOTE — BH INPATIENT PSYCHIATRY PROGRESS NOTE - NSBHASSESSSUMMFT_PSY_ALL_CORE
Patient is a 57 year old man, undomiciled, single, on disability, with PPH of bipolar 1 disorder, polysubstance use disorder (crystal meth, cocaine, opioid, cannabis, K2, alcohol), opioid use disorder on maintenance therapy, antisocial personality disorder, history of incarceration (8 years in assisted for manslaughter and was released in 2014), with PMH of TBI, pituitary tumor, HTN, T2DM, and GERD, who was admitted to [facility], transferred from [facility] ED, after self-presenting for suicidal and homicidal ideation, in context of substance intoxication and medication non-adherence. On evaluation, patient reports continued depressed mood and passive SI with no intent/plan. He appears to have improved judgment as he has no intention of harming anyone. His delusions and paranoia remain, but are less severe at this time. It is unclear what medication increase he is referring to; continue to monitor for oversedation.     #Bipolar 1 disorder  - Continue with Risperidone 2 mg orally nightly for psychosis and disorganization  - Continue with Divalproex  mg orally daily, and 750 mg orally nightly for mood stabilization; subtherapeutic level 59.3 on 10/19/23   - Continue taper of Valium, currently 2.5 mg orally daily; patient remains highly motivated for continued taper and to be completely off of it in a couple of days   - Continue Gabapentin to 1200 mg orally TID for chronic anxiety   - Melatonin/Zyprexa PRN for insomnia/agitation respectively   - [facility]    #Opioid use disorder   - Continue Methadone 130 mg orally daily for maintenance opioid use disorder     #DM   - Hba1c 11.2   - Continue with Lantus 16 units nightly  - Continue with aspart 12 units TID with meals  - Continue with Atorvastatin 10 mg orally daily   - Continue with sliding scale and hypoglycemia protocol   - Endo consulted, recs appreciated, continue as per above     #HTN/CHF  - Continue with Lisinopril 10 mg orally daily   - Continue with Carvedilol 6.25 mg orally twice daily for CHF

## 2023-11-02 NOTE — BH INPATIENT PSYCHIATRY PROGRESS NOTE - NSBHFUPINTERVALHXFT_PSY_A_CORE
It was patient's birthday today and he got into [facility] for people with TBI and substance use. He signed consent forms and should go there on Monday. Valium will be discontinued tomorrow. Pt at baseline and ready for rehab. Pt very thankful.

## 2023-11-02 NOTE — BH INPATIENT PSYCHIATRY PROGRESS NOTE - CURRENT MEDICATION
MEDICATIONS  (STANDING):  aspirin  chewable 81 milliGRAM(s) Oral daily  atorvastatin 40 milliGRAM(s) Oral at bedtime  carvedilol 6.25 milliGRAM(s) Oral every 12 hours  dextrose 5%. 1000 milliLiter(s) (100 mL/Hr) IV Continuous <Continuous>  dextrose 5%. 1000 milliLiter(s) (50 mL/Hr) IV Continuous <Continuous>  dextrose 50% Injectable 25 Gram(s) IV Push once  dextrose 50% Injectable 25 Gram(s) IV Push once  dextrose 50% Injectable 12.5 Gram(s) IV Push once  diazepam    Tablet 2.5 milliGRAM(s) Oral daily  divalproex  milliGRAM(s) Oral daily  divalproex  milliGRAM(s) Oral at bedtime  gabapentin 1200 milliGRAM(s) Oral every 8 hours  glucagon  Injectable 1 milliGRAM(s) IntraMuscular once  insulin glargine Injectable (LANTUS) 16 Unit(s) SubCutaneous at bedtime  insulin lispro (ADMELOG) corrective regimen sliding scale   SubCutaneous Before meals and at bedtime  insulin lispro Injectable (ADMELOG) 14 Unit(s) SubCutaneous three times a day before meals  lisinopril 20 milliGRAM(s) Oral daily  methadone    Tablet 130 milliGRAM(s) Oral daily  nicotine - 21 mG/24Hr(s) Patch 1 Patch Transdermal daily  risperiDONE   Tablet 2 milliGRAM(s) Oral at bedtime  spironolactone 25 milliGRAM(s) Oral daily    MEDICATIONS  (PRN):  acetaminophen     Tablet .. 650 milliGRAM(s) Oral every 6 hours PRN Temp greater or equal to 38C (100.4F), Mild Pain (1 - 3)  dextrose Oral Gel 15 Gram(s) Oral once PRN Blood Glucose LESS THAN 70 milliGRAM(s)/deciliter  melatonin 3 milliGRAM(s) Oral at bedtime PRN Insomnia  OLANZapine 5 milliGRAM(s) Oral every 6 hours PRN agitation

## 2023-11-03 LAB
GLUCOSE BLDC GLUCOMTR-MCNC: 158 MG/DL — HIGH (ref 70–99)
GLUCOSE BLDC GLUCOMTR-MCNC: 158 MG/DL — HIGH (ref 70–99)
GLUCOSE BLDC GLUCOMTR-MCNC: 159 MG/DL — HIGH (ref 70–99)
GLUCOSE BLDC GLUCOMTR-MCNC: 159 MG/DL — HIGH (ref 70–99)
GLUCOSE BLDC GLUCOMTR-MCNC: 226 MG/DL — HIGH (ref 70–99)
GLUCOSE BLDC GLUCOMTR-MCNC: 226 MG/DL — HIGH (ref 70–99)
GLUCOSE BLDC GLUCOMTR-MCNC: 294 MG/DL — HIGH (ref 70–99)
GLUCOSE BLDC GLUCOMTR-MCNC: 294 MG/DL — HIGH (ref 70–99)

## 2023-11-03 RX ORDER — SODIUM CHLORIDE 0.65 %
1 AEROSOL, SPRAY (ML) NASAL THREE TIMES A DAY
Refills: 0 | Status: DISCONTINUED | OUTPATIENT
Start: 2023-11-03 | End: 2023-11-06

## 2023-11-03 RX ADMIN — OLANZAPINE 5 MILLIGRAM(S): 15 TABLET, FILM COATED ORAL at 22:20

## 2023-11-03 RX ADMIN — Medication 14 UNIT(S): at 12:11

## 2023-11-03 RX ADMIN — METHADONE HYDROCHLORIDE 130 MILLIGRAM(S): 40 TABLET ORAL at 09:52

## 2023-11-03 RX ADMIN — DIVALPROEX SODIUM 500 MILLIGRAM(S): 500 TABLET, DELAYED RELEASE ORAL at 09:52

## 2023-11-03 RX ADMIN — Medication 3 MILLIGRAM(S): at 22:20

## 2023-11-03 RX ADMIN — DIVALPROEX SODIUM 750 MILLIGRAM(S): 500 TABLET, DELAYED RELEASE ORAL at 21:25

## 2023-11-03 RX ADMIN — Medication 14 UNIT(S): at 17:08

## 2023-11-03 RX ADMIN — Medication 2: at 17:07

## 2023-11-03 RX ADMIN — RISPERIDONE 2 MILLIGRAM(S): 4 TABLET ORAL at 22:20

## 2023-11-03 RX ADMIN — CARVEDILOL PHOSPHATE 6.25 MILLIGRAM(S): 80 CAPSULE, EXTENDED RELEASE ORAL at 09:52

## 2023-11-03 RX ADMIN — Medication 2.5 MILLIGRAM(S): at 09:51

## 2023-11-03 RX ADMIN — GABAPENTIN 1200 MILLIGRAM(S): 400 CAPSULE ORAL at 21:28

## 2023-11-03 RX ADMIN — Medication 14 UNIT(S): at 07:41

## 2023-11-03 RX ADMIN — GABAPENTIN 1200 MILLIGRAM(S): 400 CAPSULE ORAL at 07:28

## 2023-11-03 RX ADMIN — Medication 2: at 07:40

## 2023-11-03 RX ADMIN — GABAPENTIN 1200 MILLIGRAM(S): 400 CAPSULE ORAL at 13:01

## 2023-11-03 RX ADMIN — Medication 6: at 21:34

## 2023-11-03 RX ADMIN — Medication 81 MILLIGRAM(S): at 09:53

## 2023-11-03 RX ADMIN — Medication 4: at 12:11

## 2023-11-03 RX ADMIN — SPIRONOLACTONE 25 MILLIGRAM(S): 25 TABLET, FILM COATED ORAL at 09:53

## 2023-11-03 RX ADMIN — LISINOPRIL 20 MILLIGRAM(S): 2.5 TABLET ORAL at 09:53

## 2023-11-03 RX ADMIN — CARVEDILOL PHOSPHATE 6.25 MILLIGRAM(S): 80 CAPSULE, EXTENDED RELEASE ORAL at 21:37

## 2023-11-03 RX ADMIN — Medication 1 PATCH: at 07:21

## 2023-11-03 RX ADMIN — Medication 1 PATCH: at 09:53

## 2023-11-03 RX ADMIN — INSULIN GLARGINE 16 UNIT(S): 100 INJECTION, SOLUTION SUBCUTANEOUS at 22:01

## 2023-11-03 RX ADMIN — ATORVASTATIN CALCIUM 40 MILLIGRAM(S): 80 TABLET, FILM COATED ORAL at 21:25

## 2023-11-03 NOTE — BH INPATIENT PSYCHIATRY PROGRESS NOTE - NSBHFUPINTERVALHXFT_PSY_A_CORE
Chart reviewed, patient seen. Patient reports feeling excited and happy [facility] [employer]. Reports some rebound anxiety with Diazepam taper and using coping mechanisms (meditation). Diazepam discontinued today. Reports nasal congestion interfering with his meditation, plan to start saline nasal spray TID PRN. Denies sleep or appetite disturbances. Reports methadone "not holding" him and becoming somewhat sick in the early AM, possibly related to decreased Valium dose, but amenable to waiting to discuss methadone dose adjustments with the rehab program. Reports feeling excited to go on to rehab and eventually to be connected to housing after spending significant time undomiciled and incarcerated. Denies SI/HI/AVH. Denies adverse effects of medications.

## 2023-11-03 NOTE — BH INPATIENT PSYCHIATRY PROGRESS NOTE - NSTXPROBDIAB_PSY_ALL_CORE
DIABETES MANAGEMENT

## 2023-11-03 NOTE — BH INPATIENT PSYCHIATRY PROGRESS NOTE - NSBHMETABOLIC_PSY_ALL_CORE_FT
BMI: BMI (kg/m2): 31.6 (10-11-23 @ 11:12)  HbA1c: A1C with Estimated Average Glucose Result: 11.2 % (10-11-23 @ 11:04)    Glucose: POCT Blood Glucose.: 226 mg/dL (11-03-23 @ 11:47)    BP: 131/82 (11-03-23 @ 09:42) (118/73 - 177/96)  Lipid Panel: Date/Time: 10-10-23 @ 05:30  Cholesterol, Serum: 118  Direct LDL: --  HDL Cholesterol, Serum: 36  Total Cholesterol/HDL Ration Measurement: --  Triglycerides, Serum: 87

## 2023-11-03 NOTE — BH INPATIENT PSYCHIATRY PROGRESS NOTE - NSTXSUBMISDATETRGT_PSY_ALL_CORE
24-Oct-2023
31-Oct-2023
07-Nov-2023
17-Oct-2023
24-Oct-2023
31-Oct-2023
24-Oct-2023
17-Oct-2023
24-Oct-2023
17-Oct-2023
24-Oct-2023
08-Nov-2023
31-Oct-2023
17-Oct-2023
17-Oct-2023

## 2023-11-03 NOTE — BH INPATIENT PSYCHIATRY PROGRESS NOTE - NSBHFUPINTERVALCCFT_PSY_A_CORE
"I am feeling ok" 
"I am feeling ok, just ready for rehab" 
"I am feeling ok" 
Ongoing psychiatric stabilization and evaluation of depression with suicidal ideation 
'I'm depressed'
Ongoing psychiatric stabilization and evaluation of depression with suicidal ideation 
Ongoing psychiatric evaluation and stabilization of bipolar disorder 
Ongoing psychiatric stabilization and evaluation of depression with suicidal ideation 
ongoing treatment of mood disorder, TBI, and polysubstance use
Ongoing psychiatric stabilization and evaluation of depression with suicidal ideation 
Ongoing psychiatric stabilization and evaluation of depression with suicidal ideation 
'I'm depressed'
Ongoing psychiatric stabilization and evaluation of depression with suicidal ideation 
I feel suicidal
"I am feeling ok" 
Ongoing psychiatric stabilization and evaluation of depression with suicidal ideation 
"I am feeling ok, just ready for rehab" 
ongoing treatment of mood disorder, TBI, and polysubstance use
Ongoing psychiatric stabilization and evaluation of depression with suicidal ideation

## 2023-11-03 NOTE — BH INPATIENT PSYCHIATRY PROGRESS NOTE - NSTXIMPULSDATEEST_PSY_ALL_CORE
17-Oct-2023
10-Oct-2023
17-Oct-2023
10-Oct-2023
17-Oct-2023
10-Oct-2023
17-Oct-2023
17-Oct-2023
10-Oct-2023
17-Oct-2023

## 2023-11-03 NOTE — BH INPATIENT PSYCHIATRY PROGRESS NOTE - NSTXSUBMISPROGRES_PSY_ALL_CORE
Improving
No Change
Improving
No Change
No Change
Improving
No Change

## 2023-11-03 NOTE — BH INPATIENT PSYCHIATRY PROGRESS NOTE - NSTXSUBMISGOAL_PSY_ALL_CORE
Other...
Other...
Will identify 2 physical complications of substance abuse
Other...
Will identify 2 physical complications of substance abuse
Other...
Will identify 2 physical complications of substance abuse

## 2023-11-03 NOTE — BH INPATIENT PSYCHIATRY PROGRESS NOTE - NSBHCHARTREVIEWVS_PSY_A_CORE FT
Vital Signs Last 24 Hrs  T(C): 36.7 (11-03-23 @ 09:42), Max: 36.9 (11-02-23 @ 17:28)  T(F): 98 (11-03-23 @ 09:42), Max: 98.4 (11-02-23 @ 17:28)  HR: 60 (11-03-23 @ 09:42) (60 - 83)  BP: 131/82 (11-03-23 @ 09:42) (131/82 - 171/98)  BP(mean): --  RR: 18 (11-02-23 @ 20:04) (18 - 18)  SpO2: 95% (11-03-23 @ 09:42) (94% - 95%)

## 2023-11-03 NOTE — BH INPATIENT PSYCHIATRY PROGRESS NOTE - NSTXPSYCHODATEEST_PSY_ALL_CORE
17-Oct-2023
24-Oct-2023
10-Oct-2023
17-Oct-2023
10-Oct-2023
17-Oct-2023
10-Oct-2023
24-Oct-2023
24-Oct-2023
10-Oct-2023
24-Oct-2023
17-Oct-2023
10-Oct-2023
24-Oct-2023
17-Oct-2023

## 2023-11-03 NOTE — BH INPATIENT PSYCHIATRY PROGRESS NOTE - NSBHMSEBEHAV_PSY_A_CORE
Cooperative/Other
Cooperative
Cooperative/Other
Cooperative
Cooperative/Other
Cooperative

## 2023-11-03 NOTE — BH INPATIENT PSYCHIATRY PROGRESS NOTE - NSBHMSEAFFRANGE_PSY_A_CORE
Constricted
Constricted
Full
Constricted
Constricted
Full
Full
Constricted
Full
Full
Constricted
Full
Constricted
Full
Full
Constricted
Constricted

## 2023-11-03 NOTE — BH INPATIENT PSYCHIATRY PROGRESS NOTE - NSTXDIABGOAL_PSY_ALL_CORE
Will be able to able to describe prescribed diabetic medications and how to properly take these medications
Other...
Will be able to able to describe prescribed diabetic medications and how to properly take these medications
Other...
Will be able to able to describe prescribed diabetic medications and how to properly take these medications
Other...
Will be able to able to describe prescribed diabetic medications and how to properly take these medications
Other...

## 2023-11-03 NOTE — BH INPATIENT PSYCHIATRY PROGRESS NOTE - NSTXPSYCHODATENEW_PSY_ALL_CORE
31-Oct-2023

## 2023-11-03 NOTE — BH INPATIENT PSYCHIATRY PROGRESS NOTE - NSTXPSYCHODATETRGT_PSY_ALL_CORE
24-Oct-2023
31-Oct-2023
17-Oct-2023
31-Oct-2023
07-Nov-2023
24-Oct-2023
24-Oct-2023
17-Oct-2023
07-Nov-2023
24-Oct-2023
17-Oct-2023
17-Oct-2023
31-Oct-2023
24-Oct-2023
31-Oct-2023
31-Oct-2023
17-Oct-2023

## 2023-11-03 NOTE — BH INPATIENT PSYCHIATRY PROGRESS NOTE - NSBHMSEMOOD_PSY_A_CORE
Normal
Depressed
Normal
Depressed
Normal
Depressed
Normal
Depressed
Depressed

## 2023-11-03 NOTE — BH INPATIENT PSYCHIATRY PROGRESS NOTE - NSTXSUBMISDATEEST_PSY_ALL_CORE
17-Oct-2023
10-Oct-2023
17-Oct-2023
10-Oct-2023
17-Oct-2023
10-Oct-2023
17-Oct-2023
10-Oct-2023
17-Oct-2023
10-Oct-2023
17-Oct-2023

## 2023-11-03 NOTE — BH INPATIENT PSYCHIATRY PROGRESS NOTE - NSBHMSEAFFQUAL_PSY_A_CORE
Depressed
Euthymic
Depressed
Euthymic/Irritable
Depressed
Euthymic
Euthymic
Depressed
Euthymic
Depressed
Euthymic
Euthymic
Depressed
Euthymic
Euthymic

## 2023-11-03 NOTE — BH INPATIENT PSYCHIATRY PROGRESS NOTE - NSTXDIABDATETRGT_PSY_ALL_CORE
17-Oct-2023
31-Oct-2023
31-Oct-2023
08-Nov-2023
24-Oct-2023
24-Oct-2023
31-Oct-2023
17-Oct-2023
24-Oct-2023
24-Oct-2023
17-Oct-2023
31-Oct-2023
24-Oct-2023
17-Oct-2023
24-Oct-2023
17-Oct-2023
08-Nov-2023
17-Oct-2023
31-Oct-2023

## 2023-11-03 NOTE — BH INPATIENT PSYCHIATRY PROGRESS NOTE - NSBHMSERECMEM_PSY_A_CORE
Normal
never
Normal

## 2023-11-03 NOTE — BH INPATIENT PSYCHIATRY PROGRESS NOTE - NSTXIMPULSGOAL_PSY_ALL_CORE
Will be able to describe/demonstrate alternative ways of managing his/her emotional state on the unit
Will be able to demonstrate the ability to pause before acting out negatively
Will be able to describe/demonstrate alternative ways of managing his/her emotional state on the unit
Will be able to demonstrate the ability to pause before acting out negatively
Will be able to describe/demonstrate alternative ways of managing his/her emotional state on the unit
Will be able to describe/demonstrate alternative ways of managing his/her emotional state on the unit

## 2023-11-03 NOTE — BH INPATIENT PSYCHIATRY PROGRESS NOTE - NSTXIMPULSPROGRES_PSY_ALL_CORE
Met - goal discontinued
Improving
Met - goal discontinued
Improving
Improving
Met - goal discontinued
Improving
Met - goal discontinued
Improving
Met - goal discontinued

## 2023-11-03 NOTE — BH INPATIENT PSYCHIATRY PROGRESS NOTE - NSICDXBHSECONDARYDX_PSY_ALL_CORE
Bipolar disorder, current episode mixed, severe, with psychotic features   F31.64  Opioid use disorder   F11.90  Amphetamine use disorder, severe   F15.20  Traumatic brain injury, with unknown loss of consciousness status, sequela   S06.9XAS  
TBI (traumatic brain injury)   S06.9XAA  Antisocial personality disorder   F60.2  
Bipolar disorder, current episode mixed, severe, with psychotic features   F31.64  Opioid use disorder   F11.90  Amphetamine use disorder, severe   F15.20  Traumatic brain injury, with unknown loss of consciousness status, sequela   S06.9XAS  

## 2023-11-03 NOTE — BH INPATIENT PSYCHIATRY PROGRESS NOTE - NSBHASSESSSUMMFT_PSY_ALL_CORE
Patient is a 57 year old man, undomiciled, single, on disability, with PPH of bipolar 1 disorder, polysubstance use disorder (crystal meth, cocaine, opioid, cannabis, K2, alcohol), opioid use disorder on maintenance therapy, antisocial personality disorder, history of incarceration (8 years in [facility] for manslaughter and was released in 2014), with PMH of TBI, pituitary tumor, HTN, T2DM, and GERD, who was admitted to [facility], transferred from [facility] ED, after self-presenting for suicidal and homicidal ideation, in context of substance intoxication and medication non-adherence. On presentation to the unit, patient reports continued depressed mood and passive SI with no intent/plan. He appears to have improved judgment as he has no intention of harming anyone. His delusions and paranoia remain, but are less severe at this time.     #Bipolar 1 disorder  - Continue with Risperidone 2 mg orally nightly for psychosis and disorganization  - Continue with Divalproex  mg orally daily, and 750 mg orally nightly for mood stabilization; subtherapeutic level 59.3 on 10/19/23   - Discontinue Valium   - Continue Gabapentin to 1200 mg orally TID for chronic anxiety   - Melatonin/Zyprexa PRN for insomnia/agitation respectively   - [facility]    #Opioid use disorder   - Continue Methadone 130 mg orally daily for maintenance opioid use disorder     #DM   - Hba1c 11.2   - Continue with Lantus 16 units nightly  - Continue with aspart 12 units TID with meals  - Continue with Atorvastatin 10 mg orally daily   - Continue with sliding scale and hypoglycemia protocol   - Endo consulted, recs appreciated, continue as per above     #HTN/CHF  - Continue with Lisinopril 10 mg orally daily   - Continue with Carvedilol 6.25 mg orally twice daily for CHF

## 2023-11-03 NOTE — BH INPATIENT PSYCHIATRY PROGRESS NOTE - CURRENT MEDICATION
MEDICATIONS  (STANDING):  aspirin  chewable 81 milliGRAM(s) Oral daily  atorvastatin 40 milliGRAM(s) Oral at bedtime  carvedilol 6.25 milliGRAM(s) Oral every 12 hours  dextrose 5%. 1000 milliLiter(s) (100 mL/Hr) IV Continuous <Continuous>  dextrose 5%. 1000 milliLiter(s) (50 mL/Hr) IV Continuous <Continuous>  dextrose 50% Injectable 25 Gram(s) IV Push once  dextrose 50% Injectable 12.5 Gram(s) IV Push once  dextrose 50% Injectable 25 Gram(s) IV Push once  diazepam    Tablet 2.5 milliGRAM(s) Oral daily  divalproex  milliGRAM(s) Oral daily  divalproex  milliGRAM(s) Oral at bedtime  gabapentin 1200 milliGRAM(s) Oral every 8 hours  glucagon  Injectable 1 milliGRAM(s) IntraMuscular once  insulin glargine Injectable (LANTUS) 16 Unit(s) SubCutaneous at bedtime  insulin lispro (ADMELOG) corrective regimen sliding scale   SubCutaneous Before meals and at bedtime  insulin lispro Injectable (ADMELOG) 14 Unit(s) SubCutaneous three times a day before meals  lisinopril 20 milliGRAM(s) Oral daily  methadone    Tablet 130 milliGRAM(s) Oral daily  nicotine - 21 mG/24Hr(s) Patch 1 Patch Transdermal daily  risperiDONE   Tablet 2 milliGRAM(s) Oral at bedtime  spironolactone 25 milliGRAM(s) Oral daily    MEDICATIONS  (PRN):  acetaminophen     Tablet .. 650 milliGRAM(s) Oral every 6 hours PRN Temp greater or equal to 38C (100.4F), Mild Pain (1 - 3)  dextrose Oral Gel 15 Gram(s) Oral once PRN Blood Glucose LESS THAN 70 milliGRAM(s)/deciliter  melatonin 3 milliGRAM(s) Oral at bedtime PRN Insomnia  OLANZapine 5 milliGRAM(s) Oral every 6 hours PRN agitation  sodium chloride 0.65% Nasal 1 Spray(s) Both Nostrils three times a day PRN Nasal Congestion

## 2023-11-03 NOTE — BH INPATIENT PSYCHIATRY PROGRESS NOTE - NSTXPSYCHOGOAL_PSY_ALL_CORE
Will be able to report experiencing hallucinations to staff
Other...
Will be able to report experiencing hallucinations to staff
Other...
Will be able to report experiencing hallucinations to staff
Other...
Other...
Will be able to report experiencing hallucinations to staff

## 2023-11-03 NOTE — BH INPATIENT PSYCHIATRY PROGRESS NOTE - NSBHMSEKNOWHOW_PSY_ALL_CORE
Current Events/Vocabulary

## 2023-11-03 NOTE — BH INPATIENT PSYCHIATRY PROGRESS NOTE - NSBHMSESPEECH_PSY_A_CORE
Normal volume, rate, productivity, spontaneity and articulation
Abnormal as indicated, otherwise normal...
Abnormal as indicated, otherwise normal...
Normal volume, rate, productivity, spontaneity and articulation
Abnormal as indicated, otherwise normal...
Normal volume, rate, productivity, spontaneity and articulation
Abnormal as indicated, otherwise normal...
Normal volume, rate, productivity, spontaneity and articulation
Normal volume, rate, productivity, spontaneity and articulation
Abnormal as indicated, otherwise normal...
Normal volume, rate, productivity, spontaneity and articulation
Normal volume, rate, productivity, spontaneity and articulation

## 2023-11-03 NOTE — BH INPATIENT PSYCHIATRY PROGRESS NOTE - NSICDXBHPRIMARYDX_PSY_ALL_CORE
Bipolar disorder, current episode mixed, severe, with psychotic features   F31.64  
20 feet. Pt. denied pain.
Bipolar disorder, current episode mixed, severe, with psychotic features   F31.64  
Bipolar disorder   F31.9  
Bipolar disorder, current episode mixed, severe, with psychotic features   F31.64

## 2023-11-03 NOTE — BH INPATIENT PSYCHIATRY PROGRESS NOTE - PRN MEDS
MEDICATIONS  (PRN):  acetaminophen     Tablet .. 650 milliGRAM(s) Oral every 6 hours PRN Temp greater or equal to 38C (100.4F), Mild Pain (1 - 3)  dextrose Oral Gel 15 Gram(s) Oral once PRN Blood Glucose LESS THAN 70 milliGRAM(s)/deciliter  melatonin 3 milliGRAM(s) Oral at bedtime PRN Insomnia  OLANZapine 5 milliGRAM(s) Oral every 6 hours PRN agitation  sodium chloride 0.65% Nasal 1 Spray(s) Both Nostrils three times a day PRN Nasal Congestion

## 2023-11-03 NOTE — BH INPATIENT PSYCHIATRY PROGRESS NOTE - NSBHROSSYSTEMS_PSY_ALL_CORE
Psychiatric
Ears/Nose/Throat/Mouth.../Psychiatric
Psychiatric

## 2023-11-03 NOTE — BH INPATIENT PSYCHIATRY PROGRESS NOTE - MSE OPTIONS
Structured MSE

## 2023-11-03 NOTE — BH INPATIENT PSYCHIATRY PROGRESS NOTE - NSBHMSETHTCONTENT_PSY_A_CORE
Unremarkable
Delusions
Unremarkable
Delusions
Unremarkable
Unremarkable
Delusions
Unremarkable
Delusions
Unremarkable
Unremarkable
Delusions
Unremarkable

## 2023-11-03 NOTE — BH INPATIENT PSYCHIATRY PROGRESS NOTE - NSTXDIABDATENEW_PSY_ALL_CORE
31-Oct-2023
24-Oct-2023
24-Oct-2023
31-Oct-2023
24-Oct-2023
31-Oct-2023
24-Oct-2023
24-Oct-2023
31-Oct-2023

## 2023-11-03 NOTE — BH INPATIENT PSYCHIATRY PROGRESS NOTE - NSTXDIABDATEEST_PSY_ALL_CORE
10-Oct-2023

## 2023-11-03 NOTE — BH INPATIENT PSYCHIATRY PROGRESS NOTE - NSTXPROBSUBMIS_PSY_ALL_CORE
SUBSTANCE MISUSE

## 2023-11-03 NOTE — BH INPATIENT PSYCHIATRY PROGRESS NOTE - NSTXIMPULSDATETRGT_PSY_ALL_CORE
17-Oct-2023
07-Nov-2023
24-Oct-2023
08-Nov-2023
17-Oct-2023
31-Oct-2023
24-Oct-2023
17-Oct-2023
24-Oct-2023
17-Oct-2023
24-Oct-2023
17-Oct-2023
24-Oct-2023

## 2023-11-03 NOTE — BH INPATIENT PSYCHIATRY PROGRESS NOTE - NSBHPSYCHOLCOGORIENT_PSY_A_CORE
Oriented to time, place, person, situation

## 2023-11-03 NOTE — BH INPATIENT PSYCHIATRY PROGRESS NOTE - NSTXSUBMISINTERMD_PSY_ALL_CORE
Continue with methadone 130 mg orally daily and Valium 10 mg orally twice daily, with goal of continued valium taper as tolerated 
Continue with methadone 130 mg orally daily, Valium taper completed
Continue with methadone 130 mg orally daily and Valium 10 mg orally twice daily, with goal of continued valium taper as tolerated 

## 2023-11-03 NOTE — BH INPATIENT PSYCHIATRY PROGRESS NOTE - NSTXIMPULSINTERMD_PSY_ALL_CORE
Depakote and Risperidone standing 

## 2023-11-03 NOTE — BH INPATIENT PSYCHIATRY PROGRESS NOTE - NSDCCRITERIA_PSY_ALL_CORE
no longer endorsing suicidal or homicidal ideation, intent, or plan, less preoccupied with chronic persecutory delusions, less disorganized, safe dispo 
gradual onset
no longer endorsing suicidal or homicidal ideation, intent, or plan, less preoccupied with chronic persecutory delusions, less disorganized, safe dispo 

## 2023-11-04 LAB
GLUCOSE BLDC GLUCOMTR-MCNC: 167 MG/DL — HIGH (ref 70–99)
GLUCOSE BLDC GLUCOMTR-MCNC: 167 MG/DL — HIGH (ref 70–99)
GLUCOSE BLDC GLUCOMTR-MCNC: 194 MG/DL — HIGH (ref 70–99)
GLUCOSE BLDC GLUCOMTR-MCNC: 194 MG/DL — HIGH (ref 70–99)
GLUCOSE BLDC GLUCOMTR-MCNC: 265 MG/DL — HIGH (ref 70–99)
GLUCOSE BLDC GLUCOMTR-MCNC: 265 MG/DL — HIGH (ref 70–99)
GLUCOSE BLDC GLUCOMTR-MCNC: 278 MG/DL — HIGH (ref 70–99)
GLUCOSE BLDC GLUCOMTR-MCNC: 278 MG/DL — HIGH (ref 70–99)

## 2023-11-04 RX ADMIN — Medication 3 MILLIGRAM(S): at 22:18

## 2023-11-04 RX ADMIN — GABAPENTIN 1200 MILLIGRAM(S): 400 CAPSULE ORAL at 21:15

## 2023-11-04 RX ADMIN — Medication 14 UNIT(S): at 12:21

## 2023-11-04 RX ADMIN — RISPERIDONE 2 MILLIGRAM(S): 4 TABLET ORAL at 22:17

## 2023-11-04 RX ADMIN — CARVEDILOL PHOSPHATE 6.25 MILLIGRAM(S): 80 CAPSULE, EXTENDED RELEASE ORAL at 10:24

## 2023-11-04 RX ADMIN — SPIRONOLACTONE 25 MILLIGRAM(S): 25 TABLET, FILM COATED ORAL at 10:25

## 2023-11-04 RX ADMIN — GABAPENTIN 1200 MILLIGRAM(S): 400 CAPSULE ORAL at 06:57

## 2023-11-04 RX ADMIN — Medication 6: at 12:21

## 2023-11-04 RX ADMIN — OLANZAPINE 5 MILLIGRAM(S): 15 TABLET, FILM COATED ORAL at 10:54

## 2023-11-04 RX ADMIN — Medication 2: at 07:36

## 2023-11-04 RX ADMIN — Medication 81 MILLIGRAM(S): at 10:24

## 2023-11-04 RX ADMIN — Medication 1 PATCH: at 09:10

## 2023-11-04 RX ADMIN — Medication 650 MILLIGRAM(S): at 13:02

## 2023-11-04 RX ADMIN — Medication 2: at 17:07

## 2023-11-04 RX ADMIN — DIVALPROEX SODIUM 500 MILLIGRAM(S): 500 TABLET, DELAYED RELEASE ORAL at 10:24

## 2023-11-04 RX ADMIN — METHADONE HYDROCHLORIDE 130 MILLIGRAM(S): 40 TABLET ORAL at 10:24

## 2023-11-04 RX ADMIN — Medication 650 MILLIGRAM(S): at 13:30

## 2023-11-04 RX ADMIN — INSULIN GLARGINE 16 UNIT(S): 100 INJECTION, SOLUTION SUBCUTANEOUS at 21:19

## 2023-11-04 RX ADMIN — Medication 6: at 21:21

## 2023-11-04 RX ADMIN — GABAPENTIN 1200 MILLIGRAM(S): 400 CAPSULE ORAL at 13:02

## 2023-11-04 RX ADMIN — DIVALPROEX SODIUM 750 MILLIGRAM(S): 500 TABLET, DELAYED RELEASE ORAL at 21:15

## 2023-11-04 RX ADMIN — CARVEDILOL PHOSPHATE 6.25 MILLIGRAM(S): 80 CAPSULE, EXTENDED RELEASE ORAL at 21:15

## 2023-11-04 RX ADMIN — Medication 1 PATCH: at 10:25

## 2023-11-04 RX ADMIN — Medication 1 PATCH: at 18:11

## 2023-11-04 RX ADMIN — ATORVASTATIN CALCIUM 40 MILLIGRAM(S): 80 TABLET, FILM COATED ORAL at 21:16

## 2023-11-04 RX ADMIN — Medication 14 UNIT(S): at 17:07

## 2023-11-04 RX ADMIN — OLANZAPINE 5 MILLIGRAM(S): 15 TABLET, FILM COATED ORAL at 18:07

## 2023-11-04 RX ADMIN — LISINOPRIL 20 MILLIGRAM(S): 2.5 TABLET ORAL at 10:24

## 2023-11-04 NOTE — BH SAFETY PLAN - WARNING SIGN 1
Pt. completed a written safety plan and was provided a copy; additional copies can be found in pt.'s chart.

## 2023-11-05 LAB
GLUCOSE BLDC GLUCOMTR-MCNC: 139 MG/DL — HIGH (ref 70–99)
GLUCOSE BLDC GLUCOMTR-MCNC: 139 MG/DL — HIGH (ref 70–99)
GLUCOSE BLDC GLUCOMTR-MCNC: 157 MG/DL — HIGH (ref 70–99)
GLUCOSE BLDC GLUCOMTR-MCNC: 157 MG/DL — HIGH (ref 70–99)
GLUCOSE BLDC GLUCOMTR-MCNC: 224 MG/DL — HIGH (ref 70–99)
GLUCOSE BLDC GLUCOMTR-MCNC: 224 MG/DL — HIGH (ref 70–99)
GLUCOSE BLDC GLUCOMTR-MCNC: 234 MG/DL — HIGH (ref 70–99)
GLUCOSE BLDC GLUCOMTR-MCNC: 234 MG/DL — HIGH (ref 70–99)

## 2023-11-05 RX ADMIN — ATORVASTATIN CALCIUM 40 MILLIGRAM(S): 80 TABLET, FILM COATED ORAL at 21:47

## 2023-11-05 RX ADMIN — Medication 1 PATCH: at 07:48

## 2023-11-05 RX ADMIN — DIVALPROEX SODIUM 750 MILLIGRAM(S): 500 TABLET, DELAYED RELEASE ORAL at 21:48

## 2023-11-05 RX ADMIN — Medication 3 MILLIGRAM(S): at 22:57

## 2023-11-05 RX ADMIN — Medication 14 UNIT(S): at 17:03

## 2023-11-05 RX ADMIN — GABAPENTIN 1200 MILLIGRAM(S): 400 CAPSULE ORAL at 08:11

## 2023-11-05 RX ADMIN — SPIRONOLACTONE 25 MILLIGRAM(S): 25 TABLET, FILM COATED ORAL at 10:22

## 2023-11-05 RX ADMIN — LISINOPRIL 20 MILLIGRAM(S): 2.5 TABLET ORAL at 10:22

## 2023-11-05 RX ADMIN — GABAPENTIN 1200 MILLIGRAM(S): 400 CAPSULE ORAL at 12:26

## 2023-11-05 RX ADMIN — DIVALPROEX SODIUM 500 MILLIGRAM(S): 500 TABLET, DELAYED RELEASE ORAL at 10:22

## 2023-11-05 RX ADMIN — OLANZAPINE 5 MILLIGRAM(S): 15 TABLET, FILM COATED ORAL at 22:58

## 2023-11-05 RX ADMIN — RISPERIDONE 2 MILLIGRAM(S): 4 TABLET ORAL at 22:57

## 2023-11-05 RX ADMIN — Medication 1 PATCH: at 12:15

## 2023-11-05 RX ADMIN — CARVEDILOL PHOSPHATE 6.25 MILLIGRAM(S): 80 CAPSULE, EXTENDED RELEASE ORAL at 21:47

## 2023-11-05 RX ADMIN — INSULIN GLARGINE 16 UNIT(S): 100 INJECTION, SOLUTION SUBCUTANEOUS at 21:45

## 2023-11-05 RX ADMIN — Medication 4: at 17:03

## 2023-11-05 RX ADMIN — Medication 4: at 21:43

## 2023-11-05 RX ADMIN — GABAPENTIN 1200 MILLIGRAM(S): 400 CAPSULE ORAL at 21:47

## 2023-11-05 RX ADMIN — CARVEDILOL PHOSPHATE 6.25 MILLIGRAM(S): 80 CAPSULE, EXTENDED RELEASE ORAL at 10:23

## 2023-11-05 RX ADMIN — Medication 81 MILLIGRAM(S): at 10:22

## 2023-11-05 RX ADMIN — METHADONE HYDROCHLORIDE 130 MILLIGRAM(S): 40 TABLET ORAL at 10:23

## 2023-11-05 RX ADMIN — Medication 14 UNIT(S): at 08:16

## 2023-11-05 RX ADMIN — Medication 2: at 08:14

## 2023-11-06 VITALS
DIASTOLIC BLOOD PRESSURE: 97 MMHG | TEMPERATURE: 99 F | SYSTOLIC BLOOD PRESSURE: 176 MMHG | HEART RATE: 68 BPM | OXYGEN SATURATION: 95 %

## 2023-11-06 LAB
GLUCOSE BLDC GLUCOMTR-MCNC: 195 MG/DL — HIGH (ref 70–99)
GLUCOSE BLDC GLUCOMTR-MCNC: 195 MG/DL — HIGH (ref 70–99)

## 2023-11-06 PROCEDURE — 96372 THER/PROPH/DIAG INJ SC/IM: CPT | Mod: XU

## 2023-11-06 PROCEDURE — 85027 COMPLETE CBC AUTOMATED: CPT

## 2023-11-06 PROCEDURE — 36415 COLL VENOUS BLD VENIPUNCTURE: CPT

## 2023-11-06 PROCEDURE — 80048 BASIC METABOLIC PNL TOTAL CA: CPT

## 2023-11-06 PROCEDURE — 80061 LIPID PANEL: CPT

## 2023-11-06 PROCEDURE — 73560 X-RAY EXAM OF KNEE 1 OR 2: CPT

## 2023-11-06 PROCEDURE — 93306 TTE W/DOPPLER COMPLETE: CPT

## 2023-11-06 PROCEDURE — 93005 ELECTROCARDIOGRAM TRACING: CPT

## 2023-11-06 PROCEDURE — 99285 EMERGENCY DEPT VISIT HI MDM: CPT

## 2023-11-06 PROCEDURE — 80307 DRUG TEST PRSMV CHEM ANLYZR: CPT

## 2023-11-06 PROCEDURE — 82962 GLUCOSE BLOOD TEST: CPT

## 2023-11-06 PROCEDURE — 83036 HEMOGLOBIN GLYCOSYLATED A1C: CPT

## 2023-11-06 PROCEDURE — 99239 HOSP IP/OBS DSCHRG MGMT >30: CPT

## 2023-11-06 PROCEDURE — 80053 COMPREHEN METABOLIC PANEL: CPT

## 2023-11-06 PROCEDURE — 80164 ASSAY DIPROPYLACETIC ACD TOT: CPT

## 2023-11-06 PROCEDURE — 81001 URINALYSIS AUTO W/SCOPE: CPT

## 2023-11-06 PROCEDURE — 85025 COMPLETE CBC W/AUTO DIFF WBC: CPT

## 2023-11-06 PROCEDURE — 93971 EXTREMITY STUDY: CPT

## 2023-11-06 PROCEDURE — 87635 SARS-COV-2 COVID-19 AMP PRB: CPT

## 2023-11-06 RX ORDER — CARVEDILOL PHOSPHATE 80 MG/1
1 CAPSULE, EXTENDED RELEASE ORAL
Qty: 60 | Refills: 0
Start: 2023-11-06 | End: 2023-12-05

## 2023-11-06 RX ORDER — OLANZAPINE 15 MG/1
1 TABLET, FILM COATED ORAL
Qty: 120 | Refills: 0
Start: 2023-11-06 | End: 2023-12-05

## 2023-11-06 RX ORDER — DIVALPROEX SODIUM 500 MG/1
3 TABLET, DELAYED RELEASE ORAL
Qty: 0 | Refills: 0 | DISCHARGE
Start: 2023-11-06

## 2023-11-06 RX ORDER — ATORVASTATIN CALCIUM 80 MG/1
1 TABLET, FILM COATED ORAL
Qty: 30 | Refills: 0
Start: 2023-11-06 | End: 2023-12-05

## 2023-11-06 RX ORDER — METFORMIN HYDROCHLORIDE 850 MG/1
1 TABLET ORAL
Qty: 60 | Refills: 0
Start: 2023-11-06

## 2023-11-06 RX ORDER — DIVALPROEX SODIUM 500 MG/1
1 TABLET, DELAYED RELEASE ORAL
Qty: 0 | Refills: 0 | DISCHARGE
Start: 2023-11-06

## 2023-11-06 RX ORDER — INSULIN NPH HUM/REG INSULIN HM 70-30/ML
0 VIAL (ML) SUBCUTANEOUS
Qty: 1 | Refills: 0
Start: 2023-11-06

## 2023-11-06 RX ORDER — INSULIN LISPRO 100/ML
0 VIAL (ML) SUBCUTANEOUS
Qty: 5 | Refills: 0
Start: 2023-11-06

## 2023-11-06 RX ORDER — CARVEDILOL PHOSPHATE 80 MG/1
1 CAPSULE, EXTENDED RELEASE ORAL
Qty: 0 | Refills: 0 | DISCHARGE
Start: 2023-11-06

## 2023-11-06 RX ORDER — INSULIN ASPART 100 [IU]/ML
0 INJECTION, SOLUTION SUBCUTANEOUS
Qty: 5 | Refills: 0
Start: 2023-11-06

## 2023-11-06 RX ORDER — SPIRONOLACTONE 25 MG/1
1 TABLET, FILM COATED ORAL
Qty: 0 | Refills: 0 | DISCHARGE
Start: 2023-11-06

## 2023-11-06 RX ORDER — METHADONE HYDROCHLORIDE 40 MG/1
13 TABLET ORAL
Qty: 0 | Refills: 0 | DISCHARGE
Start: 2023-11-06

## 2023-11-06 RX ORDER — ASPIRIN/CALCIUM CARB/MAGNESIUM 324 MG
1 TABLET ORAL
Qty: 30 | Refills: 0
Start: 2023-11-06 | End: 2023-12-05

## 2023-11-06 RX ORDER — GABAPENTIN 400 MG/1
2 CAPSULE ORAL
Qty: 0 | Refills: 0 | DISCHARGE
Start: 2023-11-06

## 2023-11-06 RX ORDER — NICOTINE POLACRILEX 2 MG
1 GUM BUCCAL
Qty: 2 | Refills: 0
Start: 2023-11-06 | End: 2023-12-05

## 2023-11-06 RX ORDER — RISPERIDONE 4 MG/1
1 TABLET ORAL
Qty: 0 | Refills: 0 | DISCHARGE
Start: 2023-11-06

## 2023-11-06 RX ORDER — DIVALPROEX SODIUM 500 MG/1
1 TABLET, DELAYED RELEASE ORAL
Qty: 30 | Refills: 0
Start: 2023-11-06 | End: 2023-12-05

## 2023-11-06 RX ORDER — INSULIN GLARGINE 100 [IU]/ML
16 INJECTION, SOLUTION SUBCUTANEOUS
Qty: 0 | Refills: 0 | DISCHARGE
Start: 2023-11-06

## 2023-11-06 RX ORDER — INSULIN GLARGINE 100 [IU]/ML
0 INJECTION, SOLUTION SUBCUTANEOUS
Qty: 5 | Refills: 0
Start: 2023-11-06

## 2023-11-06 RX ORDER — SPIRONOLACTONE 25 MG/1
1 TABLET, FILM COATED ORAL
Qty: 30 | Refills: 0
Start: 2023-11-06 | End: 2023-12-05

## 2023-11-06 RX ORDER — ASPIRIN/CALCIUM CARB/MAGNESIUM 324 MG
1 TABLET ORAL
Qty: 0 | Refills: 0 | DISCHARGE
Start: 2023-11-06

## 2023-11-06 RX ORDER — INSULIN LISPRO 100/ML
14 VIAL (ML) SUBCUTANEOUS
Qty: 13 | Refills: 0
Start: 2023-11-06 | End: 2023-12-05

## 2023-11-06 RX ORDER — LISINOPRIL 2.5 MG/1
1 TABLET ORAL
Qty: 30 | Refills: 0
Start: 2023-11-06 | End: 2023-12-05

## 2023-11-06 RX ORDER — INSULIN DETEMIR 100/ML (3)
0 INSULIN PEN (ML) SUBCUTANEOUS
Qty: 5 | Refills: 0
Start: 2023-11-06

## 2023-11-06 RX ORDER — OLANZAPINE 15 MG/1
0 TABLET, FILM COATED ORAL
Refills: 0 | DISCHARGE

## 2023-11-06 RX ORDER — GABAPENTIN 400 MG/1
2 CAPSULE ORAL
Qty: 180 | Refills: 0
Start: 2023-11-06 | End: 2023-12-05

## 2023-11-06 RX ORDER — INSULIN GLARGINE 100 [IU]/ML
16 INJECTION, SOLUTION SUBCUTANEOUS
Qty: 5 | Refills: 0
Start: 2023-11-06 | End: 2023-12-05

## 2023-11-06 RX ORDER — ATORVASTATIN CALCIUM 80 MG/1
1 TABLET, FILM COATED ORAL
Qty: 0 | Refills: 0 | DISCHARGE
Start: 2023-11-06

## 2023-11-06 RX ORDER — DIVALPROEX SODIUM 500 MG/1
1 TABLET, DELAYED RELEASE ORAL
Qty: 60 | Refills: 0
Start: 2023-11-06 | End: 2023-12-05

## 2023-11-06 RX ORDER — INSULIN LISPRO 100/ML
14 VIAL (ML) SUBCUTANEOUS
Qty: 0 | Refills: 0 | DISCHARGE
Start: 2023-11-06

## 2023-11-06 RX ORDER — LISINOPRIL 2.5 MG/1
1 TABLET ORAL
Qty: 0 | Refills: 0 | DISCHARGE
Start: 2023-11-06

## 2023-11-06 RX ORDER — INSULIN LISPRO 100/ML
0 VIAL (ML) SUBCUTANEOUS
Qty: 1 | Refills: 0
Start: 2023-11-06

## 2023-11-06 RX ORDER — DIAZEPAM 5 MG
1 TABLET ORAL
Refills: 0 | DISCHARGE

## 2023-11-06 RX ORDER — OLANZAPINE 15 MG/1
1 TABLET, FILM COATED ORAL
Qty: 0 | Refills: 0 | DISCHARGE
Start: 2023-11-06

## 2023-11-06 RX ORDER — NICOTINE POLACRILEX 2 MG
1 GUM BUCCAL
Qty: 0 | Refills: 0 | DISCHARGE
Start: 2023-11-06

## 2023-11-06 RX ORDER — RISPERIDONE 4 MG/1
1 TABLET ORAL
Qty: 30 | Refills: 0
Start: 2023-11-06 | End: 2023-12-05

## 2023-11-06 RX ORDER — GLUCAGON INJECTION, SOLUTION 0.5 MG/.1ML
0 INJECTION, SOLUTION SUBCUTANEOUS
Qty: 1 | Refills: 0
Start: 2023-11-06

## 2023-11-06 RX ADMIN — Medication 1 PATCH: at 08:49

## 2023-11-06 RX ADMIN — METHADONE HYDROCHLORIDE 130 MILLIGRAM(S): 40 TABLET ORAL at 10:32

## 2023-11-06 RX ADMIN — Medication 81 MILLIGRAM(S): at 10:33

## 2023-11-06 RX ADMIN — GABAPENTIN 1200 MILLIGRAM(S): 400 CAPSULE ORAL at 07:48

## 2023-11-06 RX ADMIN — CARVEDILOL PHOSPHATE 6.25 MILLIGRAM(S): 80 CAPSULE, EXTENDED RELEASE ORAL at 10:33

## 2023-11-06 RX ADMIN — LISINOPRIL 20 MILLIGRAM(S): 2.5 TABLET ORAL at 10:33

## 2023-11-06 RX ADMIN — Medication 1 PATCH: at 10:31

## 2023-11-06 RX ADMIN — Medication 2: at 07:53

## 2023-11-06 RX ADMIN — DIVALPROEX SODIUM 500 MILLIGRAM(S): 500 TABLET, DELAYED RELEASE ORAL at 10:32

## 2023-11-06 RX ADMIN — SPIRONOLACTONE 25 MILLIGRAM(S): 25 TABLET, FILM COATED ORAL at 10:33

## 2023-11-06 RX ADMIN — Medication 14 UNIT(S): at 07:53

## 2023-11-06 NOTE — BH DISCHARGE NOTE NURSING/SOCIAL WORK/PSYCH REHAB - NSDCADDINFO1FT_PSY_ALL_CORE
Please present to building 57 for admission to inpatient rehab. SMOKING NOT ALLOWED. [facility] [place] ([facility]) is a variable length of stay program, which means that the clients length of stay is based upon the client’s needs, generally around 30 days but we can accommodate longer if needed. Patient can bring 4-5 changes of clothes, a razor and or clippers.  [facility] supply all toiletries etc..  If patient doesn’t have a razor [facility] can provide as well, ifpatient doesn’t have clothes [facility] can get him some donations. Please present to building 57 for admission to inpatient rehab. SMOKING NOT ALLOWED. [facility] [place] ([facility]) is a variable length of stay program, which means that the clients length of stay is based upon the client’s needs, generally around 30 days but we can accommodate longer if needed. Patient can bring 4-5 changes of clothes, a razor and or clippers. [facility] supply all toiletries etc..  If patient doesn’t have a razor [facility] can provide as well, ifpatient doesn’t have clothes [facility] can get him some donations. Please present to building 57 for admission to inpatient rehab. SMOKING NOT ALLOWED. [facility] [place] ([facility]) is a variable length of stay program, which means that the clients length of stay is based upon the client’s needs, generally around 30 days but we can accommodate longer if needed. Patient can bring 4-5 changes of clothes, a razor and or clippers. [facility] supply all toiletries etc.. If patient doesn’t have a razor [facility] can provide as well, if patient doesn’t have clothes [facility] can get some donations.

## 2023-11-06 NOTE — BH INPATIENT PSYCHIATRY DISCHARGE NOTE - NSBHASSESSSUMMFT_PSY_ALL_CORE
Pt is much improved on current regimen and completed valium taper. Pt is doing better than I have ever seen. He is headed to [facility] for ongoing rehab.
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
## 2023-11-06 NOTE — BH DISCHARGE NOTE NURSING/SOCIAL WORK/PSYCH REHAB - NSDCNEXTLEVEL_PSY_ALL_CORE
COVID-19 Week 1 Survey      Responses   [facility] patient discharged from?  [facility]   Does the patient have one of the following disease processes/diagnoses(primary or secondary)?  COVID-19   COVID-19 underlying condition?  None   Call Number  Call 3   Week 1 Call successful?  Yes   Call start time  1027   Call end time  1033   Discharge diagnosis   Myalgias, nausea and vomiting COVID    Meds reviewed with patient/caregiver?  Yes   Is the patient having any side effects they believe may be caused by any medication additions or changes?  No   Is the patient taking all medications as directed (includes completed medication regime)?  Yes   Does the patient have an appointment with their PCP or specialist within 7 days of discharge?  No   What is preventing the patient from scheduling follow up appointments within 7 days of discharge?  Haven't had time   Has the patient kept scheduled appointments due by today?  N/A   Psychosocial issues?  No   Comments  Pt reports no issues. Afebrile, no myalgias and denies SOA. She reports her isolation is over 10-21.    What is the patient's perception of their health status since discharge?  Improving   Does the patient have any of the following symptoms?  None   Pulse Ox monitoring  Intermittent   Pulse Ox device source  Patient   O2 Sat comments  93%RA Sat   Is the patient/caregiver able to teach back the hierarchy of who to call/visit for symptoms/problems? PCP, Specialist, Home health nurse, Urgent Care, ED, 911  Yes   COVID-19 call completed?  Yes          Lashaun Sequeira RN  
Yes

## 2023-11-06 NOTE — BH INPATIENT PSYCHIATRY DISCHARGE NOTE - DATE OF DISCHARGE SERVICE:
Patient is good to switch to [facility]. I will mail patient start form to sign and send back. Patient stated she has not had her Tecfidera for a week now, wanted to know if that will cause any problems? 06-Nov-2023
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
## 2023-11-06 NOTE — BH INPATIENT PSYCHIATRY DISCHARGE NOTE - NSDCMRMEDTOKEN_GEN_ALL_CORE_FT
alcohol swabs: Apply topically to affected area 4 times a day  aspirin 81 mg oral tablet, chewable: 1 tab(s) orally once a day  atorvastatin 40 mg oral tablet: 1 tab(s) orally once a day (at bedtime)  carvedilol 6.25 mg oral tablet: 1 tab(s) orally every 12 hours  divalproex sodium 250 mg oral delayed release tablet: 3 tab(s) orally once a day (at bedtime)  divalproex sodium 500 mg oral delayed release tablet: 1 tab(s) orally once a day  gabapentin 600 mg oral tablet: 2 tab(s) orally every 8 hours  insulin glargine 100 units/mL subcutaneous solution: 16 unit(s) subcutaneous once a day (at bedtime)  insulin lispro 100 units/mL injectable solution: 14 unit(s) injectable 3 times a day Take three gimes a day before meals  lancets: 1 application subcutaneously 4 times a day  lisinopril 20 mg oral tablet: 1 tab(s) orally once a day  methadone 10 mg oral tablet: 13 tab(s) orally once a day  nicotine 21 mg/24 hr transdermal film, extended release: 1 patch transdermal once a day  OLANZapine 5 mg oral tablet: 1 tab(s) orally every 6 hours As needed agitation  risperiDONE 2 mg oral tablet: 1 tab(s) orally once a day (at bedtime)  spironolactone 25 mg oral tablet: 1 tab(s) orally once a day  test strips (per patient&#x27;s insurance): 1 application subcutaneously 4 times a day. ** Compatible with patient&#x27;s glucometer **

## 2023-11-06 NOTE — BH INPATIENT PSYCHIATRY DISCHARGE NOTE - NSDCCPCAREPLAN_GEN_ALL_CORE_FT
PRINCIPAL DISCHARGE DIAGNOSIS  Diagnosis: Bipolar disorder, current episode mixed, severe, with psychotic features  Assessment and Plan of Treatment:       SECONDARY DISCHARGE DIAGNOSES  Diagnosis: Amphetamine use disorder, severe  Assessment and Plan of Treatment:     Diagnosis: Opioid use disorder  Assessment and Plan of Treatment:     Diagnosis: Traumatic brain injury, with unknown loss of consciousness status, sequela  Assessment and Plan of Treatment:     Diagnosis: Substance abuse  Assessment and Plan of Treatment:

## 2023-11-06 NOTE — BH INPATIENT PSYCHIATRY DISCHARGE NOTE - NSBHMETABOLIC_PSY_ALL_CORE_FT
BMI: BMI (kg/m2): 31.6 (10-11-23 @ 11:12)  HbA1c: A1C with Estimated Average Glucose Result: 11.2 % (10-11-23 @ 11:04)    Glucose: POCT Blood Glucose.: 234 mg/dL (11-05-23 @ 21:42)    BP: 135/74 (11-05-23 @ 20:22) (129/77 - 197/80)  Lipid Panel: Date/Time: 10-10-23 @ 05:30  Cholesterol, Serum: 118  Direct LDL: 65  HDL Cholesterol, Serum: 36  Total Cholesterol/HDL Ration Measurement: --  Triglycerides, Serum: 87

## 2023-11-06 NOTE — BH INPATIENT PSYCHIATRY DISCHARGE NOTE - LEVEL OF CONSCIOUSNESS
What Type Of Note Output Would You Prefer (Optional)?: Standard Output Hpi Title: Evaluation of Skin Lesions How Severe Are Your Spot(S)?: mild Have Your Spot(S) Been Treated In The Past?: has not been treated Alert

## 2023-11-06 NOTE — BH DISCHARGE NOTE NURSING/SOCIAL WORK/PSYCH REHAB - NSCDUDCCRISIS_PSY_A_CORE
[facility]  1 [phone] [facility] ([phone]  Text "WELL" to [phone]  Website: www.[employer]National Suicide Prevention Lifeline 3 [phone] 882-4555(690) 235-5962/988 Suicide and Crisis Lifeline

## 2023-11-06 NOTE — BH DISCHARGE NOTE NURSING/SOCIAL WORK/PSYCH REHAB - NSDCPEEMAIL_GEN_ALL_CORE
[facility] for Tobacco Control email [email or web address]

## 2023-11-06 NOTE — BH INPATIENT PSYCHIATRY DISCHARGE NOTE - LEGAL HISTORY
History of numerous incarcerations, the longest for 8 years due to manslaughter, was d/c from [facility] in 2014. No arrests since his discharge from [facility].

## 2023-11-06 NOTE — BH DISCHARGE NOTE NURSING/SOCIAL WORK/PSYCH REHAB - NSDCPEWEB_GEN_ALL_CORE
[facility] for Tobacco Control website --- [email or web address] website --- [email or web address]

## 2023-11-06 NOTE — BH DISCHARGE NOTE NURSING/SOCIAL WORK/PSYCH REHAB - PATIENT PORTAL LINK FT
You can access the FollowMyHealth Patient Portal offered by [facility] by registering at the following website: [email or web address]. By joining [employer]’s FollowMyHealth portal, you will also be able to view your health information using other applications (apps) compatible with our system.

## 2023-11-06 NOTE — BH INPATIENT PSYCHIATRY DISCHARGE NOTE - HOSPITAL COURSE
Pt is much improved on current regimen and completed valium taper. Pt is doing better than I have ever seen. He is headed to [facility] for ongoing rehab.     Pt will get a month's supply of the following medications:. He should continue taking them unless directed otherwise by outpatient provider:    Aspirin 81 mg daily for CAD  insulin lispro 14 units three times a day before meals  insulin lanntus 16 units at night  lisinopril 20 mg daily for hypertension  depakote 500 mg daily and 750 mg at night for mood dysregulation  neurontin 1200 mg three times a day for anxiety  spironolactone 25 mg daily for Cardiac disease  coreg 6.25 mg twice day for hypertension/cardiac disease  nicotine patch 21 mg daily for nicotine cravings  risperdal 2 mg at night for psychosis  zyprexa 5 mg q6h prn agitation  atorvastatin 40 mg at night for hyperlipidemia    Pt also taking methadone 130 mg daily for opioid agonist therapy.     Medications for substance use cravings such as naltrexone discussed but he did not want to use them as he has tried them all and they don't work

## 2023-11-06 NOTE — BH INPATIENT PSYCHIATRY DISCHARGE NOTE - NSBHFUPINTERVALHXFT_PSY_A_CORE
Pt is much improved on current regimen and completed valium taper. Pt is doing better than I have ever seen. He is headed to [facility] for ongoing rehab.

## 2023-11-06 NOTE — BH DISCHARGE NOTE NURSING/SOCIAL WORK/PSYCH REHAB - NSDCPRGOAL_PSY_ALL_CORE
Pt attended groups regularly during admission.  Pt has engaged well in group discussions and has been expressive about his feelings and experiences.  Pt has demonstrated full range of affect and has been mostly pleasant on approach.  Pt has been social with peers and supportive of others in groups.  Pt has been future-focused and has stated wanting to work on his sobriety in rehab.  Pt endorsed readiness for discharge and completed a safety plan.

## 2023-11-06 NOTE — BH INPATIENT PSYCHIATRY DISCHARGE NOTE - REASON FOR ADMISSION
57 YO M with polysubstance (opioid, etoh, cannabis, cocaine, crystal meth, sedative/anxiolytic, K2) use disorder, traumatic brain injury, and bipolar disorder with psychotic features vs mood disorder and psychosis secondary to traumatic brain injury was admitted with psychotic delusions, mood dysregulation, and impulsivity in the context of substance use and homelessness. He has been admitted to our unit several times before.

## 2023-11-07 NOTE — BH SOCIAL WORK CONFIRMATION FOLLOW UP NOTE - NSLINKEDTOLOC_PSY_ALL_CORE
[facility]  06-Nov-2023 12:00  [address], [address]  [place] [id number]  Phone:[phone]

## 2023-11-07 NOTE — BH SOCIAL WORK CONFIRMATION FOLLOW UP NOTE - NSCOMMENTS_PSY_ALL_CORE
Caring Call 11/7/23: Patient doesn't have active phone # at this time however, SW did speak with rehab and confirmed pt made it safe.     Linkage Call 11/7/23: SW spoke with Addictions Counselor, Madeleine CASTANEDA CAS <[email or web address]> who confirmed pt made it safely to inpatient rehab on 11/6/23. No concerns noted.  Caring Call 11/7/23: Patient doesn't have active phone # at this time however, SW did speak with rehab via email who confirmed pt made it safe.     Linkage Call 11/7/23: SW spoke with Addictions Counselor, Madeleine CASTANEDA CAS <[email or web address]> who confirmed pt made it safely to inpatient rehab on 11/6/23. No concerns noted.  Caring Call 11/7/23: Patient doesn't have active phone # at this time however, SW did speak with rehab via email who confirmed pt made it safe.     Linkage Call 11/7/23: SW spoke with Addictions Counselor, Madeleine CASTANEDA CAS <[email or web address] who confirmed pt made it safely and currently admitted to inpatient rehab on 11/6/23. No concerns noted.

## 2023-11-08 DIAGNOSIS — I25.10 ATHEROSCLEROTIC HEART DISEASE OF NATIVE CORONARY ARTERY WITHOUT ANGINA PECTORIS: ICD-10-CM

## 2023-11-08 DIAGNOSIS — Z88.8 ALLERGY STATUS TO OTHER DRUGS, MEDICAMENTS AND BIOLOGICAL SUBSTANCES: ICD-10-CM

## 2023-11-08 DIAGNOSIS — Z91.119 PATIENT'S NONCOMPLIANCE WITH DIETARY REGIMEN DUE TO UNSPECIFIED REASON: ICD-10-CM

## 2023-11-08 DIAGNOSIS — G89.29 OTHER CHRONIC PAIN: ICD-10-CM

## 2023-11-08 DIAGNOSIS — F11.20 OPIOID DEPENDENCE, UNCOMPLICATED: ICD-10-CM

## 2023-11-08 DIAGNOSIS — E11.65 TYPE 2 DIABETES MELLITUS WITH HYPERGLYCEMIA: ICD-10-CM

## 2023-11-08 DIAGNOSIS — E78.5 HYPERLIPIDEMIA, UNSPECIFIED: ICD-10-CM

## 2023-11-08 DIAGNOSIS — Z79.4 LONG TERM (CURRENT) USE OF INSULIN: ICD-10-CM

## 2023-11-08 DIAGNOSIS — F60.2 ANTISOCIAL PERSONALITY DISORDER: ICD-10-CM

## 2023-11-08 DIAGNOSIS — Z86.73 PERSONAL HISTORY OF TRANSIENT ISCHEMIC ATTACK (TIA), AND CEREBRAL INFARCTION WITHOUT RESIDUAL DEFICITS: ICD-10-CM

## 2023-11-08 DIAGNOSIS — F41.9 ANXIETY DISORDER, UNSPECIFIED: ICD-10-CM

## 2023-11-08 DIAGNOSIS — F22 DELUSIONAL DISORDERS: ICD-10-CM

## 2023-11-08 DIAGNOSIS — F17.210 NICOTINE DEPENDENCE, CIGARETTES, UNCOMPLICATED: ICD-10-CM

## 2023-11-08 DIAGNOSIS — S06.9XAS UNSPECIFIED INTRACRANIAL INJURY WITH LOSS OF CONSCIOUSNESS STATUS UNKNOWN, SEQUELA: ICD-10-CM
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
## 2023-11-08 DIAGNOSIS — M17.11 UNILATERAL PRIMARY OSTEOARTHRITIS, RIGHT KNEE: ICD-10-CM

## 2023-11-08 DIAGNOSIS — F15.229: ICD-10-CM

## 2023-11-08 DIAGNOSIS — T42.6X6A UNDERDOSING OF OTHER ANTIEPILEPTIC AND SEDATIVE-HYPNOTIC DRUGS, INITIAL ENCOUNTER: ICD-10-CM

## 2023-11-08 DIAGNOSIS — K21.9 GASTRO-ESOPHAGEAL REFLUX DISEASE WITHOUT ESOPHAGITIS: ICD-10-CM

## 2023-11-08 DIAGNOSIS — R45.851 SUICIDAL IDEATIONS: ICD-10-CM

## 2023-11-08 DIAGNOSIS — I11.0 HYPERTENSIVE HEART DISEASE WITH HEART FAILURE: ICD-10-CM

## 2023-11-08 DIAGNOSIS — I50.32 CHRONIC DIASTOLIC (CONGESTIVE) HEART FAILURE: ICD-10-CM

## 2023-11-08 DIAGNOSIS — Z91.49 OTHER PERSONAL HISTORY OF PSYCHOLOGICAL TRAUMA, NOT ELSEWHERE CLASSIFIED: ICD-10-CM

## 2023-11-08 DIAGNOSIS — Z91.51 PERSONAL HISTORY OF SUICIDAL BEHAVIOR: ICD-10-CM
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
## 2023-11-08 DIAGNOSIS — G40.909 EPILEPSY, UNSPECIFIED, NOT INTRACTABLE, WITHOUT STATUS EPILEPTICUS: ICD-10-CM

## 2023-11-08 DIAGNOSIS — F31.64 BIPOLAR DISORDER, CURRENT EPISODE MIXED, SEVERE, WITH PSYCHOTIC FEATURES: ICD-10-CM

## 2023-11-08 DIAGNOSIS — R45.850 HOMICIDAL IDEATIONS: ICD-10-CM

## 2023-11-08 DIAGNOSIS — Z91.128 PATIENT'S INTENTIONAL UNDERDOSING OF MEDICATION REGIMEN FOR OTHER REASON: ICD-10-CM

## 2023-11-08 DIAGNOSIS — Z59.02 UNSHELTERED HOMELESSNESS: ICD-10-CM

## 2023-11-08 SDOH — ECONOMIC STABILITY - HOUSING INSECURITY: UNSHELTERED HOMELESSNESS: Z59.02

## 2024-03-04 NOTE — ED ADULT TRIAGE NOTE - DOMESTIC TRAVEL HIGH RISK QUESTION
Discharge Diagnosis  Right lower quadrant abdominal pain    Issues Requiring Follow-Up  Abdominal pain    Test Results Pending At Discharge  Pending Labs       No current pending labs.            Hospital Course    Usman is a 11yo M who presented with a 1d hx of abdominal pain. He was seen at an OSH, labs work all within normal limits and CT scan done with c/f early appendicitis as well as a large stool burden. Patient was then transferred to [facility] for care. Upon exam at [facility], his abdominal exam was benign, he stated his pain was gone and wanted to eat. He tolerated a diet and was discharged home with tylenol/motrin for pain and miralax for constipation. Follow up as needed.     Pertinent Physical Exam At Time of Discharge  Physical Exam  HENT:      Head: Normocephalic.      Nose: Nose normal.   Eyes:      Pupils: Pupils are equal, round, and reactive to light.   Cardiovascular:      Rate and Rhythm: Normal rate.   Pulmonary:      Effort: Pulmonary effort is normal.   Abdominal:      General: Abdomen is flat. Bowel sounds are normal.      Palpations: Abdomen is soft.   Musculoskeletal:         General: Normal range of motion.   Skin:     General: Skin is warm and dry.   Neurological:      Mental Status: He is alert.   Psychiatric:         Mood and Affect: Mood normal.           Home Medications     Medication List      START taking these medications     acetaminophen 160 mg/5 mL (5 mL) suspension; Commonly known as: Tylenol;   Take 12.5 mL (400 mg) by mouth every 6 hours if needed for mild pain (1 -   3).   ibuprofen 100 mg/5 mL suspension; Take 12 mL (240 mg) by mouth every 6   hours if needed for moderate pain (4 - 6).   polyethylene glycol 8.5 gram powder in packet; Commonly known as:   Glycolax, Miralax; Take 8.5 g by mouth once daily. Do not start before   March 5, 2024.; Start taking on: March 5, 2024       Outpatient Follow-Up  No future appointments.    Donya Lewis, APRN-CNP   No

## 2024-03-10 ENCOUNTER — EMERGENCY (EMERGENCY)
Facility: HOSPITAL | Age: 59
LOS: 1 days | Discharge: HOME | End: 2024-03-10
Attending: EMERGENCY MEDICINE | Admitting: STUDENT IN AN ORGANIZED HEALTH CARE EDUCATION/TRAINING PROGRAM
Payer: COMMERCIAL

## 2024-03-10 VITALS
OXYGEN SATURATION: 98 % | TEMPERATURE: 98 F | DIASTOLIC BLOOD PRESSURE: 103 MMHG | RESPIRATION RATE: 18 BRPM | HEART RATE: 112 BPM | SYSTOLIC BLOOD PRESSURE: 186 MMHG

## 2024-03-10 LAB
BASE EXCESS BLDV CALC-SCNC: 5.2 MMOL/L — HIGH (ref -2–3)
CO2 BLDV-SCNC: 31.2 MMOL/L — HIGH (ref 22–26)
HCO3 BLDV-SCNC: 30 MMOL/L — HIGH (ref 22–29)
HCT VFR BLD CALC: 37.1 % — LOW (ref 39–50)
HGB BLD-MCNC: 12.7 G/DL — LOW (ref 13–17)
MCHC RBC-ENTMCNC: 30 PG — SIGNIFICANT CHANGE UP (ref 27–34)
MCHC RBC-ENTMCNC: 34.2 GM/DL — SIGNIFICANT CHANGE UP (ref 32–36)
MCV RBC AUTO: 87.5 FL — SIGNIFICANT CHANGE UP (ref 80–100)
NRBC # BLD: 0 /100 WBCS — SIGNIFICANT CHANGE UP (ref 0–0)
PCO2 BLDV: 43 MMHG — SIGNIFICANT CHANGE UP (ref 42–55)
PH BLDV: 7.45 — HIGH (ref 7.32–7.43)
PLATELET # BLD AUTO: 245 K/UL — SIGNIFICANT CHANGE UP (ref 150–400)
PO2 BLDV: 137 MMHG — HIGH (ref 25–45)
RBC # BLD: 4.24 M/UL — SIGNIFICANT CHANGE UP (ref 4.2–5.8)
RBC # FLD: 13.3 % — SIGNIFICANT CHANGE UP (ref 10.3–14.5)
SAO2 % BLDV: 99.4 % — HIGH (ref 67–88)
WBC # BLD: 5.62 K/UL — SIGNIFICANT CHANGE UP (ref 3.8–10.5)
WBC # FLD AUTO: 5.62 K/UL — SIGNIFICANT CHANGE UP (ref 3.8–10.5)

## 2024-03-10 PROCEDURE — 99285 EMERGENCY DEPT VISIT HI MDM: CPT

## 2024-03-10 RX ORDER — SODIUM CHLORIDE 9 MG/ML
1000 INJECTION INTRAMUSCULAR; INTRAVENOUS; SUBCUTANEOUS ONCE
Refills: 0 | Status: COMPLETED | OUTPATIENT
Start: 2024-03-10 | End: 2024-03-10

## 2024-03-10 RX ADMIN — SODIUM CHLORIDE 1000 MILLILITER(S): 9 INJECTION INTRAMUSCULAR; INTRAVENOUS; SUBCUTANEOUS at 23:41

## 2024-03-10 NOTE — ED ADULT NURSE NOTE - OBJECTIVE STATEMENT
Pt presents to ER c/o, Pt A&O x3, calm and cooperative, airway patent, respirations regular, non-labored, lungs clear bilaterally to auscultation, abdomen soft non-tender, normoactive bowel sounds noted in all quadrants, strong palpable peripheral pulses noted, skin warm dry intact. Pt waiting ER provider evaluation.

## 2024-03-11 VITALS
SYSTOLIC BLOOD PRESSURE: 181 MMHG | OXYGEN SATURATION: 99 % | TEMPERATURE: 98 F | RESPIRATION RATE: 18 BRPM | HEART RATE: 65 BPM | DIASTOLIC BLOOD PRESSURE: 108 MMHG

## 2024-03-11 DIAGNOSIS — F60.2 ANTISOCIAL PERSONALITY DISORDER: ICD-10-CM

## 2024-03-11 LAB
ALBUMIN SERPL ELPH-MCNC: 3.8 G/DL — SIGNIFICANT CHANGE UP (ref 3.3–5)
ALBUMIN SERPL ELPH-MCNC: 4.1 G/DL — SIGNIFICANT CHANGE UP (ref 3.3–5)
ALP SERPL-CCNC: 92 U/L — SIGNIFICANT CHANGE UP (ref 40–120)
ALP SERPL-CCNC: SIGNIFICANT CHANGE UP (ref 40–120)
ALT FLD-CCNC: 13 U/L — SIGNIFICANT CHANGE UP (ref 10–45)
ALT FLD-CCNC: SIGNIFICANT CHANGE UP (ref 10–45)
ANION GAP SERPL CALC-SCNC: 10 MMOL/L — SIGNIFICANT CHANGE UP (ref 5–17)
ANION GAP SERPL CALC-SCNC: 9 MMOL/L — SIGNIFICANT CHANGE UP (ref 5–17)
AST SERPL-CCNC: 15 U/L — SIGNIFICANT CHANGE UP (ref 10–40)
AST SERPL-CCNC: SIGNIFICANT CHANGE UP (ref 10–40)
B-OH-BUTYR SERPL-SCNC: 0.3 MMOL/L — SIGNIFICANT CHANGE UP
BILIRUB SERPL-MCNC: 0.2 MG/DL — SIGNIFICANT CHANGE UP (ref 0.2–1.2)
BILIRUB SERPL-MCNC: 0.2 MG/DL — SIGNIFICANT CHANGE UP (ref 0.2–1.2)
BUN SERPL-MCNC: 16 MG/DL — SIGNIFICANT CHANGE UP (ref 7–23)
BUN SERPL-MCNC: 17 MG/DL — SIGNIFICANT CHANGE UP (ref 7–23)
CALCIUM SERPL-MCNC: 9 MG/DL — SIGNIFICANT CHANGE UP (ref 8.4–10.5)
CALCIUM SERPL-MCNC: 9.3 MG/DL — SIGNIFICANT CHANGE UP (ref 8.4–10.5)
CHLORIDE SERPL-SCNC: 100 MMOL/L — SIGNIFICANT CHANGE UP (ref 96–108)
CHLORIDE SERPL-SCNC: 94 MMOL/L — LOW (ref 96–108)
CO2 SERPL-SCNC: 27 MMOL/L — SIGNIFICANT CHANGE UP (ref 22–31)
CO2 SERPL-SCNC: 28 MMOL/L — SIGNIFICANT CHANGE UP (ref 22–31)
CREAT SERPL-MCNC: 0.95 MG/DL — SIGNIFICANT CHANGE UP (ref 0.5–1.3)
CREAT SERPL-MCNC: 0.98 MG/DL — SIGNIFICANT CHANGE UP (ref 0.5–1.3)
EGFR: 90 ML/MIN/1.73M2 — SIGNIFICANT CHANGE UP
EGFR: 93 ML/MIN/1.73M2 — SIGNIFICANT CHANGE UP
ETHANOL SERPL-MCNC: <10 MG/DL — SIGNIFICANT CHANGE UP (ref 0–10)
GLUCOSE SERPL-MCNC: 305 MG/DL — HIGH (ref 70–99)
GLUCOSE SERPL-MCNC: 363 MG/DL — HIGH (ref 70–99)
POTASSIUM SERPL-MCNC: 4.4 MMOL/L — SIGNIFICANT CHANGE UP (ref 3.5–5.3)
POTASSIUM SERPL-MCNC: SIGNIFICANT CHANGE UP (ref 3.5–5.3)
POTASSIUM SERPL-SCNC: 4.4 MMOL/L — SIGNIFICANT CHANGE UP (ref 3.5–5.3)
POTASSIUM SERPL-SCNC: SIGNIFICANT CHANGE UP (ref 3.5–5.3)
PROT SERPL-MCNC: 6.2 G/DL — SIGNIFICANT CHANGE UP (ref 6–8.3)
PROT SERPL-MCNC: 6.7 G/DL — SIGNIFICANT CHANGE UP (ref 6–8.3)
SODIUM SERPL-SCNC: 131 MMOL/L — LOW (ref 135–145)
SODIUM SERPL-SCNC: 137 MMOL/L — SIGNIFICANT CHANGE UP (ref 135–145)

## 2024-03-11 PROCEDURE — 85027 COMPLETE CBC AUTOMATED: CPT

## 2024-03-11 PROCEDURE — 82010 KETONE BODYS QUAN: CPT

## 2024-03-11 PROCEDURE — 96374 THER/PROPH/DIAG INJ IV PUSH: CPT

## 2024-03-11 PROCEDURE — 96361 HYDRATE IV INFUSION ADD-ON: CPT

## 2024-03-11 PROCEDURE — 82803 BLOOD GASES ANY COMBINATION: CPT

## 2024-03-11 PROCEDURE — 93005 ELECTROCARDIOGRAM TRACING: CPT

## 2024-03-11 PROCEDURE — 73620 X-RAY EXAM OF FOOT: CPT | Mod: 26,RT

## 2024-03-11 PROCEDURE — 36415 COLL VENOUS BLD VENIPUNCTURE: CPT

## 2024-03-11 PROCEDURE — 73620 X-RAY EXAM OF FOOT: CPT

## 2024-03-11 PROCEDURE — 80053 COMPREHEN METABOLIC PANEL: CPT

## 2024-03-11 PROCEDURE — 82962 GLUCOSE BLOOD TEST: CPT

## 2024-03-11 PROCEDURE — 80307 DRUG TEST PRSMV CHEM ANLYZR: CPT

## 2024-03-11 PROCEDURE — 90792 PSYCH DIAG EVAL W/MED SRVCS: CPT | Mod: 95

## 2024-03-11 PROCEDURE — 99285 EMERGENCY DEPT VISIT HI MDM: CPT | Mod: 25

## 2024-03-11 RX ORDER — DIPHENHYDRAMINE HCL 50 MG
25 CAPSULE ORAL ONCE
Refills: 0 | Status: COMPLETED | OUTPATIENT
Start: 2024-03-11 | End: 2024-03-11

## 2024-03-11 RX ORDER — LISINOPRIL 2.5 MG/1
20 TABLET ORAL ONCE
Refills: 0 | Status: COMPLETED | OUTPATIENT
Start: 2024-03-11 | End: 2024-03-11

## 2024-03-11 RX ORDER — INSULIN HUMAN 100 [IU]/ML
8 INJECTION, SOLUTION SUBCUTANEOUS ONCE
Refills: 0 | Status: COMPLETED | OUTPATIENT
Start: 2024-03-11 | End: 2024-03-11

## 2024-03-11 RX ADMIN — LISINOPRIL 20 MILLIGRAM(S): 2.5 TABLET ORAL at 06:43

## 2024-03-11 RX ADMIN — SODIUM CHLORIDE 1000 MILLILITER(S): 9 INJECTION INTRAMUSCULAR; INTRAVENOUS; SUBCUTANEOUS at 00:45

## 2024-03-11 RX ADMIN — INSULIN HUMAN 8 UNIT(S): 100 INJECTION, SOLUTION SUBCUTANEOUS at 06:44

## 2024-03-11 RX ADMIN — Medication 1 MILLIGRAM(S): at 00:54

## 2024-03-11 RX ADMIN — Medication 25 MILLIGRAM(S): at 00:54

## 2024-03-11 NOTE — ED ADULT NURSE REASSESSMENT NOTE - NS ED NURSE REASSESS COMMENT FT1
Pt cleared by psychiatry for discharge, I informed patient he was being discharged, pt became angry, stated "I am not leaving" "Dr Parker spoke with patient" Plan psych to re-evaluate. Patient is a 69y old  Female who presents with a chief complaint of hypoglycemia (29 Dec 2017 03:50)      INTERVAL HPI/OVERNIGHT EVENTS:  69y  Female seen and examined at bedside, resting comfortably.  No acute events overnight.  Labs & chart reviewed  Pt mentions that she has a history of hypoglycemia, and due to fear of hypoglycemia has taken no diabetes medications for the past month, ever since last ED visit for hypoglycemia.  She used to take invokana, but it was UT'ed and now she is only supposed to be taking metformin 500 ER qd as well as repaglinide 2mg TID before meals, as per her PMD, which she reports she has not been taking.  She also notes 15 days' duration of abdominal pain, localized to RLQ, sharp, crampy in nature, menstrual like associated with nausea, vomiting x1 yesterday NBNB, and subjective tactile fever + malaise.  She has a hx of GB stones, but no surgical intervention.  She appreciates her history of elevated LFTs and notes an US last week for Hepatomegaly and a Calcified lesion right hepatic lobe present on the prior CT scan.  Her abdominal discomfort is tolerable for now, not requiring medication, she does not have an appetite, and also notes no BM in the past 2 days; no diarrhea or urinary sx.    Vital Signs Last 24 Hrs  T(C): 37.4 (29 Dec 2017 10:59), Max: 37.6 (29 Dec 2017 10:44)  T(F): 99.4 (29 Dec 2017 10:59), Max: 99.6 (29 Dec 2017 10:44)  HR: 94 (29 Dec 2017 10:44) (65 - 94)  BP: 97/63 (29 Dec 2017 10:44) (97/63 - 145/68)  BP(mean): --  RR: 20 (29 Dec 2017 10:44) (16 - 20)  SpO2: 100% (29 Dec 2017 10:44) (97% - 100%)      PHYSICAL EXAM:    GENERAL: NAD, well-groomed, well-developed  HEAD:  Atraumatic, Normocephalic  EYES: EOMI, PERRLA, conjunctiva and sclera clear  ENMT: No tonsillar erythema, exudates, or enlargement; Moist mucous membranes, Good dentition, No lesions  NECK: Supple, No JVD, Normal thyroid  NERVOUS SYSTEM:  Alert & Oriented X3, Good concentration; Motor Strength 5/5 B/L upper and lower extremities; DTRs 2+ intact and symmetric  CHEST/LUNG: Clear to Ausc bilaterally; No rales, rhonchi, wheezing, or rubs  HEART: Regular rate and rhythm; No murmurs, rubs, or gallops  ABDOMEN: Soft, tender in RLQ and mildly in RUQ without guarding/rigidity, Nondistended; Bowel sounds present  EXTREMITIES:  2+ Peripheral Pulses, No clubbing, cyanosis, or edema  LYMPH: No lymphadenopathy noted      LABS:                        8.5    2.6   )-----------( 127      ( 29 Dec 2017 06:39 )             25.2     CBC Full  -  ( 29 Dec 2017 06:39 )  WBC Count : 2.6 K/uL  Hemoglobin : 8.5 g/dL  Hematocrit : 25.2 %  Platelet Count - Automated : 127 K/uL  Mean Cell Volume : 80.5 fl  Mean Cell Hemoglobin : 27.2 pg  Mean Cell Hemoglobin Concentration : 33.7 g/dL  Auto Neutrophil # : 1.9 K/uL  Auto Lymphocyte # : 0.5 K/uL  Auto Monocyte # : 0.2 K/uL  Auto Eosinophil # : 0.1 K/uL  Auto Basophil # : 0.0 K/uL  Auto Neutrophil % : 70.9 %  Auto Lymphocyte % : 18.5 %  Auto Monocyte % : 6.0 %  Auto Eosinophil % : 3.8 %  Auto Basophil % : 0.4 %        138  |  104  |  18.0  ----------------------------<  230<H>  4.0   |  21.0<L>  |  0.88    Ca    8.0<L>      29 Dec 2017 06:39  Mg     1.4         TPro  5.5<L>  /  Alb  3.0<L>  /  TBili  0.2<L>  /  DBili  x   /  AST  90<H>  /  ALT  46<H>  /  AlkPhos  153<H>      PT/INR - ( 29 Dec 2017 01:41 )   PT: 12.4 sec;   INR: 1.12 ratio         PTT - ( 29 Dec 2017 01:41 )  PTT:35.6 sec  Urinalysis Basic - ( 29 Dec 2017 05:18 )    Color: Yellow / Appearance: Clear / S.010 / pH: x  Gluc: x / Ketone: Negative  / Bili: Negative / Urobili: Negative mg/dL   Blood: x / Protein: Negative mg/dL / Nitrite: Negative   Leuk Esterase: Small / RBC: 0-2 /HPF / WBC -   Sq Epi: x / Non Sq Epi: Few / Bacteria: Occasional    Albumin, Serum: 3.0 g/dL ( @ 06:39)  Albumin, Serum: 3.2 g/dL ( @ 01:41)    LIVER FUNCTIONS - ( 29 Dec 2017 06:39 )  Alb: 3.0 g/dL / Pro: 5.5 g/dL / ALK PHOS: 153 U/L / ALT: 46 U/L / AST: 90 U/L / GGT: x             RADIOLOGY & ADDITIONAL TESTS:   US Abd = FINDINGS:  Liver: Enlarged measuring 20 cm in length. Calcified lesion in the right   hepatic lobe measuring 1.2 x 1.2 x 1.1 cm.  Bile ducts: Normal caliber. Common bile duct measures 4 mm.   Gallbladder: Surgically absent.      Pancreas: Visualized portions are within normal limits.  Right kidney: 10.3 cm. No hydronephrosis.      Ascites: None.  IVC and aorta: Visualized portions are within normal limits. Patient is a 69y old  Female who presents with a chief complaint of hypoglycemia (29 Dec 2017 03:50)      INTERVAL HPI/OVERNIGHT EVENTS:  69y  Female seen and examined at bedside, resting comfortably.  No acute events overnight.  Labs & chart reviewed  Pt mentions that she has a history of hypoglycemia, and due to fear of hypoglycemia has taken no diabetes medications for the past month, ever since last ED visit for hypoglycemia.  She used to take invokana, but it was PR'ed and now she is only supposed to be taking metformin 500 ER qd as well as repaglinide 2mg TID before meals, as per her PMD, which she reports she has not been taking.  She also notes 15 days' duration of abdominal pain, localized to RLQ, sharp, crampy in nature, menstrual like associated with nausea, vomiting x1 yesterday NBNB, and subjective tactile fever + malaise.  She has a hx of GB stones, but no surgical intervention.  She appreciates her history of elevated LFTs and notes an US last week for Hepatomegaly and a Calcified lesion right hepatic lobe present on the prior CT scan.  Her abdominal discomfort is tolerable for now, not requiring medication, she does not have an appetite, and also notes no BM in the past 2 days; no diarrhea or urinary sx.      Vital Signs Last 24 Hrs  T(C): 37.4 (29 Dec 2017 10:59), Max: 37.6 (29 Dec 2017 10:44)  T(F): 99.4 (29 Dec 2017 10:59), Max: 99.6 (29 Dec 2017 10:44)  HR: 94 (29 Dec 2017 10:44) (65 - 94)  BP: 97/63 (29 Dec 2017 10:44) (97/63 - 145/68)  BP(mean): --  RR: 20 (29 Dec 2017 10:44) (16 - 20)  SpO2: 100% (29 Dec 2017 10:44) (97% - 100%)      PHYSICAL EXAM:    GENERAL: NAD, well-groomed, well-developed  HEAD:  Atraumatic, Normocephalic  EYES: EOMI, PERRLA, conjunctiva and sclera clear  ENMT: No tonsillar erythema, exudates, or enlargement; Moist mucous membranes, Good dentition, No lesions  NECK: Supple, No JVD, Normal thyroid; no lymphadenopathy noted  NERVOUS SYSTEM:  Alert & Oriented X3, Good concentration; Motor Strength 5/5 B/L upper and lower extremities; DTRs 2+ intact and symmetric  CHEST/LUNG: Clear to Ausc bilaterally; No rales, rhonchi, wheezing, or rubs  HEART: Regular rate and rhythm; No murmurs, rubs, or gallops  ABDOMEN: Soft, tender in RLQ and mildly in RUQ without guarding/rigidity, Nondistended; Bowel sounds present; no splenomegaly but inferior edge of liver palpated lower than expected  EXTREMITIES:  2+ Peripheral Pulses, No clubbing, cyanosis, or edema  LYMPH: No lymphadenopathy noted      LABS:                        8.5    2.6   )-----------( 127      ( 29 Dec 2017 06:39 )             25.2     CBC Full  -  ( 29 Dec 2017 06:39 )  WBC Count : 2.6 K/uL  Hemoglobin : 8.5 g/dL  Hematocrit : 25.2 %  Platelet Count - Automated : 127 K/uL  Mean Cell Volume : 80.5 fl  Mean Cell Hemoglobin : 27.2 pg  Mean Cell Hemoglobin Concentration : 33.7 g/dL  Auto Neutrophil # : 1.9 K/uL  Auto Lymphocyte # : 0.5 K/uL  Auto Monocyte # : 0.2 K/uL  Auto Eosinophil # : 0.1 K/uL  Auto Basophil # : 0.0 K/uL  Auto Neutrophil % : 70.9 %  Auto Lymphocyte % : 18.5 %  Auto Monocyte % : 6.0 %  Auto Eosinophil % : 3.8 %  Auto Basophil % : 0.4 %        138  |  104  |  18.0  ----------------------------<  230<H>  4.0   |  21.0<L>  |  0.88    Ca    8.0<L>      29 Dec 2017 06:39  Mg     1.4         TPro  5.5<L>  /  Alb  3.0<L>  /  TBili  0.2<L>  /  DBili  x   /  AST  90<H>  /  ALT  46<H>  /  AlkPhos  153<H>      PT/INR - ( 29 Dec 2017 01:41 )   PT: 12.4 sec;   INR: 1.12 ratio         PTT - ( 29 Dec 2017 01:41 )  PTT:35.6 sec  Urinalysis Basic - ( 29 Dec 2017 05:18 )    Color: Yellow / Appearance: Clear / S.010 / pH: x  Gluc: x / Ketone: Negative  / Bili: Negative / Urobili: Negative mg/dL   Blood: x / Protein: Negative mg/dL / Nitrite: Negative   Leuk Esterase: Small / RBC: 0-2 /HPF / WBC    Sq Epi: x / Non Sq Epi: Few / Bacteria: Occasional    Albumin, Serum: 3.0 g/dL ( @ 06:39)  Albumin, Serum: 3.2 g/dL ( @ 01:41)    LIVER FUNCTIONS - ( 29 Dec 2017 06:39 )  Alb: 3.0 g/dL / Pro: 5.5 g/dL / ALK PHOS: 153 U/L / ALT: 46 U/L / AST: 90 U/L / GGT: x             RADIOLOGY & ADDITIONAL TESTS:   US Abd = FINDINGS:  Liver: Enlarged measuring 20 cm in length. Calcified lesion in the right   hepatic lobe measuring 1.2 x 1.2 x 1.1 cm.  Bile ducts: Normal caliber. Common bile duct measures 4 mm.   Gallbladder: Surgically absent.      Pancreas: Visualized portions are within normal limits.  Right kidney: 10.3 cm. No hydronephrosis.      Ascites: None.  IVC and aorta: Visualized portions are within normal limits.

## 2024-03-11 NOTE — ED BEHAVIORAL HEALTH ASSESSMENT NOTE - SAFETY PLAN ADDT'L DETAILS
Education provided regarding environmental safety / lethal means restriction/Provision of National Suicide Prevention Lifeline 9-352-659-TALK (5141)
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
## 2024-03-11 NOTE — ED PROVIDER NOTE - PROGRESS NOTE DETAILS
Patient cleared by psychiatry most likely with manipulative behavior  drug-seeking cleared by psychiatry x-ray negative for acute fracture of the foot patient given insulin not acidotic given IV fluids as well as his lisinopril for hypertension stable for DC On Dc pt w/ si/ hi will so for daytime psych linear likely manipulative will require medication prior to dc Patient evaluated by MD Avendaño, clearly malingering. Requesting controlled substances, agitated, aggressive. Cleared by psych. Reviewed psych note in detail. Will discharge, security called.

## 2024-03-11 NOTE — ED PROVIDER NOTE - PHYSICAL EXAMINATION
VITAL SIGNS: I have reviewed nursing notes and confirm.  CONSTITUTIONAL: Well appearing, in no acute distress.   SKIN:  warm and dry, no acute rash.   HEAD:  normocephalic, atraumatic.  EYES: EOM intact; conjunctiva and sclera clear.  ENT: No nasal discharge; airway clear.   NECK: Supple.  CARD: S1, S2, Regular rate and rhythm.   RESP:  Clear to auscultation b/l, no wheezes, rales or rhonchi.  ABD: Normal bowel sounds; soft; non-distended; non-tender; no guarding/ rebound.  EXT: Normal ROM. No peripheral edema. Pulses intact and equal b/l.Right lower extremity without erythema edema patient is ambulatory no foot drop DP pulses intact  NEURO: Alert, oriented, grossly unremarkable  PSYCH: Cooperative, mood and affect appropriate. linear admits to homicidal ideation no flight of ideas nontangential
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
## 2024-03-11 NOTE — ED PROVIDER NOTE - PATIENT PORTAL LINK FT
You can access the FollowMyHealth Patient Portal offered by [facility] by registering at the following website: [email or web address]. By joining [employer]’s FollowMyHealth portal, you will also be able to view your health information using other applications (apps) compatible with our system. You can access the FollowMyHealth Patient Portal offered by [facility] by registering at the following website: [email or web address]. By joining [employer]’s FollowMyHealth portal, you will also be able to view your health information using other applications (apps) compatible with our system.
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
## 2024-03-11 NOTE — ED PROVIDER NOTE - NSFOLLOWUPINSTRUCTIONS_ED_ALL_ED_FT
Please take your medications and insulin as prescribed should you have difficulty obtaining medications or feel like you want to hurt yourself  or others please seek emergent medical attention.  Today you were seen and  cleared by psychiatry.  You had an x-ray of your foot which showed no acute fracture
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
## 2024-03-11 NOTE — ED PROVIDER NOTE - CLINICAL SUMMARY MEDICAL DECISION MAKING FREE TEXT BOX
9.27 58-year-old male with bipolar disorder and drug-induced psychosis in the past mood disorder here today for his homicidal ideation possibly with paranoia small abrasion to his left cheek not actively bleeding no surrounding erythema      plan for tetanus update medical clearance x-ray foot CBC CMP beta-hydroxybutyrate EKG IV fluids recheck blood sugar psych consult alcohol level   EKG normal sinus rhythm 74 normal axis and intervals no acute ischemia

## 2024-03-11 NOTE — ED BEHAVIORAL HEALTH ASSESSMENT NOTE - NS ED BHA MED ROS PSYCHIATRIC
See HPI Muscle Hinge Flap Text: The defect edges were debeveled with a #15 scalpel blade.  Given the size, depth and location of the defect and the proximity to free margins a muscle hinge flap was deemed most appropriate.  Using a sterile surgical marker, an appropriate hinge flap was drawn incorporating the defect. The area thus outlined was incised with a #15 scalpel blade.  The skin margins were undermined to an appropriate distance in all directions utilizing iris scissors.

## 2024-03-11 NOTE — ED BEHAVIORAL HEALTH ASSESSMENT NOTE - PAST PSYCHOTROPIC MEDICATION
Per HPI (has also been on fluoxetine, zyprexa, neurontin, sertraline, wellbutrin, lexapro, and gabapentin) also states history of adderall

## 2024-03-11 NOTE — ED BEHAVIORAL HEALTH ASSESSMENT NOTE - NSBHINFOSOURCE_PSY_ALL_CORE
"VS: /87 (BP Location: Left arm, Patient Position: Supine, Cuff Size: Adult Regular)   Pulse 100   Temp 97  F (36.1  C) (Oral)   Resp 16   Ht 1.854 m (6' 0.99\")   Wt 78.7 kg (173 lb 8 oz)   SpO2 99%   BMI 22.90 kg/m       O2: Saturating within normal limits on room air   Output: unmeasured   Last BM:    Activity: Standby assist of one, not out of chair during this shift   Skin: Within normal limits except surgical incision   Pain: Pain of 5 , managed with tylenol   CMS: Alert and oriented X4   Dressing: Clean dry and intact   Diet: regular   LDA: 2 PIVs on right and left hand, posterior cervical spinal incision   Equipment:    Plan: Care to be continued   Additional Info:         Goal Outcome Evaluation:                         " Patient
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
## 2024-03-14 DIAGNOSIS — K21.9 GASTRO-ESOPHAGEAL REFLUX DISEASE WITHOUT ESOPHAGITIS: ICD-10-CM

## 2024-03-14 DIAGNOSIS — F15.90 OTHER STIMULANT USE, UNSPECIFIED, UNCOMPLICATED: ICD-10-CM

## 2024-03-14 DIAGNOSIS — Z91.013 ALLERGY TO SEAFOOD: ICD-10-CM

## 2024-03-14 DIAGNOSIS — Z88.8 ALLERGY STATUS TO OTHER DRUGS, MEDICAMENTS AND BIOLOGICAL SUBSTANCES: ICD-10-CM

## 2024-03-14 DIAGNOSIS — Z91.51 PERSONAL HISTORY OF SUICIDAL BEHAVIOR: ICD-10-CM

## 2024-03-14 DIAGNOSIS — Z59.02 UNSHELTERED HOMELESSNESS: ICD-10-CM
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
## 2024-03-14 DIAGNOSIS — G89.11 ACUTE PAIN DUE TO TRAUMA: ICD-10-CM

## 2024-03-14 DIAGNOSIS — E11.65 TYPE 2 DIABETES MELLITUS WITH HYPERGLYCEMIA: ICD-10-CM

## 2024-03-14 DIAGNOSIS — F11.90 OPIOID USE, UNSPECIFIED, UNCOMPLICATED: ICD-10-CM

## 2024-03-14 DIAGNOSIS — F22 DELUSIONAL DISORDERS: ICD-10-CM

## 2024-03-14 DIAGNOSIS — Z56.0 UNEMPLOYMENT, UNSPECIFIED: ICD-10-CM

## 2024-03-14 DIAGNOSIS — R45.850 HOMICIDAL IDEATIONS: ICD-10-CM

## 2024-03-14 DIAGNOSIS — I10 ESSENTIAL (PRIMARY) HYPERTENSION: ICD-10-CM

## 2024-03-14 DIAGNOSIS — S00.81XA ABRASION OF OTHER PART OF HEAD, INITIAL ENCOUNTER: ICD-10-CM
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
## 2024-03-14 DIAGNOSIS — M79.671 PAIN IN RIGHT FOOT: ICD-10-CM

## 2024-03-14 DIAGNOSIS — F60.2 ANTISOCIAL PERSONALITY DISORDER: ICD-10-CM

## 2024-03-14 DIAGNOSIS — F10.90 ALCOHOL USE, UNSPECIFIED, UNCOMPLICATED: ICD-10-CM

## 2024-03-14 DIAGNOSIS — F13.90 SEDATIVE, HYPNOTIC, OR ANXIOLYTIC USE, UNSPECIFIED, UNCOMPLICATED: ICD-10-CM

## 2024-03-14 DIAGNOSIS — F17.290 NICOTINE DEPENDENCE, OTHER TOBACCO PRODUCT, UNCOMPLICATED: ICD-10-CM

## 2024-03-14 DIAGNOSIS — F14.90 COCAINE USE, UNSPECIFIED, UNCOMPLICATED: ICD-10-CM

## 2024-03-14 DIAGNOSIS — F31.9 BIPOLAR DISORDER, UNSPECIFIED: ICD-10-CM

## 2024-03-14 DIAGNOSIS — F19.90 OTHER PSYCHOACTIVE SUBSTANCE USE, UNSPECIFIED, UNCOMPLICATED: ICD-10-CM
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
## 2024-03-14 DIAGNOSIS — Z91.148 PATIENT'S OTHER NONCOMPLIANCE WITH MEDICATION REGIMEN FOR OTHER REASON: ICD-10-CM

## 2024-03-14 DIAGNOSIS — T38.3X6A UNDERDOSING OF INSULIN AND ORAL HYPOGLYCEMIC [ANTIDIABETIC] DRUGS, INITIAL ENCOUNTER: ICD-10-CM

## 2024-03-14 DIAGNOSIS — T42.6X6A UNDERDOSING OF OTHER ANTIEPILEPTIC AND SEDATIVE-HYPNOTIC DRUGS, INITIAL ENCOUNTER: ICD-10-CM

## 2024-03-14 SDOH — ECONOMIC STABILITY - INCOME SECURITY: UNEMPLOYMENT, UNSPECIFIED: Z56.0

## 2024-03-14 SDOH — ECONOMIC STABILITY - HOUSING INSECURITY: UNSHELTERED HOMELESSNESS: Z59.02

## 2024-04-18 NOTE — ED ADULT NURSE NOTE - NSFALLRISK_ED_ALL_ED
Continuity of Care Document  2/28/2024  Hilda Bello - 58 y.o. Female; born [birth year]Summary of episode note, generated on Apr. 18, 2024April 18, 2024   CHIEF COMPLAINT    CHIEF COMPLAINT  Reason for Visit/Chief Complaint   consult for elevated calcium score   Ms. Bello is a pleasant 57-year-old lady with a history of familial dyslipidemia and vitamin D deficiency. She has occasional fatigue as well as some shortness of breath with exercise. She is not having any chest discomfort. No palpitations, syncope, orthopnea, PND or edema. No recent history of infection and no history of bleeding.She has an ultrafast heart score of 1153  Carotid ultrasound is less than 50% stenosis bilaterallyLipids are horrendous. Her cholesterol is 386, HDL 69, triglycerides 53, .She had myalgias on Lipitor and Zocor.She was placed on Zetia 10 and Repatha.  Her next blood work will be in 6 weeks and we will be checking a follow-up CBC, CMP, fasting lipid panel, TSH, vitamin D, high-sensitivity CRPWould recommend left heart cardiac catheterization for definitive evaluation of coronary anatomy. Risks were explained including bleeding, infection, allergy, renal failure from the dye, heart attack, stroke, arrhythmia, death, vascular, and bypass surgery. Patient understands and wishes to proceed.Will also be getting an echocardiogram to make sure her heart is structurally normal.     PROBLEMS  Reconcile with Patient's ChartPROBLEMS  Problem Effective Dates Date resolved Problem Status   Pre-procedural examination, [SNOMED-CT: 814689365] 2/29/2024 - Active   Colon polyps, [SNOMED-CT: 61077942] 9/19/2022 - Active   Diverticulosis of colon, [SNOMED-CT: 353591419] 9/19/2022 - Active   Internal hemorrhoids, [SNOMED-CT: 55388902] 9/19/2022 - Active   Anxiety and depression, [SNOMED-CT: 883551866] 10/5/2020 - Active   Chronic migraine, [SNOMED-CT: 613172785] 10/5/2020 - Active   Mixed hyperlipidemia, [SNOMED-CT: 099073812]  10/5/2020 - Active   Iron deficiency anemia due to chronic blood loss, [SNOMED-CT: 294961935] 5/30/2018 - Active   Dysfunctional uterine bleeding, [SNOMED-CT: 71300484664203] 5/30/2018 - Active   Perimenopausal menorrhagia, [SNOMED-CT: 575995754] 5/30/2018 - Active     ENCOUNTER DIAGNOSIS    ENCOUNTER DIAGNOSIS  Problem Effective Dates Date resolved Problem Status   ANGELO (dyspnea on exertion), [SNOMED-CT: 762621651] 2/29/2024 - Active   Fatigue, [SNOMED-CT: 62147632] 2/29/2024 - Active   Chronic hyperglycemia, [SNOMED-CT: 646246593] 2/29/2024 - Active   Abnormal EKG, [SNOMED-CT: 640427334] 2/29/2024 - Active   Carotid stenosis, bilateral, [SNOMED-CT: 991191440] 2/29/2024 - Active   Family history of early CAD -FHx, [SNOMED-CT: 329858497] 2/29/2024 - Active   Hypercholesterolemia, familial , [SNOMED-CT: 593245097] 2/29/2024 - Active   Abnormal fasting glucose, [SNOMED-CT: 726368357] 2/28/2024 - Active     VITAL SIGNS    VITAL SIGNS  Date / Time: 2/29/2024   BP Systolic 112 mmHg   BP Diastolic 74 mmHg   Height 67 inches   Weight 176 lbs   Pulse Rate 60 bpm   BSA (Body Surface Area) 2 cc/m2   BMI (Body Mass Index) 27.6 cc/m2   Blood Pressure 112 / 74 mmHg     PHYSICAL EXAMINATION    PHYSICAL EXAMINATION  Header Details   Vitals Right Arm Sitting  / 74 mmHg, Pulse rate 60 bpm, Regular, Height in 5' 7\", BMI: 27.6, Weight in 176 lbs (or) 79.83 kgs, BSA : 1.96 cc/m²   General Appearance No Acute Distress, Appropriate   Head/Eyes/Ears/Nose/Mouth/Throat Conjunctiva pink, Sclera Clear, Mucous membranes Moist, No pallor   Neck No carotid bruits, No JVD   Respiratory Lungs clear with normal breath sounds   Cardiovascular Intact distal pulses, Regular rhythm. Normal rate present. Normal and normal S1 and S2   Gastrointestinal Abdomen soft, Non-tender   Musculoskeletal Normal spine   Gait Normal gait   Strength and tone Normal muscle strength, Normal muscle tone   Upper Extremities No edema   Lower Extremities Pulses 2+ and  equal bilaterally, No edema   Skin Warm and dry   Neurologic / Psychiatric Alert and Oriented   Speech Normal speech     ALLERGIES, ADVERSE REACTIONS, ALERTS    No data available    MEDICATIONS ADMINISTERED DURING VISIT    No data available    MEDICATIONS    MEDICATIONS  Medication Start Date Route/Frequency Status   ALPRAZolam (XANAX) 0.25 MG Oral Tab, [RxNorm: 190149] 2/28/2024 Take 1 tablet (0.25 mg total) by mouth 2 (two) times daily as needed for Anxiety. Active   butalbital-acetaminophen-caffeine -40 MG Oral Tab, [RxNorm: 281161] 2/28/2024 TAKE 1 TABLET BY MOUTH EVERY 4 HOURS AS NEEDED FOR PAIN Active   Evolocumab (REPATHA SURECLICK) 140 MG/ML Subcutaneous Solution Auto-injector, [RxNorm: 0143794] 2/28/2024 Inject 1 mL into the skin every 14 (fourteen) days. Active   fluticasone propionate 50 MCG/ACT Nasal Suspension, [RxNorm: 5968350] 2/28/2024 2 sprays by Each Nare route daily. Active   LEVOTHYROXINE 25 MCG Oral Tab, [RxNorm: 973857] 2/28/2024 TAKE 1 TABLET(25 MCG) BY MOUTH BEFORE BREAKFAST Active   sertraline 100 MG Oral Tab, [RxNorm: 950542] 2/28/2024 Take 1 tablet (100 mg total) by mouth daily. Active   simvastatin 40 MG Oral Tab, [RxNorm: 786810] 2/28/2024 Take 1 tablet (40 mg total) by mouth nightly. Active   aspirin 81 mg tablet,delayed release, [RxNorm: 722099] 2/29/2024 Take 1 tablet orally once a day. Active   Vitamin D2 1,250 mcg (50,000 unit) capsule, [RxNorm: 0075736] 2/29/2024 Take 1 capsule orally once a week Active   Zetia 10 mg tablet, [RxNorm: 292097] 2/29/2024 Take 1 tablet orally once a day. Active     ASSESSMENT    Increased BMI: Provide patient with information regarding diet and lifestyle changes.Ms. Bello is a pleasant 57-year-old lady with a history of familial dyslipidemia and vitamin D deficiency. She has occasional fatigue as well as some shortness of breath with exercise. She is not having any chest discomfort. No palpitations, syncope, orthopnea, PND or edema. No recent  history of infection and no history of bleeding.She has an ultrafast heart score of 1153  Carotid ultrasound is less than 50% stenosis bilaterallyLipids are horrendous. Her cholesterol is 386, HDL 69, triglycerides 53, .She had myalgias on Lipitor and Zocor.She was placed on Zetia 10 and Repatha.  Her next blood work will be in 6 weeks and we will be checking a follow-up CBC, CMP, fasting lipid panel, lipoprotein a, TSH, vitamin D, high-sensitivity CRP, hemoglobin [id number] recommend left heart cardiac catheterization for definitive evaluation of coronary anatomy. Risks were explained including bleeding, infection, allergy, renal failure from the dye, heart attack, stroke, arrhythmia, death, vascular, and bypass surgery. Patient understands and wishes to proceed.Will also be getting an echocardiogram to make sure her heart is structurally normal.Please set up an angiogram with either Dr. Guzman or Dr. Felix.Of note, multiple family members are on Repatha and if he had bypass surgeries and stents. This is clearly genetic.  They have 2 children and both of them should he have a lipid panel and a lipoprotein a checked.Please see APN 1 week after angiogramWill base further treatment decisions after the above cardiac diagnostic studies are performed and will follow this very nice person in office. Please return to clinic to see me in 3 months.     FAMILY HISTORY    FAMILY HISTORY  Relationship Age Diagnosis   Father 0 Hypertension (HTN), primary   Mother 0 Cancer     GENERAL STATUS    No data available    PAST MEDICAL HISTORY    PAST MEDICAL HISTORY  Problem Date diagonsed Date resolved Status   Pre-procedural examination, [SNOMED-CT: 757907201] 2/29/2024 - Active   Colon polyps, [SNOMED-CT: 02206595] 9/19/2022 - Active   Diverticulosis of colon, [SNOMED-CT: 446705771] 9/19/2022 - Active   Internal hemorrhoids, [SNOMED-CT: 06539530] 9/19/2022 - Active   Anxiety and depression, [SNOMED-CT: 430852680] 10/5/2020  - Active   Chronic migraine, [SNOMED-CT: 255211854] 10/5/2020 - Active   Mixed hyperlipidemia, [SNOMED-CT: 806919537] 10/5/2020 - Active   Iron deficiency anemia due to chronic blood loss, [SNOMED-CT: 807215783] 5/30/2018 - Active   Dysfunctional uterine bleeding, [SNOMED-CT: 03440981742472] 5/30/2018 - Active   Perimenopausal menorrhagia, [SNOMED-CT: 836650054] 5/30/2018 - Active     HISTORY OF PRESENT ILLNESS    Ms. Bello is a pleasant 57-year-old lady with a history of familial dyslipidemia and vitamin D deficiency. She has occasional fatigue as well as some shortness of breath with exercise. She is not having any chest discomfort. No palpitations, syncope, orthopnea, PND or edema. No recent history of infection and no history of bleeding.She has an ultrafast heart score of 1153  Carotid ultrasound is less than 50% stenosis bilaterallyLipids are horrendous. Her cholesterol is 386, HDL 69, triglycerides 53, .She had myalgias on Lipitor and Zocor.She was placed on Zetia 10 and Repatha.  Her next blood work will be in 6 weeks and we will be checking a follow-up CBC, CMP, fasting lipid panel, TSH, vitamin D, high-sensitivity CRPWould recommend left heart cardiac catheterization for definitive evaluation of coronary anatomy. Risks were explained including bleeding, infection, allergy, renal failure from the dye, heart attack, stroke, arrhythmia, death, vascular, and bypass surgery. Patient understands and wishes to proceed.Will also be getting an echocardiogram to make sure her heart is structurally normal.     IMMUNIZATIONS    No data available    PLAN OF CARE    PLAN OF CARE  Planned Care Date   EKG (electrocardiogram) 6/3/2024   CBC w/ Differential 1/1/1900   CMP 1/1/1900   Hemoglobin A1c 1/1/1900   Lipid panel, Fasting 1/1/1900   TSH (Thyroid Stimulating Hormone) Panel 1/1/1900   Vitamin D, Total 1/1/1900   CRP high sensitivity 1/1/1900   Lipid panel with lipoprotein (a) measurement 1/1/1900    Echocardiography - Complete 6/3/2024   CBC w/ Differential 1/1/1900   CMP 1/1/1900   Chest x-ray, PA and lateral 6/3/2024   Follow up visit - Holley Schumacher MD 1/1/1900     PROCEDURES    No data available    RESULTS    RESULTS  Name Result Date Location - Ordered By   CBC w/ Differential [LOINC: 86857-9] Pending 6 Weeks     CMP [LOINC: 11403-6] Pending 6 Weeks     Hemoglobin A1c [LOINC: 78057-9] Pending 6 Weeks     Lipid panel, Fasting [LOINC: 28253-6] Pending 6 Weeks     TSH (Thyroid Stimulating Hormone) Panel [LOINC: 31404-8] Pending 6 Weeks     Vitamin D, Total [LOINC: 24278-8] Pending 6 Weeks     CRP high sensitivity [LOINC: NAA7311] Pending 6 Weeks     Lipid panel with lipoprotein (a) measurement [LOINC: MNS5237] Pending 6 Weeks     CBC w/ Differential [LOINC: 93290-8] Pending Schedule next available     CMP [LOINC: 09804-2] Pending Schedule next available       REVIEW OF SYSTEMS    REVIEW OF SYSTEMS  Header Details   Cardiovascular No history of Chest pain, ANGELO, Palpitations, Syncope, PND, Orthopnea, Edema, Claudication     SOCIAL HISTORY    SOCIAL HISTORY  Social History Element Description Effective Dates   Smoking status Never smoked -     FUNCTIONAL STATUS    No data available    INSTRUCTIONS    INSTRUCTIONS  NAME TYPE DATE   Increased BMI: Provide patient with information regarding diet and lifestyle changes. Goals 2/29/2024     MEDICAL EQUIPMENT    No data available    Goals Sections    No data available    REASON FOR REFERRAL    No data available    Health Concerns Section    No data available    COGNITIVE/MENTAL STATUS    No data available    Patient Demographics    Patient Demographics  Patient Address Patient Name Communication   [address] [place] [id number] Hilda Bello [phone] (Mobile)     Patient Demographics  Language Race / Ethnicity Marital Status   English - Spoken (Preferred) White / Not  or       Document Information    Primary Care Provider Other Service  Providers Document Coverage Dates   Holley Schumacher  NPI: [id number]  [phone] (Work)  [address] [place] [address]  [place] [address]  Interpreting Physicians  [facility]  [phone] (Work)  [address]  [place] [address] Katjoy Schumacher  NPI: [id number]  [phone] (Work)  [address] [place] [address]  [place] [address]  Nurses     Cee Acuna  NPI: [id number]  [phone] (Work)  [address] [place] [address]  [place] [address]  Nurses Mar. 04, 2024March 04, 2024      Organization   [facility]  [phone] (Work)  [address] [place] [address]  [place] [address]     Encounter Providers Encounter Date    Feb. 29, 2024February 29, 2024     Legal Authenticator    Holley Schumacher  NPI: [id number]  [phone] (Work)  [address] [place] [address]  [place] [address]       No

## 2024-05-08 NOTE — BH PATIENT PROFILE - NSVRISKSUSPICION_PSY_ALL_CORE
Per IQM, patient is overdue for  Controlling High BP.    In an effort to ensure that our patients LiveWell, a Team Member has reviewed your chart and identified an opportunity to provide the best care possible. An attempt was made to discuss or schedule overdue Preventive or Disease Management screening.     The Outcome was Contact was made, appointment scheduled. Care Gaps include Hypertension.    
No

## 2024-06-03 NOTE — ED BEHAVIORAL HEALTH ASSESSMENT NOTE - NS ED BHA HOMICIDALITY PRESENT CURRENT INTENT
How Severe Is Your Eczema?: mild Is This A New Presentation, Or A Follow-Up?: Follow Up Eczema Additional History: Pt has nummular eczema she states the Zoryve does help but it hasn’t gone away completely Yes

## 2024-06-23 NOTE — BH INPATIENT PSYCHIATRY ASSESSMENT NOTE - LEGAL HISTORY
History of numerous incarcerations, the longest for 10 years due to manslaughter, was d/c from retirement in 2014. No arrests since his discharge from retirement. Simple: Patient demonstrates quick and easy understanding

## 2024-06-26 ENCOUNTER — HOSPITAL ENCOUNTER (INPATIENT)
Dept: HOSPITAL 74 - YASAS | Age: 59
LOS: 5 days | Discharge: OTHER INSTITUTION | DRG: 773 | End: 2024-07-01
Attending: PSYCHIATRY & NEUROLOGY | Admitting: ALLERGY & IMMUNOLOGY
Payer: COMMERCIAL

## 2024-06-26 VITALS — BODY MASS INDEX: 30.1 KG/M2

## 2024-06-26 DIAGNOSIS — F17.210: ICD-10-CM

## 2024-06-26 DIAGNOSIS — Z88.8: ICD-10-CM

## 2024-06-26 DIAGNOSIS — F14.20: ICD-10-CM

## 2024-06-26 DIAGNOSIS — F11.20: ICD-10-CM

## 2024-06-26 DIAGNOSIS — I50.9: ICD-10-CM

## 2024-06-26 DIAGNOSIS — I25.10: ICD-10-CM

## 2024-06-26 DIAGNOSIS — F13.230: ICD-10-CM

## 2024-06-26 DIAGNOSIS — F12.20: ICD-10-CM

## 2024-06-26 DIAGNOSIS — E10.9: ICD-10-CM

## 2024-06-26 DIAGNOSIS — Z59.00: ICD-10-CM

## 2024-06-26 DIAGNOSIS — I11.0: ICD-10-CM

## 2024-06-26 DIAGNOSIS — R56.1: ICD-10-CM

## 2024-06-26 DIAGNOSIS — Z79.4: ICD-10-CM

## 2024-06-26 DIAGNOSIS — F10.230: Primary | ICD-10-CM

## 2024-06-26 PROCEDURE — HZ2ZZZZ DETOXIFICATION SERVICES FOR SUBSTANCE ABUSE TREATMENT: ICD-10-PCS | Performed by: SURGERY

## 2024-06-26 RX ADMIN — INSULIN ASPART SCH UNITS: 100 INJECTION, SOLUTION INTRAVENOUS; SUBCUTANEOUS at 23:34

## 2024-06-26 RX ADMIN — Medication SCH MG: at 23:37

## 2024-06-26 RX ADMIN — Medication SCH MG: at 23:38

## 2024-06-26 SDOH — ECONOMIC STABILITY - HOUSING INSECURITY: HOMELESSNESS UNSPECIFIED: Z59.00

## 2024-06-27 LAB
ALBUMIN SERPL-MCNC: 3.3 G/DL (ref 3.4–5)
ALP SERPL-CCNC: 85 U/L (ref 45–117)
ALT SERPL-CCNC: 22 U/L (ref 13–61)
ANION GAP SERPL CALC-SCNC: 7 MMOL/L (ref 4–13)
AST SERPL-CCNC: 15 U/L (ref 15–37)
BILIRUB SERPL-MCNC: 0.3 MG/DL (ref 0.2–1)
BUN SERPL-MCNC: 14.6 MG/DL (ref 7–18)
CALCIUM SERPL-MCNC: 8.7 MG/DL (ref 8.5–10.1)
CHLORIDE SERPL-SCNC: 98 MMOL/L (ref 98–107)
CO2 SERPL-SCNC: 32 MMOL/L (ref 21–32)
CREAT SERPL-MCNC: 0.9 MG/DL (ref 0.55–1.3)
DEPRECATED RDW RBC AUTO: 15.1 % (ref 11.9–15.9)
GLUCOSE SERPL-MCNC: 268 MG/DL (ref 74–106)
HCT VFR BLD CALC: 39.7 % (ref 35.4–49)
HGB BLD-MCNC: 13.3 GM/DL (ref 11.7–16.9)
MCH RBC QN AUTO: 28.6 PG (ref 25.7–33.7)
MCHC RBC AUTO-ENTMCNC: 33.4 G/DL (ref 32–35.9)
MCV RBC: 85.5 FL (ref 80–96)
PLATELET # BLD AUTO: 214 10^3/UL (ref 134–434)
PMV BLD: 9 FL (ref 7.5–11.1)
POTASSIUM SERPLBLD-SCNC: 5 MMOL/L (ref 3.5–5.1)
PROT SERPL-MCNC: 6.4 G/DL (ref 6.4–8.2)
RBC # BLD AUTO: 4.64 M/MM3 (ref 4–5.6)
SODIUM SERPL-SCNC: 137 MMOL/L (ref 136–145)
WBC # BLD AUTO: 3.6 K/MM3 (ref 4–10)

## 2024-06-27 RX ADMIN — TUBERCULIN PURIFIED PROTEIN DERIVATIVE ONE TU: 5 INJECTION, SOLUTION INTRADERMAL at 01:34

## 2024-06-27 RX ADMIN — ONDANSETRON PRN MG: 4 TABLET, ORALLY DISINTEGRATING ORAL at 13:10

## 2024-06-27 RX ADMIN — RALTEGRAVIR SCH MG: 400 TABLET, FILM COATED ORAL at 22:29

## 2024-06-27 RX ADMIN — Medication SCH TAB: at 10:05

## 2024-06-27 RX ADMIN — NICOTINE SCH MG: 21 PATCH TRANSDERMAL at 10:05

## 2024-06-27 RX ADMIN — IBUPROFEN PRN MG: 600 TABLET, FILM COATED ORAL at 17:27

## 2024-06-27 RX ADMIN — HYDROXYZINE PAMOATE PRN MG: 25 CAPSULE ORAL at 22:29

## 2024-06-27 RX ADMIN — GABAPENTIN SCH MG: 400 CAPSULE ORAL at 13:10

## 2024-06-27 RX ADMIN — LISINOPRIL SCH: 20 TABLET ORAL at 12:56

## 2024-06-28 RX ADMIN — METHOCARBAMOL PRN MG: 500 TABLET ORAL at 13:42

## 2024-06-28 RX ADMIN — EMTRICITABINE AND TENOFOVIR DISOPROXIL FUMARATE SCH TAB: 200; 300 TABLET, FILM COATED ORAL at 10:23

## 2024-06-30 RX ADMIN — DIVALPROEX SODIUM ONE MG: 500 TABLET, DELAYED RELEASE ORAL at 10:34

## 2024-06-30 RX ADMIN — DIVALPROEX SODIUM SCH MG: 500 TABLET, DELAYED RELEASE ORAL at 21:30

## 2024-06-30 RX ADMIN — GABAPENTIN SCH MG: 400 CAPSULE ORAL at 13:31

## 2024-07-01 ENCOUNTER — HOSPITAL ENCOUNTER (INPATIENT)
Dept: HOSPITAL 74 - YASAS | Age: 59
LOS: 2 days | Discharge: LEFT BEFORE DISCHARGE | DRG: 770 | End: 2024-07-03
Attending: PSYCHIATRY & NEUROLOGY | Admitting: ALLERGY & IMMUNOLOGY
Payer: COMMERCIAL

## 2024-07-01 VITALS
TEMPERATURE: 97.7 F | RESPIRATION RATE: 20 BRPM | SYSTOLIC BLOOD PRESSURE: 130 MMHG | HEART RATE: 61 BPM | DIASTOLIC BLOOD PRESSURE: 65 MMHG

## 2024-07-01 DIAGNOSIS — F91.8: ICD-10-CM

## 2024-07-01 DIAGNOSIS — Z79.4: ICD-10-CM

## 2024-07-01 DIAGNOSIS — Z91.199: ICD-10-CM

## 2024-07-01 DIAGNOSIS — Z86.69: ICD-10-CM

## 2024-07-01 DIAGNOSIS — Z88.8: ICD-10-CM

## 2024-07-01 DIAGNOSIS — K21.9: ICD-10-CM

## 2024-07-01 DIAGNOSIS — Z20.6: ICD-10-CM

## 2024-07-01 DIAGNOSIS — F13.20: ICD-10-CM

## 2024-07-01 DIAGNOSIS — E11.9: ICD-10-CM

## 2024-07-01 DIAGNOSIS — F31.9: ICD-10-CM

## 2024-07-01 DIAGNOSIS — F17.210: ICD-10-CM

## 2024-07-01 DIAGNOSIS — F10.20: ICD-10-CM

## 2024-07-01 DIAGNOSIS — I25.10: ICD-10-CM

## 2024-07-01 DIAGNOSIS — I10: ICD-10-CM

## 2024-07-01 DIAGNOSIS — F11.20: Primary | ICD-10-CM

## 2024-07-01 DIAGNOSIS — F14.20: ICD-10-CM

## 2024-07-01 DIAGNOSIS — R56.1: ICD-10-CM

## 2024-07-01 PROCEDURE — HZ42ZZZ GROUP COUNSELING FOR SUBSTANCE ABUSE TREATMENT, COGNITIVE-BEHAVIORAL: ICD-10-PCS | Performed by: PSYCHIATRY & NEUROLOGY

## 2024-07-01 RX ADMIN — METHOCARBAMOL PRN MG: 500 TABLET ORAL at 21:11

## 2024-07-01 RX ADMIN — HYDROXYZINE PAMOATE PRN MG: 25 CAPSULE ORAL at 21:11

## 2024-07-01 RX ADMIN — CLONAZEPAM SCH MG: 1 TABLET, ORALLY DISINTEGRATING ORAL at 21:11

## 2024-07-01 RX ADMIN — Medication SCH MG: at 21:11

## 2024-07-01 RX ADMIN — DIVALPROEX SODIUM SCH MG: 500 TABLET, DELAYED RELEASE ORAL at 10:03

## 2024-07-02 LAB — HIV 1+2 AB+HIV1 P24 AG SERPL QL IA: NEGATIVE

## 2024-07-02 RX ADMIN — Medication SCH TAB: at 10:04

## 2024-07-02 RX ADMIN — NICOTINE SCH MG: 21 PATCH TRANSDERMAL at 10:04

## 2024-07-02 RX ADMIN — IBUPROFEN PRN MG: 600 TABLET, FILM COATED ORAL at 16:48

## 2024-07-02 RX ADMIN — RALTEGRAVIR SCH MG: 400 TABLET, FILM COATED ORAL at 11:08

## 2024-07-02 RX ADMIN — INSULIN ASPART SCH UNITS: 100 INJECTION, SOLUTION INTRAVENOUS; SUBCUTANEOUS at 12:07

## 2024-07-02 RX ADMIN — GABAPENTIN SCH MG: 400 CAPSULE ORAL at 13:14

## 2024-07-02 RX ADMIN — EMTRICITABINE AND TENOFOVIR DISOPROXIL FUMARATE SCH TAB: 200; 300 TABLET, FILM COATED ORAL at 11:08

## 2024-07-02 RX ADMIN — LISINOPRIL SCH MG: 20 TABLET ORAL at 10:05

## 2024-07-03 VITALS — DIASTOLIC BLOOD PRESSURE: 93 MMHG | SYSTOLIC BLOOD PRESSURE: 145 MMHG | HEART RATE: 60 BPM

## 2024-07-03 VITALS — TEMPERATURE: 97.3 F | RESPIRATION RATE: 17 BRPM

## 2024-10-28 NOTE — ED ADULT TRIAGE NOTE - TEMPERATURE IN FAHRENHEIT (DEGREES F)
Problem: Adult Inpatient Plan of Care  Goal: Absence of Hospital-Acquired Illness or Injury  Intervention: Identify and Manage Fall Risk  Recent Flowsheet Documentation  Taken 10/27/2024 1630 by Kirby Arellano RN  Safety Promotion/Fall Prevention:   activity supervised   assistive device/personal items within reach   bedside attendant   clutter free environment maintained   lighting adjusted   nonskid shoes/slippers when out of bed   patient and family education   safety round/check completed   supervised activity     Problem: Adult Inpatient Plan of Care  Goal: Optimal Comfort and Wellbeing  Intervention: Monitor Pain and Promote Comfort  Recent Flowsheet Documentation  Taken 10/27/2024 1826 by Kirby Arellano RN  Pain Management Interventions: medication (see MAR)     Problem: Risk for Delirium  Goal: Optimal Coping  Outcome: Progressing  Goal: Improved Behavioral Control  Outcome: Progressing  Intervention: Minimize Safety Risk  Recent Flowsheet Documentation  Taken 10/27/2024 1630 by Kirby Arellano RN  Enhanced Safety Measures:   assistive devices when indicated   pain management   review medications for side effects with activity  Goal: Improved Attention and Thought Clarity  Outcome: Progressing  Goal: Improved Sleep  Outcome: Progressing     Problem: Pain Acute  Goal: Optimal Pain Control and Function  Outcome: Progressing  Intervention: Develop Pain Management Plan  Recent Flowsheet Documentation  Taken 10/27/2024 1826 by Kirby Arellano RN  Pain Management Interventions: medication (see MAR)     Problem: Dysrhythmia  Goal: Normalized Cardiac Rhythm  Outcome: Progressing     Problem: Fall Injury Risk  Goal: Absence of Fall and Fall-Related Injury  Outcome: Progressing  Intervention: Promote Injury-Free Environment  Recent Flowsheet Documentation  Taken 10/27/2024 1630 by Kirby Arellano RN  Safety Promotion/Fall Prevention:   activity supervised   assistive device/personal items within reach   bedside  attendant   clutter free environment maintained   lighting adjusted   nonskid shoes/slippers when out of bed   patient and family education   safety round/check completed   supervised activity     Problem: Comorbidity Management  Goal: Maintenance of Behavioral Health Symptom Control  Outcome: Progressing  Goal: Maintenance of COPD Symptom Control  Outcome: Progressing  Goal: Maintenance of Heart Failure Symptom Control  Outcome: Progressing  Goal: Blood Pressure in Desired Range  Outcome: Progressing    Patient was confused and oriented only to self. Patient was initially restless and anxious due to his family not visiting. Patient was assisted in ambulating in the hallway with gait belt and walker and distracted with conversation and television, which were somewhat effective. PRN quetiapine administered, which was effective. Patient observed to have BLE edema, but refused to have BLE elevated. Patient wants to spend majority of his time in his recliner, and intermittently stands up and sits back down to reposition. Pillow provided underneath as the Zak mat was not available. Incentive spirometer education provided. Patient's left hip incision was well-approximated with staples intact. Site was clean and dry. Periwound with erythema (not new). Per AM RN, she will notify ortho in the morning to see if staples are to be removed (patient had an ORIF on 10/6/2024).   98.2

## 2024-11-06 NOTE — BH TREATMENT PLAN - NSTXMANICMODTX_PSY_ALL_CORE
accordance with the 10-20 International System of Electrode Placement.     The patient was not sleep deprived.  EEG Description: The dominant background activity during maximal recorded wakefulness consisted of bioccipitally dominant 3-4 Hz, 25-35 uV symmetric activity.  Frequent bursts of generalized spike and polyspike discharges 180-200 uV were seen, at times occurring at 2-3 Hz frequency.   Activation procedures were not performed.     The EKG channel demonstrated a normal sinus rhythm.    Interpretation  This EEG was abnormal.  Diffuse polymorphic delta slowing was seen. Frequent bursts of generalized spike and polyspike discharges 180-200 uV were seen, at times occurring at 2-3 Hz frequency.     Clinical correlation  This EEG was abnormal suggesting moderate to severe encephalopathy.  Frequent bursts of generalzied spike and polyspike wave discharges conferred an increased risk for generalized seizures. Of note, this was a limited 8 channel EEG, which is prone to artifact and a conventional 10-20 lead EEG may be considered if clinically indicated.       SHANNON BOWLES MD  Diplomate, American Board of Psychiatry and Neurology  Diplomate, American Board of Clinical Neurophysiology  Diplomate, American Board of Epilepsy          Yes

## 2024-12-02 NOTE — ED BEHAVIORAL HEALTH ASSESSMENT NOTE - MEDICAL RECORD REVIEWED
Medicine Refill Request    Last Office Visit: 9/13/2024   Last Consult Visit: 12/17/2021  Last Telemedicine Visit: Visit date not found    Next Appointment: 3/14/2025      Current Outpatient Medications:     amLODIPine (NORVASC) 5 mg tablet, TAKE 1 TABLET (5 MG TOTAL) BY MOUTH DAILY., Disp: 90 tablet, Rfl: 3    losartan-hydrochlorothiazide (HYZAAR) 50-12.5 mg per tablet, TAKE 2 TABLETS BY MOUTH EVERY DAY, Disp: 180 tablet, Rfl: 0    metFORMIN (GLUCOPHAGE) 1,000 mg tablet, Take 1 tablet (1,000 mg total) by mouth 2 (two) times a day with meals., Disp: 180 tablet, Rfl: 0      BP Readings from Last 3 Encounters:   09/13/24 138/82   12/17/23 (!) 146/86   07/12/23 130/80       Recent Lab results:  Lab Results   Component Value Date    CHOL 217 (H) 08/02/2023   ,   Lab Results   Component Value Date    HDL 53 08/02/2023   ,   Lab Results   Component Value Date    LDLCALC 133 (H) 08/02/2023   ,   Lab Results   Component Value Date    TRIG 172 (H) 08/02/2023        Lab Results   Component Value Date    GLUCOSE 258 (H) 08/02/2023   ,   Lab Results   Component Value Date    HGBA1C 10.0 (H) 08/02/2023         Lab Results   Component Value Date    CREATININE 0.91 08/02/2023       Lab Results   Component Value Date    TSH 3.52 12/15/2015           Lab Results   Component Value Date    HGBA1C 10.0 (H) 08/02/2023       Yes

## 2025-01-23 NOTE — BH PATIENT PROFILE - NSPROGENOTHERPROVIDER_GEN_A_NUR
Subjective   Patient ID: Carmelo Head is a 55 y.o. male who presents for No chief complaint on file..    HPI[unfilled]  Patient presents in the office today with complaints of a *** cough.   Patient states that the cough is producing a *** colored sputum.   Patient states ***.   Patient admits/ denies this cough keeps them awake at night.   Ongoing X ***.   Has tried ***.        The patient's allergies, medications, and past medical/surgical/family history were reviewed with them today and updated as indicated.  ---  Additional HPI  ***  Review of Systems   Objective   Vital Signs: There were no vitals taken for this visit.    Physical Exam   POCT today: No results found for this visit on 01/23/25.     Assessment & Plan  No diagnosis found.  Reviewed treatment options and health maintenance. Take medications as prescribed. We will contact you with the results of any ordered testing; please send a [employer] message or call the office if you do not hear from us. Follow up in the office in *** months/as previously discussed with your PCP. Return sooner if symptoms do not resolve as expected.     Michelle Barth, APRN-CNP, DNP, CCRN  Family Nurse Practitioner  [facility]  
outpatient care

## 2025-02-20 NOTE — BH INPATIENT PSYCHIATRY ASSESSMENT NOTE - NSBHINFOSOURCE_PSY_ALL_CORE
[facility] Division of Medicine    SUBJECTIVE / OVERNIGHT EVENTS:  Pt seen at the bedside. On bipap, awake but not understanding questions. ROS unable to be done.    MEDICATIONS  (STANDING):  HYDROmorphone Infusion 3 mG/Hr (3 mL/Hr) IV Continuous <Continuous>  LORazepam   Injectable 2 milliGRAM(s) IV Push every 4 hours  LORazepam   Injectable 2 milliGRAM(s) IV Push once    MEDICATIONS  (PRN):  glycopyrrolate Injectable 0.2 milliGRAM(s) IV Push every 4 hours PRN oral secretion  HYDROmorphone  Injectable 4 milliGRAM(s) IV Push every 2 hours PRN breakthrough pain/dyspnea/ tachypnea  LORazepam   Injectable 2 milliGRAM(s) IV Push every 3 hours PRN agitation, anxiety  ondansetron Injectable 4 milliGRAM(s) IV Push every 8 hours PRN Nausea and/or Vomiting      I&O's Summary    19 Feb 2025 07:01  -  20 Feb 2025 07:00  --------------------------------------------------------  IN: 150 mL / OUT: 0 mL / NET: 150 mL        glycopyrrolate Injectable 0.2 milliGRAM(s) IV Push every 4 hours PRN  HYDROmorphone  Injectable 4 milliGRAM(s) IV Push every 2 hours PRN  HYDROmorphone Infusion 3 mG/Hr IV Continuous <Continuous>  LORazepam   Injectable 2 milliGRAM(s) IV Push every 4 hours  LORazepam   Injectable 2 milliGRAM(s) IV Push every 3 hours PRN  LORazepam   Injectable 2 milliGRAM(s) IV Push once  ondansetron Injectable 4 milliGRAM(s) IV Push every 8 hours PRN      PHYSICAL EXAM:  Vital Signs Last 24 Hrs  T(C): 37 (20 Feb 2025 10:00), Max: 37 (20 Feb 2025 07:46)  T(F): 98.6 (20 Feb 2025 10:00), Max: 98.6 (20 Feb 2025 07:46)  HR: 83 (20 Feb 2025 10:00) (59 - 101)  BP: 91/51 (20 Feb 2025 10:00) (91/51 - 130/74)  BP(mean): 64 (20 Feb 2025 10:00) (64 - 87)  RR: 25 (20 Feb 2025 10:00) (18 - 33)  SpO2: 97% (20 Feb 2025 10:00) (87% - 99%)    Parameters below as of 20 Feb 2025 10:00  Patient On (Oxygen Delivery Method): BiPAP/CPAP          CONSTITUTIONAL: in distress  RESP: in distress, shallow breathing, lung sounds unable to be auscultated as a result  CV: RRR, +S1S2  GI: soft, tender throughout  PSYCH: awake but unable to assess      LABS:                        11.9   10.73 )-----------( 41       ( 20 Feb 2025 05:24 )             37.0     02-20    147[H]  |  107  |  46.8[H]  ----------------------------<  96  4.3   |  23.0  |  4.01[H]    Ca    9.6      20 Feb 2025 05:24  Phos  3.5     02-20  Mg     1.9     02-20    TPro  6.3[L]  /  Alb  3.3  /  TBili  5.9[H]  /  DBili  x   /  AST  98[H]  /  ALT  35  /  AlkPhos  147[H]  02-20    PT/INR - ( 20 Feb 2025 05:24 )   PT: 25.9 sec;   INR: 2.31 ratio         PTT - ( 20 Feb 2025 05:24 )  PTT:48.9 sec      Urinalysis Basic - ( 20 Feb 2025 05:24 )    Color: x / Appearance: x / SG: x / pH: x  Gluc: 96 mg/dL / Ketone: x  / Bili: x / Urobili: x   Blood: x / Protein: x / Nitrite: x   Leuk Esterase: x / RBC: x / WBC x   Sq Epi: x / Non Sq Epi: x / Bacteria: x        CAPILLARY BLOOD GLUCOSE      POCT Blood Glucose.: 89 mg/dL (20 Feb 2025 06:31)  POCT Blood Glucose.: 85 mg/dL (20 Feb 2025 00:51)  POCT Blood Glucose.: 97 mg/dL (19 Feb 2025 22:44)  POCT Blood Glucose.: 97 mg/dL (19 Feb 2025 21:52)  POCT Blood Glucose.: 114 mg/dL (19 Feb 2025 18:55)  POCT Blood Glucose.: 78 mg/dL (19 Feb 2025 17:50)  POCT Blood Glucose.: 195 mg/dL (19 Feb 2025 12:59)      IMAGING:                                   Patient

## 2025-03-19 NOTE — BH INPATIENT PSYCHIATRY DISCHARGE NOTE - NSBHSUBSTUSESTATEMENT_PSY_A_CORE
BE   Chief Complaint   Patient presents with    Back Pain    Neck Pain    Office Visit     Today's Date: 3/19/2025  Date of Injury: no  Initial Treatment Date: 2/26/2025    Date Last Seen: 2/26/2025   Mechanism of Onset: no    Referred by:Hali Lr DC  Primary Care Physician: Aura Whiteside APNP  Fullscript/ChiroUp Email: [email or web address]  Date informed consent signed: 2/26/2025 (Be)  Initial pain rating: 3/10  Visit Number of Current Episode: 02  Relevant insurance information (i.e. visits allowed per year):  30 visits/year     **Patient preferences:  **Things to consider for future:    Where is your pain located today? Back and neck  .  Patient rates her neck and back  pain today at 6/10 with 10 being worst  How did frequency, intensity of symptoms change since your last visit? No   Do you have any tingling or numbness? Where? How frequent?  right hip   Patient states she felt that the symptoms Slightly Improved  after the last visit.   What was the duration of relief after last treatment? 1 day then was a little muscle sore the next day  Any activities you were ABLE to do since last visit, but were difficult or caused increase in pain? All activities. She walked 6 miles over the weekend and hips are more sore today. Knee injections last week at [facility], made her knees feel better.  Any activities you were ABLE to do since last visit without having any pain? None  .  Any activities you were UNABLE to do since last visit because you felt it would increase pain? Going down the stairs .  Were you able to complete any home treatment since your last visit, such as ice/heat/exercises? Ice and heat  .  The patient denies any other adverse events following last treatment or any other new/changing symptoms.    Patient Emotional screening was completed (3/19/2025) ;    During the past 24 hours, how has pain interfered with normal activities: 7/10  During the past 24 hours, how has pain interfered with  your sleep: 8/10  During the past 24 hours, how has pain affected your mood: 5/10  During the past 24 hours, how has pain contributed to your stress: 7/10    DoD/VA Pain Supplemental Questionnaire score was 27/40     SUBJECTIVE:  The patient is a pleasant 52 year old female that presents to our office today for an evaluation and treatment of back pain, both hip pain and both knee pain .    CHIROPRACTIC PATIENT HISTORY:   Patient rates her low back and hip pain today at 7/10 with 10 being worst, and states that the pain is occasionally (26-50% of the time).    Patient describes her pain as sore and numb.  Patient reports tearing both menisci at the start of COVID and went to PT but not for very long.  She did not have hip pain at that point.  She has been treated at [facility] for her bilateral hip pain, doing injections over the course of time to 15 weeks with no durable pain relief.  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  She tries to get up every 2 hours to stretch.  She has 2 goals in mind 1 to be able to put her socks on, and the other is to be able to walk across the football field on senior night for her son this fall.  History of vitamin D deficiency, type 2 diabetes, anxiety and depression.  Her BMI is 51.30.  Patient is perimenopausal.    On roughly what date did your present pain start: 6 months ago    How did your pain start? Gradually.  Over time are your symptoms: Getting worse.  Rate how bad your symptoms are; 0 being no pain and 10 unbearable at their:  Worst: 8/10 for low back   Best: 2/10  Patient states that the pain is worse when: standing, sitting, walking, exercising, lying down, and sleeping.  Do you ever get pain, numbness or tingling in your arms/legs/hands/feet?  If so, where? Low back feels numb occasionally.  Patient states to reduce pain she has tried heat and ice and ibuprofen  .  Patient has sought chiropractic treatment in the past? Yes, 17 years ago .    Are you still working? Yes.  Patient’s current work status: Full-Time.  Occupation: office job, working from home   How much does your job require you to lift? 5lbs  Are you on work restrictions? Yes, just working from home .  Are you totally disabled from work? No.    Do you currently smoke? Yes.  How much water per day do you drink? 30-40 oz  How much caffeine do you drink?  (soda and/or coffee) 24 oz she has 1 coffee and 1 diet Dr. Pepper each day  What, if any, exercise routine do you have? no  What supplements do you take? Vitamin D 5000 IU  Diagnostic studies/imaging of area of concern: Yes, Xray for hips only on her phone. (Listed below under Dx studies/Imaging) MRIs of bilateral knees positive for meniscal tears and arthritis.  Hospitalizations/Urgent Care/Emergency Department: No.     Patient denies the following: History of cancer, recent unexplained weight loss, bowel/bladder retention/incontinence, saddle anesthesia, fever/chills/nausea/vomitting, lower extremity weakness, upper extremity weakness, headache, chest pain, shortness of breath, double vision, photo/phono-phobia, difficulty speaking/swallowing, facial numbness, loss of fine motor control, or balance problems      Patient Emotional screening was completed (2/26/2025) ;    During the past 24 hours, how has pain interfered with normal activities: 7/10  During the past 24 hours, how has pain interfered with your sleep: 7/10  During the past 24 hours, how has pain affected your mood: 7/10  During the past 24 hours, how has pain contributed to your stress: 7/10    DoD/VA Pain Supplemental Questionnaire score was 28/40    INITIAL OBJECTIVE FINDINGS:     Objective Rating Index is       OWI: 62% (2/26/2025)            REVIEW OF SYSTEMS:  Reviewed in SMartChart  MEDICATIONS:  Reviewed in SmartChart.  ALLERGIES:  Reviewed in SmartChart.    INITIAL OBJECTIVE  FINDINGS:  Complicating FactorsRelevant co-morbidities: BMI of 51, anxiety depression, DJD bilateral hips and bilateral knees, type 2 diabetes mellitus, vitamin D deficiency.    POSTURE:   The patient is alert and oriented x3. she is pleasant and in no acute distress. she sits comfortably during the history and transitions stiffly out of the chair. she demonstrates a stiff, wobbling side-to-side, but nonantalgic gait. Standing postural examination reveals moderate upper crossed type posture including anterior head carriage, forward rolled shoulders and thoracic hyperkyphosis.  There is anterior weightbearing and positive Valdes sign indicating thoracolumbar scoliosis, balance is poor.    ROM: range of motion was observed (not measured with goniometer), the following table represents estimated reduction from normal range:       LUMBAR SPINE     Degree Pain   Flexion 30 Pain across low back   Extension 0 Pain across low back   Left SB 0 Pain across low back   Right SB 10 Pain across low back   Left SB/E 0 Pain across low back   Right SB/E 0 Pain across low back      MOTOR EXAMINATION:  LOWER EXTREMITY         LEFT RIGHT   Iliopsoas 5/5 5/5   Quadriceps 5/5 5/5   Ankle Inv/eversion 5/5 5/5   Foot EHL 5/5 5/5   Gastrocnemius 5/5 5/5      PULSES         LEFT RIGHT        Dorsalis Pedis 2/4 2/4   Posterior Tibial 2/4 2/4           ORTHOPEDIC TESTS:  Straight Leg Raise:negative  Braggard's: Negative  Heel to buttock: Positive for back pain  Slump test: Negative  Sacroiliac Joint tenderness bilaterally left greater than right  Yessica's test: Positive bilaterally  Babinski: Absent    NEUROLOGIC TESTS:  Right Deep tendon reflexes: Right- Patellar: 2+;   Achilles:2+   Left Deep tendon reflexes: Left- Patellar: 2+;   Achilles:2+   Temperature:  normal to touch bilateral  lower extremities  Edema - none  Skin - normal in all extremities  Light Touch Intact:  bilateral lower extremities       Today's Problem focused examination  revealed:    (Thoracic spine) Thoracic spine facet joint function is within normal limits except for T3/T4, T5/T6, T6/T7, T7/T8, T8/T9, T9/T10, T10/T11, T11/T12 that exhibited limited passive range of motion and segmental restriction and tenderness upon palpation; The following muscles were examined for normal flexibility and tone; bilateral thoracolumbar paraspinals. These muscles were within normal limits except for right thoracic paraspinals and left thoracic paraspinals that exhibited limited flexibility and abnormal resting tone.    (Lumbar and Pelvic spine) Lumbar spine facet joint function is within normal limits except for T12/L1, L1/L2, L2/L3, L3/L4, L4/L5, and L5/S1 that exhibited limited passive range of motion and segmental restriction and tenderness on palpation; sacroiliac joint function is within normal limits except for left sacroiliac joint and right sacroiliac joint that exhibited limited passive range of motion and joint restriction; The following muscles were examined for flexibility and tone at rest; bilateral lumbosacral paraspinals.  These muscles were found to be within normal limits except for right hip flexor, left hip flexor, right hip rotator, left hip rotator, right piriformis, left piriformis, right hamstring, left hamstring, right lumbar paraspinals, left lumbar paraspinals, right gluteus medius, and left gluteus medius that exhibited limited flexibility and were hypertonic at rest.    (Hip)  Range of motion is within normal limits except for left hip internal rotation, right hip internal rotation, left hip external rotation, right hip external rotation, left abduction, and right abduction that is limited.    DIRECTIONAL PREFERENCE:  The patient does not demonstrate a directional preference    DX STUDIES/IMAGING:  XR LUMBAR SPINE AP LATERAL AND OBLIQUES  History:  chronic low back pain with numbness Right lower back pain x 6 months, no  injury. Hx of arthritis  M54.50 Chronic bilateral  low back pain without  sciatica  Comparison:  None.  FINDINGS:  Frontal, lateral, oblique, and lumbosacral views are provided.There are 5  nonrib-bearing lumbar type vertebral bodies present. There is S-shaped  curvature. There is no acute appearing fracture or visible spondylolysis.  Lordosis is normal. There is moderate lumbosacral disc height loss. Facet  arthropathy is present mostly L3-S1, greater caudally.  IMPRESSION:  Spinal curvature with variable spondylosis.         ASSESSMENT:  1. Chronic bilateral low back pain without sciatica    2. Segmental dysfunction of lumbar region    3. Segmental dysfunction of sacral region    4. Segmental dysfunction of thoracic region    5. Myofascial pain    6. Gait disturbance    7. Impaired functional mobility, balance, gait, and endurance        Chiropractic care for the above listed conditions will involve manual therapy for correction of aberrant thoracic, lumbar, and sacral segmental motion and symptom relief, myofascial release techniques for release of affected hypertonic musculature, and exercise therapy to improve activation of inhibited musculature.    ACTION:  Possible complications, probability of complications, risks of remaining untreated, alternative treatment options, and benefits of treatment were discussed with the patient. Informed consent signed and uploaded into media tab.     Following reassessment of joint function and soft tissue tonicity, Monica was treated with right lateral flexion, left lateral flexion, and lumbar axial distraction  spinal manipulation utilizing De La Cruz Protocol 2 to stretch thoracic, lumbar, and sacral paraspinal muscles, to increase intersegmental joint function, and to decrease intradiscal pressure and neural tension of her thoracic, lumbar, and sacral .  Monica was also treated with Passive myofascial release and Pin and Stretch for 15 minutes to the above listed muscles noted as taut in objective findings to increase  circulation, improve flexibility and decrease strain to associated spinal structures.    Weinberg drop spinal mobilization was applied to the sacral spine.      ChiroUp condition report: The patient was given a summary of their diagnosis and my proposed management, including the activity of living advice plus written descriptions and demonstrations of home-based rehabilitation exercises consisting of: * Hamstring Doorway Stretch (1 sets of 3 contract/relax cycles, 2 times per day) * Dead Bug - Basic (3 sets of 10 reps, 2 times per day) * Psoas Stretch- Standing (1 sets of 3 contract/relax cycles, 2 times per day) * Side Bridge (3 sets of 10 reps, 1 times per day) * Bird Dog (3 sets of 10 reps, 2 times per day) * Diaphragm Breathing (1 sets of 2 breaths, 1 times per hour) * Curl Up (3 sets of 10 reps, 1 times per day) * Scoliosis- Stick Push (3 sets of 10 reps, 2 times per day) * Bird Dog Standing (3 sets of 10 reps, 2 times per day) * Tri-Planar Psoas Stretch (3 sets of 10 reps, 2 times per day)   .  PLAN:  It was recommended that Monica Cardoza continue performance of the prescribed home exercise program. Ice as needed for any residual soreness post-treatment and call our office with any problems, questions, or worsening of symptoms.     Goals of Care: Goal of care is to improve muscular and skeletal function and provide symptom relief, and to reduce the JOSE disability Index by 10 points by 4 weeks.    Follow up with me 3 times a week for 2 weeks then reduce frequency.  We discussed anti-inflammatory lifestyle, increasing water to 80 ounces per day, making sure that she is taking 5000 IUs of vitamin D per day and retest mid summer.  We also discussed getting up every 20 minutes from the seated position while working. Recommend following up with Orthopedics for bilateral hips and knees.    Following treatment, back pain is initially better.  Call our office with any concerns: [facility] [phone], or  message in MyChart.      Total visit time 30 minutes. In addition to face to face time, this may include time spent reviewing past medical documents, labs, imaging, referral notes; and coordination of care.     I have reviewed and agree with my Chiropractic Technician’s note.    .

## 2025-04-07 NOTE — BH INPATIENT PSYCHIATRY PROGRESS NOTE - PRN MEDS
MEDICATIONS  (PRN):  acetaminophen     Tablet .. 650 milliGRAM(s) Oral every 6 hours PRN Temp greater or equal to 38C (100.4F), Mild Pain (1 - 3), Moderate Pain (4 - 6), Severe Pain (7 - 10)  benzocaine 15 mG/menthol 3.6 mG Lozenge 1 Lozenge Oral every 2 hours PRN cough  dextrose Oral Gel 15 Gram(s) Oral once PRN Blood Glucose LESS THAN 70 milliGRAM(s)/deciliter  gabapentin 300 milliGRAM(s) Oral every 6 hours PRN anxiety/agitation  guaiFENesin Oral Liquid (Sugar-Free) 100 milliGRAM(s) Oral every 6 hours PRN cough  melatonin. 5 milliGRAM(s) Oral at bedtime PRN Insomnia  OLANZapine Disintegrating Tablet 2.5 milliGRAM(s) Oral every 4 hours PRN agitation  OLANZapine Injectable 5 milliGRAM(s) IntraMuscular once PRN severe agitation  QUEtiapine 50 milliGRAM(s) Oral every 4 hours PRN anxiety/agitation  sodium chloride 0.65% Nasal 1 Spray(s) Both Nostrils four times a day PRN Nasal Congestion   TRIPP/ALFONSO/Kyara

## 2025-06-02 NOTE — BH PATIENT PROFILE - HAS THE PATIENT EXPERIENCED ANY OF THE FOLLOWING WITHIN THE WEEK PRIOR TO ADMISSION?
June 2, 2025     Patient: Sanjiv Kay  YOB: 2017  Date of Visit: 6/2/2025      To Whom it May Concern:    Sanjiv Kay is under my professional care. Sanjiv was seen in my office on 6/2/2025. Sanjiv may return to school on 6/3/2025.    If you have any questions or concerns, please don't hesitate to call.         Sincerely,          SHOSHANA Velásquez        CC: No Recipients  
no

## 2025-06-10 ENCOUNTER — EMERGENCY (EMERGENCY)
Facility: HOSPITAL | Age: 60
LOS: 1 days | End: 2025-06-10
Attending: STUDENT IN AN ORGANIZED HEALTH CARE EDUCATION/TRAINING PROGRAM | Admitting: STUDENT IN AN ORGANIZED HEALTH CARE EDUCATION/TRAINING PROGRAM
Payer: COMMERCIAL

## 2025-06-10 VITALS
RESPIRATION RATE: 18 BRPM | SYSTOLIC BLOOD PRESSURE: 158 MMHG | HEIGHT: 70 IN | OXYGEN SATURATION: 95 % | HEART RATE: 108 BPM | TEMPERATURE: 99 F | WEIGHT: 225.09 LBS | DIASTOLIC BLOOD PRESSURE: 93 MMHG

## 2025-06-10 VITALS
OXYGEN SATURATION: 100 % | TEMPERATURE: 98 F | SYSTOLIC BLOOD PRESSURE: 145 MMHG | HEART RATE: 95 BPM | RESPIRATION RATE: 18 BRPM | DIASTOLIC BLOOD PRESSURE: 84 MMHG

## 2025-06-10 PROCEDURE — 99283 EMERGENCY DEPT VISIT LOW MDM: CPT

## 2025-06-10 PROCEDURE — 99284 EMERGENCY DEPT VISIT MOD MDM: CPT

## 2025-06-10 RX ORDER — CARVEDILOL 3.12 MG/1
1 TABLET, FILM COATED ORAL
Qty: 60 | Refills: 0
Start: 2025-06-10 | End: 2025-07-09

## 2025-06-10 RX ORDER — CARVEDILOL 3.12 MG/1
6.25 TABLET, FILM COATED ORAL ONCE
Refills: 0 | Status: COMPLETED | OUTPATIENT
Start: 2025-06-10 | End: 2025-06-10

## 2025-06-10 RX ORDER — ASPIRIN 325 MG
81 TABLET ORAL ONCE
Refills: 0 | Status: COMPLETED | OUTPATIENT
Start: 2025-06-10 | End: 2025-06-10

## 2025-06-10 RX ORDER — ATORVASTATIN CALCIUM 80 MG/1
40 TABLET, FILM COATED ORAL ONCE
Refills: 0 | Status: COMPLETED | OUTPATIENT
Start: 2025-06-10 | End: 2025-06-10

## 2025-06-10 RX ORDER — LISINOPRIL 5 MG/1
20 TABLET ORAL ONCE
Refills: 0 | Status: COMPLETED | OUTPATIENT
Start: 2025-06-10 | End: 2025-06-10

## 2025-06-10 RX ORDER — CHLORDIAZEPOXIDE HCL 10 MG
75 CAPSULE ORAL ONCE
Refills: 0 | Status: DISCONTINUED | OUTPATIENT
Start: 2025-06-10 | End: 2025-06-10

## 2025-06-10 RX ORDER — LISINOPRIL 5 MG/1
1 TABLET ORAL
Qty: 30 | Refills: 0
Start: 2025-06-10 | End: 2025-07-09

## 2025-06-10 RX ORDER — GABAPENTIN 400 MG/1
1 CAPSULE ORAL
Qty: 20 | Refills: 0
Start: 2025-06-10 | End: 2025-06-19

## 2025-06-10 RX ORDER — ASPIRIN 325 MG
1 TABLET ORAL
Qty: 30 | Refills: 0
Start: 2025-06-10 | End: 2025-07-09

## 2025-06-10 RX ORDER — GABAPENTIN 400 MG/1
1 CAPSULE ORAL
Qty: 21 | Refills: 0
Start: 2025-06-10 | End: 2025-06-16

## 2025-06-10 RX ORDER — ATORVASTATIN CALCIUM 80 MG/1
1 TABLET, FILM COATED ORAL
Qty: 30 | Refills: 0
Start: 2025-06-10 | End: 2025-07-09

## 2025-06-10 RX ADMIN — ATORVASTATIN CALCIUM 40 MILLIGRAM(S): 80 TABLET, FILM COATED ORAL at 19:13

## 2025-06-10 RX ADMIN — Medication 75 MILLIGRAM(S): at 19:12

## 2025-06-10 RX ADMIN — LISINOPRIL 20 MILLIGRAM(S): 5 TABLET ORAL at 19:12

## 2025-06-10 RX ADMIN — Medication 81 MILLIGRAM(S): at 19:13

## 2025-06-10 RX ADMIN — CARVEDILOL 6.25 MILLIGRAM(S): 3.12 TABLET, FILM COATED ORAL at 19:24

## 2025-06-10 NOTE — ED PROVIDER NOTE - PATIENT PORTAL LINK FT
You can access the FollowMyHealth Patient Portal offered by [facility] by registering at the following website: [email or web address]. By joining [facility]’s FollowMyHealth portal, you will also be able to view your health information using other applications (apps) compatible with our system.

## 2025-06-10 NOTE — ED PROVIDER NOTE - CLINICAL SUMMARY MEDICAL DECISION MAKING FREE TEXT BOX
no acute medical complain. pt requests to take klonopin but no documentation. will give some librium for anxiety and hand tremor for possible mild etoh withdrawal. will prescribe his daily meds. pt to fu with pcp. return precaution given. will dc

## 2025-06-10 NOTE — ED ADULT TRIAGE NOTE - CHIEF COMPLAINT QUOTE
Pt states " I was discharged last Monday and I didn't get any medication that I got from the hospital". Pt A&Ox4, NAD. Denies any other complaints.

## 2025-06-10 NOTE — ED PROVIDER NOTE - NSFOLLOWUPINSTRUCTIONS_ED_ALL_ED_FT
please follow up with your primary care. Please return to the ED if you notice worsening pain or other symptoms of concern.

## 2025-06-10 NOTE — ED PROVIDER NOTE - PHYSICAL EXAMINATION
VITAL SIGNS: I have reviewed nursing notes and confirm.  CONSTITUTIONAL: Well appearing, in no acute distress.   SKIN:  warm and dry, no acute rash.   HEAD:  normocephalic, atraumatic.  EYES: EOM intact; conjunctiva and sclera clear.  ENT: No nasal discharge; airway clear.   NECK: Supple.  CARD: S1, S2, Regular rate and rhythm.   RESP:  Clear to auscultation b/l, no wheezes, rales or rhonchi.  ABD: Normal bowel sounds; soft; non-distended; non-tender; no guarding/ rebound.  EXT: Normal ROM. No peripheral edema. Pulses intact and equal b/l.  NEURO: Alert, oriented, grossly unremarkable. some hand tremor

## 2025-06-10 NOTE — ED PROVIDER NOTE - OBJECTIVE STATEMENT
59 M, hx of polysubstance use, multiple psych disorder, HTN, HLD, DM, p/w meds refill. States he was admitted to [facility] for bipolar x 2 months. after he was discharged, he wasn't able to fill his meds. States he's been off his meds for 9 days. requesting to take all his daily meds now. last etoh use today.

## 2025-06-12 DIAGNOSIS — I10 ESSENTIAL (PRIMARY) HYPERTENSION: ICD-10-CM

## 2025-06-12 DIAGNOSIS — Z88.8 ALLERGY STATUS TO OTHER DRUGS, MEDICAMENTS AND BIOLOGICAL SUBSTANCES: ICD-10-CM

## 2025-06-12 DIAGNOSIS — E11.9 TYPE 2 DIABETES MELLITUS WITHOUT COMPLICATIONS: ICD-10-CM

## 2025-06-12 DIAGNOSIS — Z88.4 ALLERGY STATUS TO ANESTHETIC AGENT: ICD-10-CM

## 2025-06-12 DIAGNOSIS — F17.200 NICOTINE DEPENDENCE, UNSPECIFIED, UNCOMPLICATED: ICD-10-CM

## 2025-07-16 NOTE — BH TREATMENT PLAN - NSDCCRITERIA_PSY_ALL_CORE
No
no longer endorsing suicidal or homicidal ideation, intent, or plan, less preoccupied with chronic persecutory delusions, less disorganized, safe dispo 